# Patient Record
Sex: MALE | Race: WHITE | NOT HISPANIC OR LATINO | Employment: OTHER | URBAN - METROPOLITAN AREA
[De-identification: names, ages, dates, MRNs, and addresses within clinical notes are randomized per-mention and may not be internally consistent; named-entity substitution may affect disease eponyms.]

---

## 2017-07-21 ENCOUNTER — ALLSCRIPTS OFFICE VISIT (OUTPATIENT)
Dept: OTHER | Facility: OTHER | Age: 62
End: 2017-07-21

## 2018-01-13 VITALS
SYSTOLIC BLOOD PRESSURE: 132 MMHG | WEIGHT: 261.5 LBS | HEIGHT: 74 IN | BODY MASS INDEX: 33.56 KG/M2 | DIASTOLIC BLOOD PRESSURE: 76 MMHG | HEART RATE: 96 BPM

## 2018-02-22 ENCOUNTER — OFFICE VISIT (OUTPATIENT)
Dept: OBGYN CLINIC | Facility: CLINIC | Age: 63
End: 2018-02-22
Payer: MEDICARE

## 2018-02-22 VITALS
HEIGHT: 73 IN | SYSTOLIC BLOOD PRESSURE: 97 MMHG | DIASTOLIC BLOOD PRESSURE: 66 MMHG | BODY MASS INDEX: 34.59 KG/M2 | HEART RATE: 101 BPM | WEIGHT: 261 LBS

## 2018-02-22 DIAGNOSIS — M19.011 PRIMARY OSTEOARTHRITIS OF RIGHT SHOULDER: Primary | ICD-10-CM

## 2018-02-22 PROCEDURE — 20610 DRAIN/INJ JOINT/BURSA W/O US: CPT | Performed by: ORTHOPAEDIC SURGERY

## 2018-02-22 PROCEDURE — 99214 OFFICE O/P EST MOD 30 MIN: CPT | Performed by: ORTHOPAEDIC SURGERY

## 2018-02-22 RX ORDER — LIDOCAINE HYDROCHLORIDE 10 MG/ML
4 INJECTION, SOLUTION INFILTRATION; PERINEURAL
Status: COMPLETED | OUTPATIENT
Start: 2018-02-22 | End: 2018-02-22

## 2018-02-22 RX ORDER — BUPIVACAINE HYDROCHLORIDE 2.5 MG/ML
4 INJECTION, SOLUTION INFILTRATION; PERINEURAL
Status: COMPLETED | OUTPATIENT
Start: 2018-02-22 | End: 2018-02-22

## 2018-02-22 RX ORDER — SIMVASTATIN 40 MG
1 TABLET ORAL
COMMUNITY
Start: 2013-10-16 | End: 2022-02-01 | Stop reason: HOSPADM

## 2018-02-22 RX ORDER — GLYBURIDE-METFORMIN HYDROCHLORIDE 2.5; 5 MG/1; MG/1
1 TABLET ORAL 2 TIMES DAILY
COMMUNITY
Start: 2013-06-07 | End: 2022-02-01 | Stop reason: HOSPADM

## 2018-02-22 RX ORDER — LISINOPRIL 30 MG/1
1 TABLET ORAL DAILY
COMMUNITY
End: 2018-10-23

## 2018-02-22 RX ORDER — BLOOD-GLUCOSE METER
EACH MISCELLANEOUS DAILY
COMMUNITY
Start: 2013-06-07 | End: 2018-10-22

## 2018-02-22 RX ORDER — LANCETS 33 GAUGE
EACH MISCELLANEOUS
COMMUNITY
Start: 2013-06-20 | End: 2018-10-22

## 2018-02-22 RX ORDER — TRIAMCINOLONE ACETONIDE 40 MG/ML
40 INJECTION, SUSPENSION INTRA-ARTICULAR; INTRAMUSCULAR
Status: COMPLETED | OUTPATIENT
Start: 2018-02-22 | End: 2018-02-22

## 2018-02-22 RX ADMIN — TRIAMCINOLONE ACETONIDE 40 MG: 40 INJECTION, SUSPENSION INTRA-ARTICULAR; INTRAMUSCULAR at 15:44

## 2018-02-22 RX ADMIN — LIDOCAINE HYDROCHLORIDE 4 ML: 10 INJECTION, SOLUTION INFILTRATION; PERINEURAL at 15:44

## 2018-02-22 RX ADMIN — BUPIVACAINE HYDROCHLORIDE 4 ML: 2.5 INJECTION, SOLUTION INFILTRATION; PERINEURAL at 15:44

## 2018-02-22 NOTE — PROGRESS NOTES
CHIEF COMPLAINT:   Chief Complaint   Patient presents with    Right Shoulder - Pain, Follow-up       HISTORY: Ashutosh Echols is a 63 y o  male here for follow-up of right shoulder pain  He was last seen approximately 7 months ago  Cortisone injection was performed at that time  He reports mild, short-term relief after that injection  He reports insidious recurrence of pain in the right shoulder  He has stiffness in the joint  Pain is worse with activities  No new injuries  ROS: Other than what is noted in the history of present illness 12 point review of systems was obtained and was otherwise negative      PROBLEMS:   Patient Active Problem List   Diagnosis    Primary osteoarthritis of right shoulder       MEDS:   Current Outpatient Prescriptions   Medication Sig Dispense Refill    Blood Glucose Monitoring Suppl (ONE TOUCH ULTRA 2) w/Device KIT by In Vitro route daily      glyBURIDE-metFORMIN (GLUCOVANCE) 2 5-500 MG per tablet Take 1 tablet by mouth Twice daily      ONETOUCH DELICA LANCETS 90O MISC by Does not apply route      simvastatin (ZOCOR) 40 mg tablet Take 1 tablet by mouth daily      lisinopril (ZESTRIL) 30 mg tablet Take 1 tablet by mouth daily       No current facility-administered medications for this visit  ALLERGIES: Patient has no known allergies  MEDICAL HISTORY:   Past Medical History:   Diagnosis Date    Depression     Hypertension        SOCIAL:   Social History     Social History    Marital status: /Civil Union     Spouse name: N/A    Number of children: N/A    Years of education: N/A     Occupational History    Not on file       Social History Main Topics    Smoking status: Former Smoker     Quit date: 3/1/2012    Smokeless tobacco: Not on file    Alcohol use Not on file    Drug use: Unknown    Sexual activity: Not on file     Other Topics Concern    Not on file     Social History Narrative    No narrative on file       The medications, allergies, and history as listed above were all reviewed by myself during this encounter      PHYSICAL EXAM:  Vitals:   Vitals:    02/22/18 1518   BP: 97/66   BP Location: Left arm   Patient Position: Sitting   Cuff Size: Large   Pulse: 101   Weight: 118 kg (261 lb)   Height: 6' 1" (1 854 m)     Body mass index is 34 43 kg/m²  General:  Alert, no distress    ORTHO EXAM:   Right shoulder:  Skin is intact  Mild anterior joint tenderness  Active and passive forward elevation or both 120°  Active and passive external rotation are both to neutral  Strength is 5/5 in elevation, internal rotation, external rotation  Sensation intact to light touch in all nerve distributions  Hand is warm and well perfused  ASSESSMENT AND PLAN:  Prosper Ramesh was seen today for pain and follow-up  Diagnoses and all orders for this visit:    Primary osteoarthritis of right shoulder        Patient Instructions   We discussed options for treatment  Options include continued conservative measures versus surgical intervention  This time the decision was made for repeat cortisone injection  Injection was performed in the right shoulder today  Return if symptoms worsen or fail to improve      Large joint arthrocentesis  Date/Time: 2/22/2018 3:44 PM  Consent given by: patient  Timeout: Immediately prior to procedure a time out was called to verify the correct patient, procedure, equipment, support staff and site/side marked as required   Supporting Documentation  Indications: pain   Procedure Details  Location: shoulder - R glenohumeral  Preparation: Patient was prepped and draped in the usual sterile fashion  Needle size: 22 G  Ultrasound guidance: no  Approach: anterior  Medications administered: 4 mL bupivacaine 0 25 %; 4 mL lidocaine 1 %; 40 mg triamcinolone acetonide 40 mg/mL    Patient tolerance: patient tolerated the procedure well with no immediate complications  Dressing:  Sterile dressing applied

## 2018-02-22 NOTE — PATIENT INSTRUCTIONS
We discussed options for treatment  Options include continued conservative measures versus surgical intervention  This time the decision was made for repeat cortisone injection  Injection was performed in the right shoulder today

## 2018-07-18 ENCOUNTER — OFFICE VISIT (OUTPATIENT)
Dept: OBGYN CLINIC | Facility: MEDICAL CENTER | Age: 63
End: 2018-07-18
Payer: MEDICARE

## 2018-07-18 ENCOUNTER — APPOINTMENT (OUTPATIENT)
Dept: RADIOLOGY | Facility: MEDICAL CENTER | Age: 63
End: 2018-07-18
Payer: MEDICARE

## 2018-07-18 VITALS
HEIGHT: 73 IN | SYSTOLIC BLOOD PRESSURE: 132 MMHG | WEIGHT: 255.2 LBS | BODY MASS INDEX: 33.82 KG/M2 | HEART RATE: 99 BPM | DIASTOLIC BLOOD PRESSURE: 83 MMHG

## 2018-07-18 DIAGNOSIS — M25.561 ACUTE PAIN OF RIGHT KNEE: Primary | ICD-10-CM

## 2018-07-18 DIAGNOSIS — M25.561 ACUTE PAIN OF RIGHT KNEE: ICD-10-CM

## 2018-07-18 DIAGNOSIS — M17.11 PRIMARY OSTEOARTHRITIS OF RIGHT KNEE: ICD-10-CM

## 2018-07-18 PROCEDURE — 99214 OFFICE O/P EST MOD 30 MIN: CPT | Performed by: ORTHOPAEDIC SURGERY

## 2018-07-18 PROCEDURE — 73562 X-RAY EXAM OF KNEE 3: CPT

## 2018-07-18 RX ORDER — ACETAMINOPHEN 325 MG/1
975 TABLET ORAL ONCE
Status: CANCELLED | OUTPATIENT
Start: 2018-07-18 | End: 2018-07-18

## 2018-07-18 RX ORDER — FERROUS SULFATE TAB EC 324 MG (65 MG FE EQUIVALENT) 324 (65 FE) MG
324 TABLET DELAYED RESPONSE ORAL
Qty: 60 TABLET | Refills: 0 | Status: SHIPPED | OUTPATIENT
Start: 2018-07-18 | End: 2019-02-06

## 2018-07-18 RX ORDER — FOLIC ACID 1 MG/1
1 TABLET ORAL DAILY
Qty: 30 TABLET | Refills: 0 | Status: SHIPPED | OUTPATIENT
Start: 2018-07-18 | End: 2018-08-01

## 2018-07-18 RX ORDER — ASCORBIC ACID 500 MG
500 TABLET ORAL DAILY
Qty: 60 TABLET | Refills: 0 | Status: SHIPPED | OUTPATIENT
Start: 2018-07-18 | End: 2019-02-06

## 2018-07-18 RX ORDER — SODIUM CHLORIDE 9 MG/ML
125 INJECTION, SOLUTION INTRAVENOUS CONTINUOUS
Status: CANCELLED | OUTPATIENT
Start: 2018-07-18 | End: 2019-07-18

## 2018-07-18 NOTE — PROGRESS NOTES
Orthopedics          Randell Villatoro 66 y o  male MRN: [id number]      Chief Complaint:   right knee pain    HPI:   66 y o male complaining of right knee pain  Patient has been diagnosed with right knee pain due to osteoarthritis in the past   Patient has had an intra-articular injection of past with minimal pain relief  Patient states his right knee pain is worse with weight-bearing worse with activity worse with bending worse with going up and down stairs pain is aching in nature mildly relieved with rest   Patient denies any significant instability of his right knee states having significant decreasing range of motion of his right knee  He denies numbness tingling denies radiation of pain                  Review Of Systems:   · Skin: Normal  · Neuro: See HPI  · Musculoskeletal: See HPI  · 14 point review of systems negative except as stated above     Past Medical History:   Past Medical History:   Diagnosis Date    Depression     Hypertension        Past Surgical History:   Past Surgical History:   Procedure Laterality Date    BACK SURGERY      HAND SURGERY      KNEE SURGERY      SHOULDER SURGERY      TOOTH EXTRACTION         Family History:  Family history reviewed and non-contributory  Family History   Problem Relation Age of Onset    No Known Problems Mother     Heart attack Father        Social History:  Social History     Social History    Marital status: /Civil Union     Spouse name: N/A    Number of children: N/A    Years of education: N/A     Social History Main Topics    Smoking status: Former Smoker     Quit date: 3/1/2012    Smokeless tobacco: Never Used    Alcohol use None    Drug use: Unknown    Sexual activity: Not Asked     Other Topics Concern    None     Social History Narrative    None       Allergies:   No Known Allergies    Labs:  No results found for: HCT, HGB, PT, INR, WBC, ESR, CRP    Meds:    Current Outpatient Prescriptions:     Blood Glucose Monitoring Suppl (ONE TOUCH ULTRA 2) w/Device KIT, by In Vitro route daily, Disp: , Rfl:     glyBURIDE-metFORMIN (GLUCOVANCE) 2 5-500 MG per tablet, Take 1 tablet by mouth Twice daily, Disp: , Rfl:     lisinopril (ZESTRIL) 30 mg tablet, Take 1 tablet by mouth daily, Disp: , Rfl:     ONETOUCH DELICA LANCETS 19I MISC, by Does not apply route, Disp: , Rfl:     simvastatin (ZOCOR) 40 mg tablet, Take 1 tablet by mouth daily, Disp: , Rfl:       Physical Exam:     General Appearance:    Alert, cooperative, no distress, appears stated age   Head:    Normocephalic, without obvious abnormality, atraumatic   Eyes:    conjunctiva/corneas clear, both eyes        Nose:   Nares normal, septum midline, no drainage    Throat:   Lips normal; teeth and gums normal   Neck:    symmetrical, trachea midline, ;     thyroid:  no enlargement/   Back:     Symmetric, no curvature, ROM normal   Lungs:   No audible wheezing or labored breathing   Chest Wall:    No tenderness or deformity    Heart:    Regular rate and rhythm               Pulses:   2+ and symmetric all extremities   Skin:   Skin color, texture, turgor normal, no rashes or lesions   Neurologic:   normal strength, sensation and reflexes     throughout       Musculoskeletal: right lower extremity    Examination of the patient's right knee no effusion varus deformity 20° flexion contracture and 90° extension contracture  Pain on palpation medial lateral joint lines  Stable to varus and valgus stress with mild laxity valgus stress negative anterior posterior drawer quadriceps hamstring strength 5 out of ankle dorsal plantar flexion strength 5/5 no pain palpation pes anserine bursa is no pain palpation distal ITB band    Radiology:   I personally reviewed the films    X-rays right knee shows severe osteoarthritis medial joint space narrowing osteophyte formation and varus deformity    _*_*_*_*_*_*_*_*_*_*_*_*_*_*_*_*_*_*_*_*_*_*_*_*_*_*_*_*_*_*_*_*_*_*_*_*_*_*_*_*_*    Assessment:  63 y o male with right knee pain osteoarthritis    Plan:   · Weight bearing as tolerated  right lower extremity  · Patient is a candidate for right total knee arthroplasty  · Operative and non operative intervention reviewed with patient  Patient wishes to pursue operative intervention including total hknee arthroplasty  Risks and benefits of the procedure reviewed with the patient in great length  Risks include, but are not limited to, infection, bleeding, DVT/PE, gait abnormality, Limp, chronic pain, stiffness, dislocation, instability, fracture, failure of hardware, neurovascular injury, compartment syndrome, decreased range of motion, loss of limb and failure to achieve the desired results  Patient is aware of the risks and is willing to proceed  We'll followup with the patient in the postoperative period    · Follow up in 3 months or sooner if needed        Amanda Sweeney PA-C

## 2018-07-20 DIAGNOSIS — Z01.818 PRE-OP EXAM: Primary | ICD-10-CM

## 2018-07-24 ENCOUNTER — TELEPHONE (OUTPATIENT)
Dept: PREADMISSION TESTING | Facility: HOSPITAL | Age: 63
End: 2018-07-24

## 2018-07-24 LAB
LEFT EYE DIABETIC RETINOPATHY: NORMAL
RIGHT EYE DIABETIC RETINOPATHY: NORMAL
SEVERITY (EYE EXAM): NORMAL

## 2018-08-01 ENCOUNTER — OFFICE VISIT (OUTPATIENT)
Dept: OBGYN CLINIC | Facility: CLINIC | Age: 63
End: 2018-08-01
Payer: MEDICARE

## 2018-08-01 VITALS
HEIGHT: 74 IN | SYSTOLIC BLOOD PRESSURE: 134 MMHG | DIASTOLIC BLOOD PRESSURE: 87 MMHG | HEART RATE: 94 BPM | BODY MASS INDEX: 32.47 KG/M2 | WEIGHT: 253 LBS

## 2018-08-01 DIAGNOSIS — M25.511 CHRONIC RIGHT SHOULDER PAIN: ICD-10-CM

## 2018-08-01 DIAGNOSIS — M19.011 PRIMARY OSTEOARTHRITIS OF RIGHT SHOULDER: Primary | ICD-10-CM

## 2018-08-01 DIAGNOSIS — G89.29 CHRONIC RIGHT SHOULDER PAIN: ICD-10-CM

## 2018-08-01 PROCEDURE — 20610 DRAIN/INJ JOINT/BURSA W/O US: CPT | Performed by: FAMILY MEDICINE

## 2018-08-01 PROCEDURE — 99213 OFFICE O/P EST LOW 20 MIN: CPT | Performed by: FAMILY MEDICINE

## 2018-08-01 RX ORDER — LIDOCAINE HYDROCHLORIDE 10 MG/ML
4 INJECTION, SOLUTION INFILTRATION; PERINEURAL
Status: COMPLETED | OUTPATIENT
Start: 2018-08-01 | End: 2018-08-01

## 2018-08-01 RX ORDER — TRIAMCINOLONE ACETONIDE 40 MG/ML
40 INJECTION, SUSPENSION INTRA-ARTICULAR; INTRAMUSCULAR
Status: COMPLETED | OUTPATIENT
Start: 2018-08-01 | End: 2018-08-01

## 2018-08-01 RX ADMIN — TRIAMCINOLONE ACETONIDE 40 MG: 40 INJECTION, SUSPENSION INTRA-ARTICULAR; INTRAMUSCULAR at 10:01

## 2018-08-01 RX ADMIN — LIDOCAINE HYDROCHLORIDE 4 ML: 10 INJECTION, SOLUTION INFILTRATION; PERINEURAL at 10:01

## 2018-08-01 NOTE — PROGRESS NOTES
Assessment/Plan:  Assessment/Plan   Diagnoses and all orders for this visit:    Primary osteoarthritis of right shoulder  -     Large joint arthrocentesis    Chronic right shoulder pain       60-year-old right-hand-dominant male with right shoulder pain  Discussed with patient physical exam, impression, and plan  Review of previous shoulder x-rays noted for  glenohumeral arthritis  His physical exam is noted for anterior glenohumeral tenderness and limited range of motion with forward flexion to 120° and abduction to 130° with internal rotation to the right buttock  He is noted to have 4+/ 5 strength supraspinatus and external rotation  Given the patient experienced improvement in pain after intra-articular injection July 2017, I offered patient corticosteroid injection to which he agreed  I administered mixture of 4 cc 1% lidocaine and 1 cc Kenalog to the glenohumeral joint without complication and upon reassessment he demonstrated improved range of motion with improved pain  He is advised to continue with activities tolerated he may follow up in 3 months at which point he will be re-evaluated  Subjective:   Patient ID: Simba Gerard is a 66 y o  male  Chief Complaint   Patient presents with    Right Shoulder - Pain        60-year-old right-hand-dominant male presents for right shoulder pain more than 1 year duration  He was last seen in this regard 1 year ago at which point he received intra-articular corticosteroid injection  He states that immediately after receiving the injection he experiences relief of pain  Pain gradually returned over the last month and is described as localized to anterior lateral aspect of the shoulder, achy and sometimes sharp, nonradiating, worse with elevating the arm, improved with rest, and associated with limited range of motion  He has had difficulty with reaching behind his back and with getting dressed    He has not been taking any medications to help with his pain       Shoulder Pain   This is a recurrent problem  The current episode started more than 1 year ago  The problem occurs intermittently  The problem has been waxing and waning  Associated symptoms include arthralgias and myalgias  Pertinent negatives include no numbness or weakness  Exacerbated by: Arm elevation  He has tried rest for the symptoms  The treatment provided no relief  Review of Systems   Musculoskeletal: Positive for arthralgias and myalgias  Neurological: Negative for weakness and numbness  Objective:  Vitals:    08/01/18 0928   BP: 134/87   Pulse: 94   Weight: 115 kg (253 lb)   Height: 6' 2" (1 88 m)     Right Elbow Exam     Tenderness   The patient is experiencing no tenderness  Range of Motion   The patient has normal right elbow ROM  Muscle Strength   The patient has normal right elbow strength  Right Shoulder Exam     Range of Motion   Active Abduction: 130   Passive Abduction: 140   Forward Flexion: 120   Right shoulder internal rotation 0 degrees: Right buttock  Muscle Strength   Abduction: 5/5   Internal Rotation: 5/5   Subscapularis: 5/5   Biceps: 5/5     Comments:   4+/ 5 strength supraspinatus and external rotation            Physical Exam   Constitutional: He is oriented to person, place, and time  He appears well-developed  No distress  HENT:   Head: Normocephalic and atraumatic  Eyes: Conjunctivae are normal    Neck: No tracheal deviation present  Cardiovascular: Normal rate  Pulmonary/Chest: Effort normal  No respiratory distress  Abdominal: He exhibits no distension  Neurological: He is alert and oriented to person, place, and time  Skin: Skin is warm and dry  Psychiatric: He has a normal mood and affect  His behavior is normal    Nursing note and vitals reviewed            Large joint arthrocentesis  Date/Time: 8/1/2018 10:01 AM  Consent given by: patient  Site marked: site marked  Timeout: Immediately prior to procedure a time out was called to verify the correct patient, procedure, equipment, support staff and site/side marked as required   Supporting Documentation  Indications: pain   Procedure Details  Location: shoulder - R glenohumeral  Preparation: Patient was prepped and draped in the usual sterile fashion  Needle size: 22 G  Approach: posterior  Medications administered: 4 mL lidocaine 1 %; 40 mg triamcinolone acetonide 40 mg/mL    Patient tolerance: patient tolerated the procedure well with no immediate complications  Dressing:  Sterile dressing applied

## 2018-10-15 ENCOUNTER — TELEPHONE (OUTPATIENT)
Dept: PREADMISSION TESTING | Facility: HOSPITAL | Age: 63
End: 2018-10-15

## 2018-10-16 ENCOUNTER — TELEPHONE (OUTPATIENT)
Dept: PREADMISSION TESTING | Facility: HOSPITAL | Age: 63
End: 2018-10-16

## 2018-10-17 DIAGNOSIS — Z01.818 PREOP EXAMINATION: Primary | ICD-10-CM

## 2018-10-19 ENCOUNTER — TELEPHONE (OUTPATIENT)
Dept: PREADMISSION TESTING | Facility: HOSPITAL | Age: 63
End: 2018-10-19

## 2018-10-22 ENCOUNTER — APPOINTMENT (OUTPATIENT)
Dept: LAB | Facility: HOSPITAL | Age: 63
End: 2018-10-22
Attending: PHYSICIAN ASSISTANT
Payer: MEDICARE

## 2018-10-22 ENCOUNTER — OFFICE VISIT (OUTPATIENT)
Dept: LAB | Facility: HOSPITAL | Age: 63
End: 2018-10-22
Attending: ORTHOPAEDIC SURGERY
Payer: MEDICARE

## 2018-10-22 DIAGNOSIS — Z01.818 PREOP EXAMINATION: ICD-10-CM

## 2018-10-22 DIAGNOSIS — M17.11 PRIMARY OSTEOARTHRITIS OF RIGHT KNEE: ICD-10-CM

## 2018-10-22 DIAGNOSIS — Z01.818 PRE-OP EXAM: ICD-10-CM

## 2018-10-22 LAB
ABO GROUP BLD: NORMAL
ALBUMIN SERPL BCP-MCNC: 4.2 G/DL (ref 3.5–5)
ALP SERPL-CCNC: 65 U/L (ref 46–116)
ALT SERPL W P-5'-P-CCNC: 84 U/L (ref 12–78)
ANION GAP SERPL CALCULATED.3IONS-SCNC: 8 MMOL/L (ref 4–13)
APTT PPP: 28 SECONDS (ref 24–36)
AST SERPL W P-5'-P-CCNC: 38 U/L (ref 5–45)
ATRIAL RATE: 102 BPM
BASOPHILS # BLD AUTO: 0.04 THOUSANDS/ΜL (ref 0–0.1)
BASOPHILS NFR BLD AUTO: 1 % (ref 0–1)
BILIRUB SERPL-MCNC: 0.32 MG/DL (ref 0.2–1)
BLD GP AB SCN SERPL QL: NEGATIVE
BUN SERPL-MCNC: 21 MG/DL (ref 5–25)
CALCIUM SERPL-MCNC: 9.8 MG/DL (ref 8.3–10.1)
CHLORIDE SERPL-SCNC: 105 MMOL/L (ref 100–108)
CO2 SERPL-SCNC: 24 MMOL/L (ref 21–32)
CREAT SERPL-MCNC: 1.01 MG/DL (ref 0.6–1.3)
CRP SERPL QL: <3 MG/L
EOSINOPHIL # BLD AUTO: 0.13 THOUSAND/ΜL (ref 0–0.61)
EOSINOPHIL NFR BLD AUTO: 2 % (ref 0–6)
ERYTHROCYTE [DISTWIDTH] IN BLOOD BY AUTOMATED COUNT: 12.9 % (ref 11.6–15.1)
EST. AVERAGE GLUCOSE BLD GHB EST-MCNC: 169 MG/DL
FERRITIN SERPL-MCNC: 357 NG/ML (ref 8–388)
GFR SERPL CREATININE-BSD FRML MDRD: 80 ML/MIN/1.73SQ M
GLUCOSE SERPL-MCNC: 121 MG/DL (ref 65–140)
HBA1C MFR BLD: 7.5 % (ref 4.2–6.3)
HCT VFR BLD AUTO: 42.7 % (ref 36.5–49.3)
HGB BLD-MCNC: 14.7 G/DL (ref 12–17)
IMM GRANULOCYTES # BLD AUTO: 0.01 THOUSAND/UL (ref 0–0.2)
IMM GRANULOCYTES NFR BLD AUTO: 0 % (ref 0–2)
INR PPP: 1.1 (ref 0.86–1.17)
IRON SATN MFR SERPL: 45 %
IRON SERPL-MCNC: 110 UG/DL (ref 65–175)
LYMPHOCYTES # BLD AUTO: 1.58 THOUSANDS/ΜL (ref 0.6–4.47)
LYMPHOCYTES NFR BLD AUTO: 22 % (ref 14–44)
MCH RBC QN AUTO: 31.9 PG (ref 26.8–34.3)
MCHC RBC AUTO-ENTMCNC: 34.4 G/DL (ref 31.4–37.4)
MCV RBC AUTO: 93 FL (ref 82–98)
MONOCYTES # BLD AUTO: 0.83 THOUSAND/ΜL (ref 0.17–1.22)
MONOCYTES NFR BLD AUTO: 12 % (ref 4–12)
NEUTROPHILS # BLD AUTO: 4.52 THOUSANDS/ΜL (ref 1.85–7.62)
NEUTS SEG NFR BLD AUTO: 63 % (ref 43–75)
NRBC BLD AUTO-RTO: 0 /100 WBCS
P AXIS: 52 DEGREES
PLATELET # BLD AUTO: 238 THOUSANDS/UL (ref 149–390)
PMV BLD AUTO: 9.9 FL (ref 8.9–12.7)
POTASSIUM SERPL-SCNC: 4.2 MMOL/L (ref 3.5–5.3)
PR INTERVAL: 168 MS
PROT SERPL-MCNC: 7.7 G/DL (ref 6.4–8.2)
PROTHROMBIN TIME: 14.3 SECONDS (ref 11.8–14.2)
QRS AXIS: 40 DEGREES
QRSD INTERVAL: 86 MS
QT INTERVAL: 334 MS
QTC INTERVAL: 435 MS
RBC # BLD AUTO: 4.61 MILLION/UL (ref 3.88–5.62)
RH BLD: POSITIVE
SODIUM SERPL-SCNC: 137 MMOL/L (ref 136–145)
SPECIMEN EXPIRATION DATE: NORMAL
T WAVE AXIS: 96 DEGREES
TIBC SERPL-MCNC: 243 UG/DL (ref 250–450)
VENTRICULAR RATE: 102 BPM
WBC # BLD AUTO: 7.11 THOUSAND/UL (ref 4.31–10.16)

## 2018-10-22 PROCEDURE — 83036 HEMOGLOBIN GLYCOSYLATED A1C: CPT

## 2018-10-22 PROCEDURE — 80053 COMPREHEN METABOLIC PANEL: CPT

## 2018-10-22 PROCEDURE — 93010 ELECTROCARDIOGRAM REPORT: CPT | Performed by: INTERNAL MEDICINE

## 2018-10-22 PROCEDURE — 83540 ASSAY OF IRON: CPT

## 2018-10-22 PROCEDURE — 83550 IRON BINDING TEST: CPT

## 2018-10-22 PROCEDURE — 93005 ELECTROCARDIOGRAM TRACING: CPT

## 2018-10-22 PROCEDURE — 86140 C-REACTIVE PROTEIN: CPT

## 2018-10-22 PROCEDURE — 85610 PROTHROMBIN TIME: CPT

## 2018-10-22 PROCEDURE — 86900 BLOOD TYPING SEROLOGIC ABO: CPT

## 2018-10-22 PROCEDURE — 86901 BLOOD TYPING SEROLOGIC RH(D): CPT

## 2018-10-22 PROCEDURE — 36415 COLL VENOUS BLD VENIPUNCTURE: CPT

## 2018-10-22 PROCEDURE — 85730 THROMBOPLASTIN TIME PARTIAL: CPT

## 2018-10-22 PROCEDURE — 82728 ASSAY OF FERRITIN: CPT

## 2018-10-22 PROCEDURE — 86850 RBC ANTIBODY SCREEN: CPT

## 2018-10-22 PROCEDURE — 85025 COMPLETE CBC W/AUTO DIFF WBC: CPT

## 2018-10-22 RX ORDER — FOLIC ACID 1 MG/1
TABLET ORAL DAILY
COMMUNITY
End: 2019-02-06

## 2018-10-22 NOTE — PRE-PROCEDURE INSTRUCTIONS
Pre-Surgery Instructions:   Medication Instructions    ascorbic acid (VITAMIN C) 500 MG tablet Instructed patient per Anesthesia Guidelines   ferrous sulfate 324 (65 Fe) mg Instructed patient per Anesthesia Guidelines   folic acid (FOLVITE) 1 mg tablet Instructed patient per Anesthesia Guidelines   glyBURIDE-metFORMIN (GLUCOVANCE) 2 5-500 MG per tablet Instructed patient per Anesthesia Guidelines   lisinopril (ZESTRIL) 30 mg tablet Instructed patient per Anesthesia Guidelines   simvastatin (ZOCOR) 40 mg tablet Instructed patient per Anesthesia Guidelines  Patient had PAT appt  Medication list reviewed & instructed  As of 11 5 18 instructed on tylenol products only  Pt taking folic acid, vit C, iron and will continue until 11 11 18  Last dose of lisinopril 11 10 18  Am DOS no medications   Pt has [facility] script filled @ pharmacy, will  for post-op use  Showering instructions and cloths given by office  Soap and IS given at time of appt  All instructions verbally understood by patient  Pt instructed no alcohol 24 hour prior  Pt to bring copy of A D  All instructions verbally understood by patient  No further questions  Callback number given  ACE/ARB Med Class     Do not take this medication the day before and the morning of the day of surgery/procedure  Low Molecular Weight Heparin Med Class     Stop taking this medication at least 12-24 hours prior to surgery/procedure with prescribing Physician and Surgeon consultation  Insulin Med Class     Pre-Surgery/Procedure Instructions for Adult Patients who Take Medicine for Diabetes or to Control their Blood Sugar     Day Before Surgery/Procedure  Use the directions based on the type of medicine you take for your diabetes  1  If you are having a procedure that does not require a bowel prep:  ? Pre-Mixed Insulin (Intermediate Acting: Humalog 75/25, Humulin 70/30   Novolog 70/30, Regular Insulin)  § Take ½ your regular dose the evening before your procedure  ? Rapid/Fast Acting Insulin/Long Acting Insulin (Humalog U200, NovoLog, Apidra, Lantus, Levemir, Nadeem Leatha, [place])  § Take your FULL regular dose the day before procedure  ? Oral Diabetic Medicines including Glipizide/Glimepiride/Glucotrol (sulfonylurea)  § Take your regular dose with dinner the evening before your procedure  2  If you are having a procedure (e g  Colonoscopy) that requires a bowel prep and you are allowed to have at least a clear liquid diet:  ? Pre-Mixed Insulin (Intermediate Acting: Humalog 75/25, Humulin 70/30, Novolog 70/30, Regular Insulin)  § Take ½ your regular dose the evening before your procedure  ? Rapid/Fast Acting Insulin (Humalog U200, NovoLog, Apidra, Fiasp)  § Take ½ your regular dose the evening before your procedure  ? Long Acting Insulin (Lantus, Levemir, Nadeem Leatha)  § Take your FULL regular dose the day before procedure  ? Oral Glipizide/Glimepiride/Glucotrol (sulfonylurea)  § Take ½ your regular dose the evening before your procedure  ? Oral Diabetic Medicines that are NOT Glipizide/Glimepiride/Glucotrol  § Take your regular dose with dinner in the evening before your procedure      Day of Surgery/Procedure  · Long Acting Insulin (Lantus, Levemir, Nadeem Leatha)  ? If you usually take your Long-Acting Insulin in the morning, take the full dose as scheduled  · With the exception of the morning Long-Acting Insulin noted above, DO NOT take ANY diabetic medicine on the day of your procedure unless you were instructed by the doctor who manages your diabetic medicines  · Continue to check your blood sugars  · If you have an insulin pump then consult with your Endocrinologist for instructions  · If you cannot see your Endocrinologist, on the day of the procedure set your insulin pump to your basal rate only   Please bring your insulin pump supplies to the hospital      This Educational material has been approved by the Patient Education Advisory Committee  Date prepared: 1/17/2018          Expiration date: 1/17/2019        Approval Number:                     Statin Med Class     Continue to take this medication on your normal schedule  If this is an oral medication and you take it in the morning, then you may take this medicine with a sip of water

## 2018-10-23 RX ORDER — LISINOPRIL 40 MG/1
40 TABLET ORAL DAILY
COMMUNITY
End: 2022-02-01 | Stop reason: HOSPADM

## 2018-10-24 ENCOUNTER — TELEPHONE (OUTPATIENT)
Dept: OBGYN CLINIC | Facility: MEDICAL CENTER | Age: 63
End: 2018-10-24

## 2018-10-29 ENCOUNTER — OFFICE VISIT (OUTPATIENT)
Dept: INTERNAL MEDICINE CLINIC | Facility: CLINIC | Age: 63
End: 2018-10-29
Payer: MEDICARE

## 2018-10-29 VITALS
HEIGHT: 74 IN | DIASTOLIC BLOOD PRESSURE: 70 MMHG | WEIGHT: 254.2 LBS | OXYGEN SATURATION: 93 % | HEART RATE: 90 BPM | SYSTOLIC BLOOD PRESSURE: 122 MMHG | BODY MASS INDEX: 32.62 KG/M2

## 2018-10-29 DIAGNOSIS — Z01.818 PRE-OPERATIVE EXAMINATION: Primary | ICD-10-CM

## 2018-10-29 DIAGNOSIS — Z23 ENCOUNTER FOR IMMUNIZATION: ICD-10-CM

## 2018-10-29 DIAGNOSIS — E66.9 OBESITY (BMI 30-39.9): ICD-10-CM

## 2018-10-29 DIAGNOSIS — E78.2 MIXED HYPERLIPIDEMIA: Chronic | ICD-10-CM

## 2018-10-29 DIAGNOSIS — M17.11 PRIMARY OSTEOARTHRITIS OF RIGHT KNEE: ICD-10-CM

## 2018-10-29 DIAGNOSIS — I10 ESSENTIAL HYPERTENSION: Chronic | ICD-10-CM

## 2018-10-29 DIAGNOSIS — E11.9 TYPE 2 DIABETES MELLITUS WITHOUT COMPLICATION, WITHOUT LONG-TERM CURRENT USE OF INSULIN (HCC): Chronic | ICD-10-CM

## 2018-10-29 DIAGNOSIS — Z71.3 DIETARY COUNSELING AND SURVEILLANCE: ICD-10-CM

## 2018-10-29 PROCEDURE — 90732 PPSV23 VACC 2 YRS+ SUBQ/IM: CPT | Performed by: INTERNAL MEDICINE

## 2018-10-29 PROCEDURE — 99204 OFFICE O/P NEW MOD 45 MIN: CPT | Performed by: INTERNAL MEDICINE

## 2018-10-29 PROCEDURE — G0009 ADMIN PNEUMOCOCCAL VACCINE: HCPCS | Performed by: INTERNAL MEDICINE

## 2018-10-29 NOTE — PATIENT INSTRUCTIONS
Type 2 Diabetes in Adults   AMBULATORY CARE:   Type 2 diabetes  is a disease that affects how your body uses glucose (sugar)  Normally, when the blood sugar level increases, the pancreas makes more insulin  Insulin helps move sugar out of the blood so it can be used for energy  Type 2 diabetes develops because either the body cannot make enough insulin, or it cannot use the insulin correctly  After many years, your pancreas may stop making insulin  Common symptoms include the following:   · More hunger or thirst than usual     · Frequent urination     · Weight loss without trying     · Blurred vision  Call 911 if you have any of the following:   · You have any of the following signs of a stroke:      ¨ Numbness or drooping on one side of your face     ¨ Weakness in an arm or leg    ¨ Confusion or difficulty speaking    ¨ Dizziness, a severe headache, or vision loss    · You have any of the following signs of a heart attack:      ¨ Squeezing, pressure, or pain in your chest that lasts longer than 5 minutes or returns    ¨ Discomfort or pain in your back, neck, jaw, stomach, or arm     ¨ Trouble breathing    ¨ Nausea or vomiting    ¨ Lightheadedness or a sudden cold sweat, especially with chest pain or trouble breathing  Seek care immediately if:   · You have severe abdominal pain, or the pain spreads to your back  You may also be vomiting  · You have trouble staying awake or focusing  · You are shaking or sweating  · You have blurred or double vision  · Your breath has a fruity, sweet smell  · Your breathing is deep and labored, or rapid and shallow  · Your heartbeat is fast and weak  Contact your healthcare provider if:   · You are vomiting or have diarrhea  · You have an upset stomach and cannot eat the foods on your meal plan  · You feel weak or more tired than usual      · You feel dizzy, have headaches, or are easily irritated  · Your skin is red, warm, dry, or swollen  · You have a wound that does not heal      · You have numbness in your arms or legs  · You have trouble coping with your illness, or you feel anxious or depressed  · You have questions or concerns about your condition or care  Treatment for type 2 diabetes  includes keeping your blood sugar at a normal level  You must eat the right foods, and exercise regularly  You may need medicine if you cannot control your blood sugar level with nutrition and exercise  You may also need medicine to prevent heart disease, a complication of type 2 diabetes  You may  need any of the following:  · Hypoglycemic medicines or insulin  may be given to decrease the amount of sugar in your blood  · Blood pressure medicine  may be given to lower your blood pressure  Your blood pressure should be less than 140/90  · Cholesterol lowering medicine  may be given to prevent heart disease  · Antiplatelets , such as aspirin, help prevent blood clots  Take your antiplatelet medicine exactly as directed  These medicines make it more likely for you to bleed or bruise  If you are told to take aspirin, do not take acetaminophen or ibuprofen instead  · Take your medicine as directed  Contact your healthcare provider if you think your medicine is not helping or if you have side effects  Tell him or her if you are allergic to any medicine  Keep a list of the medicines, vitamins, and herbs you take  Include the amounts, and when and why you take them  Bring the list or the pill bottles to follow-up visits  Carry your medicine list with you in case of an emergency  Check your blood sugar level: You will be taught how to check a small drop of blood in a glucose monitor  You will need to check your blood sugar level at least 3 times each day if you are on insulin  Ask your healthcare provider when and how often to check during the day  If you check your blood sugar level before a meal , it should be between 80 and 130 mg/dL   If you check your blood sugar level 1 to 2 hours after a meal , it should be less than 180 mg/dL  Ask your healthcare provider if these are good goals for you  Write down your results, and show them to your healthcare provider  He may use the results to make changes to your medicine, food, and exercise schedules  If your blood sugar level is too low: Your blood sugar level is too low if it goes below 70 mg/dL  If the level is too low, eat or drink 15 grams of fast-acting carbohydrate  These are found naturally in fruits  Fast-acting carbohydrates will raise your blood sugar level quickly  Examples of 15 grams of fast-acting carbohydrate are 4 ounces (½ cup) of fruit juice or 4 ounces of regular soda  Other examples are 2 tablespoons of raisins or 3 to 4 glucose tablets  Check your blood sugar level 15 minutes later  If the level is still low (less than 100 mg/dL), eat another 15 grams of carbohydrate  When the level returns to 100 mg/dL, eat a snack or meal that contains carbohydrates  This will help prevent another drop in blood sugar  Always carefully follow your healthcare provider's instructions on how to treat low blood sugar levels  Check your feet each day for sores:  Wear shoes and socks that fit correctly  Do not trim your toenails  Ask your healthcare provider for more information about foot care  Follow your meal plan:  A dietitian will help you make a meal plan to keep your blood sugar level steady  Do not skip meals  Your blood sugar level may drop too low if you have taken diabetes medicine and do not eat  · Keep track of carbohydrates (sugar and starchy foods)  Your blood sugar level can get too high if you eat too many carbohydrates  Your dietitian will help you plan meals and snacks that have the right amount of carbohydrates  · Eat low-fat foods , such as skinless chicken and low-fat milk  · Eat less sodium (salt)    Limit high-sodium foods, such as soy sauce, potato chips, and soup  Do not add salt to food you cook  Limit your use of table salt  You should have less than 2,300 mg of sodium per day  · Eat high-fiber foods , such as vegetables, whole grain breads, and beans  · Limit alcohol  Alcohol affects your blood sugar level and can make it harder to manage your diabetes  Limit alcohol to 1 drink a day if you are a woman  Limit alcohol to 2 drinks a day if you are a man  A drink of alcohol is 12 ounces of beer, 5 ounces of wine, or 1½ ounces of liquor  Maintain a healthy weight:  Ask your healthcare provider how much you should weigh  A healthy weight can help you control your diabetes  Ask your provider to help you create a weight loss plan if you are overweight  Together you can set manageable weight loss goals  Exercise as directed:  Exercise can help keep your blood sugar level steady, decrease your risk of heart disease, and help you lose weight  Stretch before and after you exercise  Exercise for at least 150 minutes every week  Spread this amount of exercise over at least 3 days a week  Do not skip exercise more than 2 days in a row  Include muscle strengthening activities 2 to 3 days each week  Older adults should include balance training 2 to 3 times each week  Activities that help increase balance include yoga and bonny chi  Work with your healthcare provider to create an exercise plan  · Check your blood sugar level before and after exercise  Healthcare providers may tell you to change the amount of insulin you take or food you eat  If your blood sugar level is high, check your blood or urine for ketones before you exercise  Do not exercise if your blood sugar level is high and you have ketones  · If your blood sugar level is less than 100 mg/dL, have a carbohydrate snack before you exercise  Examples are 4 to 6 crackers, ½ banana, 8 ounces (1 cup) of milk, or 4 ounces (½ cup) of juice   Drink water or liquids that do not contain sugar before, during, and after exercise  Ask your dietitian or healthcare provider which liquids you should drink when you exercise  · Do not sit for longer than 30 minutes  If you cannot walk around, at least stand up  This will help you stay active and keep your blood circulating  Do not smoke:  Nicotine and other chemicals in cigarettes and cigars can cause lung damage and make it more difficult to manage your diabetes  Ask your healthcare provider for information if you currently smoke and need help to quit  Do not use e-cigarettes or smokeless tobacco in place of cigarettes or to help you quit  They still contain nicotine  Check your blood pressure as directed:  Ask your healthcare provider what your blood pressure should be  Most adults with diabetes and high blood pressure should have a systolic blood pressure (first number) less than 140  Your diastolic blood pressure (second number) should be less than 90  Wear medical alert identification:  Wear medical alert jewelry or carry a card that says you have diabetes  Ask your healthcare provider where to get these items  Ask about vaccines: You have a higher risk for serious illness if you get the flu, pneumonia, or hepatitis  Ask your healthcare provider if you should get a flu, pneumonia, or hepatitis B vaccine, and when to get the vaccine  Follow up with your healthcare provider as directed: You may need to return to have your A1c checked every 3 months  You will need to return at least once each year to have your feet checked  You will need an eye exam once a year to check for retinopathy  You will also need urine tests every year to check for kidney problems  You may need tests to monitor for heart disease such as an EKG, stress test, blood pressure monitoring, and blood tests  Write down your questions so you remember to ask them during your visits     © 2017 [address] [employer] Information is for End User's use only and may not be sold, redistributed or otherwise used for commercial purposes  All illustrations and images included in CareNotes® are the copyrighted property of A D A M [employer] Inc  or Jhonny Metcalf  The above information is an  only  It is not intended as medical advice for individual conditions or treatments  Talk to your doctor, nurse or pharmacist before following any medical regimen to see if it is safe and effective for you

## 2018-10-29 NOTE — PROGRESS NOTES
INTERNAL MEDICINE INITIAL OFFICE VISIT  [facility] - [facility]    NAME: Tara Ferrer  AGE: 66 y o  SEX: male  : 1955     DATE: 10/29/2018     Assessment and Plan:     1  Pre-operative examination  2  Primary osteoarthritis of right knee    Pre-operative labs and EKG were reviewed  His diabetes is acceptable to undergo surgery at this time  Chest x-ray was unremarkable  His cardiovascular risk is low  He is CLEARED for surgery  His lungs were clear on exam today  Can hold his diabetes medications along with lisinopril the morning of surgery  3  Type 2 diabetes mellitus without complication, without long-term current use of insulin (HCC)    Patient's A1C is acceptable  Weight loss and diabetic diet recommended  Continue current medications as prescribed  [facility]  4  Mixed hyperlipidemia    Continue statin as prescribed  5  Essential hypertension    Blood pressure is well controlled  Continue lisinopril  6  Obesity (BMI 30-39  9)    Patient's Body mass index is 32 64 kg/m²  Discussed the patient's BMI  The BMI is above average; BMI counseling and education was provided to the patient  General weight loss/lifestyle modification strategies discussed (elicit support from others; identify saboteurs; non-food rewards, etc)  Diet interventions: low carb/low calorie diet recommended  Physical activity limited by his underlying arthritis    7  Encounter for immunization  - PNEUMOCOCCAL POLYSACCHARIDE VACCINE 23-VALENT =>1YO SQ IM    Return in about 4 months (around 2019) for Follow-up, [facility]  Chief Complaint:     Chief Complaint   Patient presents with   [address]     [facility] pt   Oleksandr Burden- 2018      History of Present Illness:     Patient presents to [facility] [place] care and he also requires pre-operative evaluation   He has an underlying history of osteoarthritis of bilateral knees, HTN, HLD, diabetes, depression  He was formerly injured in the [facility] ([place]  He had previous 4 operations on his left knee and 1 previous operation on his right knee  He follows regularly with the [place] for most of his health needs  His A1C is usually around 7 0-7 5%  His most recent A1C was 7 5%  He is taking onglyza, glyburide, and metformin  He denies any history of CAD, CHF, CVD, CKD  He denies any chest pain, shortness of breath, palpitations, nausea, vomiting, abdominal pain, fever, chills  No history of abnormal stress test or Echocardiogram  Patient has failed conservative treatment and has continued to suffer with pain and disability from his right knee  For this reason, he has elected to undergo right total knee replacement on 11/19/2018  He gets his eyes and feet checked through the [place]  Denies any history of neuropathy or retinopathy  He has no current concerns for his health  Just wants his knee fixed so he can stop being in so much pain  Denies any history of DVT/PE  Denies any history of abnormal bleeding  Denies any prior reactions to anesthesia  The following portions of the patient's history were reviewed and updated as appropriate: allergies, current medications, past family history, past medical history, past social history, past surgical history and problem list      Review of Systems:     Review of Systems   Constitutional: Negative for appetite change, chills, fatigue and fever  HENT: Negative for congestion, hearing loss, postnasal drip, rhinorrhea, sore throat, tinnitus and trouble swallowing  Eyes: Negative for pain, discharge, redness and visual disturbance  Respiratory: Negative for cough, chest tightness, shortness of breath and wheezing  Cardiovascular: Negative for chest pain, palpitations and leg swelling  Gastrointestinal: Negative for abdominal distention, abdominal pain, blood in stool, constipation, diarrhea, nausea and vomiting     Endocrine: Negative for cold intolerance, heat intolerance, polydipsia, polyphagia and polyuria  Genitourinary: Negative for difficulty urinating, dysuria, frequency, hematuria and urgency  Musculoskeletal: Positive for arthralgias and gait problem  Negative for back pain, joint swelling, myalgias, neck pain and neck stiffness  Skin: Negative for color change and rash  Neurological: Negative for dizziness, tremors, seizures, syncope, speech difficulty, weakness, light-headedness, numbness and headaches  Hematological: Negative for adenopathy  Does not bruise/bleed easily  Psychiatric/Behavioral: Negative for agitation, behavioral problems, confusion, hallucinations, sleep disturbance and suicidal ideas  The patient is not nervous/anxious  Past Medical History:     Past Medical History:   Diagnosis Date    Depression     Diabetes mellitus ([facility] [address] )     Hyperlipidemia     Hypertension     Osteoarthritis, knee       Past Surgical History:     Past Surgical History:   Procedure Laterality Date    BACK SURGERY      HAND SURGERY      JOINT REPLACEMENT      KNEE SURGERY Left     SHOULDER SURGERY      TOOTH EXTRACTION      WISDOM TOOTH EXTRACTION        Social History:   He reports that he quit smoking about 6 years ago  His smoking use included Cigarettes  He has never used smokeless tobacco  He reports that he drinks alcohol  He reports that he does not use drugs       Family History:     Family History   Problem Relation Age of Onset    No Known Problems Mother     Heart attack Father     Arthritis Family     Cancer Family     Diabetes Family     Heart disease Family     Osteoporosis Family       Current Medications:     Current Outpatient Prescriptions:     ascorbic acid (VITAMIN C) 500 MG tablet, Take 1 tablet (500 mg total) by mouth daily, Disp: 60 tablet, Rfl: 0    enoxaparin (LOVENOX) 40 mg/0 4 mL, 1 subcutaneous injection daily x 28 days AFTER surgery, Disp: 28 Syringe, Rfl: 0    ferrous sulfate 324 (65 Fe) mg, Take 1 tablet (324 mg total) by mouth 2 (two) times a day before meals, Disp: 60 tablet, Rfl: 0    folic acid (FOLVITE) 1 mg tablet, Take by mouth daily, Disp: , Rfl:     glyBURIDE-metFORMIN (GLUCOVANCE) 2 5-500 MG per tablet, Take 1 tablet by mouth Twice daily, Disp: , Rfl:     lisinopril (ZESTRIL) 40 mg tablet, Take 40 mg by mouth daily, Disp: , Rfl:     saxagliptin (ONGLYZA) 5 MG tablet, Take 5 mg by mouth daily, Disp: , Rfl:     simvastatin (ZOCOR) 40 mg tablet, Take 1 tablet by mouth daily at bedtime  , Disp: , Rfl:      Allergies:   No Known Allergies     Physical Exam:     /70 (BP Location: Left arm, Patient Position: Sitting, Cuff Size: Standard)   Pulse 90   Ht 6' 2" (1 88 m)   Wt 115 kg (254 lb 3 2 oz)   SpO2 93%   BMI 32 64 kg/m²     Physical Exam   Constitutional: He is oriented to person, place, and time  He appears well-developed and well-nourished  No distress  Obesity   HENT:   Crowded oropharynx   Eyes: Conjunctivae are normal  Right eye exhibits no discharge  Left eye exhibits no discharge  No scleral icterus  Neck: Neck supple  No JVD present  No thyromegaly present  Cardiovascular: Normal rate, regular rhythm, normal heart sounds and intact distal pulses  Exam reveals no gallop and no friction rub  Pulses are no weak pulses  No murmur heard  Pulses:       Dorsalis pedis pulses are 2+ on the right side, and 2+ on the left side  Posterior tibial pulses are 2+ on the right side, and 2+ on the left side  Pulmonary/Chest: Effort normal and breath sounds normal  No respiratory distress  He has no wheezes  He has no rales  He exhibits no tenderness  Abdominal: Soft  Bowel sounds are normal  He exhibits no distension and no mass  There is no tenderness  There is no rebound and no guarding  Musculoskeletal: He exhibits no edema  Knee crepitus   Feet:   Right Foot:   Skin Integrity: Negative for ulcer, skin breakdown, erythema, warmth, callus or dry skin     Left Foot:   Skin Integrity: Negative for ulcer, skin breakdown, erythema, warmth, callus or dry skin  Lymphadenopathy:     He has no cervical adenopathy  Neurological: He is alert and oriented to person, place, and time  Skin: Skin is warm and dry  He is not diaphoretic  Psychiatric: He has a normal mood and affect  His behavior is normal    Vitals reviewed  Diabetic Foot Exam  Patient's shoes and socks removed  Right Foot/Ankle   Right Foot Inspection  Skin Exam: skin normal and skin intact no dry skin, no warmth, no callus, no erythema, no maceration, no abnormal color, no pre-ulcer, no ulcer and no callus                          Toe Exam: ROM and strength within normal limits  Sensory     Proprioception: intact   Monofilament testing: intact  Vascular  Capillary refills: < 3 seconds  The right DP pulse is 2+  The right PT pulse is 2+  Left Foot/Ankle  Left Foot Inspection  Skin Exam: skin normal and skin intactno dry skin, no warmth, no erythema, no maceration, normal color, no pre-ulcer, no ulcer and no callus                         Toe Exam: ROM and strength within normal limits                   Sensory     Proprioception: intact  Monofilament: intact  Vascular  Capillary refills: < 3 seconds  The left DP pulse is 2+  The left PT pulse is 2+  Assign Risk Category:  No deformity present; No loss of protective sensation; No weak pulses       Risk: 0     Data:     Laboratory Results: I have personally reviewed the pertinent laboratory results/reports      Radiology/Other Diagnostic Testing Results: I have personally reviewed pertinent reports        Maryjane Ledesma DO  [facility]

## 2018-11-01 ENCOUNTER — OFFICE VISIT (OUTPATIENT)
Dept: OBGYN CLINIC | Facility: CLINIC | Age: 63
End: 2018-11-01
Payer: MEDICARE

## 2018-11-01 VITALS
BODY MASS INDEX: 32.6 KG/M2 | WEIGHT: 254 LBS | HEIGHT: 74 IN | DIASTOLIC BLOOD PRESSURE: 92 MMHG | HEART RATE: 98 BPM | SYSTOLIC BLOOD PRESSURE: 146 MMHG

## 2018-11-01 DIAGNOSIS — M25.511 CHRONIC RIGHT SHOULDER PAIN: ICD-10-CM

## 2018-11-01 DIAGNOSIS — M19.011 GLENOHUMERAL ARTHRITIS, RIGHT: Primary | ICD-10-CM

## 2018-11-01 DIAGNOSIS — G89.29 CHRONIC RIGHT SHOULDER PAIN: ICD-10-CM

## 2018-11-01 PROCEDURE — 20610 DRAIN/INJ JOINT/BURSA W/O US: CPT | Performed by: FAMILY MEDICINE

## 2018-11-01 PROCEDURE — 99213 OFFICE O/P EST LOW 20 MIN: CPT | Performed by: FAMILY MEDICINE

## 2018-11-01 RX ADMIN — BUPIVACAINE HYDROCHLORIDE 4 ML: 7.5 INJECTION, SOLUTION EPIDURAL; RETROBULBAR at 10:30

## 2018-11-01 RX ADMIN — BUPIVACAINE HYDROCHLORIDE 3 ML: 7.5 INJECTION, SOLUTION EPIDURAL; RETROBULBAR at 10:30

## 2018-11-01 RX ADMIN — TRIAMCINOLONE ACETONIDE 40 MG: 40 INJECTION, SUSPENSION INTRA-ARTICULAR; INTRAMUSCULAR at 10:30

## 2018-11-01 NOTE — PROGRESS NOTES
Assessment/Plan:  Assessment/Plan   Diagnoses and all orders for this visit:    Glenohumeral arthritis, right  -     Large joint arthrocentesis    Chronic right shoulder pain  -     Large joint arthrocentesis      60-year-old right-hand-dominant male with right shoulder pain  Discussed with patient physical exam, impression, plan  Physical exam is unremarkable for tenderness of the shoulder  Range of motion is limited to forward flexion of 90° and abduction to 90°, with internal rotation to the sacrum  He has normal strength of the shoulder in all planes  I discussed treatment option of corticosteroid injection to which he agreed  I administered mixture of 4 cc 0 75% bupivacaine and 1 cc Kenalog to the right glenohumeral joint without complication, and upon reassessment he reported improved pain and range of motion  Also advised to start taking tumeric and glucosamine supplements  He will follow up in 3 months at which point he will be re-evaluated  Subjective:   Patient ID: Honorio Sinclair is a 66 y o  male  Chief Complaint   Patient presents with    Right Shoulder - Pain       60-year-old right-hand-dominant male following up for right shoulder pain more than 1 year duration  He was last seen 3 months ago at which point he received corticosteroid injection to the right glenohumeral joint for treatment of arthritis  Today reports that he has significant improvement that lasted about 2 months after the injection  About 3 weeks ago pain gradually returned and is now described as constant, generalized to the shoulder, achy, worse with elevating the arm, and associated with limited range of motion  Reports not having taken any over-the-counter medications for his pain  Shoulder Pain   This is a chronic problem  The current episode started more than 1 year ago  The problem occurs constantly  The problem has been waxing and waning  Associated symptoms include arthralgias   Pertinent negatives include no joint swelling, numbness or weakness  Exacerbated by: Arm movement  He has tried rest (Corticosteroid injection) for the symptoms  The treatment provided mild relief  Review of Systems   Musculoskeletal: Positive for arthralgias  Negative for joint swelling  Neurological: Negative for weakness and numbness  Objective:  Vitals:    11/01/18 1004   BP: 146/92   Pulse: 98   Weight: 115 kg (254 lb)   Height: 6' 2" (1 88 m)     Right Shoulder Exam     Tenderness   The patient is experiencing no tenderness  Range of Motion   Active Abduction: 90   Forward Flexion: 90   Internal Rotation 0 degrees: Sacrum     Muscle Strength   The patient has normal right shoulder strength  Physical Exam   Constitutional: He is oriented to person, place, and time  He appears well-developed  No distress  HENT:   Head: Normocephalic and atraumatic  Eyes: Conjunctivae are normal    Neck: No tracheal deviation present  Cardiovascular: Normal rate  Pulmonary/Chest: Effort normal  No respiratory distress  Abdominal: He exhibits no distension  Neurological: He is alert and oriented to person, place, and time  Skin: Skin is warm and dry  Psychiatric: He has a normal mood and affect  His behavior is normal    Nursing note and vitals reviewed          Large joint arthrocentesis  Date/Time: 11/1/2018 10:30 AM  Consent given by: patient  Site marked: site marked  Timeout: Immediately prior to procedure a time out was called to verify the correct patient, procedure, equipment, support staff and site/side marked as required   Supporting Documentation  Indications: pain   Procedure Details  Location: shoulder - R glenohumeral  Preparation: Patient was prepped and draped in the usual sterile fashion  Needle size: 22 G  Approach: posterior  Medications administered: 4 mL bupivacaine (PF) 0 75 %; 3 mL bupivacaine (PF) 0 75 %; 40 mg triamcinolone acetonide 40 mg/mL    Patient tolerance: patient tolerated the procedure well with no immediate complications  Dressing:  Sterile dressing applied

## 2018-11-02 RX ORDER — TRIAMCINOLONE ACETONIDE 40 MG/ML
40 INJECTION, SUSPENSION INTRA-ARTICULAR; INTRAMUSCULAR
Status: COMPLETED | OUTPATIENT
Start: 2018-11-01 | End: 2018-11-01

## 2018-11-02 RX ORDER — BUPIVACAINE HYDROCHLORIDE 7.5 MG/ML
4 INJECTION, SOLUTION EPIDURAL; RETROBULBAR
Status: COMPLETED | OUTPATIENT
Start: 2018-11-01 | End: 2018-11-01

## 2018-11-02 RX ORDER — BUPIVACAINE HYDROCHLORIDE 7.5 MG/ML
3 INJECTION, SOLUTION EPIDURAL; RETROBULBAR
Status: COMPLETED | OUTPATIENT
Start: 2018-11-01 | End: 2018-11-01

## 2018-11-06 ENCOUNTER — TELEPHONE (OUTPATIENT)
Dept: PREADMISSION TESTING | Facility: HOSPITAL | Age: 63
End: 2018-11-06

## 2018-11-07 ENCOUNTER — EVALUATION (OUTPATIENT)
Dept: PHYSICAL THERAPY | Facility: CLINIC | Age: 63
End: 2018-11-07
Payer: MEDICARE

## 2018-11-07 ENCOUNTER — HOSPITAL ENCOUNTER (OUTPATIENT)
Dept: RADIOLOGY | Facility: HOSPITAL | Age: 63
Discharge: HOME | End: 2018-11-07
Attending: ORTHOPAEDIC SURGERY
Payer: MEDICARE

## 2018-11-07 DIAGNOSIS — Z01.818 PRE-OP EXAM: ICD-10-CM

## 2018-11-07 DIAGNOSIS — M17.11 PRIMARY OSTEOARTHRITIS OF RIGHT KNEE: Primary | ICD-10-CM

## 2018-11-07 DIAGNOSIS — M17.11 PRIMARY OSTEOARTHRITIS OF RIGHT KNEE: ICD-10-CM

## 2018-11-07 PROCEDURE — 97110 THERAPEUTIC EXERCISES: CPT | Performed by: PHYSICAL THERAPIST

## 2018-11-07 PROCEDURE — G8979 MOBILITY GOAL STATUS: HCPCS | Performed by: PHYSICAL THERAPIST

## 2018-11-07 PROCEDURE — G8978 MOBILITY CURRENT STATUS: HCPCS | Performed by: PHYSICAL THERAPIST

## 2018-11-07 PROCEDURE — 97162 PT EVAL MOD COMPLEX 30 MIN: CPT | Performed by: PHYSICAL THERAPIST

## 2018-11-07 PROCEDURE — 71046 X-RAY EXAM CHEST 2 VIEWS: CPT

## 2018-11-07 RX ORDER — FOLIC ACID 1 MG/1
TABLET ORAL
Qty: 30 TABLET | Refills: 0 | Status: ON HOLD | OUTPATIENT
Start: 2018-11-07 | End: 2018-11-19

## 2018-11-07 NOTE — PROGRESS NOTES
PT Evaluation  and PT Discharge    Today's date: 2018  Patient name: Nely Dyer  : 1955  MRN: [id number]  Referring provider: Nathaniel Strickland MD  Dx:   Encounter Diagnosis     ICD-10-CM    1  Primary osteoarthritis of right knee M17 11 PT plan of care cert/re-cert       Start Time: 1806  Stop Time: 1846  Total time in clinic (min): 40 minutes    Assessment  Assessment details: Nely Dyer is a 66 y o  male  presenting with right knee osteoarthritis and pre  Total knee arthroplasty to be performed by Dr Mychal Sahu on 2018 at [facility]  Currently having difficulty with standing, walking, stairs, tranfers, and household maintenance  Steps to enter home 2 with handrail on left     First floor set up: yes  Assist at home: spouse  Pre op TUG score: 14 sec    Patient was educated on proper stair management, HEP, and home set up    2019: Patient did not return to physical therapy following this visit  Objective measurements and functional goals were unable to be updated  Patient will be discharged at this time    Impairments: abnormal or restricted ROM, activity intolerance, impaired physical strength, lacks appropriate home exercise program, pain with function and weight-bearing intolerance  Understanding of Dx/Px/POC: good   Prognosis: good    Goals  Short term goals:  1) Patient will demonstrate decrease in pain by 20-50% in 4 weeks-not met  2) Patient will demonstrate improved right knee flexion > 110 degrees in 4 weeks-not met  3) Patient will demonstrate improved right knee extension > -5 degrees in 4 weeks-not met  4) Patient will demonstrate improved right knee strength by 1/2 grade in 4 weeks-not met  Long term goals:  1) Patient will demonstrate ability to ambulate without AD  in 6 weeks-not met  2) Patient will demonstrate ability to manage stair with step over step pattern in 6 weeks-not met  3) Patient will demonstrate demonstrate ability to perform car transfer without pain in right knee in 6 weeks-not met  4) Patient will demonstrate independence in HEP in 6 weeks-not met    Plan  Patient would benefit from: skilled physical therapy  Referral necessary: No  Planned modality interventions: cryotherapy  Planned therapy interventions: joint mobilization, manual therapy, Briones taping, neuromuscular re-education, postural training, patient education, therapeutic training, therapeutic exercise, therapeutic activities, stretching, strengthening, home exercise program, balance, graded exercise, functional ROM exercises, gait training and flexibility  Frequency: 2x week  Treatment plan discussed with: family and patient        Subjective Evaluation    History of Present Illness  Mechanism of injury: Patient reports that he started in  he reports that he injured his knee in the [employer]  He reports that he had a menisectomy  He reports that his knee pain has been getting worse overtime  Patient reports that it is now limiting his ability to function and complete ADL's  Patient reports that he had an xray that revealed degenerative changes  Patient reports that Dr Maggy Fischer recommended a TKA  He is scheduled to undergo a right TKA on 2018 [address] [facility]  Patient reports that he will have to participate in home health initially following discharge as he will not have transportation    Pain  Current pain ratin  At best pain ratin  At worst pain ratin  Location: Right knee pain    Social Support  Steps to enter house: yes (2 with handrail on left hand going up)  Lives in: multiple-level home  Lives with: spouse    Employment status: not working (Disabled )  Exercise history: Patient reports that he has no responsibilities around the house  Patient reports that he does not hat any hobbies             Objective     Active Range of Motion     Right Knee   Flexion: 90 degrees   Extension: -12 degrees     Passive Range of Motion     Right Knee   Flexion: Right knee passive flexion: refused passive  Extension: -12 degrees     Additional Passive Range of Motion Details  Patient refused to have left knee ROM measured      Strength/Myotome Testing     Left Knee   Flexion: 4  Extension: 4+    Right Knee   Flexion: 4+  Extension: 4+    Functional Assessment     Comments  TUG 14 sec  Patient refused to participate in balance testing this date      Flowsheet Rows      Most Recent Value   PT/OT G-Codes   Current Score  1   Projected Score  32   Assessment Type  Evaluation   G code set  Mobility: Walking & Moving Around   Mobility: Walking and Moving Around Current Status ()  CL   Mobility: Walking and Moving Around Goal Status ()  CM          Precautions: Right TKA (11/19/2018), Depression, HTN, DM  Daily Treatment Diary     Patient refused to participate in HEP on eval    Manual  11/7            Right knee PROM NV                                                                    Exercise Diary  11/7            Quad sets NV            Heel slides NV            Calf stretch NV            Hamstring stretch  NV                                                                                                                                                                                                                                Modalities

## 2018-11-08 ENCOUNTER — TELEPHONE (OUTPATIENT)
Dept: INTERNAL MEDICINE CLINIC | Facility: CLINIC | Age: 63
End: 2018-11-08

## 2018-11-08 NOTE — TELEPHONE ENCOUNTER
FYI: Dr Sami Nunn called about Dr Maxine Trent addending his note from Oct 29 in reference to the patient's clearance for surgery  The note states that the clearance is pending per results of his chest x-ray  Please have Dr Maxine Trent addend his note     Surgery is Nov 19

## 2018-11-12 ENCOUNTER — TELEPHONE (OUTPATIENT)
Dept: OBGYN CLINIC | Facility: HOSPITAL | Age: 63
End: 2018-11-12

## 2018-11-12 NOTE — TELEPHONE ENCOUNTER
Call placed to conduct pre-op elective admission assessment, spoke with pt  Pt reports he lives with his wife, Priyanka Tom  Per pt, Priyanka Tom works 12-9pm and can only assist limitedly  Pt reports his wife did not take any time off  He reports he has no other local family or friends that could assist him  I encouraged pt to prepare pre-op the best he can including reviewing the home prep checklist and preparing meals for himself due to his limited caregiver support  Pt reporting he does not have any means of transportation post-op  I asked him to discuss with his wife if she could take this pt to outpatient PT in the AMs prior to her going to work for the day  Pt became irate at this point and he adamantly declined this as a possibility and also adamantly declined taking an uber or bus to get there  This patient reports to me he is getting home health care, or will not do post-op PT due to him having no transportation to get there  Per him, he has had 6 knee surgeries, multiple by Dr Quentin Solano and I dont need therapy  I educated pt that he would need post-op PT and possibly up to 12 weeks of it, educated him that [address] is not promised  I also educated pt that not having transportation is not a qualifier for home health care and that he would need to be considered home bound to receive [address], I encouraged him to consider making alternative arrangements in case he is recommended for DC to home with outpt PT(transport by friends, neighbors, community supports including [employer], bus or uber etc )  This patient continued to be upset and refused to work with me in developing any alternative DC plans  I was unable to complete my pre-op admission assessment with this patient  Surgeon has been advised  Pt was encouraged to call me with any questions, concerns or issues

## 2018-11-15 ENCOUNTER — APPOINTMENT (OUTPATIENT)
Dept: PHYSICAL THERAPY | Facility: CLINIC | Age: 63
End: 2018-11-15
Payer: MEDICARE

## 2018-11-19 ENCOUNTER — ANESTHESIA EVENT (OUTPATIENT)
Dept: PERIOP | Facility: HOSPITAL | Age: 63
DRG: 470 | End: 2018-11-19
Payer: MEDICARE

## 2018-11-19 ENCOUNTER — HOSPITAL ENCOUNTER (INPATIENT)
Facility: HOSPITAL | Age: 63
LOS: 3 days | Discharge: NURSING, REHAB OR LONG-TERM CARE | DRG: 470 | End: 2018-11-23
Attending: ORTHOPAEDIC SURGERY | Admitting: ORTHOPAEDIC SURGERY
Payer: MEDICARE

## 2018-11-19 ENCOUNTER — ANESTHESIA (OUTPATIENT)
Dept: PERIOP | Facility: HOSPITAL | Age: 63
DRG: 470 | End: 2018-11-19
Payer: MEDICARE

## 2018-11-19 DIAGNOSIS — Z96.651 STATUS POST TOTAL KNEE REPLACEMENT, RIGHT: Primary | ICD-10-CM

## 2018-11-19 LAB
ABO GROUP BLD: NORMAL
BLD GP AB SCN SERPL QL: NEGATIVE
GLUCOSE SERPL-MCNC: 136 MG/DL (ref 65–140)
GLUCOSE SERPL-MCNC: 152 MG/DL (ref 65–140)
GLUCOSE SERPL-MCNC: 234 MG/DL (ref 65–140)
GLUCOSE SERPL-MCNC: 264 MG/DL (ref 65–140)
RH BLD: POSITIVE
SPECIMEN EXPIRATION DATE: NORMAL

## 2018-11-19 PROCEDURE — G8978 MOBILITY CURRENT STATUS: HCPCS

## 2018-11-19 PROCEDURE — G8979 MOBILITY GOAL STATUS: HCPCS

## 2018-11-19 PROCEDURE — C1776 JOINT DEVICE (IMPLANTABLE): HCPCS | Performed by: ORTHOPAEDIC SURGERY

## 2018-11-19 PROCEDURE — 86901 BLOOD TYPING SEROLOGIC RH(D): CPT | Performed by: ORTHOPAEDIC SURGERY

## 2018-11-19 PROCEDURE — 82948 REAGENT STRIP/BLOOD GLUCOSE: CPT

## 2018-11-19 PROCEDURE — C1713 ANCHOR/SCREW BN/BN,TIS/BN: HCPCS | Performed by: ORTHOPAEDIC SURGERY

## 2018-11-19 PROCEDURE — 0SRC0J9 REPLACEMENT OF RIGHT KNEE JOINT WITH SYNTHETIC SUBSTITUTE, CEMENTED, OPEN APPROACH: ICD-10-PCS | Performed by: ORTHOPAEDIC SURGERY

## 2018-11-19 PROCEDURE — 27447 TOTAL KNEE ARTHROPLASTY: CPT | Performed by: ORTHOPAEDIC SURGERY

## 2018-11-19 PROCEDURE — 86850 RBC ANTIBODY SCREEN: CPT | Performed by: ORTHOPAEDIC SURGERY

## 2018-11-19 PROCEDURE — 86900 BLOOD TYPING SEROLOGIC ABO: CPT | Performed by: ORTHOPAEDIC SURGERY

## 2018-11-19 PROCEDURE — 3E0T3BZ INTRODUCTION OF ANESTHETIC AGENT INTO PERIPHERAL NERVES AND PLEXI, PERCUTANEOUS APPROACH: ICD-10-PCS | Performed by: ANESTHESIOLOGY

## 2018-11-19 PROCEDURE — 97163 PT EVAL HIGH COMPLEX 45 MIN: CPT

## 2018-11-19 DEVICE — ATTUNE KNEE SYSTEM TIBIAL INSERT ROTATING PLATFORM POSTERIOR STABILIZED SIZE 9 5MM AOX
Type: IMPLANTABLE DEVICE | Site: KNEE | Status: FUNCTIONAL
Brand: ATTUNE

## 2018-11-19 DEVICE — ATTUNE KNEE SYSTEM TIBIAL BASE ROTATING PLATFORM SIZE 9 CEMENTED
Type: IMPLANTABLE DEVICE | Site: KNEE | Status: FUNCTIONAL
Brand: ATTUNE

## 2018-11-19 DEVICE — ATTUNE KNEE SYSTEM FEMORAL POSTERIOR STABILIZED SIZE 9 RIGHT CEMENTED
Type: IMPLANTABLE DEVICE | Site: KNEE | Status: FUNCTIONAL
Brand: ATTUNE

## 2018-11-19 DEVICE — SMARTSET HV HIGH VISCOSITY BONE CEMENT 40G
Type: IMPLANTABLE DEVICE | Status: FUNCTIONAL
Brand: SMARTSET

## 2018-11-19 DEVICE — ATTUNE PATELLA MEDIALIZED DOME 41MM CEMENTED AOX
Type: IMPLANTABLE DEVICE | Site: KNEE | Status: FUNCTIONAL
Brand: ATTUNE

## 2018-11-19 RX ORDER — HYDROMORPHONE HCL/PF 1 MG/ML
1 SYRINGE (ML) INJECTION
Status: DISCONTINUED | OUTPATIENT
Start: 2018-11-19 | End: 2018-11-23 | Stop reason: HOSPADM

## 2018-11-19 RX ORDER — HYDROMORPHONE HCL/PF 1 MG/ML
0.4 SYRINGE (ML) INJECTION
Status: DISCONTINUED | OUTPATIENT
Start: 2018-11-19 | End: 2018-11-19 | Stop reason: HOSPADM

## 2018-11-19 RX ORDER — ACETAMINOPHEN 325 MG/1
650 TABLET ORAL EVERY 6 HOURS SCHEDULED
Status: DISCONTINUED | OUTPATIENT
Start: 2018-11-19 | End: 2018-11-20

## 2018-11-19 RX ORDER — DOCUSATE SODIUM 100 MG/1
100 CAPSULE, LIQUID FILLED ORAL 2 TIMES DAILY
Status: DISCONTINUED | OUTPATIENT
Start: 2018-11-19 | End: 2018-11-23 | Stop reason: HOSPADM

## 2018-11-19 RX ORDER — SENNOSIDES 8.6 MG
1 TABLET ORAL DAILY
Status: DISCONTINUED | OUTPATIENT
Start: 2018-11-19 | End: 2018-11-23 | Stop reason: HOSPADM

## 2018-11-19 RX ORDER — SODIUM CHLORIDE, SODIUM LACTATE, POTASSIUM CHLORIDE, CALCIUM CHLORIDE 600; 310; 30; 20 MG/100ML; MG/100ML; MG/100ML; MG/100ML
125 INJECTION, SOLUTION INTRAVENOUS CONTINUOUS
Status: DISCONTINUED | OUTPATIENT
Start: 2018-11-19 | End: 2018-11-23 | Stop reason: HOSPADM

## 2018-11-19 RX ORDER — CEFAZOLIN SODIUM 1 G/3ML
INJECTION, POWDER, FOR SOLUTION INTRAMUSCULAR; INTRAVENOUS AS NEEDED
Status: DISCONTINUED | OUTPATIENT
Start: 2018-11-19 | End: 2018-11-19 | Stop reason: SURG

## 2018-11-19 RX ORDER — MIDAZOLAM HYDROCHLORIDE 1 MG/ML
INJECTION INTRAMUSCULAR; INTRAVENOUS AS NEEDED
Status: DISCONTINUED | OUTPATIENT
Start: 2018-11-19 | End: 2018-11-19 | Stop reason: SURG

## 2018-11-19 RX ORDER — SODIUM CHLORIDE, SODIUM LACTATE, POTASSIUM CHLORIDE, CALCIUM CHLORIDE 600; 310; 30; 20 MG/100ML; MG/100ML; MG/100ML; MG/100ML
100 INJECTION, SOLUTION INTRAVENOUS CONTINUOUS
Status: DISCONTINUED | OUTPATIENT
Start: 2018-11-19 | End: 2018-11-23 | Stop reason: HOSPADM

## 2018-11-19 RX ORDER — TRAMADOL HYDROCHLORIDE 50 MG/1
50 TABLET ORAL EVERY 6 HOURS PRN
Qty: 30 TABLET | Refills: 0 | Status: SHIPPED | OUTPATIENT
Start: 2018-11-19 | End: 2018-11-29

## 2018-11-19 RX ORDER — PROPOFOL 10 MG/ML
INJECTION, EMULSION INTRAVENOUS CONTINUOUS PRN
Status: DISCONTINUED | OUTPATIENT
Start: 2018-11-19 | End: 2018-11-19 | Stop reason: SURG

## 2018-11-19 RX ORDER — OXYCODONE HYDROCHLORIDE 10 MG/1
10 TABLET ORAL EVERY 4 HOURS PRN
Status: DISCONTINUED | OUTPATIENT
Start: 2018-11-19 | End: 2018-11-20

## 2018-11-19 RX ORDER — BUPIVACAINE HYDROCHLORIDE 5 MG/ML
INJECTION, SOLUTION PERINEURAL AS NEEDED
Status: DISCONTINUED | OUTPATIENT
Start: 2018-11-19 | End: 2018-11-19 | Stop reason: SURG

## 2018-11-19 RX ORDER — PRAVASTATIN SODIUM 80 MG/1
80 TABLET ORAL
Status: DISCONTINUED | OUTPATIENT
Start: 2018-11-19 | End: 2018-11-23 | Stop reason: HOSPADM

## 2018-11-19 RX ORDER — PROPOFOL 10 MG/ML
INJECTION, EMULSION INTRAVENOUS AS NEEDED
Status: DISCONTINUED | OUTPATIENT
Start: 2018-11-19 | End: 2018-11-19 | Stop reason: SURG

## 2018-11-19 RX ORDER — LISINOPRIL 20 MG/1
40 TABLET ORAL DAILY
Status: DISCONTINUED | OUTPATIENT
Start: 2018-11-20 | End: 2018-11-19

## 2018-11-19 RX ORDER — SENNOSIDES 8.6 MG
650 CAPSULE ORAL EVERY 8 HOURS PRN
Qty: 30 TABLET | Refills: 0 | Status: SHIPPED | OUTPATIENT
Start: 2018-11-19 | End: 2018-12-19

## 2018-11-19 RX ORDER — OXYCODONE HYDROCHLORIDE 5 MG/1
5 TABLET ORAL EVERY 4 HOURS PRN
Status: DISCONTINUED | OUTPATIENT
Start: 2018-11-19 | End: 2018-11-20

## 2018-11-19 RX ORDER — TRANEXAMIC ACID 100 MG/ML
INJECTION, SOLUTION INTRAVENOUS AS NEEDED
Status: DISCONTINUED | OUTPATIENT
Start: 2018-11-19 | End: 2018-11-19 | Stop reason: SURG

## 2018-11-19 RX ORDER — TRANEXAMIC ACID 100 MG/ML
INJECTION, SOLUTION INTRAVENOUS AS NEEDED
Status: DISCONTINUED | OUTPATIENT
Start: 2018-11-19 | End: 2018-11-19 | Stop reason: HOSPADM

## 2018-11-19 RX ORDER — ONDANSETRON 2 MG/ML
4 INJECTION INTRAMUSCULAR; INTRAVENOUS ONCE AS NEEDED
Status: DISCONTINUED | OUTPATIENT
Start: 2018-11-19 | End: 2018-11-19 | Stop reason: HOSPADM

## 2018-11-19 RX ORDER — FENTANYL CITRATE 50 UG/ML
INJECTION, SOLUTION INTRAMUSCULAR; INTRAVENOUS AS NEEDED
Status: DISCONTINUED | OUTPATIENT
Start: 2018-11-19 | End: 2018-11-19 | Stop reason: SURG

## 2018-11-19 RX ORDER — SODIUM CHLORIDE, SODIUM LACTATE, POTASSIUM CHLORIDE, CALCIUM CHLORIDE 600; 310; 30; 20 MG/100ML; MG/100ML; MG/100ML; MG/100ML
1.5 INJECTION, SOLUTION INTRAVENOUS CONTINUOUS
Status: DISCONTINUED | OUTPATIENT
Start: 2018-11-19 | End: 2018-11-23 | Stop reason: HOSPADM

## 2018-11-19 RX ORDER — OXYCODONE HYDROCHLORIDE 5 MG/1
TABLET ORAL
Qty: 30 TABLET | Refills: 0 | Status: SHIPPED | OUTPATIENT
Start: 2018-11-19 | End: 2018-12-05 | Stop reason: SDUPTHER

## 2018-11-19 RX ORDER — HYDRALAZINE HYDROCHLORIDE 25 MG/1
25 TABLET, FILM COATED ORAL EVERY 8 HOURS PRN
Status: DISCONTINUED | OUTPATIENT
Start: 2018-11-19 | End: 2018-11-23 | Stop reason: HOSPADM

## 2018-11-19 RX ORDER — ACETAMINOPHEN 325 MG/1
975 TABLET ORAL ONCE
Status: COMPLETED | OUTPATIENT
Start: 2018-11-19 | End: 2018-11-19

## 2018-11-19 RX ORDER — SODIUM CHLORIDE 9 MG/ML
125 INJECTION, SOLUTION INTRAVENOUS CONTINUOUS
Status: DISCONTINUED | OUTPATIENT
Start: 2018-11-19 | End: 2018-11-23 | Stop reason: HOSPADM

## 2018-11-19 RX ORDER — MAGNESIUM HYDROXIDE 1200 MG/15ML
LIQUID ORAL AS NEEDED
Status: DISCONTINUED | OUTPATIENT
Start: 2018-11-19 | End: 2018-11-19 | Stop reason: HOSPADM

## 2018-11-19 RX ORDER — DOCUSATE SODIUM 100 MG/1
100 CAPSULE, LIQUID FILLED ORAL 2 TIMES DAILY
Qty: 10 CAPSULE | Refills: 0 | Status: SHIPPED | OUTPATIENT
Start: 2018-11-19 | End: 2019-02-06

## 2018-11-19 RX ADMIN — OXYCODONE HYDROCHLORIDE 10 MG: 10 TABLET ORAL at 22:40

## 2018-11-19 RX ADMIN — INSULIN LISPRO 2 UNITS: 100 INJECTION, SOLUTION INTRAVENOUS; SUBCUTANEOUS at 18:33

## 2018-11-19 RX ADMIN — TRANEXAMIC ACID 1.5 G: 100 INJECTION, SOLUTION INTRAVENOUS at 10:47

## 2018-11-19 RX ADMIN — DOCUSATE SODIUM 100 MG: 100 CAPSULE, LIQUID FILLED ORAL at 18:28

## 2018-11-19 RX ADMIN — INSULIN LISPRO 2 UNITS: 100 INJECTION, SOLUTION INTRAVENOUS; SUBCUTANEOUS at 21:35

## 2018-11-19 RX ADMIN — FENTANYL CITRATE 50 MCG: 50 INJECTION, SOLUTION INTRAMUSCULAR; INTRAVENOUS at 10:00

## 2018-11-19 RX ADMIN — HYDROMORPHONE HYDROCHLORIDE 1 MG: 1 INJECTION, SOLUTION INTRAMUSCULAR; INTRAVENOUS; SUBCUTANEOUS at 19:26

## 2018-11-19 RX ADMIN — ACETAMINOPHEN 650 MG: 325 TABLET, FILM COATED ORAL at 18:27

## 2018-11-19 RX ADMIN — PROPOFOL 50 MG: 10 INJECTION, EMULSION INTRAVENOUS at 10:39

## 2018-11-19 RX ADMIN — ACETAMINOPHEN 975 MG: 325 TABLET, FILM COATED ORAL at 09:16

## 2018-11-19 RX ADMIN — FENTANYL CITRATE 50 MCG: 50 INJECTION, SOLUTION INTRAMUSCULAR; INTRAVENOUS at 10:46

## 2018-11-19 RX ADMIN — MIDAZOLAM 2 MG: 1 INJECTION INTRAMUSCULAR; INTRAVENOUS at 10:00

## 2018-11-19 RX ADMIN — SODIUM CHLORIDE, SODIUM LACTATE, POTASSIUM CHLORIDE, AND CALCIUM CHLORIDE 125 ML/HR: .6; .31; .03; .02 INJECTION, SOLUTION INTRAVENOUS at 09:54

## 2018-11-19 RX ADMIN — SODIUM CHLORIDE, SODIUM LACTATE, POTASSIUM CHLORIDE, AND CALCIUM CHLORIDE 1.5 ML/KG/HR: .6; .31; .03; .02 INJECTION, SOLUTION INTRAVENOUS at 22:38

## 2018-11-19 RX ADMIN — SENNOSIDES 8.6 MG: 8.6 TABLET, FILM COATED ORAL at 15:56

## 2018-11-19 RX ADMIN — PRAVASTATIN SODIUM 80 MG: 80 TABLET ORAL at 15:56

## 2018-11-19 RX ADMIN — OXYCODONE HYDROCHLORIDE 10 MG: 10 TABLET ORAL at 18:27

## 2018-11-19 RX ADMIN — SODIUM CHLORIDE, SODIUM LACTATE, POTASSIUM CHLORIDE, AND CALCIUM CHLORIDE 1.5 ML/KG/HR: .6; .31; .03; .02 INJECTION, SOLUTION INTRAVENOUS at 15:49

## 2018-11-19 RX ADMIN — PROPOFOL 120 MCG/KG/MIN: 10 INJECTION, EMULSION INTRAVENOUS at 10:39

## 2018-11-19 RX ADMIN — BUPIVACAINE HYDROCHLORIDE 2.5 ML: 5 INJECTION, SOLUTION PERINEURAL at 10:36

## 2018-11-19 RX ADMIN — CEFAZOLIN SODIUM 2000 MG: 2 SOLUTION INTRAVENOUS at 18:29

## 2018-11-19 RX ADMIN — CEFAZOLIN 2000 MG: 1 INJECTION, POWDER, FOR SOLUTION INTRAVENOUS at 10:40

## 2018-11-19 RX ADMIN — ACETAMINOPHEN 650 MG: 325 TABLET, FILM COATED ORAL at 23:41

## 2018-11-19 NOTE — PERIOPERATIVE NURSING NOTE
As per dr Carlos Eldridge md, patient must be able to slightly bend knees b/l in order to be discharged to the floor  Will continue to monitor

## 2018-11-19 NOTE — DISCHARGE INSTRUCTIONS
Discharge Instructions - Orthopedics  William Player 66 y o  male MRN: [id number]  Unit/Bed#: Operating Room    Weight Bearing Status:                                           Weight Bearing as tolerated to the right lower extremity  DVT prophylaxis:  Complete course of Lovenox as directed    Pain:  Continue analgesics as directed    Showering Instructions:   Do not shower until followup     Dressing Instructions:   Keep dressing clean, dry and intact until follow up appointment  Driving Instructions:  No driving until cleared by Orthopaedic Surgery  PT/OT:  Continue PT/OT on outpatient basis as directed    Appt Instructions: If you do not have your appointment, please call the clinic at [phone]  Otherwise followup as scheduled below:    Contact the office sooner if you experience any increased numbness/tingling in the extremities        Miscellaneous:  None

## 2018-11-19 NOTE — PLAN OF CARE
DISCHARGE PLANNING     Discharge to home or other facility with appropriate resources Progressing        INFECTION - ADULT     Absence or prevention of progression during hospitalization Progressing        Knowledge Deficit     Patient/family/caregiver demonstrates understanding of disease process, treatment plan, medications, and discharge instructions Progressing        PAIN - ADULT     Verbalizes/displays adequate comfort level or baseline comfort level Progressing        Potential for Falls     Patient will remain free of falls Progressing        SAFETY ADULT     Maintain or return mobility status to optimal level Progressing     Patient will remain free of falls Progressing

## 2018-11-19 NOTE — PLAN OF CARE
Problem: PHYSICAL THERAPY ADULT  Goal: Performs mobility at highest level of function for planned discharge setting  See evaluation for individualized goals  Treatment/Interventions: Functional transfer training, LE strengthening/ROM, Endurance training, Patient/family training, Bed mobility, Gait training, Spoke to nursing, OT  Equipment Recommended: Moises Melissa       See flowsheet documentation for full assessment, interventions and recommendations  Prognosis: Good  Problem List: Decreased strength, Decreased range of motion, Decreased endurance, Impaired balance, Decreased mobility, Pain, Orthopedic restrictions  Assessment: Pt is 66 y o  male seen for PT evaluation s/p admit to [facility] on 11/19/2018 w/ Primary osteoarthritis of right knee  PT consulted to assess pt's functional mobility and d/c needs  Order placed for PT eval and tx, w/ up as tolerated and WBAT R LE order  Comorbidities affecting pt's physical performance at time of assessment include: limited ROM L knee, pain, numbness  PTA, pt was ambulates community distances and elevations  Personal factors affecting pt at time of IE include: inability to ambulate household distances, limited home support, decreased initiation and engagement, unable to perform physical activity, inability to perform IADLs and inability to perform ADLs  Please find objective findings from PT assessment regarding body systems outlined above with impairments and limitations including weakness, decreased ROM, impaired balance, decreased endurance, gait deviations, pain, decreased activity tolerance, decreased functional mobility tolerance, decreased safety awareness, fall risk and orthopedic restrictions  Pt required increased encouragement to participate with c/o continued numbness in B/L LE  Noted active mobility despite numbness  Required A for LE management to scoot EOB  Good sitting tolerance   Pt agreeable to attempt standing although not agreeable to use RW at this time with anxiety about inability to feel LE or the floor  Able to tolerate standing x2 aprox 1 min each time  Refused chair at this time  Demonstrated ability to weight shift although minimal ability to clear LE for lateral stepping  The following objective measures performed on IE also reveal limitations: Barthel Index: 35/100  Pt's clinical presentation is currently evolving seen in pt's presentation of o2, numbness  Pt to benefit from continued PT tx to address deficits as defined above and maximize level of functional independent mobility and consistency  From PT/mobility standpoint, recommendation at time of d/c would be OP PT pending progress in order to facilitate return to PLOF  Recommendation: Outpatient PT     PT - OK to Discharge: No    See flowsheet documentation for full assessment

## 2018-11-19 NOTE — ANESTHESIA PREPROCEDURE EVALUATION
Review of Systems/Medical History  Patient summary reviewed  Chart reviewed  No history of anesthetic complications     Cardiovascular  EKG reviewed, Exercise tolerance (METS): >4,  Hyperlipidemia, Hypertension ,    Pulmonary  Smoker ex-smoker  ,        GI/Hepatic  Negative GI/hepatic ROS          Negative  ROS        Endo/Other  Diabetes well controlled type 2 ,      GYN       Hematology  Negative hematology ROS      Musculoskeletal    Arthritis     Neurology  Negative neurology ROS      Psychology   Depression ,            Lab Results   Component Value Date    WBC 7 11 10/22/2018    HGB 14 7 10/22/2018    HCT 42 7 10/22/2018    MCV 93 10/22/2018     10/22/2018     Lab Results   Component Value Date    K 4 2 10/22/2018    CO2 24 10/22/2018     10/22/2018    BUN 21 10/22/2018    CREATININE 1 01 10/22/2018     Lab Results   Component Value Date    INR 1 10 10/22/2018    PROTIME 14 3 (H) 10/22/2018     Lab Results   Component Value Date    PTT 28 10/22/2018       No results found for: GLUCOSE    Lab Results   Component Value Date    HGBA1C 7 5 (H) 10/22/2018       Type and Screen:  O      Physical Exam    Airway    Mallampati score: IV  TM Distance: <3 FB  Neck ROM: full     Dental       Cardiovascular  Rhythm: regular, Rate: normal,     Pulmonary      Other Findings        Anesthesia Plan  ASA Score- 2     Anesthesia Type- spinal and regional with ASA Monitors  Additional Monitors:   Airway Plan:     Comment: GETA as backup  Plan Factors-    Induction- intravenous  Postoperative Plan- Plan for postoperative opioid use  Informed Consent- Anesthetic plan and risks discussed with patient  I personally reviewed this patient with the CRNA  Discussed and agreed on the Anesthesia Plan with the CRNA  Chanelle Solorio

## 2018-11-19 NOTE — ANESTHESIA PROCEDURE NOTES
Peripheral Block    Patient location during procedure: holding area  Start time: 11/19/2018 10:05 AM  Reason for block: at surgeon's request and post-op pain management  Staffing  Anesthesiologist: Milla Campbell  Performed: anesthesiologist   Preanesthetic Checklist  Completed: patient identified, site marked, surgical consent, pre-op evaluation, timeout performed, IV checked, risks and benefits discussed and monitors and equipment checked  Peripheral Block  Patient position: supine  Prep: ChloraPrep  Patient monitoring: continuous pulse ox, frequent blood pressure checks and heart rate  Block type: adductor canal block  Laterality: right  Injection technique: single-shot  Procedures: ultrasound guided  Ultrasound permanent image saved  Local infiltration: ropivacaine  Infiltration strength: 0 5 %  Dose: 25 mL  Needle  Needle type: Stimuplex   Needle gauge: 21 G  Needle length: 10 cm  Needle localization: ultrasound guidance  Assessment  Injection assessment: incremental injection, local visualized surrounding nerve on ultrasound, negative aspiration for heme and no paresthesia on injection  Slow fractionated injection: yes  Post-procedure:  site cleaned

## 2018-11-19 NOTE — PHYSICAL THERAPY NOTE
Physical Therapy Evaluation     Patient's Name: Vivien Suero    Admitting Diagnosis  Primary osteoarthritis of right knee [M17 11]    Problem List  Patient Active Problem List   Diagnosis    Primary osteoarthritis of right shoulder    Primary osteoarthritis of right knee    Essential hypertension    Mixed hyperlipidemia    Type 2 diabetes mellitus without complication, without long-term current use of insulin ([facility])    Obesity (BMI 30-39  9)       Past Medical History  Past Medical History:   Diagnosis Date    Depression     Diabetes mellitus ([address] )     Hyperlipidemia     Hypertension     Osteoarthritis, knee        Past Surgical History  Past Surgical History:   Procedure Laterality Date    BACK SURGERY      HAND SURGERY      JOINT REPLACEMENT Left     l tkr    KNEE SURGERY Left     SHOULDER SURGERY      TOOTH EXTRACTION      WISDOM TOOTH EXTRACTION        11/19/18 2568   Note Type   Note type Eval/Treat   Pain Assessment   Pain Assessment 0-10   Pain Score No Pain   Pain Type Acute pain;Surgical pain   Pain Location Knee   Pain Orientation Right   Hospital Pain Intervention(s) Repositioned   Response to Interventions tolerated   Home Living   Type of [address] [facility] One level   Home Equipment (walking stick)   Prior Function   Level of [place] Independent with ADLs and functional mobility   Lives With Spouse   Receives Help From Family   ADL Assistance Independent   IADLs Independent   Falls in the last 6 months 0   Restrictions/Precautions   Weight Bearing Precautions Per Order Yes   RLE Weight Bearing Per Order WBAT   Other Precautions Chair Alarm; Bed Alarm;WBS;Multiple lines;Telemetry;O2;Fall Risk;Pain   General   Family/Caregiver Present No   Cognition   Orientation Level Oriented X4   RLE Assessment   RLE Assessment (grossly 3-/5 with pain and numbness noted post sx)   LLE Assessment   LLE Assessment WFL   Coordination   Movements are Fluid and Coordinated 0   Bed Mobility Supine to Sit 3  Moderate assistance   Additional items Assist x 1; Increased time required;LE management   Sit to Supine 3  Moderate assistance   Additional items Assist x 1; Increased time required;LE management   Transfers   Sit to Stand 3  Moderate assistance   Additional items Increased time required;Assist x 2   Stand to Sit 3  Moderate assistance   Additional items Assist x 2; Increased time required   Ambulation/Elevation   Gait pattern Decreased foot clearance;Shuffling   Gait Assistance 3  Moderate assist   Additional items Assist x 2   Assistive Device Other (Comment)  (HHA, refused RW 2* to decreased sensation)   Distance 1'  (minimal progression to R, unable to advance LE)   Balance   Static Sitting Good   Dynamic Sitting Good   Static Standing Poor -   Endurance Deficit   Endurance Deficit Yes   Endurance Deficit Description limited by fatigue and decreased sensation post sx   Activity Tolerance   Activity Tolerance Patient limited by fatigue;Treatment limited secondary to medical complications (Comment)   Nurse Made Aware yes, nsg gave clearance to work with pt  Updated on pt progress   Assessment   Prognosis Good   Problem List Decreased strength;Decreased range of motion;Decreased endurance; Impaired balance;Decreased mobility;Pain;Orthopedic restrictions   Assessment Pt is 66 y o  male seen for PT evaluation s/p admit [facility] on 11/19/2018 w/ Primary osteoarthritis of right knee  PT consulted to assess pt's functional mobility and d/c needs  Order placed for PT eval and tx, w/ up as tolerated and WBAT R LE order  Comorbidities affecting pt's physical performance at time of assessment include: limited ROM L knee, pain, numbness  PTA, pt was ambulates community distances and elevations   Personal factors affecting pt at time of IE include: inability to ambulate household distances, limited home support, decreased initiation and engagement, unable to perform physical activity, inability to perform IADLs and inability to perform ADLs  Please find objective findings from PT assessment regarding body systems outlined above with impairments and limitations including weakness, decreased ROM, impaired balance, decreased endurance, gait deviations, pain, decreased activity tolerance, decreased functional mobility tolerance, decreased safety awareness, fall risk and orthopedic restrictions  Pt required increased encouragement to participate with c/o continued numbness in B/L LE  Noted active mobility despite numbness  Required A for LE management to scoot EOB  Good sitting tolerance  Pt agreeable to attempt standing although not agreeable to use RW at this time with anxiety about inability to feel LE or the floor  Able to tolerate standing x2 aprox 1 min each time  Refused chair at this time  Demonstrated ability to weight shift although minimal ability to clear LE for lateral stepping  The following objective measures performed on IE also reveal limitations: Barthel Index: 35/100  Pt's clinical presentation is currently evolving seen in pt's presentation of o2, numbness  Pt to benefit from continued PT tx to address deficits as defined above and maximize level of functional independent mobility and consistency  From PT/mobility standpoint, recommendation at time of d/c would be OP PT pending progress in order to facilitate return to PLOF  Goals   Patient Goals To be able to feel the floor   STG Expiration Date 12/01/18   Short Term Goal #1 1  Complete bed mobility and transfers I to decrease need for caregiver in home  2  Ambulate 300' I to complete household and community mobility without A  3  Improve dynamic balance to good to decrease need for UE support during ambulation  4  Be educated & demonstate 2 steps to be able to enter home without A  5  Improve knee ROM 0-90  6  I with HEP  Plan   Treatment/Interventions Functional transfer training;LE strengthening/ROM; Endurance training;Patient/family training;Bed mobility;Gait training;Spoke to nursing;OT   PT Frequency Twice a day   Recommendation   Recommendation Outpatient PT   Equipment Recommended Walker   PT - OK to Discharge No   Barthel Index   Feeding 10   Bathing 0   Grooming Score 0   Dressing Score 5   Bladder Score 0   Bowels Score 10   Toilet Use Score 5   Transfers (Bed/Chair) Score 5   Mobility (Level Surface) Score 0   Stairs Score 0   Barthel Index Score 35           Chuck Nava, PT

## 2018-11-19 NOTE — ANESTHESIA POSTPROCEDURE EVALUATION
Post-Op Assessment Note      CV Status:  Stable    Mental Status:  Alert and awake    Hydration Status:  Euvolemic    PONV Controlled:  Controlled    Airway Patency:  Patent    Post Op Vitals Reviewed: Yes          Staff: CRNA, Anesthesiologist           /79 (11/19/18 1302)    Temp (!) 97 °F (36 1 °C) (11/19/18 1302)    Pulse 90 (11/19/18 1302)   Resp 18 (11/19/18 1302)    SpO2   98

## 2018-11-19 NOTE — PERIOPERATIVE NURSING NOTE
Ok to d/c patient to floor as per dr Ar Leo md   Pt  Able to slightly abduct/adduct legs b/l  Dr Ar Leo md, ok with patient being discharged to the floor  Will continue to monitor

## 2018-11-19 NOTE — CONSULTS
Consultation - Shara Winter 66 y o  male MRN: [id number]    Unit/Bed#: CW3 345-01 Encounter: [id number]        History of Present Illness     HPI: Shara Winter is a 66 y o  male, with PMH of depression, hyperlipidemia, DM type 2 and HTN, who presents for an elective Rt TKA by Dr Gara Hodgkins  He had failed conservative treatment as an outpt  Pt tolerated the surgery well  He currently reports feeling hungry  ROS:  Constitutional: Negative  HENT: Negative  Respiratory: Negative  Cardiovascular: Negative  Gastrointestinal: Negative  Musculoskeletal: Negative  Neurological: Negative  Psychiatric/Behavioral: Negative          Historical Information   Past Medical History:   Diagnosis Date    Depression     Diabetes mellitus ([address] )     Hyperlipidemia     Hypertension     Osteoarthritis, knee      Past Surgical History:   Procedure Laterality Date    BACK SURGERY      HAND SURGERY      JOINT REPLACEMENT Left     l tkr    KNEE SURGERY Left     SHOULDER SURGERY      TOOTH EXTRACTION      WISDOM TOOTH EXTRACTION       Social History   History   Alcohol Use    Yes     Comment: 'couple beers every couple weeks'     History   Drug Use No     History   Smoking Status    Former Smoker    Types: Cigarettes    Quit date: 3/1/2012   Smokeless Tobacco    Never Used     Family History   Problem Relation Age of Onset    No Known Problems Mother     Heart attack Father     Arthritis Family     Cancer Family     Diabetes Family     Heart disease Family     Osteoporosis Family        Meds/Allergies   current meds:  Current Facility-Administered Medications   Medication Dose Route Frequency    acetaminophen (TYLENOL) tablet 650 mg  650 mg Oral Q6H [address]    ceFAZolin (ANCEF) IVPB (premix) 2,000 mg  2,000 mg Intravenous Q8H    docusate sodium (COLACE) capsule 100 mg  100 mg Oral BID    [START ON 11/20/2018] enoxaparin (LOVENOX) subcutaneous injection 40 mg  40 mg Subcutaneous Daily    [START ON 11/20/2018] glyBURIDE-metFORMIN (GLUCOVANCE 2 5/500) combo dose   Oral Daily With Breakfast    HYDROmorphone (DILAUDID) injection 1 mg  1 mg Intravenous Q3H PRN    lactated ringers bolus 1,000 mL  1,000 mL Intravenous Once    lactated ringers infusion  125 mL/hr Intravenous Continuous    lactated ringers infusion  100 mL/hr Intravenous Continuous    lactated ringers infusion  1 5 mL/kg/hr Intravenous Continuous    [START ON 11/20/2018] lisinopril (ZESTRIL) tablet 40 mg  40 mg Oral Daily    oxyCODONE (ROXICODONE) immediate release tablet 10 mg  10 mg Oral Q4H PRN    oxyCODONE (ROXICODONE) IR tablet 5 mg  5 mg Oral Q4H PRN    pravastatin (PRAVACHOL) tablet 80 mg  80 mg Oral Daily With Dinner    senna (SENOKOT) tablet 8 6 mg  1 tablet Oral Daily    [START ON 11/20/2018] sitaGLIPtin (JANUVIA) tablet 25 mg  25 mg Oral Daily    sodium chloride 0 9 % infusion  125 mL/hr Intravenous Continuous       PTA meds:   Prescriptions Prior to Admission   Medication    ascorbic acid (VITAMIN C) 500 MG tablet    ferrous sulfate 324 (65 Fe) mg    folic acid (FOLVITE) 1 mg tablet    glyBURIDE-metFORMIN (GLUCOVANCE) 2 5-500 MG per tablet    lisinopril (ZESTRIL) 40 mg tablet    saxagliptin (ONGLYZA) 5 MG tablet    simvastatin (ZOCOR) 40 mg tablet     No Known Allergies    Objective   Vitals: Blood pressure 121/74, pulse 86, temperature 98 1 °F (36 7 °C), temperature source Oral, resp  rate 18, height 6' 2" (1 88 m), weight 115 kg (253 lb 15 5 oz), SpO2 97 %  Physical Exam   Constitutional: Pt is oriented to person, place, and time  HENT:  Normocephalic  EOM are normal  PERRLA  Neck supple  Cardiovascular: Normal rate and regular rhythm  Pulmonary: Breath sounds normal  No respiratory distress  Pt has no wheezes nor rales  Abdominal: Soft  Bowel sounds are normal  Pt exhibits no distension  There is no tenderness  There is no rebound and no guarding     Neurological: Pt is alert and oriented to person, place, and time  Psychiatric: Pt has a normal mood and affect  Lab Results:           Invalid input(s): LABGLOM            No results found for: GLUCOSE    Labs reviewed    Imaging: reviewed  EKG, Pathology, and Other Studies: I have personally reviewed pertinent reports  VTE Prophylaxis: Enoxaparin (Lovenox)    Code Status: Level 1 - Full Code   Advance Directive and Living Will:      Power of :    POLST:      Assessment/Plan     # Rt knee OA s/p Rt TKA: Continue post op pain control measures as prescribed  Follow bowel regimen to help decrease narcotic induced constipation  Follow post operative hemoglobin with serial CBC and treat accordingly  Monitor WBC and fever curve post op while encouraging use of incentive spirometer  DVT prophylaxis in place and reviewed  # HTN:  Pt takes lisinopril 40mg daily at home  Will hold ACEI to decrease the risk of VANDA in the post-op period  Add Hydralazine 25mg every 8 hours as needed for SBP > 160  # DM type 2:  Pt takes Glucovance 2 5/500mg 2x daily and Onglyza 5mg daily at home  Will resume medications in AM provided pt is eating well  Januvia to be substituted for Onglyza  Continue SSI and accuchecks  Adjust medications as needed  # Hyperlipidemia: Continue pravastatin while here  Resume simvastatin at discharge  Counseling / Coordination of Care  Total floor / unit time spent today 20 minutes  Greater than 50% of total time was spent with the patient and / or family counseling and / or coordination of care        George Holm PA-C

## 2018-11-19 NOTE — OP NOTE
Brief Op Note  Areli Mckinney  MRN: [id number]      Unit/Bed#: Operating Room    PreOp Dx: right knee DJD      Postop Dx: right knee DJD      Procedure: right total knee arthroplasty      Surgeon: Kevni Vincent MD      Assistants: Meir Forrest MD ,Meir Forrest MD ,        Fluids:       EBL:       Drains: Hemovac x 1      Specimens: No       Complications: No       Condition: stable transferred to postanesthesia care unit      Implants: Depuy Attune RP  Femoral, size: 9  Tibial tray: 9  Polyethylene: 5  Patellar button: 13      78 y o male, presents at this time, secondary to treatment of right knee DJD with varus and flexion contracture deformities which has failed conservative treatment  The patient was told of all the pros and cons of operative and nonoperative intervention  Some of the complications of operative intervention include infection, bleeding, scarring, nerve injury, vascular injury, fracture, continued pain, decreased range of motion, DVT, PE death, loss of limb, need for further surgery, not obtain an results  The patient wished  Patient did consent for operative intervention for this pathology  Patient was brought to the operating room  Patient was anesthetized as anesthesia team  Patient was prepped and draped in normal sterile fashion  After this was done, we did conduct a time out to make sure correct  Patient was in the room, prepped marked and draped  Implants were in the room, Rep  For the implants were present, DVT prophylaxis and antibiotics were addressed  Midline incision was made over the anterior knee after going through skin, fatty tissue, fascia was identified  Flaps were created both medially and laterally  Medial parapatellar incision was made  Excision of Hoffa fat pad was conducted  At this time because this was a varus knee, we did release over the medial aspect including medial osteophytes and deep MCL   We're able to excise the fatty tissue from the anterior aspect of the distal femur  We were able to at this time, flex the knee with the patella everted  Intramedullary reamer was used  Intramedullary guide for the femur was then placed to determine how much of the distal femur to cut and also, valgus cut angle  Pins were then placed  Intramedullary guide was removed  Distal femoral cut was made  Attention was now to the proximal tibia  Extramedullary guide was used  After making sure that we took the appropriate amount from the medial and lateral side with the aid of a measuring device, the jig was held in place with pins  Protection of the medial and lateral ligaments, as well as the popliteal region, was done  Proximal tibial cut was made  Extension gaps were evaluated  Size of the femur was then determined with the aid of a guide  After this was done, we placed the appropriate sized block  These were held down with pins  Anterior and posterior cuts were made  Anterior, posterior, chamfer cuts were made  We did check our flexion gap and was noted to be appropriate  This was a PCL sacrificing device being used Therefore a box cut was made  Tibial tray size was determined  This was held down with 2 small screws  The reamer and the punch was then used with the appropriate guide to make sure that these were all done, the appropriate manner  Guide was removed  Trial femoral component was placed  Trial tibial polyethylene was placed  Patient was noted to be stable  On coronal and sagittal planes  Patellar button size was determined  This was placed on the medial aspect of the patella  Holes were drilled for our true patella button to be cemented on  We tracked the knee, and we noted that the patella was tracking appropriately over the trochlear of the trial implants  After this was done we did drill holes in our trial femoral component  We then removed  All trial components  Copious irrigation was used at the operative site   We did place some bone within the intramedullary canal of the femur as well as in the medial aspect of the proximal tibia with patient had a subchondral cyst  I discussed the cement was used and all components were placed, including the femur, tibia, and patella button  A trial tibial Polyethylene was placed  After cement had dried, removed the trial polyethylene and removed any excess cement that was in the popliteal region  Copious irrigation was used  The tibial polyethylene was then placed  Patient was placed in the appropriate ranges of motion and patient's Knee was noted to be stable  Tourniquet was discontinued  Hemostasis was obtained  Hemovac was placed, exiting the lateral distal thigh region  #1 Vicryl sutures were used to reapproximate the parapatellar incision  2-0 Vicryl sutures for the subcutaneous closure  This was reinforced with staples  Wounds were cleaned and dried  Hemovac was placed to suction  Acticoat, 4 x 4, ABDs and web roll was placed prior to Ace bandage be placed from the foot  All the way to the mid thigh region    Patient was awakened as anesthesia team noted to be a stable condition and transferred to postanesthesia care unit

## 2018-11-19 NOTE — ANESTHESIA PROCEDURE NOTES
Spinal Block    Patient location during procedure: OR  Start time: 11/19/2018 10:36 AM  Reason for block: primary anesthetic  Staffing  Anesthesiologist: Elliot Joe  Resident/CRNA: Jin Albarran  Performed: resident/CRNA   Preanesthetic Checklist  Completed: patient identified, site marked, surgical consent, pre-op evaluation, timeout performed, IV checked, risks and benefits discussed and monitors and equipment checked  Spinal Block  Patient position: sitting  Prep: ChloraPrep  Patient monitoring: heart rate, continuous pulse ox and frequent blood pressure checks  Approach: midline  Location: L3-4  Injection technique: single-shot  Needle  Needle type: pencil-tip   Needle gauge: 25 G  Needle length: 10 cm  Assessment  Injection Assessment:  negative aspiration for heme, no paresthesia on injection and positive aspiration for clear CSF    Post-procedure:  site cleaned

## 2018-11-20 LAB
ANION GAP SERPL CALCULATED.3IONS-SCNC: 5 MMOL/L (ref 4–13)
BUN SERPL-MCNC: 19 MG/DL (ref 5–25)
CALCIUM SERPL-MCNC: 7.8 MG/DL (ref 8.3–10.1)
CHLORIDE SERPL-SCNC: 104 MMOL/L (ref 100–108)
CO2 SERPL-SCNC: 26 MMOL/L (ref 21–32)
CREAT SERPL-MCNC: 1.01 MG/DL (ref 0.6–1.3)
ERYTHROCYTE [DISTWIDTH] IN BLOOD BY AUTOMATED COUNT: 12.8 % (ref 11.6–15.1)
GFR SERPL CREATININE-BSD FRML MDRD: 80 ML/MIN/1.73SQ M
GLUCOSE SERPL-MCNC: 189 MG/DL (ref 65–140)
GLUCOSE SERPL-MCNC: 206 MG/DL (ref 65–140)
GLUCOSE SERPL-MCNC: 222 MG/DL (ref 65–140)
GLUCOSE SERPL-MCNC: 225 MG/DL (ref 65–140)
GLUCOSE SERPL-MCNC: 234 MG/DL (ref 65–140)
HCT VFR BLD AUTO: 33.2 % (ref 36.5–49.3)
HGB BLD-MCNC: 11.2 G/DL (ref 12–17)
MCH RBC QN AUTO: 32.3 PG (ref 26.8–34.3)
MCHC RBC AUTO-ENTMCNC: 33.7 G/DL (ref 31.4–37.4)
MCV RBC AUTO: 96 FL (ref 82–98)
PLATELET # BLD AUTO: 166 THOUSANDS/UL (ref 149–390)
PMV BLD AUTO: 9.8 FL (ref 8.9–12.7)
POTASSIUM SERPL-SCNC: 4.2 MMOL/L (ref 3.5–5.3)
RBC # BLD AUTO: 3.47 MILLION/UL (ref 3.88–5.62)
SODIUM SERPL-SCNC: 135 MMOL/L (ref 136–145)
WBC # BLD AUTO: 10.41 THOUSAND/UL (ref 4.31–10.16)

## 2018-11-20 PROCEDURE — 97110 THERAPEUTIC EXERCISES: CPT

## 2018-11-20 PROCEDURE — 80048 BASIC METABOLIC PNL TOTAL CA: CPT | Performed by: STUDENT IN AN ORGANIZED HEALTH CARE EDUCATION/TRAINING PROGRAM

## 2018-11-20 PROCEDURE — 85027 COMPLETE CBC AUTOMATED: CPT | Performed by: STUDENT IN AN ORGANIZED HEALTH CARE EDUCATION/TRAINING PROGRAM

## 2018-11-20 PROCEDURE — 97530 THERAPEUTIC ACTIVITIES: CPT

## 2018-11-20 PROCEDURE — 97116 GAIT TRAINING THERAPY: CPT

## 2018-11-20 PROCEDURE — G8987 SELF CARE CURRENT STATUS: HCPCS

## 2018-11-20 PROCEDURE — 97167 OT EVAL HIGH COMPLEX 60 MIN: CPT

## 2018-11-20 PROCEDURE — G8988 SELF CARE GOAL STATUS: HCPCS

## 2018-11-20 PROCEDURE — 82948 REAGENT STRIP/BLOOD GLUCOSE: CPT

## 2018-11-20 RX ORDER — ACETAMINOPHEN 325 MG/1
975 TABLET ORAL EVERY 8 HOURS SCHEDULED
Status: DISCONTINUED | OUTPATIENT
Start: 2018-11-20 | End: 2018-11-23 | Stop reason: HOSPADM

## 2018-11-20 RX ORDER — HYDROMORPHONE HCL/PF 1 MG/ML
SYRINGE (ML) INJECTION
Status: COMPLETED
Start: 2018-11-20 | End: 2018-11-20

## 2018-11-20 RX ORDER — GABAPENTIN 100 MG/1
100 CAPSULE ORAL 3 TIMES DAILY
Status: DISCONTINUED | OUTPATIENT
Start: 2018-11-20 | End: 2018-11-23 | Stop reason: HOSPADM

## 2018-11-20 RX ORDER — OXYCODONE HYDROCHLORIDE 10 MG/1
10 TABLET ORAL EVERY 4 HOURS PRN
Status: DISCONTINUED | OUTPATIENT
Start: 2018-11-20 | End: 2018-11-23 | Stop reason: HOSPADM

## 2018-11-20 RX ORDER — METHOCARBAMOL 750 MG/1
750 TABLET, FILM COATED ORAL EVERY 8 HOURS SCHEDULED
Status: DISCONTINUED | OUTPATIENT
Start: 2018-11-20 | End: 2018-11-23 | Stop reason: HOSPADM

## 2018-11-20 RX ADMIN — HYDROMORPHONE HYDROCHLORIDE 1 MG: 1 INJECTION, SOLUTION INTRAMUSCULAR; INTRAVENOUS; SUBCUTANEOUS at 12:07

## 2018-11-20 RX ADMIN — OXYCODONE HYDROCHLORIDE 10 MG: 10 TABLET ORAL at 05:49

## 2018-11-20 RX ADMIN — HYDROMORPHONE HYDROCHLORIDE 1 MG: 1 INJECTION, SOLUTION INTRAMUSCULAR; INTRAVENOUS; SUBCUTANEOUS at 16:17

## 2018-11-20 RX ADMIN — HYDROMORPHONE HYDROCHLORIDE 1 MG: 1 INJECTION, SOLUTION INTRAMUSCULAR; INTRAVENOUS; SUBCUTANEOUS at 02:18

## 2018-11-20 RX ADMIN — OXYCODONE HYDROCHLORIDE 15 MG: 10 TABLET ORAL at 18:42

## 2018-11-20 RX ADMIN — METHOCARBAMOL TABLETS 750 MG: 750 TABLET, COATED ORAL at 17:06

## 2018-11-20 RX ADMIN — DOCUSATE SODIUM 100 MG: 100 CAPSULE, LIQUID FILLED ORAL at 08:40

## 2018-11-20 RX ADMIN — CEFAZOLIN SODIUM 2000 MG: 2 SOLUTION INTRAVENOUS at 02:17

## 2018-11-20 RX ADMIN — HYDROMORPHONE HYDROCHLORIDE 1 MG: 1 INJECTION, SOLUTION INTRAMUSCULAR; INTRAVENOUS; SUBCUTANEOUS at 21:41

## 2018-11-20 RX ADMIN — DOCUSATE SODIUM 100 MG: 100 CAPSULE, LIQUID FILLED ORAL at 17:06

## 2018-11-20 RX ADMIN — GABAPENTIN 100 MG: 100 CAPSULE ORAL at 17:06

## 2018-11-20 RX ADMIN — PRAVASTATIN SODIUM 80 MG: 80 TABLET ORAL at 18:09

## 2018-11-20 RX ADMIN — OXYCODONE HYDROCHLORIDE 10 MG: 10 TABLET ORAL at 10:48

## 2018-11-20 RX ADMIN — METFORMIN HYDROCHLORIDE: 500 TABLET ORAL at 08:39

## 2018-11-20 RX ADMIN — OXYCODONE HYDROCHLORIDE 15 MG: 10 TABLET ORAL at 23:47

## 2018-11-20 RX ADMIN — OXYCODONE HYDROCHLORIDE 10 MG: 10 TABLET ORAL at 14:44

## 2018-11-20 RX ADMIN — INSULIN LISPRO 1 UNITS: 100 INJECTION, SOLUTION INTRAVENOUS; SUBCUTANEOUS at 21:42

## 2018-11-20 RX ADMIN — ENOXAPARIN SODIUM 40 MG: 40 INJECTION SUBCUTANEOUS at 08:38

## 2018-11-20 RX ADMIN — SENNOSIDES 8.6 MG: 8.6 TABLET, FILM COATED ORAL at 08:39

## 2018-11-20 RX ADMIN — ACETAMINOPHEN 975 MG: 325 TABLET ORAL at 21:41

## 2018-11-20 RX ADMIN — GABAPENTIN 100 MG: 100 CAPSULE ORAL at 21:41

## 2018-11-20 RX ADMIN — ACETAMINOPHEN 650 MG: 325 TABLET, FILM COATED ORAL at 05:49

## 2018-11-20 RX ADMIN — SITAGLIPTIN 25 MG: 25 TABLET, FILM COATED ORAL at 08:39

## 2018-11-20 RX ADMIN — SODIUM CHLORIDE, SODIUM LACTATE, POTASSIUM CHLORIDE, AND CALCIUM CHLORIDE 1.5 ML/KG/HR: .6; .31; .03; .02 INJECTION, SOLUTION INTRAVENOUS at 04:53

## 2018-11-20 RX ADMIN — METHOCARBAMOL TABLETS 750 MG: 750 TABLET, COATED ORAL at 23:48

## 2018-11-20 RX ADMIN — HYDROMORPHONE HYDROCHLORIDE 1 MG: 1 INJECTION, SOLUTION INTRAMUSCULAR; INTRAVENOUS; SUBCUTANEOUS at 08:37

## 2018-11-20 RX ADMIN — ACETAMINOPHEN 650 MG: 325 TABLET, FILM COATED ORAL at 12:07

## 2018-11-20 RX ADMIN — INSULIN LISPRO 1 UNITS: 100 INJECTION, SOLUTION INTRAVENOUS; SUBCUTANEOUS at 08:40

## 2018-11-20 RX ADMIN — SODIUM CHLORIDE, SODIUM LACTATE, POTASSIUM CHLORIDE, AND CALCIUM CHLORIDE 1.5 ML/KG/HR: .6; .31; .03; .02 INJECTION, SOLUTION INTRAVENOUS at 10:49

## 2018-11-20 RX ADMIN — INSULIN LISPRO 1 UNITS: 100 INJECTION, SOLUTION INTRAVENOUS; SUBCUTANEOUS at 18:09

## 2018-11-20 RX ADMIN — INSULIN LISPRO 2 UNITS: 100 INJECTION, SOLUTION INTRAVENOUS; SUBCUTANEOUS at 12:11

## 2018-11-20 NOTE — PLAN OF CARE
Problem: OCCUPATIONAL THERAPY ADULT  Goal: Performs self-care activities at highest level of function for planned discharge setting  See evaluation for individualized goals  Treatment Interventions: ADL retraining, Functional transfer training, UE strengthening/ROM, Endurance training, Patient/family training, Equipment evaluation/education, Compensatory technique education, Continued evaluation, Energy conservation, Activityengagement  Equipment Recommended: Raised toilet seat       See flowsheet documentation for full assessment, interventions and recommendations  Limitation: Decreased ADL status, Decreased Safe judgement during ADL, Decreased endurance, Decreased self-care trans, Decreased high-level ADLs  Prognosis: Fair  Assessment: Pt is a 66 y o  male who was [facility]/19/2018 with Primary osteoarthritis of right knees/p R TKA , WBAT   Pt's problem list also includes PMH of HTN, diabetes type 2, hyperlipidemia, arthritis, obesity depression, R TKR, back and hand surgery  At baseline pt was completing ADL's/IADL's with I +drives, assists wife with homemaking tasks  Pt lives spouse in a 2 Sh with first floor set-up, wife works full time  Currently pt requires S/Set-up UB self care tasks, max/total assist LB self care tasks and min a x 1 with RW for short distance of 3 steps functional mobility, assist x 2 sit to stand transfers with RW, poor weight bearing status to RLE due to pain  Pt currently presents with impairments in the following categories -limited home support, difficulty performing ADLS and difficulty performing IADLS  activity tolerance, endurance, standing balance/tolerance and safety    These impairments, as well as pt's fatigue, pain and WBS   limit pt's ability to safely engage in all baseline areas of occupation, includingbathing, dressing, toileting, functional mobility/transfers, community mobility, driving and house maintenance From OT standpoint, recommend inpatient rehab  upon D/C  OT will continue to follow to address the below stated goals  OT Discharge Recommendation: Short Term Rehab  OT - OK to Discharge:  Yes

## 2018-11-20 NOTE — PHYSICAL THERAPY NOTE
Physical Therapy Tx Session:       11/20/18 1000   Pain Assessment   Pain Assessment 0-10   Pain Score Worst Possible Pain   Pain Type Acute pain;Surgical pain   Pain Location Leg;Knee   Pain Orientation Right   Hospital Pain Intervention(s) Repositioned; Ambulation/increased activity; Elevated; Emotional support;Rest;Relaxation technique   Restrictions/Precautions   Weight Bearing Precautions Per Order Yes   RLE Weight Bearing Per Order WBAT   Other Precautions Agitated;WBS;Multiple lines;Telemetry; Fall Risk;O2;Pain   General   Chart Reviewed Yes   Family/Caregiver Present No   Cognition   Overall Cognitive Status WFL   Arousal/Participation Responsive   Attention Attends with cues to redirect   Orientation Level Oriented X4   Following Commands Follows one step commands with increased time or repetition  (2* inc pain,slow mobility)   Subjective   Subjective pt supine in bed resting comfortably;pt willing and agreeable to work with PT and to participate in therapy intervention with inc motivation and encouragement;"I am having a lot of pain, I can only do what I can do"   Bed Mobility   Supine to Sit 3  Moderate assistance   Additional items Assist x 1;HOB elevated; Bedrails; Increased time required;Verbal cues;LE management   Transfers   Sit to Stand 3  Moderate assistance   Additional items Assist x 1;Bedrails; Increased time required;Verbal cues   Stand to Sit 3  Moderate assistance   Additional items Assist x 1; Armrests; Increased time required;Verbal cues  (for safety,education and control descent)   Additional Comments pt declines to place weight through RLE 2* inc pain and "I have limitations"   Ambulation/Elevation   Gait pattern Poor UE support; Improper Weight shift; Antalgic;Narrow YOVANI; Forward Flexion;Decreased foot clearance;Decreased R stance; Inconsistent alma; Foward flexed; Short stride; Ataxia; Step to;Excessively slow   Gait Assistance 3  Moderate assist   Additional items Assist x 1;Verbal cues; Tactile cues   Assistive Device Rolling walker   Distance 4-6 hops on LLE bed->chair with use of RW on tile surface;pt reports inc pain RLE and "I am unable" to place weight through RLE at this time during mobility   Balance   Static Sitting Good  (in chair postmobility with BLE elevated)   Dynamic Sitting Poor   Static Standing Poor   Dynamic Standing Poor   Ambulatory Poor   Endurance Deficit   Endurance Deficit Yes   Endurance Deficit Description pain,weakness,fatigue,recent surgical procedure   Activity Tolerance   Activity Tolerance Patient limited by fatigue;Patient limited by pain  (poor)   Nurse Made Aware yes Óscar Guerrero)   Exercises   [employer]; Right  (hold for 3 seconds each rep;pt tolerates 7 reps only)   Hip Abduction Supine;AAROM; Right  (reports following 7 reps "stop, it hurts to much")   Hip Adduction Supine;AROM; Right  (tolerated 7 reps;"Stop, it hurts too muc, this is torture")   Equipment Use   Comments limitations for pt to complete RLE ther ex HEP TKR program in supine;pt reports "I need to stop and not do anymore  It hurts too much and this is torture";extensive education and instruction given to pt for comfort and mobility->pt cont to decline to proceed further with HEP and mobility   Assessment   Prognosis Fair   Problem List Decreased strength;Decreased range of motion;Decreased endurance; Impaired balance;Decreased mobility; Decreased safety awareness; Obesity; Decreased skin integrity;Orthopedic restrictions;Pain   Assessment Pt able to perform sit to stand transfers modAx1,ambulate and perform 4-6 hops on LLE with use of RW modAx1 bed->recliner  Limitations and limited gait distance 2* inc pain,dec ability to bear weight RLE 2* inc pain and discomfort and reports fatigue and weakness  Pt able to perform BM modAx1 with inc time, motivation and encouragement  Pt able to perform and complete RLE ther ex HEP in supine with ability to perform limited number of reps 2* inc pain and discomfort   pt would cont to benefit from skilled inpt PT services to maximize functional independence  Goals   Patient Goals to dec the pain and to go home   STG Expiration Date 11/27/18   Treatment Day 1   Plan   Treatment/Interventions Functional transfer training;LE strengthening/ROM; Elevations; Therapeutic exercise; Endurance training;Patient/family training;Equipment eval/education; Bed mobility;Gait training;Spoke to nursing   Progress Slow progress, decreased activity tolerance   PT Frequency Twice a day;7x/wk   Recommendation   Recommendation Outpatient PT; Home with family support   Equipment Recommended Walker  (use of RW for mobility)

## 2018-11-20 NOTE — PROGRESS NOTES
Internal Medicine Progress Note  Patient: Martha Bauman  Age/sex: 66 y o  male  Medical Record #: [id number]      ASSESSMENT/PLAN:  Martha Bauman is seen and examined and mangement for following issues:    # Rt knee OA s/p Rt TKA:  Postop day 1 hemoglobin 11 2  Patient having some postoperative pain but controlled by current medical therapy will continue with same  No fevers  Tolerating diet this a m         # HTN:  Continue to hold ACEI to decrease the risk of VANDA in the post-op period  Add Hydralazine 25mg every 8 hours as needed for SBP > 160  Pt was hypotensive overnight and did receive an LR bolus with improvement  Blood pressure better this morning and patient is sitting in chair without lightheadedness  Will see how he does with physical therapy  Monitor blood pressure per protocol and adjust medications as needed      # DM type 2:  Blood sugars elevated overnight  Oral  Diabetic meds to be resumed this AM  Continue SSI and accuchecks  Adjust medications as needed      # Hyperlipidemia: Continue pravastatin while here  Resume simvastatin at discharge  Subjective: Patient seen and examined  Hypotension overnight responded to IV fluids      ROS:   GI: denies abdominal pain, change bowel habits or reflux symptoms  Neuro: No new neurologic changes  Respiratory: No Cough, SOB  Cardiovascular: No CP, palpitations     Scheduled Meds:    Current Facility-Administered Medications:  acetaminophen 650 mg Oral Q6H [facility] Cheryl Huffman MD    docusate sodium 100 mg Oral BID Cheryl Huffman MD    enoxaparin 40 mg Subcutaneous Daily Cheryl Huffman MD    glyBURIDE-metFORMIN ([facility] 2 5/500) combo dose  Oral Daily With Breakfast Cheryl Huffman MD    hydrALAZINE 25 mg Oral Q8H PRN Carlee Jack PA-C    HYDROmorphone 1 mg Intravenous Q3H PRN Cheryl Huffman MD    insulin lispro 1-5 Units Subcutaneous TID AC Carlee Jack PA-C    insulin lispro 1-5 Units Subcutaneous HS Carlee FLOR Jack    lactated ringers 1,000 mL Intravenous Once Mio Schilling MD Last Rate: Stopped (11/19/18 1534)   lactated ringers 125 mL/hr Intravenous Continuous Malaika Grace MD Last Rate: 125 mL/hr (11/19/18 0954)   lactated ringers 100 mL/hr Intravenous Continuous Nata Madrid CRNA    lactated ringers 1 5 mL/kg/hr Intravenous Continuous Mio Schilling MD Last Rate: 1 5 mL/kg/hr (11/20/18 0453)   oxyCODONE 10 mg Oral Q4H PRN Mio Schilling MD    oxyCODONE 5 mg Oral Q4H PRN Mio Schilling MD    pravastatin 80 mg Oral Daily With Shanna Gonzalez MD    senna 1 tablet Oral Daily Mio Schilling MD    sitaGLIPtin 25 mg Oral Daily Mio Schilling MD    sodium chloride 125 mL/hr Intravenous Continuous Allen Spencer PA-C        Labs:       Results from last 7 days  Lab Units 11/20/18  0511   WBC Thousand/uL 10 41*   HEMOGLOBIN g/dL 11 2*   HEMATOCRIT % 33 2*   PLATELETS Thousands/uL 166           Invalid input(s): LABGLOM, CMP             No results found for: GLUCOSE    Labs reviewed    Physical Examination:  Vitals:   Vitals:    11/19/18 1815 11/19/18 2027 11/19/18 2329 11/20/18 0356   BP: 97/64 94/59 103/67 106/64   BP Location: Right arm Right arm Right arm Right arm   Pulse: 75 81 88 92   Resp: 18 18 18 18   Temp: 97 8 °F (36 6 °C) 98 °F (36 7 °C) 98 3 °F (36 8 °C) 98 °F (36 7 °C)   TempSrc: Oral Oral Oral Oral   SpO2: 99% 98% 96% 98%   Weight:       Height:           GEN: NAD  RESP: CTAB, no R/R/W  CV: +S1 S2, regular rate, no rubs  ABD: soft, NT, ND, normal BS   : catheter removed  EXT: DP pulses intact b/l  Neuro: AAOx3    [ X ] Total time spent: 30 Mins and greater than 50% of this time was spent counseling/coordinating care

## 2018-11-20 NOTE — OCCUPATIONAL THERAPY NOTE
[address] Evaluation     Patient Name: Pastor Isaiah PIERRE Date: 11/20/2018  Problem List  Patient Active Problem List   Diagnosis    Primary osteoarthritis of right shoulder    Primary osteoarthritis of right knee    Essential hypertension    Mixed hyperlipidemia    Type 2 diabetes mellitus without complication, without long-term current use of insulin (HCC)    Obesity (BMI 30-39  9)     Past Medical History  Past Medical History:   Diagnosis Date    Depression     Diabetes mellitus ([address] )     Hyperlipidemia     Hypertension     Osteoarthritis, knee      Past Surgical History  Past Surgical History:   Procedure Laterality Date    BACK SURGERY      HAND SURGERY      JOINT REPLACEMENT Left     l tkr    KNEE SURGERY Left     CT TOTAL KNEE ARTHROPLASTY Right 11/19/2018    Procedure: ARTHROPLASTY KNEE TOTAL;  Surgeon: Nika Du MD;  [facility];  Service: Orthopedics    SHOULDER SURGERY      TOOTH EXTRACTION      WISDOM TOOTH EXTRACTION               11/20/18 1405   Note Type   Note type Eval only   Restrictions/Precautions   Weight Bearing Precautions Per Order Yes   RLE Weight Bearing Per Order WBAT   Other Precautions Agitated;Multiple lines;Telemetry; Fall Risk;Pain;WBS   Pain Assessment   Pain Assessment 0-10   Pain Score Worst Possible Pain   Pain Type Acute pain;Surgical pain   Pain Location Knee   Pain Orientation Right   Hospital Pain Intervention(s) Elevated;Rest;Ambulation/increased activity;Repositioned;Medication (See MAR)  (RN premedicated pt prior to evaluation)   Home Living   Type of [facility] Two level; Able to live on main level with bedroom/bathroom   Bathroom Shower/Tub Tub/shower unit   Bathroom Toilet Standard   Bathroom Equipment Grab bars in shower   Bathroom Accessibility Accessible via walker   [address] [facility]   Prior Function   Level of [address] with ADLs and functional mobility   Lives With Spouse Receives Help From Family   ADL Assistance Independent   IADLs Independent   Falls in the last 6 months 0   Vocational Retired   Lifestyle   Autonomy I with ADl's/IADL's +drives   Reciprocal Relationships spouse -works full time   Service to Others retired   Intrinsic Gratification not stated   Psychosocial   Psychosocial (WDL) X   Patient Behaviors/Mood Anxious; Flat affect;Irritable   Subjective   Subjective Cooperative with pt structuring therapy session, self limiting  ADL   Where Assessed Chair   Eating Assistance 7  Independent   Grooming Assistance 7  Independent   UB Bathing Assistance 5  Supervision/Setup   LB Bathing Assistance 2  Maximal Assistance   UB Dressing Assistance 5  Supervision/Setup   LB Dressing Assistance 1  Total Assistance   LB Dressing Deficit Thread RLE into underwear; Thread LLE into underwear;Setup; Requires assistive device for steadying;Steadying;Verbal cueing;Supervision/safety; Increased time to complete   Additional Comments pt limited by pain with evaluation, premedicated  Bed Mobility   Additional Comments pt in chair upon arrival    Transfers   Sit to Stand 3  Moderate assistance   Additional items Assist x 2; Increased time required   Stand to Sit 3  Moderate assistance   Additional items Assist x 1; Increased time required   Toilet transfer Unable to assess  (pt unable to tolerate further activity)   Additional Comments pt limited by pain to RLE, declined further activity   Functional Mobility   Functional Mobility 4  Minimal assistance   Additional Comments +3 steps with RW min a x 1  (max verbal cues to sequence steps due to pain)   Additional items Rolling walker   Balance   Static Sitting Fair +   Dynamic Sitting Fair   Static Standing Poor   Dynamic Standing Poor   Ambulatory Poor   Activity Tolerance   Activity Tolerance Patient limited by fatigue;Patient limited by pain;Treatment limited secondary to agitation   Medical Staff Made Aware GORDON mcdonough cleared for therapy, Carissa Kluti Kaah -PT   Nurse Made Aware yes   RUE Assessment   RUE Assessment X  (shoulder flexion 0-90* otherwise RUE WFL for ROM/strength)   LUE Assessment   LUE Assessment WFL   Hand Function   Gross Motor Coordination Functional   Fine Motor Coordination Functional   Vision-Basic Assessment   Current Vision Wears glasses all the time   Vision - Complex Assessment   Acuity Able to read clock/calendar on wall without difficulty   Cognition   Overall Cognitive Status Impaired   Arousal/Participation Alert; Responsive   Attention Difficulty attending to directions  (due to pain)   Orientation Level Oriented X4   Memory Within functional limits   Following Commands Follows multistep commands with increased time or repetition   Comments pt with impaired judgement/reasoning with evaluation   Assessment   Limitation Decreased ADL status; Decreased Safe judgement during ADL;Decreased endurance;Decreased self-care trans;Decreased high-level ADLs   Prognosis Fair   Assessment Pt is a 66 y o  male who was [facility] to [facility] on 11/19/2018 with Primary osteoarthritis of right knees/p R TKA , WBAT   Pt's problem list also includes PMH of HTN, diabetes type 2, hyperlipidemia, arthritis, obesity depression, R TKR, back and hand surgery  At baseline pt was completing ADL's/IADL's with I +drives, assists wife with homemaking tasks  Pt lives spouse in a 2 Sh with first floor set-up, wife works full time  Currently pt requires S/Set-up UB self care tasks, max/total assist LB self care tasks and min a x 1 with RW for short distance of 3 steps functional mobility, assist x 2 sit to stand transfers with RW, poor weight bearing status to RLE due to pain  Pt currently presents with impairments in the following categories -limited home support, difficulty performing ADLS and difficulty performing IADLS  activity tolerance, endurance, standing balance/tolerance and safety    These impairments, as well as pt's fatigue, pain and WBS   limit pt's ability to safely engage in all baseline areas of occupation, includingbathing, dressing, toileting, functional mobility/transfers, community mobility, driving and house maintenance From OT standpoint, recommend inpatient rehab  upon D/C  OT will continue to follow to address the below stated goals  Goals   Patient Goals go to rehab   LTG Time Frame 3-7   Long Term Goal #1 see goals below   Plan   Treatment Interventions ADL retraining;Functional transfer training;UE strengthening/ROM; Endurance training;Patient/family training;Equipment evaluation/education; Compensatory technique education;Continued evaluation; Energy conservation; Activityengagement   Goal Expiration Date 11/27/18   OT Frequency 3-5x/wk   Recommendation   OT Discharge Recommendation Short Term Rehab   Equipment Recommended Raised toilet seat   OT - OK to Discharge Yes   Barthel Index   Feeding 10   Bathing 0   Grooming Score 5   Dressing Score 0   Bladder Score 10   Bowels Score 10   Toilet Use Score 5   Transfers (Bed/Chair) Score 5   Mobility (Level Surface) Score 0   Stairs Score 0   Barthel Index Score 45   Modified Monona Scale   Modified Monona Scale 4        * Pt will perform all ADL's at  S level with G balance and DME/AD as needed  *Pt will perform functional transfers, including toilet transfer at S  level with the use of DME as needed  *Pt will perform functional mobility at a S  level with the use of AD as needed and G balance    *Pt with demonstrate good carry over of RW safety and energy conservation techniques  *Pt will demonstrate good carry over of pt/family education and training with 100% attention to tasks to assist with all ADL's     *Pt will improve activity tolerance to F+for 20-25 minute treatment session  *Pt will improve unsupported sitting balance at EOB to G for 15-20 minutes of purposeful activity with G endurance      *Pt will improve standing balance to F+ for 8-10 minutes with functional tasks and G endurance

## 2018-11-20 NOTE — PHYSICAL THERAPY NOTE
Physical Therapy Progress Note     11/20/18 1533   Pain Assessment   Pain Assessment 0-10   Pain Score 6   Pain Type Acute pain   Pain Location Knee   Pain Orientation Right   Effect of Pain on Daily Activities impaired, decreased mobility   Patient's Stated Pain Goal No pain   Hospital Pain Intervention(s) Cold applied;Repositioned; Ambulation/increased activity; Elevated;Distraction; Emotional support; Rest   Diversional Activities Television   Response to Interventions tolerated, decreased with rest   Restrictions/Precautions   RLE Weight Bearing Per Order WBAT   Other Precautions WBS;Pain; Fall Risk;Multiple lines   Subjective   Subjective Pt initially appeared agitated when encountered, but became more pleasant during session  Continues to complain of increased pain with all RLE movements, but stated it felt easier to move this session  Transfers   Sit to Stand 3  Moderate assistance   Additional items Assist x 2;Armrests; Increased time required   Stand to Sit 3  Moderate assistance   Additional items Assist x 1; Increased time required   Ambulation/Elevation   Gait pattern Improper Weight shift; Antalgic;Decreased foot clearance; Inconsistent alma; Short stride; Step to;Excessively slow   Gait Assistance 4  Minimal assist   Additional items Assist x 1   Assistive Device Rolling walker   Distance 5'   Balance   Static Sitting Good   Static Standing Poor +   Ambulatory Poor   Endurance Deficit   Endurance Deficit Yes   Endurance Deficit Description pain   Activity Tolerance   Activity Tolerance Patient limited by pain; Patient limited by fatigue   Nurse [address], RN   Exercises   Quad Sets 5 reps;AROM; Right;Sitting  (3 second holds)   Heelslides Sitting;5 reps;AAROM; Right   Hip Flexion Sitting;5 reps;AAROM; Right   Hip Abduction Sitting;AAROM; Right  (7 reps)   Ankle Pumps Sitting;10 reps;AROM; Bilateral   Assessment   Prognosis Fair   Problem List Decreased strength;Decreased range of motion;Decreased endurance; Impaired balance;Decreased mobility; Decreased safety awareness; Obesity; Decreased skin integrity;Orthopedic restrictions;Pain   Assessment Pt continues to require considerable assist for mobility tasks at this time due to increased R knee pain & decreased ROM  Pt requires increased time to perform sit to stand transfers & is unable to perform L knee flexion to place it under his YOVANI to improve transfer  Pt demonstrated improved standing tolerance, as he was able to put weight through RLE during standing and ambulating after receiving instructions for sequencing  Pt then performed LE exercises as noted above, but is again limited in activity tolerance by pain  Pt educated in continued mobiilty, positional changes & performing exercises to improve activity tolerance, improve ROM, strength, & functional mobility   Pt verbalized understanding at this time  Will continue to benefit from therapy services to address noted deficits, focusing on improved standing tolerance and mobility to increase functional independence  Continue to recommend discharge home with continued progress at this time  Barriers to Discharge Inaccessible home environment   Goals   Patient Goals for pain to go down   STG Expiration Date 11/27/18   Short Term Goal #1 as per PT assessment:    Complete bed mobility and transfers I to decrease need for caregiver in home  2  Ambulate 300' I to complete household and community mobility without A  3  Improve dynamic balance to good to decrease need for UE support during ambulation  4  Be educated & demonstate 2 steps to be able to enter home without A  5  Improve knee ROM 0-90  6  I with HEP  Treatment Day 2   Plan   Treatment/Interventions Functional transfer training;LE strengthening/ROM; Elevations; Therapeutic exercise; Endurance training;Patient/family training;Equipment eval/education; Bed mobility;Gait training   Progress Slow progress, decreased activity tolerance   PT Frequency Twice a day;7x/wk   Recommendation   Recommendation Outpatient PT   Equipment Recommended Walker   PT - OK to Discharge No     Connie Meza, PTA

## 2018-11-20 NOTE — PLAN OF CARE
Problem: PHYSICAL THERAPY ADULT  Goal: Performs mobility at highest level of function for planned discharge setting  See evaluation for individualized goals  Treatment/Interventions: Functional transfer training, LE strengthening/ROM, Endurance training, Patient/family training, Bed mobility, Gait training, Spoke to nursing, OT  Equipment Recommended: Oralia Dunlap       See flowsheet documentation for full assessment, interventions and recommendations  Outcome: Progressing  Prognosis: Fair  Problem List: Decreased strength, Decreased range of motion, Decreased endurance, Impaired balance, Decreased mobility, Decreased safety awareness, Obesity, Decreased skin integrity, Orthopedic restrictions, Pain  Assessment: Pt able to perform sit to stand transfers modAx1,ambulate and perform 4-6 hops on LLE with use of RW modAx1 bed->recliner  Limitations and limited gait distance 2* inc pain,dec ability to bear weight RLE 2* inc pain and discomfort and reports fatigue and weakness  Pt able to perform BM modAx1 with inc time, motivation and encouragement  Pt able to perform and complete RLE ther ex HEP in supine with ability to perform limited number of reps 2* inc pain and discomfort  pt would cont to benefit from skilled inpt PT services to maximize functional independence  Recommendation: Outpatient PT, Home with family support     PT - OK to Discharge: No    See flowsheet documentation for full assessment

## 2018-11-20 NOTE — PROGRESS NOTES
Subjective: No acute events overnight  No acute distress  Pain well controlled    Right lower extremity  Dressing c/d/i  Motor & sensation grossly intact  Limb warm and well perfused    Assessment: Post op from right total knee arthroplasty    Plan:  Weightbearing as tolerated to the right lower extremity  Pain control  DVT ppx  PT  Patient noted to have acute blood loss anemia postoperatively greater than 2 0 mg/dL from baseline  Patient will be resuscitated with IV fluids as needed    Dispo    Objective:  Lab Results   Component Value Date/Time    WBC 10 41 (H) 11/20/2018 05:11 AM    HGB 11 2 (L) 11/20/2018 05:11 AM       Vitals:    11/20/18 0356   BP: 106/64   Pulse: 92   Resp: 18   Temp: 98 °F (36 7 °C)   SpO2: 98%

## 2018-11-20 NOTE — UTILIZATION REVIEW
Initial Clinical Review    Age/Sex: 66 y o  male admitted on 11/19 for elective surgery - OR    Surgery Date: 11/19    Procedure: S/P Right Total Knee Arthroplasty    Anesthesia: Spinal, Regional    Admission Orders: Date/Time/Statement: Outpatient No Charge Bed 11/19 and changed to Inpatient on 11/20 @ 1138    Admitting Physician Mallory Bennett    Level of Care Med Surg    Estimated length of stay More than 2 Midnights    Certification I certify that inpatient services are medically necessary for this patient for a duration of greater than two midnights  See H&P and MD Progress Notes for additional information about the patient's course of treatment  This patient underwent a procedure not on the inpatient only list and therefore is subject to the 2 midnight benchmark  Accordingly, in my judgement, the patient will require at least 2 midnights in the hospital receiving acute medical care  The patient is noted to have comorbid conditions which will require management in the grecia-operative period prior to safe discharge  As such the patient will require acute care beyond the usual and routine recovery period for the procedure alone and is therefore appropriate for inpatient admission  Vital Signs: /73 (BP Location: Left arm)   Pulse 94   Temp 97 9 °F (36 6 °C) (Oral)   Resp 18   Ht 6' 2" (1 88 m)   Wt 115 kg (253 lb 15 5 oz)   SpO2 96%   BMI 32 61 kg/m²     Diet:        Diet Orders            Start     Ordered    11/19/18 1305  Diet Regular; Regular House  Diet effective now     Question Answer Comment   Diet Type Regular    Regular Regular House    RD to adjust diet per protocol?  Yes        11/19/18 1304          Mobility: WBAT RLE  PT/OT eval and treat    DVT Prophylaxis: Foot Pump    Scheduled Meds:  Current Facility-Administered Medications:  Cefazolin  Intravenous x2   acetaminophen 650 mg Oral Q6H YNES   docusate sodium 100 mg Oral BID   enoxaparin 40 mg Subcutaneous Daily glyBURIDE-metFORMIN (GLUCOVANCE 2 5/500) combo dose  Oral Daily With Breakfast   insulin lispro 1-5 Units Subcutaneous TID AC   insulin lispro 1-5 Units Subcutaneous HS   lactated ringers 1,000 mL Intravenous Once   pravastatin 80 mg Oral Daily With Dinner   senna 1 tablet Oral Daily   sitaGLIPtin 25 mg Oral Daily     Continuous Infusions:  lactated ringers 125 mL/hr Last Rate: 125 mL/hr (11/19/18 4244)   lactated ringers 100 mL/hr    lactated ringers 1 5 mL/kg/hr Last Rate: 1 5 mL/kg/hr (11/20/18 4153)   sodium chloride 125 mL/hr      PRN Meds:  hydrALAZINE    HYDROmorphone Iv x2    oxyCODONE po x3

## 2018-11-20 NOTE — CONSULTS
Consultation - Anesthesia Acute Pain Management   Brendan Ramesh 66 y o  male MRN: [id number]  Unit/Bed#: CW3 345-01 Encounter: [id number]               Consult Time: 20 minutes    Assessment/Plan     Assessment:   Primary problem:  Primary osteoarthritis of right knee    Patient Active Problem List   Diagnosis    Primary osteoarthritis of right shoulder    Primary osteoarthritis of right knee    Essential hypertension    Mixed hyperlipidemia    Type 2 diabetes mellitus without complication, without long-term current use of insulin (HCC)    Obesity (BMI 30-39  9)       Plan:   Continue standing tylenol   Continue oxycodone 5 mg (mod)/10 mg (sev) PO q4h PRN  Consider discontinuing IV opioids tomorrow, may decrease dose to 0 5 mg today given relative frequency with which patient requiring breakthrough dosing in order to facilitate transition to exclusively PO meds  Continue bowel regimen while on opioids    Pain appears well controlled on current regimen  Acute pain will sign off at this time  Please contact with any additional questions or concerns  History of Present Illness    Admit Date:  11/19/2018  Hospital Day:  0 days  Primary Service:  Orthopedic Surgery  Attending Provider:  Sherie Hull MD  Physician Requesting Consult: Sherie Hull MD  Reason for Consult / Principal Problem: Postoperative pain control/post peripheral nerve chandu    HPI: Brendan Ramesh is a 66y o  year old male who is POD1 from right total knee arthroplasty under spinal anesthesia and single shot adductor canal nerve block  Both blocks have resolved appropriately  Patient denies painful transition following resolution of PNB  Relays severe pain when OOB this morning  Resolved with "med in IV " Denies any pain at time of conversation  Eating breakfast in bed and in good spirits  Discussed expectations for pain given patient desiring pain free state achieved with hydromorphone pushes      ROS:   Denies HA  Denies CP/SOB  Denies N/V  +flatus, -BM  +R knee pain    Pain History:  Pain History: Chronic right knee pain managed conservatively in the past  Minimal relief with intraarticular injections  Current pain location(s): Posterior knee  Pain Scale:   0-10, currently 2/10  Severity:  Mild currently  Quality: Stabbing  Aggravating and alleviating factors: Improved with PO/IV pain meds and rest, worsened by weight bearing   Current Analgesic regimen:  Tylenol 650 mg PO q6h standing, oxycodone 5 mg PO q4h PRN moderate pain, oxycodone 10 mg PO q4h PRN severe pain (4 doses), hydromorphone 1 mg IV q3h breakthrough pain (3 doses)    Historical Information   Past Medical History:   Diagnosis Date    Depression     Diabetes mellitus ([address] )     Hyperlipidemia     Hypertension     Osteoarthritis, knee      Past Surgical History:   Procedure Laterality Date    BACK SURGERY      HAND SURGERY      JOINT REPLACEMENT Left     l tkr    KNEE SURGERY Left     MS TOTAL KNEE ARTHROPLASTY Right 11/19/2018    Procedure: ARTHROPLASTY KNEE TOTAL;  Surgeon: Parrish Hayden MD;  [facility];  Service: Orthopedics    SHOULDER SURGERY      TOOTH EXTRACTION      WISDOM TOOTH EXTRACTION       Social History   History   Alcohol Use    Yes     Comment: 'couple beers every couple weeks'     History   Drug Use No     History   Smoking Status    Former Smoker    Types: Cigarettes    Quit date: 3/1/2012   Smokeless Tobacco    Never Used     Family History: noncontributory    Meds/Allergies   Current Facility-Administered Medications   Medication Dose Route Frequency    acetaminophen (TYLENOL) tablet 650 mg  650 mg Oral Q6H [address]    docusate sodium (COLACE) capsule 100 mg  100 mg Oral BID    enoxaparin (LOVENOX) subcutaneous injection 40 mg  40 mg Subcutaneous Daily    glyBURIDE-metFORMIN (GLUCOVANCE 2 5/500) combo dose   Oral Daily With Breakfast    hydrALAZINE (APRESOLINE) tablet 25 mg  25 mg Oral Q8H PRN    HYDROmorphone (DILAUDID) injection 1 mg  1 mg Intravenous Q3H PRN    insulin lispro (HumaLOG) 100 units/mL subcutaneous injection 1-5 Units  1-5 Units Subcutaneous TID AC    insulin lispro (HumaLOG) 100 units/mL subcutaneous injection 1-5 Units  1-5 Units Subcutaneous HS    lactated ringers bolus 1,000 mL  1,000 mL Intravenous Once    lactated ringers infusion  125 mL/hr Intravenous Continuous    lactated ringers infusion  100 mL/hr Intravenous Continuous    lactated ringers infusion  1 5 mL/kg/hr Intravenous Continuous    oxyCODONE (ROXICODONE) immediate release tablet 10 mg  10 mg Oral Q4H PRN    oxyCODONE (ROXICODONE) IR tablet 5 mg  5 mg Oral Q4H PRN    pravastatin (PRAVACHOL) tablet 80 mg  80 mg Oral Daily With Dinner    senna (SENOKOT) tablet 8 6 mg  1 tablet Oral Daily    sitaGLIPtin (JANUVIA) tablet 25 mg  25 mg Oral Daily    sodium chloride 0 9 % infusion  125 mL/hr Intravenous Continuous     Prescriptions Prior to Admission   Medication    ascorbic acid (VITAMIN C) 500 MG tablet    ferrous sulfate 324 (65 Fe) mg    folic acid (FOLVITE) 1 mg tablet    glyBURIDE-metFORMIN (GLUCOVANCE) 2 5-500 MG per tablet    lisinopril (ZESTRIL) 40 mg tablet    saxagliptin (ONGLYZA) 5 MG tablet    simvastatin (ZOCOR) 40 mg tablet         No Known Allergies    Objective   Temp:  [97 °F (36 1 °C)-99 6 °F (37 6 °C)] 97 9 °F (36 6 °C)  HR:  [72-97] 94  Resp:  [13-18] 18  BP: ()/(59-83) 119/73    Intake/Output Summary (Last 24 hours) at 11/20/18 1044  Last data filed at 11/20/18 1001   Gross per 24 hour   Intake             2365 ml   Output             3175 ml   Net             -810 ml         Counseling / Coordination of Care  Total floor / unit time spent today 20 minutes  Greater than 50% of total time was spent with the patient and / or family counseling and / or coordination of care  Please note that the [facility] provides consultative services regarding pain management only    With the exception of ketamine and epidural infusions and except when indicated, final decisions regarding starting or changing doses of analgesic medications are at the discretion of the consulting service  Off hours consultation and/or medication management is generally not available      Anthony Olmos MD  November 20, 2018  10:44 AM

## 2018-11-20 NOTE — SOCIAL WORK
CM met with patient to complete a general SW assessment and discuss discharge needs  CM introduced herself, provided extension, and explained her role in the discharge planning  Patient resides in a one level house with his wife George Lawson [phone])  Patient identifies his as their primary support system  Patient is independent with ADLs and functional mobility  Patient reports having a walking stick at home; declined the need for additional at discharge  Patient drives; reports his wife is available to transport at discharge  Patient uses [facility] in [place]; CM informed patient of their access to [facility] at discharge  PCP identified as Dr Isaias Siddiqui  Patient does not have a POA, declined further information  No history of Mental health/ D&A reported  Cm discussed the discharge recommendation from OT of STR  Patient is agreeable and requested more time to look over the list due to feeling groggy from pain medication  Cm will follow up  Patient/caregiver received discharge checklist   Content reviewed  Patient/caregiver encouraged to participate in discharge plan of care prior to discharge home  CM reviewed d/c planning process including the following: identifying help at home, patient preference for d/c planning needs, Discharge Lounge, [facility] Meds to Bed program, availability of treatment team to discuss questions or concerns patient and/or family may have regarding understanding medications and recognizing signs and symptoms once discharged  CM also encouraged patient to follow up with all recommended appointments after discharge  Patient advised of importance for patient and family to participate in managing patients medical well being

## 2018-11-20 NOTE — PLAN OF CARE
Problem: PHYSICAL THERAPY ADULT  Goal: Performs mobility at highest level of function for planned discharge setting  See evaluation for individualized goals  Treatment/Interventions: Functional transfer training, LE strengthening/ROM, Endurance training, Patient/family training, Bed mobility, Gait training, Spoke to nursing, OT  Equipment Recommended: Shante Lynn       See flowsheet documentation for full assessment, interventions and recommendations  Outcome: Progressing  Prognosis: Fair  Problem List: Decreased strength, Decreased range of motion, Decreased endurance, Impaired balance, Decreased mobility, Decreased safety awareness, Obesity, Decreased skin integrity, Orthopedic restrictions, Pain  Assessment: Pt continues to require considerable assist for mobility tasks at this time due to increased R knee pain & decreased ROM  Pt requires increased time to perform sit to stand transfers & is unable to perform L knee flexion to place it under his YOVANI to improve transfer  Pt demonstrated improved standing tolerance, as he was able to put weight through RLE during standing and ambulating after receiving instructions for sequencing  Pt then performed LE exercises as noted above, but is again limited in activity tolerance by pain  Pt educated in continued mobiilty, positional changes & performing exercises to improve activity tolerance, improve ROM, strength, & functional mobility   Pt verbalized understanding at this time  Will continue to benefit from therapy services to address noted deficits, focusing on improved standing tolerance and mobility to increase functional independence  Continue to recommend discharge home with continued progress at this time  Barriers to Discharge: Inaccessible home environment     Recommendation: Outpatient PT     PT - OK to Discharge: No    See flowsheet documentation for full assessment

## 2018-11-20 NOTE — PROGRESS NOTES
Pharmacy notified about Acudose discrepancy regarding waste required for hydromorphone 1mg  Prescribed dose was administered to patient and documented  No medication required to be wasted  Witnessed by Jorge Pickard RN  Please see MAR for medication details

## 2018-11-20 NOTE — PROGRESS NOTES
Jessie Leon 66 y o  male MRN: [id number]  Unit/Bed#: CW3 345-01    This patient underwent a procedure not on the inpatient only list and therefore is subject to the 2 midnight benchmark  Accordingly, in my judgement, the patient will require at least 2 midnights in the hospital receiving acute medical care  The patient is noted to have comorbid conditions which will require management in the grecia-operative period prior to safe discharge  As such the patient will require acute care beyond the usual and routine recovery period for the procedure alone and is therefore appropriate for inpatient admission

## 2018-11-21 ENCOUNTER — APPOINTMENT (OUTPATIENT)
Dept: PHYSICAL THERAPY | Facility: CLINIC | Age: 63
End: 2018-11-21
Payer: MEDICARE

## 2018-11-21 PROBLEM — Z96.651 S/P TKR (TOTAL KNEE REPLACEMENT), RIGHT: Status: ACTIVE | Noted: 2018-11-21

## 2018-11-21 LAB
ANION GAP SERPL CALCULATED.3IONS-SCNC: 7 MMOL/L (ref 4–13)
BUN SERPL-MCNC: 10 MG/DL (ref 5–25)
CALCIUM SERPL-MCNC: 8.2 MG/DL (ref 8.3–10.1)
CHLORIDE SERPL-SCNC: 103 MMOL/L (ref 100–108)
CO2 SERPL-SCNC: 26 MMOL/L (ref 21–32)
CREAT SERPL-MCNC: 0.81 MG/DL (ref 0.6–1.3)
ERYTHROCYTE [DISTWIDTH] IN BLOOD BY AUTOMATED COUNT: 13 % (ref 11.6–15.1)
GFR SERPL CREATININE-BSD FRML MDRD: 96 ML/MIN/1.73SQ M
GLUCOSE SERPL-MCNC: 162 MG/DL (ref 65–140)
GLUCOSE SERPL-MCNC: 186 MG/DL (ref 65–140)
GLUCOSE SERPL-MCNC: 195 MG/DL (ref 65–140)
GLUCOSE SERPL-MCNC: 196 MG/DL (ref 65–140)
GLUCOSE SERPL-MCNC: 205 MG/DL (ref 65–140)
HCT VFR BLD AUTO: 30.6 % (ref 36.5–49.3)
HGB BLD-MCNC: 10.2 G/DL (ref 12–17)
MCH RBC QN AUTO: 31.7 PG (ref 26.8–34.3)
MCHC RBC AUTO-ENTMCNC: 33.3 G/DL (ref 31.4–37.4)
MCV RBC AUTO: 95 FL (ref 82–98)
PLATELET # BLD AUTO: 150 THOUSANDS/UL (ref 149–390)
PMV BLD AUTO: 9.9 FL (ref 8.9–12.7)
POTASSIUM SERPL-SCNC: 3.8 MMOL/L (ref 3.5–5.3)
RBC # BLD AUTO: 3.22 MILLION/UL (ref 3.88–5.62)
SODIUM SERPL-SCNC: 136 MMOL/L (ref 136–145)
WBC # BLD AUTO: 10.56 THOUSAND/UL (ref 4.31–10.16)

## 2018-11-21 PROCEDURE — 82948 REAGENT STRIP/BLOOD GLUCOSE: CPT

## 2018-11-21 PROCEDURE — 97530 THERAPEUTIC ACTIVITIES: CPT

## 2018-11-21 PROCEDURE — 85027 COMPLETE CBC AUTOMATED: CPT | Performed by: STUDENT IN AN ORGANIZED HEALTH CARE EDUCATION/TRAINING PROGRAM

## 2018-11-21 PROCEDURE — 97116 GAIT TRAINING THERAPY: CPT

## 2018-11-21 PROCEDURE — 80048 BASIC METABOLIC PNL TOTAL CA: CPT | Performed by: STUDENT IN AN ORGANIZED HEALTH CARE EDUCATION/TRAINING PROGRAM

## 2018-11-21 RX ORDER — METHOCARBAMOL 750 MG/1
750 TABLET, FILM COATED ORAL EVERY 8 HOURS SCHEDULED
Refills: 0
Start: 2018-11-21 | End: 2019-02-06

## 2018-11-21 RX ORDER — SENNOSIDES 8.6 MG
1 TABLET ORAL DAILY
Qty: 120 EACH | Refills: 0
Start: 2018-11-21 | End: 2022-02-01 | Stop reason: HOSPADM

## 2018-11-21 RX ORDER — GABAPENTIN 100 MG/1
100 CAPSULE ORAL 3 TIMES DAILY
Refills: 0
Start: 2018-11-21 | End: 2019-02-07

## 2018-11-21 RX ADMIN — GABAPENTIN 100 MG: 100 CAPSULE ORAL at 21:29

## 2018-11-21 RX ADMIN — PRAVASTATIN SODIUM 80 MG: 80 TABLET ORAL at 17:34

## 2018-11-21 RX ADMIN — INSULIN LISPRO 1 UNITS: 100 INJECTION, SOLUTION INTRAVENOUS; SUBCUTANEOUS at 21:29

## 2018-11-21 RX ADMIN — SENNOSIDES 8.6 MG: 8.6 TABLET, FILM COATED ORAL at 09:00

## 2018-11-21 RX ADMIN — OXYCODONE HYDROCHLORIDE 10 MG: 10 TABLET ORAL at 06:15

## 2018-11-21 RX ADMIN — INSULIN LISPRO 1 UNITS: 100 INJECTION, SOLUTION INTRAVENOUS; SUBCUTANEOUS at 12:56

## 2018-11-21 RX ADMIN — OXYCODONE HYDROCHLORIDE 15 MG: 10 TABLET ORAL at 14:42

## 2018-11-21 RX ADMIN — METHOCARBAMOL TABLETS 750 MG: 750 TABLET, COATED ORAL at 08:59

## 2018-11-21 RX ADMIN — ACETAMINOPHEN 975 MG: 325 TABLET ORAL at 21:29

## 2018-11-21 RX ADMIN — DOCUSATE SODIUM 100 MG: 100 CAPSULE, LIQUID FILLED ORAL at 08:59

## 2018-11-21 RX ADMIN — GABAPENTIN 100 MG: 100 CAPSULE ORAL at 17:34

## 2018-11-21 RX ADMIN — DOCUSATE SODIUM 100 MG: 100 CAPSULE, LIQUID FILLED ORAL at 17:34

## 2018-11-21 RX ADMIN — ENOXAPARIN SODIUM 40 MG: 40 INJECTION SUBCUTANEOUS at 08:59

## 2018-11-21 RX ADMIN — METHOCARBAMOL TABLETS 750 MG: 750 TABLET, COATED ORAL at 17:34

## 2018-11-21 RX ADMIN — INSULIN LISPRO 1 UNITS: 100 INJECTION, SOLUTION INTRAVENOUS; SUBCUTANEOUS at 17:41

## 2018-11-21 RX ADMIN — SITAGLIPTIN 25 MG: 25 TABLET, FILM COATED ORAL at 09:00

## 2018-11-21 RX ADMIN — OXYCODONE HYDROCHLORIDE 15 MG: 10 TABLET ORAL at 10:37

## 2018-11-21 RX ADMIN — METFORMIN HYDROCHLORIDE: 500 TABLET ORAL at 09:00

## 2018-11-21 RX ADMIN — ACETAMINOPHEN 975 MG: 325 TABLET ORAL at 06:14

## 2018-11-21 RX ADMIN — INSULIN LISPRO 1 UNITS: 100 INJECTION, SOLUTION INTRAVENOUS; SUBCUTANEOUS at 09:00

## 2018-11-21 RX ADMIN — ACETAMINOPHEN 975 MG: 325 TABLET ORAL at 14:42

## 2018-11-21 RX ADMIN — OXYCODONE HYDROCHLORIDE 15 MG: 10 TABLET ORAL at 21:30

## 2018-11-21 RX ADMIN — GABAPENTIN 100 MG: 100 CAPSULE ORAL at 08:59

## 2018-11-21 NOTE — PHYSICAL THERAPY NOTE
Physical Therapy Progress Note     11/21/18 1134   Pain Assessment   Pain Assessment 0-10   Pain Score Worst Possible Pain   Pain Type Acute pain   Pain Location Knee   Pain Orientation Right   Restrictions/Precautions   Weight Bearing Precautions Per Order Yes   RLE Weight Bearing Per Order WBAT   Other Precautions Fall Risk;Pain;WBS; Telemetry   General   Chart Reviewed Yes   Family/Caregiver Present No   Cognition   Overall Cognitive Status WFL   Subjective   Subjective Pt  agreeable to PT  In bed supine upon entry  Pt  very agitated t/o session reporting "there is nothing you are going to teach me that I dont know  This is my 6th surgery on thie leg"  Pt  agitated t/o session  Pt  will not perform even supine to sit transfer without a second person in the room I need two people to get me up from bed  Bed Mobility   Supine to Sit 5  Supervision   Additional items Increased time required; Bedrails;HOB elevated   Transfers   Sit to Stand 3  Moderate assistance   Additional items Assist x 2; Increased time required   Stand to Sit 5  Supervision   Additional items Assist x 1; Increased time required;Armrests; Verbal cues   Ambulation/Elevation   Gait pattern Decreased R stance;Decreased foot clearance; Improper Weight shift; Short stride; Excessively slow; Antalgic   Gait Assistance 5  Supervision   Additional items Assist x 2;Verbal cues   Assistive Device Rolling walker   Distance 6ft   Balance   Static Sitting Good   Ambulatory Fair -   Activity Tolerance   Activity Tolerance Other (Comment); Patient limited by pain  (pt  noted to be self limiting)   Nurse Made Aware yes   Equipment Use   Comments pt  self limiting reporting this is all you get from me because this is all I can do   I will do more tomorrow   Assessment   Prognosis Fair   Problem List Decreased strength;Decreased range of motion;Decreased endurance;Decreased mobility;Pain;Orthopedic restrictions   Assessment pt  needed no hands on assit for supine to sit transfer with HOB elevated and use of bed rails  Pt  used LLE to assist in mobilizing RLE  When therapist attemtpted to assist RLE for transfers supine to sit and stand to sit patient shouted "No  I will do it myself"  Per pt  request provided Mod A x 2 assist for sit to stand transfer and bed elevated  pt  able to steady himself in standing with RW and ambulat to chair with RW with CGAx2-1 and CS  Pt  refused to participate furtehr in therapy reporting this is all I can do  Pt,  able to don shorts in bed in supine and needed Minimal assist in pulling it up once standing  Will continue to follow during the stay to improve functional mobility  Barriers to Discharge Inaccessible home environment;Decreased caregiver support; Other (Comment)  (Pt  reports his wife works and he is alone at home )   Goals   Patient Goals None reported   STG Expiration Date 11/27/18   Short Term Goal #1 as per PT assessment:    Complete bed mobility and transfers I to decrease need for caregiver in home  2  Ambulate 300' I to complete household and community mobility without A  3  Improve dynamic balance to good to decrease need for UE support during ambulation  4  Be educated & demonstate 2 steps to be able to enter home without A  5  Improve knee ROM 0-90  6  I with HEP  Treatment Day 3   Plan   Treatment/Interventions Functional transfer training;Bed mobility;Gait training;Equipment eval/education;Patient/family training;Spoke to nursing   Progress Slow progress, decreased activity tolerance   PT Frequency 7x/wk; Twice a day   Recommendation   Recommendation Outpatient PT   Equipment Recommended Dexter Skelton, PTA

## 2018-11-21 NOTE — OCCUPATIONAL THERAPY NOTE
Occupational Therapy         Patient Name: Brendan Ramesh  XKUXR'T Date: 11/21/2018      Chart reviewed  Pt received bedside and currently eating dinner  Declining therapy and stating "I am going to rehab, hopefully I will miss you tomorrow"  Will continue to follow    Becki Marshall, OT

## 2018-11-21 NOTE — PROGRESS NOTES
Internal Medicine Progress Note  Patient: Brendan Ramesh  Age/sex: 66 y o  male  Medical Record #: [id number]      ASSESSMENT/PLAN:  Brendan Ramesh is seen and examined and mangement for following issues:    # Rt knee OA s/p Rt TKA:  Pain better controlled overnight; No fevers  Hgb 10 2, stable; bmp pending      # HTN:  Continue to hold ACEI to decrease the risk of VANDA in the post-op period  Add Hydralazine 25mg every 8 hours as needed for SBP > 160  First night post op had some hypotension, since resolved; Monitor blood pressure per protocol and adjust medications as needed      # DM type 2:  BS better, back on oral meds; Continue SSI and accuchecks  Adjust medications as needed      # Hyperlipidemia: Continue pravastatin while here  Resume simvastatin at discharge  Subjective: Patient seen and examined    No complaints overnight; slept better; pain is controlled on current regimen    ROS:   GI: denies abdominal pain, change bowel habits or reflux symptoms  Neuro: No new neurologic changes  Respiratory: No Cough, SOB  Musculoskeletal:  +knee pain  Cardiovascular: No CP, palpitations     Scheduled Meds:    Current Facility-Administered Medications:  acetaminophen 975 mg Oral [facility] Janina Roblero MD    docusate sodium 100 mg Oral BID Rachel Alberto MD    enoxaparin 40 mg Subcutaneous Daily Rachel Alberto MD    gabapentin 100 mg Oral TID Janina Roblero MD    glyBURIDE-metFORMIN ([facility] 2 5/500) combo dose  Oral Daily With Breakfast Rachel Alberto MD    hydrALAZINE 25 mg Oral Q8H PRN Carlee Jack PA-C    HYDROmorphone 1 mg Intravenous Q3H PRN Rachel Alberto MD    insulin lispro 1-5 Units Subcutaneous TID AC Carlee Jack PA-C    insulin lispro 1-5 Units Subcutaneous HS Carlee Jack PA-C    lactated ringers 1,000 mL Intravenous Once Rachel Alberto MD Last Rate: Stopped (11/19/18 1534)   lactated ringers 125 mL/hr Intravenous Continuous Anthony Olmos MD Last Rate: 125 mL/hr (11/19/18 0954)   lactated ringers 100 mL/hr Intravenous Continuous Nata Madrid CRNA    lactated ringers 1 5 mL/kg/hr Intravenous Continuous Mio Schilling MD Last Rate: Stopped (11/20/18 1354)   methocarbamol 750 mg Oral Q8H [address] Darrius Langford MD    oxyCODONE 10 mg Oral Q4H PRN Darrius Langford MD    oxyCODONE 15 mg Oral Q4H PRN Darrius Langford MD    pravastatin 80 mg Oral Daily With Shanna Gonzalez MD    senna 1 tablet Oral Daily Mio Schilling MD    sitaGLIPtin 25 mg Oral Daily Mio Schilling MD    sodium chloride 125 mL/hr Intravenous Continuous Allen Spencer PA-C        Labs:       Results from last 7 days  Lab Units 11/20/18  0511   WBC Thousand/uL 10 41*   HEMOGLOBIN g/dL 11 2*   HEMATOCRIT % 33 2*   PLATELETS Thousands/uL 166       Results from last 7 days  Lab Units 11/20/18  0509   POTASSIUM mmol/L 4 2   CHLORIDE mmol/L 104   CO2 mmol/L 26   BUN mg/dL 19   CREATININE mg/dL 1 01   CALCIUM mg/dL 7 8*                No results found for: GLUCOSE    Labs reviewed    Physical Examination:  Vitals:   Vitals:    11/20/18 1204 11/20/18 1533 11/20/18 1900 11/20/18 2315   BP: 151/91 147/80 144/74 125/77   BP Location: Right arm Right arm Right arm Left arm   Pulse: 92 95 101 100   Resp: 16 18 18 19   Temp: 97 8 °F (36 6 °C) 98 2 °F (36 8 °C) 98 6 °F (37 °C) 97 9 °F (36 6 °C)   TempSrc: Oral Oral Oral Oral   SpO2: 97% 96% 96% 98%   Weight:       Height:           GEN: NAD  RESP: CTAB, no R/R/W  CV: +S1 S2, regular rate, no rubs  ABD: soft, NT, ND, normal BS   : voiding  EXT: DP pulses intact b/l; dressing in place  Neuro: AAOx3    [ X ] Total time spent: 30 Mins and greater than 50% of this time was spent counseling/coordinating care

## 2018-11-21 NOTE — PLAN OF CARE
Problem: PHYSICAL THERAPY ADULT  Goal: Performs mobility at highest level of function for planned discharge setting  See evaluation for individualized goals  Treatment/Interventions: Functional transfer training, LE strengthening/ROM, Endurance training, Patient/family training, Bed mobility, Gait training, Spoke to nursing, OT  Equipment Recommended: Jenn Socks       See flowsheet documentation for full assessment, interventions and recommendations  Outcome: Progressing  Prognosis: Fair  Problem List: Decreased strength, Decreased range of motion, Decreased endurance, Decreased mobility, Pain, Orthopedic restrictions  Assessment: pt  needed no hands on assit for supine to sit transfer with HOB elevated and use of bed rails  Pt  used LLE to assist in mobilizing RLE  When therapist attemtpted to assist RLE for transfers supine to sit and stand to sit patient shouted "No  I will do it myself"  Per pt  request provided Mod A x 2 assist for sit to stand transfer and bed elevated  pt  able to steady himself in standing with RW and ambulat to chair with RW with CGAx2-1 and CS  Pt  refused to participate furte in therapy reporting this is all I can do  Pt,  able to don shorts in bed in supine and needed Minimal assist in pulling it up once standing  Will continue to follow during the stay to improve functional mobility  Barriers to Discharge: Inaccessible home environment, Decreased caregiver support, Other (Comment) (Pt  reports his wife works and he is alone at home )     Recommendation: Outpatient PT     PT - OK to Discharge: No    See flowsheet documentation for full assessment

## 2018-11-21 NOTE — PROGRESS NOTES
Subjective: No acute events overnight  No acute distress  Pain well controlled    Right lower extremity  Dressing c/d/i  Motor & sensation grossly intact  Limb warm and well perfused    Assessment: Post op from right total knee arthroplasty    Plan:  Weightbearing as tolerated  Pain control  DVT ppx  PT  Patient noted to have acute blood loss anemia postoperatively greater than 2 0 mg/dL from baseline  Patient will be resuscitated with IV fluids as needed    Dispo:  Orthopedically stable for discharge once cleared by Physical therapy    Objective:  Lab Results   Component Value Date/Time    WBC 10 56 (H) 11/21/2018 04:57 AM    HGB 10 2 (L) 11/21/2018 04:57 AM       Vitals:    11/20/18 2315   BP: 125/77   Pulse: 100   Resp: 19   Temp: 97 9 °F (36 6 °C)   SpO2: 98%

## 2018-11-21 NOTE — SOCIAL WORK
Patient provided choices for STR; referrals to [facility] and [address]  Patient accepted to both facilities and is aware  Cm received notice that patient will be contacted by both facilities and is aware  CM to follow

## 2018-11-21 NOTE — PLAN OF CARE
Problem: PHYSICAL THERAPY ADULT  Goal: Performs mobility at highest level of function for planned discharge setting  See evaluation for individualized goals  Treatment/Interventions: Functional transfer training, LE strengthening/ROM, Endurance training, Patient/family training, Bed mobility, Gait training, Spoke to nursing, OT  Equipment Recommended: Obie Castleman       See flowsheet documentation for full assessment, interventions and recommendations  Outcome: Progressing  Prognosis: Fair  Problem List: Decreased strength, Decreased range of motion, Decreased endurance, Decreased mobility, Pain, Orthopedic restrictions  Assessment: The pt  is very resistant to education about the therapeutic process and benefit of therapeutic exercise and continued mobility  He was adamant about performing transfers his own way even after he was educated on the benefit of utilizing proper technique  He was able to ambulate a short distance, but he refused further ambulation  He also became agitated when it was recommended to utilize the rolling walker to fully turn squarely against the bed prior to sitting as he pushed the walker away and leaned to sit at a fourty-five degree angle  He requires extensive time for all mobility, and continued to resist any recommendations for alternate techniques  Encouraged the pt  to continue mobilizing with staff as well as to complete his home exercise program   Barriers to Discharge: Inaccessible home environment, Decreased caregiver support     Recommendation: Other (Comment), Outpatient PT, Home with family support (With continued progress  )     PT - OK to Discharge: No    See flowsheet documentation for full assessment

## 2018-11-21 NOTE — PHYSICAL THERAPY NOTE
Physical Therapy Progress Note     11/21/18 3022   Pain Assessment   Pain Assessment 0-10   Pain Score Worst Possible Pain   Pain Type Surgical pain   Pain Location Knee   Pain Orientation Right   Hospital Pain Intervention(s) Cold applied;Repositioned; Ambulation/increased activity; Emotional support   Response to Interventions Tolerated  Restrictions/Precautions   RLE Weight Bearing Per Order WBAT   Other Precautions Pain; Fall Risk;WBS   Subjective   Subjective The pt  states that he is only going to ambulate to the other side of the bed  He stated that he does not want a chair follow to walk down the bhagat because he is just going to walk to the other side of the bed  He noted that he does not want assistance with his LE management, and that he knows everything because he has already had five surgeries on his knee  Bed Mobility   Sit to Supine 4  Minimal assistance   Additional items Assist x 1;LE management; Increased time required   Transfers   Sit to Stand 5  Supervision   Additional items Increased time required;Armrests   Stand to Sit 5  Supervision   Additional items Increased time required   Ambulation/Elevation   Gait pattern Excessively slow; Step to;Short stride; Inconsistent alma; Shuffling;Decreased foot clearance; Antalgic;Decreased R stance   Gait Assistance 5  Supervision   Additional items Verbal cues   Assistive Device Rolling walker   Distance 14 feet  Balance   Static Sitting Good   Dynamic Sitting Good   Static Standing Fair   Ambulatory Fair -   Activity Tolerance   Activity Tolerance Patient limited by pain  (Pt  declining any further ambulation or therapy )   Nurse Samina Rizo RN  Exercises   Ankle Pumps Sitting;Right;AAROM;5 reps   Assessment   Prognosis Fair   Problem List Decreased strength;Decreased range of motion;Decreased endurance;Decreased mobility;Pain;Orthopedic restrictions   Assessment The pt   is very resistant to education about the therapeutic process and benefit of therapeutic exercise and continued mobility  He was adamant about performing transfers his own way even after he was educated on the benefit of utilizing proper technique  He was able to ambulate a short distance, but he refused further ambulation  He also became agitated when it was recommended to utilize the rolling walker to fully turn squarely against the bed prior to sitting as he pushed the walker away and leaned to sit at a fourty-five degree angle  He requires extensive time for all mobility, and continued to resist any recommendations for alternate techniques  Encouraged the pt  to continue mobilizing with staff as well as to complete his home exercise program    Barriers to Discharge Inaccessible home environment;Decreased caregiver support   Goals   Patient Goals To go to rehab  STG Expiration Date 11/27/18   Short Term Goal #1 1  Complete bed mobility and transfers I to decrease need for caregiver in home  2  Ambulate 300' I to complete household and community mobility without A  3  Improve dynamic balance to good to decrease need for UE support during ambulation  4  Be educated & demonstate 2 steps to be able to enter home without A  5  Improve knee ROM 0-90  6  I with HEP  by Lang Devine PT at 11/19/18 1715   Treatment Day 4   Plan   Treatment/Interventions Functional transfer training;Elevations;LE strengthening/ROM; Therapeutic exercise; Endurance training;Patient/family training;Bed mobility;Gait training   Progress Slow progress, decreased activity tolerance   PT Frequency 7x/wk; Twice a day   Recommendation   Recommendation Other (Comment); Outpatient PT; Home with family support  (With continued progress )   Equipment Recommended Walker   PT - OK to Discharge No     Arti Carson, CHE

## 2018-11-21 NOTE — RESTORATIVE TECHNICIAN NOTE
Restorative Specialist Mobility Note       Activity: Bedrest, Dangle, Stand at bedside, Turn, Ambulate in room, Ambulate in bhagat     Assistive Device: Front wheel walker     Ambulation Response: Tolerated fairly well  Repositioned: Sitting, Up in chair                          Assisted therapy during session  For all/additional information please see therapy note(s)          Ny Collins Restorative Technician, BS

## 2018-11-21 NOTE — DISCHARGE SUMMARY
ORTHOPEDICS DISCHARGE SUMMARY  Areli Mckinney 66 y o  male MRN: [id number]  Unit/Bed#: CW3 345-01    Attending Physician: Bro Espinoza    Admitting diagnosis: Primary osteoarthritis of right knee [M17 11]    Discharge diagnosis: Primary osteoarthritis of right knee [M17 11]    Date of admission: 11/19/2018    Date of discharge: 11/23/2018         Procedure: R TKA    HPI:  This is a 66y o  year old male that presented to the office with signs and symptoms of right knee osteoarthritis  They tried and failed conservative treatment measures and wished to proceed with surgical intervention  The risks, benefits, and complications of the procedure were discussed with the patient and informed consent was obtained  Hospital Course: The patient was admitted to the hospital on 11/19/2018 and underwent an uncomplicated right total knee arthroplasty  They were transferred to the floor after a brief stay in the post-anesthesia care unit  Their pain was well managed with IV and oral pain medications  They began therapy on post operative day #1  Lovenox was also started for DVT prophylaxis post operative day #1  Hemovac drain was removed on POD1  On discharge date pt was cleared by PT and the medicine team and determined to be safe for discharge  Daily discussion was had with the patient, nursing staff, orthopaedic team, and family members if present  All questions were answered to the patients satisifaction  0  Lab Value Date/Time   HGB 10 2 (L) 11/21/2018 0457   HGB 11 2 (L) 11/20/2018 0511   HGB 14 7 10/22/2018 1355       Greater than 2 gram drop which qualifies for diagnosis of acute blood loss anemia  Vital signs remained stable and pt was resuscitated with IVF as needed     Discharge Instructions: The patient was discharged weight bearing as tolerated to the right lower extremity  Lovenox will be continued for 28 days  Continue PT/OT  Take pain medications as instructed  Discharge Medications:   For the complete list of discharge medications, please refer to the patient's medication reconciliation

## 2018-11-21 NOTE — PHYSICIAN ADVISOR
Current patient class: Inpatient  The patient is currently on Hospital Day: 2 at [address] [place] [address]      The patient was admitted to the hospital at [phone] on 11/20/18 for the following diagnosis:  Primary osteoarthritis of right knee [M17 11]       There is documentation in the medical record of an expected length of stay of at least 2 midnights  The patient is therefore expected to satisfy the 2 midnight benchmark and given the 2 midnight presumption is appropriate for INPATIENT ADMISSION  Given this expectation of a satisfying stay, CMS instructs us that the patient is most often appropriate for inpatient admission under part A provided medical necessity is documented in the chart  After review of the relevant documentation, labs, vital signs and test results, the patient is appropriate for INPATIENT ADMISSION  Admission to the hospital as an inpatient is a complex decision making process which requires the practitioner to consider the patients presenting complaint, history and physical examination and all relevant testing  With this in mind, in this case, the patient was deemed appropriate for INPATIENT ADMISSION  After review of the documentation and testing available at the time of the admission I concur with this clinical determination of medical necessity  Rationale is as follows: The patient is a 66 yrs old Male who presented to the ED at 11/19/2018  8:31 AM with a chief complaint of TKA  Given the need for further hospitalization, and along with the documentation of medical necessity present in the chart, the patient is appropriate for inpatient admission  The patient is expected to satisfy the 2 midnight benchmark, and will require further acute medical care  The patient does have comorbid conditions which increases the risk for significant adverse outcome  Given this the patient is appropriate for inpatient admission      This patient underwent a procedure not on the inpatient only list and therefore is subject to the 2 midnight benchmark  Accordingly, in my judgement, the patient will require at least 2 midnights in the hospital receiving acute medical care  The patient is noted to have comorbid conditions which will require management in the grecia-operative period prior to safe discharge  As such the patient will require acute care beyond the usual and routine recovery period for the procedure alone and is therefore appropriate for inpatient admission        The patients vitals on arrival were ED Triage Vitals   Temperature Pulse Respirations Blood Pressure SpO2   11/19/18 0923 11/19/18 0923 11/19/18 0923 11/19/18 0923 11/19/18 0923   99 7 °F (37 6 °C) 84 16 119/82 97 %      Temp Source Heart Rate Source Patient Position - Orthostatic VS BP Location FiO2 (%)   11/19/18 0923 11/19/18 0923 11/19/18 1515 11/19/18 1515 --   Tymp Core Monitor Lying Right arm       Pain Score       11/19/18 0923       7           Past Medical History:   Diagnosis Date    Depression     Diabetes mellitus ([facility] [address] )     Hyperlipidemia     Hypertension     Osteoarthritis, knee      Past Surgical History:   Procedure Laterality Date    BACK SURGERY      HAND SURGERY      JOINT REPLACEMENT Left     l tkr    KNEE SURGERY Left     LA TOTAL KNEE ARTHROPLASTY Right 11/19/2018    Procedure: ARTHROPLASTY KNEE TOTAL;  Surgeon: Priya Amaya MD;  [facility]: [facility];  Service: Orthopedics    SHOULDER SURGERY      TOOTH EXTRACTION      WISDOM TOOTH EXTRACTION             Consults have been placed to:   IP CONSULT TO CASE MANAGEMENT  IP CONSULT TO INTERNAL MEDICINE  IP CONSULT TO ACUTE PAIN SERVICE    Vitals:    11/20/18 0801 11/20/18 1204 11/20/18 1533 11/20/18 1900   BP: 119/73 151/91 147/80 144/74   BP Location: Left arm Right arm Right arm Right arm   Pulse: 94 92 95 101   Resp: 18 16 18 18   Temp: 97 9 °F (36 6 °C) 97 8 °F (36 6 °C) 98 2 °F (36 8 °C) 98 6 °F (37 °C) TempSrc: Oral Oral Oral Oral   SpO2: 96% 97% 96% 96%   Weight:       Height:           Most recent labs:    Recent Labs      11/20/18   0509  11/20/18   0511   WBC   --   10 41*   HGB   --   11 2*   HCT   --   33 2*   PLT   --   166   K  4 2   --    CALCIUM  7 8*   --    BUN  19   --    CREATININE  1 01   --        Scheduled Meds:  Current Facility-Administered Medications:  acetaminophen 975 mg Oral [facility] Nay Mccrary MD    docusate sodium 100 mg Oral BID Laura Saldana MD    enoxaparin 40 mg Subcutaneous Daily Laura Saldana MD    gabapentin 100 mg Oral TID Nay Mccrary MD    glyBURIDE-metFORMIN (Myrtle Kameron 2 5/500) combo dose  Oral Daily With Breakfast Laura Saldana MD    hydrALAZINE 25 mg Oral Q8H PRN Carlee Jack PA-C    HYDROmorphone 1 mg Intravenous Q3H PRN Laura Saldana MD    insulin lispro 1-5 Units Subcutaneous TID AC Carlee Jack PA-C    insulin lispro 1-5 Units Subcutaneous HS Carlee Jack PA-C    lactated ringers 1,000 mL Intravenous Once Laura Saldana MD Last Rate: Stopped (11/19/18 1534)   lactated ringers 125 mL/hr Intravenous Continuous Lala Brandt MD Last Rate: 125 mL/hr (11/19/18 0954)   lactated ringers 100 mL/hr Intravenous Continuous Nata Rosenberg CRNA    lactated ringers 1 5 mL/kg/hr Intravenous Continuous Laura Saldana MD Last Rate: Stopped (11/20/18 1354)   methocarbamol 750 mg Oral [facility] Nay Mccrary MD    oxyCODONE 10 mg Oral Q4H PRN Nay Mccrary MD    oxyCODONE 15 mg Oral Q4H PRN Nay Mccrary MD    pravastatin 80 mg Oral Daily With Marylee Cheek, MD    senna 1 tablet Oral Daily Laura Saldana MD    sitaGLIPtin 25 mg Oral Daily Laura Saldana MD    sodium chloride 125 mL/hr Intravenous Continuous Dillon Mason PA-C      Continuous Infusions:  lactated ringers 125 mL/hr Last Rate: 125 mL/hr (11/19/18 0954)   lactated ringers 100 mL/hr    lactated ringers 1 5 mL/kg/hr Last Rate: Stopped (11/20/18 3482)   sodium chloride 125 mL/hr      PRN Meds: hydrALAZINE    HYDROmorphone    oxyCODONE    oxyCODONE    Surgical procedures (if appropriate):  Procedure(s):  ARTHROPLASTY KNEE TOTAL

## 2018-11-22 LAB
ANION GAP SERPL CALCULATED.3IONS-SCNC: 5 MMOL/L (ref 4–13)
BUN SERPL-MCNC: 11 MG/DL (ref 5–25)
CALCIUM SERPL-MCNC: 8.4 MG/DL (ref 8.3–10.1)
CHLORIDE SERPL-SCNC: 104 MMOL/L (ref 100–108)
CO2 SERPL-SCNC: 28 MMOL/L (ref 21–32)
CREAT SERPL-MCNC: 0.79 MG/DL (ref 0.6–1.3)
ERYTHROCYTE [DISTWIDTH] IN BLOOD BY AUTOMATED COUNT: 13.2 % (ref 11.6–15.1)
GFR SERPL CREATININE-BSD FRML MDRD: 97 ML/MIN/1.73SQ M
GLUCOSE SERPL-MCNC: 166 MG/DL (ref 65–140)
GLUCOSE SERPL-MCNC: 190 MG/DL (ref 65–140)
GLUCOSE SERPL-MCNC: 196 MG/DL (ref 65–140)
GLUCOSE SERPL-MCNC: 200 MG/DL (ref 65–140)
GLUCOSE SERPL-MCNC: 274 MG/DL (ref 65–140)
HCT VFR BLD AUTO: 28.3 % (ref 36.5–49.3)
HGB BLD-MCNC: 9.4 G/DL (ref 12–17)
MCH RBC QN AUTO: 31.9 PG (ref 26.8–34.3)
MCHC RBC AUTO-ENTMCNC: 33.2 G/DL (ref 31.4–37.4)
MCV RBC AUTO: 96 FL (ref 82–98)
PLATELET # BLD AUTO: 152 THOUSANDS/UL (ref 149–390)
PMV BLD AUTO: 9.7 FL (ref 8.9–12.7)
POTASSIUM SERPL-SCNC: 3.9 MMOL/L (ref 3.5–5.3)
RBC # BLD AUTO: 2.95 MILLION/UL (ref 3.88–5.62)
SODIUM SERPL-SCNC: 137 MMOL/L (ref 136–145)
WBC # BLD AUTO: 9.31 THOUSAND/UL (ref 4.31–10.16)

## 2018-11-22 PROCEDURE — 97116 GAIT TRAINING THERAPY: CPT

## 2018-11-22 PROCEDURE — 97530 THERAPEUTIC ACTIVITIES: CPT | Performed by: STUDENT IN AN ORGANIZED HEALTH CARE EDUCATION/TRAINING PROGRAM

## 2018-11-22 PROCEDURE — 97530 THERAPEUTIC ACTIVITIES: CPT

## 2018-11-22 PROCEDURE — 97535 SELF CARE MNGMENT TRAINING: CPT | Performed by: STUDENT IN AN ORGANIZED HEALTH CARE EDUCATION/TRAINING PROGRAM

## 2018-11-22 PROCEDURE — 80048 BASIC METABOLIC PNL TOTAL CA: CPT | Performed by: STUDENT IN AN ORGANIZED HEALTH CARE EDUCATION/TRAINING PROGRAM

## 2018-11-22 PROCEDURE — 82948 REAGENT STRIP/BLOOD GLUCOSE: CPT

## 2018-11-22 PROCEDURE — 85027 COMPLETE CBC AUTOMATED: CPT | Performed by: STUDENT IN AN ORGANIZED HEALTH CARE EDUCATION/TRAINING PROGRAM

## 2018-11-22 RX ADMIN — METFORMIN HYDROCHLORIDE: 500 TABLET ORAL at 08:05

## 2018-11-22 RX ADMIN — ENOXAPARIN SODIUM 40 MG: 40 INJECTION SUBCUTANEOUS at 08:08

## 2018-11-22 RX ADMIN — ACETAMINOPHEN 975 MG: 325 TABLET ORAL at 06:12

## 2018-11-22 RX ADMIN — ACETAMINOPHEN 975 MG: 325 TABLET ORAL at 21:51

## 2018-11-22 RX ADMIN — SITAGLIPTIN 25 MG: 25 TABLET, FILM COATED ORAL at 08:05

## 2018-11-22 RX ADMIN — OXYCODONE HYDROCHLORIDE 15 MG: 10 TABLET ORAL at 11:49

## 2018-11-22 RX ADMIN — GABAPENTIN 100 MG: 100 CAPSULE ORAL at 16:09

## 2018-11-22 RX ADMIN — OXYCODONE HYDROCHLORIDE 15 MG: 10 TABLET ORAL at 16:09

## 2018-11-22 RX ADMIN — METHOCARBAMOL TABLETS 750 MG: 750 TABLET, COATED ORAL at 08:08

## 2018-11-22 RX ADMIN — INSULIN LISPRO 2 UNITS: 100 INJECTION, SOLUTION INTRAVENOUS; SUBCUTANEOUS at 21:51

## 2018-11-22 RX ADMIN — INSULIN LISPRO 1 UNITS: 100 INJECTION, SOLUTION INTRAVENOUS; SUBCUTANEOUS at 17:35

## 2018-11-22 RX ADMIN — SENNOSIDES 8.6 MG: 8.6 TABLET, FILM COATED ORAL at 08:08

## 2018-11-22 RX ADMIN — INSULIN LISPRO 1 UNITS: 100 INJECTION, SOLUTION INTRAVENOUS; SUBCUTANEOUS at 08:09

## 2018-11-22 RX ADMIN — METHOCARBAMOL TABLETS 750 MG: 750 TABLET, COATED ORAL at 01:33

## 2018-11-22 RX ADMIN — OXYCODONE HYDROCHLORIDE 15 MG: 10 TABLET ORAL at 04:51

## 2018-11-22 RX ADMIN — GABAPENTIN 100 MG: 100 CAPSULE ORAL at 21:51

## 2018-11-22 RX ADMIN — DOCUSATE SODIUM 100 MG: 100 CAPSULE, LIQUID FILLED ORAL at 08:08

## 2018-11-22 RX ADMIN — DOCUSATE SODIUM 100 MG: 100 CAPSULE, LIQUID FILLED ORAL at 17:35

## 2018-11-22 RX ADMIN — GABAPENTIN 100 MG: 100 CAPSULE ORAL at 08:08

## 2018-11-22 RX ADMIN — ACETAMINOPHEN 975 MG: 325 TABLET ORAL at 04:52

## 2018-11-22 RX ADMIN — INSULIN LISPRO 1 UNITS: 100 INJECTION, SOLUTION INTRAVENOUS; SUBCUTANEOUS at 11:46

## 2018-11-22 RX ADMIN — PRAVASTATIN SODIUM 80 MG: 80 TABLET ORAL at 16:09

## 2018-11-22 RX ADMIN — METHOCARBAMOL TABLETS 750 MG: 750 TABLET, COATED ORAL at 16:09

## 2018-11-22 RX ADMIN — OXYCODONE HYDROCHLORIDE 15 MG: 10 TABLET ORAL at 21:51

## 2018-11-22 NOTE — PLAN OF CARE
Problem: OCCUPATIONAL THERAPY ADULT  Goal: Performs self-care activities at highest level of function for planned discharge setting  See evaluation for individualized goals  Treatment Interventions: ADL retraining, Functional transfer training, UE strengthening/ROM, Endurance training, Patient/family training, Equipment evaluation/education, Compensatory technique education, Continued evaluation, Energy conservation, Activityengagement  Equipment Recommended: Raised toilet seat       See flowsheet documentation for full assessment, interventions and recommendations  Outcome: Progressing  Limitation: Decreased ADL status, Decreased Safe judgement during ADL, Decreased endurance, Decreased self-care trans, Decreased high-level ADLs  Prognosis: Fair  Assessment: Pt participates in OT session with focus on LB dressing and activity tolerance to increase I for d/c  Pt requires encouragement to engage in therapy this session  Pt refused use of LHAE at this time stating, "I won't ever use that, I should be good after some time"  Pt mod A LB dressing to don/doff socks and requires A to don sock on RLE  Pt requires occasional vc to redirect to task at hand  Pt will continue to benefit from activity tolerance, adls, and transfers  OT Discharge Recommendation: Short Term Rehab  OT - OK to Discharge:  Yes

## 2018-11-22 NOTE — PROGRESS NOTES
Subjective: Patient complaining of R knee pain with PT   Denies numbness or tingling     Objective:  Lab Results   Component Value Date/Time    WBC 9 31 11/22/2018 04:47 AM    HGB 9 4 (L) 11/22/2018 04:47 AM       Vitals:    11/22/18 0738   BP: 103/62   Pulse: 97   Resp: 18   Temp: 98 4 °F (36 9 °C)   SpO2: 97%     RLE extremity  Dressing c/d/i  Motor & sensation grossly intact distally   Toes WWP    Assessment: Post op from R TKA     Plan:  WBAT RLE  Pain control  DVT ppx  PT  Dispo pending placement in rehab

## 2018-11-22 NOTE — PLAN OF CARE
Problem: PHYSICAL THERAPY ADULT  Goal: Performs mobility at highest level of function for planned discharge setting  See evaluation for individualized goals  Treatment/Interventions: Functional transfer training, LE strengthening/ROM, Endurance training, Patient/family training, Bed mobility, Gait training, Spoke to nursing, OT  Equipment Recommended: [place]       See flowsheet documentation for full assessment, interventions and recommendations  Outcome: Progressing  Prognosis: Fair  Problem List: Decreased strength, Decreased range of motion, Decreased endurance, Impaired balance, Decreased mobility, Decreased skin integrity, Orthopedic restrictions, Pain  Assessment: Pt participated in therapy session focusing on functional mobility tasks  Pt only agreeable to transfer OOB and ambulate to bedside chair despite education of benefits of further ambulation and increased participation in therapy session  Pt performed bed mobility with supervision assist and verbal cues for technique  Pt performed sit to stand transfer with mod A x 1 and verbal cues for technique and hand placement  Pt ambulated 6' x 1 with RW and supervision assist and verbal cues for sequencing and safety  Pt displays decreased step and stride length, decreased gait speed, antalgic gait pattern, step to gait pattern, improper weight shfit, forward flexed posture  Attempted to provide pt education regarding benefits of increased participation in therapy session, pt declining further participation  Skilled physical therapy is indicated to address listed functional deficits focusing on strength, endurance and functional mobility  Barriers to Discharge: Inaccessible home environment, Decreased caregiver support     Recommendation: Outpatient PT, Home with family support (with continued progress)     PT - OK to Discharge: No    See flowsheet documentation for full assessment

## 2018-11-22 NOTE — PHYSICAL THERAPY NOTE
PHYSICAL THERAPY NOTE          Patient Name: Andrea Tena  [id number] Date: 11/22/2018 11/22/18 1100   Pain Assessment   Pain Assessment 0-10   Pain Score 8   Pain Type Surgical pain   Pain Location Knee   Pain Orientation Right   Restrictions/Precautions   Weight Bearing Precautions Per Order Yes   RLE Weight Bearing Per Order WBAT   Other Precautions WBS;Pain; Fall Risk   General   Chart Reviewed Yes   Family/Caregiver Present No   Cognition   Overall Cognitive Status WFL   Arousal/Participation Alert; Responsive   Attention Within functional limits   Orientation Level Oriented X4   Following Commands Follows multistep commands with increased time or repetition   Subjective   Subjective "I don't mind getting up but I'm not sitting up for 5 hours again"   Bed Mobility   Supine to Sit 5  Supervision   Additional items Increased time required;Verbal cues;HOB elevated   Additional Comments Pt left seated in bedside chair at termination of session, all needs within reach   Transfers   Sit to Stand 3  Moderate assistance   Additional items Assist x 1; Increased time required;Verbal cues   Stand to Sit 5  Supervision   Additional items Increased time required;Verbal cues   Ambulation/Elevation   Gait pattern Improper Weight shift; Antalgic;Decreased R stance; Foward flexed; Short stride; Step to;Excessively slow   Gait Assistance 5  Supervision   Additional items Verbal cues   Assistive Device Rolling walker   Distance 5' x 1   Balance   Static Sitting Good   Dynamic Sitting Good   Static Standing Fair +   Dynamic Standing Fair   Ambulatory Fair   Endurance Deficit   Endurance Deficit Yes   Endurance Deficit Description pain   Activity Tolerance   Activity Tolerance Patient limited by pain  (pt only agreeable to transfer OOB and ambulate to chair)   Nurse Made Aware yes, RN Portia   Assessment   Prognosis Fair   Problem List Decreased strength;Decreased range of motion;Decreased endurance; Impaired balance;Decreased mobility; Decreased skin integrity;Orthopedic restrictions;Pain   Assessment Pt participated in therapy session focusing on functional mobility tasks  Pt only agreeable to transfer OOB and ambulate to bedside chair despite education of benefits of further ambulation and increased participation in therapy session  Pt performed bed mobility with supervision assist and verbal cues for technique  Pt performed sit to stand transfer with mod A x 1 and verbal cues for technique and hand placement  Pt ambulated 6' x 1 with RW and supervision assist and verbal cues for sequencing and safety  Pt displays decreased step and stride length, decreased gait speed, antalgic gait pattern, step to gait pattern, improper weight shfit, forward flexed posture  Attempted to provide pt education regarding benefits of increased participation in therapy session, pt declining further participation  Skilled physical therapy is indicated to address listed functional deficits focusing on strength, endurance and functional mobility  Barriers to Discharge Inaccessible home environment;Decreased caregiver support   Goals   Patient Goals none expressed   STG Expiration Date 11/27/18   Short Term Goal #1 1  Complete bed mobility and transfers I to decrease need for caregiver in home  2  Ambulate 300' I to complete household and community mobility without A  3  Improve dynamic balance to good to decrease need for UE support during ambulation  4  Be educated & demonstate 2 steps to be able to enter home without A  5  Improve knee ROM 0-90  6  I with HEP  by Fabio Fung PT at 11/19/18 9155   Treatment Day 5   Plan   Treatment/Interventions Functional transfer training;LE strengthening/ROM; Endurance training;Bed mobility;Gait training;Spoke to nursing   Progress Slow progress, decreased activity tolerance   PT Frequency 7x/wk; Twice a day   Recommendation Recommendation Outpatient PT; Home with family support  (with continued progress)   Equipment Recommended Levon Carroll   PT - OK to Discharge No     Mau Patel, PTA

## 2018-11-22 NOTE — PROGRESS NOTES
Physical Therapy Cancellation Note  Pt refusing all tx at this time, will f/u tomorrow       Eliot Toribio, PTA

## 2018-11-22 NOTE — PLAN OF CARE
Discharge to home or other facility with appropriate resources Progressing      Discharge to post-acute care or home with appropriate resources Progressing      Absence or prevention of progression during hospitalization Progressing      Patient/family/caregiver demonstrates understanding of disease process, treatment plan, medications, and discharge instructions Progressing      Maintain or return mobility to safest level of function Progressing      Maintain proper alignment of affected body part Progressing      Verbalizes/displays adequate comfort level or baseline comfort level Progressing      Patient will remain free of falls Progressing      Skin integrity is maintained or improved Progressing      Maintain or return mobility status to optimal level Progressing      Patient will remain free of falls Progressing      Skin integrity remains intact Progressing      Incision(s), wounds(s) or drain site(s) healing without S/S of infection Progressing      Oral mucous membranes remain intact Progressing

## 2018-11-22 NOTE — OCCUPATIONAL THERAPY NOTE
11/22/18 1128   Restrictions/Precautions   Weight Bearing Precautions Per Order Yes   RLE Weight Bearing Per Order WBAT   Other Precautions WBS;Pain; Fall Risk   Pain Assessment   Pain Assessment 0-10   Pain Score 8   ADL   Where Assessed Chair   LB Dressing Assistance 3  Moderate Assistance   LB Dressing Deficit Setup;Don/doff R sock   LB Dressing Comments don/doff socks   Cognition   Overall Cognitive Status WFL   Arousal/Participation Alert   Attention Within functional limits   Orientation Level Oriented X4   Memory Within functional limits   Following Commands Follows one step commands with increased time or repetition   Activity Tolerance   Activity Tolerance Patient tolerated treatment well   Assessment   Assessment Pt participates in OT session with focus on LB dressing and activity tolerance to increase I for d/c  Pt requires encouragement to engage in therapy this session  Pt refused use of LHAE at this time stating, "I won't ever use that, I should be good after some time"  Pt mod A LB dressing to don/doff socks and requires A to don sock on RLE  Pt requires occasional vc to redirect to task at hand  Pt will continue to benefit from activity tolerance, adls, and transfers  Plan   Treatment Interventions ADL retraining; Activityengagement   Goal Expiration Date 11/27/18   Treatment Day 1   OT Frequency 3-5x/wk   Recommendation   OT Discharge Recommendation Short Term Rehab

## 2018-11-22 NOTE — PROGRESS NOTES
Internal Medicine Progress Note  Patient: Quentin Lemons  Age/sex: 66 y o  male  Medical Record #: [id number]      ASSESSMENT/PLAN:  Quentin Lemons is seen and examined and mangement for following issues:    # Rt knee OA s/p Rt TKA:  Pain better controlled overnight; No fevers  Hgb 9 4, stable; bmp stable      # HTN:  Continue to hold ACEI to decrease the risk of VANDA in the post-op period  Add Hydralazine 25mg every 8 hours as needed for SBP > 160  First night post op had some hypotension, since resolved; Monitor blood pressure per protocol and adjust medications as needed      # DM type 2:  BS slightly elevated, back on oral meds; patient had been receiving a house diet will change to a 5 g consistent carbohydrate diet and see how this improves blood sugar control  Continue same medical therapy for now Continue SSI and accuchecks  Adjust medications as needed      # Hyperlipidemia: Continue pravastatin while here  Resume simvastatin at discharge  Medically stable for discharge await placement for rehabilitation    Subjective: Patient seen and examined    No overnight issues  ROS:   GI: denies abdominal pain, change bowel habits or reflux symptoms  Neuro: No new neurologic changes  Respiratory: No Cough, SOB  Musculoskeletal:  +knee pain  Cardiovascular: No CP, palpitations     Scheduled Meds:    Current Facility-Administered Medications:  acetaminophen 975 mg Oral [facility] Ryland Whitney MD    docusate sodium 100 mg Oral BID Mika Sorto MD    enoxaparin 40 mg Subcutaneous Daily Mika Sorto MD    gabapentin 100 mg Oral TID Ryland Whitney MD    glyBURIDE-metFORMIN (Larinda Fury 2 5/500) combo dose  Oral Daily With Breakfast Mika Sorto MD    hydrALAZINE 25 mg Oral Q8H PRN Carlee Jack PA-C    HYDROmorphone 1 mg Intravenous Q3H PRN Mika Sorto MD    insulin lispro 1-5 Units Subcutaneous TID AC Carlee Jack PA-C    insulin lispro 1-5 Units Subcutaneous HS Carlee FLOR Jack    lactated ringers 1,000 mL Intravenous Once Susi Frost MD Last Rate: Stopped (11/19/18 1534)   lactated ringers 125 mL/hr Intravenous Continuous Nicko Arriola MD Last Rate: 125 mL/hr (11/19/18 0954)   lactated ringers 100 mL/hr Intravenous Continuous Naat Curtis CRNA    lactated ringers 1 5 mL/kg/hr Intravenous Continuous Susi Frost MD Last Rate: Stopped (11/20/18 1354)   methocarbamol 750 mg Oral Q8H [address] Karli Fishman MD    oxyCODONE 10 mg Oral Q4H PRN Karli Fishman MD    oxyCODONE 15 mg Oral Q4H PRN Karli Fishman MD    pravastatin 80 mg Oral Daily With Joel Song MD    senna 1 tablet Oral Daily Susi Frost MD    sitaGLIPtin 25 mg Oral Daily Susi Frost MD    sodium chloride 125 mL/hr Intravenous Continuous Cari Herndon PA-C        Labs:       Results from last 7 days  Lab Units 11/22/18  0447 11/21/18  0457   WBC Thousand/uL 9 31 10 56*   HEMOGLOBIN g/dL 9 4* 10 2*   HEMATOCRIT % 28 3* 30 6*   PLATELETS Thousands/uL 152 150       Results from last 7 days  Lab Units 11/22/18  0447 11/21/18  0457   POTASSIUM mmol/L 3 9 3 8   CHLORIDE mmol/L 104 103   CO2 mmol/L 28 26   BUN mg/dL 11 10   CREATININE mg/dL 0 79 0 81   CALCIUM mg/dL 8 4 8 2*                No results found for: GLUCOSE    Labs reviewed    Physical Examination:  Vitals:   Vitals:    11/21/18 1500 11/21/18 1900 11/21/18 2227 11/22/18 0738   BP: 116/69 104/62 108/69 103/62   BP Location: Left arm Left arm Left arm Left arm   Pulse: 104 92 105 97   Resp: 18 18 18 18   Temp: (!) 100 6 °F (38 1 °C) 99 °F (37 2 °C) 99 4 °F (37 4 °C) 98 4 °F (36 9 °C)   TempSrc: Oral Oral Oral Oral   SpO2: 97% 97% 96% 97%   Weight:       Height:           GEN: NAD  RESP: CTAB, no R/R/W  CV: +S1 S2, regular rate, no rubs  ABD: soft, NT, ND, normal BS   : voiding  EXT: DP pulses intact b/l; dressing in place  Neuro: AAOx3    [ X ] Total time spent: 30 Mins and greater than 50% of this time was spent counseling/coordinating care

## 2018-11-23 ENCOUNTER — TRANSITIONAL CARE MANAGEMENT (OUTPATIENT)
Dept: INTERNAL MEDICINE CLINIC | Facility: CLINIC | Age: 63
End: 2018-11-23

## 2018-11-23 ENCOUNTER — APPOINTMENT (OUTPATIENT)
Dept: PHYSICAL THERAPY | Facility: CLINIC | Age: 63
End: 2018-11-23
Payer: MEDICARE

## 2018-11-23 VITALS
TEMPERATURE: 97.5 F | SYSTOLIC BLOOD PRESSURE: 106 MMHG | RESPIRATION RATE: 18 BRPM | HEART RATE: 95 BPM | DIASTOLIC BLOOD PRESSURE: 62 MMHG | OXYGEN SATURATION: 96 % | WEIGHT: 253.97 LBS | BODY MASS INDEX: 32.59 KG/M2 | HEIGHT: 74 IN

## 2018-11-23 LAB
GLUCOSE SERPL-MCNC: 198 MG/DL (ref 65–140)
GLUCOSE SERPL-MCNC: 232 MG/DL (ref 65–140)

## 2018-11-23 PROCEDURE — 82948 REAGENT STRIP/BLOOD GLUCOSE: CPT

## 2018-11-23 RX ADMIN — INSULIN LISPRO 2 UNITS: 100 INJECTION, SOLUTION INTRAVENOUS; SUBCUTANEOUS at 08:21

## 2018-11-23 RX ADMIN — ENOXAPARIN SODIUM 40 MG: 40 INJECTION SUBCUTANEOUS at 08:18

## 2018-11-23 RX ADMIN — OXYCODONE HYDROCHLORIDE 15 MG: 10 TABLET ORAL at 06:20

## 2018-11-23 RX ADMIN — SITAGLIPTIN 25 MG: 25 TABLET, FILM COATED ORAL at 08:17

## 2018-11-23 RX ADMIN — METHOCARBAMOL TABLETS 750 MG: 750 TABLET, COATED ORAL at 08:17

## 2018-11-23 RX ADMIN — METHOCARBAMOL TABLETS 750 MG: 750 TABLET, COATED ORAL at 00:27

## 2018-11-23 RX ADMIN — GABAPENTIN 100 MG: 100 CAPSULE ORAL at 08:17

## 2018-11-23 RX ADMIN — OXYCODONE HYDROCHLORIDE 15 MG: 10 TABLET ORAL at 10:24

## 2018-11-23 RX ADMIN — METFORMIN HYDROCHLORIDE: 500 TABLET ORAL at 06:33

## 2018-11-23 RX ADMIN — OXYCODONE HYDROCHLORIDE 15 MG: 10 TABLET ORAL at 02:01

## 2018-11-23 RX ADMIN — ACETAMINOPHEN 975 MG: 325 TABLET ORAL at 06:20

## 2018-11-23 NOTE — SOCIAL WORK
Patient accepted to [facility] and is first choice per patient when CM spoke to him  Patient's wife is transporting patient at discharge  Numbers for report and bed assignment are below       P: [phone]  F: [phone]  Bed: 112B

## 2018-11-23 NOTE — PROGRESS NOTES
Subjective: Complaining of pain in right knee, improves with pain medication  Refusing PT yesterday  Set up for DC to rehab today       Objective:  Lab Results   Component Value Date/Time    WBC 9 31 11/22/2018 04:47 AM    HGB 9 4 (L) 11/22/2018 04:47 AM       Vitals:    11/23/18 0000   BP: 111/63   Pulse: (!) 107   Resp: 16   Temp: 98 5 °F (36 9 °C)   SpO2: 98%     RLE  extremity  Dressing c/d/i  Motor & sensation grossly intact distally  Toes WWP    Assessment: Post op from R TKA    Plan:  WBAT RLE  Pain control  DVT ppx  PT  Dispo: DC to rehab today planned

## 2018-11-23 NOTE — PROGRESS NOTES
Internal Medicine Progress Note  Patient: Ashutosh Echols  Age/sex: 66 y o  male  Medical Record #: [id number]      ASSESSMENT/PLAN:  Ashutosh Echols is seen and examined and mangement for following issues:    # Rt knee OA s/p Rt TKA:  Continued pain  No fevers  Hgb 9 4, stable; bmp stable      # HTN:  Continue to hold ACEI to decrease the risk of VANDA in the post-op period  Add Hydralazine 25mg every 8 hours as needed for SBP > 160  First night post op had some hypotension, since resolved; Monitor blood pressure per protocol and adjust medications as needed      # DM type 2:  Diet changed to constant carb 2  Continue same medical therapy for now Continue SSI and accuchecks  Adjust medications as needed       # Hyperlipidemia: Continue pravastatin while here  Resume simvastatin at discharge  Medically stable for discharge await placement for rehabilitation    Subjective: Patient seen and examined  Pt reports uncontrolled pain in the Rt knee  He denies any other complaints      ROS:   GI: denies abdominal pain, change bowel habits or reflux symptoms  Neuro: No new neurologic changes  Respiratory: No Cough, SOB  Musculoskeletal:  +knee pain  Cardiovascular: No CP, palpitations     Scheduled Meds:    Current Facility-Administered Medications:  acetaminophen 975 mg Oral [facility] Kenny Cramer MD    docusate sodium 100 mg Oral BID Kendra Garcia MD    enoxaparin 40 mg Subcutaneous Daily Kendra Garcia MD    gabapentin 100 mg Oral TID Kenny Cramer MD    glyBURIDE-metFORMIN (Lidia Jack 2 5/500) combo dose  Oral Daily With Breakfast Kendra Garcia MD    hydrALAZINE 25 mg Oral Q8H PRN Carlee Jack PA-C    HYDROmorphone 1 mg Intravenous Q3H PRN Kendra Garcia MD    insulin lispro 1-5 Units Subcutaneous TID AC Carlee Jack PA-C    insulin lispro 1-5 Units Subcutaneous HS Carlee Jack PA-C    lactated ringers 1,000 mL Intravenous Once Kendra Garcia MD Last Rate: Stopped (11/19/18 1534)   lactated ringers 125 mL/hr Intravenous Continuous Jossue Sanabria MD Last Rate: 125 mL/hr (11/19/18 0954)   lactated ringers 100 mL/hr Intravenous Continuous Nata Dumont CRNA    lactated ringers 1 5 mL/kg/hr Intravenous Continuous Yosi Jones MD Last Rate: Stopped (11/20/18 1354)   methocarbamol 750 mg Oral Q8H [address] Wero Coronel MD    oxyCODONE 10 mg Oral Q4H PRN Wero Coronel MD    oxyCODONE 15 mg Oral Q4H PRN Wero Coronel MD    pravastatin 80 mg Oral Daily With Gabriela Foy MD    senna 1 tablet Oral Daily Yosi Jones MD    sitaGLIPtin 25 mg Oral Daily Yosi Jones MD    sodium chloride 125 mL/hr Intravenous Continuous Kristi Esposito PA-C        Labs:       Results from last 7 days  Lab Units 11/22/18  0447 11/21/18  0457   WBC Thousand/uL 9 31 10 56*   HEMOGLOBIN g/dL 9 4* 10 2*   HEMATOCRIT % 28 3* 30 6*   PLATELETS Thousands/uL 152 150       Results from last 7 days  Lab Units 11/22/18  0447 11/21/18  0457   POTASSIUM mmol/L 3 9 3 8   CHLORIDE mmol/L 104 103   CO2 mmol/L 28 26   BUN mg/dL 11 10   CREATININE mg/dL 0 79 0 81   CALCIUM mg/dL 8 4 8 2*                No results found for: GLUCOSE    Labs reviewed    Physical Examination:  Vitals:   Vitals:    11/22/18 0738 11/22/18 1445 11/23/18 0000 11/23/18 0700   BP: 103/62 127/73 111/63 106/62   BP Location: Left arm Left arm Left arm Right arm   Pulse: 97 94 (!) 107 95   Resp: 18 16 16 18   Temp: 98 4 °F (36 9 °C) 97 7 °F (36 5 °C) 98 5 °F (36 9 °C) 97 5 °F (36 4 °C)   TempSrc: Oral Oral Oral Oral   SpO2: 97% 98% 98% 96%   Weight:       Height:           GEN: NAD  RESP: CTAB, no R/R/W  CV: +S1 S2, regular rate, no rubs  ABD: soft, NT, ND, normal BS   : voiding  EXT: DP pulses intact b/l; dressing in place  Neuro: AAOx3    [ X ] Total time spent: 30 Mins and greater than 50% of this time was spent counseling/coordinating care

## 2018-11-26 ENCOUNTER — TELEPHONE (OUTPATIENT)
Dept: OBGYN CLINIC | Facility: HOSPITAL | Age: 63
End: 2018-11-26

## 2018-11-26 NOTE — TELEPHONE ENCOUNTER
Patient was able to send a picture of the Right foot/ankle to office email  I forwarded this picture to Keyla Salcedo for review

## 2018-11-26 NOTE — TELEPHONE ENCOUNTER
Dr Gara Hodgkins patient - PO R TKA 11/19    Patient calling that he developed redness to R foot and ankle swelling and tender to touch  He has a history of gout and he is requesting to be evaluated  He will need to be placed on the schedule for 10am as his wife needs to go to work late morning  Please advise if we can place patient on the schedule for tomorrow at 10am?   Patient is seen by [place] for his medical which requires him to drive to [facility]

## 2018-11-27 ENCOUNTER — OFFICE VISIT (OUTPATIENT)
Dept: OBGYN CLINIC | Facility: HOSPITAL | Age: 63
End: 2018-11-27

## 2018-11-27 ENCOUNTER — HOSPITAL ENCOUNTER (OUTPATIENT)
Dept: RADIOLOGY | Facility: HOSPITAL | Age: 63
Discharge: HOME | End: 2018-11-27
Attending: ORTHOPAEDIC SURGERY
Payer: MEDICARE

## 2018-11-27 VITALS — DIASTOLIC BLOOD PRESSURE: 69 MMHG | HEART RATE: 103 BPM | SYSTOLIC BLOOD PRESSURE: 106 MMHG

## 2018-11-27 DIAGNOSIS — Z47.1 AFTERCARE FOLLOWING RIGHT KNEE JOINT REPLACEMENT SURGERY: ICD-10-CM

## 2018-11-27 DIAGNOSIS — M10.371 GOUT DUE TO RENAL IMPAIRMENT INVOLVING TOE OF RIGHT FOOT, UNSPECIFIED CHRONICITY: Primary | ICD-10-CM

## 2018-11-27 DIAGNOSIS — Z96.651 AFTERCARE FOLLOWING RIGHT KNEE JOINT REPLACEMENT SURGERY: ICD-10-CM

## 2018-11-27 PROCEDURE — 73560 X-RAY EXAM OF KNEE 1 OR 2: CPT

## 2018-11-27 PROCEDURE — 99024 POSTOP FOLLOW-UP VISIT: CPT | Performed by: ORTHOPAEDIC SURGERY

## 2018-11-27 RX ORDER — INDOMETHACIN 25 MG/1
25 CAPSULE ORAL
Qty: 60 CAPSULE | Refills: 0 | Status: SHIPPED | OUTPATIENT
Start: 2018-11-27 | End: 2022-02-01 | Stop reason: HOSPADM

## 2018-11-27 RX ORDER — FAMOTIDINE 40 MG/1
40 TABLET, FILM COATED ORAL DAILY
Qty: 30 TABLET | Refills: 0 | Status: SHIPPED | OUTPATIENT
Start: 2018-11-27 | End: 2019-02-06

## 2018-11-27 NOTE — PROGRESS NOTES
Assessment:  1  Aftercare following right knee joint replacement surgery  XR knee 1 or 2 vw right   2  Gout due to renal impairment involving toe of right foot, unspecified chronicity         Plan:     Weight Bearing  as Tolerated   Continue with physical therapy    Continue with Lovenox for DVT prophalyaxis   Prescribed patient indocin for Gout    Discussed treatment plan with patient and he is in agreement treatment plan   Patient will follow up with us in 1 week for his routine postop follow-up    To do next visit:  Return for PO candace has an appt scheduled next week   The above stated was discussed in layman's terms and the patient expressed understanding  All questions were answered to the patient's satisfaction  Subjective:   William Blake is a 66 y o  male who presents  1 week s/p right total knee arthroplasty on 11/9/2018  Patient states he is doing good, having some pain  He notes he is having increase right foot pain  He notes the right foot is hypersensitive to touch and swelling through out the foot and hammyvonnee  Melanie Jovel He has postponed physical therapy due to the right foot pain         Review of systems negative unless otherwise specified in HPI    Past Medical History:   Diagnosis Date    Depression     Diabetes mellitus ([facility] [address] )     Hyperlipidemia     Hypertension     Osteoarthritis, knee        Past Surgical History:   Procedure Laterality Date    BACK SURGERY      HAND SURGERY      JOINT REPLACEMENT Left     l tkr    KNEE SURGERY Left     [place] TOTAL KNEE ARTHROPLASTY Right 11/19/2018    Procedure: ARTHROPLASTY KNEE TOTAL;  Surgeon: Imani Olguin MD;  [facility];  Service: Orthopedics    SHOULDER SURGERY      TOOTH EXTRACTION      WISDOM TOOTH EXTRACTION         Family History   Problem Relation Age of Onset    No Known Problems Mother     Heart attack Father     Arthritis Family     Cancer Family     Diabetes Family     Heart disease Family     Osteoporosis Family        Social History     Occupational History    Not on file       Social History Main Topics    Smoking status: Former Smoker     Types: Cigarettes     Quit date: 3/1/2012    Smokeless tobacco: Never Used    Alcohol use Yes      Comment: 'couple beers every couple weeks'    Drug use: No    Sexual activity: No         Current Outpatient Prescriptions:     acetaminophen (TYLENOL) 650 mg CR tablet, Take 1 tablet (650 mg total) by mouth every 8 (eight) hours as needed for mild pain for up to 30 days, Disp: 30 tablet, Rfl: 0    ascorbic acid (VITAMIN C) 500 MG tablet, Take 1 tablet (500 mg total) by mouth daily, Disp: 60 tablet, Rfl: 0    docusate sodium (COLACE) 100 mg capsule, Take 1 capsule (100 mg total) by mouth 2 (two) times a day, Disp: 10 capsule, Rfl: 0    enoxaparin (LOVENOX) 40 mg/0 4 mL, 1 subcutaneous injection daily x 28 days AFTER surgery, Disp: 28 Syringe, Rfl: 0    ferrous sulfate 324 (65 Fe) mg, Take 1 tablet (324 mg total) by mouth 2 (two) times a day before meals, Disp: 60 tablet, Rfl: 0    folic acid (FOLVITE) 1 mg tablet, Take by mouth daily, Disp: , Rfl:     gabapentin (NEURONTIN) 100 mg capsule, Take 1 capsule (100 mg total) by mouth 3 (three) times a day, Disp: , Rfl: 0    glyBURIDE-metFORMIN (GLUCOVANCE) 2 5-500 MG per tablet, Take 1 tablet by mouth Twice daily, Disp: , Rfl:     lisinopril (ZESTRIL) 40 mg tablet, Take 40 mg by mouth daily, Disp: , Rfl:     methocarbamol (ROBAXIN) 750 mg tablet, Take 1 tablet (750 mg total) by mouth every 8 (eight) hours, Disp: , Rfl: 0    oxyCODONE (ROXICODONE) 5 mg immediate release tablet, Take 1-2 tablets every 4-6 hrs as needed for pain control, Disp: 30 tablet, Rfl: 0    saxagliptin (ONGLYZA) 5 MG tablet, Take 5 mg by mouth daily, Disp: , Rfl:     senna (SENOKOT) 8 6 mg, Take 1 tablet (8 6 mg total) by mouth daily, Disp: 120 each, Rfl: 0    simvastatin (ZOCOR) 40 mg tablet, Take 1 tablet by mouth daily at bedtime  , Disp: , Rfl:     traMADol (ULTRAM) 50 mg tablet, Take 1 tablet (50 mg total) by mouth every 6 (six) hours as needed for moderate pain for up to 10 days, Disp: 30 tablet, Rfl: 0    No Known Allergies         Vitals:    11/27/18 1132   BP: 106/69   Pulse: 103       Objective:            Physical Exam  · General: Awake, Alert, Oriented  · Eyes: Pupils equal, round and reactive to light  · Heart: regular rate and rhythm  · Lungs: No audible wheezing  · Abdomen: soft                    Ortho Exam   Right knee:  Staples intact over incision site  Passive ROM 10 degrees  No erythema no open wounds    Right ankle  Generalized swelling   Pain with ROM of greater toe and to lesser digits  Neurovascularly Intact Distally     Diagnostics, reviewed and taken today if performed as documented: The attending physician has personally reviewed the pertinent films in PACS and interpretation is as follows: Xray right knee s/p right TKA, well alignment of implant      Procedures, if performed today:    Procedures    None performed      Scribe Attestation    I,:   Ryan Jeter am acting as a scribe while in the presence of the attending physician :        I,:   Pricila Meza MD personally performed the services described in this documentation    as scribed in my presence :              Portions of the record may have been created with voice recognition software  Occasional wrong word or "sound a like" substitutions may have occurred due to the inherent limitations of voice recognition software  Read the chart carefully and recognize, using context, where substitutions have occurred

## 2018-12-05 ENCOUNTER — OFFICE VISIT (OUTPATIENT)
Dept: OBGYN CLINIC | Facility: MEDICAL CENTER | Age: 63
End: 2018-12-05

## 2018-12-05 VITALS — HEIGHT: 74 IN | HEART RATE: 104 BPM | WEIGHT: 253 LBS | BODY MASS INDEX: 32.47 KG/M2

## 2018-12-05 DIAGNOSIS — Z96.651 STATUS POST TOTAL KNEE REPLACEMENT, RIGHT: ICD-10-CM

## 2018-12-05 DIAGNOSIS — Z96.651 S/P TKR (TOTAL KNEE REPLACEMENT), RIGHT: Primary | ICD-10-CM

## 2018-12-05 PROCEDURE — 99024 POSTOP FOLLOW-UP VISIT: CPT | Performed by: ORTHOPAEDIC SURGERY

## 2018-12-05 RX ORDER — NAPROXEN 500 MG/1
500 TABLET ORAL 2 TIMES DAILY PRN
Qty: 30 TABLET | Refills: 1 | Status: SHIPPED | OUTPATIENT
Start: 2018-12-05 | End: 2019-02-06

## 2018-12-05 RX ORDER — CEPHALEXIN 500 MG/1
500 CAPSULE ORAL 4 TIMES DAILY
Qty: 28 CAPSULE | Refills: 0 | Status: SHIPPED | OUTPATIENT
Start: 2018-12-05 | End: 2018-12-12

## 2018-12-05 RX ORDER — OXYCODONE HYDROCHLORIDE 5 MG/1
TABLET ORAL
Qty: 30 TABLET | Refills: 0 | Status: SHIPPED | OUTPATIENT
Start: 2018-12-05 | End: 2019-02-06

## 2018-12-05 NOTE — PROGRESS NOTES
66 y o male presents for continued postoperative care of right total knee arthroplasty  He is now 2 weeks postoperative  He has not done any therapy secondary to stating that  was not able to set this up for him as well as he is not willing to obtain transported to outpatient therapy  He currently complains primarily of pain at the dorsum of his left foot as well as gouty pain in his right foot for which he was prescribed Indocin at previous visit  Currently denies motor sensory deficits on the right lower extremity  He denies any fevers or chills      Review of Systems  Review of systems negative unless otherwise specified in HPI    Past Medical History  Past Medical History:   Diagnosis Date    Depression     Diabetes mellitus ([facility] [address] )     Hyperlipidemia     Hypertension     Osteoarthritis, knee        Past Surgical History  Past Surgical History:   Procedure Laterality Date    BACK SURGERY      HAND SURGERY      JOINT REPLACEMENT Left     l tkr    KNEE SURGERY Left     [place] TOTAL KNEE ARTHROPLASTY Right 11/19/2018    Procedure: ARTHROPLASTY KNEE TOTAL;  Surgeon: Segundo Marrero MD;  [facility];  Service: Orthopedics    SHOULDER SURGERY      TOOTH EXTRACTION      WISDOM TOOTH EXTRACTION         Current Medications  Current Outpatient Prescriptions on File Prior to Visit   Medication Sig Dispense Refill    acetaminophen (TYLENOL) 650 mg CR tablet Take 1 tablet (650 mg total) by mouth every 8 (eight) hours as needed for mild pain for up to 30 days 30 tablet 0    ascorbic acid (VITAMIN C) 500 MG tablet Take 1 tablet (500 mg total) by mouth daily 60 tablet 0    docusate sodium (COLACE) 100 mg capsule Take 1 capsule (100 mg total) by mouth 2 (two) times a day 10 capsule 0    enoxaparin (LOVENOX) 40 mg/0 4 mL 1 subcutaneous injection daily x 28 days AFTER surgery 28 Syringe 0    famotidine (PEPCID) 40 MG tablet Take 1 tablet (40 mg total) by mouth daily Please take this medication while on indomethacin for your gout 30 tablet 0    ferrous sulfate 324 (65 Fe) mg Take 1 tablet (324 mg total) by mouth 2 (two) times a day before meals 60 tablet 0    folic acid (FOLVITE) 1 mg tablet Take by mouth daily      gabapentin (NEURONTIN) 100 mg capsule Take 1 capsule (100 mg total) by mouth 3 (three) times a day  0    glyBURIDE-metFORMIN (GLUCOVANCE) 2 5-500 MG per tablet Take 1 tablet by mouth Twice daily      indomethacin (INDOCIN) 25 mg capsule Take 1 capsule (25 mg total) by mouth 3 (three) times a day with meals 60 capsule 0    lisinopril (ZESTRIL) 40 mg tablet Take 40 mg by mouth daily      methocarbamol (ROBAXIN) 750 mg tablet Take 1 tablet (750 mg total) by mouth every 8 (eight) hours  0    oxyCODONE (ROXICODONE) 5 mg immediate release tablet Take 1-2 tablets every 4-6 hrs as needed for pain control 30 tablet 0    saxagliptin (ONGLYZA) 5 MG tablet Take 5 mg by mouth daily      senna (SENOKOT) 8 6 mg Take 1 tablet (8 6 mg total) by mouth daily 120 each 0    simvastatin (ZOCOR) 40 mg tablet Take 1 tablet by mouth daily at bedtime         No current facility-administered medications on file prior to visit          Recent Labs [facility])    0  Lab Value Date/Time   HCT 28 3 (L) 11/22/2018 0447   HGB 9 4 (L) 11/22/2018 0447   WBC 9 31 11/22/2018 0447   INR 1 10 10/22/2018 1355   CRP <3 0 10/22/2018 1355   HGBA1C 7 5 (H) 10/22/2018 1355   HGBA1C 8 0 (H) 08/21/2013 0717         Physical exam  General: Awake, Alert, Oriented  HEENT: No scleral injection, no evidence of facial trauma  Heart: Extremities warm and well perfused  Lungs: No audible wheezing  ·   right Knee exam  · AROM 3-80 unable to passively correct to full  · Incision healing well with no evidence of infection or erythema  · Extremity appears well perfused    Left foot  Erythema over dorsum of foot, tender to touch, slightly warm  No tenderness at ankle, no pain with passive stretch of toes  Foot not swollen  Foot appears well perfused    Procedure  Suture removal  Date/Time: 12/5/2018 9:55 AM  Performed by: Wyatt Yuan  Authorized by: Wyatt Yuan     Consent:     Consent obtained:  Verbal    Consent given by:  Patient  Location:     Laterality:  Right    Location:  Lower extremity    Lower extremity location:  Knee    Knee location:  R knee  Procedure details:     Nail bed suture material: staple remover  Wound appearance:  No sign(s) of infection    Staples removed: entire incision  Post-procedure details:     Post-removal:  Steri-Strips applied    Patient tolerance of procedure:   Tolerated well, no immediate complications      Imaging  Plain films of right knee reveal total knee arthroplasty components in satisfactory positioning    A/P: 63M now 2 wks s/p R TKA with significantly limited range of motion to knee and R foot pain from cellulitis  WBAT bl LE  Keflex prescription for cellulitis  Aggressive PT to restore R knee ROM - home health and outpatient referrals placed  Refilled oxyocodone today, added naprosyn to pain regiemn  Follow-up in 2 months, Dr Eduardo Echavarria will call before that time to assess progress    Mahnaz Davies  12/05/18

## 2018-12-10 ENCOUNTER — TELEPHONE (OUTPATIENT)
Dept: OBGYN CLINIC | Facility: HOSPITAL | Age: 63
End: 2018-12-10

## 2018-12-10 DIAGNOSIS — Z96.651 PRESENCE OF TOTAL KNEE JOINT PROSTHESIS, RIGHT: Primary | ICD-10-CM

## 2018-12-10 NOTE — TELEPHONE ENCOUNTER
Patients wife is calling letting us know that she contacted in home physical therapy and was told that they do not cover the [place] area  The wife is wondering what they should do now  Someone by the name of Suly Del Rosario from the facility was supposed to have called us but I do not see any notes documenting this  Wife would like to know what the next course of action is      Re mcneill

## 2018-12-10 NOTE — TELEPHONE ENCOUNTER
I am not sure what home physical therapy group is covered by his insurance, or he can do outpatient therapy

## 2018-12-11 ENCOUNTER — TELEPHONE (OUTPATIENT)
Dept: OBGYN CLINIC | Facility: HOSPITAL | Age: 63
End: 2018-12-11

## 2018-12-11 DIAGNOSIS — Z96.651 HISTORY OF TOTAL RIGHT KNEE REPLACEMENT: Primary | ICD-10-CM

## 2018-12-11 NOTE — TELEPHONE ENCOUNTER
Pt accepted for home health care through [address]  They plan to contact pt tomorrow to set up services

## 2018-12-12 ENCOUNTER — TELEPHONE (OUTPATIENT)
Dept: OBGYN CLINIC | Facility: HOSPITAL | Age: 63
End: 2018-12-12

## 2018-12-12 NOTE — TELEPHONE ENCOUNTER
Call placed to speak to patient regarding home health care  Spoke with pt  Pt reports that [facility] has "come and gone already this morning"  Pt reports next scheduled home PT appt is Friday  Pt denies having any CP/SOB/dizziness/calf pain  He denies any incisional redness/drainage/bleeding  He denies having any concerning signs and symptoms to him  I encouraged pt to participate as much as he can with his home therapist so he has the greatest level of function possible  Pt denies having any questions at this time  Pt encouraged to call me with any questions, concerns or updates

## 2018-12-24 DIAGNOSIS — M10.371 GOUT DUE TO RENAL IMPAIRMENT INVOLVING TOE OF RIGHT FOOT, UNSPECIFIED CHRONICITY: ICD-10-CM

## 2018-12-27 RX ORDER — FAMOTIDINE 40 MG/1
TABLET, FILM COATED ORAL
Qty: 30 TABLET | Refills: 0 | OUTPATIENT
Start: 2018-12-27

## 2019-01-22 ENCOUNTER — TELEPHONE (OUTPATIENT)
Dept: INTERNAL MEDICINE CLINIC | Facility: CLINIC | Age: 64
End: 2019-01-22

## 2019-01-24 ENCOUNTER — TELEPHONE (OUTPATIENT)
Dept: OBGYN CLINIC | Facility: HOSPITAL | Age: 64
End: 2019-01-24

## 2019-01-24 NOTE — TELEPHONE ENCOUNTER
[place]- Dr Horn Kanner patient  Ph- [phone]  Pt is requesting antibiotics before he receives another injection in the shoulder  He had previous knee replacement done  Next f/u is on 2/7/19

## 2019-02-06 ENCOUNTER — APPOINTMENT (OUTPATIENT)
Dept: RADIOLOGY | Facility: MEDICAL CENTER | Age: 64
End: 2019-02-06
Payer: MEDICARE

## 2019-02-06 ENCOUNTER — OFFICE VISIT (OUTPATIENT)
Dept: OBGYN CLINIC | Facility: MEDICAL CENTER | Age: 64
End: 2019-02-06

## 2019-02-06 VITALS
SYSTOLIC BLOOD PRESSURE: 126 MMHG | HEART RATE: 93 BPM | BODY MASS INDEX: 32.33 KG/M2 | DIASTOLIC BLOOD PRESSURE: 89 MMHG | WEIGHT: 251.8 LBS

## 2019-02-06 DIAGNOSIS — M25.561 ACUTE PAIN OF RIGHT KNEE: ICD-10-CM

## 2019-02-06 DIAGNOSIS — Z09 POSTOP CHECK: Primary | ICD-10-CM

## 2019-02-06 DIAGNOSIS — M79.671 PAIN IN BOTH FEET: ICD-10-CM

## 2019-02-06 DIAGNOSIS — M79.672 PAIN IN BOTH FEET: ICD-10-CM

## 2019-02-06 PROCEDURE — 99024 POSTOP FOLLOW-UP VISIT: CPT | Performed by: ORTHOPAEDIC SURGERY

## 2019-02-06 PROCEDURE — 73560 X-RAY EXAM OF KNEE 1 OR 2: CPT

## 2019-02-06 NOTE — PROGRESS NOTES
ASSESSMENT/PLAN:    Assessment:   63M three months since R TKA    Plan:   - PT  - f/u 9 months for repeat xrays R knee  - f/u with Dr Arabella Low for foot and ankle  - discussed treatment plan with patient he is in agreement treatment plan  Thank you        _____________________________________________________  CHIEF COMPLAINT:  Chief Complaint   Patient presents with    Right Knee - Follow-up         SUBJECTIVE:  Nicolette Marquez is a 66y o  year old male who presents presents 3 months after right total knee arthroplasty  Pain has improved since the operation, patient is able to participate in all activities he likes to  Overall satisfied of the surgery        PAST MEDICAL HISTORY:  Past Medical History:   Diagnosis Date    Depression     Diabetes mellitus ([address] )     Hyperlipidemia     Hypertension     Osteoarthritis, knee        PAST SURGICAL HISTORY:  Past Surgical History:   Procedure Laterality Date    BACK SURGERY      HAND SURGERY      JOINT REPLACEMENT Left     l tkr    KNEE SURGERY Left     [place] TOTAL KNEE ARTHROPLASTY Right 11/19/2018    Procedure: ARTHROPLASTY KNEE TOTAL;  Surgeon: Anatoly Yap MD;  [facility];  Service: Orthopedics    SHOULDER SURGERY      TOOTH EXTRACTION      WISDOM TOOTH EXTRACTION         FAMILY HISTORY:  Family History   Problem Relation Age of Onset    No Known Problems Mother     Heart attack Father     Arthritis Family     Cancer Family     Diabetes Family     Heart disease Family     Osteoporosis Family        SOCIAL HISTORY:  Social History   Substance Use Topics    Smoking status: Former Smoker     Types: Cigarettes     Quit date: 3/1/2012    Smokeless tobacco: Never Used    Alcohol use Yes      Comment: 'couple beers every couple weeks'       MEDICATIONS:    Current Outpatient Prescriptions:     glyBURIDE-metFORMIN (GLUCOVANCE) 2 5-500 MG per tablet, Take 1 tablet by mouth Twice daily, Disp: , Rfl:     lisinopril (ZESTRIL) 40 mg tablet, Take 40 mg by mouth daily, Disp: , Rfl:     saxagliptin (ONGLYZA) 5 MG tablet, Take 5 mg by mouth daily, Disp: , Rfl:     senna (SENOKOT) 8 6 mg, Take 1 tablet (8 6 mg total) by mouth daily, Disp: 120 each, Rfl: 0    simvastatin (ZOCOR) 40 mg tablet, Take 1 tablet by mouth daily at bedtime  , Disp: , Rfl:     gabapentin (NEURONTIN) 100 mg capsule, Take 1 capsule (100 mg total) by mouth 3 (three) times a day (Patient not taking: Reported on 2/6/2019 ), Disp: , Rfl: 0    indomethacin (INDOCIN) 25 mg capsule, Take 1 capsule (25 mg total) by mouth 3 (three) times a day with meals, Disp: 60 capsule, Rfl: 0    ALLERGIES:  No Known Allergies    REVIEW OF SYSTEMS:  Pertinent items are noted in HPI  A comprehensive review of systems was negative      LABS:  HgA1c:   Lab Results   Component Value Date    HGBA1C 7 5 (H) 10/22/2018     BMP:   Lab Results   Component Value Date    CALCIUM 8 4 11/22/2018    K 3 9 11/22/2018    CO2 28 11/22/2018     11/22/2018    BUN 11 11/22/2018    CREATININE 0 79 11/22/2018       _____________________________________________________  PHYSICAL EXAMINATION:  General: well developed and well nourished, alert, oriented times 3 and appears comfortable  Psychiatric: Normal  HEENT: Trachea Midline, No torticollis  Cardiovascular: No discernable arrhythmia  Pulmonary: No wheezing or stridor  Skin: No masses, erthema, lacerations, fluctation, ulcerations  Neurovascular:   SILT s/sp/dp/s, toes x5 warm and pink, +FHL/EHL, +AnkleP-Dflexion      MUSCULOSKELETAL EXAMINATION:  Right knee today:  Healed anterior incision, extension 0°, flexion 120°, stable varus valgus stresses 0 and 30°      _____________________________________________________  STUDIES REVIEWED:  xrays today: stable implant compared with previous xrays      PROCEDURES PERFORMED:  Procedures  No Procedures performed today

## 2019-02-07 ENCOUNTER — OFFICE VISIT (OUTPATIENT)
Dept: OBGYN CLINIC | Facility: CLINIC | Age: 64
End: 2019-02-07
Payer: MEDICARE

## 2019-02-07 VITALS
BODY MASS INDEX: 32.29 KG/M2 | HEART RATE: 90 BPM | HEIGHT: 74 IN | WEIGHT: 251.6 LBS | DIASTOLIC BLOOD PRESSURE: 83 MMHG | SYSTOLIC BLOOD PRESSURE: 121 MMHG

## 2019-02-07 DIAGNOSIS — M19.011 GLENOHUMERAL ARTHRITIS, RIGHT: Primary | ICD-10-CM

## 2019-02-07 PROCEDURE — 20610 DRAIN/INJ JOINT/BURSA W/O US: CPT | Performed by: FAMILY MEDICINE

## 2019-02-07 PROCEDURE — 99214 OFFICE O/P EST MOD 30 MIN: CPT | Performed by: FAMILY MEDICINE

## 2019-02-07 RX ORDER — LIDOCAINE HYDROCHLORIDE 10 MG/ML
3 INJECTION, SOLUTION INFILTRATION; PERINEURAL
Status: COMPLETED | OUTPATIENT
Start: 2019-02-07 | End: 2019-02-07

## 2019-02-07 RX ORDER — TRIAMCINOLONE ACETONIDE 40 MG/ML
40 INJECTION, SUSPENSION INTRA-ARTICULAR; INTRAMUSCULAR
Status: COMPLETED | OUTPATIENT
Start: 2019-02-07 | End: 2019-02-07

## 2019-02-07 RX ORDER — LIDOCAINE HYDROCHLORIDE 10 MG/ML
4 INJECTION, SOLUTION INFILTRATION; PERINEURAL
Status: COMPLETED | OUTPATIENT
Start: 2019-02-07 | End: 2019-02-07

## 2019-02-07 RX ADMIN — LIDOCAINE HYDROCHLORIDE 3 ML: 10 INJECTION, SOLUTION INFILTRATION; PERINEURAL at 10:58

## 2019-02-07 RX ADMIN — LIDOCAINE HYDROCHLORIDE 4 ML: 10 INJECTION, SOLUTION INFILTRATION; PERINEURAL at 10:58

## 2019-02-07 RX ADMIN — TRIAMCINOLONE ACETONIDE 40 MG: 40 INJECTION, SUSPENSION INTRA-ARTICULAR; INTRAMUSCULAR at 10:58

## 2019-02-07 NOTE — PROGRESS NOTES
Assessment/Plan:  Assessment/Plan   Diagnoses and all orders for this visit:    Glenohumeral arthritis, right  -     Large joint arthrocentesis      81-year-old right-hand-dominant male with right shoulder pain more than 1 year duration  Discussed with patient physical exam, impression and plan  Physical exam noted to have range of motion limited to forward flexion of 100° abduction to 90°, with internal rotation to the right buttock  He has normal strength  I discussed treatment option of corticosteroid injection to which he agreed  I administered mixture of 4 cc 1% lidocaine and 1 cc Kenalog to the right glenohumeral joint without complication, and upon reassessment he demonstrated improved range of motion significant improvement in pain  He is advised to start taking joint supplements of tumeric and Osteo Bi-Flex  He will follow up as needed  Subjective:   Patient ID: Shara Winter is a 66 y o  male  Chief Complaint   Patient presents with    Right Shoulder - Follow-up       81-year-old right-hand-dominant male following up for right shoulder pain more than 1 year duration  He was last seen in this regard 3 months ago at which point he received injection to the glenohumeral joint for treatment of arthritis and was advised to start taking joint supplements  He reports having experienced significant improvement in pain immediately after the corticosteroid injection, and improved pain lasted up until about 2 weeks ago  He has not had any injury since last visit  Pain described as generalized to the shoulder, achy and sometimes sharp, constant, nonradiating, fluctuating in intensity, worse with movement of the arm and lifting objects, and improved with rest   He is status post right total knee arthroplasty 11/19/2018  Shoulder Pain   This is a chronic problem  The current episode started more than 1 year ago  The problem occurs constantly  The problem has been waxing and waning   Associated symptoms include arthralgias  Pertinent negatives include no joint swelling, numbness or weakness  Exacerbated by: Arm movement  He has tried rest for the symptoms  The treatment provided mild relief  Review of Systems   Musculoskeletal: Positive for arthralgias  Negative for joint swelling  Neurological: Negative for weakness and numbness  Objective:  Vitals:    02/07/19 1019   BP: 121/83   Pulse: 90   Weight: 114 kg (251 lb 9 6 oz)   Height: 6' 2" (1 88 m)     Right Elbow Exam     Range of Motion   The patient has normal right elbow ROM  Right Shoulder Exam     Tenderness   Right shoulder tenderness location: Anterior glenohumeral aspect  Range of Motion   Active Abduction: 90   Forward Flexion: 100   Right shoulder internal rotation 0 degrees: Right buttock  Muscle Strength   The patient has normal right shoulder strength  Physical Exam   Constitutional: He is oriented to person, place, and time  He appears well-developed  No distress  HENT:   Head: Normocephalic and atraumatic  Eyes: Conjunctivae are normal    Neck: No tracheal deviation present  Cardiovascular: Normal rate  Pulmonary/Chest: Effort normal  No respiratory distress  Abdominal: He exhibits no distension  Musculoskeletal: He exhibits tenderness  He exhibits no edema or deformity  Decreased range of motion of right shoulder   Neurological: He is alert and oriented to person, place, and time  He exhibits normal muscle tone  Coordination normal    Skin: Skin is warm and dry  No rash noted  No pallor  Psychiatric: He has a normal mood and affect  His behavior is normal  Judgment and thought content normal    Nursing note and vitals reviewed        Large joint arthrocentesis  Date/Time: 2/7/2019 10:58 AM  Consent given by: patient  Site marked: site marked  Timeout: Immediately prior to procedure a time out was called to verify the correct patient, procedure, equipment, support staff and site/side marked as required   Supporting Documentation  Indications: pain   Procedure Details  Location: shoulder - R glenohumeral  Preparation: Patient was prepped and draped in the usual sterile fashion  Needle size: 22 G  Ultrasound guidance: no  Approach: posterior  Medications administered: 4 mL lidocaine 1 %; 3 mL lidocaine 1 %; 40 mg triamcinolone acetonide 40 mg/mL    Patient tolerance: patient tolerated the procedure well with no immediate complications  Dressing:  Sterile dressing applied

## 2019-02-15 ENCOUNTER — OFFICE VISIT (OUTPATIENT)
Dept: OBGYN CLINIC | Facility: CLINIC | Age: 64
End: 2019-02-15
Payer: MEDICARE

## 2019-02-15 ENCOUNTER — APPOINTMENT (OUTPATIENT)
Dept: RADIOLOGY | Facility: CLINIC | Age: 64
End: 2019-02-15
Payer: MEDICARE

## 2019-02-15 VITALS
HEART RATE: 109 BPM | DIASTOLIC BLOOD PRESSURE: 99 MMHG | HEIGHT: 74 IN | BODY MASS INDEX: 31.57 KG/M2 | WEIGHT: 246 LBS | SYSTOLIC BLOOD PRESSURE: 152 MMHG

## 2019-02-15 DIAGNOSIS — M79.671 PAIN IN BOTH FEET: ICD-10-CM

## 2019-02-15 DIAGNOSIS — M25.571 PAIN, JOINT, ANKLE AND FOOT, RIGHT: ICD-10-CM

## 2019-02-15 DIAGNOSIS — M79.672 PAIN IN BOTH FEET: ICD-10-CM

## 2019-02-15 DIAGNOSIS — M25.571 PAIN, JOINT, ANKLE AND FOOT, RIGHT: Primary | ICD-10-CM

## 2019-02-15 DIAGNOSIS — M76.71 PERONEAL TENDINITIS OF RIGHT LOWER EXTREMITY: ICD-10-CM

## 2019-02-15 PROCEDURE — 73610 X-RAY EXAM OF ANKLE: CPT

## 2019-02-15 PROCEDURE — 73630 X-RAY EXAM OF FOOT: CPT

## 2019-02-15 PROCEDURE — 99213 OFFICE O/P EST LOW 20 MIN: CPT | Performed by: ORTHOPAEDIC SURGERY

## 2019-02-15 NOTE — PROGRESS NOTES
JEANNETTE Orourke  Attending, Orthopaedic Surgery  [facility] [address] [address] [facility]        ORTHOPAEDIC FOOT AND ANKLE CLINIC VISIT     Assessment:     Encounter Diagnoses   Name Primary?  Pain, joint, ankle and foot, right Yes    Pain in both feet     Peroneal tendinitis of right lower extremity               Plan:   · The patient verbalized understanding of exam findings and treatment plan  We engaged in the shared decision-making process and treatment options were discussed at length with the patient  Surgical and conservative management discussed today along with risks and benefits  · He was given a referral to physical therapy  · He is to continue activity to tolerance  · Advised to follow up with pcp at the [facility] for gout management  · Continue with OTC ankle brace  Return in about 8 weeks (around 4/15/2019) for Recheck right ankle  Ivette Franktown History of Present Illness:   Chief Complaint:   Chief Complaint   Patient presents with    Right Foot - Pain    Right Ankle - Pain     Brendan Ramesh is a 66 y o  male who is being seen for right ankle pain and 1st mtpj pain  He states he had a fractured ankle in 1978 with surgical intervention, he states pain level has increased over the years  Pain is localized at lateral malleolus and 1st mtpj with minimal radiating and described as sharp and severe  Patient denies numbness, tingling or radicular pain  Denies history of neuropathy  Patient does not smoke, does not have diabetes and does not take blood thinners  Patient denies family history of anesthesia complications and has not had any complications with anesthesia       Pain/symptom timing:  Worse during the day when active  Pain/symptom context:  Worse with activites and work  Pain/symptom modifying factors:  Rest makes better, activities make worse  Pain/symptom associated signs/symptoms: none    Prior treatment   · NSAIDsYes    · Injections No · Bracing/Orthotics No   · Physical Therapy No     Orthopedic Surgical History:   See below  Past Medical, Surgical and Social History:  Past Medical History:  has a past medical history of Depression, Diabetes mellitus ([address] ), Hyperlipidemia, Hypertension, and Osteoarthritis, knee  Problem List: does not have any pertinent problems on file  Past Surgical History:  has a past surgical history that includes Knee surgery (Left); Tooth extraction; Back surgery; Shoulder surgery; Hand surgery; [place] tooth extraction; Joint replacement (Left); and pr total knee arthroplasty (Right, 11/19/2018)  Family History: family history includes Arthritis in his family; Cancer in his family; Diabetes in his family; Heart attack in his father; Heart disease in his family; No Known Problems in his mother; Osteoporosis in his family  Social History:  reports that he quit smoking about 6 years ago  His smoking use included cigarettes  He has never used smokeless tobacco  He reports that he drinks alcohol  He reports that he does not use drugs  Current Medications: has a current medication list which includes the following prescription(s): glyburide-metformin, indomethacin, lisinopril, saxagliptin, senna, and simvastatin  Allergies: has No Known Allergies       Review of Systems:  General- denies fever/chills  HEENT- denies hearing loss or sore throat  Eyes- denies eye pain or visual disturbances, denies red eyes  Respiratory- denies cough or SOB  Cardio- denies chest pain or palpitations  GI- denies abdominal pain  Endocrine- denies urinary frequency  Urinary- denies pain with urination  Musculoskeletal- Negative except noted above  Skin- denies rashes or wounds  Neurological- denies dizziness or headache  Psychiatric- denies anxiety or difficulty concentrating    Physical Exam:   /99 (BP Location: Left arm, Patient Position: Sitting, Cuff Size: Adult)   Pulse (!) 109   Ht 6' 2" (1 88 m)   Wt 112 kg (246 lb)   BMI 31 58 kg/m²   General/Constitutional: No apparent distress: well-nourished and well developed  Eyes: normal ocular motion  Lymphatic: No appreciable lymphadenopathy  Respiratory: Non-labored breathing  Vascular: No edema, swelling or tenderness, except as noted in detailed exam   Integumentary: No impressive skin lesions present, except as noted in detailed exam   Neuro: No ataxia or tremors noted  Psych: Normal mood and affect, oriented to person, place and time  Appropriate affect  Musculoskeletal: Normal, except as noted in detailed exam and in HPI  Examination    Right    Gait Normal   Musculoskeletal Tender to palpation at lateral ankle, peroneal tendon  Skin Normal       Nails Normal    Range of Motion  20 degrees dorsiflexion, 40 degrees plantarflexion  Subtalar motion: 20 I, 15 E    Stability Stable    Muscle Strength 5/5 tibialis anterior  5/5 gastrocnemius-soleus  5/5 posterior tibialis  4/5 peroneal/eversion strength  5/5 EHL  5/5 FHL    Neurologic Normal    Sensation Intact to light touch throughout sural, saphenous, superficial peroneal, deep peroneal and medial/lateral plantar nerve distributions  [place] [facility] 07 filament (10g) testing deferred  Cardiovascular Brisk capillary refill < 2 seconds,intact DP and PT pulses    Special Tests Anterior Drawer:  negative      Imaging Studies:   3 views of the right ankle and foot were taken, reviewed and interpreted independently that demonstrate no fracture or dislocation present  No degenerative changes present  Scribe Attestation    I,:   David Reza am acting as a scribe while in the presence of the attending physician :        I,:   Valencia Irizarry MD personally performed the services described in this documentation    as scribed in my presence :          Charlynne Ruts Lachman, MD  Foot & Ankle Surgery   Department [address]      I personally performed the service  Charlynne Ruts Lachman, MD

## 2019-05-09 ENCOUNTER — OFFICE VISIT (OUTPATIENT)
Dept: OBGYN CLINIC | Facility: CLINIC | Age: 64
End: 2019-05-09
Payer: MEDICARE

## 2019-05-09 ENCOUNTER — TELEPHONE (OUTPATIENT)
Dept: INTERNAL MEDICINE CLINIC | Facility: CLINIC | Age: 64
End: 2019-05-09

## 2019-05-09 ENCOUNTER — APPOINTMENT (OUTPATIENT)
Dept: RADIOLOGY | Facility: CLINIC | Age: 64
End: 2019-05-09
Payer: MEDICARE

## 2019-05-09 VITALS
HEART RATE: 83 BPM | BODY MASS INDEX: 33.5 KG/M2 | DIASTOLIC BLOOD PRESSURE: 92 MMHG | SYSTOLIC BLOOD PRESSURE: 143 MMHG | WEIGHT: 261 LBS | HEIGHT: 74 IN

## 2019-05-09 DIAGNOSIS — M19.011 GLENOHUMERAL ARTHRITIS, RIGHT: Primary | ICD-10-CM

## 2019-05-09 DIAGNOSIS — M19.011 GLENOHUMERAL ARTHRITIS, RIGHT: ICD-10-CM

## 2019-05-09 PROCEDURE — 20610 DRAIN/INJ JOINT/BURSA W/O US: CPT | Performed by: FAMILY MEDICINE

## 2019-05-09 PROCEDURE — 73030 X-RAY EXAM OF SHOULDER: CPT

## 2019-05-09 PROCEDURE — 99213 OFFICE O/P EST LOW 20 MIN: CPT | Performed by: FAMILY MEDICINE

## 2019-05-09 RX ORDER — TRIAMCINOLONE ACETONIDE 40 MG/ML
40 INJECTION, SUSPENSION INTRA-ARTICULAR; INTRAMUSCULAR
Status: COMPLETED | OUTPATIENT
Start: 2019-05-09 | End: 2019-05-09

## 2019-05-09 RX ORDER — LIDOCAINE HYDROCHLORIDE 10 MG/ML
3 INJECTION, SOLUTION INFILTRATION; PERINEURAL
Status: COMPLETED | OUTPATIENT
Start: 2019-05-09 | End: 2019-05-09

## 2019-05-09 RX ORDER — LIDOCAINE HYDROCHLORIDE 10 MG/ML
4 INJECTION, SOLUTION INFILTRATION; PERINEURAL
Status: COMPLETED | OUTPATIENT
Start: 2019-05-09 | End: 2019-05-09

## 2019-05-09 RX ORDER — OXYCODONE HYDROCHLORIDE AND ACETAMINOPHEN 5; 325 MG/1; MG/1
1 TABLET ORAL 2 TIMES DAILY PRN
Qty: 20 TABLET | Refills: 0 | Status: SHIPPED | OUTPATIENT
Start: 2019-05-09 | End: 2019-05-15 | Stop reason: SDUPTHER

## 2019-05-09 RX ADMIN — LIDOCAINE HYDROCHLORIDE 4 ML: 10 INJECTION, SOLUTION INFILTRATION; PERINEURAL at 10:38

## 2019-05-09 RX ADMIN — TRIAMCINOLONE ACETONIDE 40 MG: 40 INJECTION, SUSPENSION INTRA-ARTICULAR; INTRAMUSCULAR at 10:38

## 2019-05-09 RX ADMIN — LIDOCAINE HYDROCHLORIDE 3 ML: 10 INJECTION, SOLUTION INFILTRATION; PERINEURAL at 10:38

## 2019-05-14 ENCOUNTER — TELEPHONE (OUTPATIENT)
Dept: OBGYN CLINIC | Facility: CLINIC | Age: 64
End: 2019-05-14

## 2019-05-15 DIAGNOSIS — M19.011 GLENOHUMERAL ARTHRITIS, RIGHT: ICD-10-CM

## 2019-05-15 RX ORDER — OXYCODONE HYDROCHLORIDE AND ACETAMINOPHEN 5; 325 MG/1; MG/1
1 TABLET ORAL 2 TIMES DAILY PRN
Qty: 20 TABLET | Refills: 0 | Status: SHIPPED | OUTPATIENT
Start: 2019-05-15 | End: 2020-06-10

## 2019-07-08 ENCOUNTER — HOSPITAL ENCOUNTER (OUTPATIENT)
Dept: MRI IMAGING | Facility: HOSPITAL | Age: 64
Discharge: HOME | End: 2019-07-08
Attending: FAMILY MEDICINE
Payer: MEDICARE

## 2019-07-08 ENCOUNTER — HOSPITAL ENCOUNTER (OUTPATIENT)
Dept: RADIOLOGY | Facility: HOSPITAL | Age: 64
Discharge: HOME | End: 2019-07-08
Payer: MEDICARE

## 2019-07-08 DIAGNOSIS — M19.011 GLENOHUMERAL ARTHRITIS, RIGHT: ICD-10-CM

## 2019-07-08 PROCEDURE — 73221 MRI JOINT UPR EXTREM W/O DYE: CPT

## 2019-08-20 ENCOUNTER — OFFICE VISIT (OUTPATIENT)
Dept: OBGYN CLINIC | Facility: CLINIC | Age: 64
End: 2019-08-20
Payer: MEDICARE

## 2019-08-20 VITALS
SYSTOLIC BLOOD PRESSURE: 134 MMHG | WEIGHT: 260.2 LBS | HEIGHT: 74 IN | BODY MASS INDEX: 33.39 KG/M2 | HEART RATE: 98 BPM | DIASTOLIC BLOOD PRESSURE: 93 MMHG

## 2019-08-20 DIAGNOSIS — M19.011 GLENOHUMERAL ARTHRITIS, RIGHT: Primary | ICD-10-CM

## 2019-08-20 DIAGNOSIS — M77.8 RIGHT SHOULDER TENDONITIS: ICD-10-CM

## 2019-08-20 PROCEDURE — 99213 OFFICE O/P EST LOW 20 MIN: CPT | Performed by: ORTHOPAEDIC SURGERY

## 2019-08-20 PROCEDURE — 20610 DRAIN/INJ JOINT/BURSA W/O US: CPT | Performed by: ORTHOPAEDIC SURGERY

## 2019-08-20 RX ADMIN — METHYLPREDNISOLONE ACETATE 2 ML: 40 INJECTION, SUSPENSION INTRA-ARTICULAR; INTRALESIONAL; INTRAMUSCULAR; SOFT TISSUE at 13:28

## 2019-08-20 RX ADMIN — LIDOCAINE HYDROCHLORIDE 2 ML: 10 INJECTION, SOLUTION INFILTRATION; PERINEURAL at 13:28

## 2019-08-20 RX ADMIN — BUPIVACAINE HYDROCHLORIDE 2 ML: 5 INJECTION, SOLUTION EPIDURAL; INTRACAUDAL at 13:28

## 2019-08-20 NOTE — PROGRESS NOTES
HPI:  Patient is a 60y o  year old RHD male  who presents to the office for evaluation of his right shoulder after being referred by Dr Fawn Degroot  Pt has been having right shoulder pain for greater than 1 year  Pt has known glenohumeral OA  Pt received a CSI on 5/9/19, 2/7/19,11/1/19, 8/1/19 and previously saw DR Hinson who also provided CSI which did help at first although he felt some did provide relief and some did not  Pt states that the CSI that were administered with an anterior approach did offer more relief  Pt states that he has been taking percocet and aleve as needed for pain  Pt states that his pain increases with any motion of his shoulder  Pt has Hx of fracture of proximal humerus fracture about 10 years ago  Pt states that he has not tried any therapy for his right shoulder  Pt states that he does not have any feeling of instability or numbness and tingling         ROS:   General: No fever, no chills, no weight loss, no weight gain  HEENT:  No loss of hearing, no nose bleeds, no sore throat  Eyes:  No eye pain, no red eyes, no visual disturbance  Respiratory:  No cough, no shortness of breath, no wheezing  Cardiovascular:  No chest pain, no palpitations, no edema  GI: No abdominal pain, no nausea, no vomiting  Endocrine: No frequent urination, no excessive thirst  Urinary:  No dysuria, no hematuria, no incontinence  Musculoskeletal: see HPI and PE  Skin:  No rash, no wounds  Neurological:  No dizziness, no headache, no numbness  Psychiatric:  No difficulty concentrating, no depression, no suicide thoughts, no anxiety  Review of all other systems is negative    PMH:  Past Medical History:   Diagnosis Date    Depression     Diabetes mellitus ([facility] [address] )     Hyperlipidemia     Hypertension     Osteoarthritis, knee        PSH:  Past Surgical History:   Procedure Laterality Date    BACK SURGERY      HAND SURGERY      JOINT REPLACEMENT Left     l tkr    KNEE SURGERY Left     NV TOTAL KNEE ARTHROPLASTY Right 2018    Procedure: ARTHROPLASTY KNEE TOTAL;  Surgeon: Teresa Louie MD;  [facility];  Service: Orthopedics    TOOTH EXTRACTION      WISDOM TOOTH EXTRACTION         Medications:  Current Outpatient Medications   Medication Sig Dispense Refill    glyBURIDE-metFORMIN (GLUCOVANCE) 2 5-500 MG per tablet Take 1 tablet by mouth Twice daily      indomethacin (INDOCIN) 25 mg capsule Take 1 capsule (25 mg total) by mouth 3 (three) times a day with meals 60 capsule 0    lisinopril (ZESTRIL) 40 mg tablet Take 40 mg by mouth daily      oxyCODONE-acetaminophen (PERCOCET) 5-325 mg per tablet Take 1 tablet by mouth 2 (two) times a day as needed for moderate painMax Daily Amount: 2 tablets 20 tablet 0    saxagliptin (ONGLYZA) 5 MG tablet Take 5 mg by mouth daily      senna (SENOKOT) 8 6 mg Take 1 tablet (8 6 mg total) by mouth daily 120 each 0    simvastatin (ZOCOR) 40 mg tablet Take 1 tablet by mouth daily at bedtime         No current facility-administered medications for this visit  Allergies:  No Known Allergies    Family History:  Family History   Problem Relation Age of Onset    No Known Problems Mother     Heart attack Father     Arthritis Family     Cancer Family     Diabetes Family     Heart disease Family     Osteoporosis Family        Social History:  Social History     Occupational History    Not on file   Tobacco Use    Smoking status: Former Smoker     Types: Cigarettes     Last attempt to quit: 3/1/2012     Years since quittin 4    Smokeless tobacco: Never Used   Substance and Sexual Activity    Alcohol use: Yes     Comment: 'couple beers every couple weeks'    Drug use: No    Sexual activity: Never       Physical Exam:  General :  Alert, cooperative, no distress, appears stated age  Blood pressure 134/93, pulse 98, height 6' 2" (1 88 m), weight 118 kg (260 lb 3 2 oz)     Head:  Normocephalic, without obvious abnormality, atraumatic   Eyes: Conjunctiva/corneas clear, EOM's intact,   Ears: Both ears normal appearance, no hearing deficits  Nose: Nares normal, septum midline, no drainage    Neck: Supple,  trachea midline, no adenopathy, no tenderness, no mass   Back:   Symmetric, no curvature, ROM normal, no tenderness   Lungs:   Respirations unlabored   Chest Wall:  No tenderness or deformity   Extremities: Extremities normal, atraumatic, no cyanosis or edema      Pulses: 2+ and symmetric   Skin: Skin color, texture, turgor normal, no rashes or lesions      Neurologic: Normal           Right Shoulder Exam     Range of Motion   Passive abduction: 100   Forward flexion: 120     Tests   Apprehension: negative    Other   Erythema: absent  Sensation: normal    Comments:  ER to neutral  IR to belly  Load shift negative            Imaging Studies: The following imaging studies were reviewed in office today  My findings are noted  Right shoulder xrays performed 5/9/19 shows glenohumeral and AC joint OA as well as evidence of old trauma to the right proximal humerus    MRI of the right shoulder performed 7/8/19 shows glenohumeral OA with joint space narrowing and osteophyte formation, partial tear of the subscapularis and right shoulder tendonitis       Assessment  Encounter Diagnoses   Name Primary?     Glenohumeral arthritis, right Yes    Right shoulder tendonitis          Plan:  Large joint arthrocentesis: R glenohumeral  Date/Time: 8/20/2019 1:28 PM  Consent given by: patient  Site marked: site marked  Timeout: Immediately prior to procedure a time out was called to verify the correct patient, procedure, equipment, support staff and site/side marked as required   Supporting Documentation  Indications: pain   Procedure Details  Location: shoulder - R glenohumeral  Preparation: Patient was prepped and draped in the usual sterile fashion  Approach: anterior  Medications administered: 2 mL bupivacaine (PF) 0 5 %; 2 mL lidocaine 1 %; 2 mL methylPREDNISolone acetate 40 mg/mL    Patient tolerance: patient tolerated the procedure well with no immediate complications  Dressing:  Sterile dressing applied        CSI was offered and accepted by patient  CSI was administered without complications  Order was placed for physical therapy to help increase ROM and decrease discomfort  Shoulder replacement was discussed to help increase ROM and releive pain   Pt would not like to consider Surgery at this time  Pt was advised to continue Aleve for pain in his right shoulder  Pt will follow up in the office in 6 weeks after he finished therapy for a repeat evaluation             Scribe Attestation    I,:   Valri Crigler am acting as a scribe while in the presence of the attending physician :        I,:   Lew Snider MD personally performed the services described in this documentation    as scribed in my presence :

## 2019-08-21 RX ORDER — METHYLPREDNISOLONE ACETATE 40 MG/ML
2 INJECTION, SUSPENSION INTRA-ARTICULAR; INTRALESIONAL; INTRAMUSCULAR; SOFT TISSUE
Status: COMPLETED | OUTPATIENT
Start: 2019-08-20 | End: 2019-08-20

## 2019-08-21 RX ORDER — LIDOCAINE HYDROCHLORIDE 10 MG/ML
2 INJECTION, SOLUTION INFILTRATION; PERINEURAL
Status: COMPLETED | OUTPATIENT
Start: 2019-08-20 | End: 2019-08-20

## 2019-08-21 RX ORDER — BUPIVACAINE HYDROCHLORIDE 5 MG/ML
2 INJECTION, SOLUTION EPIDURAL; INTRACAUDAL
Status: COMPLETED | OUTPATIENT
Start: 2019-08-20 | End: 2019-08-20

## 2019-08-27 ENCOUNTER — TELEPHONE (OUTPATIENT)
Dept: INTERNAL MEDICINE CLINIC | Facility: CLINIC | Age: 64
End: 2019-08-27

## 2019-08-27 NOTE — TELEPHONE ENCOUNTER
Deysi called back said he doesn't wish to be seen here- he goes to the [facility]   Removed PCP from chart per patient's request

## 2019-11-20 ENCOUNTER — OFFICE VISIT (OUTPATIENT)
Dept: OBGYN CLINIC | Facility: CLINIC | Age: 64
End: 2019-11-20
Payer: MEDICARE

## 2019-11-20 VITALS
DIASTOLIC BLOOD PRESSURE: 84 MMHG | WEIGHT: 264.8 LBS | HEART RATE: 88 BPM | HEIGHT: 74 IN | BODY MASS INDEX: 33.98 KG/M2 | SYSTOLIC BLOOD PRESSURE: 139 MMHG

## 2019-11-20 DIAGNOSIS — M19.011 PRIMARY OSTEOARTHRITIS OF RIGHT SHOULDER: Primary | ICD-10-CM

## 2019-11-20 PROCEDURE — 99213 OFFICE O/P EST LOW 20 MIN: CPT | Performed by: ORTHOPAEDIC SURGERY

## 2019-11-20 NOTE — PROGRESS NOTES
SUBJECTIVE  Patient returns today for follow-up regarding his right shoulder  He has been feeling great since his injection on 08/20/2019  He never had to go to therapy as he has full active range of motion at this time  He is feeling well enough that he does not require another injection        ROS:   General: No fever, no chills, no weight loss, no weight gain  HEENT:  No loss of hearing, no nose bleeds, no sore throat  Eyes:  No eye pain, no red eyes, no visual disturbance  Respiratory:  No cough, no shortness of breath, no wheezing  Cardiovascular:  No chest pain, no palpitations, no edema  GI: No abdominal pain, no nausea, no vomiting  Endocrine: No frequent urination, no excessive thirst  Urinary:  No dysuria, no hematuria, no incontinence  Musculoskeletal: see HPI and PE  Skin:  No rash, no wounds  Neurological:  No dizziness, no headache, no numbness  Psychiatric:  No difficulty concentrating, no depression, no suicide thoughts, no anxiety  Review of all other systems is negative    PMH:  Past Medical History:   Diagnosis Date    Depression     Diabetes mellitus [address] [address]     Hyperlipidemia     Hypertension     Osteoarthritis, knee        PSH:  Past Surgical History:   Procedure Laterality Date    BACK SURGERY      HAND SURGERY      JOINT REPLACEMENT Left     l tkr    KNEE SURGERY Left     CA TOTAL KNEE ARTHROPLASTY Right 11/19/2018    Procedure: ARTHROPLASTY KNEE TOTAL;  Surgeon: Renny Spaulding MD;  [facility];  Service: Orthopedics    TOOTH EXTRACTION      WISDOM TOOTH EXTRACTION         Medications:  Current Outpatient Medications   Medication Sig Dispense Refill    glyBURIDE-metFORMIN (GLUCOVANCE) 2 5-500 MG per tablet Take 1 tablet by mouth Twice daily      indomethacin (INDOCIN) 25 mg capsule Take 1 capsule (25 mg total) by mouth 3 (three) times a day with meals 60 capsule 0    lisinopril (ZESTRIL) 40 mg tablet Take 40 mg by mouth daily      oxyCODONE-acetaminophen (PERCOCET) 5-325 mg per tablet Take 1 tablet by mouth 2 (two) times a day as needed for moderate painMax Daily Amount: 2 tablets 20 tablet 0    saxagliptin (ONGLYZA) 5 MG tablet Take 5 mg by mouth daily      senna (SENOKOT) 8 6 mg Take 1 tablet (8 6 mg total) by mouth daily 120 each 0    simvastatin (ZOCOR) 40 mg tablet Take 1 tablet by mouth daily at bedtime         No current facility-administered medications for this visit  Allergies:  No Known Allergies    Family History:  Family History   Problem Relation Age of Onset    No Known Problems Mother     Heart attack Father     Arthritis Family     Cancer Family     Diabetes Family     Heart disease Family     Osteoporosis Family        Social History:  Social History     Occupational History    Not on file   Tobacco Use    Smoking status: Former Smoker     Types: Cigarettes     Last attempt to quit: 3/1/2012     Years since quittin 7    Smokeless tobacco: Never Used   Substance and Sexual Activity    Alcohol use: Yes     Comment: 'couple beers every couple weeks'    Drug use: No    Sexual activity: Never       Physical Exam:  General :  Alert, cooperative, no distress, appears stated age  Blood pressure 139/84, pulse 88, height 6' 2" (1 88 m), weight 120 kg (264 lb 12 8 oz)  Head:  Normocephalic, without obvious abnormality, atraumatic   Eyes:  Conjunctiva/corneas clear, EOM's intact,   Ears: Both ears normal appearance, no hearing deficits      Nose: Nares normal, septum midline, no drainage    Neck: Supple,  trachea midline, no adenopathy, no tenderness, no mass   Back:   Symmetric, no curvature, ROM normal, no tenderness   Lungs:   Respirations unlabored   Chest Wall:  No tenderness or deformity   Extremities: Extremities normal, atraumatic, no cyanosis or edema      Pulses: 2+ and symmetric   Skin: Skin color, texture, turgor normal, no rashes or lesions      Neurologic: Normal           Right Shoulder Exam     Tenderness   The patient is experiencing no tenderness  Range of Motion   Active abduction: 160   Forward flexion: 170   Internal rotation 0 degrees: L1     Muscle Strength   The patient has normal right shoulder strength  Tests   Apprehension: negative  Zhong test: negative    Other   Erythema: absent  Sensation: normal  Pulse: present            Imaging Studies: The following imaging studies were reviewed in office today  My findings are noted  No new imaging studies reviewed today  Assessment  Encounter Diagnosis   Name Primary?  Primary osteoarthritis of right shoulder Yes         Plan:  Patient will continue a home exercise program   He will follow up in 3 months for recheck and glenohumeral injection if necessary

## 2020-01-24 ENCOUNTER — TELEPHONE (OUTPATIENT)
Dept: OBGYN CLINIC | Facility: HOSPITAL | Age: 65
End: 2020-01-24

## 2020-06-10 ENCOUNTER — APPOINTMENT (OUTPATIENT)
Dept: RADIOLOGY | Facility: CLINIC | Age: 65
End: 2020-06-10
Payer: MEDICARE

## 2020-06-10 ENCOUNTER — OFFICE VISIT (OUTPATIENT)
Dept: OBGYN CLINIC | Facility: CLINIC | Age: 65
End: 2020-06-10
Payer: MEDICARE

## 2020-06-10 VITALS
HEIGHT: 74 IN | WEIGHT: 255 LBS | HEART RATE: 97 BPM | SYSTOLIC BLOOD PRESSURE: 133 MMHG | BODY MASS INDEX: 32.73 KG/M2 | RESPIRATION RATE: 20 BRPM | DIASTOLIC BLOOD PRESSURE: 91 MMHG

## 2020-06-10 DIAGNOSIS — M19.012 GLENOHUMERAL ARTHRITIS, LEFT: ICD-10-CM

## 2020-06-10 DIAGNOSIS — M25.512 LEFT SHOULDER PAIN, UNSPECIFIED CHRONICITY: ICD-10-CM

## 2020-06-10 DIAGNOSIS — M25.512 LEFT SHOULDER PAIN, UNSPECIFIED CHRONICITY: Primary | ICD-10-CM

## 2020-06-10 PROCEDURE — 3075F SYST BP GE 130 - 139MM HG: CPT | Performed by: ORTHOPAEDIC SURGERY

## 2020-06-10 PROCEDURE — 99213 OFFICE O/P EST LOW 20 MIN: CPT | Performed by: ORTHOPAEDIC SURGERY

## 2020-06-10 PROCEDURE — 3080F DIAST BP >= 90 MM HG: CPT | Performed by: ORTHOPAEDIC SURGERY

## 2020-06-10 PROCEDURE — 1036F TOBACCO NON-USER: CPT | Performed by: ORTHOPAEDIC SURGERY

## 2020-06-10 PROCEDURE — 73030 X-RAY EXAM OF SHOULDER: CPT

## 2020-06-10 PROCEDURE — 3008F BODY MASS INDEX DOCD: CPT | Performed by: ORTHOPAEDIC SURGERY

## 2020-06-10 PROCEDURE — 20610 DRAIN/INJ JOINT/BURSA W/O US: CPT | Performed by: ORTHOPAEDIC SURGERY

## 2020-06-10 RX ADMIN — METHYLPREDNISOLONE ACETATE 2 ML: 40 INJECTION, SUSPENSION INTRA-ARTICULAR; INTRALESIONAL; INTRAMUSCULAR; SOFT TISSUE at 16:29

## 2020-06-10 RX ADMIN — LIDOCAINE HYDROCHLORIDE 2 ML: 10 INJECTION, SOLUTION INFILTRATION; PERINEURAL at 16:29

## 2020-06-10 RX ADMIN — BUPIVACAINE HYDROCHLORIDE 2 ML: 2.5 INJECTION, SOLUTION INFILTRATION; PERINEURAL at 16:29

## 2020-06-11 RX ORDER — BUPIVACAINE HYDROCHLORIDE 2.5 MG/ML
2 INJECTION, SOLUTION INFILTRATION; PERINEURAL
Status: COMPLETED | OUTPATIENT
Start: 2020-06-10 | End: 2020-06-10

## 2020-06-11 RX ORDER — LIDOCAINE HYDROCHLORIDE 10 MG/ML
2 INJECTION, SOLUTION INFILTRATION; PERINEURAL
Status: COMPLETED | OUTPATIENT
Start: 2020-06-10 | End: 2020-06-10

## 2020-06-11 RX ORDER — METHYLPREDNISOLONE ACETATE 40 MG/ML
2 INJECTION, SUSPENSION INTRA-ARTICULAR; INTRALESIONAL; INTRAMUSCULAR; SOFT TISSUE
Status: COMPLETED | OUTPATIENT
Start: 2020-06-10 | End: 2020-06-10

## 2021-01-06 ENCOUNTER — OFFICE VISIT (OUTPATIENT)
Dept: OBGYN CLINIC | Facility: CLINIC | Age: 66
End: 2021-01-06
Payer: MEDICARE

## 2021-01-06 VITALS
SYSTOLIC BLOOD PRESSURE: 132 MMHG | BODY MASS INDEX: 32.08 KG/M2 | WEIGHT: 250 LBS | DIASTOLIC BLOOD PRESSURE: 93 MMHG | HEART RATE: 85 BPM | HEIGHT: 74 IN

## 2021-01-06 DIAGNOSIS — M19.011 GLENOHUMERAL ARTHRITIS, RIGHT: Primary | ICD-10-CM

## 2021-01-06 DIAGNOSIS — M19.012 GLENOHUMERAL ARTHRITIS, LEFT: ICD-10-CM

## 2021-01-06 PROCEDURE — 99213 OFFICE O/P EST LOW 20 MIN: CPT | Performed by: ORTHOPAEDIC SURGERY

## 2021-01-06 PROCEDURE — 20610 DRAIN/INJ JOINT/BURSA W/O US: CPT | Performed by: ORTHOPAEDIC SURGERY

## 2021-01-06 RX ORDER — METHYLPREDNISOLONE ACETATE 40 MG/ML
2 INJECTION, SUSPENSION INTRA-ARTICULAR; INTRALESIONAL; INTRAMUSCULAR; SOFT TISSUE
Status: COMPLETED | OUTPATIENT
Start: 2021-01-06 | End: 2021-01-06

## 2021-01-06 RX ORDER — BUPIVACAINE HYDROCHLORIDE 5 MG/ML
2 INJECTION, SOLUTION EPIDURAL; INTRACAUDAL
Status: COMPLETED | OUTPATIENT
Start: 2021-01-06 | End: 2021-01-06

## 2021-01-06 RX ORDER — LIDOCAINE HYDROCHLORIDE 10 MG/ML
2 INJECTION, SOLUTION INFILTRATION; PERINEURAL
Status: COMPLETED | OUTPATIENT
Start: 2021-01-06 | End: 2021-01-06

## 2021-01-06 RX ADMIN — METHYLPREDNISOLONE ACETATE 2 ML: 40 INJECTION, SUSPENSION INTRA-ARTICULAR; INTRALESIONAL; INTRAMUSCULAR; SOFT TISSUE at 09:34

## 2021-01-06 RX ADMIN — LIDOCAINE HYDROCHLORIDE 2 ML: 10 INJECTION, SOLUTION INFILTRATION; PERINEURAL at 09:34

## 2021-01-06 RX ADMIN — BUPIVACAINE HYDROCHLORIDE 2 ML: 5 INJECTION, SOLUTION EPIDURAL; INTRACAUDAL at 09:34

## 2021-01-06 NOTE — PROGRESS NOTES
SUBJECTIVE  Pt is a 63year old male with known bilateral glenohumeral OA  Patient previously received a left glenohumeral joint injection 06/10/2020 and right glenohumeral injections 8/2019 and 05/2019  Patient states that he does get significant relief from the cortisone steroid injections  Patient states he takes Tylenol occasionally for pain  Patient states that he has pain at night that causes him to have difficulty sleeping  Patient is diabetic but states that the previous cortisone steroid injections did not increase his blood glucose levels        ROS:   General: No fever, no chills, no weight loss, no weight gain  HEENT:  No loss of hearing, no nose bleeds, no sore throat  Eyes:  No eye pain, no red eyes, no visual disturbance  Respiratory:  No cough, no shortness of breath, no wheezing  Cardiovascular:  No chest pain, no palpitations, no edema  GI: No abdominal pain, no nausea, no vomiting  Endocrine: No frequent urination, no excessive thirst  Urinary:  No dysuria, no hematuria, no incontinence  Musculoskeletal: see HPI and PE  Skin:  No rash, no wounds  Neurological:  No dizziness, no headache, no numbness  Psychiatric:  No difficulty concentrating, no depression, no suicide thoughts, no anxiety  Review of all other systems is negative    PMH:  Past Medical History:   Diagnosis Date    Depression     Diabetes mellitus ([facility] [address] )     Hyperlipidemia     Hypertension     Osteoarthritis, knee        PSH:  Past Surgical History:   Procedure Laterality Date    BACK SURGERY      HAND SURGERY      JOINT REPLACEMENT Left     l tkr    KNEE SURGERY Left     [facility] TOTAL KNEE ARTHROPLASTY Right 11/19/2018    Procedure: ARTHROPLASTY KNEE TOTAL;  Surgeon: Ilia Camp MD;  [facility];  Service: Orthopedics    TOOTH EXTRACTION      WISDOM TOOTH EXTRACTION         Medications:  Current Outpatient Medications   Medication Sig Dispense Refill    glyBURIDE-metFORMIN (GLUCOVANCE) 2 5-500 MG per tablet Take 1 tablet by mouth Twice daily      indomethacin (INDOCIN) 25 mg capsule Take 1 capsule (25 mg total) by mouth 3 (three) times a day with meals 60 capsule 0    lisinopril (ZESTRIL) 40 mg tablet Take 40 mg by mouth daily      saxagliptin (ONGLYZA) 5 MG tablet Take 5 mg by mouth daily      senna (SENOKOT) 8 6 mg Take 1 tablet (8 6 mg total) by mouth daily 120 each 0    simvastatin (ZOCOR) 40 mg tablet Take 1 tablet by mouth daily at bedtime         No current facility-administered medications for this visit  Allergies:  No Known Allergies    Family History:  Family History   Problem Relation Age of Onset    No Known Problems Mother     Heart attack Father     Arthritis Family     Cancer Family     Diabetes Family     Heart disease Family     Osteoporosis Family        Social History:  Social History     Occupational History    Not on file   Tobacco Use    Smoking status: Former Smoker     Types: Cigarettes     Quit date: 3/1/2012     Years since quittin 8    Smokeless tobacco: Never Used   Substance and Sexual Activity    Alcohol use: Yes     Comment: 'couple beers every couple weeks'    Drug use: No    Sexual activity: Never       Physical Exam:  General :  Alert, cooperative, no distress, appears stated age  Blood pressure 132/93, pulse 85, height 6' 2" (1 88 m), weight 113 kg (250 lb)  Head:  Normocephalic, without obvious abnormality, atraumatic   Eyes:  Conjunctiva/corneas clear, EOM's intact,   Ears: Both ears normal appearance, no hearing deficits      Nose: Nares normal, septum midline, no drainage    Neck: Supple,  trachea midline, no adenopathy, no tenderness, no mass   Back:   Symmetric, no curvature, ROM normal, no tenderness   Lungs:   Respirations unlabored   Chest Wall:  No tenderness or deformity   Extremities: Extremities normal, atraumatic, no cyanosis or edema      Pulses: 2+ and symmetric   Skin: Skin color, texture, turgor normal, no rashes or lesions Neurologic: Normal           Right Shoulder Exam     Range of Motion   Active abduction: 160   Forward flexion: 170   Internal rotation 0 degrees: L2     Tests   Apprehension: negative  Zhong test: negative    Other   Erythema: absent  Sensation: normal  Pulse: present      Left Shoulder Exam     Range of Motion   External rotation: 10   Left shoulder internal rotation 0 degrees: hip  Other   Erythema: absent  Sensation: normal  Pulse: present     Comments: Forward flexion - active 90 deg passive 120 deg               Imaging Studies: The following imaging studies were reviewed in office today  My findings are noted  No new images     Assessment  Encounter Diagnoses   Name Primary?  Glenohumeral arthritis, right Yes    Glenohumeral arthritis, left          Plan:  Pt is a 63year old male with known bilateral glenohumeral OA who received significant pain relief from CSI in the past    * Pt was offered bilateral glenohumeral Joint injections  *Pt accepted and CSI were administered without complications   * Pt was advised to monitor his blood glucose level  * Pt will follow up in the office in 3 months for reevaluation     Large joint arthrocentesis: L glenohumeral  Universal Protocol:  Consent: Verbal consent obtained  Risks and benefits: risks, benefits and alternatives were discussed  Consent given by: patient  Patient understanding: patient states understanding of the procedure being performed  Patient consent: the patient's understanding of the procedure matches consent given  Site marked: the operative site was marked  Radiology Images displayed and confirmed   If images not available, report reviewed: imaging studies available    Supporting Documentation  Indications: pain   Procedure Details  Location: shoulder - L glenohumeral  Preparation: Patient was prepped and draped in the usual sterile fashion  Ultrasound guidance: no  Medications administered: 2 mL bupivacaine (PF) 0 5 %; 2 mL lidocaine 1 %; 2 mL methylPREDNISolone acetate 40 mg/mL    Patient tolerance: patient tolerated the procedure well with no immediate complications  Dressing:  Sterile dressing applied    Large joint arthrocentesis: R glenohumeral  [place] Protocol:  Consent: Verbal consent obtained  Risks and benefits: risks, benefits and alternatives were discussed  Consent given by: patient  Patient understanding: patient states understanding of the procedure being performed  Patient consent: the patient's understanding of the procedure matches consent given  Site marked: the operative site was marked  Radiology Images displayed and confirmed   If images not available, report reviewed: imaging studies available    Supporting Documentation  Indications: pain   Procedure Details  Location: shoulder - R glenohumeral  Preparation: Patient was prepped and draped in the usual sterile fashion  Ultrasound guidance: no  Medications administered: 2 mL bupivacaine (PF) 0 5 %; 2 mL lidocaine 1 %; 2 mL methylPREDNISolone acetate 40 mg/mL    Patient tolerance: patient tolerated the procedure well with no immediate complications  Dressing:  Sterile dressing applied            Scribe Attestation    I,:  Jos Woods am acting as a scribe while in the presence of the attending physician :       I,:  Polly Chandler MD personally performed the services described in this documentation    as scribed in my presence :

## 2021-04-21 ENCOUNTER — OFFICE VISIT (OUTPATIENT)
Dept: OBGYN CLINIC | Facility: CLINIC | Age: 66
End: 2021-04-21
Payer: MEDICARE

## 2021-04-21 VITALS
HEART RATE: 87 BPM | SYSTOLIC BLOOD PRESSURE: 153 MMHG | DIASTOLIC BLOOD PRESSURE: 86 MMHG | WEIGHT: 256 LBS | BODY MASS INDEX: 32.85 KG/M2 | HEIGHT: 74 IN

## 2021-04-21 DIAGNOSIS — M54.2 NECK PAIN: ICD-10-CM

## 2021-04-21 DIAGNOSIS — M19.011 GLENOHUMERAL ARTHRITIS, RIGHT: ICD-10-CM

## 2021-04-21 DIAGNOSIS — M19.012 GLENOHUMERAL ARTHRITIS, LEFT: Primary | ICD-10-CM

## 2021-04-21 PROCEDURE — 20610 DRAIN/INJ JOINT/BURSA W/O US: CPT | Performed by: ORTHOPAEDIC SURGERY

## 2021-04-21 PROCEDURE — 99213 OFFICE O/P EST LOW 20 MIN: CPT | Performed by: ORTHOPAEDIC SURGERY

## 2021-04-21 RX ORDER — ATORVASTATIN CALCIUM 80 MG/1
TABLET, FILM COATED ORAL
COMMUNITY
Start: 2021-03-26

## 2021-04-21 RX ADMIN — LIDOCAINE HYDROCHLORIDE 2 ML: 10 INJECTION, SOLUTION INFILTRATION; PERINEURAL at 11:40

## 2021-04-21 RX ADMIN — METHYLPREDNISOLONE ACETATE 2 ML: 40 INJECTION, SUSPENSION INTRA-ARTICULAR; INTRALESIONAL; INTRAMUSCULAR; SOFT TISSUE at 11:40

## 2021-04-21 RX ADMIN — BUPIVACAINE HYDROCHLORIDE 2 ML: 2.5 INJECTION, SOLUTION INFILTRATION; PERINEURAL at 11:40

## 2021-04-21 NOTE — PROGRESS NOTES
SUBJECTIVE  Pt is a 63year old male with known bilateral glenohumeral OA  Patient previously received a left glenohumeral joint injections with the most recent  Being administered bilaterally on 01/06/2021  Today patient states that the cortisone steroid injections provided him with  Significant pain relief  Patient states that he is now having right greater than left shoulder pain  Patient states that his right-sided pain does also radiate to his elbow in the right side of his neck and is worse in the morning  Patient states that he has difficulty lying on his right side and his pain wakes him from sleep  Patient states that his left side is just sore  Patient states that when he is having increased right shoulder pain he wears a sling for short periods of time which does help with his pain        ROS:   General: No fever, no chills, no weight loss, no weight gain  HEENT:  No loss of hearing, no nose bleeds, no sore throat  Eyes:  No eye pain, no red eyes, no visual disturbance  Respiratory:  No cough, no shortness of breath, no wheezing  Cardiovascular:  No chest pain, no palpitations, no edema  GI: No abdominal pain, no nausea, no vomiting  Endocrine: No frequent urination, no excessive thirst  Urinary:  No dysuria, no hematuria, no incontinence  Musculoskeletal: see HPI and PE  Skin:  No rash, no wounds  Neurological:  No dizziness, no headache, no numbness  Psychiatric:  No difficulty concentrating, no depression, no suicide thoughts, no anxiety  Review of all other systems is negative    PMH:  Past Medical History:   Diagnosis Date    Depression     Diabetes mellitus ([address] )     Hyperlipidemia     Hypertension     Osteoarthritis, knee        PSH:  Past Surgical History:   Procedure Laterality Date    BACK SURGERY      HAND SURGERY      JOINT REPLACEMENT Left     l tkr    KNEE SURGERY Left     NE TOTAL KNEE ARTHROPLASTY Right 11/19/2018    Procedure: ARTHROPLASTY KNEE TOTAL;  Surgeon: Jonathan Dunne MD Re;  [facility];  Service: Orthopedics    TOOTH EXTRACTION      WISDOM TOOTH EXTRACTION         Medications:  Current Outpatient Medications   Medication Sig Dispense Refill    atorvastatin (LIPITOR) 80 mg tablet TAKE 40MG (ONE-HALF TABLET) BY MOUTH EVERY DAY AT 5 PM FOR CHOLESTEROL      glyBURIDE-metFORMIN (GLUCOVANCE) 2 5-500 MG per tablet Take 1 tablet by mouth Twice daily      indomethacin (INDOCIN) 25 mg capsule Take 1 capsule (25 mg total) by mouth 3 (three) times a day with meals 60 capsule 0    insulin glargine (LANTUS SOLOSTAR) 100 units/mL injection pen INJECT 28 UNITS SUBCUTANEOUSLY AT BEDTIME FOR DIABETES      lisinopril (ZESTRIL) 40 mg tablet Take 40 mg by mouth daily      metFORMIN (GLUCOPHAGE) 1000 MG tablet TAKE ONE TABLET BY MOUTH TWICE A DAY WITH MEALS FOR DIABETES      saxagliptin (ONGLYZA) 5 MG tablet Take 5 mg by mouth daily      senna (SENOKOT) 8 6 mg Take 1 tablet (8 6 mg total) by mouth daily 120 each 0    simvastatin (ZOCOR) 40 mg tablet Take 1 tablet by mouth daily at bedtime         No current facility-administered medications for this visit         Allergies:  No Known Allergies    Family History:  Family History   Problem Relation Age of Onset    No Known Problems Mother     Heart attack Father     Arthritis Family     Cancer Family     Diabetes Family     Heart disease Family     Osteoporosis Family        Social History:  Social History     Occupational History    Not on file   Tobacco Use    Smoking status: Former Smoker     Types: Cigarettes     Quit date: 3/1/2012     Years since quittin 3    Smokeless tobacco: Never Used   Vaping Use    Vaping Use: Never used   Substance and Sexual Activity    Alcohol use: Yes     Comment: 'couple beers every couple weeks'    Drug use: No    Sexual activity: Never       Physical Exam:  General :  Alert, cooperative, no distress, appears stated age  Blood pressure 153/86, pulse 87, height 6' 2" (1 88 m), weight 116 kg (256 lb)  Head:  Normocephalic, without obvious abnormality, atraumatic   Eyes:  Conjunctiva/corneas clear, EOM's intact,   Ears: Both ears normal appearance, no hearing deficits  Nose: Nares normal, septum midline, no drainage    Neck: Supple,  trachea midline, no adenopathy, no tenderness, no mass   Back:   Symmetric, no curvature, ROM normal, no tenderness   Lungs:   Respirations unlabored   Chest Wall:  No tenderness or deformity   Extremities: Extremities normal, atraumatic, no cyanosis or edema      Pulses: 2+ and symmetric   Skin: Skin color, texture, turgor normal, no rashes or lesions      Neurologic: Normal           Back Exam     Comments:  Right-sided neck discomfort with neck range of motion   Right upper extremity strength 5/5 sensation intact light touch      Right Shoulder Exam     Muscle Strength   Abduction: 5/5   Internal rotation: 5/5   External rotation: 5/5     Other   Erythema: absent  Sensation: normal    Comments:    Passive forward flexion to 130°   Passive abduction 85°           Left Shoulder Exam     Other   Erythema: absent  Sensation: normal     Comments:   Moderate limitations with range of motion   Pain with active range of motion   Limitations with range of motion            Imaging Studies: The following imaging studies were reviewed in office today  My findings are noted  No new images reviewed       Assessment  Encounter Diagnoses   Name Primary?     Glenohumeral arthritis, left Yes    Glenohumeral arthritis, right     Neck pain          Plan:  Pt is a 62 yo male with known bilateral shoulder glenohumeral OA  - Pt has new onset right sided neck discomfort worse in the morning   - PT was ordered for bilateral shoulders and cervical spine  - Pt was offered and accepted repeat glenohumeral CSI   -CSI were administered without complications   - Pt will follow up in the office in 6 weeks after completing therapy       Large joint arthrocentesis: HOMERO glenohumeral  Universal Protocol:  Consent: Verbal consent obtained  Risks and benefits: risks, benefits and alternatives were discussed  Consent given by: patient  Patient understanding: patient states understanding of the procedure being performed  Patient consent: the patient's understanding of the procedure matches consent given  Site marked: the operative site was marked  Supporting Documentation  Indications: pain   Procedure Details  Location: shoulder - R glenohumeral  Preparation: Patient was prepped and draped in the usual sterile fashion  Ultrasound guidance: no  Medications administered: 2 mL lidocaine 1 %; 2 mL methylPREDNISolone acetate 40 mg/mL; 2 mL bupivacaine 0 25 %    Patient tolerance: patient tolerated the procedure well with no immediate complications  Dressing:  Sterile dressing applied    Large joint arthrocentesis: L glenohumeral  Universal Protocol:  Consent: Verbal consent obtained  Risks and benefits: risks, benefits and alternatives were discussed  Consent given by: patient  Time out: Immediately prior to procedure a "time out" was called to verify the correct patient, procedure, equipment, support staff and site/side marked as required    Patient understanding: patient states understanding of the procedure being performed  Patient consent: the patient's understanding of the procedure matches consent given  Site marked: the operative site was marked  Supporting Documentation  Indications: pain   Procedure Details  Location: shoulder - L glenohumeral  Preparation: Patient was prepped and draped in the usual sterile fashion  Ultrasound guidance: no  Medications administered: 2 mL lidocaine 1 %; 2 mL methylPREDNISolone acetate 40 mg/mL; 2 mL bupivacaine 0 25 %    Patient tolerance: patient tolerated the procedure well with no immediate complications  Dressing:  Sterile dressing applied            Scribe Attestation    I,:  Елена Jean am acting as a scribe while in the presence of the attending physician :       I,:  Tami Recinos MD personally performed the services described in this documentation    as scribed in my presence :

## 2021-06-28 RX ORDER — LIDOCAINE HYDROCHLORIDE 10 MG/ML
2 INJECTION, SOLUTION INFILTRATION; PERINEURAL
Status: COMPLETED | OUTPATIENT
Start: 2021-04-21 | End: 2021-04-21

## 2021-06-28 RX ORDER — METHYLPREDNISOLONE ACETATE 40 MG/ML
2 INJECTION, SUSPENSION INTRA-ARTICULAR; INTRALESIONAL; INTRAMUSCULAR; SOFT TISSUE
Status: COMPLETED | OUTPATIENT
Start: 2021-04-21 | End: 2021-04-21

## 2021-06-28 RX ORDER — BUPIVACAINE HYDROCHLORIDE 2.5 MG/ML
2 INJECTION, SOLUTION INFILTRATION; PERINEURAL
Status: COMPLETED | OUTPATIENT
Start: 2021-04-21 | End: 2021-04-21

## 2021-10-13 ENCOUNTER — OFFICE VISIT (OUTPATIENT)
Dept: OBGYN CLINIC | Facility: CLINIC | Age: 66
End: 2021-10-13
Payer: MEDICARE

## 2021-10-13 VITALS
BODY MASS INDEX: 33.37 KG/M2 | SYSTOLIC BLOOD PRESSURE: 115 MMHG | HEIGHT: 74 IN | DIASTOLIC BLOOD PRESSURE: 79 MMHG | WEIGHT: 260 LBS | HEART RATE: 91 BPM

## 2021-10-13 DIAGNOSIS — M19.011 GLENOHUMERAL ARTHRITIS, RIGHT: ICD-10-CM

## 2021-10-13 DIAGNOSIS — M19.012 GLENOHUMERAL ARTHRITIS, LEFT: Primary | ICD-10-CM

## 2021-10-13 PROCEDURE — 99213 OFFICE O/P EST LOW 20 MIN: CPT | Performed by: ORTHOPAEDIC SURGERY

## 2021-10-13 PROCEDURE — 20610 DRAIN/INJ JOINT/BURSA W/O US: CPT | Performed by: ORTHOPAEDIC SURGERY

## 2021-10-13 RX ADMIN — METHYLPREDNISOLONE ACETATE 2 ML: 40 INJECTION, SUSPENSION INTRA-ARTICULAR; INTRALESIONAL; INTRAMUSCULAR; SOFT TISSUE at 14:07

## 2021-10-13 RX ADMIN — LIDOCAINE HYDROCHLORIDE 2 ML: 10 INJECTION, SOLUTION INFILTRATION; PERINEURAL at 14:07

## 2021-10-13 RX ADMIN — BUPIVACAINE HYDROCHLORIDE 2 ML: 2.5 INJECTION, SOLUTION INFILTRATION; PERINEURAL at 14:07

## 2021-10-18 RX ORDER — BUPIVACAINE HYDROCHLORIDE 2.5 MG/ML
2 INJECTION, SOLUTION INFILTRATION; PERINEURAL
Status: COMPLETED | OUTPATIENT
Start: 2021-10-13 | End: 2021-10-13

## 2021-10-18 RX ORDER — METHYLPREDNISOLONE ACETATE 40 MG/ML
2 INJECTION, SUSPENSION INTRA-ARTICULAR; INTRALESIONAL; INTRAMUSCULAR; SOFT TISSUE
Status: COMPLETED | OUTPATIENT
Start: 2021-10-13 | End: 2021-10-13

## 2021-10-18 RX ORDER — LIDOCAINE HYDROCHLORIDE 10 MG/ML
2 INJECTION, SOLUTION INFILTRATION; PERINEURAL
Status: COMPLETED | OUTPATIENT
Start: 2021-10-13 | End: 2021-10-13

## 2022-01-19 ENCOUNTER — APPOINTMENT (INPATIENT)
Dept: RADIOLOGY | Facility: HOSPITAL | Age: 67
DRG: 560 | End: 2022-01-19
Payer: MEDICARE

## 2022-01-19 ENCOUNTER — HOSPITAL ENCOUNTER (INPATIENT)
Facility: HOSPITAL | Age: 67
LOS: 13 days | Discharge: HOME HEALTH | DRG: 560 | End: 2022-02-01
Attending: PHYSICAL MEDICINE & REHABILITATION | Admitting: PHYSICAL MEDICINE & REHABILITATION
Payer: MEDICARE

## 2022-01-19 DIAGNOSIS — G63 POLYNEUROPATHY ASSOCIATED WITH UNDERLYING DISEASE (HCC): ICD-10-CM

## 2022-01-19 DIAGNOSIS — Z72.820 POOR SLEEP: ICD-10-CM

## 2022-01-19 DIAGNOSIS — M79.671 PAIN IN BOTH FEET: ICD-10-CM

## 2022-01-19 DIAGNOSIS — S72.145D CLOSED NONDISPLACED INTERTROCHANTERIC FRACTURE OF LEFT FEMUR WITH ROUTINE HEALING: Primary | ICD-10-CM

## 2022-01-19 DIAGNOSIS — M79.672 PAIN IN BOTH FEET: ICD-10-CM

## 2022-01-19 DIAGNOSIS — L03.115 CELLULITIS OF RIGHT LOWER EXTREMITY: ICD-10-CM

## 2022-01-19 DIAGNOSIS — M79.671 RIGHT FOOT PAIN: ICD-10-CM

## 2022-01-19 DIAGNOSIS — E55.9 VITAMIN D DEFICIENCY: ICD-10-CM

## 2022-01-19 DIAGNOSIS — D64.9 ANEMIA, UNSPECIFIED TYPE: ICD-10-CM

## 2022-01-19 DIAGNOSIS — K59.01 SLOW TRANSIT CONSTIPATION: ICD-10-CM

## 2022-01-19 DIAGNOSIS — E11.9 TYPE 2 DIABETES MELLITUS WITHOUT COMPLICATION, WITHOUT LONG-TERM CURRENT USE OF INSULIN (HCC): Chronic | ICD-10-CM

## 2022-01-19 DIAGNOSIS — I10 ESSENTIAL HYPERTENSION: Chronic | ICD-10-CM

## 2022-01-19 PROBLEM — M17.0 PRIMARY OSTEOARTHRITIS OF BOTH KNEES: Status: ACTIVE | Noted: 2018-07-18

## 2022-01-19 LAB
BACTERIA UR QL AUTO: NORMAL /HPF
BILIRUB UR QL STRIP: NEGATIVE
CLARITY UR: CLEAR
COLOR UR: YELLOW
GLUCOSE SERPL-MCNC: 331 MG/DL (ref 65–140)
GLUCOSE SERPL-MCNC: 333 MG/DL (ref 65–140)
GLUCOSE UR STRIP-MCNC: ABNORMAL MG/DL
HGB UR QL STRIP.AUTO: NEGATIVE
KETONES UR STRIP-MCNC: NEGATIVE MG/DL
LEUKOCYTE ESTERASE UR QL STRIP: NEGATIVE
NITRITE UR QL STRIP: NEGATIVE
NON-SQ EPI CELLS URNS QL MICRO: NORMAL /HPF
PH UR STRIP.AUTO: 5 [PH]
PROT UR STRIP-MCNC: NEGATIVE MG/DL
RBC #/AREA URNS AUTO: NORMAL /HPF
SP GR UR STRIP.AUTO: 1.02 (ref 1–1.04)
UROBILINOGEN UA: 1 MG/DL
WBC #/AREA URNS AUTO: NORMAL /HPF

## 2022-01-19 PROCEDURE — 81001 URINALYSIS AUTO W/SCOPE: CPT | Performed by: PHYSICAL MEDICINE & REHABILITATION

## 2022-01-19 PROCEDURE — 1124F ACP DISCUSS-NO DSCNMKR DOCD: CPT | Performed by: PHYSICAL MEDICINE & REHABILITATION

## 2022-01-19 PROCEDURE — 82948 REAGENT STRIP/BLOOD GLUCOSE: CPT

## 2022-01-19 PROCEDURE — 73630 X-RAY EXAM OF FOOT: CPT

## 2022-01-19 PROCEDURE — 99222 1ST HOSP IP/OBS MODERATE 55: CPT | Performed by: PHYSICAL MEDICINE & REHABILITATION

## 2022-01-19 PROCEDURE — 81003 URINALYSIS AUTO W/O SCOPE: CPT | Performed by: PHYSICAL MEDICINE & REHABILITATION

## 2022-01-19 RX ORDER — ONDANSETRON 4 MG/1
4 TABLET, ORALLY DISINTEGRATING ORAL EVERY 6 HOURS PRN
Status: DISCONTINUED | OUTPATIENT
Start: 2022-01-19 | End: 2022-02-01 | Stop reason: HOSPADM

## 2022-01-19 RX ORDER — OXYCODONE HYDROCHLORIDE 5 MG/1
5 TABLET ORAL EVERY 4 HOURS PRN
Status: DISCONTINUED | OUTPATIENT
Start: 2022-01-19 | End: 2022-01-20

## 2022-01-19 RX ORDER — GABAPENTIN 100 MG/1
100 CAPSULE ORAL EVERY 8 HOURS SCHEDULED
Status: DISCONTINUED | OUTPATIENT
Start: 2022-01-19 | End: 2022-01-20

## 2022-01-19 RX ORDER — LIDOCAINE 50 MG/G
2 PATCH TOPICAL DAILY PRN
Status: DISCONTINUED | OUTPATIENT
Start: 2022-01-19 | End: 2022-02-01 | Stop reason: HOSPADM

## 2022-01-19 RX ORDER — ACETAMINOPHEN 325 MG/1
650 TABLET ORAL EVERY 6 HOURS PRN
Status: DISCONTINUED | OUTPATIENT
Start: 2022-01-19 | End: 2022-02-01 | Stop reason: HOSPADM

## 2022-01-19 RX ORDER — ACETAMINOPHEN 325 MG/1
650 TABLET ORAL EVERY 8 HOURS SCHEDULED
Status: DISCONTINUED | OUTPATIENT
Start: 2022-01-19 | End: 2022-02-01 | Stop reason: HOSPADM

## 2022-01-19 RX ORDER — ATORVASTATIN CALCIUM 40 MG/1
40 TABLET, FILM COATED ORAL
Status: DISCONTINUED | OUTPATIENT
Start: 2022-01-19 | End: 2022-02-01 | Stop reason: HOSPADM

## 2022-01-19 RX ORDER — METHOCARBAMOL 500 MG/1
500 TABLET, FILM COATED ORAL 4 TIMES DAILY
Status: DISCONTINUED | OUTPATIENT
Start: 2022-01-19 | End: 2022-02-01 | Stop reason: HOSPADM

## 2022-01-19 RX ORDER — INSULIN GLARGINE 100 [IU]/ML
10 INJECTION, SOLUTION SUBCUTANEOUS
Status: DISCONTINUED | OUTPATIENT
Start: 2022-01-19 | End: 2022-01-24

## 2022-01-19 RX ORDER — SODIUM PHOSPHATE, DIBASIC AND SODIUM PHOSPHATE, MONOBASIC 7; 19 G/133ML; G/133ML
1 ENEMA RECTAL ONCE
Status: COMPLETED | OUTPATIENT
Start: 2022-01-19 | End: 2022-01-19

## 2022-01-19 RX ORDER — POLYETHYLENE GLYCOL 3350 17 G/17G
17 POWDER, FOR SOLUTION ORAL DAILY
Status: DISCONTINUED | OUTPATIENT
Start: 2022-01-20 | End: 2022-02-01 | Stop reason: HOSPADM

## 2022-01-19 RX ORDER — DOCUSATE SODIUM 100 MG/1
100 CAPSULE, LIQUID FILLED ORAL 2 TIMES DAILY
Status: DISCONTINUED | OUTPATIENT
Start: 2022-01-19 | End: 2022-02-01 | Stop reason: HOSPADM

## 2022-01-19 RX ORDER — ERGOCALCIFEROL 1.25 MG/1
50000 CAPSULE ORAL WEEKLY
Status: DISCONTINUED | OUTPATIENT
Start: 2022-01-20 | End: 2022-02-01 | Stop reason: HOSPADM

## 2022-01-19 RX ORDER — OXYCODONE HYDROCHLORIDE 10 MG/1
10 TABLET ORAL EVERY 4 HOURS PRN
Status: DISCONTINUED | OUTPATIENT
Start: 2022-01-19 | End: 2022-02-01 | Stop reason: HOSPADM

## 2022-01-19 RX ADMIN — INSULIN LISPRO 4 UNITS: 100 INJECTION, SOLUTION INTRAVENOUS; SUBCUTANEOUS at 21:37

## 2022-01-19 RX ADMIN — ENOXAPARIN SODIUM 30 MG: 30 INJECTION SUBCUTANEOUS at 21:37

## 2022-01-19 RX ADMIN — DICLOFENAC SODIUM 2 G: 10 GEL TOPICAL at 21:44

## 2022-01-19 RX ADMIN — GABAPENTIN 100 MG: 100 CAPSULE ORAL at 17:04

## 2022-01-19 RX ADMIN — DOCUSATE SODIUM 100 MG: 100 CAPSULE, LIQUID FILLED ORAL at 21:36

## 2022-01-19 RX ADMIN — INSULIN GLARGINE 10 UNITS: 100 INJECTION, SOLUTION SUBCUTANEOUS at 21:37

## 2022-01-19 RX ADMIN — ACETAMINOPHEN 650 MG: 325 TABLET ORAL at 21:36

## 2022-01-19 RX ADMIN — SALINE LAXATIVE 1 ENEMA: 7; 19 ENEMA RECTAL at 17:51

## 2022-01-19 RX ADMIN — INSULIN LISPRO 4 UNITS: 100 INJECTION, SOLUTION INTRAVENOUS; SUBCUTANEOUS at 17:05

## 2022-01-19 RX ADMIN — GABAPENTIN 100 MG: 100 CAPSULE ORAL at 21:36

## 2022-01-19 RX ADMIN — METHOCARBAMOL TABLETS 500 MG: 500 TABLET, COATED ORAL at 17:04

## 2022-01-19 RX ADMIN — ATORVASTATIN CALCIUM 40 MG: 40 TABLET, FILM COATED ORAL at 17:04

## 2022-01-19 RX ADMIN — ACETAMINOPHEN 650 MG: 325 TABLET ORAL at 17:04

## 2022-01-19 RX ADMIN — METHOCARBAMOL TABLETS 500 MG: 500 TABLET, COATED ORAL at 21:36

## 2022-01-19 NOTE — PLAN OF CARE
Problem: Potential for Falls  Goal: Patient will remain free of falls  Description: INTERVENTIONS:  - Educate patient/family on patient safety including physical limitations  - Instruct patient to call for assistance with activity   - Consult OT/PT to assist with strengthening/mobility   - Keep Call bell within reach  - Keep bed low and locked with side rails adjusted as appropriate  - Keep care items and personal belongings within reach  - Initiate and maintain comfort rounds  - Make Fall Risk Sign visible to staff  - Offer Toileting every  Hours, in advance of need  - Initiate/Maintain alarm  - Obtain necessary fall risk management equipment: - Apply yellow socks and bracelet for high fall risk patients  - Consider moving patient to room near nurses station  Outcome: Progressing     Problem: CARDIOVASCULAR - ADULT  Goal: Maintains optimal cardiac output and hemodynamic stability  Description: INTERVENTIONS:  - Monitor I/O, vital signs and rhythm  - Monitor for S/S and trends of decreased cardiac output  - Administer and titrate ordered vasoactive medications to optimize hemodynamic stability  - Assess quality of pulses, skin color and temperature  - Assess for signs of decreased coronary artery perfusion  - Instruct patient to report change in severity of symptoms  Outcome: Progressing     Problem: GASTROINTESTINAL - ADULT  Goal: Maintains or returns to baseline bowel function  Description: INTERVENTIONS:  - Assess bowel function  - Encourage oral fluids to ensure adequate hydration  - Administer IV fluids if ordered to ensure adequate hydration  - Administer ordered medications as needed  - Encourage mobilization and activity  - Consider nutritional services referral to assist patient with adequate nutrition and appropriate food choices  Outcome: Progressing

## 2022-01-19 NOTE — PCC CARE MANAGEMENT
Pt is participating in therapy sessions and rehab program  Pt lives in a 2 story home with his wife  He has 3 ALEXSANDRA and only uses the first floor  He has crutches and a walker  He reports his wife works and he will be alone during the day  He states he will drive to appointments  Pt/ family have been educated on potential d/c recommendations for continued services  Following to assist w/ d/c planning needs

## 2022-01-19 NOTE — H&P
PHYSICAL MEDICINE AND REHABILITATION H&P/ADMISSION NOTE  Jorden Mascorro 61 y o  male MRN: [id number]  Unit/Bed#: -53 Encounter: [id number]     Rehab Diagnosis: Impairment of mobility, safety and Activities of Daily Living (ADLs) due to Orthopedic Disorders:  08 11  Unilateral Hip Fracture    History of Present Illness:   Jorden Mascorro is a 61 y o  male with PMH of HTN, T2DM TKA (R) (c/b by infection, requiring revision x2), TKA L, gout,  back surgeries, shoulder surgeries, hand surgeries, depression, HLD, Sleep Apnea who presented to the [address] [place] [address] on 1/5 after being bumped by his dog and falling onto his L hip  He was found to have a nondisplaced L trochanteric fracture with a partial tear at the greater trochanter insertion  He underwent L femur ORIF on 1/11  Post-op course complicated by pain, hyperglycemia  He also had issues with constipation  He was admitted to the [facility] on 1/19  Subjective: He was frustrated with his hospital stay, stating that he only had 2 bowel movements his entire stay and last night required manual disimpaction which did not completely empty him  He denies issues with urination  He continues to have pain - but actually more so in his feet than anything - he describes it as pins/needles, burning type pain  But also a component of gout in the R toe  In the past Dr Eugene Arias has treated him with indocin with resolution of his discomfort  He denies any CP, SOB, fevers, chills, N/V, abdominal pain  He is open to any adjustment to his meds to get him closer to his home regimen  Review of Systems: A 10-point review of systems was performed  Negative except as listed above      Plan:     * Closed nondisplaced intertrochanteric fracture of left femur with routine healing  Assessment & Plan  Diagnosed on MRI - nondisplaced intertrochanteric fracture with partial tear at greater trochanter insertion  ORIF on 1/11 with [facility]  Multimodal pain control as described below  WBAT  Incisional care  2 week follow-up on 1/25  Outpatient f/u with [facility]       Slow transit constipation  Assessment & Plan  Likely related to pain medication, inadequate hydration, decreased mobility, recent trauma/surgery  Scheduled docusate/miralax  Giving enema tonight  Polyneuropathy associated with underlying disease [facility])  Assessment & Plan  Most likely related to diabetes  To complete work-up will check SPEP and B12  Started gabapentin 100mg Q8hr and titrate up as tolerated/necessary  Pain in both feet  Assessment & Plan  Bilateral  R > L   R foot may have component of gout with tophus at 5MT head, but not erythematous and warm  Patient's description sounds more like neuropathic pain  Patient has good pulses  Given trauma, will check XR of R foot  Checking BMP prior to initiating colchicine for possible gout  Starting gabapentin for neuropathic pain  Obesity (BMI 30-39  9)  Assessment & Plan  Counseling  Type 2 diabetes mellitus without complication, without long-term current use of insulin [facility])  Assessment & Plan  Lab Results   Component Value Date    HGBA1C 7 2 (H) 01/08/2022       Recent Labs     01/19/22  1633   POCGLU 331*       Blood Sugar Average: Last 72 hrs:  (P) 331     At home: Metformin 1000mg BID, Lantus 28 units daily, Alogliptin 25mg daily, Empaglifluzin 12 5mg daily, Glipizide 5mg BID  Here: Will start Lantus 10 units at night, will hold on starting orals for now, and will phase them in as tolerated  CDI, Accuchecks  Diabetic Diet  IM consulted and management at their discretion  Mixed hyperlipidemia  Assessment & Plan  Restart home Lipitor 40mg at night  Essential hypertension  Assessment & Plan  At home on Lisinopril 30mg daily  Here has not required BP meds  Will closely monitor BP, and add home med as appropriate    IM consulted and management at their discretion    Primary osteoarthritis of both knees  Assessment & Plan  He reports issues with bilateral knees  Dr Yolanda Burden did do TKA to R knee and was following him for this  He reports previous surgery to his L knee with multiple complications/revisions  See multi-modal pain control  Outpatient f/u with Orthopedics  Primary osteoarthritis of both shoulders  Carter Sampson  Last injection was 10/13  Typically gets them done every 6 months or so  May require as he is more reliant on his shoulders  If so, will consult Ortho  Will place order for Diclofenac gel and lidoderm for now  Health Maintenance  #Delirium/Sleep: At risk  No complaints at this time  Focus on environmental interventions - avoiding physical/chemical restraints  Focus on redirection and optimizing pain/bowel/bladder management  #Pain: Tylenol scheduled, Oxycodone 5-10mg PRN, Robaxin scheduled, and adding Gabapentin for neuropathic pain  #Bowel: Last BM 1/18 but backed up  Enema tonight  Added miralax daily to scheduled docusate  #Bladder: Voiding and continent  #Skin/Pressure Injury Prevention: Turn Q2hr in bed, with weight shifts [id number] in wheelchair  Float heels in bed  #DVT Prophylaxis: Lovenox 30mg Q12hr  Will change to 40 during stay  SCDs  #GI Prophylaxis: PO diet  #Code Status:  Full Code  #FEN:  Diabetic Diet  #Dispo:  ELOS 7-10 days  With plan to discharge to home with first floor set-up  If he can do stairs, that would be ideal   Drug regimen reviewed, all potential adverse effects identified and addressed:    Scheduled Meds:     Restrictions include:  left lower extremity weight bearing as tolerated Fall precautions      Functional History/Home Set-up - Prior to Admission:      Functional Status: Patient was independent with mobility/ambulation, transfers, ADL's, IADL's  He did not require any assistive devices prior to this  He lives in a home with a first floor set-up with need to go upstairs only to feed his cats when his wife isn't home      Functional Status Upon Admission to [facility]:  Mobility: Dom RW 20'  Transfers: Dom  ADLs: min-modA     Physical Exam:  Temp:  [101 1 °F (38 4 °C)] 101 1 °F (38 4 °C)  HR:  [110] 110  Resp:  [18] 18  BP: (130)/(76) 130/76  SpO2:  [94 %] 94 %      Gen: No acute distress, Well-nourished, well-appearing  HEENT: Moist mucus membranes, Normocephalic/Atraumatic  Cardiovascular: Regular rate, rhythm, S1/S2  Distal pulses palpable (DP/PT/Radial)  Heme/Extr: No edema  Pulmonary: Non-labored breathing  Lungs CTAB  : No gilbert  GI: Soft, non-tender, non-distended  BS+  MSK: Decreased AROM in L hip due to pain  Decreased movement in R ankle related to pain  Integumentary: Skin is warm, dry  Heels intact  Neuro: AAOx3, Allodynia in bilateral feet  Speech is intact  Appropriate to questioning  Tone is normal    MMT:   Strength: pain limitation in R ankle  Right  Left  Site  Right  Left  Site    5 5  S Ab: Shoulder Abductors  5  3+ HF: Hip Flexors    5 5  EF: Elbow Flexors  5  4 KF: Knee Flexors    5  5  EE: Elbow Extensors  5  4 KE: Knee Extensors    5  5  WE: Wrist Extensors  4  5  DR: Dorsi Flexors    5  5  FF: Finger Flexors  4  5  PF: Plantar Flexors    5  5  HI: Hand Intrinsics  4  5  EHL: Extensor Hallucis Longus   Psych: Normal mood and affect  Laboratory:          Invalid input(s): PLTCT        Invalid input(s): CA         Wt Readings from Last 1 Encounters:   10/13/21 118 kg (260 lb)     Estimated body mass index is 33 38 kg/m² as calculated from the following:    Height as of 10/13/21: 6' 2" (1 88 m)  Weight as of 10/13/21: 118 kg (260 lb)  Imaging: reviewed records  1/5 CTAP  No acute injury in abdomen and pelvis   Diffuse fatty liver, R renal cyst     XR Chest  Lungs clear, L glenohumeral joint arthritis    XR Hip  No evidence of fracture or dislocation of the pelvis or left hip    XR L Knee  No evidence of acute abnormality    MRI L Hip  Non displaced L intertrochanteric femur fracture, adjacent soft tissue edema, partial tear at greater trochanter insertion, mild fluid distention of greater trochanteric bursa suggestive of mild CAM type femoral acetabular impingement  CT L-Spine:  No acute lumbar vertebral body fracture  Multilevel spinal and foraminal stenosis  Rehabilitation Prognosis: good     Tolerance for three hours of therapy a day: good     Family/Patient Goals:  Patient/family's goals: Return to previous home/apartment  Patient will receive PT and OT 90 minutes each per day, five days per week to achieve rehab goals or participate in 900 minutes of therapy within a 7 day week period  Mobility Goals: Modified [place]  Transfer Goals: Modified [place]  Activities of Daily Living (ADLs) Goals: Modified [place]    Discharge Planning:  Rehabilitation and discharge goals discussed with the patient and/or family  Case Managment and Social Work to review patient/family resources and to coordinate Discharge Planning  Estimated length of stay: 7 to 10 days    Patient and Family Education and Training:  Rehabilitation and discharge goals discussed with the patient and/or family  Patient/family education/training needs to be discussed in weekly team meeting      Equipment/DME needs: Therapy teams to assess and evaluate for additional equipment/DME needs throughout rehabilitation stay    Past Medical History:   Past Surgical History:   Family History:   Social history:   Past Medical History:   Diagnosis Date    Depression     Diabetes mellitus ([facility] [address] )     Hyperlipidemia     Hypertension     Osteoarthritis, knee     Past Surgical History:   Procedure Laterality Date    BACK SURGERY      HAND SURGERY      JOINT REPLACEMENT Left     l tkr    KNEE SURGERY Left     CA TOTAL KNEE ARTHROPLASTY Right 11/19/2018    Procedure: ARTHROPLASTY KNEE TOTAL;  Surgeon: Yoly Lou MD;  [facility];  Service: Orthopedics    TOOTH EXTRACTION      WISDOM TOOTH EXTRACTION       Family History   Problem Relation Age of Onset    No Known Problems Mother     Heart attack Father     Arthritis Family     Cancer Family     Diabetes Family     Heart disease Family     Osteoporosis Family       Social History     Socioeconomic History    Marital status: /Civil Union     Spouse name: None    Number of children: None    Years of education: None    Highest education level: None   Occupational History    None   Tobacco Use    Smoking status: Former Smoker     Types: Cigarettes     Quit date: 3/1/2012     Years since quittin 8    Smokeless tobacco: Never Used   Vaping Use    Vaping Use: Never used   Substance and Sexual Activity    Alcohol use: Yes     Comment: 'couple beers every couple weeks'    Drug use: No    Sexual activity: Not Currently   Other Topics Concern    None   Social History Narrative    Caffeine use, active     Social Determinants of Health     Financial Resource Strain: Not on file   Food Insecurity: Not on file   Transportation Needs: Not on file   Physical Activity: Not on file   Stress: Not on file   Social Connections: Not on file   Intimate Partner Violence: Not on file   Housing Stability: Not on file          Current Medical Diagnosis Allergies   Patient Active Problem List   Diagnosis    Primary osteoarthritis of right shoulder    Primary osteoarthritis of right knee    Essential hypertension    Mixed hyperlipidemia    Type 2 diabetes mellitus without complication, without long-term current use of insulin [facility]    Obesity (BMI 30-39  9)    S/P TKR (total knee replacement), right    No Known Allergies        Medical Necessity Criteria for [facility] Admission: Moderately complex due multiple previous surgeries related to diffuse arthritis, chronic back issues, chronic shoulder issues with shoulder arthritis, bilateral knee replacements c/b infection requiring revision, pain control, undiagnosed peripheral neuropathy suspected, possible acute gout, significant constipation, hypomagnesemia, and T2DM with hyperglycemia currently  He needs incisional care and emotional support for adaptation to his recent injury  In addition, the preadmission screen, post-admission physical evaluation, overall plan of care and admissions order demonstrate a reasonable expectation that the following criteria were met at the time of admission to the [facility]  1  The patient requires active and ongoing therapeutic intervention of multiple therapy disciplines (physical therapy, occupational therapy, speech-language pathology, or prosthetics/orthotics), one of which is physical or occupational therapy  2  Patient requires an intensive rehabilitation therapy program, as defined in Chapter 1, section 110 2 2 of the CMS Medicare Policy Manual  This intensive rehabilitation therapy program will consist of at least 3 hours of therapy per day at least 5 days per week or at least 15 hours of intensive rehabilitation therapy within a 7 consecutive day period, beginning with the date of admission to the [facility]  3  The patient is reasonably expected to actively participate in, and benefit significantly from, the intensive rehabilitation therapy program as defined in Chapter 1, section 110 2 2 of the CMS Medicare Policy Manual at this time of admission to the [facility]  He can reasonably be expected to make measurable improvement (that will be of practical value to improve the patients functional capacity or adaptation to impairments) as a result of the rehabilitation treatment, as defined in section 110 3, and such improvement can be expected to be made within the prescribed period of time  As noted in the CMS Medicare Policy Manual, the patient need not be expected to achieve complete independence in the domain of self-care nor be expected to return to his or her prior level of functioning in order to meet this standard    4  The patient must require physician supervision by a rehabilitation physician  As such, a rehabilitation physician will conduct face-to-face visits with the patient at least 3 days per week throughout the patients stay in the [facility] to assess the patient both medically and functionally, as well as to modify the course of treatment as needed to maximize the patients capacity to benefit from the rehabilitation process  5  The patient requires an intensive and coordinated interdisciplinary approach to providing rehabilitation, as defined in Chapter 1, section 110 2 5 of the CMS Medicare Policy Manual  This will be achieved through periodic team conferences, conducted at least once in a 7-day period, and comprising of an interdisciplinary team of medical professionals consisting of: a rehabilitation physician, registered nurse,  and/or , and a licensed/certified therapist from each therapy discipline involved in treating the patient  Shravan Townsend MD  Physical Medicine and Rehabilitation    ** Please Note: Fluency Direct voice to text software may have been used in the creation of this document   **

## 2022-01-20 PROBLEM — E55.9 VITAMIN D DEFICIENCY: Status: ACTIVE | Noted: 2022-01-20

## 2022-01-20 PROBLEM — D64.9 ANEMIA: Status: ACTIVE | Noted: 2022-01-20

## 2022-01-20 PROBLEM — E83.42 HYPOMAGNESEMIA: Status: ACTIVE | Noted: 2022-01-20

## 2022-01-20 LAB
ALBUMIN SERPL BCP-MCNC: 3.3 G/DL (ref 3–5.2)
ALP SERPL-CCNC: 120 U/L (ref 43–122)
ALT SERPL W P-5'-P-CCNC: 34 U/L
ANION GAP SERPL CALCULATED.3IONS-SCNC: 6 MMOL/L (ref 5–14)
AST SERPL W P-5'-P-CCNC: 24 U/L (ref 17–59)
BASOPHILS # BLD AUTO: 0 THOUSANDS/ΜL (ref 0–0.1)
BASOPHILS NFR BLD AUTO: 0 % (ref 0–1)
BILIRUB SERPL-MCNC: 0.96 MG/DL
BUN SERPL-MCNC: 11 MG/DL (ref 5–25)
CALCIUM ALBUM COR SERPL-MCNC: 9.4 MG/DL (ref 8.3–10.1)
CALCIUM SERPL-MCNC: 8.8 MG/DL (ref 8.4–10.2)
CHLORIDE SERPL-SCNC: 100 MMOL/L (ref 97–108)
CO2 SERPL-SCNC: 30 MMOL/L (ref 22–30)
CREAT SERPL-MCNC: 0.77 MG/DL (ref 0.7–1.5)
EOSINOPHIL # BLD AUTO: 0.2 THOUSAND/ΜL (ref 0–0.4)
EOSINOPHIL NFR BLD AUTO: 2 % (ref 0–6)
ERYTHROCYTE [DISTWIDTH] IN BLOOD BY AUTOMATED COUNT: 14.6 %
FERRITIN SERPL-MCNC: 304 NG/ML (ref 8–388)
GFR SERPL CREATININE-BSD FRML MDRD: 95 ML/MIN/1.73SQ M
GLUCOSE P FAST SERPL-MCNC: 227 MG/DL (ref 70–99)
GLUCOSE SERPL-MCNC: 204 MG/DL (ref 65–140)
GLUCOSE SERPL-MCNC: 227 MG/DL (ref 70–99)
GLUCOSE SERPL-MCNC: 239 MG/DL (ref 65–140)
GLUCOSE SERPL-MCNC: 267 MG/DL (ref 65–140)
GLUCOSE SERPL-MCNC: 293 MG/DL (ref 65–140)
HCT VFR BLD AUTO: 31.8 % (ref 41–53)
HGB BLD-MCNC: 10.4 G/DL (ref 13.5–17.5)
IRON SATN MFR SERPL: 22 % (ref 20–50)
IRON SERPL-MCNC: 32 UG/DL (ref 65–175)
LYMPHOCYTES # BLD AUTO: 1.4 THOUSANDS/ΜL (ref 0.5–4)
LYMPHOCYTES NFR BLD AUTO: 13 % (ref 25–45)
MAGNESIUM SERPL-MCNC: 1.4 MG/DL (ref 1.6–2.3)
MCH RBC QN AUTO: 30.2 PG (ref 26–34)
MCHC RBC AUTO-ENTMCNC: 32.7 G/DL (ref 31–36)
MCV RBC AUTO: 93 FL (ref 80–100)
MONOCYTES # BLD AUTO: 1.1 THOUSAND/ΜL (ref 0.2–0.9)
MONOCYTES NFR BLD AUTO: 10 % (ref 1–10)
NEUTROPHILS # BLD AUTO: 8.2 THOUSANDS/ΜL (ref 1.8–7.8)
NEUTS SEG NFR BLD AUTO: 76 % (ref 45–65)
PLATELET # BLD AUTO: 433 THOUSANDS/UL (ref 150–450)
PMV BLD AUTO: 7.3 FL (ref 8.9–12.7)
POTASSIUM SERPL-SCNC: 3.5 MMOL/L (ref 3.6–5)
PROT SERPL-MCNC: 6.8 G/DL (ref 5.9–8.4)
RBC # BLD AUTO: 3.43 MILLION/UL (ref 4.5–5.9)
SODIUM SERPL-SCNC: 136 MMOL/L (ref 137–147)
TIBC SERPL-MCNC: 147 UG/DL (ref 250–450)
URATE SERPL-MCNC: 5 MG/DL (ref 3.5–8.5)
VIT B12 SERPL-MCNC: 315 PG/ML (ref 100–900)
WBC # BLD AUTO: 10.9 THOUSAND/UL (ref 4.5–11)

## 2022-01-20 PROCEDURE — 84550 ASSAY OF BLOOD/URIC ACID: CPT | Performed by: NURSE PRACTITIONER

## 2022-01-20 PROCEDURE — 83540 ASSAY OF IRON: CPT | Performed by: NURSE PRACTITIONER

## 2022-01-20 PROCEDURE — 99233 SBSQ HOSP IP/OBS HIGH 50: CPT | Performed by: PHYSICAL MEDICINE & REHABILITATION

## 2022-01-20 PROCEDURE — 85025 COMPLETE CBC W/AUTO DIFF WBC: CPT | Performed by: PHYSICAL MEDICINE & REHABILITATION

## 2022-01-20 PROCEDURE — 97166 OT EVAL MOD COMPLEX 45 MIN: CPT

## 2022-01-20 PROCEDURE — 97162 PT EVAL MOD COMPLEX 30 MIN: CPT

## 2022-01-20 PROCEDURE — 82728 ASSAY OF FERRITIN: CPT | Performed by: NURSE PRACTITIONER

## 2022-01-20 PROCEDURE — 82607 VITAMIN B-12: CPT | Performed by: PHYSICAL MEDICINE & REHABILITATION

## 2022-01-20 PROCEDURE — 97535 SELF CARE MNGMENT TRAINING: CPT

## 2022-01-20 PROCEDURE — 84165 PROTEIN E-PHORESIS SERUM: CPT | Performed by: PATHOLOGY

## 2022-01-20 PROCEDURE — 97530 THERAPEUTIC ACTIVITIES: CPT

## 2022-01-20 PROCEDURE — 83735 ASSAY OF MAGNESIUM: CPT | Performed by: PHYSICAL MEDICINE & REHABILITATION

## 2022-01-20 PROCEDURE — 84165 PROTEIN E-PHORESIS SERUM: CPT | Performed by: PHYSICAL MEDICINE & REHABILITATION

## 2022-01-20 PROCEDURE — 82948 REAGENT STRIP/BLOOD GLUCOSE: CPT

## 2022-01-20 PROCEDURE — 80053 COMPREHEN METABOLIC PANEL: CPT | Performed by: PHYSICAL MEDICINE & REHABILITATION

## 2022-01-20 PROCEDURE — 99221 1ST HOSP IP/OBS SF/LOW 40: CPT | Performed by: INTERNAL MEDICINE

## 2022-01-20 PROCEDURE — 83550 IRON BINDING TEST: CPT | Performed by: NURSE PRACTITIONER

## 2022-01-20 RX ORDER — GABAPENTIN 300 MG/1
300 CAPSULE ORAL EVERY 8 HOURS SCHEDULED
Status: DISCONTINUED | OUTPATIENT
Start: 2022-01-20 | End: 2022-02-01 | Stop reason: HOSPADM

## 2022-01-20 RX ORDER — POTASSIUM CHLORIDE 20 MEQ/1
40 TABLET, EXTENDED RELEASE ORAL ONCE
Status: COMPLETED | OUTPATIENT
Start: 2022-01-20 | End: 2022-01-20

## 2022-01-20 RX ORDER — OXYCODONE HYDROCHLORIDE 5 MG/1
5 TABLET ORAL EVERY 4 HOURS PRN
Status: DISCONTINUED | OUTPATIENT
Start: 2022-01-20 | End: 2022-02-01 | Stop reason: HOSPADM

## 2022-01-20 RX ADMIN — ERGOCALCIFEROL 50000 UNITS: 1.25 CAPSULE ORAL at 08:01

## 2022-01-20 RX ADMIN — OXYCODONE HYDROCHLORIDE 10 MG: 10 TABLET ORAL at 13:56

## 2022-01-20 RX ADMIN — INSULIN LISPRO 2 UNITS: 100 INJECTION, SOLUTION INTRAVENOUS; SUBCUTANEOUS at 08:02

## 2022-01-20 RX ADMIN — MAGNESIUM OXIDE TAB 400 MG (241.3 MG ELEMENTAL MG) 800 MG: 400 (241.3 MG) TAB at 08:44

## 2022-01-20 RX ADMIN — METHOCARBAMOL TABLETS 500 MG: 500 TABLET, COATED ORAL at 11:45

## 2022-01-20 RX ADMIN — OXYCODONE HYDROCHLORIDE 10 MG: 10 TABLET ORAL at 07:45

## 2022-01-20 RX ADMIN — METHOCARBAMOL TABLETS 500 MG: 500 TABLET, COATED ORAL at 21:26

## 2022-01-20 RX ADMIN — INSULIN LISPRO 3 UNITS: 100 INJECTION, SOLUTION INTRAVENOUS; SUBCUTANEOUS at 11:46

## 2022-01-20 RX ADMIN — GABAPENTIN 100 MG: 100 CAPSULE ORAL at 06:16

## 2022-01-20 RX ADMIN — METHOCARBAMOL TABLETS 500 MG: 500 TABLET, COATED ORAL at 08:01

## 2022-01-20 RX ADMIN — GABAPENTIN 300 MG: 300 CAPSULE ORAL at 21:26

## 2022-01-20 RX ADMIN — ACETAMINOPHEN 650 MG: 325 TABLET ORAL at 21:26

## 2022-01-20 RX ADMIN — LIDOCAINE 2 PATCH: 50 PATCH TOPICAL at 07:45

## 2022-01-20 RX ADMIN — METFORMIN HYDROCHLORIDE 1000 MG: 500 TABLET ORAL at 17:33

## 2022-01-20 RX ADMIN — OXYCODONE HYDROCHLORIDE 10 MG: 10 TABLET ORAL at 21:27

## 2022-01-20 RX ADMIN — METFORMIN HYDROCHLORIDE 1000 MG: 500 TABLET ORAL at 08:44

## 2022-01-20 RX ADMIN — GABAPENTIN 300 MG: 300 CAPSULE ORAL at 13:57

## 2022-01-20 RX ADMIN — POLYETHYLENE GLYCOL 3350 17 G: 17 POWDER, FOR SOLUTION ORAL at 08:01

## 2022-01-20 RX ADMIN — METHOCARBAMOL TABLETS 500 MG: 500 TABLET, COATED ORAL at 17:33

## 2022-01-20 RX ADMIN — INSULIN LISPRO 1 UNITS: 100 INJECTION, SOLUTION INTRAVENOUS; SUBCUTANEOUS at 21:24

## 2022-01-20 RX ADMIN — DOCUSATE SODIUM 100 MG: 100 CAPSULE, LIQUID FILLED ORAL at 17:33

## 2022-01-20 RX ADMIN — MAGNESIUM OXIDE TAB 400 MG (241.3 MG ELEMENTAL MG) 800 MG: 400 (241.3 MG) TAB at 17:33

## 2022-01-20 RX ADMIN — ACETAMINOPHEN 650 MG: 325 TABLET ORAL at 13:57

## 2022-01-20 RX ADMIN — DOCUSATE SODIUM 100 MG: 100 CAPSULE, LIQUID FILLED ORAL at 08:01

## 2022-01-20 RX ADMIN — ACETAMINOPHEN 650 MG: 325 TABLET ORAL at 06:16

## 2022-01-20 RX ADMIN — INSULIN LISPRO 2 UNITS: 100 INJECTION, SOLUTION INTRAVENOUS; SUBCUTANEOUS at 17:33

## 2022-01-20 RX ADMIN — ENOXAPARIN SODIUM 30 MG: 30 INJECTION SUBCUTANEOUS at 08:01

## 2022-01-20 RX ADMIN — ENOXAPARIN SODIUM 30 MG: 30 INJECTION SUBCUTANEOUS at 21:24

## 2022-01-20 RX ADMIN — INSULIN GLARGINE 10 UNITS: 100 INJECTION, SOLUTION SUBCUTANEOUS at 21:30

## 2022-01-20 RX ADMIN — POTASSIUM CHLORIDE 40 MEQ: 1500 TABLET, EXTENDED RELEASE ORAL at 08:44

## 2022-01-20 RX ADMIN — ATORVASTATIN CALCIUM 40 MG: 40 TABLET, FILM COATED ORAL at 17:33

## 2022-01-20 NOTE — PROGRESS NOTES
01/20/22 0930   Rehab Team Goals   ADL Team Goal Patient will be independent with ADLs with least restrictive device upon completion of rehab program   Rehab Team Interventions   OT Interventions Self Care; Therapeutic Exercise; Energy Conservation;Patient/Family Education;Group Therapy   Eating Goal   Eating Goal 06  Independent - Patient completes the activity by him/herself with no assistance from a helper  Meal Complete All meals   Status Ongoing; Target goal - two weeks   Interventions Optimal Position   Grooming Goal   Oral Hygiene Goal 06  Independent - Patient completes the activity by him/herself with no assistance from a helper  Task Wash/Dry Face;Wash/Dry Hands;Brush Teeth; Complete Groom   Environment Stand at [employer] Precaution;WBAT   Status Ongoing; Target goal - two weeks   Intervention Balance Work; Therapeutic Exercise; Tolerance Work   Tub/Shower Transfer Goal   Method Tub  ([place])   Assist Device Seat with Back   Status Ongoing; Target goal - two weeks   Interventions ADL Training   Bathing Goal   Shower/bathe self Goal 06  Independent - Patient completes the activity by him/herself with no assistance from a helper  Environment Seated;Standing;Sponge Bath;Tub   Adaptive Equipment Seat with back   Safety Precautions Safety Precaution;WBAT   Status Ongoing; Target goal - two weeks   Intervention ADL Training; Therapeutic Exercise   Upper Body Dressing Goal   Upper body dressing Goal 06  Independent - Patient completes the activity by him/herself with no assistance from a helper  Task Upper Body;Arms in/out; Over Head   Environment Seated   Safety Precautions Safety Precaution   Status Ongoing; Target goal - two weeks   Intervention Balance Work; Therapeutic Exercise; Tolerance Work   Lower Body Dressing Goal   Lower body dressing Goal 06  Independent - Patient completes the activity by him/herself with no assistance from a helper  Putting on/taking off footwear Goal 06  Independent - Patient completes the activity by him/herself with no assistance from a helper  Task Lower Body;Shoe/Slipper;Socks;Pants; Undergarment   Adaptive Equipment Sock Aide;Reacher   Environment Seated;Standing   Safety Precautions Safety Precaution;WBAT   Status Ongoing; Target goal - two weeks   Intervention Balance Work; Therapeutic Exercise; Tolerance Work   Toileting Transfer Goal   Toilet transfer Goal 06  Independent - Patient completes the activity by him/herself with no assistance from a helper  Assistive Device [facility]; [facility] Com[facility]   Safety Safety Precaution;WBAT   Status Ongoing; Target goal - two weeks   Intervention ADL Training;Balance Work   Toileting Goal   Toileting hygiene Goal 06  Independent - Patient completes the activity by him/herself with no assistance from a helper  Task Pants Up;Pants Down;Hygiene   Safety Balance   Status Ongoing; Target goal - two weeks   Intervention ADL Training;Balance Work;Assistive Device   Comprehension Goal   Comprehension Assist Level Moderate [place]   Assist Device Glasses   Function Demand Basic Needs   Status Ongoing; Target goal - two weeks   Interventions Use of Gesture   Expression Goal   Expression Assist Level Moderate [place]   Assist Device Gestures no;Gestures yes   Function Demand Basic Needs   Status Ongoing; Target goal - two weeks   Meal Prep and Kitchen Mobility   Assist Level Independent   Status Ongoing; Target goal - two weeks   Medication Management   Assist Level Independent   Status Ongoing; Target goal - two weeks

## 2022-01-20 NOTE — TEAM CONFERENCE
Acute RehabilitationTeam Conference Note  Date: 1/20/2022   Time: 12:54 PM       Patient Name:  Shasta Abraham       Medical Record Number: [id number]   YOB: 1955  Sex: Male          Room/Bed:  Banner 264/Banner 264-01  Payor Info:  Payor: Presleyroula Wilkinson / Plan: MEDICARE A AND B / Product Type: Medicare A & B Fee for Service /      Admitting Diagnosis: Closed 2-part intertrochanteric fracture of left femur ([facility] ) [S72 142A]   Admit Date/Time:  1/19/2022  3:54 PM  Admission Comments: No comment available     Primary Diagnosis:  Closed nondisplaced intertrochanteric fracture of left femur with routine healing  Principal Problem: Closed nondisplaced intertrochanteric fracture of left femur with routine healing    Patient Active Problem List    Diagnosis Date Noted    Vitamin D deficiency 01/20/2022    Anemia 01/20/2022    Hypomagnesemia 01/20/2022    Closed nondisplaced intertrochanteric fracture of left femur with routine healing 01/19/2022    Pain in both feet 01/19/2022    Polyneuropathy associated with underlying disease ([facility] ) 01/19/2022    Slow transit constipation 01/19/2022    S/P TKR (total knee replacement), right 11/21/2018    Obesity (BMI 30-39 9) 10/29/2018    Primary osteoarthritis of both knees 07/18/2018    Primary osteoarthritis of both shoulders 12/06/2016    Mixed hyperlipidemia 04/23/2013    Essential hypertension 07/12/2012    Type 2 diabetes mellitus without complication, without long-term current use of insulin ([facility] ) 07/12/2012       Physical Therapy:         61 yr old male admitted to [facility] 1/19/22 sp fall walking dog, sustained + L non displaced IT fx, with greater trochanter insertion tear  Underwent L hip ORIF by Dr Benton Son 1/11/22  Cleared for WBAT L LE  At baseline pt lives with spouse in ML home with first floor setup, 3 ALEXSANDRA, Fully I PTA  without AD  PT to eval pt this afternoon  Occupational Therapy:  Eating: Independent  Grooming: Supervision  Bathing:  Total Assistance,Assist of 2  Bathing: Total Assistance,Assist of 2  Upper Body Dressing: Supervision  Lower Body Dressing: Total Assistance,Assist of 2  Toileting: Total Assistance,Assist of 2  Tub/Shower Transfer:  (NA safety)  Toilet Transfer: Total Assistance,Assist of 2  Cognition: Within Defined Limits  Orientation: Person,Place,Time,Situation  Discharge Recommendations: Home with:  [address] with[de-identified] First Floor Setup,Home Occupational Therapy       Freya Pacheco is a 61 y o  male with PMH of HTN, T2DM TKA (R) (c/b by infection, requiring revision x2), TKA L, gout,  back surgeries, shoulder surgeries, hand surgeries, depression, HLD, Sleep Apnea who presented to the [address] [place] [address] on 1/5 after being bumped by his dog and falling onto his L hip  He was found to have a nondisplaced L trochanteric fracture with a partial tear at the greater trochanter insertion  He underwent L femur ORIF on 1/11  Post-op course complicated by pain, hyperglycemia  He also had issues with constipation  He was admitted to the [facility] on 1/19  Pt supine in bed upon therapist arrival  Beginning of session pt very upset from previous hospital stay  However, pleasant throughout  Pt reports he lives in a multi level home with 3STE with BHR  Pt reports he typically does not go upstairs and stays on FF  Pt reports he was indep with all ADLS, +, reports he takes care of his dogs  Reports he mainly stays on FF which has full BR with tub/shower  At this time pt req S for UB wash/dress, but req Ax2 for STS, SPT and lB dressing 2* to inc harry feet pain which Dr Eren Benitez reporting is neuropathic  Pt seen for 90mins of skilled OT services with emphasis on self-care, transfers,, as well as therapist educated pt on rehab process, goal setting and ELOS which pt verbalized understanding   Pt presents with the following performance component deficits impacting ADL/IADL skills: harry feet pain, weakness, impaired balance, decreased endurance, decreased coordination, increased fall risk, new onset of impairment of functional mobility, decreased ADLS, decreased IADLS, decreased activity tolerance, decreased safety awareness, that result in activity limitations and/or participation restrictions  Pt to continue to benefit from continued acute rehab OT services during hospital stay to address defined deficits and to maximize level of functional independence in the following Occupational Performance areas: grooming, bathing/shower, toilet hygiene, dressing, medication management, functional mobility, community mobility, clothing management, cleaning  Treatment approaches may include: OT eval, self-care, there ex, therapeutic activity, modalities  Pt presents with good rehab potential  This evaluation requires clinical decision making of moderate complexity, because the patient presents with comorbidites that affect occupational performance and required significant modification of tasks or assistance with consideration of multiple treatment options  Pt is unsafe to D/C home at this time, recommending ELOS 2 weeks to achieve Keron level goals with least restrictive device to address these areas and resume prior occupational roles to maximize independence to reduce risk of fall and decrease risk of readmission  Speech Therapy:           No notes on file    Nursing Notes:     Diet Type: Diabetic,Thin Liquids                                                                     Pain Location/Orientation: Orientation: Bilateral,Location: Leg  Pain Score: 4                       Hospital Pain Intervention(s): Medication (See MAR),Repositioned          61 y o  male with PMH of HTN, T2DM TKA (R) (c/b by infection, requiring revision x2), TKA L, gout,  back surgeries, shoulder surgeries, hand surgeries, depression, HLD, Sleep Apnea who presented to the [address] [place] [address] on 1/5 after being bumped by his dog and falling onto his L hip   He was found to have a nondisplaced L trochanteric fracture with a partial tear at the greater trochanter insertion  He underwent L femur ORIF on 1/11  Post-op course complicated by pain, hyperglycemia  He also had issues with constipation  He was admitted to the [address] [place] [address]  on 1/19  Constipation managed with colace, miralax & Fleets enema x 1  Polyneuropathy managed with gabapentin 100mg q8h  DM2 managed with accu checks QID, SSI's, diabetic diet level 2 & Lantus 10units @ HS  HLD managed with lipitor 40mg @ HS  Osteoarthritis of harry knees & shoulders managed with Voltaren gel 2Gm QID PRN,  Lidoderm patches q12h PRN, tylenol 650mg q6h PRN, Oxy IR 5mg or 10mg q4h PRN  This week we will monitor pt's vital signs & lab results  Pt will have adequate pain control to fully participate in therapies  Pt will be educated on importance of T/R & off loading weight while in bed/chair to prevent pressure injury  Pt will have safe transfers with the use of call bell & hourly rounding to prevent falls  We will educate on energy conservation & encourage independence with ADL's  Case Management:     Discharge Planning  Living Arrangements: Lives w/ Spouse/significant other  Support Systems: Spouse/significant other  Assistance Needed: unable to determine at present  Type of Current Residence: Private residence  Current Home Care Services: No  Pt is a new admission and initial evaluation to occur  Is the patient actively participating in therapies? yes  List any modifications to the treatment plan:     Barriers Interventions   Peripheral neuropathy Med management, medical team following   Pain Med management, patches, desensitization    Decreased activity tolerance Endurance training   ALEXSANDRA Stairs training         Is the patient making expected progress toward goals?  yes  List any update or changes to goals:     Medical Goals: Patient will be medically stable for discharge to [facility] upon completion of rehab program and Patient will be able to manage medical conditions and comorbid conditions with medications and follow up upon completion of rehab program    Weekly Team Goals:   Rehab Team Goals  ADL Team Goal: Patient will be independent with ADLs with least restrictive device upon completion of rehab program    Discussion: Pt presents with the above barriers  He is an a of 2 for transfers 2 weeks and mod I goals  PT still has to evaluate pt  Recommendations will be made as pt progresses  Anticipated Discharge Date:  [facility] Team Members Present: The following team members are supervising care for this patient and were present during this Weekly Team Conference      Physician: Dr Cristóbal Laguna MD  : SAIGE Pham  Registered Nurse: Milena Dupree, RN, BSN, CRRN  Physical Therapist: Nataly Thornton, PT  Occupational Therapist: Ginger Moralez MS, OTR/L and Mica Suero, MS, OTR/L

## 2022-01-20 NOTE — CONSULTS
[address] 1955, 61 y o  male MRN: [id number]  Unit/Bed#: -06 Encounter: [id number]  Primary Care Provider: No primary care provider on file  Date and time admitted to hospital: 1/19/2022  3:54 PM    Inpatient consult to Internal Medicine  Consult performed by: MARGARITA Mcfadden  Consult ordered by: Fátima Teixeira MD          Hypomagnesemia  Assessment & Plan  Mg 1 4 on 1/20  Started on magnesium oxide 800mg BID  Recheck Mg on Monday  Anemia  Assessment & Plan  Related to acute blood loss post-procedure  Hgb currently 10 4  Hgb was 15 0 pre-operatively  Iron panel pending  Start iron supplementation after constipation resolves  Monitor for s/s of active bleeding  Continue to trend with routine CBC  Vitamin D deficiency  Assessment & Plan  Vitamin D level 11 on 1/8  Start on Vitamin D 50,000u weekly  Continue to follow-up with PCP as outpatient  Polyneuropathy associated with underlying disease ([facility] )  Assessment & Plan  Continue gabapentin 100mg Q8  Daily skin checks/neurovascular checks  Considerations with PT/OT therapies  Vitamin B12 pending  Obesity (BMI 30-39  9)  Assessment & Plan  BMI 32 35 on admission  Encourage lifestyle modifications  Type 2 diabetes mellitus without complication, without long-term current use of insulin [facility])  Assessment & Plan  Lab Results   Component Value Date    HGBA1C 7 2 (H) 01/08/2022       Recent Labs     01/19/22  1633 01/19/22 2005 01/20/22  0613   POCGLU 331* 333* 239*       Blood Sugar Average: Last 72 hrs:  (P) 301     Last A1c 7 5 on 11/16  Home regimen: Alogliptin 25mg daily, Jardiance 25mg daily, Glipizide 5mg BID, Lantus 28u at HS, and metformin 1000mg BID  Started on Lantus 10u at HS and metformin 1000mg BID on 1/20  Continue QID accuchecks and SSI  Continue cons  carb diet  Continue to follow-up with PCP as outpatient      Mixed hyperlipidemia  Assessment & Plan  Continue Lipitor 40mg daily  Last lipid panel on 11/16: Cholesterol 139, Triglycerides 150, HDL 32, and LDL 82  Essential hypertension  Assessment & Plan  Home lisinopril 30mg currently on hold  Monitor BP with routine VS     Primary osteoarthritis of both shoulders  Assessment & Plan  B/L shoulder glenohumeral osteoarthritis  [facility] [place] as [facility]  Last received steroid injections on 10/13/21  Ensure adequate pain control  Considerations during therapies  * Closed nondisplaced intertrochanteric fracture of left femur with routine healing  Assessment & Plan  1/6 - Mechanical fall at home onto L hip  CT abd/pelvis - L intertrochanteric femur fracture  1/7 - OR for ORIF with short TFN to L hip - Dr Sonali De La Cruz    Ensure adequate pain control  Neurovascular checks Q shift  WBAT to LLE  DVT ppx with Lovenox  Follow-up with Dr Sonali De La Cruz in 2 weeks as outpatient (1/21)  Continue PT/OT therapies  Primary team following  No hx of DXA scan - recommend to be done as outpatient  Continue Vitamin D supplementation  History of Present Illness:    Ashutosh Echols is a 61 y o  male with a PMH of osteoarthritis, T2DM, HLD, HTN, and peripheral neuropathy who originally presented to [address] [place] [address] on 1/6/2022 after experiencing a mechanical fall at home with L hip pain  CT abdomen/pelvis showed L intertrochanteric femur fracture  Patient was taken to the OR on 1/7/2022 for ORIF with short TFN of the L hip performed by Dr Sonali De La Cruz  Post-operatively, patient experienced acute blood loss with drop in Hgb from 15 to 12 7 and was started on oral iron supplementation  Patient was evaluated by inpatient OT and PT and it was recommended that patient have acute rehab placement  Patient admitted to [address] [place] [address] on 1/19/2022; we are consulted for medical clearance  Pt examined while pt sitting in recliner in pt room    Fractured his femur while training his dog to be a service dog  Denies any other falls in the past year  Reviewed medications and medical hx  Hx of T2DM and states that his BG is well controlled at home  Checks his fasting BG daily and usually notes them to be around 100  Has peripheral neuropathy to both of his feet which appear to be very painful and bothersome  Started on gabapentin yesterday with no resolution of symptoms yet  Also ordered Lidoderm patches  Feels that the pain in his feet is worse than his hip  Currently complains of L hip pain, only with movement, 4/10, aching, improving with rest   Had a BM yesterday after a fleets enema and feels much better  Therapy went well this morning and he enjoyed his session  Lives at home with his wife and two dogs  Wife is able to help him as needed but he would like to do as much as possible on his own  Review of Systems:    Review of Systems   Constitutional: Negative for appetite change, chills, fatigue and fever  Respiratory: Negative for cough and shortness of breath  Cardiovascular: Negative for chest pain, palpitations and leg swelling  Gastrointestinal: Negative for abdominal distention, abdominal pain, constipation, diarrhea, nausea and vomiting  LBM 1/19 - after fleets enema   Genitourinary: Negative for difficulty urinating  Musculoskeletal: Positive for arthralgias (B/L shoulder pain, aching, 4/10, improves with rest   L hip pain, 4/10, aching, improves with rest) and gait problem (difficulty getting out of sitting position due to osteoarthritis in B/L knees)  Negative for back pain and joint swelling  Neurological: Positive for numbness  Negative for dizziness, weakness, light-headedness and headaches  Burning/numbess to B/L feet/lower extremities   Psychiatric/Behavioral: Negative for sleep disturbance         Past Medical and Surgical History:     Past Medical History:   Diagnosis Date    Depression     Diabetes mellitus ([facility] )     Hyperlipidemia     Hypertension     Osteoarthritis, knee        Past Surgical History:   Procedure Laterality Date    BACK SURGERY      HAND SURGERY      JOINT REPLACEMENT Left     l tkr    KNEE SURGERY Left     NV TOTAL KNEE ARTHROPLASTY Right 2018    Procedure: ARTHROPLASTY KNEE TOTAL;  Surgeon: Britney Murrieta MD;  [facility]: [facility];  Service: Orthopedics    TOOTH EXTRACTION      WISDOM TOOTH EXTRACTION         Meds/Allergies:    all medications and allergies reviewed    Allergies: No Known Allergies    Social History:     Marital Status: /Civil Union    Substance Use History:   Social History     Substance and Sexual Activity   Alcohol Use Yes    Comment: 'couple beers every couple weeks'     Social History     Tobacco Use   Smoking Status Former Smoker    Types: Cigarettes    Quit date: 3/1/2012    Years since quittin 8   Smokeless Tobacco Never Used     Social History     Substance and Sexual Activity   Drug Use No       Family History:  Reviewed    Physical Exam:     Vitals:   Blood Pressure: 138/64 (22 0609)  Pulse: 84 (22 0609)  Temperature: 98 3 °F (36 8 °C) (22 06)  Temp Source: Oral (22 06)  Respirations: 18 (22 0609)  Height: 6' 2" (188 cm) (22 1604)  Weight - Scale: 114 kg (252 lb) (22 1604)  SpO2: 98 % (22 5190)    Physical Exam  Vitals and nursing note reviewed  Constitutional:       Appearance: Normal appearance  He is obese  HENT:      Head: Normocephalic and atraumatic  Cardiovascular:      Rate and Rhythm: Normal rate and regular rhythm  Pulses: Normal pulses  Heart sounds: Murmur heard  Systolic murmur is present with a grade of 1/6  No friction rub  Pulmonary:      Effort: Pulmonary effort is normal  No respiratory distress  Breath sounds: Examination of the right-lower field reveals rales  Examination of the left-lower field reveals rales  Rales present  No wheezing or rhonchi  Abdominal:      General: Abdomen is flat  Bowel sounds are normal  There is no distension  Palpations: Abdomen is soft  Tenderness: There is no abdominal tenderness  Musculoskeletal:      Cervical back: Normal range of motion and neck supple  Right lower leg: Edema (Minimal non-pitting edema) present  Left lower leg: Edema (Minimal non-pitting edema) present  Skin:     General: Skin is warm and dry  Capillary Refill: Capillary refill takes less than 2 seconds  Comments: Surgical dressing on L hip  Dressing is CDI  Neurological:      Mental Status: He is alert and oriented to person, place, and time  Psychiatric:         Mood and Affect: Mood normal          Behavior: Behavior normal          Additional Data:     Labs and imaging reviewed  EKG Reviewed - last EKG on 1/5 showed NSR with PVCs and QTc of 442  M*Modal software was used to dictate this note  It may contain errors with dictating incorrect words or incorrect spelling  Please contact the provider directly with any questions

## 2022-01-20 NOTE — ASSESSMENT & PLAN NOTE
Vitamin D level 11 on 1/8  Start on Vitamin D 50,000u weekly  Continue to follow-up with PCP as outpatient

## 2022-01-20 NOTE — ASSESSMENT & PLAN NOTE
Lab Results   Component Value Date    HGBA1C 7 2 (H) 01/08/2022       Recent Labs     01/19/22  1633 01/19/22 2005 01/20/22  0613   POCGLU 331* 333* 239*       Blood Sugar Average: Last 72 hrs:  (P) 301     Last A1c 7 5 on 11/16  Home regimen: Alogliptin 25mg daily, Jardiance 25mg daily, Glipizide 5mg BID, Lantus 28u at HS, and metformin 1000mg BID  Started on Lantus 10u at HS and metformin 1000mg BID on 1/20  Continue QID accuchecks and SSI  Continue cons  carb diet  Continue to follow-up with PCP as outpatient

## 2022-01-20 NOTE — ASSESSMENT & PLAN NOTE
1/6 - Mechanical fall at home onto L hip  CT abd/pelvis - L intertrochanteric femur fracture  1/7 - OR for ORIF with short TFN to L hip - Dr Jaz Barrow    Ensure adequate pain control  Neurovascular checks Q shift  WBAT to LLE  DVT ppx with Lovenox  Follow-up with Dr Jaz Barrow in 2 weeks as outpatient (1/21)  Continue PT/OT therapies  Primary team following  No hx of DXA scan - recommend to be done as outpatient  Continue Vitamin D supplementation

## 2022-01-20 NOTE — PROGRESS NOTES
[facility] ICP  PT LTGS   ELOS x2 weeks  01/20/22 1330   Rehab Team Goals   Transfer Team Goal Patient will be independent with transfers with least restrictive device upon completion of rehab program   Locomotion Team Goal Patient will be independent with locomotion with least restrictive device upon completion of rehab program   Rehab Team Interventions   PT Interventions Gait Training; Therapeutic Exercise;Transfer Training;Community Reintegration;Bed Mobility;Modalities; Patient/Family Education   PT Transfer Goal   Roll left and right Goal 09  Not applicable   Sit to lying Goal 09  Not applicable   Lying to sitting on side of bed Goal 09  Not applicable   Sit to stand Goal 06  Independent - Patient completes the activity by him/herself with no assistance from a helper  Chair/bed-to-chair transfer Goal 06  Independent - Patient completes the activity by him/herself with no assistance from a helper  Car Transfer Goal 04  Supervision or touching assistance- [place] provides VERBAL CUES or supervision throughout activity  Assistive Device Roller Walker   Safety Precautions WBAT   Environment Level Surface; Well Lit; Distractions   Status Ongoing; Target goal - two weeks   Locomotion Goal   Primary discharge mode of locomotion Walking   Target Walk Distance 150 ft   Assist Device Roller Walker   Gait Pattern Improvement Inconsistant Rosanne; Excess Slow;Improper weight shift; Forward Flexion; Antalgic   Environment Level Surface; Well Lit; Distractions   Walk 10 feet Goal 06  Independent - Patient completes the activity by him/herself with no assistance from a helper  Walk 50 feet with 2 turns Goal 06  Independent - Patient completes the activity by him/herself with no assistance from a helper  Walk 150 feet Goal 04  Supervision or touching assistance- [place] provides VERBAL CUES or supervision throughout activity  Walking 10 feet on uneven surface 04   Supervision or touching assistance- [place] provides VERBAL CUES or supervision throughout activity  Walking Goal Status Ongoing; Target goal - two weeks   Wheel 50 feet with 2 turns Goal 09  Not applicable   Wheel 483 feet Goal 09  Not applicable   Stairs Goal   1 step or curb goal 04  Supervision or touching assistance- [place] provides VERBAL CUES or supervision throughout activity  4 steps Goal 04  Supervision or touching assistance- [place] provides VERBAL CUES or supervision throughout activity  12 steps Goal   (TBD if able- does not need for DC home)   Assist Level Supervision   Number of Stairs 4   Technique Non-reciprocal   Hand Rail Bilateral   Status Ongoing; Target goal - two weeks   Object Retrieval Goal   Picking up object Goal 06  Independent - Patient completes the activity by him/herself with no assistance from a helper  Assistive Device  Reacher   Small Object Picked Up marker from floor - target goal x2 weeks

## 2022-01-20 NOTE — ASSESSMENT & PLAN NOTE
Bilateral  R > L   R foot may have component of gout/pseudogout, also quite arthritic  Dorsal erythema, related to bone spur per podiatry   Erythema/warmth worsened on 1/21, was started on prednisone and keflex by IM  Has since completed and symptoms have improved  Podiatry placed on 5 day course of colchicine, which has since been completed  Swelling and discomfort improved with LATRELL stockings  Also biomechanically, has a large painful bunion - this causes him to walk on the lateral edge of his foot which causes pain  Also with component of neuropathic pain  XR R foot negative for acute fracture/process  Uric acid normal  Continue increased dose of gabapentin and desensitization  Will need f/u with podiatry at discharge

## 2022-01-20 NOTE — CASE MANAGEMENT
Cm met with pt and reviewed team meeting update  He is agreement with a continued stay and reteam while he continues to work on his rehab goals toward mod I  Discussed potential LOS of two weeks  Following to assist w/ d/c planning needs

## 2022-01-20 NOTE — TREATMENT PLAN
Individualized Plan of [address] [place] [address] 61 y o  male MRN: [id number]  Unit/Bed#: -01 Encounter: [id number]     PATIENT INFORMATION  ADMISSION DATE: 1/19/2022  3:54 PM HAYDE CATEGORY:Orthopedic Disorders:  08 11  Unilateral Hip Fracture   ADMISSION DIAGNOSIS: Closed 2-part intertrochanteric fracture of left femur ([address] ) [S72 142A]  EXPECTED LOS: 7 to 10 days     MEDICAL/FUNCTIONAL PROGNOSIS  Based on my assessment of the patient's medical conditions and current functional status, the prognosis for attaining medical and functional goals or the IRF stay is:  Good    Medical Goals: Patient will be able to manage medical conditions and comorbid conditions with medications and follow up upon completion of rehab program     1  Adequate pain control  2  Prevention of VTE  3  Adequate secretion management, and monitoring of respiratory status  4  Bowel/bladder management  5  Maintain appropriate nutrition and hydration for healing and hemodynamic stability  6  Emotional adaptation to impairment  7  Monitor for polypharmacy  8  Fall prevention  9  Patient and family caregiver training/education  10  Skin protection and prevention of pressure injury  11  Incisional care to prevent infection/dehiscence  12  Prevention of delirium  13  Appropriate management of T2DM for euglycemia, hopefully transition back to home regimen  14  Work-up and management of neuropathic pain       ANTICIPATED DISCHARGE DISPOSITION AND SERVICES  COMMUNITY SETTING: Home - independent/modified independent    ANTICIPATED FOLLOW-UP SERVICE:   Outpatient Therapy Services: PT and OT       DISCIPLINE SPECIFIC PLANS:  Required Disciplines & Services: Rehabillitation Nursing    REQUIRED THERAPY:  Therapy Hours per Day Days per Week Total Days   Physical Therapy 1 5 5 5   Occupational Therapy 1 5 5 5   NOTE: Additional therapy time(s) or changes to allocation of therapies as appropriate to meet patient needs and to achieve functional goals      Patient will either participate in above therapy regimen or participate in 900 minutes of therapy within 7 day week consisting of PT and OT due to the following medical procedure/condition:Orthopedic Disorders:  08 11  Unilateral Hip Fracture    ANTICIPATED FUNCTIONAL OUTCOMES:  ADL:   mod Ind   Bladder/Bowel:   mod Ind    Transfers:   mod Ind   Locomotion:   mod Ind    Cognitive:  mod Ind      DISCHARGE PLANNING NEEDS  Equipment needs: Discharge needs to be reviewed with team      REHAB ANTICIPATED PARTICIPATION RESTRICTIONS:  None

## 2022-01-20 NOTE — ASSESSMENT & PLAN NOTE
Likely related to pain medication, inadequate hydration, decreased mobility, recent trauma/surgery  Scheduled docusate/miralax  Got enema on admission  Last BM 1/27 after bisacodyl tabs  Last BM 1/30

## 2022-01-20 NOTE — CASE MANAGEMENT
Cm met with pt and reviewed rehab routine and cm role  Pt lives in a 2 story home with his wife  He has 3 ALEXSANDRA and only uses the first floor  He has crutches and a walker  He reports his wife works and he will be alone during the day  He states he will drive to appointments  He has a history of outpt therapy and home therapy and STR in a IRF  Reviewed team meeting process  Following to assist w/ d/c planing needs

## 2022-01-20 NOTE — ASSESSMENT & PLAN NOTE
Related to acute blood loss post-procedure  Hgb currently 10 4  Hgb was 15 0 pre-operatively  Iron panel pending  Start iron supplementation after constipation resolves  Monitor for s/s of active bleeding  Continue to trend with routine CBC

## 2022-01-20 NOTE — PCC NURSING
61 y o  male with PMH of HTN, T2DM TKA (R) (c/b by infection, requiring revision x2), TKA L, gout,  back surgeries, shoulder surgeries, hand surgeries, depression, HLD, Sleep Apnea who presented to the [address] [place] [address] on 1/5 after being bumped by his dog and falling onto his L hip  He was found to have a nondisplaced L trochanteric fracture with a partial tear at the greater trochanter insertion  He underwent L femur ORIF on 1/11  Post-op course complicated by pain, hyperglycemia  He also had issues with constipation  He was admitted to the [address] [place] [address]  on 1/19  Constipation managed with colace, miralax & Fleets enema x 1  Gout managed with colchicine 0 6mg daily  Polyneuropathy managed with gabapentin 300mg q8h  DM2 managed with accu checks QID, SSI's, diabetic diet level 2, Metformin 1000mg BID with meals, Jardiance 12 5mg daily, Glipizide 5mg daily before breakfast & Lantus 15units @ HS  HLD managed with lipitor 40mg @ HS  Osteoarthritis of harry knees & shoulders managed with Robaxin 500mg QID, Voltaren gel 2Gm QID PRN, Lidoderm patches q12h PRN, tylenol 650mg q8h, Oxy IR 5mg or 10mg q4h PRN  PPX: Lovenox 30mg BID  This week we will monitor pt's vital signs & lab results  Pt will have adequate pain control to fully participate in therapies  Pt will be educated on importance of T/R & off loading weight while in bed/chair to prevent pressure injury  Pt will have safe transfers with the use of call bell & hourly rounding to prevent falls  We will educate on energy conservation & encourage independence with ADL's

## 2022-01-20 NOTE — ASSESSMENT & PLAN NOTE
B/L shoulder glenohumeral osteoarthritis  Follows with Dr Rochelle Power as outpatient  Last received steroid injections on 10/13/21  Ensure adequate pain control  Considerations during therapies

## 2022-01-20 NOTE — ASSESSMENT & PLAN NOTE
Most likely related to diabetes  SPEP without monoclonal gammopathy  B12 low normal - started on supplementation  Increased Gabapentin 300mg Q8hr  Desensitization techniques

## 2022-01-20 NOTE — ASSESSMENT & PLAN NOTE
Diagnosed on MRI - nondisplaced intertrochanteric fracture with partial tear at greater trochanter insertion  ORIF on 1/11 with [facility]  Multimodal pain control as described below  WBAT  Incisional care  2 week follow-up on 1/25 - XR on 1/21 with near anatomic alignment     - Staples removed, steri-strips in place   - Provided photo of his imaging to take home  [facility] in 4 weeks

## 2022-01-20 NOTE — PROGRESS NOTES
01/20/22 0930   Patient Data   Rehab Impairment L femur fx   Etiologic Diagnosis Fx of Left femur   Date of Onset 01/05/22  (surgery 1/11/22)   Support System   Name Sindhu Payne  (works FT)   Relationship spouse   Able to provide 24 hour supervision No   Able to provide physical help? No   Home Setup   Type of Home Multi Level   Method of Entry Stairs;Hand Rail Bilateral   Number of Stairs 3   Number of Stairs in Home   (FF to 2nd flr; reports he does ot go upstairs)   In Home Hand Rail Right  (L ledge)   First Floor Bathroom Tub;Full; Shower;Curtain;Grab Bars   First Floor Bathroom Accessibility Grab bars in tub/shower   Second Floor Bathroom Half   First Floor Setup Available Yes   Home Modifications Necessary?   (pending progress)   Home Modification Comment pending progress   Available Equipment Roller Walker  (crutches)   Baseline Information   Vocation Other  (retired)   Transportation    Prior IADL Participation   Money Management   (both complete)   Meal Preparation Partial Participation;Stove top; Oven  (50/50 with wife)   Dennys [place]   (wife completes)   Home Cleaning Partial Participation  (50/50 with wife)   Prior Level of Function   Self-Care 3  Independent - Patient completed the activities by him/herself, with or without an assistive device, with no assistance from a helper  Indoor-Mobility (Ambulation) 3  Independent - Patient completed the activities by him/herself, with or without an assistive device, with no assistance from a helper  Stairs 3  Independent - Patient completed the activities by him/herself, with or without an assistive device, with no assistance from a helper  Functional Cognition 3  Independent - Patient completed the activities by him/herself, with or without an assistive device, with no assistance from a helper  Prior Assistance Needed for Household Chores/Cleaning;Meal Preparation  (took meds from bottles)   Prior Device Used Z   None of the above   Falls in the Last Year   Number of falls in the past 12 months 1  (dog got excited and pulled)   Type of Injury Associated with Fall Major injury  (current admission)   Patient Preference   Luis Enrique (Patient Preference) Melyssa Moore   Psychosocial   Psychosocial (WDL) WDL   Restrictions/Precautions   Precautions Fall Risk   Weight Bearing Restrictions Yes   LLE Weight Bearing Per Order WBAT   ROM Restrictions No   Pain Assessment   Pain Assessment Tool 0-10   Pain Score 5   Pain Location/Orientation Orientation: Bilateral;Location: Foot   Pain Radiating Towards constant on feet    Pain Onset/Description Descriptor: Stabbing   Effect of Pain on Daily Activities impacts STS   Patient's Stated Pain Goal No pain   Hospital Pain Intervention(s) Elevated; Rest  (reports getting meds from RN)   Multiple Pain Sites No   Tub/Shower Transfer   Reason Not Assessed Safety;Sponge Bath   Shower/Bathe Self   Type of Assistance Needed Physical assistance   Physical Assistance Level Total assistance   Comment seated EOB completed SB and pt able to wash 8/10 with F fx reach up to ankle  Req TA for washing feet  2* to inc harry feet pain, req Ax2 for STS and TA for grecia/buttock hygiene   Shower/Bathe Self CARE Score 1   Bathing   Assessed Bath Style Sponge Bath   Anticipated D/C Bath Style Tub; Shower;Sponge Bath   Able to [place] No   Able to [employer] Temperature No   Able to Wash/Rinse/Dry (body part) Left Arm;Right Arm;L Upper Leg;R Upper Leg;Chest;Abdomen   Limitations Noted in Balance; Endurance;Strength;Timeliness   Positioning Seated;Standing   Dressing/Undressing Clothing   Remove UB Clothes   (gown)   Don UB Clothes Pullover Shirt   Type of Assistance Needed Supervision   Physical Assistance Level No physical assistance   Comment seated   Upper Body Dressing CARE Score 4   Remove LB Clothes Socks  (brief)   Don LB Clothes Pants;Socks; Other  (brief)   Type of Assistance Needed Physical assistance   Physical Assistance Level Total assistance   Comment pt able to don brief/pants with F fx reach, however req Ax2 for STS and CM   Lower Body Dressing CARE Score 1   Limitations Noted In Balance; Endurance; Safety;Strength;Timeliness   Positioning Supported Sit;Standing   Putting On/Taking Off Footwear   Type of Assistance Needed Physical assistance   Physical Assistance Level 51%-75%   Comment able to don L sock, TA for R sock   Putting On/Taking Off Footwear CARE Score 2   Toileting Hygiene   Type of Assistance Needed Physical assistance   Physical Assistance Level Total assistance   Toileting Hygiene CARE Score 1   Toilet Transfer   Type of Assistance Needed Physical assistance   Physical Assistance Level Total assistance   Comment Ax2 for SPT with RW with inc time 2* to feet pain   Toilet Transfer CARE Score 1   Transfer Bed/Chair/Wheelchair   Type of Assistance Needed Physical assistance   Physical Assistance Level Total assistance   Comment Ax2 for SPT with RW   Chair/Bed-to-Chair Transfer CARE Score 1   Lying to Sitting on Side of Bed   Comment pt req modA for bed mobility, pt rpeorts he sleeps on a recliner at home   Reason if not Attempted Activity not applicable   Lying to Sitting on Side of Bed CARE Score 9   Sit to Stand   Type of Assistance Needed Physical assistance   Physical Assistance Level Total assistance   Comment Ax2 for STS with RW   Sit to Stand CARE Score 1   Comprehension   Assist Devices Glasses   Auditory Complex   Visual Complex   QI: Comprehension 3  Usually Understands: Understands most conversations, but misses some part/intent of message  Requires cues at times to understand  Comprehension (FIM) 6 - Understands complex/abstract but requires  glasses for visual comp   Expression   Verbal Complex   Non-Verbal Complex   Intelligibility Sentence   QI: Expression 3   Exhibits some difficulty with expressing needs and ideas (e g , some words or finishing thoughts) or speech is not clear   Expression (FIM) 6 - Expresses complex/abstract but requires:  more time   RLE Assessment   RLE Assessment   (x1 TKR)   LLE Assessment   LLE Assessment   (x2 THR)   RUE Assessment   RUE Assessment   (upto 90 degrees)   LUE Assessment   LUE Assessment WFL   Sensation   Additional Comments Reports inc pain on harry feet, reports MD believes its gout   Cognition   Overall Cognitive Status WFL   Arousal/Participation Alert; Cooperative   Attention Attends with cues to redirect   Orientation Level Oriented to person;Oriented to place;Oriented to situation   Memory Decreased short term memory   Following Commands Follows multistep commands with increased time or repetition   Vision   Vision Comments No blurry/double vision   Objective Measure   OT Measure(s) Barthel index score   OT Findings see below   Discharge Information   Vocational Plan Retired/not working   Barriers to Return to [place]; Endurance   Patient's Discharge Plan Go back home   Patient's Rehab Expectations "To get back to doing what I was doing"   Barriers to Discharge Home Decreased Strength;Decreased Endurance   Impressions Jessie Leon is a 61 y o  male with PMH of HTN, T2DM TKA (R) (c/b by infection, requiring revision x2), TKA L, gout,  back surgeries, shoulder surgeries, hand surgeries, depression, HLD, Sleep Apnea who presented to the [address] [place] [address] on 1/5 after being bumped by his dog and falling onto his L hip  He was found to have a nondisplaced L trochanteric fracture with a partial tear at the greater trochanter insertion  He underwent L femur ORIF on 1/11  Post-op course complicated by pain, hyperglycemia  He also had issues with constipation  He was admitted to the [facility] on 1/19  Pt supine in bed upon therapist arrival  Beginning of session pt very upset from previous hospital stay  However, pleasant throughout  Pt reports he lives in a multi level home with 3STE with BHR  Pt reports he typically does not go upstairs and stays on FF   Pt reports he was indep with all ADLS, +, reports he takes care of his dogs  Reports he mainly stays on FF which has full BR with tub/shower  At this time pt req S for UB wash/dress, but req Ax2 for STS, SPT and lB dressing 2* to inc harry feet pain which Dr Nicole Nunes reporting is neuropathic  Pt seen for 90mins of skilled OT services with emphasis on self-care, transfers,, as well as therapist educated pt on rehab process, goal setting and ELOS which pt verbalized understanding  Pt presents with the following performance component deficits impacting ADL/IADL skills: harry feet pain, weakness, impaired balance, decreased endurance, decreased coordination, increased fall risk, new onset of impairment of functional mobility, decreased ADLS, decreased IADLS, decreased activity tolerance, decreased safety awareness, that result in activity limitations and/or participation restrictions  Pt to continue to benefit from continued acute rehab OT services during hospital stay to address defined deficits and to maximize level of functional independence in the following Occupational Performance areas: grooming, bathing/shower, toilet hygiene, dressing, medication management, functional mobility, community mobility, clothing management, cleaning  Treatment approaches may include: OT eval, self-care, there ex, therapeutic activity, modalities  Pt presents with good rehab potential  This evaluation requires clinical decision making of moderate complexity, because the patient presents with comorbidites that affect occupational performance and required significant modification of tasks or assistance with consideration of multiple treatment options  Pt is unsafe to D/C home at this time, recommending ELOS 2 weeks to achieve Keron level goals with least restrictive device to address these areas and resume prior occupational roles to maximize independence to reduce risk of fall and decrease risk of readmission        OT Therapy Minutes   OT Time In 0930   OT Time Out 1100   OT Total Time (minutes) 90   OT Mode of treatment - Individual (minutes) 90   OT Mode of treatment - Concurrent (minutes) 0   OT Mode of treatment - Group (minutes) 0   OT Mode of treatment - Co-treat (minutes) 0   OT Mode of Treatment - Total time(minutes) 90 minutes   OT Cumulative Minutes 90   Cumulative Minutes   Cumulative therapy minutes 90     Barthel Index Score (IE)  Feeding- 10/10  Bathing- 0/5  Grooming- 5/5  Dressing- 0/10  Bowels- 10/10  Bladder- 10/10  Toilet use- 0/10  Transfers-10/15  Mobility- 0/15  Stairs-0/10    Total- 45/100  (0-20 total dependence) (21-60 severe dependence) (61-90 moderate dependence) (91-99 slight dependence) (100 independent)

## 2022-01-20 NOTE — ASSESSMENT & PLAN NOTE
Continue Lipitor 40mg daily  Last lipid panel on 11/16: Cholesterol 139, Triglycerides 150, HDL 32, and LDL 82

## 2022-01-20 NOTE — ASSESSMENT & PLAN NOTE
Continue gabapentin 100mg Q8  Daily skin checks/neurovascular checks  Considerations with PT/OT therapies  Vitamin B12 pending

## 2022-01-20 NOTE — ASSESSMENT & PLAN NOTE
Lab Results   Component Value Date    HGBA1C 7 2 (H) 01/08/2022       Recent Labs     01/30/22  1623 01/30/22  2049 01/31/22  0625 01/31/22  1110   POCGLU 163* 198* 157* 107       Blood Sugar Average: Last 72 hrs:  (P) 331     At home: Metformin 1000mg BID, Lantus 28 units daily, Alogliptin 25mg daily, Empaglifluzin 12 5mg daily, Glipizide 5mg BID  Here: Lantus 10 units, Metformin 1000mg BID, Januvia 12 5mg, Glipizide 5mg daily, CDI, Accuchecks  Diabetic Diet  IM consulted and management at their discretion  Follow-up with PCP at discharge

## 2022-01-20 NOTE — PROGRESS NOTES
PT EVAL  ARC TAA       01/20/22 1330   Patient Data   Rehab Impairment U/L hip fx   Etiologic Diagnosis L introchcanteric femur fx   Date of Onset 01/05/22  (DOS 1/11/22)   Support System   Name Fanny Banegas   Relationship wife   Able to provide 24 hour supervision No   Able to provide physical help? No   Home Setup   Type of Home Multi Level   Method of Entry Stairs;Hand Rail Bilateral   Number of Stairs 3   Number of Stairs in Home   (FF to second floor  does not need to go up)   In [address] Right   First Floor Bathroom Full   First Floor Setup Available Yes   Home Modification Comment pending progress   Available Equipment Roller Walker   Baseline Information   Transportation    Prior Level of Function   Self-Care 3  Independent - Patient completed the activities by him/herself, with or without an assistive device, with no assistance from a helper  Indoor-Mobility (Ambulation) 3  Independent - Patient completed the activities by him/herself, with or without an assistive device, with no assistance from a helper  Stairs 3  Independent - Patient completed the activities by him/herself, with or without an assistive device, with no assistance from a helper  Functional Cognition 3  Independent - Patient completed the activities by him/herself, with or without an assistive device, with no assistance from a helper  Prior Assistance Needed for Household Chores/Cleaning;Meal Preparation   Prior Device Used Z  None of the above   Falls in the Last Year   Number of falls in the past 12 months 1   Type of Injury Associated with Fall Major injury  (this incident)   Patient Preference   Nickname (Patient Preference) Jennifer Arguello   Psychosocial   Psychosocial (WDL) X   Patient Behaviors/Mood Aggressive verbally, others;Irritable; Anxious; Angry   Restrictions/Precautions   Precautions Fall Risk;Pain   LLE Weight Bearing Per Order WBAT   Pain Assessment   Pain Assessment Tool 0-10   Pain Score 7   Pain Location/Orientation Orientation: Bilateral;Location: Knee; Location: Foot;Orientation: Left; Location: Hip   Pain Radiating Towards B feet  Pain Onset/Description Onset: Ongoing;Frequency: Intermittent; Descriptor: Aching;Descriptor: Cramping;Descriptor: Discomfort; Descriptor: Avonne Euler   Effect of Pain on Daily Activities affects all mobility  Hospital Pain Intervention(s) Cold applied;Medication (See MAR); Repositioned   Toileting Hygiene   Type of Assistance Needed Physical assistance   Physical Assistance Level Total assistance   Comment +BM, total A for hygiene post and pant mgmt   Toileting Hygiene CARE Score 1   Toilet Transfer   Surface Assessed Standard Commode   Transfer Technique Sit Pivot   Limitations Noted In Balance; Endurance;ROM;Safety;UE Strength;LE Strength   Positioning Concerns Raised Seat   Findings BSC on highest setting, x2 A to stand form BSC   Type of Assistance Needed Physical assistance   Physical Assistance Level Total assistance   Comment x2 A with RW, poor knee flexion  Toilet Transfer CARE Score 1   Toileting   Able to Pull Clothing down no, up no  Able to Manage Clothing Closures No   Limitations Noted In ROM;UE Strength;LE Strength;Balance   Findings +large soft BM, RN notified  Transfer Bed/Chair/Wheelchair   Positioning Concerns Other  (limited mobility)   Limitations Noted In Confidence;Pain Management;UE Strength;LE Strength   Adaptive Equipment Roller Walker   Findings inc time, poor B knee flexion, needs A to wt shift forward  Type of Assistance Needed Physical assistance   Physical Assistance Level Total assistance   Comment x2 A to A with wt shift fwd due to poor knee flexion  Chair/Bed-to-Chair Transfer CARE Score 1   Roll Left and Right   Comment pt sleeps in recliner at baseline   Reason if not Attempted Activity not applicable   Roll Left and Right CARE Score 9   Sit to Lying   Comment pt sleeps in recliner at baseline  Татьяна Asher A for L LE support sit to supine in hospital bed  Reason if not Attempted Activity not applicable   Sit to Lying CARE Score 9   Lying to Sitting on Side of Bed   Comment Pt sleeps in recliner at baseline  Reason if not Attempted Activity not applicable   Lying to Sitting on Side of Bed CARE Score 9   Sit to Stand   Type of Assistance Needed Physical assistance   Physical Assistance Level Total assistance   Comment x2 A to wt shift fwd  Sit to Stand CARE Score 1   Picking Up Object   Comment unable to stand unsupported at this time  Reason if not Attempted Safety concerns   Picking Up Object CARE Score 88   Car Transfer   Reason if not Attempted Environmental limitations   Car Transfer CARE Score 10   Ambulation   Primary Mode of Locomotion Prior to Admission Walk   Distance Walked (feet)   (few steps chair to [facility] and few steps back to bed w/RW)   Assist Device Roller Walker   Gait Pattern Forward Flexion;Decreased foot clearance; Slow Rosanne; Wide YOVANI;Shuffle   Limitations Noted In Posture;Speed;Strength;Swing   Provided Assistance with: Balance   Walk Assist Level Other  (min A x2 for safety  )   Findings pt declined amb on IE due to pain, x2 A present for few steps with transfers with RW to maximize safety  Walk 10 Feet   Reason if not Attempted Safety concerns   Walk 10 Feet CARE Score 88   Walk 50 Feet with Two Turns   Reason if not Attempted Safety concerns   Walk 50 Feet with Two Turns CARE Score 88   Walk 150 Feet   Reason if not Attempted Safety concerns   Walk 150 Feet CARE Score 88   Walking 10 Feet on Uneven Surfaces   Reason if not Attempted Safety concerns   Walking 10 Feet on Uneven Surfaces CARE Score 88   Wheelchair mobility   Findings anticipate DC ambulatory      Wheel 50 Feet with Two Turns   Reason if not Attempted Activity not applicable   Wheel 50 Feet with Two Turns CARE Score 9   Wheel 150 Feet   Reason if not Attempted Activity not applicable   Wheel 197 Feet CARE Score 9   Curb or Single Stair   Reason if not Attempted Safety concerns   1 Step (Curb) CARE Score 88   4 Steps   Reason if not Attempted Safety concerns   4 Steps CARE Score 88   12 Steps   Reason if not Attempted Safety concerns   12 Steps CARE Score 88   Stairs   Findings not safe for stair trial on IE>    RLE Assessment   RLE Assessment   (dec Knee ROM, observed grossly at least 3/5)   LLE Assessment   LLE Assessment   (dec knee ROM  dec hip strength- due to post op status  )   Coordination   Movements are Fluid and Coordinated 0   Coordination and Movement Description rigid due to dec B knee ROM  Sensation   Light Touch No apparent deficits  (slightly hypersensitive due to B LE neuropathy)   Cognition   Overall Cognitive Status [facility]   Arousal/Participation Alert; Cooperative   Attention Attends with cues to redirect   Orientation Level Oriented X4   Memory Decreased short term memory   Following Commands Follows multistep commands with increased time or repetition   Comments can be irritable, disgruntled, tends to do what he wants  Vision   Vision Comments wears glasses    Therapeutic Exercise   Therapeutic Exercise/Activity toileting as outlined, standing sky with RW approx 2min  Assisted pt back to bed at end of session  Discharge Information   Vocational Plan Retired/not working   Patient's Discharge Plan return home with family support and first floor setup  Patient's Rehab Expectations to walk out of here   Barriers to Discharge Home Limited Family Support;Decreased Strength;Decreased Endurance;Pain; Safety Considerations   Impressions 61 yr old male admitted to [facility] 1/19/22 from [facility] fall walking dog, sustained + L non displaced IT fx, with greater trochanter insertion tear  Underwent L hip ORIF by Dr Justin Crenshaw 1/11/22  Cleared for WBAT L LE  Post op complication due to hyperglycemia and constipation  At baseline pt lives with spouse in ML home with first floor setup, 3 ALEXSANDRA B HR, Fully I PTA  without AD, +   PMH per pre-admission screen: DM, depression, HDL, HTN, OA B Knees sp R TKR x1, TKR x2 9 due to post op infection  PALLAVI, h/o back, shoulder and hand surgery  Pt presents OOB in WC from AM session, somewhat frustrated with having to repeat himself  Reported having h/o issues with therapy however is pleased be back with Haja Jackman Rn (Ericka) medicated pt upon entry, as pt had declined meds at noon, as was not medicated since 8 am  Asking for pain meds encouraged  Pt with prolonged recovery process sp TKRs, currently presenting with sig dec B Knee flexion, requiring x2 A for all transfers  Pt asking for BSC, +BM as outlined, RN updated  Declining further amb at this time  Returned to supine with L hip cold pack for comfort  Pt demonstrated good rehab potential to reach mod I goals for safe return home with family support however spouse works during the day, in [employer] x2 weeks  Anticipate DC home with  and home vs out PT likely pending pt cooperation and participation      PT Therapy Minutes   PT Time In 1330   PT Time Out 1500   PT Total Time (minutes) 90   PT Mode of treatment - Individual (minutes) 90   PT Mode of treatment - Concurrent (minutes) 0   PT Mode of treatment - Group (minutes) 0   PT Mode of treatment - Co-treat (minutes) 0   PT Mode of Treatment - Total time(minutes) 90 minutes   PT Cumulative Minutes 90   Cumulative Minutes   Cumulative therapy minutes 180

## 2022-01-20 NOTE — PROGRESS NOTES
Physical Medicine and Rehabilitation Progress Note  Nely Dyer 61 y o  male MRN: [id number]  Unit/Bed#: -15 Encounter: [id number]    HPI: Nely Dyer is a 61 y o  male with PMH of HTN, T2DM TKA (R) (c/b by infection, requiring revision x2), TKA L, gout,  back surgeries, shoulder surgeries, hand surgeries, depression, HLD, Sleep Apnea who presented to the [address] [place] [address] on 1/5 after being bumped by his dog and falling onto his L hip  He was found to have a nondisplaced L trochanteric fracture with a partial tear at the greater trochanter insertion  He underwent L femur ORIF on 1/11  Post-op course complicated by pain, hyperglycemia  He also had issues with constipation  He was admitted to the [facility] on 1/19  Chief Complaint: [place] pain in feet  Interval History/Subjective:  No acute events overnight  Already feeling a bit better  Pain in feet is severe, burning  Denies any CP, SOB, fevers, chills, N/V, abdominal pain  Had a big BM last night, which was a relief  No other new concerns  ROS:  A 10 point review of systems was negative except for what is noted in the HPI  Today's Changes:  1  Increase gabapentin to 300mg Q8hr, desensitization techniques  2  Start oral Mag  3  Add on Uric Acid  4  Restart metformin today  5  Team, see separate conference note  6  Reviewed XR of foot with patient  Total time spent:  35 minutes, with more than 50% spent counseling/coordinating care  Counseling includes discussion with patient re: progress in therapies, functional issues observed by therapy staff, and discussion with patient his/her current medical state/wellbeing  Coordination of patient's care was performed in conjunction with Internal Medicine service to monitor patient's labs, vitals, and management of their comorbidities  In addition, this patient was discussed by the interdisciplinary team in weekly case conference today   The care of the patient was extensively discussed with all care providers and an appropriate rehabilitation plan was formulated unique for this patient  Barriers were identified preventing progression of therapy and appropriate interventions were discussed with each discipline  Please see the team note for input from all disciplines regarding barriers, intervention, and discharge planning  Assessment/Plan:    * Closed nondisplaced intertrochanteric fracture of left femur with routine healing  Assessment & Plan  Diagnosed on MRI - nondisplaced intertrochanteric fracture with partial tear at greater trochanter insertion  ORIF on 1/11 with [facility]  Multimodal pain control as described below  WBAT  Incisional care  2 week follow-up on 1/25  Outpatient f/u with [facility]       Anemia  Assessment & Plan  Mild, likely ABLA  Checking iron panel  Monitor and transfuse for <7  Slow transit constipation  Assessment & Plan  Likely related to pain medication, inadequate hydration, decreased mobility, recent trauma/surgery  Scheduled docusate/miralax  Giving enema tonight  Polyneuropathy associated with underlying disease [facility])  Assessment & Plan  Most likely related to diabetes  To complete work-up will check SPEP and B12 - pending  Increased Gabapentin 300mg Q8hr  Desensitization techniques  Pain in both feet  Assessment & Plan  Bilateral  R > L   R foot may have component of gout with tophus at 5MT head, but not erythematous and warm  Patient's description sounds more like neuropathic pain  Patient has good pulses  XR R foot negative for acute fracture/process  Checking uric acid  Increase gabapentin, desensitization  Obesity (BMI 30-39  9)  Assessment & Plan  Counseling       Type 2 diabetes mellitus without complication, without long-term current use of insulin [facility])  Assessment & Plan  Lab Results   Component Value Date    HGBA1C 7 2 (H) 01/08/2022       Recent Labs     01/19/22  1633 01/19/22 2005 01/20/22  0613 01/20/22  1111   POCGLU 331* 333* 239* 293*       Blood Sugar Average: Last 72 hrs:  (P) 331     At home: Metformin 1000mg BID, Lantus 28 units daily, Alogliptin 25mg daily, Empaglifluzin 12 5mg daily, Glipizide 5mg BID  Here: Lantus 10 units, Metformin 1000mg BID, CDI, Accuchecks  Diabetic Diet  IM consulted and management at their discretion  Mixed hyperlipidemia  Assessment & Plan  Restart home Lipitor 40mg at night  Essential hypertension  Assessment & Plan  At home on Lisinopril 30mg daily  Here has not required BP meds  Will closely monitor BP, and add home med as appropriate  IM consulted and management at their discretion    Primary osteoarthritis of both knees  Assessment & Plan  He reports issues with bilateral knees  Dr Yolanda Burden did do TKA to R knee and was following him for this  He reports previous surgery to his L knee with multiple complications/revisions  See multi-modal pain control  Outpatient f/u with Orthopedics  Primary osteoarthritis of both shoulders  Carter Sampson  Last injection was 10/13  Typically gets them done every 6 months or so  May require as he is more reliant on his shoulders  If so, will consult Ortho  Will place order for Diclofenac gel and lidoderm for now  Health Maintenance  #Delirium/Sleep: At risk  No complaints at this time  Focus on environmental interventions - avoiding physical/chemical restraints  Focus on redirection and optimizing pain/bowel/bladder management  #Pain: Tylenol scheduled, Oxycodone 5-10mg PRN, Robaxin scheduled, and adding Gabapentin for neuropathic pain  #Bowel: Last BM 1/18 but backed up  Enema tonight  Added miralax daily to scheduled docusate  #Bladder: Voiding and continent  #Skin/Pressure Injury Prevention: Turn Q2hr in bed, with weight shifts [id number] in wheelchair  Float heels in bed  #DVT Prophylaxis: Lovenox 30mg Q12hr  Will change to 40 during stay   SCDs  #GI Prophylaxis: PO diet  #Code Status:  Full Code  #FEN: Diabetic Diet  #Dispo:  Team 1/20: Changing ELOS to 2 weeks after watching therapy session today  Neuropathic pain primarily limitation  Goal is modified Ind and discharge back to home  Reteam        Objective:    Functional Update:  PT: pending  OT: Ind eating, Sup grooming, Total A bathing x2, Sup UB dressing,  LB Total A x2, Toileting, Total A x2, Toilet transfer, Total A x2  Allergies per EMR    Physical Exam:  Temp:  [98 3 °F (36 8 °C)-101 1 °F (38 4 °C)] 98 3 °F (36 8 °C)  HR:  [] 84  Resp:  [18] 18  BP: (117-138)/(64-76) 138/64  Oxygen Therapy  SpO2: 98 %    Gen: No acute distress, Well-nourished, well-appearing  HEENT: Moist mucus membranes  Cardiovascular: Regular rate, rhythm, S1/S2  Distal pulses palpable  Heme/Extr: No edema  Pulmonary: Non-labored breathing, lungs CTAB   : No gilbert  GI: Soft, non-tender, non-distended  MSK: Required 2 person assist for sit to stand, limited by pain in feet  Integumentary: Skin is warm, dry  Incision site without signs of infection  Staples are in place  Neuro: AAOx3, Speech is intact  Appropriate to questioning  Psych: Normal mood and affect  Diagnostic Studies: reviewed, no new imaging  XR foot 3+ vw right    Result Date: 1/20/2022  Impression: No acute osseous abnormality  Workstation performed: [id number]       Laboratory:  Reviewed   Results from last 7 days   Lab Units 01/20/22  0506   HEMOGLOBIN g/dL 10 4*   HEMATOCRIT % 31 8*   WBC Thousand/uL 10 90     Results from last 7 days   Lab Units 01/20/22  0506   BUN mg/dL 11   POTASSIUM mmol/L 3 5*   CHLORIDE mmol/L 100   CREATININE mg/dL 0 77   AST U/L 24   ALT U/L 34            Patient Active Problem List   Diagnosis    Primary osteoarthritis of both shoulders    Primary osteoarthritis of both knees    Essential hypertension    Mixed hyperlipidemia    Type 2 diabetes mellitus without complication, without long-term current use of insulin (HCC)    Obesity (BMI 30-39  9)    S/P TKR (total knee replacement), right    Closed nondisplaced intertrochanteric fracture of left femur with routine healing    Pain in both feet    Polyneuropathy associated with underlying disease ([address] )    Slow transit constipation    Vitamin D deficiency    Anemia    Hypomagnesemia         Medications  Current Facility-Administered Medications   Medication Dose Route Frequency Provider Last Rate    acetaminophen  650 mg Oral Q6H PRN MARGARITA Morillo      acetaminophen  650 mg Oral [facility] Delon Carlton MD      atorvastatin  40 mg Oral Daily With Yohannes Dangelo MD      Diclofenac Sodium  2 g Topical 4x Daily PRN Delon Carlton MD      docusate sodium  100 mg Oral BID Delon Carlton MD      enoxaparin  30 mg Subcutaneous BID Delon Carlton MD      ergocalciferol  50,000 Units Oral Weekly MARGARITA Morillo      gabapentin  100 mg Oral [facility] Delon Carlton MD      insulin glargine  10 Units Subcutaneous HS Delon Carlton MD      insulin lispro  1-5 Units Subcutaneous TID [facility] Delon Carlton MD      insulin lispro  1-5 Units Subcutaneous HS Delon Carlton MD      lidocaine  2 patch Topical Daily PRN Delon Carlton MD      magnesium oxide  800 mg Oral BID MARGARITA Morillo      metFORMIN  1,000 mg Oral BID With Meals MARGARITA Morillo      methocarbamol  500 mg Oral 4x Daily Delon Carlton MD      ondansetron  4 mg Oral Q6H PRN Delon Carlton MD      oxyCODONE  10 mg Oral Q4H PRN Delon Carlton MD      oxyCODONE  5 mg Oral Q4H PRN Delon Carlton MD      polyethylene glycol  17 g Oral Daily Delon Carlton MD          ** Please Note: Fluency Direct voice to text software may have been used in the creation of this document   **    =

## 2022-01-20 NOTE — PLAN OF CARE
Problem: CARDIOVASCULAR - ADULT  Goal: Maintains optimal cardiac output and hemodynamic stability  Description: INTERVENTIONS:  - Monitor I/O, vital signs and rhythm  - Monitor for S/S and trends of decreased cardiac output  - Administer and titrate ordered vasoactive medications to optimize hemodynamic stability  - Assess quality of pulses, skin color and temperature  - Assess for signs of decreased coronary artery perfusion  - Instruct patient to report change in severity of symptoms  Outcome: Progressing     Problem: RESPIRATORY - ADULT  Goal: Achieves optimal ventilation and oxygenation  Description: INTERVENTIONS:  - Assess for changes in respiratory status  - Assess for changes in mentation and behavior  - Position to facilitate oxygenation and minimize respiratory effort  - Oxygen administered by appropriate delivery if ordered  - Initiate smoking cessation education as indicated  - Encourage broncho-pulmonary hygiene including cough, deep breathe, Incentive Spirometry  - Assess the need for suctioning and aspirate as needed  - Assess and instruct to report SOB or any respiratory difficulty  - Respiratory Therapy support as indicated  Outcome: Progressing     Problem: GASTROINTESTINAL - ADULT  Goal: Maintains or returns to baseline bowel function  Description: INTERVENTIONS:  - Assess bowel function  - Encourage oral fluids to ensure adequate hydration  - Administer IV fluids if ordered to ensure adequate hydration  - Administer ordered medications as needed  - Encourage mobilization and activity  - Consider nutritional services referral to assist patient with adequate nutrition and appropriate food choices  Outcome: Progressing

## 2022-01-20 NOTE — PLAN OF CARE
Problem: Potential for Falls  Goal: Patient will remain free of falls  Description: INTERVENTIONS:  - Educate patient/family on patient safety including physical limitations  - Instruct patient to call for assistance with activity   - Consult OT/PT to assist with strengthening/mobility   - Keep Call bell within reach  - Keep bed low and locked with side rails adjusted as appropriate  - Keep care items and personal belongings within reach  - Initiate and maintain comfort rounds  - Make Fall Risk Sign visible to staff  - Offer Toileting every  Hours, in advance of need  - Initiate/Maintain alarm  - Obtain necessary fall risk management equipment: - Apply yellow socks and bracelet for high fall risk patients  - Consider moving patient to room near nurses station  Outcome: Progressing     Problem: CARDIOVASCULAR - ADULT  Goal: Maintains optimal cardiac output and hemodynamic stability  Description: INTERVENTIONS:  - Monitor I/O, vital signs and rhythm  - Monitor for S/S and trends of decreased cardiac output  - Administer and titrate ordered vasoactive medications to optimize hemodynamic stability  - Assess quality of pulses, skin color and temperature  - Assess for signs of decreased coronary artery perfusion  - Instruct patient to report change in severity of symptoms  Outcome: Progressing

## 2022-01-20 NOTE — ASSESSMENT & PLAN NOTE
Mild  Normocytic  Component of ABLA  1/30 Hgb improved at 11 7   Cyanocobalamin given low normal    IM checked iron panel, started on Vit C and iron supplementation  Monitor and transfuse for <7

## 2022-01-20 NOTE — ASSESSMENT & PLAN NOTE
He reports issues with bilateral knees  Dr Gemma Mcardle did do TKA to R knee and was following him for this  He reports previous surgery to his L knee with multiple complications/revisions  See multi-modal pain control  Outpatient f/u with Orthopedics

## 2022-01-20 NOTE — PCC OCCUPATIONAL THERAPY
1/26/22  ADL: S/u UB, CGA LB for CM, CGA toiletintg  TRANSFER: CGA with RW, pt performs STS and SPT with Rw with use of TEDS and sneakers, pt reporting inc comfort and ability to perform fx mobility and transfers  D/C PLAN: Pt is making steady gains anf is anticipated to achieve Keron level goals  Anticipated DC early next week with home OT services    Pt barriers include foot pain, L hip pain, dec activity tolerance, dec balance, dec UE strength, dec standing tolerance/balance, impacting participation in ADL/IADL's  In order to address fx deficits, skilled OT services have worked on self-care, therapeutic exercise, therapeutic activity, modalities PRN in order to inc fx performance and independence  Therapist has discussed POC with pt and areas of focus with pt verbalizing understanding  Barriers Intervention   Feet pain/L hip pain Ice, TEDS and sneakers   Dec activity tolerance There act   Dec balance There act, balance training   Dec standing tolerance In stance WB activities        1/19/22  Vivien Suero is a 61 y o  male with PMH of HTN, T2DM TKA (R) (c/b by infection, requiring revision x2), TKA L, gout,  back surgeries, shoulder surgeries, hand surgeries, depression, HLD, Sleep Apnea who presented to the [facility] on 1/5 after being bumped by his dog and falling onto his L hip  He was found to have a nondisplaced L trochanteric fracture with a partial tear at the greater trochanter insertion  He underwent L femur ORIF on 1/11  Post-op course complicated by pain, hyperglycemia  He also had issues with constipation  He was admitted to the [facility] on 1/19  Pt supine in bed upon therapist arrival  Beginning of session pt very upset from previous hospital stay  However, pleasant throughout  Pt reports he lives in a multi level home with 3STE with BHR  Pt reports he typically does not go upstairs and stays on FF  Pt reports he was indep with all ADLS, +, reports he takes care of his dogs   Reports he mainly stays on FF which has full BR with tub/shower  At this time pt req S for UB wash/dress, but req Ax2 for STS, SPT and lB dressing 2* to inc harry feet pain which Dr Nicole Nunes reporting is neuropathic  Pt seen for 90mins of skilled OT services with emphasis on self-care, transfers,, as well as therapist educated pt on rehab process, goal setting and ELOS which pt verbalized understanding  Pt presents with the following performance component deficits impacting ADL/IADL skills: harry feet pain, weakness, impaired balance, decreased endurance, decreased coordination, increased fall risk, new onset of impairment of functional mobility, decreased ADLS, decreased IADLS, decreased activity tolerance, decreased safety awareness, that result in activity limitations and/or participation restrictions  Pt to continue to benefit from continued acute rehab OT services during hospital stay to address defined deficits and to maximize level of functional independence in the following Occupational Performance areas: grooming, bathing/shower, toilet hygiene, dressing, medication management, functional mobility, community mobility, clothing management, cleaning  Treatment approaches may include: OT eval, self-care, there ex, therapeutic activity, modalities  Pt presents with good rehab potential  This evaluation requires clinical decision making of moderate complexity, because the patient presents with comorbidites that affect occupational performance and required significant modification of tasks or assistance with consideration of multiple treatment options  Pt is unsafe to D/C home at this time, recommending ELOS 2 weeks to achieve Keron level goals with least restrictive device to address these areas and resume prior occupational roles to maximize independence to reduce risk of fall and decrease risk of readmission

## 2022-01-20 NOTE — PCC PHYSICAL THERAPY
61 yr old male admitted to [facility] 1/19/22 sp fall walking dog, sustained + L non displaced IT fx, with greater trochanter insertion tear  Underwent L hip ORIF by Dr Allison Camacho 1/11/22  Cleared for WBAT L LE  At baseline pt lives with spouse in ML home with first floor setup, 3 ALEXSANDRA, Fully I PTA  without AD  PT to eval pt this afternoon  1/26/22  Pt making progress towards mod I goals despite pain into L Hip and R heel  Reports pain medications and cold packs help, as well as LATRELL stocking and sneakers  Pt amb up to 50 with RW, standing rest breaks as needed  Heavy use on UE due to limited by knee ROM t/o gait cycle  Barriers -> Interventions  Pain -> medication and modalities  ALEXSANDRA -> initiated stair trials as able  Dec B LE ROM -> AROM as sky, strengthening  Dec I with transfers -> progressing  Ongoing strengthening and transfer training  Needing inc time for all transfers  Dec amb -> has 150ft goal however only needs household distances for DC home  Anticipate DC home mid next week, with home PT recommended for DC (possibly Kasandra Records as in home out pt, pending needs)  Pt has RW

## 2022-01-20 NOTE — ASSESSMENT & PLAN NOTE
At home on Lisinopril 30mg daily  Here has not required BP meds  Will closely monitor BP, and add home med as appropriate    IM consulted and management at their discretion

## 2022-01-20 NOTE — ASSESSMENT & PLAN NOTE
Sees Dr Tip Maharaj  Last injection was 10/13  Typically gets them done every 6 months or so  Has not required during stay  Will follow-up with Dr Tip Maharaj as an outpatient  Will place order for Diclofenac gel and lidoderm for now

## 2022-01-21 ENCOUNTER — APPOINTMENT (INPATIENT)
Dept: RADIOLOGY | Facility: HOSPITAL | Age: 67
DRG: 560 | End: 2022-01-21
Payer: MEDICARE

## 2022-01-21 PROBLEM — L03.90 CELLULITIS: Status: ACTIVE | Noted: 2022-01-21

## 2022-01-21 LAB
GLUCOSE SERPL-MCNC: 191 MG/DL (ref 65–140)
GLUCOSE SERPL-MCNC: 222 MG/DL (ref 65–140)
GLUCOSE SERPL-MCNC: 222 MG/DL (ref 65–140)
GLUCOSE SERPL-MCNC: 283 MG/DL (ref 65–140)

## 2022-01-21 PROCEDURE — 97110 THERAPEUTIC EXERCISES: CPT

## 2022-01-21 PROCEDURE — 97530 THERAPEUTIC ACTIVITIES: CPT

## 2022-01-21 PROCEDURE — 97535 SELF CARE MNGMENT TRAINING: CPT

## 2022-01-21 PROCEDURE — 73552 X-RAY EXAM OF FEMUR 2/>: CPT

## 2022-01-21 PROCEDURE — 82948 REAGENT STRIP/BLOOD GLUCOSE: CPT

## 2022-01-21 PROCEDURE — 99232 SBSQ HOSP IP/OBS MODERATE 35: CPT | Performed by: INTERNAL MEDICINE

## 2022-01-21 RX ORDER — DOXYCYCLINE HYCLATE 50 MG/1
324 CAPSULE, GELATIN COATED ORAL
Status: DISCONTINUED | OUTPATIENT
Start: 2022-01-21 | End: 2022-02-01 | Stop reason: HOSPADM

## 2022-01-21 RX ORDER — CEPHALEXIN 500 MG/1
500 CAPSULE ORAL EVERY 12 HOURS SCHEDULED
Status: COMPLETED | OUTPATIENT
Start: 2022-01-21 | End: 2022-01-25

## 2022-01-21 RX ORDER — LANOLIN ALCOHOL/MO/W.PET/CERES
100 CREAM (GRAM) TOPICAL DAILY
Status: DISCONTINUED | OUTPATIENT
Start: 2022-01-21 | End: 2022-01-21

## 2022-01-21 RX ORDER — PREDNISONE 20 MG/1
40 TABLET ORAL DAILY
Status: COMPLETED | OUTPATIENT
Start: 2022-01-21 | End: 2022-01-25

## 2022-01-21 RX ORDER — ASCORBIC ACID 500 MG
500 TABLET ORAL DAILY
Status: DISCONTINUED | OUTPATIENT
Start: 2022-01-21 | End: 2022-02-01 | Stop reason: HOSPADM

## 2022-01-21 RX ADMIN — THIAMINE HCL TAB 100 MG 100 MG: 100 TAB at 09:05

## 2022-01-21 RX ADMIN — GABAPENTIN 300 MG: 300 CAPSULE ORAL at 21:12

## 2022-01-21 RX ADMIN — INSULIN LISPRO 1 UNITS: 100 INJECTION, SOLUTION INTRAVENOUS; SUBCUTANEOUS at 08:03

## 2022-01-21 RX ADMIN — POLYETHYLENE GLYCOL 3350 17 G: 17 POWDER, FOR SOLUTION ORAL at 08:03

## 2022-01-21 RX ADMIN — INSULIN LISPRO 3 UNITS: 100 INJECTION, SOLUTION INTRAVENOUS; SUBCUTANEOUS at 21:13

## 2022-01-21 RX ADMIN — METFORMIN HYDROCHLORIDE 1000 MG: 500 TABLET ORAL at 08:02

## 2022-01-21 RX ADMIN — METHOCARBAMOL TABLETS 500 MG: 500 TABLET, COATED ORAL at 21:12

## 2022-01-21 RX ADMIN — MAGNESIUM OXIDE TAB 400 MG (241.3 MG ELEMENTAL MG) 800 MG: 400 (241.3 MG) TAB at 17:02

## 2022-01-21 RX ADMIN — ACETAMINOPHEN 650 MG: 325 TABLET ORAL at 14:22

## 2022-01-21 RX ADMIN — OXYCODONE HYDROCHLORIDE 10 MG: 10 TABLET ORAL at 17:02

## 2022-01-21 RX ADMIN — ATORVASTATIN CALCIUM 40 MG: 40 TABLET, FILM COATED ORAL at 17:02

## 2022-01-21 RX ADMIN — METHOCARBAMOL TABLETS 500 MG: 500 TABLET, COATED ORAL at 11:56

## 2022-01-21 RX ADMIN — OXYCODONE HYDROCHLORIDE 10 MG: 10 TABLET ORAL at 08:02

## 2022-01-21 RX ADMIN — FERROUS GLUCONATE 324 MG: 324 TABLET ORAL at 09:05

## 2022-01-21 RX ADMIN — MAGNESIUM OXIDE TAB 400 MG (241.3 MG ELEMENTAL MG) 800 MG: 400 (241.3 MG) TAB at 08:03

## 2022-01-21 RX ADMIN — METHOCARBAMOL TABLETS 500 MG: 500 TABLET, COATED ORAL at 17:02

## 2022-01-21 RX ADMIN — GABAPENTIN 300 MG: 300 CAPSULE ORAL at 14:22

## 2022-01-21 RX ADMIN — OXYCODONE HYDROCHLORIDE 10 MG: 10 TABLET ORAL at 21:13

## 2022-01-21 RX ADMIN — CEPHALEXIN 500 MG: 500 CAPSULE ORAL at 21:12

## 2022-01-21 RX ADMIN — GABAPENTIN 300 MG: 300 CAPSULE ORAL at 06:25

## 2022-01-21 RX ADMIN — CEPHALEXIN 500 MG: 500 CAPSULE ORAL at 14:22

## 2022-01-21 RX ADMIN — PREDNISONE 40 MG: 20 TABLET ORAL at 14:22

## 2022-01-21 RX ADMIN — ENOXAPARIN SODIUM 30 MG: 30 INJECTION SUBCUTANEOUS at 21:12

## 2022-01-21 RX ADMIN — METFORMIN HYDROCHLORIDE 1000 MG: 500 TABLET ORAL at 17:02

## 2022-01-21 RX ADMIN — INSULIN LISPRO 2 UNITS: 100 INJECTION, SOLUTION INTRAVENOUS; SUBCUTANEOUS at 17:03

## 2022-01-21 RX ADMIN — METHOCARBAMOL TABLETS 500 MG: 500 TABLET, COATED ORAL at 08:02

## 2022-01-21 RX ADMIN — INSULIN LISPRO 2 UNITS: 100 INJECTION, SOLUTION INTRAVENOUS; SUBCUTANEOUS at 11:56

## 2022-01-21 RX ADMIN — DOCUSATE SODIUM 100 MG: 100 CAPSULE, LIQUID FILLED ORAL at 17:02

## 2022-01-21 RX ADMIN — ACETAMINOPHEN 650 MG: 325 TABLET ORAL at 21:09

## 2022-01-21 RX ADMIN — ACETAMINOPHEN 650 MG: 325 TABLET ORAL at 06:24

## 2022-01-21 RX ADMIN — OXYCODONE HYDROCHLORIDE AND ACETAMINOPHEN 500 MG: 500 TABLET ORAL at 09:05

## 2022-01-21 RX ADMIN — ENOXAPARIN SODIUM 30 MG: 30 INJECTION SUBCUTANEOUS at 08:02

## 2022-01-21 RX ADMIN — DOCUSATE SODIUM 100 MG: 100 CAPSULE, LIQUID FILLED ORAL at 08:02

## 2022-01-21 RX ADMIN — INSULIN GLARGINE 10 UNITS: 100 INJECTION, SOLUTION SUBCUTANEOUS at 21:12

## 2022-01-21 NOTE — ASSESSMENT & PLAN NOTE
Related to acute blood loss post-procedure  Hgb currently 10 4  Hgb was 15 0 pre-operatively  Iron panel on 1/20: Iron Sat 22%, TIBC 147, Iron 32, and Ferritin 304  Started on ferrous gluconate and ascorbic acid  Monitor for s/s of active bleeding  Continue to trend with routine CBC

## 2022-01-21 NOTE — PLAN OF CARE
Problem: Potential for Falls  Goal: Patient will remain free of falls  Description: INTERVENTIONS:  - Educate patient/family on patient safety including physical limitations  - Instruct patient to call for assistance with activity   - Consult OT/PT to assist with strengthening/mobility   - Keep Call bell within reach  - Keep bed low and locked with side rails adjusted as appropriate  - Keep care items and personal belongings within reach  - Initiate and maintain comfort rounds  - Make Fall Risk Sign visible to staff  - Offer Toileting every  Hours, in advance of need  - Initiate/Maintain alarm  - Obtain necessary fall risk management equipment:   - Apply yellow socks and bracelet for high fall risk patients  - Consider moving patient to room near nurses station  Outcome: Progressing     Problem: NEUROSENSORY - ADULT  Goal: Achieves stable or improved neurological status  Description: INTERVENTIONS  - Monitor and report changes in neurological status  - Monitor vital signs such as temperature, blood pressure, glucose, and any other labs ordered   - Initiate measures to prevent increased intracranial pressure  - Monitor for seizure activity and implement precautions if appropriate      Outcome: Progressing     Problem: RESPIRATORY - ADULT  Goal: Achieves optimal ventilation and oxygenation  Description: INTERVENTIONS:  - Assess for changes in respiratory status  - Assess for changes in mentation and behavior  - Position to facilitate oxygenation and minimize respiratory effort  - Oxygen administered by appropriate delivery if ordered  - Initiate smoking cessation education as indicated  - Encourage broncho-pulmonary hygiene including cough, deep breathe, Incentive Spirometry  - Assess the need for suctioning and aspirate as needed  - Assess and instruct to report SOB or any respiratory difficulty  - Respiratory Therapy support as indicated  Outcome: Progressing

## 2022-01-21 NOTE — ASSESSMENT & PLAN NOTE
Lab Results   Component Value Date    HGBA1C 7 2 (H) 01/08/2022       Recent Labs     01/20/22  1111 01/20/22  1616 01/20/22  2043 01/21/22  0618   POCGLU 293* 267* 204* 191*       Blood Sugar Average: Last 72 hrs:  (P) 265 [id number]     Last A1c 7 5 on 11/16  Home regimen: Alogliptin 25mg daily, Jardiance 25mg daily, Glipizide 5mg BID, Lantus 28u at HS, and metformin 1000mg BID  Started on Lantus 10u at HS and metformin 1000mg BID on 1/20  Continue QID accuchecks and SSI  Continue cons  carb diet  Continue to follow-up with PCP as outpatient

## 2022-01-21 NOTE — PROGRESS NOTES
01/21/22 1230   Pain Assessment   Pain Assessment Tool 0-10   Pain Score 7   Pain Location/Orientation Orientation: Right;Location: Foot   Restrictions/Precautions   Precautions Fall Risk;Pain   LLE Weight Bearing Per Order WBAT   Sit to Lying   Type of Assistance Needed Physical assistance   Physical Assistance Level 26%-50%   Comment BLE lift   Sit to Lying CARE Score 3   Bed-Chair Transfer   Type of Assistance Needed Physical assistance   Physical Assistance Level 51%-75%   Comment Mod A slide board    Chair/Bed-to-Chair Transfer CARE Score 2   Transfer Bed/Chair/Wheelchair   Findings Used slide board due to pt refusing to stand due to R foot pain ( pt min to mod A depending on fatigue / pain/ set up -  Lacks knee flexion from old TKR  so watch that feet down slide out from under him-  relies heavy on UEs but also has OA shoulders so needs slide assist and set up and safety cues   Walk 10 Feet   Reason if not Attempted Safety concerns   Walk 10 Feet CARE Score 88   Walk 50 Feet with Two Turns   Reason if not Attempted Safety concerns   Walk 50 Feet with Two Turns CARE Score 88   Curb or Single Stair   Reason if not Attempted Safety concerns   1 Step (Curb) CARE Score 88   4 Steps   Reason if not Attempted Safety concerns   4 Steps CARE Score 88   Therapeutic Interventions   Strengthening Seated LAQ on L 10x3,  hip flex aarom 10x3   Assessment   Treatment Assessment 60 min session started with bringing pt down to gym in recliner (was going to perform mat program) , pt reports needed to use commode so first portion of session was slide board to commode and performed wt shifting for hygiene and pt was able to perform partial push up for therapist to get pants up  Pt used LLE for push up but did not use R due to foot pain  Pt perform seated TE for ROM and at end of session transport came to take pt for xray so xfered to stretcher (needed 2 people for this due to going up hill)    Cont skilled PT with LE strengthening and ROM,  transfers and attempt standing and walking when pt able  Problem List Decreased strength;Decreased range of motion;Decreased endurance; Impaired balance;Decreased mobility;Pain   Barriers to Discharge Decreased caregiver support; Inaccessible home environment   Plan   Progress Slow progress, decreased activity tolerance   PT Therapy Minutes   PT Time In 1230   PT Time Out 1330   PT Total Time (minutes) 60   PT Mode of treatment - Individual (minutes) 60   PT Mode of treatment - Concurrent (minutes) 0   PT Mode of treatment - Group (minutes) 0   PT Mode of treatment - Co-treat (minutes) 0   PT Mode of Treatment - Total time(minutes) 60 minutes   PT Cumulative Minutes 150   Therapy Time missed   Time missed?  No

## 2022-01-21 NOTE — ASSESSMENT & PLAN NOTE
Possible cellulitis infection to the R foot/heel  Hx of previous cellulitis infections in same location  Started on keflex 500mg BID on 1/21  Monitor for s/s of reoccurring infection

## 2022-01-21 NOTE — ASSESSMENT & PLAN NOTE
Continue gabapentin 100mg Q8 - increased to 300mg Q8 on 1/20  Daily skin checks/neurovascular checks  Considerations with PT/OT therapies  Vitamin B12 level 315 - start on thiamine supplementation

## 2022-01-21 NOTE — PROGRESS NOTES
01/21/22 0815   Pain Assessment   Pain Assessment Tool 0-10   Pain Score 10 - Worst Possible Pain   Pain Location/Orientation Orientation: Right  (foot)   Pain Onset/Description Frequency: Intermittent  (When WBing through R foot)   Effect of Pain on Daily Activities Affects fxnl transfer's and mobility   Patient's Stated Pain Goal No pain   Hospital Pain Intervention(s) Cold applied;Repositioned   Multiple Pain Sites No   Restrictions/Precautions   Precautions Fall Risk;Pain   Weight Bearing Restrictions Yes   LLE Weight Bearing Per Order WBAT   ROM Restrictions No   Lying to Sitting on Side of Bed   Type of Assistance Needed Supervision; Adaptive equipment   Physical Assistance Level No physical assistance   Comment S with HOB elevated and use of BR   Lying to Sitting on Side of Bed CARE Score 4   Sit to Stand   Comment Pt refusing to trial STS to RW with A x 2 2* to c/o of increased pain in R foot   Reason if not Attempted Refused to perform   Sit to Stand CARE Score 7   Bed-Chair Transfer   Type of Assistance Needed Physical assistance; Adaptive equipment;Verbal cues   Physical Assistance Level Total assistance   Comment Trialed SBT EOB - W/C, W/C - drop arm recliner with pt req A x 2 and cues for technique   Chair/Bed-to-Chair Transfer CARE Score 1   Toilet Transfer   Type of Assistance Needed Physical assistance; Adaptive equipment;Verbal cues   Physical Assistance Level Total assistance   Comment Trialed SBT W/C - platform drop arm BSC with cues for technique and body mechanics   Toilet Transfer CARE Score 1   Therapeutic Excerise-Strength   UE Strength Yes   Right Upper Extremity- Strength   R Shoulder Flexion;ABduction; Extension;Horizontal ABduction; External rotation; Internal rotation   R Elbow Elbow flexion;Elbow extension   R Position Seated   Equipment Dumbbell  (2#)   R Weight/Reps/Sets 2 x 15   RUE Strength Comment Pt encouraged to perform UB TE utilizing 2# dumbbell to perform 2 sets of 15 reps with focus on improving UB strength for ADL transfer's  Pt completed UB TE as tolerated, reports chronic R shoulder pain  Left Upper Extremity-Strength   L Shoulder Flexion;ABduction; Extension;Horizontal ABduction; External rotation; Internal rotation   L Elbow Elbow flexion;Elbow extension   L Position Seated   Equipment Dumbell  (2#)   L Weights/Reps/Sets 2 x 15   LUE Strength Comment See above  Cognition   Overall Cognitive Status WFL   Arousal/Participation Alert   Attention Attends with cues to redirect   Orientation Level Oriented X4   Memory Decreased short term memory   Following Commands Follows multistep commands with increased time or repetition   Additional Activities   Additional Activities Other (Comment)   Additional Activities Comments This MON[place]O briefly discussed LHAE to assist with increasing IND with LB ADL's, as pt does have Keron goals  Pt reports being familiar with Titus Nicole, however, does not plan to utilize upon D/C  Discussed activities to improve dynamic reach to A with completing LB ADL's  Pt agreeable to participate once pain level decreases  Activity Tolerance   Activity Tolerance Patient tolerated treatment well   Assessment   Treatment Assessment Pt seen x 90 min skilled OT Tx session with focus on ADL transfer's, LHAE discussion, pain management, and UB TE  See above for further Tx details  Upon entering pt's room, pt communicating aggressively towards this [place]O and PCA Viktor Russo), expressing frustration with engaging in Tx session when experiencing increased pain in R foot  RN did administer pain medication prior to OT session, however, pt verbalizing no relief  Pt not agreeable to complete STS to RW, however, agreeable to trial SBT OOB to w/c  Pt able to complete with A x 2 and cues for body mechanics  Introduced platform drop arm BSC for toileting until pain in R foot decreases and pt is able to complete SPT's with RW  Pt able to complete SBT to [facility] with Ax2   Communicated completing SBT's at this time to PT Leena Miguel) to discuss with team during stand up meeting  Pt participated in [address] with EDU provided on focus of TE to improve UB strength for ADL transfer's  Pt was not receptive, however, completed and tolerated with rest breaks inbetween  Pt reports difficulty with RUE shoulder from "work" and recieves cortizone shots to A with pain  MONTAÑO briefly discussed Edwin Trejo with focus on improving IND with LB ADL's, however, pt not receptive, stating "I wont use this at home " OT plan to encourage dynamic reach activities to improve IND with LB ADL's  Ice provided to R foot for pain management x 15 mins throughout UB TE, with pt tolerating and verbalized "It's comfortable " Continue POC with focus on progressing to ADL tarnsfer's utilizing RW and improving IND with ADL's  Prognosis Fair   Problem List Decreased strength;Decreased range of motion;Decreased endurance; Impaired balance;Decreased mobility;Pain   Barriers to Discharge Decreased caregiver support; Inaccessible home environment   Plan   Treatment/Interventions ADL retraining;Functional transfer training; Therapeutic exercise; Endurance training   Progress Slow progress, decreased activity tolerance  (limited by pain)   Recommendation   OT Discharge Recommendation   (Pending progress)   Equipment Recommended   (TBD)   OT - OK to Discharge No   OT Therapy Minutes   OT Time In 0815   OT Time Out 0945   OT Total Time (minutes) 90   OT Mode of treatment - Individual (minutes) 90   OT Mode of treatment - Concurrent (minutes) 0   OT Mode of treatment - Group (minutes) 0   OT Mode of treatment - Co-treat (minutes) 0   OT Mode of Treatment - Total time(minutes) 90 minutes   OT Cumulative Minutes 180   Therapy Time missed   Time missed?  No

## 2022-01-21 NOTE — ASSESSMENT & PLAN NOTE
1/6 - Mechanical fall at home onto L hip  CT abd/pelvis - L intertrochanteric femur fracture  1/7 - OR for ORIF with short TFN to L hip - Dr Judie Craig    Ensure adequate pain control  Neurovascular checks Q shift  WBAT to LLE  DVT ppx with Lovenox  Follow-up with Dr Judie Craig in 2 weeks as outpatient (1/21)  Continue PT/OT therapies  Primary team following  No hx of DXA scan - recommend to be done as outpatient  Continue Vitamin D supplementation

## 2022-01-21 NOTE — PROGRESS NOTES
01/21/22 1500   Restrictions/Precautions   Precautions Fall Risk;Pain   LLE Weight Bearing Per Order WBAT   Sit to Stand   Comment Pt refused to stand   Bed-Chair Transfer   Type of Assistance Needed Physical assistance   Physical Assistance Level 51%-75%   Comment Mod A slide board    Chair/Bed-to-Chair Transfer CARE Score 2   Therapeutic Interventions   Flexibility supine knee to chest aarom with slight stretch,  Hip abd/ add arom 10x3,  Hip ext MRE min resisted 10x3   Assessment   Treatment Assessment 30 min session pt was in recliner and requested to get back to bed so performed slide board to bed  Pt still reluctant to use RLE   Pt agreed to ROM/ strenthening on LLE  Had discussion with pt that goal is to get walking again when able (will benefit from LE ROM and strengthening but will need to attempt standing again  Nsg reports med changing today so progress pt as able  Barriers to Discharge Decreased caregiver support; Inaccessible home environment   PT Barriers   Physical Impairment Decreased strength;Decreased range of motion;Decreased endurance;Decreased mobility;Pain   Plan   Progress Slow progress, decreased activity tolerance   PT Therapy Minutes   PT Time In 1500   PT Time Out 1530   PT Total Time (minutes) 30   PT Mode of treatment - Individual (minutes) 30   PT Mode of treatment - Concurrent (minutes) 0   PT Mode of treatment - Group (minutes) 0   PT Mode of treatment - Co-treat (minutes) 0   PT Mode of Treatment - Total time(minutes) 30 minutes   PT Cumulative Minutes 180   Therapy Time missed   Time missed?  No

## 2022-01-21 NOTE — PROGRESS NOTES
[facility]  Progress Note - Donelda Filler 1955, 61 y o  male MRN: [id number]  Unit/Bed#: -01 Encounter: [id number]  Primary Care Provider: No primary care provider on file  Date and time admitted to hospital: 1/19/2022  3:54 PM    Cellulitis  Assessment & Plan  Possible cellulitis infection to the R foot/heel  Hx of previous cellulitis infections in same location  Started on keflex 500mg BID on 1/21  Monitor for s/s of reoccurring infection  Hypomagnesemia  Assessment & Plan  Mg 1 4 on 1/20  Started on magnesium oxide 800mg BID  Recheck Mg on Monday  Anemia  Assessment & Plan  Related to acute blood loss post-procedure  Hgb currently 10 4  Hgb was 15 0 pre-operatively  Iron panel on 1/20: Iron Sat 22%, TIBC 147, Iron 32, and Ferritin 304  Started on ferrous gluconate and ascorbic acid  Monitor for s/s of active bleeding  Continue to trend with routine CBC  Vitamin D deficiency  Assessment & Plan  Vitamin D level 11 on 1/8  Start on Vitamin D 50,000u weekly  Continue to follow-up with PCP as outpatient  Polyneuropathy associated with underlying disease ([facility] [address] )  Assessment & Plan  Continue gabapentin 100mg Q8 - increased to 300mg Q8 on 1/20  Daily skin checks/neurovascular checks  Considerations with PT/OT therapies  Vitamin B12 level 315 - start on thiamine supplementation  Obesity (BMI 30-39  9)  Assessment & Plan  BMI 32 35 on admission  Encourage lifestyle modifications  Type 2 diabetes mellitus without complication, without long-term current use of insulin [facility])  Assessment & Plan  Lab Results   Component Value Date    HGBA1C 7 2 (H) 01/08/2022       Recent Labs     01/20/22  1111 01/20/22  1616 01/20/22  2043 01/21/22  0618   POCGLU 293* 267* 204* 191*       Blood Sugar Average: Last 72 hrs:  (P) 265 [id number]     Last A1c 7 5 on 11/16    Home regimen: Alogliptin 25mg daily, Jardiance 25mg daily, Glipizide 5mg BID, Lantus 28u at HS, and metformin 1000mg BID  Started on Lantus 10u at HS and metformin 1000mg BID on 1/20  Continue QID accuchecks and SSI  Continue cons  carb diet  Continue to follow-up with PCP as outpatient  Mixed hyperlipidemia  Assessment & Plan  Continue Lipitor 40mg daily  Last lipid panel on 11/16: Cholesterol 139, Triglycerides 150, HDL 32, and LDL 82  Essential hypertension  Assessment & Plan  Home lisinopril 30mg currently on hold  Monitor BP with routine VS     Primary osteoarthritis of both shoulders  Assessment & Plan  B/L shoulder glenohumeral osteoarthritis  Follows with Dr Kareem Bro as outpatient  Last received steroid injections on 10/13/21  Ensure adequate pain control  Considerations during therapies  * Closed nondisplaced intertrochanteric fracture of left femur with routine healing  Assessment & Plan  1/6 - Mechanical fall at home onto L hip  CT abd/pelvis - L intertrochanteric femur fracture  1/7 - OR for ORIF with short TFN to L hip - Dr Earlene Trevino    Ensure adequate pain control  Neurovascular checks Q shift  WBAT to LLE  DVT ppx with Lovenox  Follow-up with Dr Earlene Trevino in 2 weeks as outpatient (1/21)  Continue PT/OT therapies  Primary team following  No hx of DXA scan - recommend to be done as outpatient  Continue Vitamin D supplementation  VTE Pharmacologic Prophylaxis:   Pharmacologic: Enoxaparin (Lovenox)  Mechanical VTE Prophylaxis in Place: Yes - sequential compression devices  Current Length of Stay: 2 day(s)    Current Patient Status: Inpatient Rehab     Discharge Plan: As per primary team     Code Status: Level 1 - Full Code    Subjective:   Pt examined while pt sitting in recliner in pt room  Complaints of burning pain specifically to the R foot, 7/10, no improvement with pain medications or gabapentin  Assessment of R foot demonstrated warmth and redness  Admits that he has had previous cellulitis infections in the same spot    Will start on antibiotics at this time  Denies any fevers or chills  Slept well last night  Denies any SOB, palpitations, lightheadedness, or dizziness  Therapy went well this morning  Denies any L hip pain - plans for follow-up XR later today  Has no other concerns or complaints currently  Objective:     Vitals:   Temp (24hrs), Av 1 °F (36 7 °C), Min:98 °F (36 7 °C), Max:98 4 °F (36 9 °C)    Temp:  [98 °F (36 7 °C)-98 4 °F (36 9 °C)] 98 °F (36 7 °C)  HR:  [85-88] 88  Resp:  [18] 18  BP: ()/(56-65) 118/63  SpO2:  [93 %-98 %] 98 %  Body mass index is 32 35 kg/m²  Review of Systems   Constitutional: Negative for appetite change, chills, fatigue and fever  Respiratory: Negative for cough and shortness of breath  Cardiovascular: Negative for chest pain, palpitations and leg swelling  Gastrointestinal: Negative for abdominal distention, abdominal pain, constipation, diarrhea, nausea and vomiting  LBM    Genitourinary: Negative for difficulty urinating  Musculoskeletal: Negative for arthralgias and back pain  Burning pain to R foot, /10, warm, red  Hx of previous cellulitis  Neurological: Negative for dizziness, weakness, light-headedness, numbness and headaches  Psychiatric/Behavioral: Negative for sleep disturbance  Input and Output Summary (last 24 hours): Intake/Output Summary (Last 24 hours) at 2022 1301  Last data filed at 2022 0900  Gross per 24 hour   Intake 120 ml   Output --   Net 120 ml       Physical Exam:     Physical Exam  Vitals and nursing note reviewed  Constitutional:       Appearance: Normal appearance  He is obese  HENT:      Head: Normocephalic and atraumatic  Cardiovascular:      Rate and Rhythm: Normal rate and regular rhythm  Pulses: Normal pulses  Heart sounds: Murmur heard  Systolic murmur is present with a grade of 1/6  No friction rub  Pulmonary:      Effort: Pulmonary effort is normal  No respiratory distress        Breath sounds: Normal breath sounds  No wheezing or rhonchi  Abdominal:      General: Abdomen is flat  Bowel sounds are normal  There is no distension  Palpations: Abdomen is soft  Tenderness: There is no abdominal tenderness  Musculoskeletal:      Cervical back: Normal range of motion and neck supple  Right lower leg: Edema (+1 pitting edema) present  Left lower leg: Edema (Minimal non-pitting edema) present  Skin:     General: Skin is warm and dry  Capillary Refill: Capillary refill takes less than 2 seconds  Findings: Erythema (Area of erythema on dorsal aspect of R foot and posterior heel with warmth and edema   ) present  Comments: Surgical dressing on L hip  Dressing is CDI  Neurological:      Mental Status: He is alert and oriented to person, place, and time     Psychiatric:         Mood and Affect: Mood normal          Behavior: Behavior normal          Additional Data:     Labs:    Results from last 7 days   Lab Units 01/20/22  0506   WBC Thousand/uL 10 90   HEMOGLOBIN g/dL 10 4*   HEMATOCRIT % 31 8*   PLATELETS Thousands/uL 433   NEUTROS PCT % 76*   LYMPHS PCT % 13*   MONOS PCT % 10   EOS PCT % 2     Results from last 7 days   Lab Units 01/20/22  0506   SODIUM mmol/L 136*   POTASSIUM mmol/L 3 5*   CHLORIDE mmol/L 100   CO2 mmol/L 30   BUN mg/dL 11   CREATININE mg/dL 0 77   ANION GAP mmol/L 6   CALCIUM mg/dL 8 8   ALBUMIN g/dL 3 3   TOTAL BILIRUBIN mg/dL 0 96   ALK PHOS U/L 120   ALT U/L 34   AST U/L 24   GLUCOSE RANDOM mg/dL 227*         Results from last 7 days   Lab Units 01/21/22  1128 01/21/22  0618 01/20/22  2043 01/20/22  1616 01/20/22  1111 01/20/22  0613 01/19/22  2005 01/19/22  1633   POC GLUCOSE mg/dl 222* 191* 204* 267* 293* 239* 333* 331*               Labs reviewed    Imaging:    Imaging reviewed    Recent Cultures (last 7 days):           Last 24 Hours Medication List:   Current Facility-Administered Medications   Medication Dose Route Frequency Provider Last Rate    acetaminophen  650 mg Oral Q6H PRN MARGARITA Bruce      acetaminophen  650 mg Oral [facility] Latonya Espino MD      ascorbic acid  500 mg Oral Daily MARGARITA Bruce      atorvastatin  40 mg Oral Daily With Talita Lomas MD      cephalexin  500 mg Oral Q12H [address] MARGARITA Chanel      Diclofenac Sodium  2 g Topical 4x Daily PRN Latonya Espino MD      docusate sodium  100 mg Oral BID Latonya Espino MD      enoxaparin  30 mg Subcutaneous BID Latonya Espino MD      ergocalciferol  50,000 Units Oral Weekly MARGARITA Bruce      ferrous gluconate  324 mg Oral Daily Before Breakfast MARGARITA Bruce      gabapentin  300 mg Oral [facility] Latonya Espino MD      insulin glargine  10 Units Subcutaneous HS Latonya Espino MD      insulin lispro  1-5 Units Subcutaneous TID [facility] Latonya Espino MD      insulin lispro  1-5 Units Subcutaneous HS Latonya Espino MD      lidocaine  2 patch Topical Daily PRN Latonya Espino MD      magnesium oxide  800 mg Oral BID MARGARITA Bruce      metFORMIN  1,000 mg Oral BID With Meals MARGARITA Bruce      methocarbamol  500 mg Oral 4x Daily Latonya Espino MD      ondansetron  4 mg Oral Q6H PRN Latonya Espino MD      oxyCODONE  10 mg Oral Q4H PRN Latonya Espino MD      oxyCODONE  5 mg Oral Q4H PRN Latonya Espino MD      polyethylene glycol  17 g Oral Daily Latonya Espino MD      thiamine  100 mg Oral Daily MARGARITA Bruce          M*Modal software was used to dictate this note  It may contain errors with dictating incorrect words or incorrect spelling  Please contact the provider directly with any questions

## 2022-01-21 NOTE — QUICK NOTE
Reviewed chart  Continue current pain management for now  Uric acid normal, can hold on colchicine for now unless he develops characteristic symptoms of inflammation  Diabetes management as per TATIANA Sommer MD  Physical Medicine and Rehabilitation

## 2022-01-21 NOTE — ASSESSMENT & PLAN NOTE
B/L shoulder glenohumeral osteoarthritis  Follows with Dr Carletta Boeck as outpatient  Last received steroid injections on 10/13/21  Ensure adequate pain control  Considerations during therapies

## 2022-01-21 NOTE — PLAN OF CARE
Problem: RESPIRATORY - ADULT  Goal: Achieves optimal ventilation and oxygenation  Description: INTERVENTIONS:  - Assess for changes in respiratory status  - Assess for changes in mentation and behavior  - Position to facilitate oxygenation and minimize respiratory effort  - Oxygen administered by appropriate delivery if ordered  - Initiate smoking cessation education as indicated  - Encourage broncho-pulmonary hygiene including cough, deep breathe, Incentive Spirometry  - Assess the need for suctioning and aspirate as needed  - Assess and instruct to report SOB or any respiratory difficulty  - Respiratory Therapy support as indicated  Outcome: Progressing     Problem: GASTROINTESTINAL - ADULT  Goal: Maintains or returns to baseline bowel function  Description: INTERVENTIONS:  - Assess bowel function  - Encourage oral fluids to ensure adequate hydration  - Administer IV fluids if ordered to ensure adequate hydration  - Administer ordered medications as needed  - Encourage mobilization and activity  - Consider nutritional services referral to assist patient with adequate nutrition and appropriate food choices  Outcome: Progressing  Goal: Maintains adequate nutritional intake  Description: INTERVENTIONS:  - Monitor percentage of each meal consumed  - Identify factors contributing to decreased intake, treat as appropriate  - Assist with meals as needed  - Monitor I&O, weight, and lab values if indicated  - Obtain nutrition services referral as needed  Outcome: Progressing

## 2022-01-22 LAB
ALBUMIN SERPL ELPH-MCNC: 3.23 G/DL (ref 3.5–5)
ALBUMIN SERPL ELPH-MCNC: 49.7 % (ref 52–65)
ALPHA1 GLOB SERPL ELPH-MCNC: 0.53 G/DL (ref 0.1–0.4)
ALPHA1 GLOB SERPL ELPH-MCNC: 8.1 % (ref 2.5–5)
ALPHA2 GLOB SERPL ELPH-MCNC: 0.96 G/DL (ref 0.4–1.2)
ALPHA2 GLOB SERPL ELPH-MCNC: 14.8 % (ref 7–13)
BETA GLOB ABNORMAL SERPL ELPH-MCNC: 0.44 G/DL (ref 0.4–0.8)
BETA1 GLOB SERPL ELPH-MCNC: 6.8 % (ref 5–13)
BETA2 GLOB SERPL ELPH-MCNC: 6.6 % (ref 2–8)
BETA2+GAMMA GLOB SERPL ELPH-MCNC: 0.43 G/DL (ref 0.2–0.5)
GAMMA GLOB ABNORMAL SERPL ELPH-MCNC: 0.91 G/DL (ref 0.5–1.6)
GAMMA GLOB SERPL ELPH-MCNC: 14 % (ref 12–22)
GLUCOSE SERPL-MCNC: 172 MG/DL (ref 65–140)
GLUCOSE SERPL-MCNC: 234 MG/DL (ref 65–140)
GLUCOSE SERPL-MCNC: 246 MG/DL (ref 65–140)
GLUCOSE SERPL-MCNC: 326 MG/DL (ref 65–140)
IGG/ALB SER: 0.99 {RATIO} (ref 1.1–1.8)
PROT PATTERN SERPL ELPH-IMP: ABNORMAL
PROT SERPL-MCNC: 6.5 G/DL (ref 6.4–8.2)

## 2022-01-22 PROCEDURE — 97535 SELF CARE MNGMENT TRAINING: CPT

## 2022-01-22 PROCEDURE — 97112 NEUROMUSCULAR REEDUCATION: CPT

## 2022-01-22 PROCEDURE — 82948 REAGENT STRIP/BLOOD GLUCOSE: CPT

## 2022-01-22 PROCEDURE — 97110 THERAPEUTIC EXERCISES: CPT

## 2022-01-22 PROCEDURE — 97530 THERAPEUTIC ACTIVITIES: CPT

## 2022-01-22 PROCEDURE — 99232 SBSQ HOSP IP/OBS MODERATE 35: CPT | Performed by: PHYSICAL MEDICINE & REHABILITATION

## 2022-01-22 RX ORDER — LANOLIN ALCOHOL/MO/W.PET/CERES
3 CREAM (GRAM) TOPICAL
Status: DISCONTINUED | OUTPATIENT
Start: 2022-01-22 | End: 2022-01-25

## 2022-01-22 RX ADMIN — PREDNISONE 40 MG: 20 TABLET ORAL at 08:07

## 2022-01-22 RX ADMIN — MAGNESIUM OXIDE TAB 400 MG (241.3 MG ELEMENTAL MG) 800 MG: 400 (241.3 MG) TAB at 08:08

## 2022-01-22 RX ADMIN — CYANOCOBALAMIN TAB 1000 MCG 1000 MCG: 1000 TAB at 08:08

## 2022-01-22 RX ADMIN — GABAPENTIN 300 MG: 300 CAPSULE ORAL at 06:31

## 2022-01-22 RX ADMIN — POLYETHYLENE GLYCOL 3350 17 G: 17 POWDER, FOR SOLUTION ORAL at 08:10

## 2022-01-22 RX ADMIN — INSULIN LISPRO 3 UNITS: 100 INJECTION, SOLUTION INTRAVENOUS; SUBCUTANEOUS at 17:08

## 2022-01-22 RX ADMIN — FERROUS GLUCONATE 324 MG: 324 TABLET ORAL at 06:31

## 2022-01-22 RX ADMIN — MELATONIN TAB 3 MG 3 MG: 3 TAB at 22:54

## 2022-01-22 RX ADMIN — MAGNESIUM OXIDE TAB 400 MG (241.3 MG ELEMENTAL MG) 800 MG: 400 (241.3 MG) TAB at 17:08

## 2022-01-22 RX ADMIN — OXYCODONE HYDROCHLORIDE 10 MG: 10 TABLET ORAL at 08:10

## 2022-01-22 RX ADMIN — OXYCODONE HYDROCHLORIDE AND ACETAMINOPHEN 500 MG: 500 TABLET ORAL at 08:08

## 2022-01-22 RX ADMIN — ACETAMINOPHEN 650 MG: 325 TABLET ORAL at 21:28

## 2022-01-22 RX ADMIN — METFORMIN HYDROCHLORIDE 1000 MG: 500 TABLET ORAL at 08:07

## 2022-01-22 RX ADMIN — METHOCARBAMOL TABLETS 500 MG: 500 TABLET, COATED ORAL at 08:07

## 2022-01-22 RX ADMIN — ENOXAPARIN SODIUM 30 MG: 30 INJECTION SUBCUTANEOUS at 21:28

## 2022-01-22 RX ADMIN — OXYCODONE HYDROCHLORIDE 10 MG: 10 TABLET ORAL at 17:15

## 2022-01-22 RX ADMIN — METHOCARBAMOL TABLETS 500 MG: 500 TABLET, COATED ORAL at 21:28

## 2022-01-22 RX ADMIN — GABAPENTIN 300 MG: 300 CAPSULE ORAL at 21:28

## 2022-01-22 RX ADMIN — INSULIN LISPRO 2 UNITS: 100 INJECTION, SOLUTION INTRAVENOUS; SUBCUTANEOUS at 12:20

## 2022-01-22 RX ADMIN — METHOCARBAMOL TABLETS 500 MG: 500 TABLET, COATED ORAL at 17:09

## 2022-01-22 RX ADMIN — ACETAMINOPHEN 650 MG: 325 TABLET ORAL at 14:08

## 2022-01-22 RX ADMIN — ENOXAPARIN SODIUM 30 MG: 30 INJECTION SUBCUTANEOUS at 08:09

## 2022-01-22 RX ADMIN — GABAPENTIN 300 MG: 300 CAPSULE ORAL at 14:08

## 2022-01-22 RX ADMIN — DOCUSATE SODIUM 100 MG: 100 CAPSULE, LIQUID FILLED ORAL at 08:18

## 2022-01-22 RX ADMIN — METHOCARBAMOL TABLETS 500 MG: 500 TABLET, COATED ORAL at 12:20

## 2022-01-22 RX ADMIN — METFORMIN HYDROCHLORIDE 1000 MG: 500 TABLET ORAL at 17:08

## 2022-01-22 RX ADMIN — DOCUSATE SODIUM 100 MG: 100 CAPSULE, LIQUID FILLED ORAL at 17:08

## 2022-01-22 RX ADMIN — INSULIN LISPRO 2 UNITS: 100 INJECTION, SOLUTION INTRAVENOUS; SUBCUTANEOUS at 21:58

## 2022-01-22 RX ADMIN — ATORVASTATIN CALCIUM 40 MG: 40 TABLET, FILM COATED ORAL at 17:08

## 2022-01-22 RX ADMIN — CEPHALEXIN 500 MG: 500 CAPSULE ORAL at 21:28

## 2022-01-22 RX ADMIN — ACETAMINOPHEN 650 MG: 325 TABLET ORAL at 06:30

## 2022-01-22 RX ADMIN — INSULIN LISPRO 1 UNITS: 100 INJECTION, SOLUTION INTRAVENOUS; SUBCUTANEOUS at 08:09

## 2022-01-22 RX ADMIN — INSULIN GLARGINE 10 UNITS: 100 INJECTION, SOLUTION SUBCUTANEOUS at 21:28

## 2022-01-22 RX ADMIN — CEPHALEXIN 500 MG: 500 CAPSULE ORAL at 08:08

## 2022-01-22 NOTE — PROGRESS NOTES
01/22/22 1301   Pain Assessment   Pain Assessment Tool 0-10   Pain Score 5   Pain Location/Orientation Orientation: Right;Location: Foot   Pain Onset/Description Frequency: Intermittent   Hospital Pain Intervention(s) Repositioned; Rest   Restrictions/Precautions   Precautions Fall Risk;Pain   Weight Bearing Restrictions Yes   LLE Weight Bearing Per Order WBAT   ROM Restrictions No   Toileting Hygiene   Type of Assistance Needed Physical assistance   Physical Assistance Level 26%-50%   Comment Stephan for CM up/down over hips while seated on commode using lateral w/s technique  Pt able to complete rear hygiene seated w/ Sup   Toileting Hygiene CARE Score 3   Toilet Transfer   Type of Assistance Needed Physical assistance; Adaptive equipment   Physical Assistance Level 25% or less   Comment Stephan slideboard xfer w/c<>BSC, with A to place/stabilize board and min vc's for technique   Toilet Transfer CARE Score 3   Toilet Transfer   Surface Assessed Standard Commode   Transfer Technique Slide Board   Limitations Noted In Balance; Endurance;Problem Solving;UE Strength;LE Strength; Safety   Positioning Concerns Raised Seat   Exercise Tools   Exercise Tools Yes   Other Exercise Tool 1 Seated in w/c with Sup, [id number] each alternating UE TherEx (all planes) using 2# free weight w/RUE and 3# w/LUE (pt has history of cortisol shots in B/L shoulders, increased difficulty completing w/RUE)  Focus was on increasing UB strength for increased safety and independence during fxl slide board xfers, sid to commode  Pt tolerated with vc's for encouragement and technique  Cognition   Overall Cognitive Status WFL   Arousal/Participation Alert; Cooperative   Attention Attends with cues to redirect   Orientation Level Oriented X4   Memory Decreased short term memory   Following Commands Follows multistep commands with increased time or repetition   Activity Tolerance   Activity Tolerance Patient tolerated treatment well   Assessment Treatment Assessment Pt seen for 60min skilled OT session focused on toileting, slide board xfers, and UE strengthening, for increased independence w/ADLs/IADLs and decreased caregiver burden  See detailed descriptions of fxl performance above  Pt tolerated session well, requiring increased time to complete BM today  Pt initially resistant to engaging in therapy/ UE strengthening, but webb educated pt on purpose of UB strengthening and provided conversation as redirection, and pt then agreeable  Pt continues to be limited by pain, standing balance/tolerance, and neuropathy  Pt would benefit from continued skilled OT focused on ADL retraining, fxl transfer training, commode transfers, standing balance/tolerance, and strengthening/endurance work  Prognosis Fair   Problem List Decreased strength;Decreased range of motion;Decreased endurance; Impaired balance;Decreased mobility;Pain   Barriers to Discharge Inaccessible home environment;Decreased caregiver support   Plan   Treatment/Interventions ADL retraining;Functional transfer training; Therapeutic exercise; Endurance training;Patient/family training;Equipment eval/education; Bed mobility; Compensatory technique education   Progress Slow progress, decreased activity tolerance   Recommendation   OT Discharge Recommendation   (pending)   OT Therapy Minutes   OT Time In 1300   OT Time Out 1400   OT Total Time (minutes) 60   OT Mode of treatment - Individual (minutes) 60   OT Mode of treatment - Concurrent (minutes) 0   OT Mode of treatment - Group (minutes) 0   OT Mode of treatment - Co-treat (minutes) 0   OT Mode of Treatment - Total time(minutes) 60 minutes   OT Cumulative Minutes 240   Therapy Time missed   Time missed?  No

## 2022-01-22 NOTE — ASSESSMENT & PLAN NOTE
Started on Keflex 1/21 by IM 5 day course - since completed  Foot no longer erythematous or red  Suspect tenderness related to pseudogout/gout, arthritis, and neuropathy

## 2022-01-22 NOTE — PLAN OF CARE
Problem: SKIN/TISSUE INTEGRITY - ADULT  Goal: Incision(s), wounds(s) or drain site(s) healing without S/S of infection  Description: INTERVENTIONS  - Assess and document dressing, incision, wound bed, drain sites and surrounding tissue  - Provide patient and family education  - Perform skin care/dressing changes every shift  Outcome: Progressing     Problem: MUSCULOSKELETAL - ADULT  Goal: Maintain proper alignment of affected body part  Description: INTERVENTIONS:  - Support, maintain and protect limb and body alignment  - Provide patient/ family with appropriate education  Outcome: Progressing

## 2022-01-22 NOTE — PROGRESS NOTES
Physical Medicine and Rehabilitation Progress Note  Lobito Chavarria 61 y o  male MRN: [id number]  Unit/Bed#: -46 Encounter: [id number]    HPI: Lobito Chavarria is a 61 y o  male with PMH of HTN, T2DM TKA (R) (c/b by infection, requiring revision x2), TKA L, gout,  back surgeries, shoulder surgeries, hand surgeries, depression, HLD, Sleep Apnea who presented to the [address] [place] [address] on 1/5 after being bumped by his dog and falling onto his L hip  He was found to have a nondisplaced L trochanteric fracture with a partial tear at the greater trochanter insertion  He underwent L femur ORIF on 1/11  Post-op course complicated by pain, hyperglycemia  He also had issues with constipation  He was admitted to the [facility] on 1/19  Chief Complaint: R foot pain feeling better  L foot worse  Poor sleep  Interval History/Subjective:  R foot pain is getting better, L hip is ok  L foot pain is still persistent  Had some trouble sleeping last night  Drinking decaf tea, juts trouble turning his mind off  Last night the pain wasn't what kept him awake  Denies any CP, Sob, fevers, chills, N/V, abdominal pain  Last BM 1/21  ROS:  A 10 point review of systems was negative except for what is noted in the HPI  Today's Changes:  1  Increase gabapentin to 300mg Q8hr, desensitization techniques  2  Continue prednisone and keflex as per IM  3  Adding melatonin for sleep  4  BG improved this AM  Monitor - IM restarted metformin  Total visit time: 25 minutes, with more than 50% spent counseling/coordinating care  Counseling includes discussion with patient re: progress in therapies, functional issues observed by therapy staff, and discussion with patient regarding their current medical state and wellbeing  Coordination of patient's care was performed in conjunction with Internal Medicine service to monitor patient's labs, vitals, and management of their comorbidities      Assessment/Plan:    * Closed nondisplaced intertrochanteric fracture of left femur with routine healing  Assessment & Plan  Diagnosed on MRI - nondisplaced intertrochanteric fracture with partial tear at greater trochanter insertion  ORIF on 1/11 with [facility]  Multimodal pain control as described below  WBAT  Incisional care  2 week follow-up on 1/25 - XR on 1/21 with near anatomic alignment  Will plan to remove giovani next week  Outpatient f/u with [facility]       Cellulitis  Assessment & Plan  Started on Keflex 1/21 by IM 5 day course  Today foot not erythematous or red  Suspect tenderness related to pseudogout and neuropathy  Hypomagnesemia  Assessment & Plan  Repleted with oral magnesium  Recheck Magnesium with Monday labs  Anemia  Assessment & Plan  Mild  Normocytic  Component of ABLA  Cyanocobalamin  IM checked iron panel, started on Vit C and iron supplementation  Also on B12 supplementation   Monitor and transfuse for <7  Vitamin D deficiency  Assessment & Plan  Started on ergocalciferol on admission  Will plan for 6 weeks, then recheck  Slow transit constipation  Assessment & Plan  Likely related to pain medication, inadequate hydration, decreased mobility, recent trauma/surgery  Scheduled docusate/miralax  Got enema  Last BM 1/21  Polyneuropathy associated with underlying disease [facility])  Assessment & Plan  Most likely related to diabetes  SPEP without monoclonal gammopathy  B12 low normal - started on supplementation  Increased Gabapentin 300mg Q8hr  Desensitization techniques  Pain in both feet  Assessment & Plan  Bilateral  R > L   R foot may have component of gout/pseudogout with tophus at 1MT head, but not erythematous and warm  Patient's description sounds more like neuropathic pain  Patient has good pulses  XR R foot negative for acute fracture/process  Uric acid normal  Continue increased dose of gabapentin desensitization  1/21 IM started 5 day course of prednisone       Obesity (BMI 30-39  9)  Assessment & Plan  Counseling  Type 2 diabetes mellitus without complication, without long-term current use of insulin [facility])  Assessment & Plan  Lab Results   Component Value Date    HGBA1C 7 2 (H) 01/08/2022       Recent Labs     01/21/22  1128 01/21/22  1643 01/21/22  2049 01/22/22  0624   POCGLU 222* 222* 283* 172*       Blood Sugar Average: Last 72 hrs:  (P) 331     At home: Metformin 1000mg BID, Lantus 28 units daily, Alogliptin 25mg daily, Empaglifluzin 12 5mg daily, Glipizide 5mg BID  Here: Lantus 10 units, Metformin 1000mg BID, CDI, Accuchecks  Diabetic Diet  IM consulted and management at their discretion  Mixed hyperlipidemia  Assessment & Plan  Restart home Lipitor 40mg at night  Essential hypertension  Assessment & Plan  At home on Lisinopril 30mg daily  Here has not required BP meds  Will closely monitor BP, and add home med as appropriate  IM consulted and management at their discretion    Primary osteoarthritis of both knees  Assessment & Plan  He reports issues with bilateral knees  Dr Abimbola Jiménez did do TKA to R knee and was following him for this  He reports previous surgery to his L knee with multiple complications/revisions  See multi-modal pain control  Outpatient f/u with Orthopedics  Primary osteoarthritis of both shoulders  Jazmín Bravo  Last injection was 10/13  Typically gets them done every 6 months or so  May require as he is more reliant on his shoulders  If so, will consult Ortho  Will place order for Diclofenac gel and lidoderm for now  Health Maintenance  #Delirium/Sleep: At risk  No complaints at this time  Focus on environmental interventions - avoiding physical/chemical restraints  Focus on redirection and optimizing pain/bowel/bladder management  #Pain: Tylenol scheduled, Oxycodone 5-10mg PRN, Robaxin scheduled, and adding Gabapentin for neuropathic pain  Also on prednisone now  #Bowel: Last BM 1/21   Continue miralax and docusate  #Bladder: Voiding and continent  #Skin/Pressure Injury Prevention: Turn Q2hr in bed, with weight shifts [id number] in wheelchair  Float heels in bed  #DVT Prophylaxis: Lovenox 30mg Q12hr  Will change to 40 during stay  SCDs  #GI Prophylaxis: PO diet  #Code Status:  Full Code  #FEN:  Diabetic Diet  #Dispo:  Team 1/20: Changing ELOS to 2 weeks after watching therapy session today  Neuropathic pain primarily limitation  Goal is modified Ind and discharge back to home  Reteam        Objective:    Functional Update:  PT: Sup bed mobility, Dom transfers  Still struggling with ambulation due to pain in feet  OT: Ind eating, Sup grooming, Total A bathing x2, Sup UB dressing,  LB Total A x2, Toileting, Total A x2, Toilet transfer, Total A x2  Allergies per EMR    Physical Exam:  Temp:  [97 6 °F (36 4 °C)-99 1 °F (37 3 °C)] 97 8 °F (36 6 °C)  HR:  [71-87] 71  Resp:  [18] 18  BP: (120-129)/(63-74) 129/63  Oxygen Therapy  SpO2: 97 %    Gen: No acute distress, Well-nourished, well-appearing  HEENT: Moist mucus membranes, Normocephalic/Atraumatic  Cardiovascular: Regular rate, rhythm, S1/S2  Distal pulses palpable  Heme/Extr: No edema  Pulmonary: Non-labored breathing  Lungs CTAB  : No gilbert  GI: Soft, non-tender, non-distended  BS+  MSK: R foot without overlying erthema  Allodynia has improved  Still swollen, but suspect tophus and callus at 1st MT head  Integumentary: Skin is warm, dry  Neuro: AAOx3  Speech is intact  Appropriate to questioning  Psych: Normal mood and affect  Diagnostic Studies: Reviewed  1/21 XR L Femur:  Nearly anatomically aligned intertrochanteric fracture of the left femur, status post ORIF  No evidence for hardware complication      Status post left knee arthroplasty        Laboratory: Reviewed   Results from last 7 days   Lab Units 01/20/22  0506   HEMOGLOBIN g/dL 10 4*   HEMATOCRIT % 31 8*   WBC Thousand/uL 10 90     Results from last 7 days   Lab Units 01/20/22  0506   BUN mg/dL 11   POTASSIUM mmol/L 3 5*   CHLORIDE mmol/L 100   CREATININE mg/dL 0 77   AST U/L 24   ALT U/L 34            Patient Active Problem List   Diagnosis    Primary osteoarthritis of both shoulders    Primary osteoarthritis of both knees    Essential hypertension    Mixed hyperlipidemia    Type 2 diabetes mellitus without complication, without long-term current use of insulin ([facility])    Obesity (BMI 30-39  9)    S/P TKR (total knee replacement), right    Closed nondisplaced intertrochanteric fracture of left femur with routine healing    Pain in both feet    Polyneuropathy associated with underlying disease ([address] )    Slow transit constipation    Vitamin D deficiency    Anemia    Hypomagnesemia    Cellulitis         Medications  Current Facility-Administered Medications   Medication Dose Route Frequency Provider Last Rate    acetaminophen  650 mg Oral Q6H PRN Alvera Alpesh, CRNP      acetaminophen  650 mg Oral [facility] Brea Savage MD      ascorbic acid  500 mg Oral Daily Alvera Alpesh, CRNP      atorvastatin  40 mg Oral Daily With Danna Willett MD      cephalexin  500 mg Oral Q12H [address]      cyanocobalamin  1,000 mcg Oral Daily Alvera Alpesh, CRNP      Diclofenac Sodium  2 g Topical 4x Daily PRN Brea Savage MD      docusate sodium  100 mg Oral BID Brea Savage MD      enoxaparin  30 mg Subcutaneous BID Brea Savage MD      ergocalciferol  50,000 Units Oral Weekly Alvera Alpesh, MARGARITA      ferrous gluconate  324 mg Oral Daily Before Breakfast Alvejusto Betts CRGROVER      gabapentin  300 mg Oral [facility] Brea Savage MD      insulin glargine  10 Units Subcutaneous HS Brea Savage MD      insulin lispro  1-5 Units Subcutaneous TID [facility] Brea Savage MD      insulin lispro  1-5 Units Subcutaneous HS Brea Savage MD      lidocaine  2 patch Topical Daily PRN Brea Savage MD      magnesium oxide  800 mg Oral BID Alvera Alpesh, CRNP      metFORMIN  1,000 mg Oral BID With Meals MARGARITA Dietrich      methocarbamol  500 mg Oral 4x Daily Timoteo David MD      ondansetron  4 mg Oral Q6H PRN Timoteo David MD      oxyCODONE  10 mg Oral Q4H PRN Timoteo David MD      oxyCODONE  5 mg Oral Q4H PRN Timoteo David MD      polyethylene glycol  17 g Oral Daily Timoteo David MD      predniSONE  40 mg Oral Daily MARGARITA Dietrich          ** Please Note: Fluency Direct voice to text software may have been used in the creation of this document   **    =

## 2022-01-22 NOTE — PROGRESS NOTES
01/22/22 0700   Pain Assessment   Pain Assessment Tool 0-10   Pain Location/Orientation Orientation: Right;Location: Foot   Pain Onset/Description Frequency: Intermittent   Patient's Stated Pain Goal No pain   Restrictions/Precautions   Precautions Fall Risk;Pain   Weight Bearing Restrictions Yes   LLE Weight Bearing Per Order WBAT   ROM Restrictions No   Cognition   Overall Cognitive Status WFL   Arousal/Participation Alert   Attention Attends with cues to redirect   Orientation Level Oriented X4   Memory Decreased short term memory   Following Commands Follows multistep commands with increased time or repetition   Subjective   Subjective Patient reported that he did not sleep last night and had pain in both legs      Roll Left and Right   Reason if not Attempted Activity not applicable   Roll Left and Right CARE Score 9   Sit to Lying   Type of Assistance Needed Physical assistance   Physical Assistance Level 25% or less   Comment Bilateral HR   Sit to Lying CARE Score 3   Lying to Sitting on Side of Bed   Type of Assistance Needed Supervision   Physical Assistance Level No physical assistance   Comment Supervision with head of bed elevated   Lying to Sitting on Side of Bed CARE Score 4   Sit to Stand   Reason if not Attempted Refused to perform   Sit to Stand CARE Score 7   Bed-Chair Transfer   Type of Assistance Needed Physical assistance   Physical Assistance Level 25% or less   Comment assistance for slide board placement into [facility]   Chair/Bed-to-Chair Transfer CARE Score 3   Car Transfer   Reason if not Attempted Environmental limitations   Car Transfer CARE Score 10   Walk 10 Feet   Reason if not Attempted Safety concerns   Walk 10 Feet CARE Score 88   Walk 50 Feet with Two Turns   Reason if not Attempted Safety concerns   Walk 50 Feet with Two Turns CARE Score 88   Walk 150 Feet   Reason if not Attempted Safety concerns   Walk 150 Feet CARE Score 88   Walking 10 Feet on Uneven Surfaces   Reason if not Attempted Safety concerns   Walking 10 Feet on Uneven Surfaces CARE Score 88   Ambulation   Primary Mode of Locomotion Prior to Admission Walk   Wheel 50 Feet with Two Turns   Reason if not Attempted Activity not applicable   Wheel 50 Feet with Two Turns CARE Score 9   Wheel 150 Feet   Reason if not Attempted Activity not applicable   Wheel 367 Feet CARE Score 9   Wheelchair mobility   Findings Anticipate D/C ambulatory    Curb or Single Stair   Reason if not Attempted Safety concerns   1 Step (Curb) CARE Score 88   4 Steps   Reason if not Attempted Safety concerns   4 Steps CARE Score 88   12 Steps   Reason if not Attempted Safety concerns   12 Steps CARE Score 88   Picking Up Object   Comment patient unable to stand   Reason if not Attempted Safety concerns   Picking Up Object CARE Score 88   Toilet Transfer   Positioning Concerns Raised Seat   Therapeutic Interventions   Strengthening Seated and performed bilaterally for all- Seated AAROM knee flexion and extension, seated hip flexion and extension, seated hip adduction and hip abduction- 30 reps each direction; seated quad sets- 30 reps, 3 seocnd holds, seated isometric hip abduction and seated adduction- 30 reps, 3 second holds, seated ball squeezes- 20 reps, 3 second holds; seated long arc quads, 50% assistance and unassisted- 20 reps, 3 second holds; seated heel slides with towel and min assistance from therapist   Flexibility Seated bilateral stretching- Hamstrings, quads, gastrocs, adductor groups within ROM and pain tolerance- 25' total    Neuromuscular Re-Education Seated [employer]- knee extension, knee extension, hip abduction, hip adduction- 30 reps with therapist applied resistance for each rep   Other Transfers per objective   Assessment   Treatment Assessment Patient tolerated session fairly today, patient refused to stand today due to his neuropathy in his bilateral feet, R>L   Patient performed all seated interventions well, improving with his participation in transfers per objective  Patient noted challenges with pain, patient unable to achieve full knee extension and limitations with his overall ROM tolerance as well as his overall intervention pain level control  Patient noted that he is improving with his hip pain throughout session  Patient brought back to room in [facility]  Patient requires skilled PT to improve and maximize his function post hip surgery  Family/Caregiver Present no   PT Family training done with: no   Problem List Decreased strength;Decreased range of motion;Decreased endurance; Impaired balance;Decreased mobility;Pain   Barriers to Discharge Decreased caregiver support; Inaccessible home environment   PT Barriers   Physical Impairment Decreased strength;Decreased range of motion;Decreased endurance;Decreased mobility;Pain   Plan   Treatment/Interventions ADL retraining;Functional transfer training; Therapeutic exercise; Endurance training   Progress Slow progress, decreased activity tolerance   PT Therapy Minutes   PT Time In 0830   PT Time Out 1000   PT Total Time (minutes) 90   PT Mode of treatment - Individual (minutes) 90   PT Mode of treatment - Concurrent (minutes) 0   PT Mode of treatment - Group (minutes) 0   PT Mode of treatment - Co-treat (minutes) 0   PT Mode of Treatment - Total time(minutes) 90 minutes   PT Cumulative Minutes 270   Therapy Time missed   Time missed?  No

## 2022-01-22 NOTE — ASSESSMENT & PLAN NOTE
Started on ergocalciferol on 1/20  Will plan for 6 weeks, then recheck     - Will be on dose 3 of 6 on 2/3/2022  Follow-up with PCP

## 2022-01-22 NOTE — PROGRESS NOTES
01/22/22 1500   Pain Assessment   Pain Assessment Tool 0-10   Pain Score 5   Pain Location/Orientation Orientation: Right;Location: Foot   Restrictions/Precautions   Precautions Fall Risk;Pain   Weight Bearing Restrictions Yes   LLE Weight Bearing Per Order WBAT   ROM Restrictions No   Cognition   Overall Cognitive Status WFL   Arousal/Participation Alert   Attention Attends with cues to redirect   Orientation Level Oriented X4   Memory Decreased short term memory   Following Commands Follows multistep commands with increased time or repetition   Subjective   Subjective Patient reported post OT in [facility] with 5/10 pain  Therapeutic Interventions   Strengthening Seated PROM, AAROM of bilateral LE musculature- hip flexion, hip abduction, hip extension, hip adduction- 30 reps each direction bilaterally; Hip flexion- 20 reps bilaterally; LAQ seated- 20 reps bilaterally   Assessment   Treatment Assessment Patient tolerated short session well today, patient attempted and achieved some AROM and strengthening this session which has been challenging for him to achieve  Patient completed a LAQ and seated hip flexion today with the L LE today which is an improvement  Patient improving with his overall tolerance and decreased pain level  Patient continues to refuse to stand due to his pain in his feet, does not feel comfortable standing  Patient requires skilled PT in order to improve his post surgical status and maximize function  Family/Caregiver Present no   PT Family training done with: no   Problem List Decreased strength;Decreased range of motion;Decreased endurance; Impaired balance;Decreased mobility;Pain   Barriers to Discharge Decreased caregiver support; Inaccessible home environment   PT Barriers   Physical Impairment Decreased strength;Decreased range of motion;Decreased endurance;Decreased mobility;Pain   Plan   Treatment/Interventions ADL retraining;Functional transfer training; Therapeutic exercise; Endurance training   Progress Slow progress, decreased activity tolerance   PT Therapy Minutes   PT Time In 1405   PT Time Out 1430   PT Total Time (minutes) 25   PT Mode of treatment - Individual (minutes) 25   PT Mode of treatment - Concurrent (minutes) 0   PT Mode of treatment - Group (minutes) 0   PT Mode of treatment - Co-treat (minutes) 0   PT Mode of Treatment - Total time(minutes) 25 minutes   PT Cumulative Minutes 295   Therapy Time missed   Time missed?  No

## 2022-01-23 LAB
GLUCOSE SERPL-MCNC: 190 MG/DL (ref 65–140)
GLUCOSE SERPL-MCNC: 204 MG/DL (ref 65–140)
GLUCOSE SERPL-MCNC: 301 MG/DL (ref 65–140)
GLUCOSE SERPL-MCNC: 303 MG/DL (ref 65–140)

## 2022-01-23 PROCEDURE — 97110 THERAPEUTIC EXERCISES: CPT

## 2022-01-23 PROCEDURE — 97535 SELF CARE MNGMENT TRAINING: CPT

## 2022-01-23 PROCEDURE — 82948 REAGENT STRIP/BLOOD GLUCOSE: CPT

## 2022-01-23 RX ADMIN — MAGNESIUM OXIDE TAB 400 MG (241.3 MG ELEMENTAL MG) 800 MG: 400 (241.3 MG) TAB at 17:05

## 2022-01-23 RX ADMIN — ACETAMINOPHEN 650 MG: 325 TABLET ORAL at 21:09

## 2022-01-23 RX ADMIN — MAGNESIUM OXIDE TAB 400 MG (241.3 MG ELEMENTAL MG) 800 MG: 400 (241.3 MG) TAB at 08:13

## 2022-01-23 RX ADMIN — METHOCARBAMOL TABLETS 500 MG: 500 TABLET, COATED ORAL at 11:46

## 2022-01-23 RX ADMIN — OXYCODONE HYDROCHLORIDE 10 MG: 10 TABLET ORAL at 19:57

## 2022-01-23 RX ADMIN — ATORVASTATIN CALCIUM 40 MG: 40 TABLET, FILM COATED ORAL at 17:05

## 2022-01-23 RX ADMIN — CEPHALEXIN 500 MG: 500 CAPSULE ORAL at 08:13

## 2022-01-23 RX ADMIN — INSULIN LISPRO 3 UNITS: 100 INJECTION, SOLUTION INTRAVENOUS; SUBCUTANEOUS at 21:10

## 2022-01-23 RX ADMIN — INSULIN LISPRO 1 UNITS: 100 INJECTION, SOLUTION INTRAVENOUS; SUBCUTANEOUS at 11:46

## 2022-01-23 RX ADMIN — FERROUS GLUCONATE 324 MG: 324 TABLET ORAL at 06:17

## 2022-01-23 RX ADMIN — ACETAMINOPHEN 650 MG: 325 TABLET ORAL at 06:18

## 2022-01-23 RX ADMIN — OXYCODONE HYDROCHLORIDE AND ACETAMINOPHEN 500 MG: 500 TABLET ORAL at 08:13

## 2022-01-23 RX ADMIN — OXYCODONE HYDROCHLORIDE 10 MG: 10 TABLET ORAL at 06:18

## 2022-01-23 RX ADMIN — ENOXAPARIN SODIUM 30 MG: 30 INJECTION SUBCUTANEOUS at 08:14

## 2022-01-23 RX ADMIN — METHOCARBAMOL TABLETS 500 MG: 500 TABLET, COATED ORAL at 21:09

## 2022-01-23 RX ADMIN — PREDNISONE 40 MG: 20 TABLET ORAL at 08:13

## 2022-01-23 RX ADMIN — OXYCODONE HYDROCHLORIDE 10 MG: 10 TABLET ORAL at 12:39

## 2022-01-23 RX ADMIN — MELATONIN TAB 3 MG 3 MG: 3 TAB at 22:19

## 2022-01-23 RX ADMIN — ACETAMINOPHEN 650 MG: 325 TABLET ORAL at 14:40

## 2022-01-23 RX ADMIN — GABAPENTIN 300 MG: 300 CAPSULE ORAL at 06:17

## 2022-01-23 RX ADMIN — METFORMIN HYDROCHLORIDE 1000 MG: 500 TABLET ORAL at 08:13

## 2022-01-23 RX ADMIN — CEPHALEXIN 500 MG: 500 CAPSULE ORAL at 21:09

## 2022-01-23 RX ADMIN — INSULIN LISPRO 3 UNITS: 100 INJECTION, SOLUTION INTRAVENOUS; SUBCUTANEOUS at 16:52

## 2022-01-23 RX ADMIN — ENOXAPARIN SODIUM 30 MG: 30 INJECTION SUBCUTANEOUS at 21:09

## 2022-01-23 RX ADMIN — DOCUSATE SODIUM 100 MG: 100 CAPSULE, LIQUID FILLED ORAL at 17:05

## 2022-01-23 RX ADMIN — METFORMIN HYDROCHLORIDE 1000 MG: 500 TABLET ORAL at 17:05

## 2022-01-23 RX ADMIN — CYANOCOBALAMIN TAB 1000 MCG 1000 MCG: 1000 TAB at 08:13

## 2022-01-23 RX ADMIN — INSULIN LISPRO 1 UNITS: 100 INJECTION, SOLUTION INTRAVENOUS; SUBCUTANEOUS at 08:14

## 2022-01-23 RX ADMIN — DOCUSATE SODIUM 100 MG: 100 CAPSULE, LIQUID FILLED ORAL at 08:13

## 2022-01-23 RX ADMIN — METHOCARBAMOL TABLETS 500 MG: 500 TABLET, COATED ORAL at 08:13

## 2022-01-23 RX ADMIN — INSULIN GLARGINE 10 UNITS: 100 INJECTION, SOLUTION SUBCUTANEOUS at 21:08

## 2022-01-23 RX ADMIN — GABAPENTIN 300 MG: 300 CAPSULE ORAL at 14:40

## 2022-01-23 RX ADMIN — GABAPENTIN 300 MG: 300 CAPSULE ORAL at 21:09

## 2022-01-23 RX ADMIN — POLYETHYLENE GLYCOL 3350 17 G: 17 POWDER, FOR SOLUTION ORAL at 08:13

## 2022-01-23 RX ADMIN — METHOCARBAMOL TABLETS 500 MG: 500 TABLET, COATED ORAL at 17:05

## 2022-01-23 NOTE — PLAN OF CARE
Problem: METABOLIC, FLUID AND ELECTROLYTES - ADULT  Goal: Glucose maintained within target range  Description: INTERVENTIONS:  - Monitor Blood Glucose as ordered  - Assess for signs and symptoms of hyperglycemia and hypoglycemia  - Administer ordered medications to maintain glucose within target range  - Assess nutritional intake and initiate nutrition service referral as needed  Outcome: Progressing     Problem: GENITOURINARY - ADULT  Goal: Urinary catheter remains patent  Description: INTERVENTIONS:  - Assess patency of urinary catheter  - If patient has a chronic gilbert, consider changing catheter if non-functioning  - Follow guidelines for intermittent irrigation of non-functioning urinary catheter  Outcome: Completed

## 2022-01-23 NOTE — PROGRESS NOTES
01/23/22 0700   Pain Assessment   Pain Assessment Tool 0-10   Pain Score 5   Pain Location/Orientation Orientation: Left; Location: Hip   Patient's Stated Pain Goal No pain   Restrictions/Precautions   Precautions Fall Risk;Pain   Weight Bearing Restrictions Yes   LLE Weight Bearing Per Order WBAT   ROM Restrictions No   Cognition   Overall Cognitive Status WFL   Arousal/Participation Alert   Attention Attends with cues to redirect   Orientation Level Oriented X4   Memory Decreased short term memory   Following Commands Follows multistep commands with increased time or repetition   Subjective   Subjective Patient presented in bed this morning, stated that he couldnt sleep last night      Roll Left and Right   Comment patient sleeps in recliner at baseline   Reason if not Attempted Activity not applicable   Roll Left and Right CARE Score 9   Sit to Lying   Type of Assistance Needed Physical assistance   Physical Assistance Level 25% or less   Comment bilateral BR   Sit to Lying CARE Score 3   Lying to Sitting on Side of Bed   Type of Assistance Needed Supervision   Physical Assistance Level No physical assistance   Comment bilateral BR   Lying to Sitting on Side of Bed CARE Score 4   Sit to Stand   Reason if not Attempted Refused to perform   Sit to Stand CARE Score 7   Bed-Chair Transfer   Type of Assistance Needed Incidental touching   Physical Assistance Level No physical assistance   Comment assistance for slide board placement, patient could not fully place under R gluteal area, less than 10% assistance   Chair/Bed-to-Chair Transfer CARE Score 4   Car Transfer   Reason if not Attempted Environmental limitations   Car Transfer CARE Score 10   Walk 10 Feet   Comment R foot pain from neuropathy   Reason if not Attempted Safety concerns   Walk 10 Feet CARE Score 88   Walk 50 Feet with Two Turns   Comment R foot pain from neuropathy   Reason if not Attempted Safety concerns   Walk 50 Feet with Two Turns CARE Score 88   Walk 150 Feet   Comment R foot pain from neuropathy   Reason if not Attempted Safety concerns   Walk 150 Feet CARE Score 88   Walking 10 Feet on Uneven Surfaces   Comment R foot pain from neuropathy   Reason if not Attempted Safety concerns   Walking 10 Feet on Uneven Surfaces CARE Score 88   Wheel 50 Feet with Two Turns   Reason if not Attempted Activity not applicable   Wheel 50 Feet with Two Turns CARE Score 9   Wheel 150 Feet   Reason if not Attempted Activity not applicable   Wheel 933 Feet CARE Score 9   Curb or Single Stair   Reason if not Attempted Safety concerns   1 Step (Curb) CARE Score 88   4 Steps   Reason if not Attempted Safety concerns   4 Steps CARE Score 88   12 Steps   Reason if not Attempted Safety concerns   12 Steps CARE Score 88   Picking Up Object   Reason if not Attempted Safety concerns   Picking Up Object CARE Score 88   Therapeutic Interventions   Strengthening AAROM and AROM knee extension, knee flexion, hip flexion, hip abduction and hip adduction; Long arc quads- 20 reps, 3 second holds; isometric hip abduction and hip adduction- 20 reps, 3 second holds, seated hip flexion with knee extended- 20 reps, 3 seocnd holds   Flexibility Seated bilateral stretching of bilateral hamstring and gastroc within pain and knee ROM tolerance   Other Bed to chair transfer and bed mobility per objective   Assessment   Treatment Assessment Patient tolerated short PT session fairly for 30 minutes today, patient challenged with overall intervention tolerance but improving with his overall intervention performance and required less assistance today with his transfers as well as his AROM today, completing increased ROM as well as completing increased end range stretching and placing feet on the ground  Patient continually limited by neuropathy in bilateral feet, R>L, states that he does not want to try transfers until his is feeling less pain   Patient requires skilled PT in order to improve his post surgical status and to maximize function  Family/Caregiver Present no   PT Family training done with: no   Problem List Decreased strength;Decreased range of motion;Decreased endurance; Impaired balance;Decreased mobility;Pain   Barriers to Discharge Inaccessible home environment;Decreased caregiver support   PT Barriers   Physical Impairment Decreased strength;Decreased range of motion;Decreased endurance;Decreased mobility;Pain   Plan   Treatment/Interventions ADL retraining;Functional transfer training; Therapeutic exercise; Endurance training   Progress Slow progress, decreased activity tolerance   PT Therapy Minutes   PT Time In 0930   PT Time Out 1000   PT Total Time (minutes) 30   PT Mode of treatment - Individual (minutes) 30   PT Mode of treatment - Concurrent (minutes) 0   PT Mode of treatment - Group (minutes) 0   PT Mode of treatment - Co-treat (minutes) 0   PT Mode of Treatment - Total time(minutes) 30 minutes   PT Cumulative Minutes 325   Therapy Time missed   Time missed?  No

## 2022-01-23 NOTE — PROGRESS NOTES
01/23/22 1230   Pain Assessment   Pain Assessment Tool 0-10   Pain Score 6   Pain Location/Orientation Orientation: Right;Location: Foot   Restrictions/Precautions   Precautions Fall Risk;Pain   Weight Bearing Restrictions Yes   LLE Weight Bearing Per Order WBAT   ROM Restrictions No   Lower Body Dressing   Type of Assistance Needed Supervision   Physical Assistance Level No physical assistance   Comment dynamic reaching while seated in  to thread pants - able to weight shift laterally for CM w/ increased time while in [facility]   Lower Body Dressing CARE Score 4   Putting On/Taking Off Footwear   Type of Assistance Needed Supervision   Physical Assistance Level No physical assistance   Comment increased time to doff/don socks via dynamic reach while seated in WC - pt receptive to sock aide; however reported "I would use it if I had to, but I don't"    Putting On/Taking Off Footwear CARE Score 4   Sit to Stand   Comment Pt refused sit <> stand transfers on this date due to R foot pain   Reason if not Attempted Refused to perform   Sit to Stand CARE Score 7   Toileting Hygiene   Type of Assistance Needed Supervision   Physical Assistance Level No physical assistance   Comment S to complete hygiene post BM and manage underwear/pants over hips via lateral weight shifting w/ increased time   [address] Score 4   Toileting   Able to [address] down yes, up yes  (w/ increased time via lateral weight shifting)   Findings +BM, RN aware   Toilet Transfer   Type of Assistance Needed Physical assistance   Physical Assistance Level 25% or less   Comment Min A via slideboard from [facility] <> drop arm commode - A required for placement and removal of slideboard - Min cues for appropriately sequencing and technique throughout transfer   Toilet Transfer CARE Score 3   Toilet Transfer   Surface Assessed Drop Arm Commode   Transfer Technique Slide Board   Limitations Noted In Endurance; Sequencing   Therapeutic Excerise-Strength   UE Strength Yes   Right Upper Extremity- Strength   R Shoulder Flexion;ABduction; Extension;Horizontal ABduction   R Elbow Elbow flexion;Elbow extension   R Position Seated   Equipment Dowel; Other (Comment)  (4# dowel)   R Weight/Reps/Sets 2x15   RUE Strength Comment completed UB therex while seated in WC focusing on increased strength for functional transfers for ADLs when agreeable to stand as well as performing slide board transfers  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, 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" - receptive to UE therex w/ min encouragement  Tangential and required redirection at times  Tolerated well w/ therapeutic rest breaks provided as needed  Modified shoulder flexion/extension to limit overhead 2* chronic R shoulder pain and limited ROM   Left Upper Extremity-Strength   L Shoulder Flexion;ABduction; Extension;Horizontal ABduction   L Elbow Elbow flexion;Elbow extension   L Position Seated   Equipment Dowel  (4#)   L Weights/Reps/Sets 2x15   LUE Strength Comment see above   Cognition   Overall Cognitive Status WFL   Arousal/Participation Alert; Cooperative   Attention Attends with cues to redirect   Orientation Level Oriented X4   Memory Within functional limits   Following Commands Follows multistep commands with increased time or repetition   Comments Required encouragement as pt very distracted and preoccupied w/ R foot pain; w/ encouragement/support and prolonged theapeutic conversation pt was agreeable and cooperative   Additional Activities   Additional Activities Other (Comment)   Additional Activities Comments Continued conversation regarding benefit of [place]; pt declined and was able to demonstrate dynamic reaching to LB to perform dressing tasks  Would benefit from sock aide for ease and improved tolerance as pt required increased time and use of bed surface for support when reaching down while seated in WC - pt pleasantly declined stating he did not feel he needed it    Pt demonstrated ability to retrieve dropped items from floor when navigating around room; has reacher at home and will use as needed  Discussed fall prevention to reinforce pt safety due to impulsivity and pt verbalized understanding   Activity Tolerance   Activity Tolerance Patient tolerated treatment well   Assessment   Treatment Assessment Pt participated in 60 minute skilled OT session focusing on functional transfers, compensatory education due to continued refusal to stand related to R foot pain, UE strengthening, toileting, and LBD  Pt remains highly distracted and upset related to R foot pain; declined standing due to same  Following increased time w/ therapeutic conversation, pt was agreeable and cooperative to tx session  Able to carryover slideboard transfer technique to commode w/ Min cues for safe sequencing  Continues w/ F performance w/ lateral weight shifting to perform CM  Demonstrated ability to dynamically reach to LE's to perform LBD tasks on this date as pt denied [place]  Pt verbalized that the sock aide is helpful as he recalled from previous education, however feels he does not need it at this time  Agreeable w/ encouragement to perform UE therex focusing on improved strength for slideboard transfers and standing / functional mob  when pt becomes agreeable and feels pain is more tolerable  Pt verbalized he is motivated to stand and walk; discouraged by R foot pain which he reports is much worse the L hip pain  Home setup is not appropriate for WC per pt and plans to utilize RW; pt educated on need to progress in tx sessions w/ RW use to prepare for d/c home; pt receptive  Discussed potential DME needs pending progress w/ functional transfers; pt receptive and familar w/ equipment from multiple previous surgeries    Pt will require/benefit from continued skilled OT services in this setting to progress w/ functional transfers and maximize safety / functional potential w/ ADL routine upon return home  Plan to follow up w/ continued functional transfer training encouraging standing/standing tolerance/standing balance required for ADL completion as well as compensatory education related to pain and decreased tolerance  Prognosis Fair   Problem List Decreased strength; Impaired balance;Decreased mobility; Impaired judgement;Pain   Barriers to Discharge Inaccessible home environment   Plan   Treatment/Interventions ADL retraining;Functional transfer training; Therapeutic exercise;Patient/family training;Equipment eval/education; Compensatory technique education   Progress Slow progress, decreased activity tolerance   Recommendation   OT Discharge Recommendation   (pending progress)   Equipment Recommended Other (comment)  (TBD - may benefit from shower chair vs  tub bench )   OT - OK to Discharge No   OT Therapy Minutes   OT Time In 1230   OT Time Out 1330   OT Total Time (minutes) 60   OT Mode of treatment - Individual (minutes) 60   OT Mode of treatment - Concurrent (minutes) 0   OT Mode of treatment - Group (minutes) 0   OT Mode of treatment - Co-treat (minutes) 0   OT Mode of Treatment - Total time(minutes) 60 minutes   OT Cumulative Minutes 300   Therapy Time missed   Time missed?  No

## 2022-01-24 PROBLEM — M79.671 RIGHT FOOT PAIN: Status: ACTIVE | Noted: 2022-01-24

## 2022-01-24 LAB
ANION GAP SERPL CALCULATED.3IONS-SCNC: 3 MMOL/L (ref 5–14)
BASOPHILS # BLD AUTO: 0.1 THOUSANDS/ΜL (ref 0–0.1)
BASOPHILS NFR BLD AUTO: 1 % (ref 0–1)
BUN SERPL-MCNC: 17 MG/DL (ref 5–25)
CALCIUM SERPL-MCNC: 8.7 MG/DL (ref 8.4–10.2)
CHLORIDE SERPL-SCNC: 102 MMOL/L (ref 97–108)
CO2 SERPL-SCNC: 32 MMOL/L (ref 22–30)
CREAT SERPL-MCNC: 0.76 MG/DL (ref 0.7–1.5)
EOSINOPHIL # BLD AUTO: 0.1 THOUSAND/ΜL (ref 0–0.4)
EOSINOPHIL NFR BLD AUTO: 1 % (ref 0–6)
ERYTHROCYTE [DISTWIDTH] IN BLOOD BY AUTOMATED COUNT: 14.5 %
GFR SERPL CREATININE-BSD FRML MDRD: 96 ML/MIN/1.73SQ M
GLUCOSE P FAST SERPL-MCNC: 195 MG/DL (ref 70–99)
GLUCOSE SERPL-MCNC: 144 MG/DL (ref 65–140)
GLUCOSE SERPL-MCNC: 183 MG/DL (ref 65–140)
GLUCOSE SERPL-MCNC: 195 MG/DL (ref 70–99)
GLUCOSE SERPL-MCNC: 246 MG/DL (ref 65–140)
GLUCOSE SERPL-MCNC: 281 MG/DL (ref 65–140)
HCT VFR BLD AUTO: 30.1 % (ref 41–53)
HGB BLD-MCNC: 10.1 G/DL (ref 13.5–17.5)
LYMPHOCYTES # BLD AUTO: 2.3 THOUSANDS/ΜL (ref 0.5–4)
LYMPHOCYTES NFR BLD AUTO: 22 % (ref 25–45)
MAGNESIUM SERPL-MCNC: 1.9 MG/DL (ref 1.6–2.3)
MCH RBC QN AUTO: 31 PG (ref 26–34)
MCHC RBC AUTO-ENTMCNC: 33.7 G/DL (ref 31–36)
MCV RBC AUTO: 92 FL (ref 80–100)
MONOCYTES # BLD AUTO: 0.9 THOUSAND/ΜL (ref 0.2–0.9)
MONOCYTES NFR BLD AUTO: 9 % (ref 1–10)
NEUTROPHILS # BLD AUTO: 7 THOUSANDS/ΜL (ref 1.8–7.8)
NEUTS SEG NFR BLD AUTO: 67 % (ref 45–65)
PLATELET # BLD AUTO: 438 THOUSANDS/UL (ref 150–450)
PMV BLD AUTO: 7.3 FL (ref 8.9–12.7)
POTASSIUM SERPL-SCNC: 3.7 MMOL/L (ref 3.6–5)
RBC # BLD AUTO: 3.27 MILLION/UL (ref 4.5–5.9)
SODIUM SERPL-SCNC: 137 MMOL/L (ref 137–147)
WBC # BLD AUTO: 10.5 THOUSAND/UL (ref 4.5–11)

## 2022-01-24 PROCEDURE — 97110 THERAPEUTIC EXERCISES: CPT

## 2022-01-24 PROCEDURE — 97530 THERAPEUTIC ACTIVITIES: CPT

## 2022-01-24 PROCEDURE — 80048 BASIC METABOLIC PNL TOTAL CA: CPT | Performed by: NURSE PRACTITIONER

## 2022-01-24 PROCEDURE — 82948 REAGENT STRIP/BLOOD GLUCOSE: CPT

## 2022-01-24 PROCEDURE — 85025 COMPLETE CBC W/AUTO DIFF WBC: CPT | Performed by: NURSE PRACTITIONER

## 2022-01-24 PROCEDURE — 97116 GAIT TRAINING THERAPY: CPT

## 2022-01-24 PROCEDURE — 99232 SBSQ HOSP IP/OBS MODERATE 35: CPT | Performed by: INTERNAL MEDICINE

## 2022-01-24 PROCEDURE — 97535 SELF CARE MNGMENT TRAINING: CPT

## 2022-01-24 PROCEDURE — 99232 SBSQ HOSP IP/OBS MODERATE 35: CPT | Performed by: PHYSICAL MEDICINE & REHABILITATION

## 2022-01-24 PROCEDURE — 83735 ASSAY OF MAGNESIUM: CPT | Performed by: NURSE PRACTITIONER

## 2022-01-24 RX ORDER — INSULIN GLARGINE 100 [IU]/ML
15 INJECTION, SOLUTION SUBCUTANEOUS
Status: DISCONTINUED | OUTPATIENT
Start: 2022-01-24 | End: 2022-02-01 | Stop reason: HOSPADM

## 2022-01-24 RX ADMIN — METHOCARBAMOL TABLETS 500 MG: 500 TABLET, COATED ORAL at 17:13

## 2022-01-24 RX ADMIN — INSULIN LISPRO 3 UNITS: 100 INJECTION, SOLUTION INTRAVENOUS; SUBCUTANEOUS at 17:13

## 2022-01-24 RX ADMIN — METHOCARBAMOL TABLETS 500 MG: 500 TABLET, COATED ORAL at 11:40

## 2022-01-24 RX ADMIN — METHOCARBAMOL TABLETS 500 MG: 500 TABLET, COATED ORAL at 08:12

## 2022-01-24 RX ADMIN — OXYCODONE HYDROCHLORIDE 10 MG: 10 TABLET ORAL at 08:13

## 2022-01-24 RX ADMIN — EMPAGLIFLOZIN 12.5 MG: 25 TABLET, FILM COATED ORAL at 11:40

## 2022-01-24 RX ADMIN — MAGNESIUM OXIDE TAB 400 MG (241.3 MG ELEMENTAL MG) 800 MG: 400 (241.3 MG) TAB at 08:12

## 2022-01-24 RX ADMIN — MELATONIN TAB 3 MG 3 MG: 3 TAB at 22:22

## 2022-01-24 RX ADMIN — INSULIN LISPRO 2 UNITS: 100 INJECTION, SOLUTION INTRAVENOUS; SUBCUTANEOUS at 11:40

## 2022-01-24 RX ADMIN — INSULIN LISPRO 1 UNITS: 100 INJECTION, SOLUTION INTRAVENOUS; SUBCUTANEOUS at 08:14

## 2022-01-24 RX ADMIN — ENOXAPARIN SODIUM 30 MG: 30 INJECTION SUBCUTANEOUS at 08:13

## 2022-01-24 RX ADMIN — OXYCODONE HYDROCHLORIDE AND ACETAMINOPHEN 500 MG: 500 TABLET ORAL at 08:12

## 2022-01-24 RX ADMIN — DOCUSATE SODIUM 100 MG: 100 CAPSULE, LIQUID FILLED ORAL at 08:12

## 2022-01-24 RX ADMIN — ATORVASTATIN CALCIUM 40 MG: 40 TABLET, FILM COATED ORAL at 17:12

## 2022-01-24 RX ADMIN — ACETAMINOPHEN 650 MG: 325 TABLET ORAL at 21:08

## 2022-01-24 RX ADMIN — FERROUS GLUCONATE 324 MG: 324 TABLET ORAL at 06:00

## 2022-01-24 RX ADMIN — INSULIN GLARGINE 15 UNITS: 100 INJECTION, SOLUTION SUBCUTANEOUS at 21:07

## 2022-01-24 RX ADMIN — PREDNISONE 40 MG: 20 TABLET ORAL at 08:11

## 2022-01-24 RX ADMIN — METHOCARBAMOL TABLETS 500 MG: 500 TABLET, COATED ORAL at 21:08

## 2022-01-24 RX ADMIN — METFORMIN HYDROCHLORIDE 1000 MG: 500 TABLET ORAL at 08:12

## 2022-01-24 RX ADMIN — ACETAMINOPHEN 650 MG: 325 TABLET ORAL at 05:54

## 2022-01-24 RX ADMIN — CEPHALEXIN 500 MG: 500 CAPSULE ORAL at 21:08

## 2022-01-24 RX ADMIN — OXYCODONE HYDROCHLORIDE 10 MG: 10 TABLET ORAL at 21:10

## 2022-01-24 RX ADMIN — GABAPENTIN 300 MG: 300 CAPSULE ORAL at 05:54

## 2022-01-24 RX ADMIN — CYANOCOBALAMIN TAB 1000 MCG 1000 MCG: 1000 TAB at 08:12

## 2022-01-24 RX ADMIN — METFORMIN HYDROCHLORIDE 1000 MG: 500 TABLET ORAL at 17:13

## 2022-01-24 RX ADMIN — ENOXAPARIN SODIUM 30 MG: 30 INJECTION SUBCUTANEOUS at 21:07

## 2022-01-24 RX ADMIN — ACETAMINOPHEN 650 MG: 325 TABLET ORAL at 14:51

## 2022-01-24 RX ADMIN — GABAPENTIN 300 MG: 300 CAPSULE ORAL at 21:08

## 2022-01-24 RX ADMIN — CEPHALEXIN 500 MG: 500 CAPSULE ORAL at 08:12

## 2022-01-24 RX ADMIN — DOCUSATE SODIUM 100 MG: 100 CAPSULE, LIQUID FILLED ORAL at 17:13

## 2022-01-24 RX ADMIN — GABAPENTIN 300 MG: 300 CAPSULE ORAL at 14:51

## 2022-01-24 NOTE — ASSESSMENT & PLAN NOTE
Continue gabapentin 100mg Q8 - increased to 300mg Q8 on 1/20  Daily skin checks/neurovascular checks  Considerations with PT/OT therapies  Vitamin B12 level 315 - started on cyanocobalamin

## 2022-01-24 NOTE — PROGRESS NOTES
01/24/22 1230   Pain Assessment   Pain Assessment Tool 0-10   Pain Score 5   Pain Location/Orientation Orientation: Right  (feet)   Pain Onset/Description Onset: Ongoing   Effect of Pain on Daily Activities impacts STS and transfers, but pt tolerates   Patient's Stated Pain Goal No pain   Hospital Pain Intervention(s) Cold applied;Elevated; Rest   Multiple Pain Sites No   Restrictions/Precautions   Precautions Fall Risk;Pain   Weight Bearing Restrictions Yes   RLE Weight Bearing Per Order WBAT   LLE Weight Bearing Per Order WBAT   ROM Restrictions No   Grooming   Findings Completed combing hair seated in wc in front of sink   Shower/Bathe Self   Type of Assistance Needed Supervision   Physical Assistance Level No physical assistance   Comment completed shower this session seated on tub bench  while seated on tub bench pt able to wash 10/10 parts with use of lat weight shifting for buttock hygiene, inc time for fx reach to feet  Although supervision, goals is for pt to complete in stance as in stance was PTA  Unable to complete in stance 2* to pain in feet   Shower/Bathe Self CARE Score 4   Bathing   Assessed Bath Style Shower   Anticipated D/C Bath Style Shower;Tub;Sponge Bath   Able to [place] No   Able to [employer] Temperature No   Able to Wash/Rinse/Dry (body part) Left Arm;Right Arm;L Upper Leg;R Upper Leg;L Lower Leg/Foot;R Lower Leg/Foot;Chest;Abdomen;Perineal Area; Buttocks   Limitations Noted in Balance; Endurance; Safety;Strength;Timeliness   Positioning Seated   Tub/Shower Transfer   Limitations Noted In Balance; Endurance;ROM;UE Strength;LE Strength   Adaptive Equipment Grab Bars;Transfer Bench   Assessed Shower   Findings Dom/CGA for SB transfer wc><tub bench   Upper Body Dressing   Type of Assistance Needed Supervision   Physical Assistance Level No physical assistance   Comment seated   Upper Body Dressing CARE Score 4   Lower Body Dressing   Type of Assistance Needed Physical assistance Physical Assistance Level 26%-50%   Comment with inc time pt able to complete fx reach to don undergarment/pants  Difficulty to perform CM at seated level on wc  Pt able to perform wc pushup with therapist A with CM over hips   Lower Body Dressing CARE Score 3   Putting On/Taking Off Footwear   Type of Assistance Needed Physical assistance   Physical Assistance Level 26%-50%   Comment pt req A to ace wrap R foot for swelling  With inc time pt able to don socks   Putting On/Taking Off Footwear CARE Score 3   Dressing/Undressing Clothing   Remove UB Clothes Pullover Shirt   Don UB Clothes Pullover Shirt   Remove LB Clothes Undergarment;Pants;Socks   Don LB Clothes Undergarment;Pants;Socks   Limitations Noted In Balance; Endurance;Strength;ROM; Timeliness   Positioning Supported Sit;Standing   Sit to Stand   Type of Assistance Needed Physical assistance   Physical Assistance Level 26%-50%   Comment therapist proivded pt with full desk length wc inc pts ability to perform STS  Pt able to push from arm rest, bring feet under him and then release for RW  Completed x2 with Dom  Sit to Stand CARE Score 3   Bed-Chair Transfer   Type of Assistance Needed Physical assistance   Physical Assistance Level 26%-50%   Comment Dom SPT with RW and inc time 2* to foot pain   Chair/Bed-to-Chair Transfer CARE Score 3   Toileting Hygiene   Type of Assistance Needed Set-up / clean-up   Physical Assistance Level No physical assistance   Comment no BM as pt only had gas, but did void  Toileting Hygiene CARE Score 5   Toilet Transfer   Type of Assistance Needed Physical assistance   Physical Assistance Level 25% or less   Comment Dom/CGA for SB transfer from wc><PFBSC over toilet   Toilet Transfer CARE Score 3   Toilet Transfer   Surface Assessed Platform Commode   Transfer Technique Slide Board   Limitations Noted In Balance; Endurance;UE Strength;LE Strength   Cognition   Overall Cognitive Status WFL   Arousal/Participation Alert; Cooperative   Attention Attends with cues to redirect   Orientation Level Oriented X4   Memory Within functional limits   Following Commands Follows multistep commands with increased time or repetition   Activity Tolerance   Activity Tolerance Patient tolerated treatment well   Assessment   Treatment Assessment Engaged pt in 90mins of skilled OT services with focus on self-care, STS and transfers  Pt at this time performing SB transfers at Dom/CGA  However, pt PTA was completing functional standing/mobility  Although pt is scoring S for bathing, pt is sitting on tub bench and completing lat weight shifting  Goal is to cont to inc in functional standing tolerance/balance to achieve PLOF  In addition, therapist provided pt with full desk length arm rest wc to inc pts fx performance in STS to a Dom level with RW  Cont to req inc time to bring BLE under him as pt has dec knee flex  In addition, pt performed SPT with RW at [facility] level  Pt can cont to benefit from further skilled OT services with focus on functional standing tolerance/balance, SPT with RW, functional mobility, activity tolerance, UE TE to inc overall fx performance/independence and dec caregiver burden  Prognosis Fair   Problem List Decreased strength; Impaired balance;Decreased mobility; Impaired judgement;Pain   Barriers to Discharge Inaccessible home environment   Plan   Treatment/Interventions ADL retraining;Functional transfer training; Therapeutic exercise; Endurance training;Patient/family training;Bed mobility; Compensatory technique education   Progress Progressing toward goals   Recommendation   OT Equipment ordered TBD   OT - OK to Discharge No   OT Therapy Minutes   OT Time In 1230   OT Time Out 1400   OT Total Time (minutes) 90   OT Mode of treatment - Individual (minutes) 90   OT Mode of treatment - Concurrent (minutes) 0   OT Mode of treatment - Group (minutes) 0   OT Mode of treatment - Co-treat (minutes) 0   OT Mode of Treatment - Total time(minutes) 90 minutes   OT Cumulative Minutes 390   Therapy Time missed   Time missed?  No

## 2022-01-24 NOTE — ASSESSMENT & PLAN NOTE
Possible cellulitis infection to the R foot/heel  Hx of previous cellulitis infections in same location  Started on keflex 500mg BID on 1/21, complete in 5 days (1/26)  Monitor for s/s of reoccurring infection

## 2022-01-24 NOTE — PLAN OF CARE
Problem: Potential for Falls  Goal: Patient will remain free of falls  Description: INTERVENTIONS:  - Educate patient/family on patient safety including physical limitations  - Instruct patient to call for assistance with activity   - Consult OT/PT to assist with strengthening/mobility   - Keep Call bell within reach  - Keep bed low and locked with side rails adjusted as appropriate  - Keep care items and personal belongings within reach  - Initiate and maintain comfort rounds  - Make Fall Risk Sign visible to staff  - Initiate/Maintain alarm  - Obtain necessary fall risk management equipment:   - Apply yellow socks and bracelet for high fall risk patients  - Consider moving patient to room near nurses station  Outcome: Progressing     Problem: METABOLIC, FLUID AND ELECTROLYTES - ADULT  Goal: Glucose maintained within target range  Description: INTERVENTIONS:  - Monitor Blood Glucose as ordered  - Assess for signs and symptoms of hyperglycemia and hypoglycemia  - Administer ordered medications to maintain glucose within target range  - Assess nutritional intake and initiate nutrition service referral as needed  Outcome: Progressing     Problem: SKIN/TISSUE INTEGRITY - ADULT  Goal: Incision(s), wounds(s) or drain site(s) healing without S/S of infection  Description: INTERVENTIONS  - Assess and document dressing, incision, wound bed, drain sites and surrounding tissue  - Provide patient and family education    Outcome: Progressing     Problem: Potential for Falls  Goal: Patient will remain free of falls  Description: INTERVENTIONS:  - Educate patient/family on patient safety including physical limitations  - Instruct patient to call for assistance with activity   - Consult OT/PT to assist with strengthening/mobility   - Keep Call bell within reach  - Keep bed low and locked with side rails adjusted as appropriate  - Keep care items and personal belongings within reach  - Initiate and maintain comfort rounds  - Make Fall Risk Sign visible to staff    - Obtain necessary fall risk management equipment:  slideboard  - Apply yellow socks and bracelet for high fall risk patients  - Consider moving patient to room near nurses station  Outcome: Progressing     Problem: METABOLIC, FLUID AND ELECTROLYTES - ADULT  Goal: Glucose maintained within target range  Description: INTERVENTIONS:  - Monitor Blood Glucose as ordered  - Assess for signs and symptoms of hyperglycemia and hypoglycemia  - Administer ordered medications to maintain glucose within target range  - Assess nutritional intake and initiate nutrition service referral as needed  Outcome: Progressing     Problem: SKIN/TISSUE INTEGRITY - ADULT  Goal: Incision(s), wounds(s) or drain site(s) healing without S/S of infection  Description: INTERVENTIONS  - Assess and document dressing, incision, wound bed, drain sites and surrounding tissue  - Provide patient and family education  - Perform skin care/dressing changes every day  Outcome: Progressing

## 2022-01-24 NOTE — PROGRESS NOTES
01/24/22 0830   Pain Assessment   Pain Assessment Tool 0-10   Pain Score 5   Pain Location/Orientation Orientation: Right;Location: Foot   Pain Onset/Description Onset: Ongoing; Descriptor: Aching;Descriptor: Burning; Descriptor: Discomfort; Descriptor: Stabbing   Restrictions/Precautions   Precautions Fall Risk;Pain   RLE Weight Bearing Per Order WBAT   LLE Weight Bearing Per Order WBAT   General   Change In Medical/Functional Status ongoing R ankle pain, at end of session, MD suggested R ankle ace wrapping  Cognition   Overall Cognitive Status WFL   Arousal/Participation Alert; Cooperative   Attention Attends with cues to redirect   Orientation Level Oriented X4   Memory Within functional limits   Following Commands Follows multistep commands with increased time or repetition   Comments pt actually pleasant and cooperative  Subjective   Subjective "The meds are kicking in, I'd like to try to stand"   Lying to Sitting on Side of Bed   Type of Assistance Needed Supervision   Physical Assistance Level No physical assistance   Comment use of UE support to mobilize L LE  Lying to Sitting on Side of Bed CARE Score 4   Sit to Stand   Type of Assistance Needed Physical assistance   Physical Assistance Level 26%-50%   Comment min A pulling self up in parallel bars  Sit to Stand CARE Score 3   Bed-Chair Transfer   Type of Assistance Needed Set-up / clean-up   Physical Assistance Level No physical assistance   Comment SBT bed to   Needs A for board placement  Chair/Bed-to-Chair Transfer CARE Score 5   Walk 10 Feet   Comment x2 trials in parallel bars    Reason if not Attempted Safety concerns   Walk 10 Feet CARE Score 88   Ambulation   Does the patient walk? 2  Yes   Primary Mode of Locomotion Prior to Admission Walk   Distance Walked (feet) 32 ft  (x2)   Assist Device Parallel Bars   Gait Pattern Antalgic; Slow Rosanne;Decreased foot clearance; Forward Flexion; Step through; Decreased R stance; Improper weight shift   Limitations Noted In Heel Strike;Posture;Speed;Strength;Swing   Provided Assistance with: Balance   Walk Assist Level Contact Guard   Findings heavy use of UE support in parallel bars,however pt able to turn and walk fwd back to start x2 trials each totalling 32 ft  Pt was motivated to walk, limited by R foot pain  Wheelchair mobility   Method Right upper extremity; Left upper extremity   Assistance Provided For Remove Leg Rest;Replace Leg Rest;Remove armrests;Replace armrests; Locking Brakes   Distance Level Surface (feet) 150 ft   Findings use of self propulsion for endurance and locomotion, plan remains for AR home ambulatory  Therapeutic Interventions   Strengthening Seated B LE TE: red tband DF x30; LAQ with thigh support x30, Red tband HS curls with thigh support x30; ball squeeze x30, red tband hip abd x30   Flexibility seated B heel slides with slight manual over pressure at end range x20 each  Manual passive DF stretch 5x30 sec each  Balance ongoing static stand trials in parallel bars during amb rest breaks  Assessment   Treatment Assessment Pt engaged in 90 min skilled PT intervention, pt pleasant and cooperative, eager to stand, Pleased medication was kicking in  Parallel bars as outlined, tolerating up to 32 ft with CGA, x2 trials  Pulls self up with B UE to stand  Use of second cushion for seat elevation to complete TE to allow for inc B knee ROM as able  Appears L LE knee flexion approx 70 degrees at best  Pt declined cold pack at end of session  R ankle ace wrapped  Pt unexpectedly motivated for therapy this AM, hopefully can continue and progress to RW use as able  Problem List Decreased strength; Impaired balance;Decreased mobility; Impaired judgement;Pain   Barriers to Discharge Inaccessible home environment   Barriers to Discharge Comments 3 ALEXSANDRA, B HR, first floor setup; sleeps in recliner      PT Barriers   Physical Impairment Decreased strength;Decreased range of motion;Decreased mobility;Orthopedic restrictions;Pain   Functional Limitation Car transfers; Ramp negotiation;Stair negotiation;Standing;Transfers; Walking   Plan   Treatment/Interventions Functional transfer training;LE strengthening/ROM; Therapeutic exercise; Endurance training;Patient/family training;Gait training   Progress Progressing toward goals   Recommendation   PT Discharge Recommendation Home with home health rehabilitation   PT Therapy Minutes   PT Time In 0830   PT Time Out 1000   PT Total Time (minutes) 90   PT Mode of treatment - Individual (minutes) 90   PT Mode of treatment - Concurrent (minutes) 0   PT Mode of treatment - Group (minutes) 0   PT Mode of treatment - Co-treat (minutes) 0   PT Mode of Treatment - Total time(minutes) 90 minutes   PT Cumulative Minutes 415   Therapy Time missed   Time missed?  No

## 2022-01-24 NOTE — ASSESSMENT & PLAN NOTE
B/L shoulder glenohumeral osteoarthritis  Follows with Dr Gennaro Gilbert as outpatient  Last received steroid injections on 10/13/21  Ensure adequate pain control  Considerations during therapies

## 2022-01-24 NOTE — ASSESSMENT & PLAN NOTE
Started on keflex for possible cellulitis infection on dorsal aspect of foot  Also started on prednisone 40mg daily x5 days on 1/21 for possible pseudogout  R foot XR on 1/19: "No acute osseous abnormality "  Uric acid level WNL

## 2022-01-24 NOTE — ASSESSMENT & PLAN NOTE
1/6 - Mechanical fall at home onto L hip  CT abd/pelvis - L intertrochanteric femur fracture  1/7 - OR for ORIF with short TFN to L hip - Dr Raymon Perez    Ensure adequate pain control  Neurovascular checks Q shift  WBAT to LLE  DVT ppx with Lovenox  Follow-up with Dr Raymon Perez in 2 weeks as outpatient (1/21)  XR on 1/21: "Nearly anatomically aligned intertrochanteric fracture of the left femur, status post ORIF  No evidence for hardware complication  Status post left knee arthroplasty "    Continue PT/OT therapies  Primary team following  No hx of DXA scan - recommend to be done as outpatient  Continue Vitamin D supplementation

## 2022-01-24 NOTE — PROGRESS NOTES
[facility]  Progress Note - Deena Le 1955, 61 y o  male MRN: [id number]  Unit/Bed#: -01 Encounter: [id number]  Primary Care Provider: No primary care provider on file  Date and time admitted to hospital: 1/19/2022  3:54 PM    Right foot pain  Assessment & Plan  Started on keflex for possible cellulitis infection on dorsal aspect of foot  Also started on prednisone 40mg daily x5 days on 1/21 for possible pseudogout  R foot XR on 1/19: "No acute osseous abnormality "  Uric acid level WNL  Cellulitis  Assessment & Plan  Possible cellulitis infection to the R foot/heel  Hx of previous cellulitis infections in same location  Started on keflex 500mg BID on 1/21, complete in 5 days (1/26)  Monitor for s/s of reoccurring infection  Hypomagnesemia  Assessment & Plan  Improved, Mg 1 9 from 1 4 on 1/24  Continue magnesium oxide 800mg daily  Anemia  Assessment & Plan  Related to acute blood loss post-procedure  Hgb currently 10 1  Hgb was 15 0 pre-operatively  Iron panel on 1/20: Iron Sat 22%, TIBC 147, Iron 32, and Ferritin 304  Started on ferrous gluconate and ascorbic acid  Monitor for s/s of active bleeding  Continue to trend with routine CBC  Vitamin D deficiency  Assessment & Plan  Vitamin D level 11 on 1/8  Start on Vitamin D 50,000u weekly  Continue to follow-up with PCP as outpatient  Polyneuropathy associated with underlying disease ([facility] [address] )  Assessment & Plan  Continue gabapentin 100mg Q8 - increased to 300mg Q8 on 1/20  Daily skin checks/neurovascular checks  Considerations with PT/OT therapies  Vitamin B12 level 315 - started on cyanocobalamin  Obesity (BMI 30-39  9)  Assessment & Plan  BMI 32 35 on admission  Encourage lifestyle modifications      Type 2 diabetes mellitus without complication, without long-term current use of insulin [facility])  Assessment & Plan  Lab Results   Component Value Date    HGBA1C 7 2 (H) 01/08/2022 Recent Labs     01/23/22  1118 01/23/22  1636 01/23/22  1959 01/24/22  0632   POCGLU 204* 301* 303* 183*       Blood Sugar Average: Last 72 hrs:  (P) 236 [id number]     Last A1c 7 5 on 11/16  Home regimen: Alogliptin 25mg daily, Jardiance 12 5mg daily, Glipizide 5mg BID, Lantus 28u at HS, and metformin 1000mg BID  Started on Lantus 10u at HS and metformin 1000mg BID on 1/20  Start Jardiance 12 5mg daily on 1/24  Continue QID accuchecks and SSI  Continue cons  carb diet  Continue to follow-up with PCP as outpatient  Mixed hyperlipidemia  Assessment & Plan  Continue Lipitor 40mg daily  Last lipid panel on 11/16: Cholesterol 139, Triglycerides 150, HDL 32, and LDL 82  Essential hypertension  Assessment & Plan  Home lisinopril 30mg currently on hold  Monitor BP with routine VS     Primary osteoarthritis of both shoulders  Assessment & Plan  B/L shoulder glenohumeral osteoarthritis  Follows with Dr Halima Rea as outpatient  Last received steroid injections on 10/13/21  Ensure adequate pain control  Considerations during therapies  * Closed nondisplaced intertrochanteric fracture of left femur with routine healing  Assessment & Plan  1/6 - Mechanical fall at home onto L hip  CT abd/pelvis - L intertrochanteric femur fracture  1/7 - OR for ORIF with short TFN to L hip - Dr Raymon Perez    Ensure adequate pain control  Neurovascular checks Q shift  WBAT to LLE  DVT ppx with Lovenox  Follow-up with Dr Raymon Perez in 2 weeks as outpatient (1/21)  XR on 1/21: "Nearly anatomically aligned intertrochanteric fracture of the left femur, status post ORIF  No evidence for hardware complication  Status post left knee arthroplasty "    Continue PT/OT therapies  Primary team following  No hx of DXA scan - recommend to be done as outpatient  Continue Vitamin D supplementation      VTE Pharmacologic Prophylaxis:   Pharmacologic: Enoxaparin (Lovenox)  Mechanical VTE Prophylaxis in Place: Yes - sequential compression devices  Current Length of Stay: 5 day(s)    Current Patient Status: Inpatient Rehab     Discharge Plan: As per primary team     Code Status: Level 1 - Full Code    Subjective:   Pt examined while pt sitting in recliner in pt room  Currently complains of B/L feet pain, 6/10, burning, improves at times with pain medication  Has noticed gradual improvement in pain with steroid and antibiotics  May benefit from gradual increase in gabapentin if neuropathy continues to be bothersome  Was able to walk the parallel bars in the gym this morning  Denies any L hip pain  Sleeping well at night  Denies any lightheadedness, dizziness, SOB, or palpitations  Currently has no other concerns or complaints at this time  Objective:     Vitals:   Temp (24hrs), Av 6 °F (36 4 °C), Min:97 6 °F (36 4 °C), Max:97 7 °F (36 5 °C)    Temp:  [97 6 °F (36 4 °C)-97 7 °F (36 5 °C)] 97 6 °F (36 4 °C)  HR:  [77-94] 77  Resp:  [17-18] 18  BP: (128-145)/(71-87) 128/74  SpO2:  [96 %-98 %] 96 %  Body mass index is 32 35 kg/m²  Review of Systems   Constitutional: Negative for appetite change, chills, fatigue and fever  Respiratory: Negative for cough and shortness of breath  Cardiovascular: Negative for chest pain, palpitations and leg swelling  Gastrointestinal: Negative for abdominal distention, abdominal pain, constipation, diarrhea, nausea and vomiting  LBM    Genitourinary: Negative for difficulty urinating  Musculoskeletal: Negative for arthralgias and back pain         + burning sensation to B/L feet, 6/10, slight improvement with medications   Neurological: Negative for dizziness, weakness, light-headedness, numbness and headaches  Psychiatric/Behavioral: Negative for sleep disturbance  Input and Output Summary (last 24 hours):        Intake/Output Summary (Last 24 hours) at 2022 0904  Last data filed at 2022 0804  Gross per 24 hour   Intake 600 ml   Output 500 ml   Net 100 ml       Physical Exam:     Physical Exam  Vitals and nursing note reviewed  Constitutional:       Appearance: Normal appearance  He is obese  HENT:      Head: Normocephalic and atraumatic  Cardiovascular:      Rate and Rhythm: Normal rate and regular rhythm  Pulses: Normal pulses  Heart sounds: Murmur heard  Systolic murmur is present with a grade of 1/6  No friction rub  Pulmonary:      Effort: Pulmonary effort is normal  No respiratory distress  Breath sounds: Normal breath sounds  No wheezing or rhonchi  Abdominal:      General: Abdomen is flat  Bowel sounds are normal  There is no distension  Palpations: Abdomen is soft  Tenderness: There is no abdominal tenderness  Musculoskeletal:      Cervical back: Normal range of motion and neck supple  Right lower leg: Edema (Moderate non-pitting edema) present  Left lower leg: Edema (Moderate non-pitting edema) present  Skin:     General: Skin is warm and dry  Capillary Refill: Capillary refill takes less than 2 seconds  Neurological:      Mental Status: He is alert and oriented to person, place, and time     Psychiatric:         Mood and Affect: Mood normal          Behavior: Behavior normal          Additional Data:     Labs:    Results from last 7 days   Lab Units 01/24/22  0638   WBC Thousand/uL 10 50   HEMOGLOBIN g/dL 10 1*   HEMATOCRIT % 30 1*   PLATELETS Thousands/uL 438   NEUTROS PCT % 67*   LYMPHS PCT % 22*   MONOS PCT % 9   EOS PCT % 1     Results from last 7 days   Lab Units 01/24/22  0638 01/20/22  0506 01/20/22  0506   SODIUM mmol/L 137   < > 136*   POTASSIUM mmol/L 3 7   < > 3 5*   CHLORIDE mmol/L 102   < > 100   CO2 mmol/L 32*   < > 30   BUN mg/dL 17   < > 11   CREATININE mg/dL 0 76   < > 0 77   ANION GAP mmol/L 3*   < > 6   CALCIUM mg/dL 8 7   < > 8 8   ALBUMIN g/dL  --   --  3 3   TOTAL BILIRUBIN mg/dL  --   --  0 96   ALK PHOS U/L  --   --  120   ALT U/L  --   --  34   AST U/L  -- --  24   GLUCOSE RANDOM mg/dL 195*   < > 227*    < > = values in this interval not displayed           Results from last 7 days   Lab Units 01/24/22  0632 01/23/22  1959 01/23/22  1636 01/23/22  1118 01/23/22  0615 01/22/22  2116 01/22/22  1634 01/22/22  1137 01/22/22  0624 01/21/22  2049 01/21/22  1643 01/21/22  1128   POC GLUCOSE mg/dl 183* 303* 301* 204* 190* 246* 326* 234* 172* 283* 222* 222*               Labs reviewed    Imaging:    Imaging reviewed    Recent Cultures (last 7 days):           Last 24 Hours Medication List:   Current Facility-Administered Medications   Medication Dose Route Frequency Provider Last Rate    acetaminophen  650 mg Oral Q6H PRN Earnstine Sam, CRNP      acetaminophen  650 mg Oral [facility] Ryan Woodall MD      ascorbic acid  500 mg Oral Daily Earnstine MARGARITA Vargas      atorvastatin  40 mg Oral Daily With Estuardo Koehler MD      cephalexin  500 mg Oral Q12H [address] MARGARITA Judge      cyanocobalamin  1,000 mcg Oral Daily Earnstine Sam CRNP      Diclofenac Sodium  2 g Topical 4x Daily PRN Ryan Woodall MD      docusate sodium  100 mg Oral BID Ryan Woodall MD      enoxaparin  30 mg Subcutaneous BID Ryan Woodall MD      ergocalciferol  50,000 Units Oral Weekly Earnstine MARGARITA Vargas      ferrous gluconate  324 mg Oral Daily Before Breakfast XochitlnsMARGARITA Bagley      gabapentin  300 mg Oral [facility] Ryan Woodall MD      insulin glargine  10 Units Subcutaneous HS Ryan Woodall MD      insulin lispro  1-5 Units Subcutaneous TID [facility] Ryan Woodall MD      insulin lispro  1-5 Units Subcutaneous HS Ryan Woodall MD      lidocaine  2 patch Topical Daily PRN Ryan Woodall MD      [START ON 1/25/2022] magnesium oxide  800 mg Oral Daily EarnsMARGARITA Bagley      melatonin  3 mg Oral HS Ryan Woodall MD      metFORMIN  1,000 mg Oral BID With Meals Earnstine MARGARITA Vargas      methocarbamol  500 mg Oral 4x Daily Ryan Woodall MD      NON FORMULARY  12 5 mg Oral Daily Tonya Keys MARGARITA Peralta      ondansetron  4 mg Oral Q6H PRN Vane Leyva MD      oxyCODONE  10 mg Oral Q4H PRN Vane Leyva MD      oxyCODONE  5 mg Oral Q4H PRN Vane Leyva MD      polyethylene glycol  17 g Oral Daily Vane Leyva MD      predniSONE  40 mg Oral Daily MARGARITA Arana          M*Modal software was used to dictate this note  It may contain errors with dictating incorrect words or incorrect spelling  Please contact the provider directly with any questions

## 2022-01-24 NOTE — ASSESSMENT & PLAN NOTE
Lab Results   Component Value Date    HGBA1C 7 2 (H) 01/08/2022       Recent Labs     01/23/22  1118 01/23/22  1636 01/23/22  1959 01/24/22  0632   POCGLU 204* 301* 303* 183*       Blood Sugar Average: Last 72 hrs:  (P) 236 [id number]     Last A1c 7 5 on 11/16  Home regimen: Alogliptin 25mg daily, Jardiance 12 5mg daily, Glipizide 5mg BID, Lantus 28u at HS, and metformin 1000mg BID  Started on Lantus 10u at HS and metformin 1000mg BID on 1/20  Start Jardiance 12 5mg daily on 1/24  Continue QID accuchecks and SSI  Continue cons  carb diet  Continue to follow-up with PCP as outpatient

## 2022-01-24 NOTE — ASSESSMENT & PLAN NOTE
Related to acute blood loss post-procedure  Hgb currently 10 1  Hgb was 15 0 pre-operatively  Iron panel on 1/20: Iron Sat 22%, TIBC 147, Iron 32, and Ferritin 304  Started on ferrous gluconate and ascorbic acid  Monitor for s/s of active bleeding  Continue to trend with routine CBC

## 2022-01-24 NOTE — PROGRESS NOTES
Physical Medicine and Rehabilitation Progress Note  Martha Carr 61 y o  male MRN: [id number]  Unit/Bed#: -29 Encounter: [id number]    HPI: Martha Carr is a 61 y o  male with PMH of HTN, T2DM TKA (R) (c/b by infection, requiring revision x2), TKA L, gout,  back surgeries, shoulder surgeries, hand surgeries, depression, HLD, Sleep Apnea who presented to the [address] [place] [address] on 1/5 after being bumped by his dog and falling onto his L hip  He was found to have a nondisplaced L trochanteric fracture with a partial tear at the greater trochanter insertion  He underwent L femur ORIF on 1/11  Post-op course complicated by pain, hyperglycemia  He also had issues with constipation  He was admitted to the [facility] on 1/19  Chief Complaint: R foot pain  Interval History/Subjective:  No acute events overnight  R foot pain headed in the right direction  ACE wrapping did help  Denies any CP, SOB, fevers, chills, N/V, abdominal pain  Slept fine last night for the most part with some repositioning  L hip feels fine  Last BM 1/24  ROS:  A 10 point review of systems was negative except for what is noted in the HPI  Today's Changes: 1  Continue prednisone, keflex, and current dose of gabapentin   - Consulted podiatry to establish, and also he reports his pain at his bunion causes him to alexa his foot and hurt the lateral aspect of his foot  - Adding ACE wrap and elevation to help with swelling  2  Some staples removed, should be able to remove the rest this week  Total visit time: 25 minutes, with more than 50% spent counseling/coordinating care  Counseling includes discussion with patient re: progress in therapies, functional issues observed by therapy staff, and discussion with patient regarding their current medical state and wellbeing   Coordination of patient's care was performed in conjunction with Internal Medicine service to monitor patient's labs, vitals, and management of their comorbidities  Assessment/Plan:    * Closed nondisplaced intertrochanteric fracture of left femur with routine healing  Assessment & Plan  Diagnosed on MRI - nondisplaced intertrochanteric fracture with partial tear at greater trochanter insertion  ORIF on 1/11 with [facility]  Multimodal pain control as described below  WBAT  Incisional care  2 week follow-up on 1/25 - XR on 1/21 with near anatomic alignment  Will plan to remove giovani tomorrow  Outpatient f/u with [facility]       Cellulitis  Assessment & Plan  Started on Keflex 1/21 by IM 5 day course  Today foot not erythematous or red  Suspect tenderness related to pseudogout and neuropathy  Hypomagnesemia  Assessment & Plan  Repleted with oral magnesium  Continue oral magnesium  1/24 Mag 1 9     Anemia  Assessment & Plan  Mild  Normocytic  Component of ABLA  1/24 Hgb stable at 10 1   Cyanocobalamin given low normal    IM checked iron panel, started on Vit C and iron supplementation  Monitor and transfuse for <7  Vitamin D deficiency  Assessment & Plan  Started on ergocalciferol on admission  Will plan for 6 weeks, then recheck  Slow transit constipation  Assessment & Plan  Likely related to pain medication, inadequate hydration, decreased mobility, recent trauma/surgery  Scheduled docusate/miralax  Got enema on admission  Last BM 1/24    Polyneuropathy associated with underlying disease [facility])  Assessment & Plan  Most likely related to diabetes  SPEP without monoclonal gammopathy  B12 low normal - started on supplementation  Increased Gabapentin 300mg Q8hr  Desensitization techniques  Pain in both feet  Assessment & Plan  Bilateral  R > L   R foot may have component of gout/pseudogout  Erythema/warmth worsened on 1/21, was started on prednisone and keflex by IM  Improved since  Still with swelling  Also biomechanically, has a large painful bunion - this causes him to walk on the lateral edge of his foot which causes pain     Also with component of neuropathic pain  XR R foot negative for acute fracture/process  Uric acid normal  Continue increased dose of gabapentin desensitization  1/21 IM started 5 day course of prednisone  Adding ACE wrapping for swelling  Consulted podiatry for input  Obesity (BMI 30-39  9)  Assessment & Plan  Counseling  Type 2 diabetes mellitus without complication, without long-term current use of insulin [facility])  Assessment & Plan  Lab Results   Component Value Date    HGBA1C 7 2 (H) 01/08/2022       Recent Labs     01/24/22  1121 01/24/22  1622 01/24/22  2054 01/25/22  0612   POCGLU 246* 281* 144* 124       Blood Sugar Average: Last 72 hrs:  (P) 331     At home: Metformin 1000mg BID, Lantus 28 units daily, Alogliptin 25mg daily, Empaglifluzin 12 5mg daily, Glipizide 5mg BID  Here: Lantus 10 units, Metformin 1000mg BID, Januvia 12 5mg, CDI, Accuchecks  Diabetic Diet  IM consulted and management at their discretion  Mixed hyperlipidemia  Assessment & Plan  Restart home Lipitor 40mg at night  Essential hypertension  Assessment & Plan  At home on Lisinopril 30mg daily  Here has not required BP meds  Will closely monitor BP, and add home med as appropriate  IM consulted and management at their discretion    Primary osteoarthritis of both knees  Assessment & Plan  He reports issues with bilateral knees  Dr Viktoria Patel did do TKA to R knee and was following him for this  He reports previous surgery to his L knee with multiple complications/revisions  See multi-modal pain control  Outpatient f/u with Orthopedics  Primary osteoarthritis of both shoulders  Roseanne Jiménez  Last injection was 10/13  Typically gets them done every 6 months or so  May require as he is more reliant on his shoulders  If so, will consult Ortho  Will place order for Diclofenac gel and lidoderm for now  Health Maintenance  #Delirium/Sleep: At risk  No complaints at this time   Focus on environmental interventions - avoiding physical/chemical restraints  Focus on redirection and optimizing pain/bowel/bladder management  #Pain: Tylenol scheduled, Oxycodone 5-10mg PRN, Robaxin scheduled, and adding Gabapentin for neuropathic pain  Also on prednisone now  #Bowel: Last BM 1/24  Continue miralax and docusate  #Bladder: Voiding and continent  #Skin/Pressure Injury Prevention: Turn Q2hr in bed, with weight shifts [id number] in wheelchair  Float heels in bed  #DVT Prophylaxis: Lovenox 30mg Q12hr  Will change to 40 during stay  SCDs  #GI Prophylaxis: PO diet  #Code Status:  Full Code  #FEN:  Diabetic Diet  #Dispo:  Team 1/20: Changing ELOS to 2 weeks after watching therapy session today  Neuropathic pain primarily limitation  Goal is modified Ind and discharge back to home  Reteam        Objective:    Functional Update:  PT: Sup bed mobility, Dom-Set-up for transfers, CGA for ambulation 32' on parallel bars   OT: Progressing to Dom with ADLs     Allergies per EMR    Physical Exam:  Temp:  [97 6 °F (36 4 °C)-97 7 °F (36 5 °C)] 97 6 °F (36 4 °C)  HR:  [77-94] 77  Resp:  [17-18] 18  BP: (128-145)/(71-87) 128/74  Oxygen Therapy  SpO2: 96 %    Gen: No acute distress, Well-nourished, well-appearing  HEENT: Moist mucus membranes, Normocephalic/Atraumatic  Cardiovascular: Regular rate, rhythm, S1/S2  Distal pulses palpable in PT and DP bilaterally  Heme/Extr: R ankle/foot edema  Pulmonary: Non-labored breathing  Lungs CTAB  : No gilbert  GI: Soft, non-tender, non-distended  BS+  MSK:  Improving AROM in L hip and R ankle  Integumentary: Skin is warm, dry  Incision C/D/I  No signs of jose d dehiscence or infection  Some skin invagination in both incisions, but healing well  Neuro: AAOx3,  Speech is intact  Appropriate to questioning  Demonstrated at least 3/5 strength in L hip and ankle  Improving  Psych: Normal mood and affect  Diagnostic Studies: Reviewed, no new imaging      Laboratory: Reviewed, no new labs  Results from last 7 days   Lab Units 01/24/22  0638 01/20/22  0506   HEMOGLOBIN g/dL 10 1* 10 4*   HEMATOCRIT % 30 1* 31 8*   WBC Thousand/uL 10 50 10 90     Results from last 7 days   Lab Units 01/24/22  0638 01/20/22  0506   BUN mg/dL 17 11   POTASSIUM mmol/L 3 7 3 5*   CHLORIDE mmol/L 102 100   CREATININE mg/dL 0 76 0 77   AST U/L  --  24   ALT U/L  --  34            Patient Active Problem List   Diagnosis    Primary osteoarthritis of both shoulders    Primary osteoarthritis of both knees    Essential hypertension    Mixed hyperlipidemia    Type 2 diabetes mellitus without complication, without long-term current use of insulin ([facility])    Obesity (BMI 30-39  9)    S/P TKR (total knee replacement), right    Closed nondisplaced intertrochanteric fracture of left femur with routine healing    Pain in both feet    Polyneuropathy associated with underlying disease ([facility])    Slow transit constipation    Vitamin D deficiency    Anemia    Hypomagnesemia    Cellulitis    Right foot pain         Medications  Current Facility-Administered Medications   Medication Dose Route Frequency Provider Last Rate    acetaminophen  650 mg Oral Q6H PRN MARGARITA Bingham      acetaminophen  650 mg Oral [facility] Anna Marino MD      ascorbic acid  500 mg Oral Daily MARGARITA Bingham      atorvastatin  40 mg Oral Daily With Crescencio Melvin MD      cephalexin  500 mg Oral Q12H [address] MARGARIAT Judge      cyanocobalamin  1,000 mcg Oral Daily MARGARITA Bingham      Diclofenac Sodium  2 g Topical 4x Daily PRN Anna Marino MD      docusate sodium  100 mg Oral BID Anna Marino MD      Empagliflozin  12 5 mg Oral Daily MARGARITA Bingham      enoxaparin  30 mg Subcutaneous BID Anna Marino MD      ergocalciferol  50,000 Units Oral Weekly MARGARITA Bingham      ferrous gluconate  324 mg Oral Daily Before Breakfast MARGARITA Bingham      gabapentin  300 mg Oral [facility] Anna Marino MD      insulin glargine  10 Units Subcutaneous HS Genevieve Medley MD      insulin lispro  1-5 Units Subcutaneous TID [facility] Genevieve Medley MD      insulin lispro  1-5 Units Subcutaneous HS Genevieve Medley MD      lidocaine  2 patch Topical Daily PRN Genevieve Medley MD      [START ON 1/25/2022] magnesium oxide  800 mg Oral Daily MARGARITA Lacey      melatonin  3 mg Oral HS Genevieve Medley MD      metFORMIN  1,000 mg Oral BID With Meals MARGARITA Lacey      methocarbamol  500 mg Oral 4x Daily Genevieve Medley MD      ondansetron  4 mg Oral Q6H PRN Genevieve Medley MD      oxyCODONE  10 mg Oral Q4H PRN Genevieve Medley MD      oxyCODONE  5 mg Oral Q4H PRN Genevieve Medley, MD      polyethylene glycol  17 g Oral Daily Genevieve Medley MD      predniSONE  40 mg Oral Daily MARGARITA Lacey          ** Please Note: Fluency Direct voice to text software may have been used in the creation of this document   **    =

## 2022-01-25 LAB
GLUCOSE SERPL-MCNC: 124 MG/DL (ref 65–140)
GLUCOSE SERPL-MCNC: 194 MG/DL (ref 65–140)
GLUCOSE SERPL-MCNC: 201 MG/DL (ref 65–140)
GLUCOSE SERPL-MCNC: 246 MG/DL (ref 65–140)

## 2022-01-25 PROCEDURE — 97530 THERAPEUTIC ACTIVITIES: CPT

## 2022-01-25 PROCEDURE — 99232 SBSQ HOSP IP/OBS MODERATE 35: CPT | Performed by: INTERNAL MEDICINE

## 2022-01-25 PROCEDURE — 97110 THERAPEUTIC EXERCISES: CPT

## 2022-01-25 PROCEDURE — 97116 GAIT TRAINING THERAPY: CPT

## 2022-01-25 PROCEDURE — 99232 SBSQ HOSP IP/OBS MODERATE 35: CPT | Performed by: PHYSICAL MEDICINE & REHABILITATION

## 2022-01-25 PROCEDURE — 82948 REAGENT STRIP/BLOOD GLUCOSE: CPT

## 2022-01-25 RX ORDER — LANOLIN ALCOHOL/MO/W.PET/CERES
6 CREAM (GRAM) TOPICAL
Status: DISCONTINUED | OUTPATIENT
Start: 2022-01-25 | End: 2022-02-01 | Stop reason: HOSPADM

## 2022-01-25 RX ORDER — COLCHICINE 0.6 MG/1
0.6 TABLET ORAL DAILY
Status: COMPLETED | OUTPATIENT
Start: 2022-01-25 | End: 2022-01-29

## 2022-01-25 RX ADMIN — CYANOCOBALAMIN TAB 1000 MCG 1000 MCG: 1000 TAB at 08:39

## 2022-01-25 RX ADMIN — METFORMIN HYDROCHLORIDE 1000 MG: 500 TABLET ORAL at 08:39

## 2022-01-25 RX ADMIN — ACETAMINOPHEN 650 MG: 325 TABLET ORAL at 21:36

## 2022-01-25 RX ADMIN — GABAPENTIN 300 MG: 300 CAPSULE ORAL at 06:08

## 2022-01-25 RX ADMIN — MELATONIN TAB 3 MG 6 MG: 3 TAB at 21:36

## 2022-01-25 RX ADMIN — ENOXAPARIN SODIUM 30 MG: 30 INJECTION SUBCUTANEOUS at 21:36

## 2022-01-25 RX ADMIN — INSULIN LISPRO 1 UNITS: 100 INJECTION, SOLUTION INTRAVENOUS; SUBCUTANEOUS at 17:31

## 2022-01-25 RX ADMIN — ENOXAPARIN SODIUM 30 MG: 30 INJECTION SUBCUTANEOUS at 08:40

## 2022-01-25 RX ADMIN — INSULIN LISPRO 1 UNITS: 100 INJECTION, SOLUTION INTRAVENOUS; SUBCUTANEOUS at 21:40

## 2022-01-25 RX ADMIN — INSULIN GLARGINE 15 UNITS: 100 INJECTION, SOLUTION SUBCUTANEOUS at 21:36

## 2022-01-25 RX ADMIN — ATORVASTATIN CALCIUM 40 MG: 40 TABLET, FILM COATED ORAL at 17:31

## 2022-01-25 RX ADMIN — DOCUSATE SODIUM 100 MG: 100 CAPSULE, LIQUID FILLED ORAL at 08:39

## 2022-01-25 RX ADMIN — CEPHALEXIN 500 MG: 500 CAPSULE ORAL at 21:36

## 2022-01-25 RX ADMIN — OXYCODONE HYDROCHLORIDE 10 MG: 10 TABLET ORAL at 09:35

## 2022-01-25 RX ADMIN — ACETAMINOPHEN 650 MG: 325 TABLET ORAL at 06:08

## 2022-01-25 RX ADMIN — METHOCARBAMOL TABLETS 500 MG: 500 TABLET, COATED ORAL at 17:31

## 2022-01-25 RX ADMIN — INSULIN LISPRO 1 UNITS: 100 INJECTION, SOLUTION INTRAVENOUS; SUBCUTANEOUS at 11:59

## 2022-01-25 RX ADMIN — METHOCARBAMOL TABLETS 500 MG: 500 TABLET, COATED ORAL at 21:36

## 2022-01-25 RX ADMIN — GABAPENTIN 300 MG: 300 CAPSULE ORAL at 14:29

## 2022-01-25 RX ADMIN — METHOCARBAMOL TABLETS 500 MG: 500 TABLET, COATED ORAL at 11:58

## 2022-01-25 RX ADMIN — METFORMIN HYDROCHLORIDE 1000 MG: 500 TABLET ORAL at 17:31

## 2022-01-25 RX ADMIN — DOCUSATE SODIUM 100 MG: 100 CAPSULE, LIQUID FILLED ORAL at 17:31

## 2022-01-25 RX ADMIN — OXYCODONE HYDROCHLORIDE 10 MG: 10 TABLET ORAL at 21:35

## 2022-01-25 RX ADMIN — EMPAGLIFLOZIN 12.5 MG: 25 TABLET, FILM COATED ORAL at 08:40

## 2022-01-25 RX ADMIN — ACETAMINOPHEN 650 MG: 325 TABLET ORAL at 14:29

## 2022-01-25 RX ADMIN — MAGNESIUM OXIDE TAB 400 MG (241.3 MG ELEMENTAL MG) 800 MG: 400 (241.3 MG) TAB at 08:39

## 2022-01-25 RX ADMIN — COLCHICINE 0.6 MG: 0.6 TABLET, FILM COATED ORAL at 14:30

## 2022-01-25 RX ADMIN — OXYCODONE HYDROCHLORIDE AND ACETAMINOPHEN 500 MG: 500 TABLET ORAL at 08:39

## 2022-01-25 RX ADMIN — PREDNISONE 40 MG: 20 TABLET ORAL at 08:39

## 2022-01-25 RX ADMIN — OXYCODONE HYDROCHLORIDE 5 MG: 5 TABLET ORAL at 14:33

## 2022-01-25 RX ADMIN — FERROUS GLUCONATE 324 MG: 324 TABLET ORAL at 06:09

## 2022-01-25 RX ADMIN — METHOCARBAMOL TABLETS 500 MG: 500 TABLET, COATED ORAL at 08:39

## 2022-01-25 RX ADMIN — GABAPENTIN 300 MG: 300 CAPSULE ORAL at 21:36

## 2022-01-25 RX ADMIN — CEPHALEXIN 500 MG: 500 CAPSULE ORAL at 08:40

## 2022-01-25 NOTE — ASSESSMENT & PLAN NOTE
1/6 - Mechanical fall at home onto L hip  CT abd/pelvis - L intertrochanteric femur fracture  1/7 - OR for ORIF with short TFN to L hip - Dr Maritza Tilley    Ensure adequate pain control  Neurovascular checks Q shift  WBAT to LLE  DVT ppx with Lovenox  Follow-up with Dr Maritza Tilley in 2 weeks as outpatient (1/21)  XR on 1/21: "Nearly anatomically aligned intertrochanteric fracture of the left femur, status post ORIF  No evidence for hardware complication  Status post left knee arthroplasty "    Continue PT/OT therapies  Primary team following  No hx of DXA scan - recommend to be done as outpatient  Continue Vitamin D supplementation

## 2022-01-25 NOTE — PROGRESS NOTES
01/25/22 1000   Pain Assessment   Pain Assessment Tool 0-10   Pain Score 7   Pain Location/Orientation Orientation: Bilateral;Location: Foot   Pain Onset/Description Onset: Ongoing   Effect of Pain on Daily Activities impacts STS   Patient's Stated Pain Goal No pain   Hospital Pain Intervention(s) Elevated; Rest  (don TEDS/sneakers)   Restrictions/Precautions   Precautions Fall Risk;Pain   Weight Bearing Restrictions Yes   RLE Weight Bearing Per Order WBAT   LLE Weight Bearing Per Order WBAT   ROM Restrictions No   Oral Hygiene   Type of Assistance Needed Set-up / clean-up   Physical Assistance Level No physical assistance   Comment seated end of session   Oral Hygiene CARE Score 5   Putting On/Taking Off Footwear   Type of Assistance Needed Physical assistance   Physical Assistance Level Total assistance   Comment TA to don TEDS/sneakers  Pt reporting that he has dec feet pain with TEDS and sneakers  NP Aida Mt approving use of TEDS vs ace wraps   Putting On/Taking Off Footwear CARE Score 1   Sit to Stand   Type of Assistance Needed Physical assistance   Physical Assistance Level 51%-75%   Comment fluctuates between Dom/modA with inc time until pt places feet under him    Sit to Stand CARE Score 2   Bed-Chair Transfer   Type of Assistance Needed Physical assistance   Physical Assistance Level 26%-50%   Comment Dom SPT with RW   Chair/Bed-to-Chair Transfer CARE Score 3   Toilet Transfer   Type of Assistance Needed Physical assistance   Physical Assistance Level 26%-50%   Comment Dom SPT with RW   Toilet Transfer CARE Score 3   Cognition   Overall Cognitive Status WFL   Arousal/Participation Alert; Cooperative   Attention Attends with cues to redirect   Orientation Level Oriented X4   Memory Within functional limits   Following Commands Follows multistep commands with increased time or repetition   Additional Activities   Additional Activities Other (Comment)  (fx mobility)   Additional Activities Comments Pt reporting inc comfort on feet with use of TEDS and sneakers  Therapist performed fx mobiltiy for short distance (13ft) at Dom level and no LOB observed  Pt toelrated well and excited he was able to ambulate for short distance, therapist communicated with PT Hamida   Activity Tolerance   Activity Tolerance Patient tolerated treatment well   Medical Staff Made Aware ALWAYS DON TEDS AND SNEAKERS DURING TRANSFERS   Assessment   Treatment Assessment Engaged pt in 60mins of skilled oT services with focus on STS, SPT and fx mobility  Upon therapist arrival pt had R foot ace wrapped  However, pt rporting if he could trail sneakers reporting inc comfort  In addition, therapist asked NP Huseyin Santa if we can trial TEDS which was approved  Combination use of TEDS and sneakers pt observed with imrpoved stability of LE and able to perform STS, SPT and short fx mobility  Therapist communicated with RN staff pt to be wheeled into BR and perform SPT with RW and always have TEDS/sneakers don for transfers with RN Ashley Lizama verbalized understanding  Pt is making steady gains, cont OT services with focus on activity tolerance, balance, SPT, fx mobility to inc fx performance and dec caregiver burden  Prognosis Fair   Problem List Decreased strength; Impaired balance;Decreased mobility; Impaired judgement;Pain   Barriers to Discharge Inaccessible home environment   Plan   Treatment/Interventions ADL retraining;Functional transfer training; Therapeutic exercise; Endurance training;Patient/family training;Bed mobility; Compensatory technique education   Progress Progressing toward goals   Recommendation   OT Equipment ordered TBD   OT - OK to Discharge No   OT Therapy Minutes   OT Time In 1000   OT Time Out 1100   OT Total Time (minutes) 60   OT Mode of treatment - Individual (minutes) 60   OT Mode of treatment - Concurrent (minutes) 0   OT Mode of treatment - Group (minutes) 0   OT Mode of treatment - Co-treat (minutes) 0   OT Mode of Treatment - Total time(minutes) 60 minutes   OT Cumulative Minutes 450   Therapy Time missed   Time missed?  No

## 2022-01-25 NOTE — PROGRESS NOTES
01/25/22 1230   Pain Assessment   Pain Assessment Tool 0-10   Pain Score 4   Pain Location/Orientation Orientation: Bilateral;Location: Foot   Pain Onset/Description Onset: Ongoing   Patient's Stated Pain Goal No pain   Hospital Pain Intervention(s) Elevated; Rest   Multiple Pain Sites No   Restrictions/Precautions   Precautions Fall Risk;Pain   Weight Bearing Restrictions Yes   RLE Weight Bearing Per Order WBAT   LLE Weight Bearing Per Order WBAT   ROM Restrictions No   Therapeutic Excerise-Strength   UE Strength Yes   Right Upper Extremity- Strength   R Shoulder Flexion;ABduction; Extension;Horizontal ABduction   R Elbow Elbow flexion;Elbow extension   R Position Seated   Equipment Dumbbell  (4#)   R Weight/Reps/Sets 2x15   RUE Strength Comment therapist attempted to compelte wc pushups, however, pt reporting inc discomfort of R shoulder  engaged in UE TE iwth 4# to inc fx strength, tolerated well with rest break in between   Left Upper Extremity-Strength   LUE Strength Comment see above   Cognition   Overall Cognitive Status [facility]   Arousal/Participation Alert; Cooperative   Attention Attends with cues to redirect   Orientation Level Oriented X4   Memory Within functional limits   Following Commands Follows multistep commands with increased time or repetition   Activity Tolerance   Activity Tolerance Patient tolerated treatment well   Assessment   Treatment Assessment Engaged pt in brief 30mins of skilled OT services with focus on UE TE  Attempted to compelte wc pushups with pt reporting discomfort of R shoulder  Therapist spoke with NP Geovany Roberts about possible cortisone shot to R shoulder as pt hs recieved them before  Geovany Roberts reporting she will speak to Dr Madison Snider next day  Pt is making steady gains at this time  Cont skilled OT services with focus on STS, SPT, activity tolerance, standing tolerance to inc fx performance and indepndence  Prognosis Fair   Problem List Decreased strength; Impaired balance;Decreased mobility; Impaired judgement;Pain   Barriers to Discharge Inaccessible home environment   Plan   Treatment/Interventions ADL retraining;Functional transfer training; Therapeutic exercise; Endurance training;Patient/family training;Bed mobility; Compensatory technique education   Progress Progressing toward goals   Recommendation   OT Equipment ordered TBD   OT - OK to Discharge No   OT Therapy Minutes   OT Time In 1230   OT Time Out 1300   OT Total Time (minutes) 30   OT Mode of treatment - Individual (minutes) 30   OT Mode of treatment - Concurrent (minutes) 0   OT Mode of treatment - Group (minutes) 0   OT Mode of treatment - Co-treat (minutes) 0   OT Mode of Treatment - Total time(minutes) 30 minutes   OT Cumulative Minutes 480   Therapy Time missed   Time missed?  No

## 2022-01-25 NOTE — PLAN OF CARE
Problem: GASTROINTESTINAL - ADULT  Goal: Maintains or returns to baseline bowel function  Description: INTERVENTIONS:  - Assess bowel function  - Encourage oral fluids to ensure adequate hydration  - Administer IV fluids if ordered to ensure adequate hydration  - Administer ordered medications as needed  - Encourage mobilization and activity  - Consider nutritional services referral to assist patient with adequate nutrition and appropriate food choices  Outcome: Progressing     Problem: METABOLIC, FLUID AND ELECTROLYTES - ADULT  Goal: Glucose maintained within target range  Description: INTERVENTIONS:  - Monitor Blood Glucose as ordered  - Assess for signs and symptoms of hyperglycemia and hypoglycemia  - Administer ordered medications to maintain glucose within target range  - Assess nutritional intake and initiate nutrition service referral as needed  Outcome: Progressing     Problem: SKIN/TISSUE INTEGRITY - ADULT  Goal: Incision(s), wounds(s) or drain site(s) healing without S/S of infection  Description: INTERVENTIONS  - Assess and document dressing, incision, wound bed, drain sites and surrounding tissue  - Provide patient and family education  - Perform skin care/dressing changes every shift  Outcome: Progressing

## 2022-01-25 NOTE — CONSULTS
Consult Note- Podiatry   Kaitlin Sanabria 61 y o  male MRN: [id number]  Unit/Bed#: -01 Encounter: [id number]    Assessment/Plan     Assessment:  1  Pain right foot  2  Degenerative joint disease b/l foot  3  Dorsal midfoot exostosis  4  Hallux limitus b/l  5  Edema b/l  6  Gout flare Rl     Plan:  - -pt eval and managed    - Number and complexity of problems addressed:  1 undiagnosed new problem with uncertain prognosis   as shown    - Amount/complexity of data reviewed and analyzed:     Category 1: prior patient notes were analyzed today before evaluating and managing patient  All PMH were discussed with pt today  - Risk of complications: moderate risk of morbidity from additional testing or treatment involved with this patient, which includes but not limited to:    - discussed anatomy, condition, treatment plan and options  They were instructed on proper foot care  The patient was seen today for greater than total of  45-59 minutes     This is total time spent today involving both face-to-face time and non face-to-face time  This time spent includes  reviewing their past medical history  , performing a medically appropriate examination and evaluation of the patient, counseling and educating the patient,  documenting all findings in EMR, and independently interpreting results and communicating results with  patient     and discussing their condition and treatment options, risks, and potential complications  I have discussed the findings of this examination with the patient  The discussion included a complete verbal explanation of the examination results, diagnosis and planned treatment(s)  A schedule for future care needs was explained  The patient has verbalized the understanding of these instructions at this time  If any questions should arise after returning home I have encouraged the patient to feel free to call the office      - xrays of the R foot reviewed by me, arthritic changes noted throughout entire foot, dorsal spurring of midfoot  - I d/w pt today that much of his symptoms are arthritic in nature  - continue with oral Prednisone as prescribed  - I will place patient on short course of colchicine as well  - dorsal swelling in area of dorsal spurring  I relaced patient sneakers today in order to alleviate pressure to area of dorsal spur  - continue with compression stockings at all times  - pt should follow up as outpt for further management, and possible injection/surgical management  - appreciate consult  - All questions and concerns addressed  - Podiatry signing off, thank you for the consult  History of Present Illness     HPI:  Pastor Colon is a 61 y o  male who presents with painful R foot  Pt denies any type of trauma  States pain was to the right big toe joint  Pt has history of gout flares  Thinks he sustained a flare  Pt was placed on prednisone  Pt denies any trauma  Pt states he has a "lump" on the top of his R foot, with swelling  Pt is in compression stockings  Inpatient consult to Podiatry  Consult performed by: Liam Esposito DPM  Consult ordered by: Susan Ricardo MD        Review of Systems   Constitutional: Negative  HENT: Negative  Eyes: Negative  Respiratory: Negative  Cardiovascular: Negative  Gastrointestinal: Negative  Musculoskeletal: R foot pain  Skin: Negative  Neurological: Negative          Historical Information   Past Medical History:   Diagnosis Date    Depression     Diabetes mellitus ([address] )     Hyperlipidemia     Hypertension     Osteoarthritis, knee      Past Surgical History:   Procedure Laterality Date    BACK SURGERY      HAND SURGERY      JOINT REPLACEMENT Left     l tkr    KNEE SURGERY Left     OR TOTAL KNEE ARTHROPLASTY Right 2018    Procedure: ARTHROPLASTY KNEE TOTAL;  Surgeon: Nika Du MD;  Location: [facility];  Service: Orthopedics    TOOTH EXTRACTION      WISDOM TOOTH EXTRACTION       Social History   Social History     Substance and Sexual Activity   Alcohol Use Yes    Comment: 'couple beers every couple weeks'     Social History     Substance and Sexual Activity   Drug Use No     Social History     Tobacco Use   Smoking Status Former Smoker    Types: Cigarettes    Quit date: 3/1/2012    Years since quittin 9   Smokeless Tobacco Never Used     Family History:   Family History   Problem Relation Age of Onset    No Known Problems Mother     Heart attack Father     Arthritis Family     Cancer Family     Diabetes Family     Heart disease Family     Osteoporosis Family        Meds/Allergies   Medications Prior to Admission   Medication    atorvastatin (LIPITOR) 80 mg tablet    glyBURIDE-metFORMIN (GLUCOVANCE) 2 5-500 MG per tablet    indomethacin (INDOCIN) 25 mg capsule    insulin glargine (LANTUS SOLOSTAR) 100 units/mL injection pen    lisinopril (ZESTRIL) 40 mg tablet    metFORMIN (GLUCOPHAGE) 1000 MG tablet    saxagliptin (ONGLYZA) 5 MG tablet    senna (SENOKOT) 8 6 mg    simvastatin (ZOCOR) 40 mg tablet     No Known Allergies    Objective   First Vitals:   Blood Pressure: 130/76 (22 1556)  Pulse: (!) 110 (22 1556)  Temperature: (!) 101 1 °F (38 4 °C) (22 1556)  Temp Source: Oral (22 1556)  Respirations: 18 (22 1556)  Height: 6' 2" (188 cm) (22 1604)  Weight - Scale: 114 kg (252 lb) (22 1604)  SpO2: 94 % (22 1556)    Current Vitals:   Blood Pressure: 116/69 (22 0608)  Pulse: 78 (22 0608)  Temperature: (!) 97 2 °F (36 2 °C) (22 0608)  Temp Source: Tympanic (22 2964)  Respirations: 18 (22 0101)  Height: 6' 2" (188 cm) (22 1604)  Weight - Scale: 114 kg (252 lb) (22 1604)  SpO2: 95 % (22 0608)    /69 (BP Location: Right arm)   Pulse 78   Temp (!) 97 2 °F (36 2 °C) (Tympanic)   Resp 18   Ht 6' 2" (1 88 m)   Wt 114 kg (252 lb)   SpO2 95%   BMI 32 35 kg/m²     General Appearance: Alert, cooperative, no distress   Head:    Normocephalic, without obvious abnormality, atraumatic   Eyes:    PERRL, conjunctiva/corneas clear, EOM's intact            Nose:   Moist mucous membranes, no drainage or sinus tenderness   Throat:   No tenderness, no exudates   Neck:   Supple, symmetrical, trachea midline, no JVD   Back:     Symmetric, no CVA tenderness   Lungs:     Clear to auscultation bilaterally, respirations unlabored   Chest wall:    No tenderness or deformity   Heart:    Regular rate and rhythm, S1 and S2 normal, no murmur, rub   or gallop   Abdomen:     Soft, non-tender, bowel sounds active all four quadrants,     no masses, no organomegaly     Extremities:   MMT is 4/5 to all compartments of the LE, +2/4 edema B/L, Digital ROM is intact,     Dorsal R midfoot, with palpable exostosis, surrounding redness from irritation, noninfectious in nature, crepitus with ROM of b/l Midfoot joints, with discomfort to the R, there is edema of the midfoot noted,    Discomfort and crepitus with ROM of the R 1st MPJ       Pulses:   R DP is +1/4, R PT is +1/4, L DP is +1/4, L PT is +1/4, CFT< 3sec to all digits  Thin/shiny skin noted to the B/L LE, pigmentary changes to B/L LE  Absent digital hair growth b/l  Nail thickening b/l  Skin:    No open Lesions  Neurologic:   CNII-XII intact  Normal strength, sensation and reflexes       Throughout  Gross sensation is intact   Protective sensation is diminished       Lab Results:   Admission on 01/19/2022   Component Date Value    POC Glucose 01/19/2022 331*    Color, UA 01/19/2022 Yellow     Clarity, UA 01/19/2022 Clear     Specific Gravity, UA 01/19/2022 1 020     pH, UA 01/19/2022 5 0     Leukocytes, UA 01/19/2022 Negative     Nitrite, UA 01/19/2022 Negative     Protein, UA 01/19/2022 Negative     Glucose, UA 01/19/2022 >=1000 (1%)*    Ketones, UA 01/19/2022 Negative     Bilirubin, UA 01/19/2022 Negative     Blood, UA 01/19/2022 Negative     UROBILINOGEN UA 01/19/2022 1 0     POC Glucose 01/19/2022 333*    RBC, UA 01/19/2022 None Seen     WBC, UA 01/19/2022 0-1     Epithelial Cells 01/19/2022 Occasional     Bacteria, UA 01/19/2022 Occasional     Sodium 01/20/2022 136*    Potassium 01/20/2022 3 5*    Chloride 01/20/2022 100     CO2 01/20/2022 30     ANION GAP 01/20/2022 6     BUN 01/20/2022 11     Creatinine 01/20/2022 0 77     Glucose 01/20/2022 227*    Glucose, Fasting 01/20/2022 227*    Calcium 01/20/2022 8 8     Corrected Calcium 01/20/2022 9 4     AST 01/20/2022 24     ALT 01/20/2022 34     Alkaline Phosphatase 01/20/2022 120     Total Protein 01/20/2022 6 8     Albumin 01/20/2022 3 3     Total Bilirubin 01/20/2022 0 96     eGFR 01/20/2022 95     WBC 01/20/2022 10 90     RBC 01/20/2022 3 43*    Hemoglobin 01/20/2022 10 4*    Hematocrit 01/20/2022 31 8*    MCV 01/20/2022 93     MCH 01/20/2022 30 2     MCHC 01/20/2022 32 7     RDW 01/20/2022 14 6     MPV 01/20/2022 7 3*    Platelets 20/93/3958 433     Neutrophils Relative 01/20/2022 76*    Lymphocytes Relative 01/20/2022 13*    Monocytes Relative 01/20/2022 10     Eosinophils Relative 01/20/2022 2     Basophils Relative 01/20/2022 0     Neutrophils Absolute 01/20/2022 8 20*    Lymphocytes Absolute 01/20/2022 1 40     Monocytes Absolute 01/20/2022 1 10*    Eosinophils Absolute 01/20/2022 0 20     Basophils Absolute 01/20/2022 0 00     Magnesium 01/20/2022 1 4*    A/G Ratio 01/20/2022 0 99*    Albumin Electrophoresis 01/20/2022 49 7*    Albumin CONC 01/20/2022 3 23*    Alpha 1 01/20/2022 8 1*    ALPHA 1 CONC 01/20/2022 0 53*    Alpha 2 01/20/2022 14 8*    ALPHA 2 CONC 01/20/2022 0 96     Beta-1 01/20/2022 6 8     BETA 1 CONC 01/20/2022 0 44     Beta-2 01/20/2022 6 6     BETA 2 CONC 01/20/2022 0 43     Gamma Globulin 01/20/2022 14 0     GAMMA CONC 01/20/2022 0 91     Total Protein 01/20/2022 6 5     SPEP Interpretation 01/20/2022 See Comment     Vitamin B-12 01/20/2022 315     Iron Saturation 01/20/2022 22     TIBC 01/20/2022 147*    Iron 01/20/2022 32*    Ferritin 01/20/2022 304     POC Glucose 01/20/2022 239*    POC Glucose 01/20/2022 293*    Uric Acid 01/20/2022 5 0     POC Glucose 01/20/2022 267*    POC Glucose 01/20/2022 204*    POC Glucose 01/21/2022 191*    POC Glucose 01/21/2022 222*    POC Glucose 01/21/2022 222*    POC Glucose 01/21/2022 283*    POC Glucose 01/22/2022 172*    POC Glucose 01/22/2022 234*    POC Glucose 01/22/2022 326*    POC Glucose 01/22/2022 246*    POC Glucose 01/23/2022 190*    POC Glucose 01/23/2022 204*    POC Glucose 01/23/2022 301*    POC Glucose 01/23/2022 303*    Sodium 01/24/2022 137     Potassium 01/24/2022 3 7     Chloride 01/24/2022 102     CO2 01/24/2022 32*    ANION GAP 01/24/2022 3*    BUN 01/24/2022 17     Creatinine 01/24/2022 0 76     Glucose 01/24/2022 195*    Glucose, Fasting 01/24/2022 195*    Calcium 01/24/2022 8 7     eGFR 01/24/2022 96     WBC 01/24/2022 10 50     RBC 01/24/2022 3 27*    Hemoglobin 01/24/2022 10 1*    Hematocrit 01/24/2022 30 1*    MCV 01/24/2022 92     MCH 01/24/2022 31 0     MCHC 01/24/2022 33 7     RDW 01/24/2022 14 5     MPV 01/24/2022 7 3*    Platelets 04/51/0355 438     Neutrophils Relative 01/24/2022 67*    Lymphocytes Relative 01/24/2022 22*    Monocytes Relative 01/24/2022 9     Eosinophils Relative 01/24/2022 1     Basophils Relative 01/24/2022 1     Neutrophils Absolute 01/24/2022 7 00     Lymphocytes Absolute 01/24/2022 2 30     Monocytes Absolute 01/24/2022 0 90     Eosinophils Absolute 01/24/2022 0 10     Basophils Absolute 01/24/2022 0 10     Magnesium 01/24/2022 1 9     POC Glucose 01/24/2022 183*    POC Glucose 01/24/2022 246*    POC Glucose 01/24/2022 281*    POC Glucose 01/24/2022 144*    POC Glucose 01/25/2022 124     POC Glucose 01/25/2022 201*     Imaging: I have personally reviewed pertinent films in PACS  EKG, Pathology, and Other Studies: I have personally reviewed pertinent reports        Code Status: Level 1 - Full Code  Advance Directive and Living Will:      Power of :    POLST:

## 2022-01-25 NOTE — ASSESSMENT & PLAN NOTE
B/L shoulder glenohumeral osteoarthritis  Follows with Dr Tyesha Lin as outpatient  Last received steroid injections on 10/13/21  Ensure adequate pain control  Considerations during therapies

## 2022-01-25 NOTE — ASSESSMENT & PLAN NOTE
Lab Results   Component Value Date    HGBA1C 7 2 (H) 01/08/2022       Recent Labs     01/24/22  1121 01/24/22  1622 01/24/22 2054 01/25/22  0612   POCGLU 246* 281* 144* 124       Blood Sugar Average: Last 72 hrs:  (P) 227 [id number]     Last A1c 7 5 on 11/16  Home regimen: Alogliptin 25mg daily, Jardiance 12 5mg daily, Glipizide 5mg BID, Lantus 28u at HS, and metformin 1000mg BID  Started on Lantus 10u at HS and metformin 1000mg BID on 1/20  Started Jardiance 12 5mg daily and increased Lantus to 15u at HS on 1/24  Continue QID accuchecks and SSI  Continue cons  carb diet  Continue to follow-up with PCP as outpatient

## 2022-01-25 NOTE — PROGRESS NOTES
01/25/22 1400   Pain Assessment   Pain Assessment Tool 0-10   Pain Score 7   Pain Location/Orientation Orientation: Right;Location: Foot;Orientation: Left; Location: Hip   Pain Onset/Description Onset: Ongoing;Frequency: Intermittent; Descriptor: Aching;Descriptor: Discomfort; Descriptor: Sore   Effect of Pain on Daily Activities Patient pushes through pain, increases functional STS time    Patient's Stated Pain Goal No pain   Hospital Pain Intervention(s) Cold applied;Elevated; Rest   Multiple Pain Sites Yes   Restrictions/Precautions   Precautions Fall Risk;Pain   Weight Bearing Restrictions Yes   RLE Weight Bearing Per Order FWB   LLE Weight Bearing Per Order WBAT   ROM Restrictions No   Cognition   Overall Cognitive Status WFL   Arousal/Participation Alert; Cooperative   Attention Attends with cues to redirect   Orientation Level Oriented X4   Memory Within functional limits   Following Commands Follows multistep commands with increased time or repetition   Subjective   Subjective Pt reports that he did not receive pain medication for a while   Sit to Stand   Type of Assistance Needed Physical assistance   Physical Assistance Level 51%-75%   Comment Min A initially and resorts to Mod A as Pt fatigues throughout the session    Sit to Stand CARE Score 2   Bed-Chair Transfer   Type of Assistance Needed Physical assistance   Physical Assistance Level 26%-50%   Comment Min A with SPT with RW   Chair/Bed-to-Chair Transfer CARE Score 3   Car Transfer   Reason if not Attempted Environmental limitations   Car Transfer CARE Score 10   Walk 10 Feet   Type of Assistance Needed Supervision;Physical assistance   Physical Assistance Level 25% or less   Comment With RW   Walk 10 Feet CARE Score 3   Walk 50 Feet with Two Turns   Type of Assistance Needed Physical assistance;Supervision   Physical Assistance Level 25% or less   Comment Pt is able to complete task without rest breaks, gait slow   Walk 50 Feet with Two Turns CARE Score 3   Walk 150 Feet   Reason if not Attempted Safety concerns   Walk 150 Feet CARE Score 88   Ambulation   Does the patient walk? 2  Yes   Primary Mode of Locomotion Prior to Admission Walk   Distance Walked (feet) 36 ft  (Trials of 50 and 80)   Assist Device Roller Walker   Gait Pattern Antalgic; Inconsistant Rosanne; Wide YOVANI   Limitations Noted In Endurance; Safety;Speed   Provided Assistance with: Balance   Walk Assist Level Minimum Assist   Findings Used RW and decreased reilance on AD from parallel bars last session  Heavy UE use, decrease B knee flexion ROM throughout gait cycle   Wheel 50 Feet with Two Turns   Reason if not Attempted Activity not applicable   Wheel 50 Feet with Two Turns CARE Score 9   Wheel 150 Feet   Reason if not Attempted Activity not applicable   Wheel 051 Feet CARE Score 9   Wheelchair mobility   Method Right upper extremity; Left upper extremity   Assistance Provided For Remove Leg Rest;Remove armrests; Locking Brakes   Distance Level Surface (feet) 150 ft   Findings Use of WC propulsion for LE endurance and ROM  Curb or Single Stair   Reason if not Attempted Safety concerns   1 Step (Curb) CARE Score 88   4 Steps   Reason if not Attempted Safety concerns   4 Steps CARE Score 88   12 Steps   Reason if not Attempted Safety concerns   12 Steps CARE Score 88   Picking Up Object   Reason if not Attempted Safety concerns   Picking Up Object CARE Score 88   Therapeutic Interventions   Strengthening Bilateral: Knee flexion RTB 20x, hip abduction RTB 20x with 3 second hold  LLE: LAQ 20x    Flexibility Manual passive overpressure on R LE into DF 4  x 20 sec   Modalities Cold pack to R achilles and L hip with the legs elevated to end the session, 20 min   Assessment   Treatment Assessment Pt engaged in 90 mins of skilled PT services with focus on bouts of ambuation increasing the distance walked with each trial   Pt progressed from parallel bars from last session indicating progress towards goals  Oxycodone was given to the Pt at the beginning of the session prior to ambulation   Pt was able to perform SLS on the R but only weigtshifts on the L  Pt would benefit from attempting SLS LLE weightshifts in preparation for stair negogation as pt has [facility] home  Overall pt progressing and pleased with progress, suggest premedication for therapy  Problem List Decreased strength; Impaired balance;Decreased mobility; Impaired judgement;Pain   Barriers to Discharge Inaccessible home environment   Barriers to Discharge Comments 3 Steps, B HR, first floor setup, sleeps in recliner  PT Barriers   Physical Impairment Decreased strength;Decreased range of motion;Decreased mobility;Orthopedic restrictions;Pain   Functional Limitation Car transfers; Ramp negotiation;Stair negotiation;Standing;Transfers; Walking   Plan   Treatment/Interventions ADL retraining;Functional transfer training;LE strengthening/ROM; Elevations; Therapeutic exercise; Endurance training;Patient/family training;Bed mobility;Gait training; Compensatory technique education   Progress Progressing toward goals   Recommendation   PT Discharge Recommendation Home with outpatient rehabilitation   PT Equipment ordered reports having RW  PT Therapy Minutes   PT Time In 1400   PT Time Out 1530   PT Total Time (minutes) 90   PT Mode of treatment - Individual (minutes) 90   PT Mode of treatment - Concurrent (minutes) 0   PT Mode of treatment - Group (minutes) 0   PT Mode of treatment - Co-treat (minutes) 0   PT Mode of Treatment - Total time(minutes) 90 minutes   PT Cumulative Minutes 505   Therapy Time missed   Time missed?  No

## 2022-01-25 NOTE — PROGRESS NOTES
[facility]  Progress Note - Hortensia Certain 1955, 61 y o  male MRN: [id number]  Unit/Bed#: -01 Encounter: [id number]  Primary Care Provider: No primary care provider on file  Date and time admitted to hospital: 1/19/2022  3:54 PM    Right foot pain  Assessment & Plan  Started on keflex for possible cellulitis infection on dorsal aspect of foot  Also started on prednisone 40mg daily x5 days on 1/21 for possible pseudogout  R foot XR on 1/19: "No acute osseous abnormality "  Uric acid level WNL  Cellulitis  Assessment & Plan  Possible cellulitis infection to the R foot/heel  Hx of previous cellulitis infections in same location  Started on keflex 500mg BID on 1/21, complete in 5 days (1/26)  Monitor for s/s of reoccurring infection  Hypomagnesemia  Assessment & Plan  Improved, Mg 1 9 from 1 4 on 1/24  Continue magnesium oxide 800mg daily  Anemia  Assessment & Plan  Related to acute blood loss post-procedure  Hgb currently 10 1  Hgb was 15 0 pre-operatively  Iron panel on 1/20: Iron Sat 22%, TIBC 147, Iron 32, and Ferritin 304  Started on ferrous gluconate and ascorbic acid  Monitor for s/s of active bleeding  Continue to trend with routine CBC  Vitamin D deficiency  Assessment & Plan  Vitamin D level 11 on 1/8  Start on Vitamin D 50,000u weekly  Continue to follow-up with PCP as outpatient  Polyneuropathy associated with underlying disease ([facility] [address] )  Assessment & Plan  Continue gabapentin 100mg Q8 - increased to 300mg Q8 on 1/20  Daily skin checks/neurovascular checks  Considerations with PT/OT therapies  Vitamin B12 level 315 - started on cyanocobalamin  Obesity (BMI 30-39  9)  Assessment & Plan  BMI 32 35 on admission  Encourage lifestyle modifications      Type 2 diabetes mellitus without complication, without long-term current use of insulin [facility])  Assessment & Plan  Lab Results   Component Value Date    HGBA1C 7 2 (H) 01/08/2022 Recent Labs     01/24/22  1121 01/24/22  1622 01/24/22  2054 01/25/22  0612   POCGLU 246* 281* 144* 124       Blood Sugar Average: Last 72 hrs:  (P) 227 [id number]     Last A1c 7 5 on 11/16  Home regimen: Alogliptin 25mg daily, Jardiance 12 5mg daily, Glipizide 5mg BID, Lantus 28u at HS, and metformin 1000mg BID  Started on Lantus 10u at HS and metformin 1000mg BID on 1/20  Started Jardiance 12 5mg daily and increased Lantus to 15u at HS on 1/24  Continue QID accuchecks and SSI  Continue cons  carb diet  Continue to follow-up with PCP as outpatient  Mixed hyperlipidemia  Assessment & Plan  Continue Lipitor 40mg daily  Last lipid panel on 11/16: Cholesterol 139, Triglycerides 150, HDL 32, and LDL 82  Essential hypertension  Assessment & Plan  Home lisinopril 30mg currently on hold  Monitor BP with routine VS     Primary osteoarthritis of both shoulders  Assessment & Plan  B/L shoulder glenohumeral osteoarthritis  Follows with Dr Westley Maldonado as outpatient  Last received steroid injections on 10/13/21  Ensure adequate pain control  Considerations during therapies  * Closed nondisplaced intertrochanteric fracture of left femur with routine healing  Assessment & Plan  1/6 - Mechanical fall at home onto L hip  CT abd/pelvis - L intertrochanteric femur fracture  1/7 - OR for ORIF with short TFN to L hip - Dr Bassam Rivas    Ensure adequate pain control  Neurovascular checks Q shift  WBAT to LLE  DVT ppx with Lovenox  Follow-up with Dr Bassam Rivas in 2 weeks as outpatient (1/21)  XR on 1/21: "Nearly anatomically aligned intertrochanteric fracture of the left femur, status post ORIF  No evidence for hardware complication  Status post left knee arthroplasty "    Continue PT/OT therapies  Primary team following  No hx of DXA scan - recommend to be done as outpatient  Continue Vitamin D supplementation        VTE Pharmacologic Prophylaxis:   Pharmacologic: Enoxaparin (Lovenox)  Mechanical VTE Prophylaxis in Place: Yes - sequential compression devices  Current Length of Stay: 6 day(s)    Current Patient Status: Inpatient Rehab     Discharge Plan: As per primary team     Code Status: Level 1 - Full Code    Subjective:   Pt examined while pt sitting in recliner in pt room  Feels that his B/L foot pain is much better today  Started wearing compression stockings and shoes and feels that it has been extremely helpful  Occasionally has L groin pain, aching, improves with rest   Overall feels that he is having a really good day today  Slept "ok" last night  Therapy is going well  Has no other concerns or complaints at this time  Objective:     Vitals:   Temp (24hrs), Av 2 °F (36 2 °C), Min:97 2 °F (36 2 °C), Max:97 3 °F (36 3 °C)    Temp:  [97 2 °F (36 2 °C)-97 3 °F (36 3 °C)] 97 2 °F (36 2 °C)  HR:  [78-89] 78  Resp:  [18] 18  BP: (116-133)/(69-83) 116/69  SpO2:  [95 %-97 %] 95 %  Body mass index is 32 35 kg/m²  Review of Systems   Constitutional: Negative for appetite change, chills, fatigue and fever  Respiratory: Negative for cough, shortness of breath, wheezing and stridor  Cardiovascular: Negative for chest pain, palpitations and leg swelling  Gastrointestinal: Negative for abdominal distention, abdominal pain, constipation, diarrhea, nausea and vomiting  LBM    Genitourinary: Negative for difficulty urinating  Musculoskeletal: Positive for arthralgias (occasional L groin pain, aching, improves with rest)  Negative for back pain  Improvement in neuropathy pain to B/L feet with compression stockings    Neurological: Negative for dizziness, weakness, light-headedness, numbness and headaches  Psychiatric/Behavioral: Negative for sleep disturbance  Input and Output Summary (last 24 hours):        Intake/Output Summary (Last 24 hours) at 2022 0902  Last data filed at 2022 0843  Gross per 24 hour   Intake 540 ml   Output 600 ml   Net -60 ml Physical Exam:     Physical Exam  Vitals and nursing note reviewed  Constitutional:       Appearance: Normal appearance  He is obese  HENT:      Head: Normocephalic and atraumatic  Cardiovascular:      Rate and Rhythm: Normal rate and regular rhythm  Pulses: Normal pulses  Heart sounds: Murmur heard  Systolic murmur is present with a grade of 1/6  No friction rub  Pulmonary:      Effort: Pulmonary effort is normal  No respiratory distress  Breath sounds: Normal breath sounds  No wheezing or rhonchi  Abdominal:      General: Abdomen is flat  Bowel sounds are normal  There is no distension  Palpations: Abdomen is soft  Tenderness: There is no abdominal tenderness  Musculoskeletal:      Cervical back: Normal range of motion and neck supple  Right lower leg: Edema (Minimal non-pitting edema) present  Left lower leg: Edema (Minimal non-pitting edema) present  Skin:     General: Skin is warm and dry  Capillary Refill: Capillary refill takes less than 2 seconds  Neurological:      Mental Status: He is alert and oriented to person, place, and time     Psychiatric:         Mood and Affect: Mood normal          Behavior: Behavior normal          Additional Data:     Labs:    Results from last 7 days   Lab Units 01/24/22  0638   WBC Thousand/uL 10 50   HEMOGLOBIN g/dL 10 1*   HEMATOCRIT % 30 1*   PLATELETS Thousands/uL 438   NEUTROS PCT % 67*   LYMPHS PCT % 22*   MONOS PCT % 9   EOS PCT % 1     Results from last 7 days   Lab Units 01/24/22  0638 01/20/22  0506 01/20/22  0506   SODIUM mmol/L 137   < > 136*   POTASSIUM mmol/L 3 7   < > 3 5*   CHLORIDE mmol/L 102   < > 100   CO2 mmol/L 32*   < > 30   BUN mg/dL 17   < > 11   CREATININE mg/dL 0 76   < > 0 77   ANION GAP mmol/L 3*   < > 6   CALCIUM mg/dL 8 7   < > 8 8   ALBUMIN g/dL  --   --  3 3   TOTAL BILIRUBIN mg/dL  --   --  0 96   ALK PHOS U/L  --   --  120   ALT U/L  --   --  34   AST U/L  --   --  24   GLUCOSE RANDOM mg/dL 195*   < > 227*    < > = values in this interval not displayed           Results from last 7 days   Lab Units 01/25/22  0612 01/24/22  2054 01/24/22  1622 01/24/22  1121 01/24/22  7350 01/23/22  1959 01/23/22  1636 01/23/22  1118 01/23/22  0615 01/22/22  2116 01/22/22  1634 01/22/22  1137   POC GLUCOSE mg/dl 124 144* 281* 246* 183* 303* 301* 204* 190* 246* 326* 234*               Labs reviewed    Imaging:    Imaging reviewed    Recent Cultures (last 7 days):           Last 24 Hours Medication List:   Current Facility-Administered Medications   Medication Dose Route Frequency Provider Last Rate    acetaminophen  650 mg Oral Q6H PRN MARGARITA Fermin      acetaminophen  650 mg Oral [facility] Melinda Hooper MD      ascorbic acid  500 mg Oral Daily MARGARITA Fermin      atorvastatin  40 mg Oral Daily With Rigo Moreno MD      cephalexin  500 mg Oral Q12H [address] MARGARITA [address]      cyanocobalamin  1,000 mcg Oral Daily MARGARITA Fermin      Diclofenac Sodium  2 g Topical 4x Daily PRN Melnida Hooper MD      docusate sodium  100 mg Oral BID Melinda Hooper MD      Empagliflozin  12 5 mg Oral Daily MARGARITA Fermin      enoxaparin  30 mg Subcutaneous BID Melinda Hooper MD      ergocalciferol  50,000 Units Oral Weekly MARGARITA Fermin      ferrous gluconate  324 mg Oral Daily Before Breakfast MARGARITA Fermin      gabapentin  300 mg Oral [facility] Melinda Hooper MD      insulin glargine  15 Units Subcutaneous HS MARGARITA Fermin      insulin lispro  1-5 Units Subcutaneous TID [facility] [place] Melinda Hooper MD      insulin lispro  1-5 Units Subcutaneous HS Melinda Hooper MD      lidocaine  2 patch Topical Daily PRN Melinda Hooper MD      magnesium oxide  800 mg Oral Daily MARGARITA Fermin      melatonin  3 mg Oral HS Melinda Hooper MD      metFORMIN  1,000 mg Oral BID With Meals MARGARITA Fermin      methocarbamol  500 mg Oral 4x Daily Melinda Hooper MD      ondansetron  4 mg Oral Q6H PRN Ryan Woodall MD      oxyCODONE  10 mg Oral Q4H PRN Ryan Woodall MD      oxyCODONE  5 mg Oral Q4H PRN Ryan Woodall MD      polyethylene glycol  17 g Oral Daily Ryan Woodall MD          M*Modal software was used to dictate this note  It may contain errors with dictating incorrect words or incorrect spelling  Please contact the provider directly with any questions

## 2022-01-26 LAB
GLUCOSE SERPL-MCNC: 137 MG/DL (ref 65–140)
GLUCOSE SERPL-MCNC: 147 MG/DL (ref 65–140)
GLUCOSE SERPL-MCNC: 170 MG/DL (ref 65–140)
GLUCOSE SERPL-MCNC: 199 MG/DL (ref 65–140)

## 2022-01-26 PROCEDURE — 97530 THERAPEUTIC ACTIVITIES: CPT

## 2022-01-26 PROCEDURE — 97110 THERAPEUTIC EXERCISES: CPT

## 2022-01-26 PROCEDURE — 99232 SBSQ HOSP IP/OBS MODERATE 35: CPT | Performed by: PHYSICAL MEDICINE & REHABILITATION

## 2022-01-26 PROCEDURE — 97535 SELF CARE MNGMENT TRAINING: CPT

## 2022-01-26 PROCEDURE — 82948 REAGENT STRIP/BLOOD GLUCOSE: CPT

## 2022-01-26 PROCEDURE — 97116 GAIT TRAINING THERAPY: CPT

## 2022-01-26 PROCEDURE — 99232 SBSQ HOSP IP/OBS MODERATE 35: CPT | Performed by: INTERNAL MEDICINE

## 2022-01-26 RX ORDER — ECHINACEA PURPUREA EXTRACT 125 MG
1 TABLET ORAL EVERY 2 HOUR PRN
Status: DISCONTINUED | OUTPATIENT
Start: 2022-01-26 | End: 2022-02-01 | Stop reason: HOSPADM

## 2022-01-26 RX ORDER — GLIPIZIDE 5 MG/1
5 TABLET ORAL
Status: DISCONTINUED | OUTPATIENT
Start: 2022-01-26 | End: 2022-02-01 | Stop reason: HOSPADM

## 2022-01-26 RX ADMIN — ACETAMINOPHEN 650 MG: 325 TABLET ORAL at 06:04

## 2022-01-26 RX ADMIN — FERROUS GLUCONATE 324 MG: 324 TABLET ORAL at 06:05

## 2022-01-26 RX ADMIN — METFORMIN HYDROCHLORIDE 1000 MG: 500 TABLET ORAL at 08:19

## 2022-01-26 RX ADMIN — GLIPIZIDE 5 MG: 5 TABLET ORAL at 09:18

## 2022-01-26 RX ADMIN — GABAPENTIN 300 MG: 300 CAPSULE ORAL at 14:09

## 2022-01-26 RX ADMIN — ATORVASTATIN CALCIUM 40 MG: 40 TABLET, FILM COATED ORAL at 17:15

## 2022-01-26 RX ADMIN — ENOXAPARIN SODIUM 30 MG: 30 INJECTION SUBCUTANEOUS at 21:22

## 2022-01-26 RX ADMIN — OXYCODONE HYDROCHLORIDE 10 MG: 10 TABLET ORAL at 08:23

## 2022-01-26 RX ADMIN — INSULIN GLARGINE 15 UNITS: 100 INJECTION, SOLUTION SUBCUTANEOUS at 21:20

## 2022-01-26 RX ADMIN — METFORMIN HYDROCHLORIDE 1000 MG: 500 TABLET ORAL at 17:15

## 2022-01-26 RX ADMIN — OXYCODONE HYDROCHLORIDE AND ACETAMINOPHEN 500 MG: 500 TABLET ORAL at 08:19

## 2022-01-26 RX ADMIN — GABAPENTIN 300 MG: 300 CAPSULE ORAL at 21:22

## 2022-01-26 RX ADMIN — ENOXAPARIN SODIUM 30 MG: 30 INJECTION SUBCUTANEOUS at 08:20

## 2022-01-26 RX ADMIN — MAGNESIUM OXIDE TAB 400 MG (241.3 MG ELEMENTAL MG) 800 MG: 400 (241.3 MG) TAB at 08:19

## 2022-01-26 RX ADMIN — INSULIN LISPRO 1 UNITS: 100 INJECTION, SOLUTION INTRAVENOUS; SUBCUTANEOUS at 21:20

## 2022-01-26 RX ADMIN — METHOCARBAMOL TABLETS 500 MG: 500 TABLET, COATED ORAL at 08:18

## 2022-01-26 RX ADMIN — GABAPENTIN 300 MG: 300 CAPSULE ORAL at 06:04

## 2022-01-26 RX ADMIN — ACETAMINOPHEN 650 MG: 325 TABLET ORAL at 14:10

## 2022-01-26 RX ADMIN — CYANOCOBALAMIN TAB 1000 MCG 1000 MCG: 1000 TAB at 08:22

## 2022-01-26 RX ADMIN — METHOCARBAMOL TABLETS 500 MG: 500 TABLET, COATED ORAL at 12:26

## 2022-01-26 RX ADMIN — INSULIN LISPRO 1 UNITS: 100 INJECTION, SOLUTION INTRAVENOUS; SUBCUTANEOUS at 17:15

## 2022-01-26 RX ADMIN — DOCUSATE SODIUM 100 MG: 100 CAPSULE, LIQUID FILLED ORAL at 17:15

## 2022-01-26 RX ADMIN — METHOCARBAMOL TABLETS 500 MG: 500 TABLET, COATED ORAL at 17:15

## 2022-01-26 RX ADMIN — ACETAMINOPHEN 650 MG: 325 TABLET ORAL at 21:22

## 2022-01-26 RX ADMIN — DOCUSATE SODIUM 100 MG: 100 CAPSULE, LIQUID FILLED ORAL at 08:20

## 2022-01-26 RX ADMIN — OXYCODONE HYDROCHLORIDE 10 MG: 10 TABLET ORAL at 21:21

## 2022-01-26 RX ADMIN — COLCHICINE 0.6 MG: 0.6 TABLET, FILM COATED ORAL at 08:23

## 2022-01-26 RX ADMIN — EMPAGLIFLOZIN 12.5 MG: 25 TABLET, FILM COATED ORAL at 08:22

## 2022-01-26 RX ADMIN — OXYCODONE HYDROCHLORIDE 10 MG: 10 TABLET ORAL at 17:16

## 2022-01-26 RX ADMIN — OXYCODONE HYDROCHLORIDE 10 MG: 10 TABLET ORAL at 12:42

## 2022-01-26 RX ADMIN — POLYETHYLENE GLYCOL 3350 17 G: 17 POWDER, FOR SOLUTION ORAL at 08:21

## 2022-01-26 RX ADMIN — METHOCARBAMOL TABLETS 500 MG: 500 TABLET, COATED ORAL at 21:22

## 2022-01-26 RX ADMIN — MELATONIN TAB 3 MG 6 MG: 3 TAB at 21:22

## 2022-01-26 NOTE — ASSESSMENT & PLAN NOTE
1/6 - Mechanical fall at home onto L hip  CT abd/pelvis - L intertrochanteric femur fracture  1/7 - OR for ORIF with short TFN to L hip - Dr Barnhart Reason    Ensure adequate pain control  Neurovascular checks Q shift  WBAT to LLE  DVT ppx with Lovenox  Follow-up with Dr Barnhart Reason in 2 weeks as outpatient (1/21)  XR on 1/21: "Nearly anatomically aligned intertrochanteric fracture of the left femur, status post ORIF  No evidence for hardware complication  Status post left knee arthroplasty "    Continue PT/OT therapies  Primary team following  No hx of DXA scan - recommend to be done as outpatient  Continue Vitamin D supplementation

## 2022-01-26 NOTE — PROGRESS NOTES
01/26/22 0830   Pain Assessment   Pain Assessment Tool 0-10   Pain Score 6   Pain Location/Orientation Orientation: Bilateral;Location: Foot   Pain Onset/Description Onset: Ongoing   Effect of Pain on Daily Activities toelrates, inc time for STS, offered ice refused end of session   Patient's Stated Pain Goal No pain   Hospital Pain Intervention(s) Elevated; Rest   Multiple Pain Sites No   Restrictions/Precautions   Precautions Fall Risk;Pain   Weight Bearing Restrictions Yes   RLE Weight Bearing Per Order FWB   LLE Weight Bearing Per Order WBAT   ROM Restrictions No   Oral Hygiene   Type of Assistance Needed Independent   Physical Assistance Level No physical assistance   Comment seated wc level at sink   Oral Hygiene CARE Score 6   Shower/Bathe Self   Type of Assistance Needed Supervision   Physical Assistance Level No physical assistance   Comment completed shower this session  Pt receptive on getting tub bench at home, 2* to foot pain and inc fall risk in shower, educ pt on completing lat weight shift for grecia/buttock hygiene with pt able to perform  G fx reach for feet  Shower/Bathe Self CARE Score 4   Bathing   Assessed Bath Style Shower   Anticipated D/C Bath Style Tub; Shower   Able to [place] No   Able to [employer] Temperature Yes   Able to Wash/Rinse/Dry (body part) Left Arm;Right Arm;L Upper Leg;R Upper Leg;L Lower Leg/Foot;R Lower Leg/Foot;Chest;Abdomen;Perineal Area; Buttocks   Limitations Noted in Balance; Endurance;ROM;Strength;Timeliness   Positioning Seated   Adaptive Equipment Shower Bars; Shower Seat;Hand M D C  Holdings CGA for shower transfer with use of GB  Pt receptive to purchase tub bench for home, handout provided   PERFORM DRY TUB TRANSFER NEXT SESSION   Upper Body Dressing   Type of Assistance Needed Set-up / clean-up   Physical Assistance Level No physical assistance   Comment seated   Upper Body Dressing CARE Score 5   Lower Body Dressing   Type of Assistance Needed Incidental touching   Physical Assistance Level No physical assistance   Comment completed fx reach to don undergarmnet/pants, CGA in stance for CM with inc time to perform STS   Lower Body Dressing CARE Score 4   Putting On/Taking Off Footwear   Type of Assistance Needed Physical assistance   Physical Assistance Level 51%-75%   Comment therapist educ pt on use of sock aid to don TEDS with pt receptive  Pt req inc time to don TEDS, elevated LE onto recliner and them pull up  Unable to complete don shoes req TA   Putting On/Taking Off Footwear CARE Score 2   Lying to Sitting on Side of Bed   Type of Assistance Needed Supervision   Physical Assistance Level No physical assistance   Lying to Sitting on Side of Bed CARE Score 4   Sit to Stand   Type of Assistance Needed Physical assistance   Physical Assistance Level 26%-50%   Comment Dom STS with RW with inc time   Sit to Stand CARE Score 3   Bed-Chair Transfer   Type of Assistance Needed Incidental touching; Adaptive equipment   Physical Assistance Level No physical assistance   Comment CGA with RW   Chair/Bed-to-Chair Transfer CARE Score 4   Toileting Hygiene   Type of Assistance Needed Incidental touching; Adaptive equipment   Physical Assistance Level No physical assistance   Comment CGA with RW   Toileting Hygiene CARE Score 4   Toilet Transfer   Type of Assistance Needed Incidental touching; Adaptive equipment   Physical Assistance Level No physical assistance   Comment CGA with RW   Toilet Transfer CARE Score 4   Toilet Transfer   Surface Assessed Standard Commode   Transfer Technique Standard   Limitations Noted In Balance; Endurance;UE Strength;LE Strength   Adaptive Equipment Grab Bar;Walker   Cognition   Overall Cognitive Status WFL   Arousal/Participation Alert; Cooperative   Attention Attends with cues to redirect   Orientation Level Oriented X4   Memory Within functional limits   Following Commands Follows multistep commands with increased time or repetition   Activity Tolerance   Activity Tolerance Patient tolerated treatment well   Assessment   Treatment Assessment Engaged pt in 90mins of skilled OT services with focus onself-care, transfers and fx mobility  Pt supine in bed upon therapist arrival  Pt reporting that he will wlak into BR barefoot, compelted at [address] level with RW and inc time  CGA for shower transfer, please trail try tub transfer next session  Pt receptive for use of tub bench and sock aid at DC with handouts provided educ pt to order sooner rather than later in order for equipment to be delivered at DC pt reporting he will order  Pt overall performing ADLs at Dom/CGA level  Therapist progressed pt with RN staff to walk into BR with RW Dom x1, communicated to [employer]  Pt is mkaing steady gains, anticipated pt to achieve Keron level goals  Cont skilled OT services with focus on dry tub transfer, don sneakers, activity tolerance, balance, IADLs, med mngmnt to inc fx performance and dec caregiver burden  Prognosis Fair   Problem List Decreased strength; Impaired balance;Decreased mobility; Impaired judgement;Pain   Barriers to Discharge Inaccessible home environment   Plan   Treatment/Interventions ADL retraining;Functional transfer training; Therapeutic exercise; Endurance training;Patient/family training;Bed mobility; Compensatory technique education   Progress Progressing toward goals   Recommendation   OT Discharge Recommendation Home with home health rehabilitation   Equipment Recommended Raised toilet seat ($);Shower transfer bench ($$);Sock aid ($)   OT Equipment ordered pt has raised toilet, handouts provided for tub bench and sock aid   OT - OK to Discharge No   OT Therapy Minutes   OT Time In 0830   OT Time Out 1000   OT Total Time (minutes) 90   OT Mode of treatment - Individual (minutes) 90   OT Mode of treatment - Concurrent (minutes) 0   OT Mode of treatment - Group (minutes) 0   OT Mode of treatment - Co-treat (minutes) 0   OT Mode of Treatment - Total time(minutes) 90 minutes   OT Cumulative Minutes 570   Therapy Time missed   Time missed?  No

## 2022-01-26 NOTE — ASSESSMENT & PLAN NOTE
Possible cellulitis infection to the R foot/heel  Hx of previous cellulitis infections in same location  Started on keflex 500mg BID on 1/21, complete today for 5 day course  Monitor for s/s of reoccurring infection

## 2022-01-26 NOTE — PROGRESS NOTES
01/26/22 1230   Pain Assessment   Pain Assessment Tool 0-10   Pain Score 6   Pain Location/Orientation Orientation: Right;Location: Foot;Orientation: Left; Location: Hip   Pain Onset/Description Onset: Ongoing;Frequency: Intermittent; Descriptor: Aching;Descriptor: Discomfort   Effect of Pain on Daily Activities dec heel strike and standing/amb tolerance  Restrictions/Precautions   Precautions Fall Risk;Pain   LLE Weight Bearing Per Order WBAT   General   Change In Medical/Functional Status trialed R heel lift due to pt toe walking   Cognition   Overall Cognitive Status [facility]   Arousal/Participation Alert; Cooperative   Attention Attends with cues to redirect   Orientation Level Oriented X4   Memory Within functional limits   Following Commands Follows multistep commands with increased time or repetition   Subjective   Subjective "My hip really hurts right now"  Requests pain meds, RN aware  Sit to Stand   Type of Assistance Needed Physical assistance   Physical Assistance Level 26%-50%   Comment needing inc time due to inc pain, min A for safety and poor knee flexion  Sit to Stand CARE Score 3   Bed-Chair Transfer   Type of Assistance Needed Incidental touching   Physical Assistance Level No physical assistance   Chair/Bed-to-Chair Transfer CARE Score 4   Walk 10 Feet   Type of Assistance Needed Incidental touching   Physical Assistance Level No physical assistance   Comment CG/CS with RW   Walk 10 Feet CARE Score 4   Walk 50 Feet with Two Turns   Comment did walk 50 ft however did not make any turns  Ambulation   Does the patient walk? 2  Yes   Primary Mode of Locomotion Prior to Admission Walk   Distance Walked (feet) 12 ft  (36, 50)   Assist Device Roller Walker   Gait Pattern Antalgic; Inconsistant Rosanne;Decreased foot clearance; Forward Flexion; Shuffle;Step through; Decreased R stance; Improper weight shift   Limitations Noted In Endurance; Heel Strike;Posture;Speed;Strength;Swing   Walk Assist Level Contact Guard   Findings inc R posterior ankle pain, pt toe walking, trialed R heel lift in shoe, with good results, pt denied achilles pain after  Wheelchair mobility   Does the patient use a wheelchair? 0  No   Curb or Single Stair   Style negotiated Single stair   Type of Assistance Needed Physical assistance   Physical Assistance Level 25% or less   Comment B HR, ascend with R LE/ descend backwards on bottom step  1 Step (Curb) CARE Score 3   Therapeutic Interventions   Strengthening Seated B heel slides 2x1 min/ seated abd slides 2x1 min  EOM LAQ and blue tband HS curls x1 min each  Modalities CP to R achilles, and L hip at end of session in recliner with feet elevated  Assessment   Treatment Assessment Pt engaged in 90 min skilled PT intervention despite c/o ongoing pain to R achilles and L hip  Feels he may overdone it yesterday a little  Agreeable to try x1 one step, use of B HR  Reports having 2 ALEXSANDRA with B HR and that he can crawl up them if needed, as he has in the past  Pt reports feeling better if he toe walks, recalls having heel cup at home  trialed R heel lift inside shoe with good result, pt denied pain with use and pleased with results  However L hip pain, depsite pain meds, limited amb  Needing several stand rest breaks t/o trials  Plan for inc stair training next session  Pt may benefit from 3-4 cushion for recliner chair at home to A with sit to stands  Suggest continue per POC to maximize fucntional mobility and independence  Pt feels he will need home PT since he cannot drive and spouse works  Problem List Decreased strength; Impaired balance;Decreased mobility; Impaired judgement;Pain   Barriers to Discharge Inaccessible home environment   PT Barriers   Physical Impairment Decreased strength;Decreased range of motion;Decreased mobility;Orthopedic restrictions;Pain   Functional Limitation Car transfers; Ramp negotiation;Stair negotiation;Standing;Transfers; Walking   Plan Treatment/Interventions Functional transfer training;LE strengthening/ROM; Elevations; Therapeutic exercise; Endurance training;Patient/family training;Gait training   Progress Progressing toward goals   Recommendation   PT Discharge Recommendation Home with home health rehabilitation   PT Therapy Minutes   PT Time In 1230   PT Time Out 1400   PT Total Time (minutes) 90   PT Mode of treatment - Individual (minutes) 90   PT Mode of treatment - Concurrent (minutes) 0   PT Mode of treatment - Group (minutes) 0   PT Mode of treatment - Co-treat (minutes) 0   PT Mode of Treatment - Total time(minutes) 90 minutes   PT Cumulative Minutes 595   Therapy Time missed   Time missed?  No

## 2022-01-26 NOTE — PROGRESS NOTES
Physical Medicine and Rehabilitation Progress Note  Jessie Leon 61 y o  male MRN: [id number]  Unit/Bed#: -76 Encounter: [id number]    HPI: Jessie Leon is a 61 y o  male with PMH of HTN, T2DM TKA (R) (c/b by infection, requiring revision x2), TKA L, gout,  back surgeries, shoulder surgeries, hand surgeries, depression, HLD, Sleep Apnea who presented to the [address] [place] [address] on 1/5 after being bumped by his dog and falling onto his L hip  He was found to have a nondisplaced L trochanteric fracture with a partial tear at the greater trochanter insertion  He underwent L femur ORIF on 1/11  Post-op course complicated by pain, hyperglycemia  He also had issues with constipation  He was admitted to the [facility] on 1/19  Chief Complaint: Feeling better  Interval History/Subjective:  No acute events overnight  This week has really turned a corner, and advanced to Dom in room mobility with nursing  He is feeling like he is making progress, and really likes the LATRELL stockings for compression  With a sock aide, he was able to put them on  He denies any new CP, SOB, fevers, chills, N/V, abdominal pain  Last BM was 1/24  ROS:  A 10 point review of systems was negative except for what is noted in the HPI  Today's Changes: 1  Reviewed podiatry note, will have him f/u with them outpatient  Appreciate recs  2  Glipizide started by IM for T2DM  3  Remove remainder of staples tomorrow, and will place steri-strips  Assessment/Plan:    * Closed nondisplaced intertrochanteric fracture of left femur with routine healing  Assessment & Plan  Diagnosed on MRI - nondisplaced intertrochanteric fracture with partial tear at greater trochanter insertion  ORIF on 1/11 with [facility]  Multimodal pain control as described below  WBAT  Incisional care  2 week follow-up on 1/25 - XR on 1/21 with near anatomic alignment  Will plan to remove remainder of staples tomorrow     Outpatient f/u with [facility] Ortho Cellulitis  Assessment & Plan  Started on Keflex 1/21 by IM 5 day course - since completed  Foot no longer erythematous or red  Suspect tenderness related to pseudogout/gout and neuropathy  Hypomagnesemia  Assessment & Plan  Repleted with oral magnesium  Continue oral magnesium  1/24 Mag 1 9     Anemia  Assessment & Plan  Mild  Normocytic  Component of ABLA  1/24 Hgb stable at 10 1   Cyanocobalamin given low normal    IM checked iron panel, started on Vit C and iron supplementation  Monitor and transfuse for <7  Vitamin D deficiency  Assessment & Plan  Started on ergocalciferol on admission  Will plan for 6 weeks, then recheck  Slow transit constipation  Assessment & Plan  Likely related to pain medication, inadequate hydration, decreased mobility, recent trauma/surgery  Scheduled docusate/miralax  Got enema on admission  Last BM 1/24    Polyneuropathy associated with underlying disease [facility])  Assessment & Plan  Most likely related to diabetes  SPEP without monoclonal gammopathy  B12 low normal - started on supplementation  Increased Gabapentin 300mg Q8hr  Desensitization techniques  Pain in both feet  Assessment & Plan  Bilateral  R > L   R foot may have component of gout/pseudogout, also quite arthritic  Dorsal erythema, related to bone spur per podiatry   Erythema/warmth worsened on 1/21, was started on prednisone and keflex by IM  Has since completed and symptoms have improved  Podiatry recommending possible course of colchicine  Swelling improved with LATRELL stockings  Also biomechanically, has a large painful bunion - this causes him to walk on the lateral edge of his foot which causes pain  Also with component of neuropathic pain  XR R foot negative for acute fracture/process  Uric acid normal  Continue increased dose of gabapentin desensitization  1/21 IM started 5 day course of prednisone  Obesity (BMI 30-39  9)  Assessment & Plan  Counseling       Type 2 diabetes mellitus without complication, without long-term current use of insulin [facility])  Assessment & Plan  Lab Results   Component Value Date    HGBA1C 7 2 (H) 01/08/2022       Recent Labs     01/25/22  1141 01/25/22  1638 01/25/22  2033 01/26/22  0601   POCGLU 201* 246* 194* 147*       Blood Sugar Average: Last 72 hrs:  (P) 331     At home: Metformin 1000mg BID, Lantus 28 units daily, Alogliptin 25mg daily, Empaglifluzin 12 5mg daily, Glipizide 5mg BID  Here: Lantus 10 units, Metformin 1000mg BID, Januvia 12 5mg, Glipizide 5mg daily, CDI, Accuchecks  Diabetic Diet  IM consulted and management at their discretion  Mixed hyperlipidemia  Assessment & Plan  Restart home Lipitor 40mg at night  Essential hypertension  Assessment & Plan  At home on Lisinopril 30mg daily  Here has not required BP meds  Will closely monitor BP, and add home med as appropriate  IM consulted and management at their discretion    Primary osteoarthritis of both knees  Assessment & Plan  He reports issues with bilateral knees  Dr Maria G Mcneil did do TKA to R knee and was following him for this  He reports previous surgery to his L knee with multiple complications/revisions  See multi-modal pain control  Outpatient f/u with Orthopedics  Primary osteoarthritis of both shoulders  Garfield Sinclair  Last injection was 10/13  Typically gets them done every 6 months or so  May require as he is more reliant on his shoulders  If so, will consult Ortho  Will place order for Diclofenac gel and lidoderm for now  Health Maintenance  #Delirium/Sleep: At risk  No complaints at this time  Focus on environmental interventions - avoiding physical/chemical restraints  Focus on redirection and optimizing pain/bowel/bladder management  #Pain: Tylenol scheduled, Oxycodone 5-10mg PRN, Robaxin scheduled, and adding Gabapentin for neuropathic pain  Also on prednisone now  #Bowel: Last BM 1/24  Continue miralax and docusate  #Bladder: Voiding and continent  #Skin/Pressure Injury Prevention: Turn Q2hr in bed, with weight shifts [id number] in wheelchair  Float heels in bed  #DVT Prophylaxis: Lovenox 30mg Q12hr  Will change to 40 during stay  SCDs  #GI Prophylaxis: PO diet  #Code Status:  Full Code  #FEN:  Diabetic Diet  #Dispo:  Team 1/20: Changing ELOS to 2 weeks after watching initial therapy eval  Making marked improvement this week  Goal is modified Ind and discharge back to home  Reteam        Objective:    Functional Update:  PT: Progressing to Dom with mobility  Ambulating with RW 36'   OT: Progressing to Dom with ADLs     Allergies per EMR    Physical Exam:  Temp:  [97 6 °F (36 4 °C)-97 9 °F (36 6 °C)] 97 6 °F (36 4 °C)  HR:  [73-84] 73  Resp:  [18] 18  BP: (107-121)/(63-74) 107/74  Oxygen Therapy  SpO2: 95 %    Gen: No acute distress, Well-nourished, well-appearing  HEENT: Moist mucus membranes, Normocephalic/Atraumatic  Cardiovascular: Regular rate, rhythm, S1/S2  Distal pulses palpable  Heme/Extr: Improving pedal edema on the R  No shin edema bilaterally  Pulmonary: Non-labored breathing  Lungs CTAB  : No gilbert  GI: Soft, non-tender, non-distended  BS+  MSK: Improving R ankle and L hip ROM  Integumentary: Skin is warm, dry  Neuro: AAOx3, Speech is intact  Appropriate to questioning  Tone is normal    Psych: Normal mood and affect  Diagnostic Studies: Reviewed, no new imaging  Laboratory: Reviewed, no new labs     Results from last 7 days   Lab Units 01/24/22  0638 01/20/22  0506   HEMOGLOBIN g/dL 10 1* 10 4*   HEMATOCRIT % 30 1* 31 8*   WBC Thousand/uL 10 50 10 90     Results from last 7 days   Lab Units 01/24/22  0638 01/20/22  0506   BUN mg/dL 17 11   POTASSIUM mmol/L 3 7 3 5*   CHLORIDE mmol/L 102 100   CREATININE mg/dL 0 76 0 77   AST U/L  --  24   ALT U/L  --  34            Patient Active Problem List   Diagnosis    Primary osteoarthritis of both shoulders    Primary osteoarthritis of both knees    Essential hypertension    Mixed hyperlipidemia    Type 2 diabetes mellitus without complication, without long-term current use of insulin ([facility])    Obesity (BMI 30-39  9)    S/P TKR (total knee replacement), right    Closed nondisplaced intertrochanteric fracture of left femur with routine healing    Pain in both feet    Polyneuropathy associated with underlying disease ([address] )    Slow transit constipation    Vitamin D deficiency    Anemia    Hypomagnesemia    Cellulitis    Right foot pain         Medications  Current Facility-Administered Medications   Medication Dose Route Frequency Provider Last Rate    acetaminophen  650 mg Oral Q6H PRN TenYOKO ÁlvarezNP      acetaminophen  650 mg Oral [facility] Fátima Teixeira MD      ascorbic acid  500 mg Oral Daily MARGARITA Mcfadden      atorvastatin  40 mg Oral Daily With King Hightower MD      colchicine  0 6 mg Oral Daily Jennifer Giordano DPM      cyanocobalamin  1,000 mcg Oral Daily MARGARITA Mcfadden      Diclofenac Sodium  2 g Topical 4x Daily PRN Fátima Teixeira MD      docusate sodium  100 mg Oral BID Fátima Teixeira MD      Empagliflozin  12 5 mg Oral Daily MARGARITA Mcfadden      enoxaparin  30 mg Subcutaneous BID Fátima Teixeira MD      ergocalciferol  50,000 Units Oral Weekly MARGARITA Mcfadden      ferrous gluconate  324 mg Oral Daily Before Breakfast MARGARITA Mcfadden      gabapentin  300 mg Oral [facility] Fátima Teixeira MD      glipiZIDE  5 mg Oral Daily Before Breakfast MARGARITA Mcfadden      insulin glargine  15 Units Subcutaneous HS MARGARITA Mcfadden      insulin lispro  1-5 Units Subcutaneous TID [facility] Fátima Teixeira MD      insulin lispro  1-5 Units Subcutaneous HS Fátima Teixeira MD      lidocaine  2 patch Topical Daily PRN Fátima Teixeira MD      magnesium oxide  800 mg Oral Daily MARGARITA Mcfadden      melatonin  6 mg Oral HS TenMARGARITA Álvarez      metFORMIN  1,000 mg Oral BID With Meals MARGARITA Mcfadden      methocarbamol 500 mg Oral 4x Daily Juan Ahumada MD      ondansetron  4 mg Oral Q6H PRN Juan Ahumada MD      oxyCODONE  10 mg Oral Q4H PRN Juan Ahumada MD      oxyCODONE  5 mg Oral Q4H PRN Juan Ahumada MD      polyethylene glycol  17 g Oral Daily Juan Ahumada MD      sodium chloride  1 spray Each Nare Q2H PRN MARGARITA Canas          ** Please Note: Fluency Direct voice to text software may have been used in the creation of this documen

## 2022-01-26 NOTE — PROGRESS NOTES
[facility]  Progress Note - Maynor Dies 1955, 61 y o  male MRN: [id number]  Unit/Bed#: -01 Encounter: [id number]  Primary Care Provider: No primary care provider on file  Date and time admitted to hospital: 1/19/2022  3:54 PM    Right foot pain  Assessment & Plan  Started on keflex for possible cellulitis infection on dorsal aspect of foot  Also started on prednisone 40mg daily x5 days on 1/21 for possible pseudogout - to be completed today  R foot XR on 1/19: "No acute osseous abnormality "  Uric acid level WNL  Started on colchicine 0 6mg daily by Podiatry on 1/25 for 5 day course  Cellulitis  Assessment & Plan  Possible cellulitis infection to the R foot/heel  Hx of previous cellulitis infections in same location  Started on keflex 500mg BID on 1/21, complete today for 5 day course  Monitor for s/s of reoccurring infection  Hypomagnesemia  Assessment & Plan  Improved, Mg 1 9 from 1 4 on 1/24  Continue magnesium oxide 800mg daily  Anemia  Assessment & Plan  Related to acute blood loss post-procedure  Hgb currently 10 1  Hgb was 15 0 pre-operatively  Iron panel on 1/20: Iron Sat 22%, TIBC 147, Iron 32, and Ferritin 304  Started on ferrous gluconate and ascorbic acid  Monitor for s/s of active bleeding  Continue to trend with routine CBC  Vitamin D deficiency  Assessment & Plan  Vitamin D level 11 on 1/8  Start on Vitamin D 50,000u weekly  Continue to follow-up with PCP as outpatient  Polyneuropathy associated with underlying disease ([facility] [address] )  Assessment & Plan  Continue gabapentin 100mg Q8 - increased to 300mg Q8 on 1/20  Daily skin checks/neurovascular checks  Considerations with PT/OT therapies  Vitamin B12 level 315 - started on cyanocobalamin  Obesity (BMI 30-39  9)  Assessment & Plan  BMI 32 35 on admission  Encourage lifestyle modifications      Type 2 diabetes mellitus without complication, without long-term current use of insulin [facility])  Assessment & Plan  Lab Results   Component Value Date    HGBA1C 7 2 (H) 01/08/2022       Recent Labs     01/25/22  1141 01/25/22  1638 01/25/22 2033 01/26/22  0601   POCGLU 201* 246* 194* 147*       Blood Sugar Average: Last 72 hrs:  (P) 881 [id number]     Last A1c 7 5 on 11/16  Home regimen: Alogliptin 25mg daily, Jardiance 12 5mg daily, Glipizide 5mg BID, Lantus 28u at HS, and metformin 1000mg BID  Currently on Lantus 15u at HS, metformin 1000mg BID, and Jardiance 12 5mg daily  Started on glipizide 5mg daily on 1/26  Continue QID accuchecks and SSI  Continue cons  carb diet  Continue to follow-up with PCP as outpatient  Mixed hyperlipidemia  Assessment & Plan  Continue Lipitor 40mg daily  Last lipid panel on 11/16: Cholesterol 139, Triglycerides 150, HDL 32, and LDL 82  Essential hypertension  Assessment & Plan  Home lisinopril 30mg currently on hold  Monitor BP with routine VS     Primary osteoarthritis of both shoulders  Assessment & Plan  B/L shoulder glenohumeral osteoarthritis  Follows with Dr Debbie Sinclair as outpatient  Last received steroid injections on 10/13/21  Ensure adequate pain control  Considerations during therapies  * Closed nondisplaced intertrochanteric fracture of left femur with routine healing  Assessment & Plan  1/6 - Mechanical fall at home onto L hip  CT abd/pelvis - L intertrochanteric femur fracture  1/7 - OR for ORIF with short TFN to L hip - Dr Pedrito Reece    Ensure adequate pain control  Neurovascular checks Q shift  WBAT to LLE  DVT ppx with Lovenox  Follow-up with Dr Pedrito Reece in 2 weeks as outpatient (1/21)  XR on 1/21: "Nearly anatomically aligned intertrochanteric fracture of the left femur, status post ORIF  No evidence for hardware complication  Status post left knee arthroplasty "    Continue PT/OT therapies  Primary team following  No hx of DXA scan - recommend to be done as outpatient    Continue Vitamin D supplementation  VTE Pharmacologic Prophylaxis:   Pharmacologic: Enoxaparin (Lovenox)  Mechanical VTE Prophylaxis in Place: Yes - sequential compression devices  Current Length of Stay: 7 day(s)    Current Patient Status: Inpatient Rehab     Discharge Plan: As per primary team     Code Status: Level 1 - Full Code    Subjective:   Pt examined while pt sitting in WC in pt room  Having a very good day today with therapy  Complaints of nasal congestion/dryness - ordered saline nasal spray  B/L foot pain well controlled today, occasional aching/burning, 4/10  Has noticed much improvement with footwear and compression stockings  Denies any hip pain currently  Slept better last night  Enjoyed the protein shake yesterday but still feels constantly hungry  Last day of steroids today  Has no other concerns or complaints currently  Objective:     Vitals:   Temp (24hrs), Av 7 °F (36 5 °C), Min:97 6 °F (36 4 °C), Max:97 9 °F (36 6 °C)    Temp:  [97 6 °F (36 4 °C)-97 9 °F (36 6 °C)] 97 6 °F (36 4 °C)  HR:  [73-84] 73  Resp:  [18] 18  BP: (107-121)/(63-74) 107/74  SpO2:  [94 %-98 %] 95 %  Body mass index is 32 35 kg/m²  Review of Systems   Constitutional: Positive for appetite change (constant hunger since starting steroids)  Negative for chills, fatigue and fever  HENT: Positive for congestion (nasal congestion/dryness, saline spray ordered)  Respiratory: Negative for cough and shortness of breath  Cardiovascular: Negative for chest pain, palpitations and leg swelling  Gastrointestinal: Negative for abdominal distention, abdominal pain, constipation, diarrhea, nausea and vomiting  LBM    Genitourinary: Negative for difficulty urinating  Musculoskeletal: Positive for arthralgias (occasional R foot aching, 4/10, improvement with shoes and TEDs)  Negative for back pain  Neurological: Negative for dizziness, weakness, light-headedness, numbness and headaches  Psychiatric/Behavioral: Negative for sleep disturbance  Input and Output Summary (last 24 hours): Intake/Output Summary (Last 24 hours) at 1/26/2022 0905  Last data filed at 1/25/2022 2100  Gross per 24 hour   Intake 120 ml   Output 1250 ml   Net -1130 ml       Physical Exam:     Physical Exam  Vitals and nursing note reviewed  Constitutional:       Appearance: Normal appearance  He is obese  HENT:      Head: Normocephalic and atraumatic  Cardiovascular:      Rate and Rhythm: Normal rate and regular rhythm  Pulses: Normal pulses  Heart sounds: Murmur heard  Systolic murmur is present with a grade of 1/6  No friction rub  Pulmonary:      Effort: Pulmonary effort is normal  No respiratory distress  Breath sounds: Normal breath sounds  No wheezing or rhonchi  Abdominal:      General: Abdomen is flat  Bowel sounds are normal  There is no distension  Palpations: Abdomen is soft  Tenderness: There is no abdominal tenderness  Musculoskeletal:      Cervical back: Normal range of motion and neck supple  Right lower leg: Edema (Minimal non-pitting edema) present  Left lower leg: Edema (Minimal non-pitting edema) present  Skin:     General: Skin is warm and dry  Capillary Refill: Capillary refill takes less than 2 seconds  Neurological:      Mental Status: He is alert and oriented to person, place, and time     Psychiatric:         Mood and Affect: Mood normal          Behavior: Behavior normal          Additional Data:     Labs:    Results from last 7 days   Lab Units 01/24/22  0638   WBC Thousand/uL 10 50   HEMOGLOBIN g/dL 10 1*   HEMATOCRIT % 30 1*   PLATELETS Thousands/uL 438   NEUTROS PCT % 67*   LYMPHS PCT % 22*   MONOS PCT % 9   EOS PCT % 1     Results from last 7 days   Lab Units 01/24/22  0638 01/20/22  0506 01/20/22  0506   SODIUM mmol/L 137   < > 136*   POTASSIUM mmol/L 3 7   < > 3 5*   CHLORIDE mmol/L 102   < > 100   CO2 mmol/L 32*   < > 30 BUN mg/dL 17   < > 11   CREATININE mg/dL 0 76   < > 0 77   ANION GAP mmol/L 3*   < > 6   CALCIUM mg/dL 8 7   < > 8 8   ALBUMIN g/dL  --   --  3 3   TOTAL BILIRUBIN mg/dL  --   --  0 96   ALK PHOS U/L  --   --  120   ALT U/L  --   --  34   AST U/L  --   --  24   GLUCOSE RANDOM mg/dL 195*   < > 227*    < > = values in this interval not displayed           Results from last 7 days   Lab Units 01/26/22  0601 01/25/22  2033 01/25/22  1638 01/25/22  1141 01/25/22  0612 01/24/22  2054 01/24/22  1622 01/24/22  1121 01/24/22  0632 01/23/22 1959 01/23/22  1636 01/23/22  1118   POC GLUCOSE mg/dl 147* 194* 246* 201* 124 144* 281* 246* 183* 303* 301* 204*               Labs reviewed    Imaging:    Imaging reviewed    Recent Cultures (last 7 days):           Last 24 Hours Medication List:   Current Facility-Administered Medications   Medication Dose Route Frequency Provider Last Rate    acetaminophen  650 mg Oral Q6H PRN MARGARITA Alejandro      acetaminophen  650 mg Oral [facility] Reyna Ríos MD      ascorbic acid  500 mg Oral Daily MARGARITA Alejandro      atorvastatin  40 mg Oral Daily With Charley Jung MD      colchicine  0 6 mg Oral Daily Neel Arguello DPM      cyanocobalamin  1,000 mcg Oral Daily MARGARITA Alejandro      Diclofenac Sodium  2 g Topical 4x Daily PRN Reyna Ríos MD      docusate sodium  100 mg Oral BID Reyna Ríos MD      Empagliflozin  12 5 mg Oral Daily MARGARITA Alejandro      enoxaparin  30 mg Subcutaneous BID Reyna Ríos MD      ergocalciferol  50,000 Units Oral Weekly MARGARITA Alejandro      ferrous gluconate  324 mg Oral Daily Before Breakfast MARGARITA Alejandro      gabapentin  300 mg Oral [facility] Reyna Ríos MD      glipiZIDE  5 mg Oral Daily Before Breakfast MARGARITA Alejandro      insulin glargine  15 Units Subcutaneous HS MARGARITA Alejandro      insulin lispro  1-5 Units Subcutaneous TID [facility] Reyna Ríos MD      insulin lispro  1-5 Units Subcutaneous MIREYA Hendrix Depadua, MD      lidocaine  2 patch Topical Daily PRN Isael Gama MD      magnesium oxide  800 mg Oral Daily MARGARITA Olea      melatonin  6 mg Oral HS MARGARITA Olea      metFORMIN  1,000 mg Oral BID With Meals MARGARITA Olea      methocarbamol  500 mg Oral 4x Daily Isael Gama MD      ondansetron  4 mg Oral Q6H PRN Isael Gama MD      oxyCODONE  10 mg Oral Q4H PRN Isael Gama MD      oxyCODONE  5 mg Oral Q4H PRN Isael Gama MD      polyethylene glycol  17 g Oral Daily Isael Gama MD          M*Modal software was used to dictate this note  It may contain errors with dictating incorrect words or incorrect spelling  Please contact the provider directly with any questions

## 2022-01-26 NOTE — PLAN OF CARE
Problem: RESPIRATORY - ADULT  Goal: Achieves optimal ventilation and oxygenation  Description: INTERVENTIONS:  - Assess for changes in respiratory status  - Assess for changes in mentation and behavior  - Position to facilitate oxygenation and minimize respiratory effort  - Oxygen administered by appropriate delivery if ordered  - Initiate smoking cessation education as indicated  - Encourage broncho-pulmonary hygiene including cough, deep breathe, Incentive Spirometry  - Assess the need for suctioning and aspirate as needed  - Assess and instruct to report SOB or any respiratory difficulty  - Respiratory Therapy support as indicated  Outcome: Progressing     Problem: SKIN/TISSUE INTEGRITY - ADULT  Goal: Incision(s), wounds(s) or drain site(s) healing without S/S of infection  Description: INTERVENTIONS  - Assess and document dressing, incision, wound bed, drain sites and surrounding tissue  - Provide patient and family education    Outcome: Progressing     Problem: PAIN - ADULT  Goal: Verbalizes/displays adequate comfort level or baseline comfort level  Description: Interventions:  - Encourage patient to monitor pain and request assistance  - Assess pain using appropriate pain scale  - Administer analgesics based on type and severity of pain and evaluate response  - Implement non-pharmacological measures as appropriate and evaluate response  - Consider cultural and social influences on pain and pain management  - Notify physician/advanced practitioner if interventions unsuccessful or patient reports new pain  Outcome: Progressing

## 2022-01-26 NOTE — ASSESSMENT & PLAN NOTE
Started on keflex for possible cellulitis infection on dorsal aspect of foot  Also started on prednisone 40mg daily x5 days on 1/21 for possible pseudogout - to be completed today  R foot XR on 1/19: "No acute osseous abnormality "  Uric acid level WNL  Started on colchicine 0 6mg daily by Podiatry on 1/25 for 5 day course

## 2022-01-26 NOTE — ASSESSMENT & PLAN NOTE
B/L shoulder glenohumeral osteoarthritis  Follows with Dr Halima Rea as outpatient  Last received steroid injections on 10/13/21  Ensure adequate pain control  Considerations during therapies

## 2022-01-26 NOTE — ASSESSMENT & PLAN NOTE
Lab Results   Component Value Date    HGBA1C 7 2 (H) 01/08/2022       Recent Labs     01/25/22  1141 01/25/22  1638 01/25/22 2033 01/26/22  0601   POCGLU 201* 246* 194* 147*       Blood Sugar Average: Last 72 hrs:  (P) 222 [id number]     Last A1c 7 5 on 11/16  Home regimen: Alogliptin 25mg daily, Jardiance 12 5mg daily, Glipizide 5mg BID, Lantus 28u at HS, and metformin 1000mg BID  Currently on Lantus 15u at HS, metformin 1000mg BID, and Jardiance 12 5mg daily  Started on glipizide 5mg daily on 1/26  Continue QID accuchecks and SSI  Continue cons  carb diet  Continue to follow-up with PCP as outpatient

## 2022-01-27 LAB
GLUCOSE SERPL-MCNC: 113 MG/DL (ref 65–140)
GLUCOSE SERPL-MCNC: 137 MG/DL (ref 65–140)
GLUCOSE SERPL-MCNC: 149 MG/DL (ref 65–140)
GLUCOSE SERPL-MCNC: 174 MG/DL (ref 65–140)

## 2022-01-27 PROCEDURE — 99232 SBSQ HOSP IP/OBS MODERATE 35: CPT | Performed by: INTERNAL MEDICINE

## 2022-01-27 PROCEDURE — 97116 GAIT TRAINING THERAPY: CPT

## 2022-01-27 PROCEDURE — 97110 THERAPEUTIC EXERCISES: CPT

## 2022-01-27 PROCEDURE — 97530 THERAPEUTIC ACTIVITIES: CPT

## 2022-01-27 PROCEDURE — 82948 REAGENT STRIP/BLOOD GLUCOSE: CPT

## 2022-01-27 PROCEDURE — 99233 SBSQ HOSP IP/OBS HIGH 50: CPT | Performed by: PHYSICAL MEDICINE & REHABILITATION

## 2022-01-27 PROCEDURE — 97535 SELF CARE MNGMENT TRAINING: CPT

## 2022-01-27 RX ADMIN — DOCUSATE SODIUM 100 MG: 100 CAPSULE, LIQUID FILLED ORAL at 08:01

## 2022-01-27 RX ADMIN — METFORMIN HYDROCHLORIDE 1000 MG: 500 TABLET ORAL at 17:37

## 2022-01-27 RX ADMIN — GABAPENTIN 300 MG: 300 CAPSULE ORAL at 21:31

## 2022-01-27 RX ADMIN — INSULIN GLARGINE 15 UNITS: 100 INJECTION, SOLUTION SUBCUTANEOUS at 21:32

## 2022-01-27 RX ADMIN — ATORVASTATIN CALCIUM 40 MG: 40 TABLET, FILM COATED ORAL at 17:37

## 2022-01-27 RX ADMIN — FERROUS GLUCONATE 324 MG: 324 TABLET ORAL at 06:24

## 2022-01-27 RX ADMIN — METHOCARBAMOL TABLETS 500 MG: 500 TABLET, COATED ORAL at 21:30

## 2022-01-27 RX ADMIN — OXYCODONE HYDROCHLORIDE 10 MG: 10 TABLET ORAL at 17:41

## 2022-01-27 RX ADMIN — POLYETHYLENE GLYCOL 3350 17 G: 17 POWDER, FOR SOLUTION ORAL at 08:02

## 2022-01-27 RX ADMIN — METHOCARBAMOL TABLETS 500 MG: 500 TABLET, COATED ORAL at 17:37

## 2022-01-27 RX ADMIN — GABAPENTIN 300 MG: 300 CAPSULE ORAL at 06:24

## 2022-01-27 RX ADMIN — ACETAMINOPHEN 650 MG: 325 TABLET ORAL at 14:11

## 2022-01-27 RX ADMIN — METHOCARBAMOL TABLETS 500 MG: 500 TABLET, COATED ORAL at 11:50

## 2022-01-27 RX ADMIN — ACETAMINOPHEN 650 MG: 325 TABLET ORAL at 06:24

## 2022-01-27 RX ADMIN — MELATONIN TAB 3 MG 6 MG: 3 TAB at 21:30

## 2022-01-27 RX ADMIN — METFORMIN HYDROCHLORIDE 1000 MG: 500 TABLET ORAL at 08:02

## 2022-01-27 RX ADMIN — DOCUSATE SODIUM 100 MG: 100 CAPSULE, LIQUID FILLED ORAL at 17:38

## 2022-01-27 RX ADMIN — ENOXAPARIN SODIUM 30 MG: 30 INJECTION SUBCUTANEOUS at 21:30

## 2022-01-27 RX ADMIN — MAGNESIUM OXIDE TAB 400 MG (241.3 MG ELEMENTAL MG) 800 MG: 400 (241.3 MG) TAB at 08:02

## 2022-01-27 RX ADMIN — INSULIN LISPRO 1 UNITS: 100 INJECTION, SOLUTION INTRAVENOUS; SUBCUTANEOUS at 17:38

## 2022-01-27 RX ADMIN — OXYCODONE HYDROCHLORIDE 10 MG: 10 TABLET ORAL at 07:59

## 2022-01-27 RX ADMIN — OXYCODONE HYDROCHLORIDE 10 MG: 10 TABLET ORAL at 11:54

## 2022-01-27 RX ADMIN — GABAPENTIN 300 MG: 300 CAPSULE ORAL at 14:14

## 2022-01-27 RX ADMIN — OXYCODONE HYDROCHLORIDE AND ACETAMINOPHEN 500 MG: 500 TABLET ORAL at 08:02

## 2022-01-27 RX ADMIN — EMPAGLIFLOZIN 12.5 MG: 25 TABLET, FILM COATED ORAL at 08:03

## 2022-01-27 RX ADMIN — METHOCARBAMOL TABLETS 500 MG: 500 TABLET, COATED ORAL at 08:12

## 2022-01-27 RX ADMIN — CYANOCOBALAMIN TAB 1000 MCG 1000 MCG: 1000 TAB at 08:02

## 2022-01-27 RX ADMIN — COLCHICINE 0.6 MG: 0.6 TABLET, FILM COATED ORAL at 08:01

## 2022-01-27 RX ADMIN — BISACODYL 10 MG: 5 TABLET, COATED ORAL at 11:50

## 2022-01-27 RX ADMIN — ERGOCALCIFEROL 50000 UNITS: 1.25 CAPSULE ORAL at 08:01

## 2022-01-27 RX ADMIN — OXYCODONE HYDROCHLORIDE 10 MG: 10 TABLET ORAL at 21:31

## 2022-01-27 RX ADMIN — ENOXAPARIN SODIUM 30 MG: 30 INJECTION SUBCUTANEOUS at 08:01

## 2022-01-27 RX ADMIN — ACETAMINOPHEN 650 MG: 325 TABLET ORAL at 21:31

## 2022-01-27 RX ADMIN — GLIPIZIDE 5 MG: 5 TABLET ORAL at 06:24

## 2022-01-27 NOTE — PROGRESS NOTES
Physical Medicine and Rehabilitation Progress Note  Shasta Abraham 61 y o  male MRN: [id number]  Unit/Bed#: -04 Encounter: [id number]    HPI: Shasta Abraham is a 61 y o  male with PMH of HTN, T2DM TKA (R) (c/b by infection, requiring revision x2), TKA L, gout,  back surgeries, shoulder surgeries, hand surgeries, depression, HLD, Sleep Apnea who presented to the [address] [place] [address] on 1/5 after being bumped by his dog and falling onto his L hip  He was found to have a nondisplaced L trochanteric fracture with a partial tear at the greater trochanter insertion  He underwent L femur ORIF on 1/11  Post-op course complicated by pain, hyperglycemia  He also had issues with constipation  He was admitted to the [facility] on 1/19  Chief Complaint: Improving R foot pain  Interval History/Subjective:  No acute events overnight  No new issues  Compression helps with R foot pain  Denies any CP, SOB, fevers, chills, N/V, abdominal pain  ROS:  A 10 point review of systems was negative except for what is noted in the HPI  Today's Changes:  1  Continue current plan of care  Assessment/Plan:    * Closed nondisplaced intertrochanteric fracture of left femur with routine healing  Assessment & Plan  Diagnosed on MRI - nondisplaced intertrochanteric fracture with partial tear at greater trochanter insertion  ORIF on 1/11 with [facility]  Multimodal pain control as described below  WBAT  Incisional care  2 week follow-up on 1/25 - XR on 1/21 with near anatomic alignment  Will plan to remove remainder of staples tomorrow  Outpatient f/u with [facility]       Cellulitis  Assessment & Plan  Started on Keflex 1/21 by IM 5 day course - since completed  Foot no longer erythematous or red  Suspect tenderness related to pseudogout/gout and neuropathy  Hypomagnesemia  Assessment & Plan  Repleted with oral magnesium  Continue oral magnesium  1/24 Mag 1 9     Anemia  Assessment & Plan  Mild  Normocytic   Component of ABLA  1/24 Hgb stable at 10 1   Cyanocobalamin given low normal    IM checked iron panel, started on Vit C and iron supplementation  Monitor and transfuse for <7  Vitamin D deficiency  Assessment & Plan  Started on ergocalciferol on admission  Will plan for 6 weeks, then recheck  Slow transit constipation  Assessment & Plan  Likely related to pain medication, inadequate hydration, decreased mobility, recent trauma/surgery  Scheduled docusate/miralax  Got enema on admission  Last BM 1/24    Polyneuropathy associated with underlying disease [facility])  Assessment & Plan  Most likely related to diabetes  SPEP without monoclonal gammopathy  B12 low normal - started on supplementation  Increased Gabapentin 300mg Q8hr  Desensitization techniques  Pain in both feet  Assessment & Plan  Bilateral  R > L   R foot may have component of gout/pseudogout, also quite arthritic  Dorsal erythema, related to bone spur per podiatry   Erythema/warmth worsened on 1/21, was started on prednisone and keflex by IM  Has since completed and symptoms have improved  Podiatry recommending possible course of colchicine  Swelling improved with LATRELL stockings  Also biomechanically, has a large painful bunion - this causes him to walk on the lateral edge of his foot which causes pain  Also with component of neuropathic pain  XR R foot negative for acute fracture/process  Uric acid normal  Continue increased dose of gabapentin desensitization  1/21 IM started 5 day course of prednisone  Obesity (BMI 30-39  9)  Assessment & Plan  Counseling       Type 2 diabetes mellitus without complication, without long-term current use of insulin [facility])  Assessment & Plan  Lab Results   Component Value Date    HGBA1C 7 2 (H) 01/08/2022       Recent Labs     01/25/22  1141 01/25/22  1638 01/25/22  2033 01/26/22  0601   POCGLU 201* 246* 194* 147*       Blood Sugar Average: Last 72 hrs:  (P) 331     At home: Metformin 1000mg BID, Lantus 28 units daily, Alogliptin 25mg daily, Empaglifluzin 12 5mg daily, Glipizide 5mg BID  Here: Lantus 10 units, Metformin 1000mg BID, Januvia 12 5mg, Glipizide 5mg daily, CDI, Accuchecks  Diabetic Diet  IM consulted and management at their discretion  Mixed hyperlipidemia  Assessment & Plan  Restart home Lipitor 40mg at night  Essential hypertension  Assessment & Plan  At home on Lisinopril 30mg daily  Here has not required BP meds  Will closely monitor BP, and add home med as appropriate  IM consulted and management at their discretion    Primary osteoarthritis of both knees  Assessment & Plan  He reports issues with bilateral knees  Dr Josiah Monsivais did do TKA to R knee and was following him for this  He reports previous surgery to his L knee with multiple complications/revisions  See multi-modal pain control  Outpatient f/u with Orthopedics  Primary osteoarthritis of both shoulders  Weed Bloodgood Dr  [facility]  Last injection was 10/13  Typically gets them done every 6 months or so  May require as he is more reliant on his shoulders  If so, will consult Ortho  Will place order for Diclofenac gel and lidoderm for now  Health Maintenance  #Delirium/Sleep: At risk  No complaints at this time  Focus on environmental interventions - avoiding physical/chemical restraints  Focus on redirection and optimizing pain/bowel/bladder management  #Pain: Tylenol scheduled, Oxycodone 5-10mg PRN, Robaxin scheduled, and adding Gabapentin for neuropathic pain  Also on prednisone now  #Bowel: Last BM 1/24  Continue miralax and docusate  #Bladder: Voiding and continent  #Skin/Pressure Injury Prevention: Turn Q2hr in bed, with weight shifts [id number] in wheelchair  Float heels in bed  #DVT Prophylaxis: Lovenox 30mg Q12hr  Will change to 40 during stay   SCDs  #GI Prophylaxis: PO diet  #Code Status:  Full Code  #FEN:  Diabetic Diet  #Dispo:  Team 1/20: Changing ELOS to 2 weeks after watching therapy session today  Neuropathic pain primarily limitation  Goal is modified Ind and discharge back to home  Reteam        Objective:    Functional Update:  PT: Sup bed mobility, Dom-Set-up for transfers, CGA for ambulation 32' on parallel bars   OT: Progressing to Dom with ADLs     Allergies per EMR    Physical Exam:  Temp:  [97 1 °F (36 2 °C)-97 6 °F (36 4 °C)] 97 6 °F (36 4 °C)  HR:  [72-82] 72  Resp:  [18-20] 18  BP: (114-136)/(66-84) 131/66  Oxygen Therapy  SpO2: 97 %    Gen: No acute distress, Well-nourished, well-appearing  HEENT: Moist mucus membranes, Normocephalic/Atraumatic  Cardiovascular: Regular rate, rhythm, S1/S2  Distal pulses palpable  Heme/Extr: No elis  Pulmonary: Non-labored breathing  Lungs CTAB  : No gilbert  GI: Soft, non-tender, non-distended  BS+  MSK: R ankle swelling improved  Integumentary: Skin is warm, dry  Neuro: AAOx3, Speech is intact  Appropriate to questioning  Tone is normal    Psych: Normal mood and affect  Diagnostic Studies: RReviewed, no new imaging  Laboratory: Reviewed, no new labs  Results from last 7 days   Lab Units 01/24/22  0638   HEMOGLOBIN g/dL 10 1*   HEMATOCRIT % 30 1*   WBC Thousand/uL 10 50     Results from last 7 days   Lab Units 01/24/22  0638   BUN mg/dL 17   POTASSIUM mmol/L 3 7   CHLORIDE mmol/L 102   CREATININE mg/dL 0 76            Patient Active Problem List   Diagnosis    Primary osteoarthritis of both shoulders    Primary osteoarthritis of both knees    Essential hypertension    Mixed hyperlipidemia    Type 2 diabetes mellitus without complication, without long-term current use of insulin ([facility])    Obesity (BMI 30-39  9)    S/P TKR (total knee replacement), right    Closed nondisplaced intertrochanteric fracture of left femur with routine healing    Pain in both feet    Polyneuropathy associated with underlying disease (HCC)    Slow transit constipation    Vitamin D deficiency    Anemia    Hypomagnesemia    Cellulitis    Right foot pain         Medications  Current Facility-Administered Medications   Medication Dose Route Frequency Provider Last Rate    acetaminophen  650 mg Oral Q6H PRN MARGARITA Lopez      acetaminophen  650 mg Oral [facility] Lefty Sainz MD      ascorbic acid  500 mg Oral Daily MARGARITA Lopez      atorvastatin  40 mg Oral Daily With Gautam Xiong MD      colchicine  0 6 mg Oral Daily Himanshu Hi DPM      cyanocobalamin  1,000 mcg Oral Daily MARGARITA Lopez      Diclofenac Sodium  2 g Topical 4x Daily PRN Lefty Sainz MD      docusate sodium  100 mg Oral BID Lefty Sainz MD      Empagliflozin  12 5 mg Oral Daily MARGARITA Lopez      enoxaparin  30 mg Subcutaneous BID Lefty Sainz MD      ergocalciferol  50,000 Units Oral Weekly MARGARITA Lopez      ferrous gluconate  324 mg Oral Daily Before Breakfast MARGARITA Lopez      gabapentin  300 mg Oral [facility] Lefty Sainz MD      glipiZIDE  5 mg Oral Daily Before Breakfast MARGARITA Lopez      insulin glargine  15 Units Subcutaneous HS MARGARITA Lopez      insulin lispro  1-5 Units Subcutaneous TID [facility] Lefty Sainz MD      insulin lispro  1-5 Units Subcutaneous HS Lefty Sainz MD      lidocaine  2 patch Topical Daily PRN Lefty Sainz MD      magnesium oxide  800 mg Oral Daily MARGARITA Lopez      melatonin  6 mg Oral HS MARGARITA Loepz      metFORMIN  1,000 mg Oral BID With Meals MARGARITA Lopez      methocarbamol  500 mg Oral 4x Daily Lefty Sainz MD      ondansetron  4 mg Oral Q6H PRN Lefty Sainz MD      oxyCODONE  10 mg Oral Q4H PRN Lefty Sainz MD      oxyCODONE  5 mg Oral Q4H PRN Lefty Sainz MD      polyethylene glycol  17 g Oral Daily Lefty Sainz MD      sodium chloride  1 spray Each Nare Q2H PRN MARGARITA Lopez          ** Please Note: Fluency Direct voice to text software may have been used in the creation of this document   **    =

## 2022-01-27 NOTE — PROGRESS NOTES
01/27/22 0830   Pain Assessment   Pain Assessment Tool 0-10   Pain Score 4   Pain Location/Orientation Orientation: Left; Location: Hip;Orientation: Right;Location: Foot   Hospital Pain Intervention(s) Heat applied   Restrictions/Precautions   Precautions Pain; Fall Risk   Weight Bearing Restrictions Yes   Cognition   Overall Cognitive Status WFL   Subjective   Subjective c/o more of right foot/ankle pain vs hip today   Lying to Sitting on Side of Bed   Type of Assistance Needed Supervision   Lying to Sitting on Side of Bed CARE Score 4   Sit to Stand   Type of Assistance Needed Incidental touching; Adaptive equipment   Comment Pt relying on B UE more, but safe   Sit to Stand CARE Score 4   Bed-Chair Transfer   Type of Assistance Needed Supervision; Adaptive equipment   Comment CS with RW   Chair/Bed-to-Chair Transfer CARE Score 4   Car Transfer   Reason if not Attempted Environmental limitations   Car Transfer CARE Score 10   Walk 10 Feet   Type of Assistance Needed Incidental touching;Supervision; Adaptive equipment   Physical Assistance Level No physical assistance   Comment initially CGA, improved to CS as he loosened up and pain controlled   Walk 10 Feet CARE Score 4   Walk 50 Feet with Two Turns   Type of Assistance Needed Incidental touching;Supervision; Adaptive equipment   Physical Assistance Level No physical assistance   Comment CGA/CS with RW   Walk 50 Feet with Two Turns CARE Score 4   Walk 150 Feet   Comment seated rest break at 100ft   Reason if not Attempted Safety concerns   Walk 150 Feet CARE Score 88   Ambulation   Does the patient walk? 2  Yes   Primary Mode of Locomotion Prior to Admission Walk   Distance Walked (feet) 100 ft   Assist Device Roller Walker   Gait Pattern Antalgic; Slow Rosanne; Step through; Inconsistant Rosanne; Improper weight shift;Decreased L stance;Decreased R stance   Wheelchair mobility   Does the patient use a wheelchair? 0   No   Curb or Single Stair   Style negotiated Single stair   Type of Assistance Needed Incidental touching   Physical Assistance Level No physical assistance   Comment using B HR, step to alowing pt to perform as he would at home, leading with R LE , no LOB   1 Step (Curb) CARE Score 4   4 Steps   Type of Assistance Needed Incidental touching   Physical Assistance Level No physical assistance   Comment using B HR, step to alowing pt to perform as he would at home, leading with R LE , no LOB   4 Steps CARE Score 4   12 Steps   Reason if not Attempted Safety concerns   12 Steps CARE Score 88   Stairs   # of Steps 4   Therapeutic Interventions   Strengthening LAQ w/ 1#, seated hip flexion L LE no wt   Flexibility Passive stretch L HS and calf   Modalities MHP x 20 min R ankle/foot x 20 min at start of session   Assessment   Treatment Assessment Pt having pain meds prior to session  Along with MHP and medication, pt able to participate well today  Improved mobility with stairs and walking  Standing tolerance and overall activity limited by pain and fatigue  , along with L LE weakness  Pt still requires elevatd surfaces for transfers du eto decreased ROM and strength  Plan   Progress Progressing toward goals   Recommendation   PT Discharge Recommendation Home with home health rehabilitation   Equipment Recommended Walker   PT Therapy Minutes   PT Time In 0830   PT Time Out 0930   PT Total Time (minutes) 60   PT Mode of treatment - Individual (minutes) 60   PT Mode of treatment - Concurrent (minutes) 0   PT Mode of treatment - Group (minutes) 0   PT Mode of treatment - Co-treat (minutes) 0   PT Mode of Treatment - Total time(minutes) 60 minutes   PT Cumulative Minutes 655   Therapy Time missed   Time missed?  No

## 2022-01-27 NOTE — PLAN OF CARE
Problem: GASTROINTESTINAL - ADULT  Goal: Minimal or absence of nausea and/or vomiting  Description: INTERVENTIONS:  - Administer IV fluids if ordered to ensure adequate hydration  - Maintain NPO status until nausea and vomiting are resolved  - Nasogastric tube if ordered  - Administer ordered antiemetic medications as needed  - Provide nonpharmacologic comfort measures as appropriate  - Advance diet as tolerated, if ordered  - Consider nutrition services referral to assist patient with adequate nutrition and appropriate food choices  Outcome: Progressing     Problem: PAIN - ADULT  Goal: Verbalizes/displays adequate comfort level or baseline comfort level  Description: Interventions:  - Encourage patient to monitor pain and request assistance  - Assess pain using appropriate pain scale  - Administer analgesics based on type and severity of pain and evaluate response  - Implement non-pharmacological measures as appropriate and evaluate response  - Consider cultural and social influences on pain and pain management  - Notify physician/advanced practitioner if interventions unsuccessful or patient reports new pain  Outcome: Progressing

## 2022-01-27 NOTE — ASSESSMENT & PLAN NOTE
1/6 - Mechanical fall at home onto L hip  CT abd/pelvis - L intertrochanteric femur fracture  1/7 - OR for ORIF with short TFN to L hip - Dr Silviano Goddard    Ensure adequate pain control  Neurovascular checks Q shift  WBAT to LLE  DVT ppx with Lovenox  Follow-up with Dr Silviano Goddard in 2 weeks as outpatient (1/21)  XR on 1/21: "Nearly anatomically aligned intertrochanteric fracture of the left femur, status post ORIF  No evidence for hardware complication  Status post left knee arthroplasty "    Continue PT/OT therapies  Primary team following  No hx of DXA scan - recommend to be done as outpatient  Continue Vitamin D supplementation

## 2022-01-27 NOTE — TEAM CONFERENCE
Acute RehabilitationTeam Conference Note  Date: 1/27/2022   Time: 12:40 PM       Patient Name:  Martha Bauman       Medical Record Number: [id number]   YOB: 1955  Sex:  Male          Room/Bed:  [facility] 264/[facility] 264-01  Payor Info:  Payor: Leoncio Soler / Plan: MEDICARE A AND B / Product Type: Medicare A & B Fee for Service /      Admitting Diagnosis: Closed 2-part intertrochanteric fracture of left femur ([facility] ) [S72 142A]   Admit Date/Time:  1/19/2022  3:54 PM  Admission Comments: No comment available     Primary Diagnosis:  Closed nondisplaced intertrochanteric fracture of left femur with routine healing  Principal Problem: Closed nondisplaced intertrochanteric fracture of left femur with routine healing    Patient Active Problem List    Diagnosis Date Noted    Right foot pain 01/24/2022    Cellulitis 01/21/2022    Vitamin D deficiency 01/20/2022    Anemia 01/20/2022    Hypomagnesemia 01/20/2022    Closed nondisplaced intertrochanteric fracture of left femur with routine healing 01/19/2022    Pain in both feet 01/19/2022    Polyneuropathy associated with underlying disease ([facility] ) 01/19/2022    Slow transit constipation 01/19/2022    S/P TKR (total knee replacement), right 11/21/2018    Obesity (BMI 30-39 9) 10/29/2018    Primary osteoarthritis of both knees 07/18/2018    Primary osteoarthritis of both shoulders 12/06/2016    Mixed hyperlipidemia 04/23/2013    Essential hypertension 07/12/2012    Type 2 diabetes mellitus without complication, without long-term current use of insulin ([facility] ) 07/12/2012       Physical Therapy:    Weight Bearing Status: Weight Bearing as Tolerated  Transfers: Minimal Assistance,Incidental Touching  Bed Mobility:  (not a goal as pt sleeps in recliner at home)  Amulation Distance (ft): 50 feet  Ambulation: Incidental Touching  Assistive Device for Ambulation: Roller Walker  Number of Stairs: 1  Assistive Device for Stairs: Bilateral Bear Uma Assistance: Minimal Assistance  Discharge Recommendations: Home with:  [address] with[de-identified] Home Physical Therapy    61 yr old male admitted to [facility] 1/19/22 sp fall walking dog, sustained + L non displaced IT fx, with greater trochanter insertion tear  Underwent L hip ORIF by Dr Patsy Garcia 1/11/22  Cleared for WBAT L LE  At baseline pt lives with spouse in ML home with first floor setup, 3 ALEXSANDRA, Fully I PTA  without AD  PT to eval pt this afternoon  1/26/22  Pt making progress towards mod I goals despite pain into L Hip and R heel  Reports pain medications and cold packs help, as well as LATRELL stocking and sneakers  Pt amb up to 50 with RW, standing rest breaks as needed  Heavy use on UE due to limited by knee ROM t/o gait cycle  Barriers -> Interventions  Pain -> medication and modalities  ALEXSANDRA -> initiated stair trials as able  Dec B LE ROM -> AROM as sky, strengthening  Dec I with transfers -> progressing  Ongoing strengthening and transfer training  Needing inc time for all transfers  Dec amb -> has 150ft goal however only needs household distances for DC home  Anticipate DC home mid next week, with home PT recommended for DC (possibly Donald as in home out pt, pending needs)  Pt has RW  Occupational Therapy:  Eating: Independent  Grooming: Independent  Bathing: Supervision  Bathing: Supervision  Upper Body Dressing:  (setup)  Lower Body Dressing: Incidental Touching  Toileting: Incidental Touching  Tub/Shower Transfer: Minimal Assistance  Toilet Transfer: Incidental Touching  Cognition: Within Defined Limits  Orientation: Person,Place,Time,Situation  Discharge Recommendations: Home with:  [address] with[de-identified] Family Support,First Floor Setup,Home Occupational Therapy       1/26/22  ADL: S/u UB, CGA LB for CM, CGA toiletintg  TRANSFER: CGA with RW, pt performs STS and SPT with Rw with use of TEDS and sneakers, pt reporting inc comfort and ability to perform fx mobility and transfers    D/C PLAN: Pt is making steady gains anf is anticipated to achieve Keron level goals  Anticipated DC early next week with home OT services    Pt barriers include foot pain, L hip pain, dec activity tolerance, dec balance, dec UE strength, dec standing tolerance/balance, impacting participation in ADL/IADL's  In order to address fx deficits, skilled OT services have worked on self-care, therapeutic exercise, therapeutic activity, modalities PRN in order to inc fx performance and independence  Therapist has discussed POC with pt and areas of focus with pt verbalizing understanding  Barriers Intervention   Feet pain/L hip pain Ice, TEDS and sneakers   Dec activity tolerance There act   Dec balance There act, balance training   Dec standing tolerance In stance WB activities        1/19/22  Ashutosh Echols is a 61 y o  male with PMH of HTN, T2DM TKA (R) (c/b by infection, requiring revision x2), TKA L, gout,  back surgeries, shoulder surgeries, hand surgeries, depression, HLD, Sleep Apnea who presented to the [facility] on 1/5 after being bumped by his dog and falling onto his L hip  He was found to have a nondisplaced L trochanteric fracture with a partial tear at the greater trochanter insertion  He underwent L femur ORIF on 1/11  Post-op course complicated by pain, hyperglycemia  He also had issues with constipation  He was admitted to the [facility] on 1/19  Pt supine in bed upon therapist arrival  Beginning of session pt very upset from previous hospital stay  However, pleasant throughout  Pt reports he lives in a multi level home with 3STE with BHR  Pt reports he typically does not go upstairs and stays on FF  Pt reports he was indep with all ADLS, +, reports he takes care of his dogs  Reports he mainly stays on FF which has full BR with tub/shower  At this time pt req S for UB wash/dress, but req Ax2 for STS, SPT and lB dressing 2* to inc harry feet pain which Dr Dameon Hernandez reporting is neuropathic   Pt seen for 90mins of skilled OT services with emphasis on self-care, transfers,, as well as therapist educated pt on rehab process, goal setting and ELOS which pt verbalized understanding  Pt presents with the following performance component deficits impacting ADL/IADL skills: harry feet pain, weakness, impaired balance, decreased endurance, decreased coordination, increased fall risk, new onset of impairment of functional mobility, decreased ADLS, decreased IADLS, decreased activity tolerance, decreased safety awareness, that result in activity limitations and/or participation restrictions  Pt to continue to benefit from continued acute rehab OT services during hospital stay to address defined deficits and to maximize level of functional independence in the following Occupational Performance areas: grooming, bathing/shower, toilet hygiene, dressing, medication management, functional mobility, community mobility, clothing management, cleaning  Treatment approaches may include: OT eval, self-care, there ex, therapeutic activity, modalities  Pt presents with good rehab potential  This evaluation requires clinical decision making of moderate complexity, because the patient presents with comorbidites that affect occupational performance and required significant modification of tasks or assistance with consideration of multiple treatment options  Pt is unsafe to D/C home at this time, recommending ELOS 2 weeks to achieve Keron level goals with least restrictive device to address these areas and resume prior occupational roles to maximize independence to reduce risk of fall and decrease risk of readmission         Speech Therapy:           No notes on file    Nursing Notes:  Appetite: Good  Diet Type: Diabetic,Thin Liquids                                                                     Pain Location/Orientation: Location: Foot,Orientation: Right  Pain Score: 6                       Hospital Pain Intervention(s): Medication (See MAR)          61 y o  male with PMH of HTN, T2DM TKA (R) (c/b by infection, requiring revision x2), TKA L, gout,  back surgeries, shoulder surgeries, hand surgeries, depression, HLD, Sleep Apnea who presented to the [address] [place] [address] on 1/5 after being bumped by his dog and falling onto his L hip  He was found to have a nondisplaced L trochanteric fracture with a partial tear at the greater trochanter insertion  He underwent L femur ORIF on 1/11  Post-op course complicated by pain, hyperglycemia  He also had issues with constipation  He was admitted to the [address] [place] [address]  on 1/19  Constipation managed with colace, miralax & Fleets enema x 1  Gout managed with colchicine 0 6mg daily  Polyneuropathy managed with gabapentin 300mg q8h  DM2 managed with accu checks QID, SSI's, diabetic diet level 2, Metformin 1000mg BID with meals, Jardiance 12 5mg daily, Glipizide 5mg daily before breakfast & Lantus 15units @ HS  HLD managed with lipitor 40mg @ HS  Osteoarthritis of harry knees & shoulders managed with Robaxin 500mg QID, Voltaren gel 2Gm QID PRN, Lidoderm patches q12h PRN, tylenol 650mg q8h, Oxy IR 5mg or 10mg q4h PRN  PPX: Lovenox 30mg BID  This week we will monitor pt's vital signs & lab results  Pt will have adequate pain control to fully participate in therapies  Pt will be educated on importance of T/R & off loading weight while in bed/chair to prevent pressure injury  Pt will have safe transfers with the use of call bell & hourly rounding to prevent falls  We will educate on energy conservation & encourage independence with ADL's  Case Management:     Discharge Planning  Living Arrangements: Lives w/ Spouse/significant other  Support Systems: Spouse/significant other  Assistance Needed: unable to determine at present  Type of Current Residence: Private residence  Current Home Care Services: No  Pt is participating in therapy sessions and rehab program  Pt lives in a 2 story home with his wife  He has 3 ALEXSANDRA and only uses the first floor   He has crutches and a walker  He reports his wife works and he will be alone during the day  He states he will drive to appointments  Pt/ family have been educated on potential d/c recommendations for continued services  Following to assist w/ d/c planning needs  Is the patient actively participating in therapies? yes  List any modifications to the treatment plan:     Barriers Interventions   Pain Med management   Gout Med management, compression, moe stockings   Constipation Med management, suppository or edema   neuropathy gabapentin    Standing tolerance Therapy exercises   Decreased balance Therapy exercises, AE   ALEXSANDRA Stairs training         Is the patient making expected progress toward goals? yes  List any update or changes to goals:     Medical Goals: Patient will be medically stable for discharge to [facility] [place] by [facility] upon completion of rehab program and Patient will be able to manage medical conditions and comorbid conditions with medications and follow up upon completion of rehab program    Weekly Team Goals:   Rehab Team Goals  ADL Team Goal: Patient will be independent with ADLs with least restrictive device upon completion of rehab program  Bowel/Bladder Team Goal: Patient will return to premorbid level for bladder/bowel management upon completion of rehab program  Transfer Team Goal: Patient will be independent with transfers with least restrictive device upon completion of rehab program  Locomotion Team Goal: Patient will be independent with locomotion with least restrictive device upon completion of rehab program    Discussion: Pt presents with the above barriers  He is ambulating at 100 ft at close supervision, close supervision for 4 ALEXSANDRA with bilateral handrails  He is functioning at min a for ADLs for LB and toiletting  Recommendations are for home PT  Anticipated Discharge Date:  2/1  [facility] Team Members Present:   The following team members are supervising care for this patient and were present during this Weekly Team Conference      Physician: Dr Ike Fisher MD  : SAIGE Snell  Registered Nurse: Timoteo Fitzgerald RN/ Arti Monique RN  Physical Therapist: KAMALA LantiguaT  Occupational Therapist: Jc Deal MS, OTR/L and Pal Arias MS, OTR/L  Speech Therapist: Nicho Gonzales [facility], [id number] [facility]

## 2022-01-27 NOTE — ASSESSMENT & PLAN NOTE
Started on keflex for possible cellulitis infection on dorsal aspect of foot  Also started on prednisone 40mg daily x5 days on 1/21 for possible pseudogout - completed on 1/26  R foot XR on 1/19: "No acute osseous abnormality "  Uric acid level WNL  Started on colchicine 0 6mg daily by Podiatry on 1/25 for 5 day course

## 2022-01-27 NOTE — PROGRESS NOTES
Physical Medicine and Rehabilitation Progress Note  Slim Hughes 61 y o  male MRN: [id number]  Unit/Bed#: -71 Encounter: [id number]    HPI: Slim Hughes is a 61 y o  male with PMH of HTN, T2DM TKA (R) (c/b by infection, requiring revision x2), TKA L, gout,  back surgeries, shoulder surgeries, hand surgeries, depression, HLD, Sleep Apnea who presented to the [address] [place] [address] on 1/5 after being bumped by his dog and falling onto his L hip  He was found to have a nondisplaced L trochanteric fracture with a partial tear at the greater trochanter insertion  He underwent L femur ORIF on 1/11  Post-op course complicated by pain, hyperglycemia  He also had issues with constipation  He was admitted to the [facility] on 1/19  Chief Complaint: R ankle pain this morning  Interval History/Subjective:  No acute events overnight  Yesterday, therapy placed a small wedge in his heel (his achilles has been feeling painful), with improvement in that pain, but later, developed some medial ankle pain around his subtalar joint  Improved after pain medicine today prior to therapy  He states he used to wear an ankle brace at times at home - just an over the counter brace, which would sometimes help  He denies any CP, SOB, fevers, chills, N/V, abdominal pain  Last BM was 1/24, and is open to taking something stronger today for his bowels  He wants to avoid taking an enema or suppository  ROS:  A 10 point review of systems was negative except for what is noted in the HPI  Today's Changes: 1  Reviewed podiatry note, will have him f/u with them outpatient  Appreciate recs  2  May require some med/lat ankle support in addition to wedge  Discussed with therapies  3  Giving bisacodyl tabs today  If no BM, would recommend suppository or enema  4  Removed remainder of staples today and placed steri-strips  5  BG better controlled  6  Team Conference today, see separate note       Total time spent:  35 minutes, with more than 50% spent counseling/coordinating care  Counseling includes discussion with patient re: progress in therapies, functional issues observed by therapy staff, and discussion with patient his/her current medical state/wellbeing  Coordination of patient's care was performed in conjunction with Internal Medicine service to monitor patient's labs, vitals, and management of their comorbidities  In addition, this patient was discussed by the interdisciplinary team in weekly case conference today  The care of the patient was extensively discussed with all care providers and an appropriate rehabilitation plan was formulated unique for this patient  Barriers were identified preventing progression of therapy and appropriate interventions were discussed with each discipline  Please see the team note for input from all disciplines regarding barriers, intervention, and discharge planning  Assessment/Plan:    * Closed nondisplaced intertrochanteric fracture of left femur with routine healing  Assessment & Plan  Diagnosed on MRI - nondisplaced intertrochanteric fracture with partial tear at greater trochanter insertion  ORIF on 1/11 with [facility]  Multimodal pain control as described below  WBAT  Incisional care  2 week follow-up on 1/25 - XR on 1/21 with near anatomic alignment     - Staples removed, steri-strips in place   - Would like photo of his imaging to take home  Outpatient f/u with [facility] in 4 weeks  Cellulitis  Assessment & Plan  Started on Keflex 1/21 by IM 5 day course - since completed  Foot no longer erythematous or red  Suspect tenderness related to pseudogout/gout, arthritis, and neuropathy  Hypomagnesemia  Assessment & Plan  Repleted with oral magnesium  Continue oral magnesium  1/24 Mag 1 9     Anemia  Assessment & Plan  Mild  Normocytic   Component of ABLA  1/24 Hgb stable at 10 1   Cyanocobalamin given low normal    IM checked iron panel, started on Vit C and iron supplementation  Monitor and transfuse for <7  Vitamin D deficiency  Assessment & Plan  Started on ergocalciferol on admission  Will plan for 6 weeks, then recheck  Slow transit constipation  Assessment & Plan  Likely related to pain medication, inadequate hydration, decreased mobility, recent trauma/surgery  Scheduled docusate/miralax  Got enema on admission  Last BM 1/24    Polyneuropathy associated with underlying disease [facility])  Assessment & Plan  Most likely related to diabetes  SPEP without monoclonal gammopathy  B12 low normal - started on supplementation  Increased Gabapentin 300mg Q8hr  Desensitization techniques  Pain in both feet  Assessment & Plan  Bilateral  R > L   R foot may have component of gout/pseudogout, also quite arthritic  Dorsal erythema, related to bone spur per podiatry   Erythema/warmth worsened on 1/21, was started on prednisone and keflex by IM  Has since completed and symptoms have improved  Podiatry recommending possible course of colchicine  Swelling improved with LATRELL stockings  Also biomechanically, has a large painful bunion - this causes him to walk on the lateral edge of his foot which causes pain  Also with component of neuropathic pain  XR R foot negative for acute fracture/process  Uric acid normal  Continue increased dose of gabapentin desensitization  1/21 IM started 5 day course of prednisone  Obesity (BMI 30-39  9)  Assessment & Plan  Counseling  Type 2 diabetes mellitus without complication, without long-term current use of insulin [facility])  Assessment & Plan  Lab Results   Component Value Date    HGBA1C 7 2 (H) 01/08/2022       Recent Labs     01/25/22  1141 01/25/22  1638 01/25/22  2033 01/26/22  0601   POCGLU 201* 246* 194* 147*       Blood Sugar Average: Last 72 hrs:  (P) 331     At home: Metformin 1000mg BID, Lantus 28 units daily, Alogliptin 25mg daily, Empaglifluzin 12 5mg daily, Glipizide 5mg BID    Here: Lantus 10 units, Metformin 1000mg BID, Januvia 12 5mg, Glipizide 5mg daily, CDI, Accuchecks  Diabetic Diet  IM consulted and management at their discretion  Mixed hyperlipidemia  Assessment & Plan  Restart home Lipitor 40mg at night  Essential hypertension  Assessment & Plan  At home on Lisinopril 30mg daily  Here has not required BP meds  Will closely monitor BP, and add home med as appropriate  IM consulted and management at their discretion    Primary osteoarthritis of both knees  Assessment & Plan  He reports issues with bilateral knees  Dr Gemma Mcardle did do TKA to R knee and was following him for this  He reports previous surgery to his L knee with multiple complications/revisions  See multi-modal pain control  Outpatient f/u with Orthopedics  Primary osteoarthritis of both shoulders  [facility] Dr Westley Maldonado  Last injection was 10/13  Typically gets them done every 6 months or so  May require as he is more reliant on his shoulders  If so, will consult Ortho  Will place order for Diclofenac gel and lidoderm for now  Health Maintenance  #Delirium/Sleep: At risk  No complaints at this time  Focus on environmental interventions - avoiding physical/chemical restraints  Focus on redirection and optimizing pain/bowel/bladder management  #Pain: Tylenol scheduled, Oxycodone 5-10mg PRN, Robaxin scheduled, and adding Gabapentin for neuropathic pain  Also on prednisone now  #Bowel: Last BM 1/24  Continue miralax and docusate  Giving bisacodyl tabs today  #Bladder: Voiding and continent  #Skin/Pressure Injury Prevention: Turn Q2hr in bed, with weight shifts [id number] in wheelchair  Float heels in bed  #DVT Prophylaxis: Lovenox 30mg Q12hr  Changing to 40mg tomorrow  SCDs  #GI Prophylaxis: PO diet  #Code Status:  Full Code  #FEN:  Diabetic Diet  #Dispo:  Team 1/27: ADD 2/1 with either home PT or [facility])  He has made excellent progress this past week   Goal is modified Ind  Objective:    Functional Update:  PT: Dom-CGA transfers, CGA ambulation 50', Dom stairs  OT: Ind eating/grooming, Sup bathing, set-up UB dressing, CGA LB dressing and toileting, Dom tub/shower transfer ,CGA toilet transfer     Allergies per EMR    Physical Exam:  Temp:  [97 1 °F (36 2 °C)-97 6 °F (36 4 °C)] 97 6 °F (36 4 °C)  HR:  [72-82] 72  Resp:  [18-20] 18  BP: (114-136)/(66-84) 131/66  Oxygen Therapy  SpO2: 97 %    Gen: No acute distress, Well-nourished, well-appearing  HEENT: Moist mucus membranes, Normocephalic/Atraumatic  Cardiovascular: Regular rate, rhythm, S1/S2  Distal pulses palpable  Heme/Extr: No edema  Pulmonary: Non-labored breathing  Lungs CTAB  : No gilbert  GI: Soft, non-tender, non-distended  BS+  MSK: No point tenderness to subtalar joint or around spring ligament, but did just receive pain medication  He has some swelling at medial ankle, which is not any worse than it has been in the past    Integumentary: Skin is warm, dry  Incision sites well-healed, steri-strips in place  Staples removed  Neuro: AAOx3, Speech is intact  Appropriate to questioning  Tone is normal  Observed sit to stand with over-reliance on upper extremities  Psych: Normal mood and affect  Diagnostic Studies: Reviewed, no new imaging  Laboratory: Reviewed, no new labs  Results from last 7 days   Lab Units 01/24/22  0638   HEMOGLOBIN g/dL 10 1*   HEMATOCRIT % 30 1*   WBC Thousand/uL 10 50     Results from last 7 days   Lab Units 01/24/22  0638   BUN mg/dL 17   POTASSIUM mmol/L 3 7   CHLORIDE mmol/L 102   CREATININE mg/dL 0 76            Patient Active Problem List   Diagnosis    Primary osteoarthritis of both shoulders    Primary osteoarthritis of both knees    Essential hypertension    Mixed hyperlipidemia    Type 2 diabetes mellitus without complication, without long-term current use of insulin (HCC)    Obesity (BMI 30-39  9)    S/P TKR (total knee replacement), right    Closed nondisplaced intertrochanteric fracture of left femur with routine healing    Pain in both feet    Polyneuropathy associated with underlying disease ([address] )    Slow transit constipation    Vitamin D deficiency    Anemia    Hypomagnesemia    Cellulitis    Right foot pain         Medications  Current Facility-Administered Medications   Medication Dose Route Frequency Provider Last Rate    acetaminophen  650 mg Oral Q6H PRN YOKO PatNP      acetaminophen  650 mg Oral [facility] Brisa Ovalle MD      ascorbic acid  500 mg Oral Daily MARGARITA Pat      atorvastatin  40 mg Oral Daily With Lala Blancas MD      colchicine  0 6 mg Oral Daily Jerry JOVANY Donohue      cyanocobalamin  1,000 mcg Oral Daily Earma Jt CRNP      Diclofenac Sodium  2 g Topical 4x Daily PRN Brisa Ovalle MD      docusate sodium  100 mg Oral BID Brisa Ovalle MD      Empagliflozin  12 5 mg Oral Daily MARGARITA Pat      enoxaparin  30 mg Subcutaneous BID Brisa Ovalle MD      ergocalciferol  50,000 Units Oral Weekly EarMARGARITA Ram      ferrous gluconate  324 mg Oral Daily Before Breakfast MARGARITA Pat      gabapentin  300 mg Oral [facility] Brisa Ovalle MD      glipiZIDE  5 mg Oral Daily Before Breakfast MARGARITA Pat      insulin glargine  15 Units Subcutaneous HS MARGARITA Pat      insulin lispro  1-5 Units Subcutaneous TID [facility] Brisa Ovalle MD      insulin lispro  1-5 Units Subcutaneous HS Brisa Ovalle MD      lidocaine  2 patch Topical Daily PRN Brisa Ovalle MD      magnesium oxide  800 mg Oral Daily EarMARGARITA Ram      melatonin  6 mg Oral HS MARGARITA Pat      metFORMIN  1,000 mg Oral BID With Meals MARGARITA Pat      methocarbamol  500 mg Oral 4x Daily Brisa Ovalle MD      ondansetron  4 mg Oral Q6H PRN Brisa Ovalle MD      oxyCODONE  10 mg Oral Q4H PRN Brisa Ovalle MD      oxyCODONE  5 mg Oral Q4H PRN Brisa Ovalle MD      polyethylene glycol  17 g Oral Daily Austin Villanueva MD      sodium chloride  1 spray Each Nare Q2H PRN MARGARITA Todd          ** Please Note: Fluency Direct voice to text software may have been used in the creation of this documen

## 2022-01-27 NOTE — ASSESSMENT & PLAN NOTE
Lab Results   Component Value Date    HGBA1C 7 2 (H) 01/08/2022       Recent Labs     01/26/22  1128 01/26/22  1606 01/26/22 2031 01/27/22  0623   POCGLU 137 199* 170* 113       Blood Sugar Average: Last 72 hrs:  (P) 543 [id number]     Last A1c 7 5 on 11/16  Home regimen: Alogliptin 25mg daily, Jardiance 12 5mg daily, Glipizide 5mg BID, Lantus 28u at HS, and metformin 1000mg BID  Currently on Lantus 15u at HS, metformin 1000mg BID, and Jardiance 12 5mg daily  Started on glipizide 5mg daily on 1/26  Continue QID accuchecks and SSI  Continue cons  carb diet  Continue to follow-up with PCP as outpatient

## 2022-01-27 NOTE — CASE MANAGEMENT
Zackary met with pt and reviewed team meeting update  He is in agreement with d/c on 2/1 with continued home PT  He requested to have it through his former agency, [address] [place] [address]  In preparation for d/c, zackary sent referral to [facility] for home [facility]  Following to assist w/ d/c planning needs

## 2022-01-27 NOTE — PROGRESS NOTES
01/27/22 1100   Pain Assessment   Pain Assessment Tool 0-10   Pain Score 6   Pain Location/Orientation Orientation: Left; Location: Hip   Restrictions/Precautions   Precautions Fall Risk;Pain   Subjective   Subjective "i think I'm a little sore form earlier this morning"   Sit to Stand   Type of Assistance Needed Incidental touching; Adaptive equipment   Comment extra time needed from higher surface   Sit to Stand CARE Score 4   Bed-Chair Transfer   Type of Assistance Needed Supervision; Adaptive equipment   Comment CS with RW   Chair/Bed-to-Chair Transfer CARE Score 4   Walk 10 Feet   Type of Assistance Needed Incidental touching;Supervision; Adaptive equipment   Comment CGA/CS with RW, only manged 15 ft limited by pain and fatigue/weakness L LE   Walk 10 Feet CARE Score 4   Ambulation   Does the patient walk? 2  Yes   Therapeutic Interventions   Strengthening had pt self-propel w/c to/from room/gym for ther ex   Equipment Use   NuStep 10 min for self active stretch   Assessment   Treatment Assessment Pt seen for 30 min session  Worked o nustep for active stretch to improve hip/knee flexion in attempt to promote incresaed fwd wt shift with STS transfers and rely less on B UE to stand  Tolerated 10 min, but reports being "sore" with decreased tolerance then to ambulate  PT Therapy Minutes   PT Time In 1100   PT Time Out 1130   PT Total Time (minutes) 30   PT Mode of treatment - Individual (minutes) 30   PT Mode of treatment - Concurrent (minutes) 0   PT Mode of treatment - Group (minutes) 0   PT Mode of treatment - Co-treat (minutes) 0   PT Mode of Treatment - Total time(minutes) 30 minutes   PT Cumulative Minutes 685   Therapy Time missed   Time missed?  No

## 2022-01-27 NOTE — PLAN OF CARE
Problem: RESPIRATORY - ADULT  Goal: Achieves optimal ventilation and oxygenation  Description: INTERVENTIONS:  - Assess for changes in respiratory status  - Assess for changes in mentation and behavior  - Position to facilitate oxygenation and minimize respiratory effort  - Oxygen administered by appropriate delivery if ordered  - Initiate smoking cessation education as indicated  - Encourage broncho-pulmonary hygiene including cough, deep breathe, Incentive Spirometry  - Assess the need for suctioning and aspirate as needed  - Assess and instruct to report SOB or any respiratory difficulty  - Respiratory Therapy support as indicated  Outcome: Progressing     Problem: GASTROINTESTINAL - ADULT  Goal: Establish and maintain optimal ostomy function  Description: INTERVENTIONS:  - Assess bowel function  - Encourage oral fluids to ensure adequate hydration  - Administer IV fluids if ordered to ensure adequate hydration   - Administer ordered medications as needed  - Encourage mobilization and activity  - Nutrition services referral to assist patient with appropriate food choices  - Assess stoma site  - Consider wound care consult   Outcome: Progressing     Problem: GENITOURINARY - ADULT  Goal: Maintains or returns to baseline urinary function  Description: INTERVENTIONS:  - Assess urinary function  - Encourage oral fluids to ensure adequate hydration if ordered  - Administer IV fluids as ordered to ensure adequate hydration  - Administer ordered medications as needed  - Offer frequent toileting  - Follow urinary retention protocol if ordered  Outcome: Progressing     Problem: METABOLIC, FLUID AND ELECTROLYTES - ADULT  Goal: Fluid balance maintained  Description: INTERVENTIONS:  - Monitor labs   - Monitor I/O and WT  - Instruct patient on fluid and nutrition as appropriate  - Assess for signs & symptoms of volume excess or deficit  Outcome: Progressing

## 2022-01-27 NOTE — PROGRESS NOTES
[facility]  Progress Note - Shara Winter 1955, 61 y o  male MRN: [id number]  Unit/Bed#: -01 Encounter: [id number]  Primary Care Provider: No primary care provider on file  Date and time admitted to hospital: 1/19/2022  3:54 PM    Right foot pain  Assessment & Plan  Started on keflex for possible cellulitis infection on dorsal aspect of foot  Also started on prednisone 40mg daily x5 days on 1/21 for possible pseudogout - completed on 1/26  R foot XR on 1/19: "No acute osseous abnormality "  Uric acid level WNL  Started on colchicine 0 6mg daily by Podiatry on 1/25 for 5 day course  Cellulitis  Assessment & Plan  Possible cellulitis infection to the R foot/heel  Hx of previous cellulitis infections in same location  Started on keflex 500mg BID on 1/21, completed on 1/26 for 5 day course  Monitor for s/s of reoccurring infection  Hypomagnesemia  Assessment & Plan  Improved, Mg 1 9 from 1 4 on 1/24  Continue magnesium oxide 800mg daily  Anemia  Assessment & Plan  Related to acute blood loss post-procedure  Hgb currently 10 1  Hgb was 15 0 pre-operatively  Iron panel on 1/20: Iron Sat 22%, TIBC 147, Iron 32, and Ferritin 304  Started on ferrous gluconate and ascorbic acid  Monitor for s/s of active bleeding  Continue to trend with routine CBC  Vitamin D deficiency  Assessment & Plan  Vitamin D level 11 on 1/8  Start on Vitamin D 50,000u weekly  Continue to follow-up with PCP as outpatient  Polyneuropathy associated with underlying disease ([facility] [address] )  Assessment & Plan  Continue gabapentin 100mg Q8 - increased to 300mg Q8 on 1/20  Daily skin checks/neurovascular checks  Considerations with PT/OT therapies  Vitamin B12 level 315 - started on cyanocobalamin  Obesity (BMI 30-39  9)  Assessment & Plan  BMI 32 35 on admission  Encourage lifestyle modifications      Type 2 diabetes mellitus without complication, without long-term current use of insulin [facility])  Assessment & Plan  Lab Results   Component Value Date    HGBA1C 7 2 (H) 01/08/2022       Recent Labs     01/26/22  1128 01/26/22  1606 01/26/22 2031 01/27/22  0623   POCGLU 137 199* 170* 113       Blood Sugar Average: Last 72 hrs:  (P) 030 [id number]     Last A1c 7 5 on 11/16  Home regimen: Alogliptin 25mg daily, Jardiance 12 5mg daily, Glipizide 5mg BID, Lantus 28u at HS, and metformin 1000mg BID  Currently on Lantus 15u at HS, metformin 1000mg BID, and Jardiance 12 5mg daily  Started on glipizide 5mg daily on 1/26  Continue QID accuchecks and SSI  Continue cons  carb diet  Continue to follow-up with PCP as outpatient  Mixed hyperlipidemia  Assessment & Plan  Continue Lipitor 40mg daily  Last lipid panel on 11/16: Cholesterol 139, Triglycerides 150, HDL 32, and LDL 82  Essential hypertension  Assessment & Plan  Home lisinopril 30mg currently on hold  Monitor BP with routine VS     Primary osteoarthritis of both shoulders  Assessment & Plan  B/L shoulder glenohumeral osteoarthritis  Follows with Dr Kasandra Sampson as outpatient  Last received steroid injections on 10/13/21  Ensure adequate pain control  Considerations during therapies  * Closed nondisplaced intertrochanteric fracture of left femur with routine healing  Assessment & Plan  1/6 - Mechanical fall at home onto L hip  CT abd/pelvis - L intertrochanteric femur fracture  1/7 - OR for ORIF with short TFN to L hip - Dr Letty Toth    Ensure adequate pain control  Neurovascular checks Q shift  WBAT to LLE  DVT ppx with Lovenox  Follow-up with Dr Letty Toth in 2 weeks as outpatient (1/21)  XR on 1/21: "Nearly anatomically aligned intertrochanteric fracture of the left femur, status post ORIF  No evidence for hardware complication  Status post left knee arthroplasty "    Continue PT/OT therapies  Primary team following  No hx of DXA scan - recommend to be done as outpatient  Continue Vitamin D supplementation        VTE Pharmacologic Prophylaxis:   Pharmacologic: Enoxaparin (Lovenox)  Mechanical VTE Prophylaxis in Place: Yes - sequential compression devices  Current Length of Stay: 8 day(s)    Current Patient Status: Inpatient Rehab     Discharge Plan: As per primary team     Code Status: Level 1 - Full Code    Subjective:   Pt examined while pt sitting in WC in pt room  States that he is having a really good day today  Pain is well controlled  Currently complaining of B/L feet pain, 5/10, burning, improves with pain medication  Denies any L hip pain at this time  States that he occasionally has L groin pain, especially after therapy - plans on icing later today  Slept very well last night  Hasn't had a BM since , denies any abdominal pain and is passing gas  BG is better this morning  Has no other concerns or complaints at this time  Objective:     Vitals:   Temp (24hrs), Av 3 °F (36 3 °C), Min:97 1 °F (36 2 °C), Max:97 6 °F (36 4 °C)    Temp:  [97 1 °F (36 2 °C)-97 6 °F (36 4 °C)] 97 6 °F (36 4 °C)  HR:  [72-82] 72  Resp:  [18-20] 18  BP: (114-136)/(66-84) 131/66  SpO2:  [97 %-98 %] 97 %  Body mass index is 32 35 kg/m²  Review of Systems   Constitutional: Negative for appetite change, chills, fatigue and fever  Respiratory: Negative for cough and shortness of breath  Cardiovascular: Negative for chest pain, palpitations and leg swelling  Gastrointestinal: Positive for constipation  Negative for abdominal distention, abdominal pain, diarrhea, nausea and vomiting  LBM , denies abdominal pain and is passing gas  Genitourinary: Negative for difficulty urinating  Musculoskeletal: Negative for arthralgias and back pain  B/L foot pain, 5/10, burning, improves with pain medication and compression   Neurological: Negative for dizziness, weakness, light-headedness, numbness and headaches  Psychiatric/Behavioral: Negative for sleep disturbance          Input and Output Summary (last 24 hours): Intake/Output Summary (Last 24 hours) at 1/27/2022 0803  Last data filed at 1/27/2022 4154  Gross per 24 hour   Intake 830 ml   Output 1970 ml   Net -1140 ml       Physical Exam:     Physical Exam  Vitals and nursing note reviewed  Constitutional:       Appearance: Normal appearance  He is obese  HENT:      Head: Normocephalic and atraumatic  Cardiovascular:      Rate and Rhythm: Normal rate and regular rhythm  Pulses: Normal pulses  Heart sounds: Murmur heard  Systolic murmur is present with a grade of 1/6  No friction rub  Pulmonary:      Effort: Pulmonary effort is normal  No respiratory distress  Breath sounds: Normal breath sounds  No wheezing or rhonchi  Abdominal:      General: Abdomen is flat  Bowel sounds are normal  There is no distension  Palpations: Abdomen is soft  Tenderness: There is no abdominal tenderness  Musculoskeletal:      Cervical back: Normal range of motion and neck supple  Right lower leg: Edema (Minimal non-pitting edema) present  Left lower leg: Edema (Minimal non-pitting edema) present  Skin:     General: Skin is warm and dry  Capillary Refill: Capillary refill takes less than 2 seconds  Neurological:      Mental Status: He is alert and oriented to person, place, and time     Psychiatric:         Mood and Affect: Mood normal          Behavior: Behavior normal          Additional Data:     Labs:    Results from last 7 days   Lab Units 01/24/22  0638   WBC Thousand/uL 10 50   HEMOGLOBIN g/dL 10 1*   HEMATOCRIT % 30 1*   PLATELETS Thousands/uL 438   NEUTROS PCT % 67*   LYMPHS PCT % 22*   MONOS PCT % 9   EOS PCT % 1     Results from last 7 days   Lab Units 01/24/22  0638   SODIUM mmol/L 137   POTASSIUM mmol/L 3 7   CHLORIDE mmol/L 102   CO2 mmol/L 32*   BUN mg/dL 17   CREATININE mg/dL 0 76   ANION GAP mmol/L 3*   CALCIUM mg/dL 8 7   GLUCOSE RANDOM mg/dL 195*         Results from last 7 days   Lab Units 01/27/22  4712 01/26/22  2031 01/26/22  1606 01/26/22  1128 01/26/22  0601 01/25/22  2033 01/25/22  1638 01/25/22  1141 01/25/22  0612 01/24/22 2054 01/24/22  1622 01/24/22  1121   POC GLUCOSE mg/dl 113 170* 199* 137 147* 194* 246* 201* 124 144* 281* 246*               Labs reviewed    Imaging:    Imaging reviewed    Recent Cultures (last 7 days):           Last 24 Hours Medication List:   Current Facility-Administered Medications   Medication Dose Route Frequency Provider Last Rate    acetaminophen  650 mg Oral Q6H PRN Tommas Cotton, CRNP      acetaminophen  650 mg Oral [facility] Timoteo David MD      ascorbic acid  500 mg Oral Daily Tommas Cotton, CRNP      atorvastatin  40 mg Oral Daily With Berta Castillo MD      colchicine  0 6 mg Oral Daily Yaquelin Lat, DPM      cyanocobalamin  1,000 mcg Oral Daily Tommas Cotton, CRNP      Diclofenac Sodium  2 g Topical 4x Daily PRN Timoteo David MD      docusate sodium  100 mg Oral BID Timoteo David MD      Empagliflozin  12 5 mg Oral Daily Tommas Cotton, CRNP      enoxaparin  30 mg Subcutaneous BID Timoteo David MD      ergocalciferol  50,000 Units Oral Weekly Tommas Cotton, CRNP      ferrous gluconate  324 mg Oral Daily Before Breakfast Tommas Cotton, CRNP      gabapentin  300 mg Oral [facility] Timoteo David MD      glipiZIDE  5 mg Oral Daily Before Breakfast Tommas Cotton, CRNP      insulin glargine  15 Units Subcutaneous HS Tommas Cotton, CRNP      insulin lispro  1-5 Units Subcutaneous TID [facility] Timoteo David MD      insulin lispro  1-5 Units Subcutaneous HS Timoteo David MD      lidocaine  2 patch Topical Daily PRN Timoteo David MD      magnesium oxide  800 mg Oral Daily Tommas Cotton, CRNP      melatonin  6 mg Oral HS Tommas Cotton, CRNP      metFORMIN  1,000 mg Oral BID With Meals Tommas Cotton, CRNP      methocarbamol  500 mg Oral 4x Daily Timoteo David MD      ondansetron  4 mg Oral Q6H PRN Timoteo David MD      oxyCODONE  10 mg Oral Q4H PRN Catherine Ahuamda Louis Lehman MD      oxyCODONE  5 mg Oral Q4H PRN Pio Saab MD      polyethylene glycol  17 g Oral Daily Pio Saab MD      sodium chloride  1 spray Each Nare Q2H PRN MARGARITA Rico*Modal software was used to dictate this note  It may contain errors with dictating incorrect words or incorrect spelling  Please contact the provider directly with any questions

## 2022-01-27 NOTE — ASSESSMENT & PLAN NOTE
B/L shoulder glenohumeral osteoarthritis  Follows with Dr Yovani Crenshaw as outpatient  Last received steroid injections on 10/13/21  Ensure adequate pain control  Considerations during therapies

## 2022-01-27 NOTE — PROGRESS NOTES
01/27/22 1230   Pain Assessment   Pain Assessment Tool 0-10   Pain Score 3   Pain Location/Orientation Orientation: Right;Location: Foot   Pain Onset/Description Frequency: Constant/Continuous   Effect of Pain on Daily Activities Tolerated   Patient's Stated Pain Goal No pain   Hospital Pain Intervention(s) Cold applied   Multiple Pain Sites No   Restrictions/Precautions   Precautions Fall Risk;Pain   Weight Bearing Restrictions Yes   RLE Weight Bearing Per Order FWB   LLE Weight Bearing Per Order WBAT   ROM Restrictions No   Tub/Shower Transfer   Limitations Noted In Balance; Endurance;ROM;LE Strength   Adaptive Equipment Transfer Bench   Assessed Tub/shower combo  (DRY)   Findings CGA utilizing tub bench to perform sit and swivel technique to enter/exit dry tub environment  Pt plans to purchase tub bench IND'ly for D/C  Pt provided with handout to purchase during previous Tx session  Sit to Stand   Type of Assistance Needed Incidental touching; Adaptive equipment   Physical Assistance Level No physical assistance   Comment CGA STS to RW   Sit to Stand CARE Score 4   Bed-Chair Transfer   Type of Assistance Needed Incidental touching; Adaptive equipment   Physical Assistance Level No physical assistance   Comment CG/CS with RW for fxnl transfer's and fxnl mobility   Chair/Bed-to-Chair Transfer CARE Score 4   Toileting Hygiene   Type of Assistance Needed Incidental touching; Adaptive equipment   Physical Assistance Level No physical assistance   Comment CG/CS in stance at RW to perform CM and hygiene task   Toileting Hygiene CARE Score 4   Toilet Transfer   Type of Assistance Needed Incidental touching; Adaptive equipment   Physical Assistance Level No physical assistance   Comment CG/CS with RW and use of over the toilet commode   Toilet Transfer CARE Score 4   Toilet Transfer   Surface Assessed Standard Commode   Transfer Technique Standard   Limitations Noted In Balance; Endurance;LE Strength   Health Management Health Management Pt provided with current list of scheduled medications with this [place] reviewing medications purpose and frequency  Pt unable to recall medications taken PTA  Communicates he does not utilize pill organizer PTA, and is currently not agreeable to trial/utilize for D/C  OT plan to complete medication reference list for pt to easily identify medications purpose and frequencies  Cognition   Overall Cognitive Status WFL   Arousal/Participation Alert; Cooperative   Attention Attends with cues to redirect   Orientation Level Oriented X4   Memory Within functional limits   Following Commands Follows multistep commands with increased time or repetition   Additional Activities   Additional Activities Other (Comment)   Additional Activities Comments Pt engaged in fxnl dynamic reach activity seated EOM req pt to perform dynamic reach to R/L side to retrieve/place graded clips  EDU provided on focus of task to improve pt's dynamic reeach for increased IND with LB ADL's  Pt receptive to EDU, overall tolerating activity well with no c/o of increased pain  Activity Tolerance   Activity Tolerance Patient tolerated treatment well   Assessment   Treatment Assessment Pt participated in 90 min skilled OT Tx session with focus on ADL transfer's, ADL of toileting, improving dynamic reach, and initiation of medication management  See above for further Tx details  Pt is demonstrating improvement with dynamic reach for LB ADL's, tolerating well with no c/o of increased pain  Pt is performing ADL transfer's at CG/CS level with RW  Pt demo ability to complete dry tub xfer at CG level completing sit and swivel technique utilizing tub bench  Pt communicates plan to IND purchase tub bench for D/C  OTR provided pt with handout to purchase tub bench during previous Tx session  [place] initiated review of medication list  Pt reports he will not utilize pill organizer upon D/C   OT plan to complete medication list for pt to reference for medications purpose and frequencies  Continue POC  Prognosis Fair   Problem List Decreased strength;Decreased range of motion;Decreased endurance; Impaired balance;Decreased mobility;Pain   Barriers to Discharge Inaccessible home environment   Plan   Treatment/Interventions ADL retraining;Functional transfer training; Therapeutic exercise; Endurance training   Progress Progressing toward goals   Recommendation   OT Discharge Recommendation Home with home health rehabilitation   Equipment Recommended Shower transfer bench ($$)   OT Equipment ordered   (Pt to order IND; OTR provided with handout to purchase)   OT - OK to Discharge No   OT Therapy Minutes   OT Time In 1230   OT Time Out 1400   OT Total Time (minutes) 90   OT Mode of treatment - Individual (minutes) 90   OT Mode of treatment - Concurrent (minutes) 0   OT Mode of treatment - Group (minutes) 0   OT Mode of treatment - Co-treat (minutes) 0   OT Mode of Treatment - Total time(minutes) 90 minutes   OT Cumulative Minutes 660   Therapy Time missed   Time missed?  No

## 2022-01-28 PROBLEM — L03.90 CELLULITIS: Status: RESOLVED | Noted: 2022-01-21 | Resolved: 2022-01-28

## 2022-01-28 LAB
GLUCOSE SERPL-MCNC: 116 MG/DL (ref 65–140)
GLUCOSE SERPL-MCNC: 143 MG/DL (ref 65–140)
GLUCOSE SERPL-MCNC: 145 MG/DL (ref 65–140)
GLUCOSE SERPL-MCNC: 148 MG/DL (ref 65–140)

## 2022-01-28 PROCEDURE — 99232 SBSQ HOSP IP/OBS MODERATE 35: CPT | Performed by: INTERNAL MEDICINE

## 2022-01-28 PROCEDURE — 97116 GAIT TRAINING THERAPY: CPT

## 2022-01-28 PROCEDURE — 97530 THERAPEUTIC ACTIVITIES: CPT

## 2022-01-28 PROCEDURE — 99232 SBSQ HOSP IP/OBS MODERATE 35: CPT | Performed by: PHYSICAL MEDICINE & REHABILITATION

## 2022-01-28 PROCEDURE — 97535 SELF CARE MNGMENT TRAINING: CPT

## 2022-01-28 PROCEDURE — 82948 REAGENT STRIP/BLOOD GLUCOSE: CPT

## 2022-01-28 PROCEDURE — 97110 THERAPEUTIC EXERCISES: CPT

## 2022-01-28 RX ADMIN — MELATONIN TAB 3 MG 6 MG: 3 TAB at 21:50

## 2022-01-28 RX ADMIN — ATORVASTATIN CALCIUM 40 MG: 40 TABLET, FILM COATED ORAL at 17:04

## 2022-01-28 RX ADMIN — METHOCARBAMOL TABLETS 500 MG: 500 TABLET, COATED ORAL at 17:04

## 2022-01-28 RX ADMIN — GLIPIZIDE 5 MG: 5 TABLET ORAL at 06:38

## 2022-01-28 RX ADMIN — METFORMIN HYDROCHLORIDE 1000 MG: 500 TABLET ORAL at 08:34

## 2022-01-28 RX ADMIN — COLCHICINE 0.6 MG: 0.6 TABLET, FILM COATED ORAL at 08:33

## 2022-01-28 RX ADMIN — DOCUSATE SODIUM 100 MG: 100 CAPSULE, LIQUID FILLED ORAL at 17:04

## 2022-01-28 RX ADMIN — POLYETHYLENE GLYCOL 3350 17 G: 17 POWDER, FOR SOLUTION ORAL at 08:32

## 2022-01-28 RX ADMIN — ENOXAPARIN SODIUM 40 MG: 40 INJECTION SUBCUTANEOUS at 08:33

## 2022-01-28 RX ADMIN — FERROUS GLUCONATE 324 MG: 324 TABLET ORAL at 06:38

## 2022-01-28 RX ADMIN — GABAPENTIN 300 MG: 300 CAPSULE ORAL at 06:38

## 2022-01-28 RX ADMIN — INSULIN GLARGINE 15 UNITS: 100 INJECTION, SOLUTION SUBCUTANEOUS at 21:53

## 2022-01-28 RX ADMIN — EMPAGLIFLOZIN 12.5 MG: 25 TABLET, FILM COATED ORAL at 08:35

## 2022-01-28 RX ADMIN — ACETAMINOPHEN 650 MG: 325 TABLET ORAL at 21:51

## 2022-01-28 RX ADMIN — GABAPENTIN 300 MG: 300 CAPSULE ORAL at 14:17

## 2022-01-28 RX ADMIN — OXYCODONE HYDROCHLORIDE 10 MG: 10 TABLET ORAL at 21:58

## 2022-01-28 RX ADMIN — GABAPENTIN 300 MG: 300 CAPSULE ORAL at 21:52

## 2022-01-28 RX ADMIN — METFORMIN HYDROCHLORIDE 1000 MG: 500 TABLET ORAL at 17:04

## 2022-01-28 RX ADMIN — OXYCODONE HYDROCHLORIDE 10 MG: 10 TABLET ORAL at 11:56

## 2022-01-28 RX ADMIN — METHOCARBAMOL TABLETS 500 MG: 500 TABLET, COATED ORAL at 11:56

## 2022-01-28 RX ADMIN — METHOCARBAMOL TABLETS 500 MG: 500 TABLET, COATED ORAL at 08:34

## 2022-01-28 RX ADMIN — MAGNESIUM OXIDE TAB 400 MG (241.3 MG ELEMENTAL MG) 800 MG: 400 (241.3 MG) TAB at 08:34

## 2022-01-28 RX ADMIN — OXYCODONE HYDROCHLORIDE 10 MG: 10 TABLET ORAL at 07:42

## 2022-01-28 RX ADMIN — CYANOCOBALAMIN TAB 1000 MCG 1000 MCG: 1000 TAB at 08:34

## 2022-01-28 RX ADMIN — METHOCARBAMOL TABLETS 500 MG: 500 TABLET, COATED ORAL at 21:52

## 2022-01-28 RX ADMIN — OXYCODONE HYDROCHLORIDE AND ACETAMINOPHEN 500 MG: 500 TABLET ORAL at 08:34

## 2022-01-28 RX ADMIN — ACETAMINOPHEN 650 MG: 325 TABLET ORAL at 06:38

## 2022-01-28 RX ADMIN — ACETAMINOPHEN 650 MG: 325 TABLET ORAL at 14:17

## 2022-01-28 RX ADMIN — OXYCODONE HYDROCHLORIDE 10 MG: 10 TABLET ORAL at 17:06

## 2022-01-28 RX ADMIN — DOCUSATE SODIUM 100 MG: 100 CAPSULE, LIQUID FILLED ORAL at 08:35

## 2022-01-28 NOTE — ASSESSMENT & PLAN NOTE
Possible cellulitis infection to the R foot/heel  Hx of previous cellulitis infections in same location  Started on keflex 500mg BID on 1/21, completed on 1/26 for 5 day course  Monitor for s/s of reoccurring infection

## 2022-01-28 NOTE — ASSESSMENT & PLAN NOTE
B/L shoulder glenohumeral osteoarthritis  Follows with Dr Duong Never as outpatient  Last received steroid injections on 10/13/21  Ensure adequate pain control  Considerations during therapies

## 2022-01-28 NOTE — ASSESSMENT & PLAN NOTE
Lab Results   Component Value Date    HGBA1C 7 2 (H) 01/08/2022       Recent Labs     01/27/22  1125 01/27/22  1637 01/27/22 2037 01/28/22  0621   POCGLU 149* 174* 137 143*       Blood Sugar Average: Last 72 hrs:  (P) 988 [id number]     Last A1c 7 5 on 11/16  Home regimen: Alogliptin 25mg daily, Jardiance 12 5mg daily, Glipizide 5mg BID, Lantus 28u at HS, and metformin 1000mg BID  Currently on Lantus 15u at HS, metformin 1000mg BID, and Jardiance 12 5mg daily  Started on glipizide 5mg daily on 1/26  Continue QID accuchecks and SSI  Continue cons  carb diet  Continue to follow-up with PCP as outpatient

## 2022-01-28 NOTE — PROGRESS NOTES
[address] [facility]  Progress Note - Andrea Tena 1955, 61 y o  male MRN: [id number]  Unit/Bed#: -01 Encounter: [id number]  Primary Care Provider: No primary care provider on file  Date and time admitted to hospital: 1/19/2022  3:54 PM    Right foot pain  Assessment & Plan  Started on keflex for possible cellulitis infection on dorsal aspect of foot  Also started on prednisone 40mg daily x5 days on 1/21 for possible pseudogout - completed on 1/26  R foot XR on 1/19: "No acute osseous abnormality "  Uric acid level WNL  Started on colchicine 0 6mg daily by Podiatry on 1/25 for 5 day course  Cellulitis  Assessment & Plan  Possible cellulitis infection to the R foot/heel  Hx of previous cellulitis infections in same location  Started on keflex 500mg BID on 1/21, completed on 1/26 for 5 day course  Monitor for s/s of reoccurring infection  Hypomagnesemia  Assessment & Plan  Improved, Mg 1 9 from 1 4 on 1/24  Continue magnesium oxide 800mg daily  Anemia  Assessment & Plan  Related to acute blood loss post-procedure  Hgb currently 10 1  Hgb was 15 0 pre-operatively  Iron panel on 1/20: Iron Sat 22%, TIBC 147, Iron 32, and Ferritin 304  Started on ferrous gluconate and ascorbic acid  Monitor for s/s of active bleeding  Continue to trend with routine CBC  Vitamin D deficiency  Assessment & Plan  Vitamin D level 11 on 1/8  Start on Vitamin D 50,000u weekly  Continue to follow-up with PCP as outpatient  Polyneuropathy associated with underlying disease ([facility] [address] )  Assessment & Plan  Continue gabapentin 100mg Q8 - increased to 300mg Q8 on 1/20  Daily skin checks/neurovascular checks  Considerations with PT/OT therapies  Vitamin B12 level 315 - started on cyanocobalamin  Obesity (BMI 30-39  9)  Assessment & Plan  BMI 32 35 on admission  Encourage lifestyle modifications      Type 2 diabetes mellitus without complication, without long-term current use of insulin [facility])  Assessment & Plan  Lab Results   Component Value Date    HGBA1C 7 2 (H) 01/08/2022       Recent Labs     01/27/22  1125 01/27/22  1637 01/27/22 2037 01/28/22  0621   POCGLU 149* 174* 137 143*       Blood Sugar Average: Last 72 hrs:  (P) 265 [id number]     Last A1c 7 5 on 11/16  Home regimen: Alogliptin 25mg daily, Jardiance 12 5mg daily, Glipizide 5mg BID, Lantus 28u at HS, and metformin 1000mg BID  Currently on Lantus 15u at HS, metformin 1000mg BID, and Jardiance 12 5mg daily  Started on glipizide 5mg daily on 1/26  Continue QID accuchecks and SSI  Continue cons  carb diet  Continue to follow-up with PCP as outpatient  Mixed hyperlipidemia  Assessment & Plan  Continue Lipitor 40mg daily  Last lipid panel on 11/16: Cholesterol 139, Triglycerides 150, HDL 32, and LDL 82  Essential hypertension  Assessment & Plan  Home lisinopril 30mg currently on hold  Monitor BP with routine VS     Primary osteoarthritis of both shoulders  Assessment & Plan  B/L shoulder glenohumeral osteoarthritis  Follows with Dr Vandana Collazo as outpatient  Last received steroid injections on 10/13/21  Ensure adequate pain control  Considerations during therapies  * Closed nondisplaced intertrochanteric fracture of left femur with routine healing  Assessment & Plan  1/6 - Mechanical fall at home onto L hip  CT abd/pelvis - L intertrochanteric femur fracture  1/7 - OR for ORIF with short TFN to L hip - Dr Maritza Tilley    Ensure adequate pain control  Neurovascular checks Q shift  WBAT to LLE  DVT ppx with Lovenox  Follow-up with Dr Maritza Tilley in 2 weeks as outpatient (1/21)  XR on 1/21: "Nearly anatomically aligned intertrochanteric fracture of the left femur, status post ORIF  No evidence for hardware complication  Status post left knee arthroplasty "    Continue PT/OT therapies  Primary team following  No hx of DXA scan - recommend to be done as outpatient  Continue Vitamin D supplementation        VTE Pharmacologic Prophylaxis:   Pharmacologic: Enoxaparin (Lovenox)  Mechanical VTE Prophylaxis in Place: Yes - sequential compression devices  Current Length of Stay: 9 day(s)    Current Patient Status: Inpatient Rehab     Discharge Plan: As per primary team     Code Status: Level 1 - Full Code    Subjective:   Pt examined while sitting in recliner in pt room  Currently complaining of R toe pain, worse after therapy  States that his shoe was on tightly and rubbing slightly  No wounds or sores present  Recommended to ice toe if continues to throb, also taking some pain medication at this time  Overall feels that each day is better  Slept well last night  Plans for discharge on Tuesday  BG have been better controlled  Has no other concerns or complaints at this time  Objective:     Vitals:   Temp (24hrs), Av 5 °F (36 4 °C), Min:97 4 °F (36 3 °C), Max:97 6 °F (36 4 °C)    Temp:  [97 4 °F (36 3 °C)-97 6 °F (36 4 °C)] 97 6 °F (36 4 °C)  HR:  [69-78] 69  Resp:  [18] 18  BP: (112-123)/(59-81) 123/73  SpO2:  [95 %-97 %] 95 %  Body mass index is 32 35 kg/m²  Review of Systems   Constitutional: Negative for appetite change, chills, fatigue and fever  Respiratory: Negative for cough and shortness of breath  Cardiovascular: Negative for chest pain, palpitations and leg swelling  Gastrointestinal: Negative for abdominal distention, abdominal pain, constipation, diarrhea, nausea and vomiting  Genitourinary: Negative for difficulty urinating  Musculoskeletal: Positive for arthralgias (R big toe pain, throbbing, /10, receiving pain medication currently, shoes were too tight during therapy)  Negative for back pain  Neurological: Negative for dizziness, weakness, light-headedness, numbness and headaches  Psychiatric/Behavioral: Negative for sleep disturbance  Input and Output Summary (last 24 hours):        Intake/Output Summary (Last 24 hours) at 2022 7533  Last data filed at 2022 0801  Gross per 24 hour   Intake 600 ml   Output 1750 ml   Net -1150 ml       Physical Exam:     Physical Exam  Vitals and nursing note reviewed  Constitutional:       Appearance: Normal appearance  He is obese  HENT:      Head: Normocephalic and atraumatic  Cardiovascular:      Rate and Rhythm: Normal rate and regular rhythm  Pulses: Normal pulses  Heart sounds: Murmur heard  Systolic murmur is present with a grade of 1/6  No friction rub  Pulmonary:      Effort: Pulmonary effort is normal  No respiratory distress  Breath sounds: Examination of the right-lower field reveals decreased breath sounds  Examination of the left-lower field reveals decreased breath sounds  Decreased breath sounds present  No wheezing or rhonchi  Abdominal:      General: Abdomen is flat  Bowel sounds are normal  There is no distension  Palpations: Abdomen is soft  Tenderness: There is no abdominal tenderness  Musculoskeletal:      Cervical back: Normal range of motion and neck supple  Right lower leg: Edema (Minimal non-pitting edema) present  Left lower leg: Edema (Minimal non-pitting edema) present  Skin:     General: Skin is warm and dry  Neurological:      Mental Status: He is alert and oriented to person, place, and time     Psychiatric:         Mood and Affect: Mood normal          Behavior: Behavior normal          Additional Data:     Labs:    Results from last 7 days   Lab Units 01/24/22  0638   WBC Thousand/uL 10 50   HEMOGLOBIN g/dL 10 1*   HEMATOCRIT % 30 1*   PLATELETS Thousands/uL 438   NEUTROS PCT % 67*   LYMPHS PCT % 22*   MONOS PCT % 9   EOS PCT % 1     Results from last 7 days   Lab Units 01/24/22  0638   SODIUM mmol/L 137   POTASSIUM mmol/L 3 7   CHLORIDE mmol/L 102   CO2 mmol/L 32*   BUN mg/dL 17   CREATININE mg/dL 0 76   ANION GAP mmol/L 3*   CALCIUM mg/dL 8 7   GLUCOSE RANDOM mg/dL 195*         Results from last 7 days   Lab Units 01/28/22  0621 01/27/22 2037 01/27/22  1637 01/27/22  1125 01/27/22  0623 01/26/22  2031 01/26/22  1606 01/26/22  1128 01/26/22  0601 01/25/22  2033 01/25/22  1638 01/25/22  1141   POC GLUCOSE mg/dl 143* 137 174* 149* 113 170* 199* 137 147* 194* 246* 201*               Labs reviewed    Imaging:    Imaging reviewed    Recent Cultures (last 7 days):           Last 24 Hours Medication List:   Current Facility-Administered Medications   Medication Dose Route Frequency Provider Last Rate    acetaminophen  650 mg Oral Q6H PRN MARGARITA Lopez      acetaminophen  650 mg Oral [facility] Lefty Sainz MD      ascorbic acid  500 mg Oral Daily MARGARITA Lopez      atorvastatin  40 mg Oral Daily With Gautam Xiong MD      colchicine  0 6 mg Oral Daily Himanshu Hi DPM      cyanocobalamin  1,000 mcg Oral Daily MARGARITA Lopez      Diclofenac Sodium  2 g Topical 4x Daily PRN Lefty Sainz MD      docusate sodium  100 mg Oral BID Lefty Sainz MD      Empagliflozin  12 5 mg Oral Daily MARGARITA Lopez      enoxaparin  40 mg Subcutaneous Q24H [address] Lefty Sainz MD      ergocalciferol  50,000 Units Oral Weekly MARGARITA Lopez      ferrous gluconate  324 mg Oral Daily Before Breakfast MARGARITA Lopez      gabapentin  300 mg Oral [facility] Lefty Sainz MD      glipiZIDE  5 mg Oral Daily Before Breakfast MARGARITA Lopez      insulin glargine  15 Units Subcutaneous HS MARGARITA Lopez      insulin lispro  1-5 Units Subcutaneous TID [facility] Lefty Sainz MD      insulin lispro  1-5 Units Subcutaneous HS Lefty Sainz MD      lidocaine  2 patch Topical Daily PRN Lefty Sainz MD      magnesium oxide  800 mg Oral Daily MARGARITA Lopez      melatonin  6 mg Oral HS MARGARITA Lopez      metFORMIN  1,000 mg Oral BID With Meals MARGARITA Lopez      methocarbamol  500 mg Oral 4x Daily Lefty Sainz MD      ondansetron  4 mg Oral Q6H PRN Lefty Sainz MD      oxyCODONE  10 mg Oral Q4H PRN MD Terrance Jimenez oxyCODONE  5 mg Oral Q4H PRN José Antonio Burnham MD      polyethylene glycol  17 g Oral Daily José Antonio Burnham MD      sodium chloride  1 spray Each Nare Q2H PRN MARGARITA Mcghee          M*Modal software was used to dictate this note  It may contain errors with dictating incorrect words or incorrect spelling  Please contact the provider directly with any questions

## 2022-01-28 NOTE — PROGRESS NOTES
01/28/22 1230   Pain Assessment   Pain Assessment Tool 0-10   Pain Score 3   Pain Location/Orientation Orientation: Right;Location: Foot   Pain Onset/Description Frequency: Intermittent   Effect of Pain on Daily Activities Tolerated   Patient's Stated Pain Goal No pain   Hospital Pain Intervention(s) Shower/Bath   Multiple Pain Sites No   Restrictions/Precautions   Precautions Fall Risk;Pain   Weight Bearing Restrictions Yes   RLE Weight Bearing Per Order FWB   LLE Weight Bearing Per Order WBAT   ROM Restrictions No   Braces or Orthoses   (Trialed w/o teds)   Lifestyle   Autonomy Pt agreeable to participate in Tx session  Grooming   Able To Comb/Brush Hair   Limitation Noted In Strength   Findings S in stance at sink with RW to comb hair  No LOB or c/o of pain  Shower/Bathe Self   Type of Assistance Needed Supervision   Physical Assistance Level No physical assistance   Comment Pt performed shower ADL demonstrating ability to bathe 10/10 parts  Improvement noted with dynamic reach to bathe BLE's to feet  Improved standing balance while in stance at grabbar to bathe grecia/buttocks area  Shower/Bathe Self CARE Score 4   Bathing   Assessed Bath Style Shower   Anticipated D/C Bath Style Shower; Tub   Able to Gather/Transport Yes   Able to Adjust Water Temperature Yes   Able to Wash/Rinse/Dry (body part) Left Arm;Right Arm;L Upper Leg;R Upper Leg;L Lower Leg/Foot;R Lower Leg/Foot;Chest;Abdomen;Perineal Area; Buttocks; Other   Limitations Noted in Balance; Endurance   Positioning Seated;Standing   Adaptive Equipment Shower Bars;Tub Bench;Hand Held Shower   Findings  Pt reports plan to purchase tub bench independently for D/C  Pt reports having grab bars installed in tub environment for D/C  Tub/Shower Transfer   Limitations Noted In Balance; Endurance;ROM   Adaptive Equipment Transfer Bench;Grab Bars   Assessed Shower   Findings CS side stepping in/out of wet shower environment, transitioning BUE's between RW <> grab bar  Slipper socks donned for transfer  Upper Body Dressing   Type of Assistance Needed Independent   Physical Assistance Level No physical assistance   Comment Seated to don [place] shirt   Upper Body Dressing CARE Score 6   Lower Body Dressing   Type of Assistance Needed Supervision   Physical Assistance Level No physical assistance   Comment Seated performing dynamic reach to thread BLE's into under garment and pants  S in stance at RW to perform CM with no LOB   Lower Body Dressing CARE Score 4   Putting On/Taking Off Footwear   Type of Assistance Needed Supervision   Physical Assistance Level No physical assistance   Comment Seated performing dynamic reach to don personal socks and shoes  Trialing w/o B/L knee high teds this session  Putting On/Taking Off Footwear CARE Score 4   Sit to Stand   Type of Assistance Needed Supervision; Adaptive equipment   Physical Assistance Level No physical assistance   Comment S with RW   Sit to Stand CARE Score 4   Bed-Chair Transfer   Type of Assistance Needed Supervision; Adaptive equipment   Physical Assistance Level No physical assistance   Comment S with RW for fxnl transfer's and fxnl mobility   Chair/Bed-to-Chair Transfer CARE Score 4   Toileting Hygiene   Type of Assistance Needed Supervision; Adaptive equipment   Physical Assistance Level No physical assistance   Comment S in stance at RW for CM and hygiene   Toileting Hygiene CARE Score 4   Toilet Transfer   Type of Assistance Needed Supervision; Adaptive equipment   Physical Assistance Level No physical assistance   Comment S with RW and use of over-the-toilet commode   Toilet Transfer CARE Score 4   Health Management   Health Management Created list of current scheduled medications for pt to reference, listing each medications purpose and frequency  Pt is currently taking 10+ medications and is not agreeable to utilize AM/PM pill organizer for D/C   OT plan to engage pt in medication management upon upcoming Tx session in preparation for D/C on 2/1  Cognition   Overall Cognitive Status WFL   Arousal/Participation Alert; Cooperative   Attention Attends with cues to redirect   Orientation Level Oriented X4   Memory Within functional limits   Following Commands Follows multistep commands with increased time or repetition   Additional Activities   Additional Activities Other (Comment)   Additional Activities Comments Pt engaged in simple item retrieval around room environment utilizing RW for fxnl mobility to retrieve comb and candies  Pt overall req S for task with no LOB observed  Activity Tolerance   Activity Tolerance Patient tolerated treatment well   Assessment   Treatment Assessment Pt engaged in 90 min skilled OT Tx session with focus on ADL performance, ADL transfer's, item retrieval, and implementing medication reference list to increase IND with med management task  See above for further Tx details  Pt is making positive gains towards OT goals of Keron, completing ADL tasks and transfer's at S level  Pt requesting to trial fxnl mobility w/o teds this session, as pt communicated only wearing teads for comfort  Pt reporting "Feeling good" without teds and requesting to see how the rest of the day goes  OT plan to follow up during upcoming sesison in regards to plan for teds for D/C  Pt provided with medication reference list to A with med management task, as pt is currently not agreeable to utilize AM/PM pill organizer  OT plan to simulate med management task utilizing medication reference sheet with focus on progressing to IND for med management task  Pt plan to D/C home on 2/1 with Home PT services  Prognosis Fair   Problem List Decreased strength;Decreased range of motion;Decreased endurance; Impaired balance;Decreased mobility;Pain   Barriers to Discharge Inaccessible home environment   Plan   Treatment/Interventions ADL retraining;Functional transfer training; Therapeutic exercise; Endurance training   Progress Progressing toward goals   Recommendation   OT Discharge Recommendation Home with home health rehabilitation  (PT only - No OT needs)   Equipment Recommended Shower transfer bench ($$)   OT Equipment ordered   (Pt provided with handout from OTR to purchase IND)   OT - OK to Discharge No   OT Therapy Minutes   OT Time In 1230   OT Time Out 1400   OT Total Time (minutes) 90   OT Mode of treatment - Individual (minutes) 90   OT Mode of treatment - Concurrent (minutes) 0   OT Mode of treatment - Group (minutes) 0   OT Mode of treatment - Co-treat (minutes) 0   OT Mode of Treatment - Total time(minutes) 90 minutes   OT Cumulative Minutes 750   Therapy Time missed   Time missed?  No

## 2022-01-28 NOTE — PROGRESS NOTES
Physical Medicine and Rehabilitation Progress Note  Melanie Reno 61 y o  male MRN: [id number]  Unit/Bed#: -00 Encounter: [id number]    HPI: Melanie Reno is a 61 y o  male with PMH of HTN, T2DM TKA (R) (c/b by infection, requiring revision x2), TKA L, gout,  back surgeries, shoulder surgeries, hand surgeries, depression, HLD, Sleep Apnea who presented to the [facility] on 1/5 after being bumped by his dog and falling onto his L hip  He was found to have a nondisplaced L trochanteric fracture with a partial tear at the greater trochanter insertion  He underwent L femur ORIF on 1/11  Post-op course complicated by pain, hyperglycemia  He also had issues with constipation  He was admitted to the [facility] on 1/19  Chief Complaint: A bit goofy today after pain meds  Interval History/Subjective:  No acute events overnight  Today pain in R foot is much better  He denies any CP, SOB, fevers, chills, N/V, abdominal pain  Last BM was 1/27 after bisacodyl tabs yesterday  He is in good spirits and agreeable to Tuesday discharge  ROS:  A 10 point review of systems was negative except for what is noted in the HPI  Today's Changes: 1  Continue current plan of care  2  Discussed compression stockings with zippers - with a sock aide this may be the easiest way to get these on  Assessment/Plan:    * Closed nondisplaced intertrochanteric fracture of left femur with routine healing  Assessment & Plan  Diagnosed on MRI - nondisplaced intertrochanteric fracture with partial tear at greater trochanter insertion  ORIF on 1/11 with [facility]  Multimodal pain control as described below  WBAT  Incisional care  2 week follow-up on 1/25 - XR on 1/21 with near anatomic alignment     - Staples removed, steri-strips in place   - Provided photo of his imaging to take home  Outpatient f/u with [facility] in 4 weeks  Hypomagnesemia  Assessment & Plan  Repleted with oral magnesium  Continue oral magnesium     1/24 Mag 1 9     Anemia  Assessment & Plan  Mild  Normocytic  Component of ABLA  1/24 Hgb stable at 10 1   Cyanocobalamin given low normal    IM checked iron panel, started on Vit C and iron supplementation  Monitor and transfuse for <7  Vitamin D deficiency  Assessment & Plan  Started on ergocalciferol on 1/20  Will plan for 6 weeks, then recheck  Slow transit constipation  Assessment & Plan  Likely related to pain medication, inadequate hydration, decreased mobility, recent trauma/surgery  Scheduled docusate/miralax  Got enema on admission  Last BM 1/27 after bisacodyl tabs  Polyneuropathy associated with underlying disease [facility])  Assessment & Plan  Most likely related to diabetes  SPEP without monoclonal gammopathy  B12 low normal - started on supplementation  Increased Gabapentin 300mg Q8hr  Desensitization techniques  Pain in both feet  Assessment & Plan  Bilateral  R > L   R foot may have component of gout/pseudogout, also quite arthritic  Dorsal erythema, related to bone spur per podiatry   Erythema/warmth worsened on 1/21, was started on prednisone and keflex by IM  Has since completed and symptoms have improved  Podiatry recommending possible course of colchicine  Swelling improved with LATRELL stockings  Also biomechanically, has a large painful bunion - this causes him to walk on the lateral edge of his foot which causes pain  Also with component of neuropathic pain  XR R foot negative for acute fracture/process  Uric acid normal  Continue increased dose of gabapentin desensitization    Obesity (BMI 30-39  9)  Assessment & Plan  Counseling       Type 2 diabetes mellitus without complication, without long-term current use of insulin [facility])  Assessment & Plan  Lab Results   Component Value Date    HGBA1C 7 2 (H) 01/08/2022       Recent Labs     01/27/22  1125 01/27/22  1637 01/27/22  2037 01/28/22  0621   POCGLU 149* 174* 137 143*       Blood Sugar Average: Last 72 hrs:  (P) 331     At home: Metformin 1000mg BID, Lantus 28 units daily, Alogliptin 25mg daily, Empaglifluzin 12 5mg daily, Glipizide 5mg BID  Here: Lantus 10 units, Metformin 1000mg BID, Januvia 12 5mg, Glipizide 5mg daily, CDI, Accuchecks  Diabetic Diet  IM consulted and management at their discretion  Mixed hyperlipidemia  Assessment & Plan  Restart home Lipitor 40mg at night  Essential hypertension  Assessment & Plan  At home on Lisinopril 30mg daily  Here has not required BP meds  Will closely monitor BP, and add home med as appropriate  IM consulted and management at their discretion    Primary osteoarthritis of both knees  Assessment & Plan  He reports issues with bilateral knees  Dr Scott Leslie did do TKA to R knee and was following him for this  He reports previous surgery to his L knee with multiple complications/revisions  See multi-modal pain control  Outpatient f/u with Orthopedics  Primary osteoarthritis of both shoulders  Speedy Wilde  Last injection was 10/13  Typically gets them done every 6 months or so  May require as he is more reliant on his shoulders  If so, will consult Ortho  Will place order for Diclofenac gel and lidoderm for now  Cellulitis-resolved as of 1/28/2022  Assessment & Plan  Started on Keflex 1/21 by IM 5 day course - since completed  Foot no longer erythematous or red  Suspect tenderness related to pseudogout/gout, arthritis, and neuropathy  Health Maintenance  #Delirium/Sleep: At risk  No complaints at this time  Focus on environmental interventions - avoiding physical/chemical restraints  Focus on redirection and optimizing pain/bowel/bladder management  #Pain: Tylenol scheduled, Oxycodone 5-10mg PRN, Robaxin scheduled, and adding Gabapentin for neuropathic pain  Also on prednisone now  #Bowel: Last BM 1/27  Continue miralax and docusate  Bisacodyl tabs PRN do work for him  #Bladder: Voiding and continent     #Skin/Pressure Injury Prevention: Turn Q2hr in bed, with weight shifts [id number] in wheelchair  Float heels in bed  #DVT Prophylaxis: Lovenox 30mg Q12hr  Changing to 40mg tomorrow  SCDs  #GI Prophylaxis: PO diet  #Code Status:  Full Code  #FEN:  Diabetic Diet  #Dispo:  Team 1/27: ADD 2/1 with either home PT or [facility]  He has made excellent progress this past week  Goal is modified Ind  Objective:    Functional Update:  PT: Dom-CGA transfers, CGA ambulation 50', Dom stairs  OT: Ind eating/grooming, Sup bathing, set-up UB dressing, CGA LB dressing and toileting, Dom tub/shower transfer ,CGA toilet transfer     Allergies per EMR    Physical Exam:  Temp:  [97 4 °F (36 3 °C)-97 6 °F (36 4 °C)] 97 6 °F (36 4 °C)  HR:  [69-78] 69  Resp:  [18] 18  BP: (112-123)/(59-81) 123/73  Oxygen Therapy  SpO2: 95 %    Gen: No acute distress, Well-nourished, well-appearing  HEENT: Moist mucus membranes, Normocephalic/Atraumatic  Cardiovascular: Regular rate, rhythm, S1/S2  Distal pulses palpable  Heme/Extr: RLE ankle edema staple  Pulmonary: Non-labored breathing  Lungs CTAB  : No gilbert  GI: Soft, non-tender, non-distended  BS+  Integumentary: Skin is warm, dry  Neuro: AAOx3, Speech is intact  Appropriate to questioning  Tone is normal  Significantly improved sit to stand today, and ambulating well with rolling walker  Psych: Normal mood and affect  Diagnostic Studies: Reviewed, no new imaging  Laboratory: Reviewed, no new labs     Results from last 7 days   Lab Units 01/24/22  0638   HEMOGLOBIN g/dL 10 1*   HEMATOCRIT % 30 1*   WBC Thousand/uL 10 50     Results from last 7 days   Lab Units 01/24/22  0638   BUN mg/dL 17   POTASSIUM mmol/L 3 7   CHLORIDE mmol/L 102   CREATININE mg/dL 0 76            Patient Active Problem List   Diagnosis    Primary osteoarthritis of both shoulders    Primary osteoarthritis of both knees    Essential hypertension    Mixed hyperlipidemia    Type 2 diabetes mellitus without complication, without long-term current use of insulin ([facility])    Obesity (BMI 30-39  9)    S/P TKR (total knee replacement), right    Closed nondisplaced intertrochanteric fracture of left femur with routine healing    Pain in both feet    Polyneuropathy associated with underlying disease ([address] )    Slow transit constipation    Vitamin D deficiency    Anemia    Hypomagnesemia    Cellulitis    Right foot pain         Medications  Current Facility-Administered Medications   Medication Dose Route Frequency Provider Last Rate    acetaminophen  650 mg Oral Q6H PRN MARGARITA Ahuja      acetaminophen  650 mg Oral [facility] Debra Hong MD      ascorbic acid  500 mg Oral Daily MARGARITA Ahuja      atorvastatin  40 mg Oral Daily With Antonio Mustafa MD      colchicine  0 6 mg Oral Daily Artur Narayanan DPM      cyanocobalamin  1,000 mcg Oral Daily MARGARITA Ahuja      Diclofenac Sodium  2 g Topical 4x Daily PRN Debra Hong MD      docusate sodium  100 mg Oral BID Debra Hong MD      Empagliflozin  12 5 mg Oral Daily MARGARITA Ahuja      enoxaparin  40 mg Subcutaneous Q24H [address] Debra Hong MD      ergocalciferol  50,000 Units Oral Weekly MARGARITA Ahuja      ferrous gluconate  324 mg Oral Daily Before Breakfast MARGARITA Ahuja      gabapentin  300 mg Oral [facility] Debra Hong MD      glipiZIDE  5 mg Oral Daily Before Breakfast MARGARITA Ahuja      insulin glargine  15 Units Subcutaneous HS MARGARITA Ahuja      insulin lispro  1-5 Units Subcutaneous TID [facility] Debra Hong MD      insulin lispro  1-5 Units Subcutaneous HS Debra Hong MD      lidocaine  2 patch Topical Daily PRN Debra Hong MD      magnesium oxide  800 mg Oral Daily MARGARITA Ahuja      melatonin  6 mg Oral HS MARGARITA Ahuja      metFORMIN  1,000 mg Oral BID With Meals MARGARITA Ahuja      methocarbamol  500 mg Oral 4x Daily Debra Hong MD      ondansetron  4 mg Oral Q6H PRN MD Lucinda Donald oxyCODONE  10 mg Oral Q4H PRN Reyes Ina, MD      oxyCODONE  5 mg Oral Q4H PRN Reyes Ina, MD      polyethylene glycol  17 g Oral Daily Reyes Ina, MD      sodium chloride  1 spray Each Nare Q2H PRN Cory Boas, CRNP          ** Please Note: Fluency Direct voice to text software may have been used in the creation of this documen

## 2022-01-28 NOTE — PLAN OF CARE
Problem: GASTROINTESTINAL - ADULT  Goal: Maintains or returns to baseline bowel function  Description: INTERVENTIONS:  - Assess bowel function  - Encourage oral fluids to ensure adequate hydration  - Administer IV fluids if ordered to ensure adequate hydration  - Administer ordered medications as needed  - Encourage mobilization and activity  - Consider nutritional services referral to assist patient with adequate nutrition and appropriate food choices  Outcome: Progressing     Problem: SKIN/TISSUE INTEGRITY - ADULT  Goal: Incision(s), wounds(s) or drain site(s) healing without S/S of infection  Description: INTERVENTIONS  - Assess and document dressing, incision, wound bed, drain sites and surrounding tissue  - Provide patient and family education  Outcome: Progressing

## 2022-01-28 NOTE — PLAN OF CARE
Problem: NEUROSENSORY - ADULT  Goal: Achieves stable or improved neurological status  Description: INTERVENTIONS  - Monitor and report changes in neurological status  - Monitor vital signs such as temperature, blood pressure, glucose, and any other labs ordered   - Initiate measures to prevent increased intracranial pressure  - Monitor for seizure activity and implement precautions if appropriate      Outcome: Progressing     Problem: RESPIRATORY - ADULT  Goal: Achieves optimal ventilation and oxygenation  Description: INTERVENTIONS:  - Assess for changes in respiratory status  - Assess for changes in mentation and behavior  - Position to facilitate oxygenation and minimize respiratory effort  - Oxygen administered by appropriate delivery if ordered  - Initiate smoking cessation education as indicated  - Encourage broncho-pulmonary hygiene including cough, deep breathe, Incentive Spirometry  - Assess the need for suctioning and aspirate as needed  - Assess and instruct to report SOB or any respiratory difficulty  - Respiratory Therapy support as indicated  Outcome: Progressing     Problem: GASTROINTESTINAL - ADULT  Goal: Maintains or returns to baseline bowel function  Description: INTERVENTIONS:  - Assess bowel function  - Encourage oral fluids to ensure adequate hydration  - Administer IV fluids if ordered to ensure adequate hydration  - Administer ordered medications as needed  - Encourage mobilization and activity  - Consider nutritional services referral to assist patient with adequate nutrition and appropriate food choices  Outcome: Progressing     Problem: MUSCULOSKELETAL - ADULT  Goal: Maintain proper alignment of affected body part  Description: INTERVENTIONS:  - Support, maintain and protect limb and body alignment  - Provide patient/ family with appropriate education  Outcome: Progressing

## 2022-01-28 NOTE — PROGRESS NOTES
01/28/22 0830   Pain Assessment   Pain Assessment Tool 0-10   Pain Score 5   Pain Location/Orientation Orientation: Left; Location: Hip;Orientation: Right;Location: Foot   Hospital Pain Intervention(s) Heat applied  (right foot)   Restrictions/Precautions   Precautions Fall Risk;Pain   Cognition   Overall Cognitive Status WFL   Subjective   Subjective feels he is making functional progress, wlaking better today   Sit to Lying   Type of Assistance Needed Supervision   Sit to Lying CARE Score 4   Lying to Sitting on Side of Bed   Type of Assistance Needed Supervision   Lying to Sitting on Side of Bed CARE Score 4   Sit to Stand   Type of Assistance Needed Incidental touching; Adaptive equipment   Comment with RW   Sit to Stand CARE Score 4   Bed-Chair Transfer   Type of Assistance Needed Supervision; Adaptive equipment   Comment with RW   Chair/Bed-to-Chair Transfer CARE Score 4   Car Transfer   Reason if not Attempted Environmental limitations   Car Transfer CARE Score 10   Walk 10 Feet   Type of Assistance Needed Supervision; Adaptive equipment   Comment CS with RW   Walk 10 Feet CARE Score 4   Walk 50 Feet with Two Turns   Type of Assistance Needed Supervision; Adaptive equipment   Comment CS with RW   Walk 50 Feet with Two Turns CARE Score 4   Walk 150 Feet   Type of Assistance Needed Supervision; Adaptive equipment   Comment CS with RW, 1 short staning rest break for 20 sec   Walk 150 Feet CARE Score 4   Ambulation   Does the patient walk? 2  Yes   Primary Mode of Locomotion Prior to Admission Walk   Distance Walked (feet) 165 ft   Findings improved step through gait, improving wt bearing B LE with less wt on B UE, reports pain tolerable and that "left hip feels looser today"   Wheelchair mobility   Does the patient use a wheelchair? 0   No   Findings pt propelling w/c to/from gym & room for ther ex purposes only   4 Steps   Type of Assistance Needed Incidental touching;Supervision   Physical Assistance Level No physical assistance   Comment Step to gait using B HR, circumducting B LE for step ups as he would previously at home   4 Steps CARE Score 4   Therapeutic Interventions   Strengthening Seated LAQ in avilble AROM L L E with 2#, AA seated hip flex L LE 3 set sto tolerance with pt reporting "its loosening up"   Flexibility manual stretch B HS and left calf   Modalities MHP Right foot x 20 min during L LE therex at start of session   Equipment Use   NuStep 15 min at end of session for self AROM/stretching B LE   Assessment   Treatment Assessment Pt continues to participate well with having pain meds prior to therapy, using MHP on right foot and stretching to start session  Close supervision wih all transfers and ambulation  Pt able to improve step through gait today, improved alma along with distance  Progressing well overall toward Ind goals  Recommendation   PT Discharge Recommendation Home with home health rehabilitation   Equipment Recommended Walker   PT Therapy Minutes   PT Time In 0830   PT Time Out 1000   PT Total Time (minutes) 90   PT Mode of treatment - Individual (minutes) 90   PT Mode of treatment - Concurrent (minutes) 0   PT Mode of treatment - Group (minutes) 0   PT Mode of treatment - Co-treat (minutes) 0   PT Mode of Treatment - Total time(minutes) 90 minutes   PT Cumulative Minutes 775   Therapy Time missed   Time missed?  No

## 2022-01-28 NOTE — ASSESSMENT & PLAN NOTE
1/6 - Mechanical fall at home onto L hip  CT abd/pelvis - L intertrochanteric femur fracture  1/7 - OR for ORIF with short TFN to L hip - Dr Yenifer Gan    Ensure adequate pain control  Neurovascular checks Q shift  WBAT to LLE  DVT ppx with Lovenox  Follow-up with Dr Yenifer Gan in 2 weeks as outpatient (1/21)  XR on 1/21: "Nearly anatomically aligned intertrochanteric fracture of the left femur, status post ORIF  No evidence for hardware complication  Status post left knee arthroplasty "    Continue PT/OT therapies  Primary team following  No hx of DXA scan - recommend to be done as outpatient  Continue Vitamin D supplementation

## 2022-01-29 LAB
GLUCOSE SERPL-MCNC: 114 MG/DL (ref 65–140)
GLUCOSE SERPL-MCNC: 134 MG/DL (ref 65–140)
GLUCOSE SERPL-MCNC: 141 MG/DL (ref 65–140)
GLUCOSE SERPL-MCNC: 153 MG/DL (ref 65–140)

## 2022-01-29 PROCEDURE — 97112 NEUROMUSCULAR REEDUCATION: CPT

## 2022-01-29 PROCEDURE — 97116 GAIT TRAINING THERAPY: CPT

## 2022-01-29 PROCEDURE — 97110 THERAPEUTIC EXERCISES: CPT

## 2022-01-29 PROCEDURE — 82948 REAGENT STRIP/BLOOD GLUCOSE: CPT

## 2022-01-29 PROCEDURE — 97530 THERAPEUTIC ACTIVITIES: CPT

## 2022-01-29 RX ADMIN — POLYETHYLENE GLYCOL 3350 17 G: 17 POWDER, FOR SOLUTION ORAL at 08:34

## 2022-01-29 RX ADMIN — ACETAMINOPHEN 650 MG: 325 TABLET ORAL at 21:31

## 2022-01-29 RX ADMIN — GABAPENTIN 300 MG: 300 CAPSULE ORAL at 06:22

## 2022-01-29 RX ADMIN — METFORMIN HYDROCHLORIDE 1000 MG: 500 TABLET ORAL at 17:32

## 2022-01-29 RX ADMIN — OXYCODONE HYDROCHLORIDE 10 MG: 10 TABLET ORAL at 17:39

## 2022-01-29 RX ADMIN — GABAPENTIN 300 MG: 300 CAPSULE ORAL at 21:31

## 2022-01-29 RX ADMIN — ACETAMINOPHEN 650 MG: 325 TABLET ORAL at 06:22

## 2022-01-29 RX ADMIN — OXYCODONE HYDROCHLORIDE AND ACETAMINOPHEN 500 MG: 500 TABLET ORAL at 08:33

## 2022-01-29 RX ADMIN — EMPAGLIFLOZIN 12.5 MG: 25 TABLET, FILM COATED ORAL at 08:32

## 2022-01-29 RX ADMIN — OXYCODONE HYDROCHLORIDE 10 MG: 10 TABLET ORAL at 09:10

## 2022-01-29 RX ADMIN — DOCUSATE SODIUM 100 MG: 100 CAPSULE, LIQUID FILLED ORAL at 08:33

## 2022-01-29 RX ADMIN — MELATONIN TAB 3 MG 6 MG: 3 TAB at 21:31

## 2022-01-29 RX ADMIN — CYANOCOBALAMIN TAB 1000 MCG 1000 MCG: 1000 TAB at 08:33

## 2022-01-29 RX ADMIN — ATORVASTATIN CALCIUM 40 MG: 40 TABLET, FILM COATED ORAL at 17:32

## 2022-01-29 RX ADMIN — METHOCARBAMOL TABLETS 500 MG: 500 TABLET, COATED ORAL at 17:33

## 2022-01-29 RX ADMIN — METHOCARBAMOL TABLETS 500 MG: 500 TABLET, COATED ORAL at 12:43

## 2022-01-29 RX ADMIN — MAGNESIUM OXIDE TAB 400 MG (241.3 MG ELEMENTAL MG) 800 MG: 400 (241.3 MG) TAB at 08:33

## 2022-01-29 RX ADMIN — INSULIN GLARGINE 15 UNITS: 100 INJECTION, SOLUTION SUBCUTANEOUS at 21:32

## 2022-01-29 RX ADMIN — ACETAMINOPHEN 650 MG: 325 TABLET ORAL at 14:42

## 2022-01-29 RX ADMIN — GLIPIZIDE 5 MG: 5 TABLET ORAL at 06:23

## 2022-01-29 RX ADMIN — METFORMIN HYDROCHLORIDE 1000 MG: 500 TABLET ORAL at 08:33

## 2022-01-29 RX ADMIN — FERROUS GLUCONATE 324 MG: 324 TABLET ORAL at 06:23

## 2022-01-29 RX ADMIN — DOCUSATE SODIUM 100 MG: 100 CAPSULE, LIQUID FILLED ORAL at 17:33

## 2022-01-29 RX ADMIN — DICLOFENAC SODIUM 2 G: 10 GEL TOPICAL at 08:32

## 2022-01-29 RX ADMIN — GABAPENTIN 300 MG: 300 CAPSULE ORAL at 14:42

## 2022-01-29 RX ADMIN — ENOXAPARIN SODIUM 40 MG: 40 INJECTION SUBCUTANEOUS at 08:32

## 2022-01-29 RX ADMIN — METHOCARBAMOL TABLETS 500 MG: 500 TABLET, COATED ORAL at 08:33

## 2022-01-29 RX ADMIN — INSULIN LISPRO 1 UNITS: 100 INJECTION, SOLUTION INTRAVENOUS; SUBCUTANEOUS at 08:33

## 2022-01-29 RX ADMIN — COLCHICINE 0.6 MG: 0.6 TABLET, FILM COATED ORAL at 08:33

## 2022-01-29 NOTE — PROGRESS NOTES
01/29/22 1000   Pain Assessment   Pain Assessment Tool 0-10   Pain Score 7   Pain Location/Orientation Orientation: Right;Location: Foot; Location: Leg   Pain Onset/Description Onset: Ongoing   Restrictions/Precautions   Precautions Fall Risk;Pain   Weight Bearing Restrictions Yes   RLE Weight Bearing Per Order WBAT   LLE Weight Bearing Per Order WBAT   ROM Restrictions No   Cognition   Overall Cognitive Status WFL   Arousal/Participation Alert; Cooperative   Attention Attends with cues to redirect   Orientation Level Oriented X4   Memory Within functional limits   Following Commands Follows multistep commands without difficulty   Subjective   Subjective Patient stated that he needed to use the rest room   Roll Left and Right   Comment Patient sleeps in recliner at [facility]   Reason if not Attempted Activity not applicable   Roll Left and Right CARE Score 9   Sit to Lying   Type of Assistance Needed Supervision   Physical Assistance Level No physical assistance   Comment bilateral BR   Sit to Lying CARE Score 4   Lying to Sitting on Side of Bed   Type of Assistance Needed Supervision   Physical Assistance Level No physical assistance   Lying to Sitting on Side of Bed CARE Score 4   Sit to Stand   Type of Assistance Needed Supervision   Physical Assistance Level No physical assistance   Comment S with RW   Sit to Stand CARE Score 4   Bed-Chair Transfer   Type of Assistance Needed Supervision   Physical Assistance Level No physical assistance   Comment S with RW   Chair/Bed-to-Chair Transfer CARE Score 4   Car Transfer   Reason if not Attempted Environmental limitations   Car Transfer CARE Score 10   Walk 10 Feet   Type of Assistance Needed Supervision   Physical Assistance Level No physical assistance   Comment CS with RW   Walk 10 Feet CARE Score 4   Walk 50 Feet with Two Turns   Type of Assistance Needed Supervision   Physical Assistance Level No physical assistance   Comment CS with RW   Walk 50 Feet with Two Turns CARE Score 4   Ambulation   Does the patient walk? 2  Yes   Primary Mode of Locomotion Prior to Admission Walk   Distance Walked (feet) 100 ft   Assist Device Roller [employer] 50 Feet with Two Turns   Type of Assistance Needed Independent   Wheel 50 Feet with Two Turns CARE Score 6   Wheel 150 Feet   Type of Assistance Needed Independent   Wheel 150 Feet CARE Score 6   Wheelchair mobility   Findings pt propelling w/c to/from gym & room for ther ex purposes only   12 Steps   Reason if not Attempted Safety concerns   12 Steps CARE Score 88   Toilet Transfer   Type of Assistance Needed Supervision   Physical Assistance Level No physical assistance   Comment standard toilet   Toilet Transfer CARE Score 4   Therapeutic Interventions   Strengthening Hip flexion, abduction, extension in standing- 20 reps, 3 second holds   Flexibility Manual stretching- 10 minutes bilaterally hamstring stretching and gastroc stretching   Neuromuscular Re-Education Mini Squats- 20 reps, 3 second holds; lateral step taps- 4" step with L Le only- 20 reps, 3 second holds   Other WC mobility, ambulation, transfers per objective   Assessment   Treatment Assessment Patient tolerated session well, progressing independence as well as progressing his functional independence  Patient challenged with standing endurance as well as his ROM in his L LE  Patient challenged with bilateral LE weight bearing today and fatigue  Patient challenged with standing and walking for long durations of time  Patient challenged with overall interventions with weight bearing and muscle contraction, patient challenged with engaging his bilateral LE musculature but improving with his standing tolerance today  Patient fatigued post session but overall intervention tolerance improving  Patient requires skilled PT in order to maximize his function and improvements in his L hip status     Family/Caregiver Present no   PT Family training done with: no   Problem List Decreased strength;Decreased range of motion;Decreased endurance; Impaired balance;Decreased mobility;Pain   Barriers to Discharge Inaccessible home environment   PT Barriers   Physical Impairment Decreased strength;Decreased range of motion;Decreased mobility;Orthopedic restrictions;Pain   Functional Limitation Car transfers; Ramp negotiation;Stair negotiation;Standing;Transfers; Walking   Plan   Treatment/Interventions ADL retraining;Functional transfer training; Therapeutic exercise; Endurance training   Progress Progressing toward goals   Recommendation   PT Discharge Recommendation Home with home health rehabilitation   Equipment Recommended Walker   PT Therapy Minutes   PT Time In 1000   PT Time Out 1130   PT Total Time (minutes) 90   PT Mode of treatment - Individual (minutes) 90   PT Mode of treatment - Concurrent (minutes) 0   PT Mode of treatment - Group (minutes) 0   PT Mode of treatment - Co-treat (minutes) 0   PT Mode of Treatment - Total time(minutes) 90 minutes   PT Cumulative Minutes 865   Therapy Time missed   Time missed?  No

## 2022-01-29 NOTE — PLAN OF CARE
Problem: METABOLIC, FLUID AND ELECTROLYTES - ADULT  Goal: Electrolytes maintained within normal limits  Description: INTERVENTIONS:  - Monitor labs and assess patient for signs and symptoms of electrolyte imbalances  - Administer electrolyte replacement as ordered  - Monitor response to electrolyte replacements, including repeat lab results as appropriate  - Instruct patient on fluid and nutrition as appropriate  Outcome: Progressing     Problem: Prexisting or High Potential for Compromised Skin Integrity  Goal: Skin integrity is maintained or improved  Description: INTERVENTIONS:  - Identify patients at risk for skin breakdown  - Assess and monitor skin integrity  - Assess and monitor nutrition and hydration status  - Monitor labs   - Assess for incontinence   - Turn and reposition patient  - Assist with mobility/ambulation  - Relieve pressure over bony prominences  - Avoid friction and shearing  - Provide appropriate hygiene as needed including keeping skin clean and dry  - Evaluate need for skin moisturizer/barrier cream  - Collaborate with interdisciplinary team   - Patient/family teaching  - Consider wound care consult   Outcome: Progressing     Problem: GENITOURINARY - ADULT  Goal: Absence of urinary retention  Description: INTERVENTIONS:  - Assess patients ability to void and empty bladder  - Monitor I/O  - Bladder scan as needed  - Discuss with physician/AP medications to alleviate retention as needed  - Discuss catheterization for long term situations as appropriate  Outcome: Completed     Problem: SKIN/TISSUE INTEGRITY - ADULT  Goal: Pressure injury heals and does not worsen  Description: Interventions:  - Implement low air loss mattress or specialty surface (Criteria met)  - Apply silicone foam dressing  - Instruct/assist with weight shifting every  minutes when in chair   - Limit chair time to  hour intervals  - Use special pressure reducing interventions such as  when in chair   - Apply fecal or urinary incontinence containment device   - Perform passive or active ROM every   - Turn and reposition patient & offload bony prominences every  hours   - Utilize friction reducing device or surface for transfers   - Consider consults to  interdisciplinary teams such as   - Use incontinent care products after each incontinent episode such as   - Consider nutrition services referral as needed  Outcome: Completed

## 2022-01-30 LAB
GLUCOSE SERPL-MCNC: 111 MG/DL (ref 65–140)
GLUCOSE SERPL-MCNC: 133 MG/DL (ref 65–140)
GLUCOSE SERPL-MCNC: 163 MG/DL (ref 65–140)
GLUCOSE SERPL-MCNC: 198 MG/DL (ref 65–140)

## 2022-01-30 PROCEDURE — 97530 THERAPEUTIC ACTIVITIES: CPT

## 2022-01-30 PROCEDURE — 82948 REAGENT STRIP/BLOOD GLUCOSE: CPT

## 2022-01-30 PROCEDURE — 97110 THERAPEUTIC EXERCISES: CPT

## 2022-01-30 PROCEDURE — 97116 GAIT TRAINING THERAPY: CPT

## 2022-01-30 PROCEDURE — 97112 NEUROMUSCULAR REEDUCATION: CPT

## 2022-01-30 RX ADMIN — DOCUSATE SODIUM 100 MG: 100 CAPSULE, LIQUID FILLED ORAL at 17:03

## 2022-01-30 RX ADMIN — ACETAMINOPHEN 650 MG: 325 TABLET ORAL at 06:35

## 2022-01-30 RX ADMIN — POLYETHYLENE GLYCOL 3350 17 G: 17 POWDER, FOR SOLUTION ORAL at 08:04

## 2022-01-30 RX ADMIN — GABAPENTIN 300 MG: 300 CAPSULE ORAL at 22:01

## 2022-01-30 RX ADMIN — METFORMIN HYDROCHLORIDE 1000 MG: 500 TABLET ORAL at 08:05

## 2022-01-30 RX ADMIN — METHOCARBAMOL TABLETS 500 MG: 500 TABLET, COATED ORAL at 21:55

## 2022-01-30 RX ADMIN — OXYCODONE HYDROCHLORIDE 10 MG: 10 TABLET ORAL at 21:54

## 2022-01-30 RX ADMIN — ATORVASTATIN CALCIUM 40 MG: 40 TABLET, FILM COATED ORAL at 17:03

## 2022-01-30 RX ADMIN — DOCUSATE SODIUM 100 MG: 100 CAPSULE, LIQUID FILLED ORAL at 08:04

## 2022-01-30 RX ADMIN — METHOCARBAMOL TABLETS 500 MG: 500 TABLET, COATED ORAL at 08:04

## 2022-01-30 RX ADMIN — GLIPIZIDE 5 MG: 5 TABLET ORAL at 06:36

## 2022-01-30 RX ADMIN — ENOXAPARIN SODIUM 40 MG: 40 INJECTION SUBCUTANEOUS at 08:05

## 2022-01-30 RX ADMIN — ACETAMINOPHEN 650 MG: 325 TABLET ORAL at 21:55

## 2022-01-30 RX ADMIN — METFORMIN HYDROCHLORIDE 1000 MG: 500 TABLET ORAL at 17:03

## 2022-01-30 RX ADMIN — OXYCODONE HYDROCHLORIDE 10 MG: 10 TABLET ORAL at 12:00

## 2022-01-30 RX ADMIN — EMPAGLIFLOZIN 12.5 MG: 25 TABLET, FILM COATED ORAL at 08:04

## 2022-01-30 RX ADMIN — OXYCODONE HYDROCHLORIDE 10 MG: 10 TABLET ORAL at 06:39

## 2022-01-30 RX ADMIN — MAGNESIUM OXIDE TAB 400 MG (241.3 MG ELEMENTAL MG) 800 MG: 400 (241.3 MG) TAB at 08:04

## 2022-01-30 RX ADMIN — INSULIN LISPRO 1 UNITS: 100 INJECTION, SOLUTION INTRAVENOUS; SUBCUTANEOUS at 17:03

## 2022-01-30 RX ADMIN — OXYCODONE HYDROCHLORIDE AND ACETAMINOPHEN 500 MG: 500 TABLET ORAL at 08:04

## 2022-01-30 RX ADMIN — MELATONIN TAB 3 MG 6 MG: 3 TAB at 21:55

## 2022-01-30 RX ADMIN — ACETAMINOPHEN 650 MG: 325 TABLET ORAL at 14:58

## 2022-01-30 RX ADMIN — INSULIN GLARGINE 15 UNITS: 100 INJECTION, SOLUTION SUBCUTANEOUS at 22:01

## 2022-01-30 RX ADMIN — METHOCARBAMOL TABLETS 500 MG: 500 TABLET, COATED ORAL at 17:03

## 2022-01-30 RX ADMIN — GABAPENTIN 300 MG: 300 CAPSULE ORAL at 14:58

## 2022-01-30 RX ADMIN — INSULIN LISPRO 1 UNITS: 100 INJECTION, SOLUTION INTRAVENOUS; SUBCUTANEOUS at 22:00

## 2022-01-30 RX ADMIN — CYANOCOBALAMIN TAB 1000 MCG 1000 MCG: 1000 TAB at 08:05

## 2022-01-30 RX ADMIN — METHOCARBAMOL TABLETS 500 MG: 500 TABLET, COATED ORAL at 11:51

## 2022-01-30 RX ADMIN — FERROUS GLUCONATE 324 MG: 324 TABLET ORAL at 06:36

## 2022-01-30 RX ADMIN — GABAPENTIN 300 MG: 300 CAPSULE ORAL at 06:36

## 2022-01-30 NOTE — PROGRESS NOTES
01/30/22 7515   Pain Assessment   Pain Assessment Tool 0-10   Pain Score 6   Pain Location/Orientation Orientation: Right;Location: Hip   Restrictions/Precautions   Precautions Fall Risk;Pain   RLE Weight Bearing Per Order WBAT   LLE Weight Bearing Per Order WBAT   Putting On/Taking Off Footwear   Type of Assistance Needed Set-up / clean-up   Physical Assistance Level No physical assistance   Comment Requires inc time to don and doff b/l sneakers   Putting On/Taking Off Footwear CARE Score 5   Sit to Stand   Type of Assistance Needed Supervision   Physical Assistance Level No physical assistance   Comment Requires PRN cues for safety   Sit to Stand CARE Score 4   Therapeutic Excerise-Strength   UE Strength Yes   Right Upper Extremity- Strength   R Elbow Elbow flexion;Elbow extension  (6lb dumbbell, red theraband)   R Wrist Wrist flexion;Wrist extension   R Hand   (gross grasp with red resistive web)   R Position Seated   Equipment Dumbbell  (6lb elbow ext/flex &forearm pronation/supination, 4lb wrist)   RUE Strength Comment 20x2 exercises with focus on strengthening for improved independence with IADLs  Pt defers participation in exercises that focus on his shoulders, reporting he needs cortizone first   5lb medicine ball pass   Left Upper Extremity-Strength   LUE Strength Comment see above   Cognition   Arousal/Participation Alert; Responsive   Attention Attends with cues to redirect   Orientation Level Oriented X4   Memory Within functional limits   Following Commands Follows multistep commands inconsistently   Comments Pt initially not agreeable, but becomes more pleasant when doing activities that he doesn't "hate"    Reports he has been through 4 major surgeries that required rehab, and wont be doing things he doesn't want to do   Activity Tolerance   Activity Tolerance Patient tolerated treatment well   Assessment   Treatment Assessment Pt is seen for 45 minute OT session this afternoon with focus on UE strengthening for carry over with IADL participation  Plan originally to practice simulated med mgmt and IADL participation, however pt defers reporting it would be "absolutely pointless"  Discussed recommendations from therapy team and importance of ensuring compliance with medication mgmt, especially with possible changes in his normal regimen  Pt continues to defer stating "I haven't  yet, have I?"  Pt willing to participate in shortened session with focus on UE strength, but no exercises that incorporate shoulder mvmt  Session limited by patients willingness to participate in recommended interventions  Pt is functioning at a supervision level with footwear donning/doffing, and completing transfers fluctuating between close supervision and Keron  Next session to attempt to address IADLs and med mgmt as pt allows  Prognosis Fair   Problem List Decreased strength;Decreased range of motion;Decreased endurance; Impaired balance;Decreased mobility;Pain   Barriers to Discharge Inaccessible home environment   Plan   Treatment/Interventions ADL retraining;Functional transfer training; Therapeutic exercise; Endurance training; Compensatory technique education   Progress Progressing toward goals   Recommendation   OT Discharge Recommendation Home with home health rehabilitation  (PT only, no OT needs)   Equipment Recommended Shower transfer bench ($$)   OT Therapy Minutes   OT Time In 1300   OT Time Out 1345   OT Total Time (minutes) 45   OT Mode of treatment - Individual (minutes) 45   OT Mode of treatment - Concurrent (minutes) 0   OT Mode of treatment - Group (minutes) 0   OT Mode of treatment - Co-treat (minutes) 0   OT Mode of Treatment - Total time(minutes) 45 minutes   OT Cumulative Minutes 795   Therapy Time missed   Time missed?  Yes   Reason for time missed   (pt defers further participation)

## 2022-01-30 NOTE — PLAN OF CARE
Problem: SKIN/TISSUE INTEGRITY - ADULT  Goal: Skin Integrity remains intact(Skin Breakdown Prevention)  Description: Assess:  -Perform Gilberto assessment every   -Clean and moisturize skin every   -Inspect skin when repositioning, toileting, and assisting with ADLS  -Assess under medical devices such as  every   -Assess extremities for adequate circulation and sensation     Bed Management:  -Have minimal linens on bed & keep smooth, unwrinkled  -Change linens as needed when moist or perspiring  -Avoid sitting or lying in one position for more than  hours while in bed  -Keep HOB at degrees     Toileting:  -Offer bedside commode  -Assess for incontinence every   -Use incontinent care products after each incontinent episode such as     Activity:  -Mobilize patient  times a day  -Encourage activity and walks on unit  -Encourage or provide ROM exercises   -Turn and reposition patient every  Hours  -Use appropriate equipment to lift or move patient in bed  -Instruct/ Assist with weight shifting every  when out of bed in chair  -Consider limitation of chair time  hour intervals    Skin Care:  -Avoid use of baby powder, tape, friction and shearing, hot water or constrictive clothing  -Relieve pressure over bony prominences using   -Do not massage red bony areas    Next Steps:  -Teach patient strategies to minimize risks such as    -Consider consults to  interdisciplinary teams such as   Outcome: Progressing

## 2022-01-30 NOTE — PROGRESS NOTES
01/30/22 0754   Pain Assessment   Pain Assessment Tool 0-10   Pain Score 7   Pain Location/Orientation Orientation: Right;Location: Foot   Restrictions/Precautions   Precautions Fall Risk;Pain   Weight Bearing Restrictions Yes   RLE Weight Bearing Per Order WBAT   LLE Weight Bearing Per Order WBAT   ROM Restrictions No   General   Change In Medical/Functional Status IN ROOM PRIVILEGES; NURSING AWARE   Cognition   Overall Cognitive Status WFL   Arousal/Participation Alert; Cooperative   Attention Attends with cues to redirect   Orientation Level Oriented X4   Memory Within functional limits   Following Commands Follows multistep commands inconsistently   Subjective   Subjective Patient reported that he is ready for skilled PT today, patient challenged with fatigue in the morning      Roll Left and Right   Comment Patient sleeps in recliner at baseline   Reason if not Attempted Activity not applicable   Roll Left and Right CARE Score 9   Sit to Lying   Type of Assistance Needed Supervision   Physical Assistance Level No physical assistance   Comment bilateral Bed rail   Sit to Lying CARE Score 4   Lying to Sitting on Side of Bed   Type of Assistance Needed Supervision   Physical Assistance Level No physical assistance   Comment bilateral bed rail   Lying to Sitting on Side of Bed CARE Score 4   Sit to Stand   Type of Assistance Needed Supervision   Physical Assistance Level No physical assistance   Comment Supervision with RW   Sit to Stand CARE Score 4   Bed-Chair Transfer   Type of Assistance Needed Supervision   Physical Assistance Level No physical assistance   Comment Supervision with RW   Chair/Bed-to-Chair Transfer CARE Score 4   Car Transfer   Reason if not Attempted Environmental limitations   Car Transfer CARE Score 10   Walk 10 Feet   Type of Assistance Needed Supervision   Physical Assistance Level No physical assistance   Comment CS with RW   Walk 10 Feet CARE Score 4   Walk 50 Feet with Two Turns   Type of Assistance Needed Supervision   Physical Assistance Level No physical assistance   Comment CS with RW   Walk 50 Feet with Two Turns CARE Score 4   Walk 150 Feet   Type of Assistance Needed Supervision   Physical Assistance Level No physical assistance   Comment CS with RW   Walk 150 Feet CARE Score 4   Walking 10 Feet on Uneven Surfaces   Type of Assistance Needed Supervision   Physical Assistance Level No physical assistance   Comment CS with RW; Ascending only, patient was too fatigued to attempt descending   Walking 10 Feet on Uneven Surfaces CARE Score 4   Ambulation   Does the patient walk? 2  Yes   Primary Mode of Locomotion Prior to Admission Walk   Distance Walked (feet) 250 ft   Assist Device Roller Walker   Gait Pattern Antalgic; Slow Rosanne; Step through; Inconsistant Rosanne; Improper weight shift;Decreased L stance;Decreased R stance   Limitations Noted In Endurance; Heel Strike;Posture;Speed;Strength;Swing   Provided Assistance with: Balance   Walk Assist Level Close Supervision   Wheelchair mobility   Does the patient use a wheelchair? 0  No   Findings pt propelling w/c to/from gym & room for ther ex purposes only   Therapeutic Interventions   Strengthening Standing hip flexion, standing hip abdution, standing hip extension- 20 reps, 3 second holds bilaterally; Standing heel raises- 20 reps, 3 second holds; seated quad sets with knees extended- 20 reps, 3 second holds; Flexibility Seated hamstring and gastroc stretching- 15' bilaterally, noted with moist heat applied per modalities section to improve pain and tissue elasticity and improve blood flow to the painful areas and reduce neuropathic pain   Balance Standing unsupported with reaching outside YOVANI, holding 1# weight between hands- 5'; step   Neuromuscular Re-Education Standing weight shifting- fowards and backwards, side to side;     Modalities MHP on R ankle and L proximal hip during static stretching to facilitate tissues elasticity and pain control   Other Ambulation, transfers, and inclines per objective   Assessment   Treatment Assessment Patient tolerated session well, patient tolerated continuous 2 hour session well today  Patient continues to struggle with pain with R foot neuropathy, patient challenged with overall intervention tolerance but improving with his independence, patient given in room privileges, patient verbalizes understanding and instructed on safety with sit to stands, patient previously sit to stand performed from bed with one hand on middle of walker and one hand on bed, treating therapist demonstrated adequate safety technique to perform sit to stand to reduce fall risk, patient verbalized and demonstrated understanding  Patient requires skilled PT in order to improve his post surgical status and to maximize function  Family/Caregiver Present no   PT Family training done with: no   Problem List Decreased strength;Decreased range of motion;Decreased endurance; Impaired balance;Decreased mobility;Pain   Barriers to Discharge Inaccessible home environment   PT Barriers   Physical Impairment Decreased strength;Decreased range of motion;Decreased mobility;Orthopedic restrictions;Pain   Functional Limitation Car transfers; Ramp negotiation;Stair negotiation;Standing;Transfers; Walking   Plan   Treatment/Interventions ADL retraining;Functional transfer training; Therapeutic exercise; Endurance training   Progress Progressing toward goals   Recommendation   PT Discharge Recommendation Home with home health rehabilitation   Equipment Recommended Walker   PT Therapy Minutes   PT Time In 0830   PT Time Out 1030   PT Total Time (minutes) 120   PT Mode of treatment - Individual (minutes) 120   PT Mode of treatment - Concurrent (minutes) 120   PT Mode of treatment - Group (minutes) 0   PT Mode of treatment - Co-treat (minutes) 0   PT Mode of Treatment - Total time(minutes) 240 minutes   PT Cumulative Minutes 1105   Therapy Time missed   Time missed?  No

## 2022-01-31 LAB
ANION GAP SERPL CALCULATED.3IONS-SCNC: 8 MMOL/L (ref 5–14)
BASOPHILS # BLD AUTO: 0 THOUSANDS/ΜL (ref 0–0.1)
BASOPHILS NFR BLD AUTO: 1 % (ref 0–1)
BUN SERPL-MCNC: 16 MG/DL (ref 5–25)
CALCIUM SERPL-MCNC: 9 MG/DL (ref 8.4–10.2)
CHLORIDE SERPL-SCNC: 103 MMOL/L (ref 97–108)
CO2 SERPL-SCNC: 30 MMOL/L (ref 22–30)
CREAT SERPL-MCNC: 0.97 MG/DL (ref 0.7–1.5)
EOSINOPHIL # BLD AUTO: 0.2 THOUSAND/ΜL (ref 0–0.4)
EOSINOPHIL NFR BLD AUTO: 4 % (ref 0–6)
ERYTHROCYTE [DISTWIDTH] IN BLOOD BY AUTOMATED COUNT: 15.4 %
GFR SERPL CREATININE-BSD FRML MDRD: 82 ML/MIN/1.73SQ M
GLUCOSE P FAST SERPL-MCNC: 170 MG/DL (ref 70–99)
GLUCOSE SERPL-MCNC: 107 MG/DL (ref 65–140)
GLUCOSE SERPL-MCNC: 143 MG/DL (ref 65–140)
GLUCOSE SERPL-MCNC: 157 MG/DL (ref 65–140)
GLUCOSE SERPL-MCNC: 166 MG/DL (ref 65–140)
GLUCOSE SERPL-MCNC: 170 MG/DL (ref 70–99)
HCT VFR BLD AUTO: 35.6 % (ref 41–53)
HGB BLD-MCNC: 11.7 G/DL (ref 13.5–17.5)
LYMPHOCYTES # BLD AUTO: 1.9 THOUSANDS/ΜL (ref 0.5–4)
LYMPHOCYTES NFR BLD AUTO: 29 % (ref 25–45)
MCH RBC QN AUTO: 30.4 PG (ref 26–34)
MCHC RBC AUTO-ENTMCNC: 32.8 G/DL (ref 31–36)
MCV RBC AUTO: 93 FL (ref 80–100)
MONOCYTES # BLD AUTO: 0.8 THOUSAND/ΜL (ref 0.2–0.9)
MONOCYTES NFR BLD AUTO: 12 % (ref 1–10)
NEUTROPHILS # BLD AUTO: 3.6 THOUSANDS/ΜL (ref 1.8–7.8)
NEUTS SEG NFR BLD AUTO: 55 % (ref 45–65)
PLATELET # BLD AUTO: 360 THOUSANDS/UL (ref 150–450)
PMV BLD AUTO: 7.5 FL (ref 8.9–12.7)
POTASSIUM SERPL-SCNC: 4 MMOL/L (ref 3.6–5)
RBC # BLD AUTO: 3.85 MILLION/UL (ref 4.5–5.9)
SODIUM SERPL-SCNC: 141 MMOL/L (ref 137–147)
WBC # BLD AUTO: 6.5 THOUSAND/UL (ref 4.5–11)

## 2022-01-31 PROCEDURE — 80048 BASIC METABOLIC PNL TOTAL CA: CPT | Performed by: NURSE PRACTITIONER

## 2022-01-31 PROCEDURE — 99232 SBSQ HOSP IP/OBS MODERATE 35: CPT | Performed by: PHYSICAL MEDICINE & REHABILITATION

## 2022-01-31 PROCEDURE — 85025 COMPLETE CBC W/AUTO DIFF WBC: CPT | Performed by: NURSE PRACTITIONER

## 2022-01-31 PROCEDURE — 97530 THERAPEUTIC ACTIVITIES: CPT

## 2022-01-31 PROCEDURE — 82948 REAGENT STRIP/BLOOD GLUCOSE: CPT

## 2022-01-31 PROCEDURE — 97116 GAIT TRAINING THERAPY: CPT

## 2022-01-31 PROCEDURE — 99231 SBSQ HOSP IP/OBS SF/LOW 25: CPT | Performed by: INTERNAL MEDICINE

## 2022-01-31 PROCEDURE — 97110 THERAPEUTIC EXERCISES: CPT

## 2022-01-31 PROCEDURE — 97535 SELF CARE MNGMENT TRAINING: CPT

## 2022-01-31 RX ORDER — OXYCODONE HYDROCHLORIDE 5 MG/1
5 TABLET ORAL EVERY 4 HOURS PRN
Qty: 30 TABLET | Refills: 0 | Status: SHIPPED | OUTPATIENT
Start: 2022-01-31 | End: 2022-01-31

## 2022-01-31 RX ORDER — LANOLIN ALCOHOL/MO/W.PET/CERES
6 CREAM (GRAM) TOPICAL
Refills: 0
Start: 2022-01-31 | End: 2022-01-31

## 2022-01-31 RX ORDER — ACETAMINOPHEN 325 MG/1
650 TABLET ORAL EVERY 6 HOURS PRN
Refills: 0
Start: 2022-01-31

## 2022-01-31 RX ORDER — METHOCARBAMOL 500 MG/1
500 TABLET, FILM COATED ORAL 4 TIMES DAILY PRN
Qty: 50 TABLET | Refills: 0 | Status: SHIPPED | OUTPATIENT
Start: 2022-01-31 | End: 2022-07-15 | Stop reason: ALTCHOICE

## 2022-01-31 RX ORDER — GABAPENTIN 300 MG/1
300 CAPSULE ORAL EVERY 8 HOURS SCHEDULED
Qty: 45 CAPSULE | Refills: 0 | Status: SHIPPED | OUTPATIENT
Start: 2022-01-31 | End: 2022-01-31

## 2022-01-31 RX ORDER — METHOCARBAMOL 500 MG/1
500 TABLET, FILM COATED ORAL 4 TIMES DAILY PRN
Qty: 60 TABLET | Refills: 0 | Status: SHIPPED | OUTPATIENT
Start: 2022-01-31 | End: 2022-01-31

## 2022-01-31 RX ORDER — GLIPIZIDE 5 MG/1
5 TABLET ORAL
Refills: 0
Start: 2022-02-01 | End: 2022-01-31

## 2022-01-31 RX ORDER — GLIPIZIDE 5 MG/1
5 TABLET ORAL
Qty: 30 TABLET | Refills: 0 | Status: SHIPPED | OUTPATIENT
Start: 2022-02-01

## 2022-01-31 RX ORDER — ERGOCALCIFEROL 1.25 MG/1
50000 CAPSULE ORAL WEEKLY
Qty: 3 CAPSULE | Refills: 0 | Status: SHIPPED | OUTPATIENT
Start: 2022-02-03 | End: 2022-01-31

## 2022-01-31 RX ORDER — DOCUSATE SODIUM 100 MG/1
100 CAPSULE, LIQUID FILLED ORAL 2 TIMES DAILY
Qty: 30 CAPSULE | Refills: 0 | Status: SHIPPED | OUTPATIENT
Start: 2022-01-31 | End: 2022-01-31

## 2022-01-31 RX ORDER — GABAPENTIN 300 MG/1
300 CAPSULE ORAL EVERY 8 HOURS SCHEDULED
Qty: 90 CAPSULE | Refills: 0 | Status: SHIPPED | OUTPATIENT
Start: 2022-01-31 | End: 2022-07-15 | Stop reason: ALTCHOICE

## 2022-01-31 RX ORDER — ASCORBIC ACID 500 MG
500 TABLET ORAL DAILY
Qty: 30 TABLET | Refills: 0 | Status: SHIPPED | OUTPATIENT
Start: 2022-02-01 | End: 2022-07-15 | Stop reason: ALTCHOICE

## 2022-01-31 RX ORDER — POLYETHYLENE GLYCOL 3350 17 G/17G
17 POWDER, FOR SOLUTION ORAL DAILY
Qty: 14 EACH | Refills: 0 | Status: SHIPPED | OUTPATIENT
Start: 2022-02-01 | End: 2022-07-15 | Stop reason: ALTCHOICE

## 2022-01-31 RX ORDER — OXYCODONE HYDROCHLORIDE 5 MG/1
5 TABLET ORAL EVERY 4 HOURS PRN
Qty: 30 TABLET | Refills: 0 | Status: SHIPPED | OUTPATIENT
Start: 2022-01-31 | End: 2022-02-10

## 2022-01-31 RX ORDER — ERGOCALCIFEROL 1.25 MG/1
50000 CAPSULE ORAL WEEKLY
Qty: 3 CAPSULE | Refills: 0 | Status: SHIPPED | OUTPATIENT
Start: 2022-02-03 | End: 2022-02-24

## 2022-01-31 RX ORDER — POLYETHYLENE GLYCOL 3350 17 G/17G
17 POWDER, FOR SOLUTION ORAL DAILY
Refills: 0
Start: 2022-02-01 | End: 2022-01-31

## 2022-01-31 RX ORDER — ASCORBIC ACID 500 MG
500 TABLET ORAL DAILY
Qty: 15 TABLET | Refills: 0 | Status: SHIPPED | OUTPATIENT
Start: 2022-02-01 | End: 2022-01-31

## 2022-01-31 RX ORDER — DOXYCYCLINE HYCLATE 50 MG/1
324 CAPSULE, GELATIN COATED ORAL
Qty: 30 TABLET | Refills: 0 | Status: SHIPPED | OUTPATIENT
Start: 2022-02-01 | End: 2022-07-15 | Stop reason: ALTCHOICE

## 2022-01-31 RX ORDER — DOXYCYCLINE HYCLATE 50 MG/1
324 CAPSULE, GELATIN COATED ORAL
Qty: 15 TABLET | Refills: 0 | Status: SHIPPED | OUTPATIENT
Start: 2022-02-01 | End: 2022-01-31

## 2022-01-31 RX ORDER — DOCUSATE SODIUM 100 MG/1
100 CAPSULE, LIQUID FILLED ORAL 2 TIMES DAILY
Qty: 30 CAPSULE | Refills: 0 | Status: SHIPPED | OUTPATIENT
Start: 2022-01-31 | End: 2022-07-15 | Stop reason: ALTCHOICE

## 2022-01-31 RX ORDER — CHOLECALCIFEROL (VITAMIN D3) 125 MCG
5 CAPSULE ORAL
Qty: 30 TABLET | Refills: 0 | Status: SHIPPED | OUTPATIENT
Start: 2022-01-31

## 2022-01-31 RX ADMIN — ENOXAPARIN SODIUM 40 MG: 40 INJECTION SUBCUTANEOUS at 08:25

## 2022-01-31 RX ADMIN — INSULIN LISPRO 1 UNITS: 100 INJECTION, SOLUTION INTRAVENOUS; SUBCUTANEOUS at 08:26

## 2022-01-31 RX ADMIN — GLIPIZIDE 5 MG: 5 TABLET ORAL at 06:45

## 2022-01-31 RX ADMIN — ATORVASTATIN CALCIUM 40 MG: 40 TABLET, FILM COATED ORAL at 16:48

## 2022-01-31 RX ADMIN — ACETAMINOPHEN 650 MG: 325 TABLET ORAL at 21:54

## 2022-01-31 RX ADMIN — DOCUSATE SODIUM 100 MG: 100 CAPSULE, LIQUID FILLED ORAL at 08:25

## 2022-01-31 RX ADMIN — METHOCARBAMOL TABLETS 500 MG: 500 TABLET, COATED ORAL at 16:49

## 2022-01-31 RX ADMIN — MELATONIN TAB 3 MG 6 MG: 3 TAB at 21:54

## 2022-01-31 RX ADMIN — METHOCARBAMOL TABLETS 500 MG: 500 TABLET, COATED ORAL at 21:55

## 2022-01-31 RX ADMIN — DOCUSATE SODIUM 100 MG: 100 CAPSULE, LIQUID FILLED ORAL at 16:49

## 2022-01-31 RX ADMIN — OXYCODONE HYDROCHLORIDE 10 MG: 10 TABLET ORAL at 21:55

## 2022-01-31 RX ADMIN — GABAPENTIN 300 MG: 300 CAPSULE ORAL at 06:44

## 2022-01-31 RX ADMIN — INSULIN LISPRO 1 UNITS: 100 INJECTION, SOLUTION INTRAVENOUS; SUBCUTANEOUS at 16:48

## 2022-01-31 RX ADMIN — OXYCODONE HYDROCHLORIDE 10 MG: 10 TABLET ORAL at 13:42

## 2022-01-31 RX ADMIN — MAGNESIUM OXIDE TAB 400 MG (241.3 MG ELEMENTAL MG) 800 MG: 400 (241.3 MG) TAB at 08:25

## 2022-01-31 RX ADMIN — GABAPENTIN 300 MG: 300 CAPSULE ORAL at 21:55

## 2022-01-31 RX ADMIN — EMPAGLIFLOZIN 12.5 MG: 25 TABLET, FILM COATED ORAL at 08:30

## 2022-01-31 RX ADMIN — METHOCARBAMOL TABLETS 500 MG: 500 TABLET, COATED ORAL at 08:25

## 2022-01-31 RX ADMIN — INSULIN GLARGINE 15 UNITS: 100 INJECTION, SOLUTION SUBCUTANEOUS at 21:55

## 2022-01-31 RX ADMIN — GABAPENTIN 300 MG: 300 CAPSULE ORAL at 13:40

## 2022-01-31 RX ADMIN — FERROUS GLUCONATE 324 MG: 324 TABLET ORAL at 06:44

## 2022-01-31 RX ADMIN — POLYETHYLENE GLYCOL 3350 17 G: 17 POWDER, FOR SOLUTION ORAL at 08:25

## 2022-01-31 RX ADMIN — ACETAMINOPHEN 650 MG: 325 TABLET ORAL at 13:40

## 2022-01-31 RX ADMIN — OXYCODONE HYDROCHLORIDE 10 MG: 10 TABLET ORAL at 03:19

## 2022-01-31 RX ADMIN — METFORMIN HYDROCHLORIDE 1000 MG: 500 TABLET ORAL at 08:25

## 2022-01-31 RX ADMIN — ACETAMINOPHEN 650 MG: 325 TABLET ORAL at 06:44

## 2022-01-31 RX ADMIN — METFORMIN HYDROCHLORIDE 1000 MG: 500 TABLET ORAL at 16:48

## 2022-01-31 RX ADMIN — METHOCARBAMOL TABLETS 500 MG: 500 TABLET, COATED ORAL at 12:14

## 2022-01-31 RX ADMIN — OXYCODONE HYDROCHLORIDE AND ACETAMINOPHEN 500 MG: 500 TABLET ORAL at 08:25

## 2022-01-31 RX ADMIN — CYANOCOBALAMIN TAB 1000 MCG 1000 MCG: 1000 TAB at 08:25

## 2022-01-31 NOTE — PROGRESS NOTES
01/31/22 1400   Pain Assessment   Pain Assessment Tool 0-10   Pain Score 5   Pain Location/Orientation Orientation: Right;Location: Foot;Orientation: Left; Location: Hip   Pain Onset/Description Onset: Ongoing   Patient's Stated Pain Goal No pain   Multiple Pain Sites No   Restrictions/Precautions   Precautions Fall Risk;Pain   Weight Bearing Restrictions Yes   LLE Weight Bearing Per Order WBAT   ROM Restrictions No   Cognition   Overall Cognitive Status WFL   Arousal/Participation Alert; Cooperative   Attention Attends with cues to redirect   Orientation Level Oriented X4   Memory Within functional limits   Following Commands Follows multistep commands with increased time or repetition   Subjective   Subjective Pt notes that L groin pain is a little bothersome  He also reports that he had pain medication given approximately 20 minutes prior to PT      Roll Left and Right   Comment Pt sleeps in recliner at baseline   Reason if not Attempted Activity not applicable   Roll Left and Right CARE Score 9   Sit to Stand   Type of Assistance Needed Independent   Physical Assistance Level No physical assistance   Comment Supervision    Sit to Stand CARE Score 6   Car Transfer   Reason if not Attempted Environmental limitations   Car Transfer CARE Score 10   Walk 10 Feet   Type of Assistance Needed Independent   Physical Assistance Level No physical assistance   Comment Independent with RW   Walk 10 Feet CARE Score 6   Walk 50 Feet with Two Turns   Type of Assistance Needed Independent   Physical Assistance Level No physical assistance   Walk 50 Feet with Two Turns CARE Score 6   Walk 150 Feet   Type of Assistance Needed Supervision   Physical Assistance Level No physical assistance   Comment Supervision with RW   Walk 150 Feet CARE Score 4   Walking 10 Feet on Uneven Surfaces   Type of Assistance Needed Supervision   Physical Assistance Level No physical assistance   Comment Supervision with patient ascending and descending 10 ft indoor ramp with RW   Walking 10 Feet on Uneven Surfaces CARE Score 4   Ambulation   Does the patient walk? 2  Yes   Primary Mode of Locomotion Prior to Admission Walk   Distance Walked (feet) 250 ft  (with 150 feet amb trial)   Assist Device Roller Walker   Gait Pattern Antalgic; Slow Rosanne; Inconsistant Rosanne; Improper weight shift   Limitations Noted In Endurance; Heel Strike; Safety;Speed   Provided Assistance with: Balance   Walk Assist Level Supervision;Modified Independent   Findings Pt demonstrates improved upright posture and increased stride length which has allowed PT to decrease assistance for short distances (50 ft) to independent and longer distances (250 ft) to supervision    Wheel 50 Feet with Two Turns   Reason if not Attempted Activity not applicable   Wheel 50 Feet with Two Turns CARE Score 9   Wheel 150 Feet   Reason if not Attempted Activity not applicable   Wheel 457 Feet CARE Score 9   Wheelchair mobility   Does the patient use a wheelchair? 0  No   Curb or Single Stair   Style negotiated Curb   Type of Assistance Needed Incidental touching   Physical Assistance Level No physical assistance   Comment Pt steps over 8 inch curb with CG from PT   If pt attempts 4 inch curb, pt would be supervision   1 Step (Curb) CARE Score 4   4 Steps   Type of Assistance Needed Supervision   Physical Assistance Level No physical assistance   Comment Pt uses UE for support, however, pt demonstrates improved foot clearance ascending steps   4 Steps CARE Score 4   12 Steps   Reason if not Attempted Safety concerns   12 Steps CARE Score 88   Stairs   Type Stairs   # of Steps 4   Weight Bearing Precautions WBAT   Assist Devices Bilateral Rail   Findings Pt uses UE for support, however, pt demonstrates improved foot clearance ascending steps   Therapeutic Interventions   Strengthening BLE: seated hip flexion AAROM 2 x 20   Flexibility Hamstring and gastro stretch with patient seated 3 x 30 sec   Modalities MHP on R ankle to decrease pain and improve ROM   Assessment   Treatment Assessment Pt engaged in 90 minute session of skilled PT services  The focus of the session was on ambulation and stair negotation in preparation for discharge  The PT has made great progress with sit to stand transfers, ambulation tolerance, and AD use  He has met his set goals and he is appropriate for D/C  He will have the support of his wife to take on additional responsibilities that he previously did such as walking and taking care of the dogs as he continues to improve his strength and ambulation tolerance  The patient does not require DME for D/C home  Additional home PT is recommended for pt to continue to return to [facility] however due to previous poor experiences with PT prior to [facility] compliance is questionable  Family/Caregiver Present no   Problem List Decreased strength;Decreased range of motion; Impaired balance;Decreased mobility;Pain   Barriers to Discharge None   PT Barriers   Physical Impairment Decreased strength;Decreased range of motion;Decreased mobility;Orthopedic restrictions;Pain   Functional Limitation Car transfers; Ramp negotiation;Stair negotiation;Standing;Transfers; Walking   Plan   Progress Improving as expected   Recommendation   PT Discharge Recommendation Home with home health rehabilitation   PT Therapy Minutes   PT Time In 1400   PT Time Out 1530   PT Total Time (minutes) 90   PT Mode of treatment - Individual (minutes) 90   PT Mode of treatment - Concurrent (minutes) 0   PT Mode of treatment - Group (minutes) 0   PT Mode of treatment - Co-treat (minutes) 0   PT Mode of Treatment - Total time(minutes) 90 minutes   PT Cumulative Minutes 8625   Therapy Time missed   Time missed?  No

## 2022-01-31 NOTE — ASSESSMENT & PLAN NOTE
Related to acute blood loss post-procedure  Hgb currently 11 7  Hgb was 15 0 pre-operatively  Iron panel on 1/20: Iron Sat 22%, TIBC 147, Iron 32, and Ferritin 304  Started on ferrous gluconate and ascorbic acid  Monitor for s/s of active bleeding  Continue to trend with routine CBC

## 2022-01-31 NOTE — DISCHARGE SUMMARY
tDischarge Summary - Lars Rivas 61 y o  male MRN: [id number]  Unit/Bed#: [facility] 815-90 Encounter: [id number]    Admission Date: 1/19/2022     Discharge Date: 02/01/2022    Etiologic/Rehabilitation Diagnosis: Impairment of mobility, safety and Activities of Daily Living (ADLs) due to Orthopedic Disorders:  08 11  Unilateral Hip Fracture    HPI: Gauri Pak a 61 y  o  male with PMH of HTN, T2DM TKA (R) (c/b by infection, requiring revision x2), TKA L, gout,  back surgeries, shoulder surgeries, hand surgeries, depression, HLD, Sleep Apnea who presented to the [facility] on 1/5 after being bumped by his dog and falling onto his L hip  He was found to have a nondisplaced L trochanteric fracture with a partial tear at the greater trochanter insertion  He underwent L femur ORIF on 1/11  Post-op course complicated by pain, hyperglycemia  He also had issues with constipation  He was admitted to the [facility] on 1/19      Procedures Performed During ARC Admission: None    Acute Rehabilitation Center Course: Patient participated in a comprehensive interdisciplinary inpatient rehabilitation program which included involvment of MD, therapies (PT, OT, and/or SLP), RN, CM, SW, dietary, and psychology services  He was able to be advanced to a modified independent level of assist and considered safe for discharge home  Please see below for patient's day to day management of rehabilitation needs  Please refer to Internal Medicine notes during [facility] stay for day to day management of patient's medical co-morbidities  * Closed nondisplaced intertrochanteric fracture of left femur with routine healing  Assessment & Plan  Diagnosed on MRI - nondisplaced intertrochanteric fracture with partial tear at greater trochanter insertion  ORIF on 1/11 with [facility]  Multimodal pain control as described below  WBAT  Incisional care    2 week follow-up on 1/25 - XR on 1/21 with near anatomic alignment     - Staples removed, steri-strips in place   - Provided photo of his imaging to take home  Outpatient f/u with [facility] in 4 weeks  Hypomagnesemia  Assessment & Plan  Repleted with oral magnesium  Continue oral magnesium  1/24 Mag 1 9     Anemia  Assessment & Plan  Mild  Normocytic  Component of ABLA  1/30 Hgb improved at 11 7   Cyanocobalamin given low normal    IM checked iron panel, started on Vit C and iron supplementation  Monitor and transfuse for <7  Vitamin D deficiency  Assessment & Plan  Started on ergocalciferol on 1/20  Will plan for 6 weeks, then recheck  - Will be on dose 3 of 6 on 2/3/2022  Follow-up with PCP     Slow transit constipation  Assessment & Plan  Likely related to pain medication, inadequate hydration, decreased mobility, recent trauma/surgery  Scheduled docusate/miralax  Got enema on admission  Last BM 1/27 after bisacodyl tabs  Last BM 1/30  Polyneuropathy associated with underlying disease [facility])  Assessment & Plan  Most likely related to diabetes  SPEP without monoclonal gammopathy  B12 low normal - started on supplementation  Increased Gabapentin 300mg Q8hr  Desensitization techniques  Pain in both feet  Assessment & Plan  Bilateral  R > L   R foot may have component of gout/pseudogout, also quite arthritic  Dorsal erythema, related to bone spur per podiatry   Erythema/warmth worsened on 1/21, was started on prednisone and keflex by IM  Has since completed and symptoms have improved  Podiatry placed on 5 day course of colchicine, which has since been completed  Swelling and discomfort improved with LATRELL stockings  Also biomechanically, has a large painful bunion - this causes him to walk on the lateral edge of his foot which causes pain  Also with component of neuropathic pain  XR R foot negative for acute fracture/process  Uric acid normal  Continue increased dose of gabapentin and desensitization  Will need f/u with podiatry at discharge       Obesity (BMI 30-39  9)  Assessment & Plan  Counseling  Type 2 diabetes mellitus without complication, without long-term current use of insulin [facility])  Assessment & Plan  Lab Results   Component Value Date    HGBA1C 7 2 (H) 01/08/2022       Recent Labs     01/30/22  1623 01/30/22  2049 01/31/22  0625 01/31/22  1110   POCGLU 163* 198* 157* 107       Blood Sugar Average: Last 72 hrs:  (P) 331     At home: Metformin 1000mg BID, Lantus 28 units daily, Alogliptin 25mg daily, Empaglifluzin 12 5mg daily, Glipizide 5mg BID  Here: Lantus 10 units, Metformin 1000mg BID, Januvia 12 5mg, Glipizide 5mg daily, CDI, Accuchecks  Diabetic Diet  IM consulted and management at their discretion  Follow-up with PCP at discharge  Mixed hyperlipidemia  Assessment & Plan  Restart home Lipitor 40mg at night  Essential hypertension  Assessment & Plan  At home on Lisinopril 30mg daily  Here has not required BP meds  Will closely monitor BP, and add home med as appropriate  IM consulted and management at their discretion    Primary osteoarthritis of both knees  Assessment & Plan  He reports issues with bilateral knees  Dr Tomasa Sever did do TKA to R knee and was following him for this  He reports previous surgery to his L knee with multiple complications/revisions  See multi-modal pain control  Outpatient f/u with Orthopedics  Primary osteoarthritis of both shoulders  Katelyn Burr  Last injection was 10/13  Typically gets them done every 6 months or so  Has not required during stay  Will follow-up with Dr Ryann Burr as an outpatient  Will place order for Diclofenac gel and lidoderm for now  Cellulitis-resolved as of 1/28/2022  Assessment & Plan  Started on Keflex 1/21 by IM 5 day course - since completed  Foot no longer erythematous or red  Suspect tenderness related to pseudogout/gout, arthritis, and neuropathy           Discharge Physical Examination:  /83 (BP Location: Left arm)   Pulse 74 Temp 97 6 °F (36 4 °C) (Tympanic)   Resp 18   Ht 6' 2" (1 88 m)   Wt 114 kg (252 lb)   SpO2 100%   BMI 32 35 kg/m²     Gen: No acute distress, Well-nourished, well-appearing  HEENT: Moist mucus membranes, Normocephalic/Atraumatic  Cardiovascular: Regular rate, rhythm, S1/S2  Distal pulses palpable  Heme/Extr: No edema  Pulmonary: Non-labored breathing  Lungs CTAB  : No gilbert  GI: Soft, non-tender, non-distended  BS+  MSK: Still limited AROM in L hip with some groin pain which is stable  Integumentary: Skin is warm, dry  Incision sites C/D/I, healing well with no staples  Neuro: AAOx3, Speech is intact  Appropriate to questioning  Tone is normal  Length dependent sensory impairment in legs  MMT:   Strength:   Right  Left  Site  Right  Left  Site    5 5  S Ab: Shoulder Abductors  5  3  HF: Hip Flexors    5 5  EF: Elbow Flexors  5  5 KF: Knee Flexors    5  5  EE: Elbow Extensors  5  5  KE: Knee Extensors    5  5  WE: Wrist Extensors  5  5  DR: Dorsi Flexors    5  5  FF: Finger Flexors  5  5  PF: Plantar Flexors    5  5  HI: Hand Intrinsics  NT  NT EHL: Extensor Hallucis Longus   Psych: Normal mood and affect  Significant Findings, Care, Treatment and Services Provided: Acute comprehensive interdisciplinary inpatient rehabilitation including PT, OT, SLP, RN, CM, SW, dietary, psychology, etc     Complications: None    Functional Status Upon Admission to ARC:  Mobility: Dom RW 20'  Transfers: Dom  ADLs: min-modA  Functional Status Upon Discharge from [facility]:   Mobility: Sup-Ind ambulation 250', Sup-Ind with transfers  Transfers: Independent   ADLs: Independent     Discharge Diagnosis: Impairment of mobility, safety and Activities of Daily Living (ADLs) due to Orthopedic Disorders:  08 11  Unilateral Hip Fracture    Discharge Medications:   See after visit summary for reconciled discharge medications provided to patient and family        Condition at Discharge: good     Discharge instructions/Information to patient and family:   See after visit summary for information provided to patient and family  Provisions for Follow-Up Care:  See after visit summary for information related to follow-up care and any pertinent home health orders  No future appointments  Disposition: Home with Home PT    Planned Readmission: No    Discharge Statement   I spent 45 minutes discharging the patient  This time was spent on the day of discharge  I had direct contact with the patient on the day of discharge  Greater than 50% of the total time was spent examining patient, answering all patient questions, arranging and discussing plan of care with patient as well as directly providing post-discharge instructions  Additional time then spent on discharge activities  Discharge Medications:  See after visit summary for reconciled discharge medications provided to patient and family        Facility Administered Medications Prior to Discharge:    Current Facility-Administered Medications   Medication Dose Route Frequency Provider Last Rate    acetaminophen  650 mg Oral Q6H PRN MARGARITA Mcghee      acetaminophen  650 mg Oral [facility] José Antonio Burnham MD      ascorbic acid  500 mg Oral Daily MARGARITA Mcghee      atorvastatin  40 mg Oral Daily With Flavio Parra MD      cyanocobalamin  1,000 mcg Oral Daily MARGARITA Mcghee      Diclofenac Sodium  2 g Topical 4x Daily PRN José Antonio Burnham MD      docusate sodium  100 mg Oral BID José Antonio Burnham MD      Empagliflozin  12 5 mg Oral Daily AníbalMARGARITA Jimenez      enoxaparin  40 mg Subcutaneous Q24H [address] José Antonio Burnham MD      ergocalciferol  50,000 Units Oral Weekly MARGARITA Mcghee      ferrous gluconate  324 mg Oral Daily Before [address] MARGARITA Cartagena      gabapentin  300 mg Oral [facility] José Antonio Burnham MD      glipiZIDE  5 mg Oral Daily Before Breakfast MARGARITA Mcghee      insulin glargine  15 Units Subcutaneous HS Aníbal Owens MARGARITA      insulin lispro  1-5 Units Subcutaneous TID [facility] Reyna Ríos MD      insulin lispro  1-5 Units Subcutaneous HS Reyna Ríos MD      lidocaine  2 patch Topical Daily PRN Reyna Ríos MD      magnesium oxide  800 mg Oral Daily MARGARITA Alejandro      melatonin  6 mg Oral HS MARGARITA Alejandro      metFORMIN  1,000 mg Oral BID With Meals MARGARITA Alejandro      methocarbamol  500 mg Oral 4x Daily Reyna Ríos MD      ondansetron  4 mg Oral Q6H PRN Reyna Ríos MD      oxyCODONE  10 mg Oral Q4H PRN Reyna Ríos MD      oxyCODONE  5 mg Oral Q4H PRN Reyna Ríos MD      polyethylene glycol  17 g Oral Daily Reyna Ríos MD      sodium chloride  1 spray Each Nare Q2H PRN MARGARITA Alejandro

## 2022-01-31 NOTE — PLAN OF CARE
Problem: NEUROSENSORY - ADULT  Goal: Remains free of injury related to seizures activity  Description: INTERVENTIONS  - Maintain airway, patient safety  and administer oxygen as ordered  - Monitor patient for seizure activity, document and report duration and description of seizure to physician/advanced practitioner  - If seizure occurs,  ensure patient safety during seizure  - Reorient patient post seizure  - Seizure pads on all 4 side rails  - Instruct patient/family to notify RN of any seizure activity including if an aura is experienced  - Instruct patient/family to call for assistance with activity based on nursing assessment  - Administer anti-seizure medications if ordered    Outcome: Progressing     Problem: CARDIOVASCULAR - ADULT  Goal: Absence of cardiac dysrhythmias or at baseline rhythm  Description: INTERVENTIONS:  - Continuous cardiac monitoring, vital signs, obtain 12 lead EKG if ordered  - Administer antiarrhythmic and heart rate control medications as ordered  - Monitor electrolytes and administer replacement therapy as ordered  Outcome: Progressing

## 2022-01-31 NOTE — ASSESSMENT & PLAN NOTE
Started on keflex for possible cellulitis infection on dorsal aspect of foot  Also started on prednisone 40mg daily x5 days on 1/21 for possible pseudogout - completed on 1/26  R foot XR on 1/19: "No acute osseous abnormality "  Uric acid level WNL  Started on colchicine 0 6mg daily by Podiatry on 1/25, completed on 1/29 for 5 day course

## 2022-01-31 NOTE — PROGRESS NOTES
Physical Medicine and Rehabilitation Progress Note  Honorio Sinclair 61 y o  male MRN: [id number]  Unit/Bed#: -43 Encounter: [id number]    HPI: Honorio Sinclair is a 61 y o  male with PMH of HTN, T2DM TKA (R) (c/b by infection, requiring revision x2), TKA L, gout,  back surgeries, shoulder surgeries, hand surgeries, depression, HLD, Sleep Apnea who presented to the [address] [place] [address] on 1/5 after being bumped by his dog and falling onto his L hip  He was found to have a nondisplaced L trochanteric fracture with a partial tear at the greater trochanter insertion  He underwent L femur ORIF on 1/11  Post-op course complicated by pain, hyperglycemia  He also had issues with constipation  He was admitted to the [facility] on 1/19  Chief Complaint: Poor sleep last night, intermittent R foot pain  Interval History/Subjective:  No acute events over the weekend  Had some trouble sleeping last night, but not sure why  Denies any new CP, SOB, fevers, chills, N/V, abdominal pain  Still gets intermittent R foot pain, but he is able to manage it  Last BM 1/30  ROS:  A 10 point review of systems was negative except for what is noted in the HPI  Today's Changes:  1  Will prep discharge today - does not need home med refills, but will need new meds - asking them to be sent to pharmacy downstairs for a price check     - HE would like 1-2 weeks of medication, and then he will f/u with PCP to get refills through the [facility] due to cost   2  Wife has to pick patient up at 10:30am tomorrow  Total time spent:  35 minutes, with more than 50% spent counseling/coordinating care  Counseling includes discussion with patient re: progress in therapies, functional issues observed by therapy staff, and discussion with patient his/her current medical state/wellbeing  Coordination of patient's care was performed in conjunction with Internal Medicine service to monitor patient's labs, vitals, and management of their comorbidities  Assessment/Plan:    * Closed nondisplaced intertrochanteric fracture of left femur with routine healing  Assessment & Plan  Diagnosed on MRI - nondisplaced intertrochanteric fracture with partial tear at greater trochanter insertion  ORIF on 1/11 with [facility]  Multimodal pain control as described below  WBAT  Incisional care  2 week follow-up on 1/25 - XR on 1/21 with near anatomic alignment     - Staples removed, steri-strips in place   - Provided photo of his imaging to take home  [facility] weeks  Hypomagnesemia  Assessment & Plan  Repleted with oral magnesium  Continue oral magnesium  1/24 Mag 1 9     Anemia  Assessment & Plan  Mild  Normocytic  Component of ABLA  1/30 Hgb improved at 11 7   Cyanocobalamin given low normal    IM checked iron panel, started on Vit C and iron supplementation  Monitor and transfuse for <7  Vitamin D deficiency  Assessment & Plan  Started on ergocalciferol on 1/20  Will plan for 6 weeks, then recheck  - Will be on dose 3 of 6 on 2/3/2022  Follow-up with PCP     Slow transit constipation  Assessment & Plan  Likely related to pain medication, inadequate hydration, decreased mobility, recent trauma/surgery  Scheduled docusate/miralax  Got enema on admission  Last BM 1/27 after bisacodyl tabs  Last BM 1/30  Polyneuropathy associated with underlying disease [facility])  Assessment & Plan  Most likely related to diabetes  SPEP without monoclonal gammopathy  B12 low normal - started on supplementation  Increased Gabapentin 300mg Q8hr  Desensitization techniques  Pain in both feet  Assessment & Plan  Bilateral  R > L   R foot may have component of gout/pseudogout, also quite arthritic  Dorsal erythema, related to bone spur per podiatry   Erythema/warmth worsened on 1/21, was started on prednisone and keflex by IM  Has since completed and symptoms have improved    Podiatry placed on 5 day course of colchicine, which has since been completed  Swelling and discomfort improved with LATRELL stockings  Also biomechanically, has a large painful bunion - this causes him to walk on the lateral edge of his foot which causes pain  Also with component of neuropathic pain  XR R foot negative for acute fracture/process  Uric acid normal  Continue increased dose of gabapentin and desensitization  Will need f/u with podiatry at discharge  Obesity (BMI 30-39  9)  Assessment & Plan  Counseling  Type 2 diabetes mellitus without complication, without long-term current use of insulin [facility])  Assessment & Plan  Lab Results   Component Value Date    HGBA1C 7 2 (H) 01/08/2022       Recent Labs     01/30/22  1623 01/30/22  2049 01/31/22  0625 01/31/22  1110   POCGLU 163* 198* 157* 107       Blood Sugar Average: Last 72 hrs:  (P) 331     At home: Metformin 1000mg BID, Lantus 28 units daily, Alogliptin 25mg daily, Empaglifluzin 12 5mg daily, Glipizide 5mg BID  Here: Lantus 10 units, Metformin 1000mg BID, Januvia 12 5mg, Glipizide 5mg daily, CDI, Accuchecks  Diabetic Diet  IM consulted and management at their discretion  Follow-up with PCP at discharge  Mixed hyperlipidemia  Assessment & Plan  Restart home Lipitor 40mg at night  Essential hypertension  Assessment & Plan  At home on Lisinopril 30mg daily  Here has not required BP meds  Will closely monitor BP, and add home med as appropriate  IM consulted and management at their discretion    Primary osteoarthritis of both knees  Assessment & Plan  He reports issues with bilateral knees  Dr Berta Oliveros did do TKA to R knee and was following him for this  He reports previous surgery to his L knee with multiple complications/revisions  See multi-modal pain control  Outpatient f/u with Orthopedics  Primary osteoarthritis of both shoulders  Angel Rivas  Last injection was 10/13  Typically gets them done every 6 months or so  Has not required during stay   Will follow-up with Dr Yovani Crenshaw as an outpatient  Will place order for Diclofenac gel and lidoderm for now  Cellulitis-resolved as of 1/28/2022  Assessment & Plan  Started on Keflex 1/21 by IM 5 day course - since completed  Foot no longer erythematous or red  Suspect tenderness related to pseudogout/gout, arthritis, and neuropathy  Health Maintenance  #Delirium/Sleep: At risk  No complaints at this time  Focus on environmental interventions - avoiding physical/chemical restraints  Focus on redirection and optimizing pain/bowel/bladder management  #Pain: Tylenol scheduled, Oxycodone 5-10mg PRN, Robaxin scheduled, and adding Gabapentin for neuropathic pain  Also on prednisone now  #Bowel: Last BM 1/30  Continue miralax and docusate  Bisacodyl tabs PRN do work for him  #Bladder: Voiding and continent  #Skin/Pressure Injury Prevention: Turn Q2hr in bed, with weight shifts [id number] in wheelchair  Float heels in bed  #DVT Prophylaxis: Lovenox 30mg Q12hr  Changing to 40mg tomorrow  SCDs  #GI Prophylaxis: PO diet  #Code Status:  Full Code  #FEN:  Diabetic Diet  #Dispo:  Team 1/27: ADD 2/1 with either home PT or [facility]  He has made excellent progress this past week  Goal is modified Ind  Objective:    Functional Update:  PT: Sup with all mobility, including ambulation 250' with RW    OT: Progressing to Ind with ADLs  Allergies per EMR    Physical Exam:  Temp:  [97 7 °F (36 5 °C)-98 1 °F (36 7 °C)] 97 7 °F (36 5 °C)  HR:  [80-90] 80  Resp:  [16-20] 20  BP: (113-124)/(66-77) 124/77  Oxygen Therapy  SpO2: 94 %    Gen: No acute distress, Well-nourished, well-appearing  HEENT: Moist mucus membranes, Normocephalic/Atraumatic  Cardiovascular: Regular rate, rhythm, S1/S2  Distal pulses palpable  Heme/Extr: No edema  Pulmonary: Non-labored breathing  Lungs CTAB  : No gilbert  GI: Soft, non-tender, non-distended  BS+  MSK: Markedly improved R ankle edema  Integumentary: Skin is warm, dry     Neuro: AAOx3, Speech is intact  Appropriate to questioning  Tone is normal    Psych: Normal mood and affect  Diagnostic Studies: Reviewed, no new imaging  Laboratory: Reviewed  Results from last 7 days   Lab Units 01/31/22  0558   HEMOGLOBIN g/dL 11 7*   HEMATOCRIT % 35 6*   WBC Thousand/uL 6 50     Results from last 7 days   Lab Units 01/31/22  0558   BUN mg/dL 16   POTASSIUM mmol/L 4 0   CHLORIDE mmol/L 103   CREATININE mg/dL 0 97            Patient Active Problem List   Diagnosis    Primary osteoarthritis of both shoulders    Primary osteoarthritis of both knees    Essential hypertension    Mixed hyperlipidemia    Type 2 diabetes mellitus without complication, without long-term current use of insulin ([facility]    Obesity (BMI 30-39  9)    S/P TKR (total knee replacement), right    Closed nondisplaced intertrochanteric fracture of left femur with routine healing    Pain in both feet    Polyneuropathy associated with underlying disease ([facility]    Slow transit constipation    Vitamin D deficiency    Anemia    Hypomagnesemia    Right foot pain         Medications  Current Facility-Administered Medications   Medication Dose Route Frequency Provider Last Rate    acetaminophen  650 mg Oral Q6H PRN Gipson Spittle, CRNP      acetaminophen  650 mg Oral [facility] Isael Gama MD      ascorbic acid  500 mg Oral Daily Gipson Spittle, CRNP      atorvastatin  40 mg Oral Daily With Dalia Bentley MD      cyanocobalamin  1,000 mcg Oral Daily Gipson Spittle, CRNP      Diclofenac Sodium  2 g Topical 4x Daily PRN Isael Gama MD      docusate sodium  100 mg Oral BID Isael Gama MD      Empagliflozin  12 5 mg Oral Daily Gipson Spittle, CRNP      enoxaparin  40 mg Subcutaneous Q24H [address] Isael Gama MD      ergocalciferol  50,000 Units Oral Weekly Gispon Spittle, CRNP      ferrous gluconate  324 mg Oral Daily Before Breakfast Gipson Spittle, CRNP      gabapentin  300 mg Oral [facility] MD Yony Cross glipiZIDE  5 mg Oral Daily Before Breakfast MARGARITA Rico      insulin glargine  15 Units Subcutaneous HS MARGARITA Rico      insulin lispro  1-5 Units Subcutaneous TID [facility] Pio Saab MD      insulin lispro  1-5 Units Subcutaneous HS Pio Saab MD      lidocaine  2 patch Topical Daily PRN Pio Saab MD      magnesium oxide  800 mg Oral Daily MARGARITA Rico      melatonin  6 mg Oral HS MARGARITA Rico      metFORMIN  1,000 mg Oral BID With Meals MARGARITA Rico      methocarbamol  500 mg Oral 4x Daily Pio Saab MD      ondansetron  4 mg Oral Q6H PRN Pio Saab MD      oxyCODONE  10 mg Oral Q4H PRN Pio Saab MD      oxyCODONE  5 mg Oral Q4H PRN Pio Saab MD      polyethylene glycol  17 g Oral Daily Pio Saab MD      sodium chloride  1 spray Each Nare Q2H PRN MARGARITA Rico          ** Please Note: Fluency Direct voice to text software may have been used in the creation of this documen

## 2022-01-31 NOTE — PROGRESS NOTES
[facility]  Progress Note - Lobito Chavarria 1955, 61 y o  male MRN: [id number]  Unit/Bed#: -01 Encounter: [id number]  Primary Care Provider: No primary care provider on file  Date and time admitted to hospital: 1/19/2022  3:54 PM    Right foot pain  Assessment & Plan  Started on keflex for possible cellulitis infection on dorsal aspect of foot  Also started on prednisone 40mg daily x5 days on 1/21 for possible pseudogout - completed on 1/26  R foot XR on 1/19: "No acute osseous abnormality "  Uric acid level WNL  Started on colchicine 0 6mg daily by Podiatry on 1/25, completed on 1/29 for 5 day course  Hypomagnesemia  Assessment & Plan  Improved, Mg 1 9 from 1 4 on 1/24  Continue magnesium oxide 800mg daily  Anemia  Assessment & Plan  Related to acute blood loss post-procedure  Hgb currently 11 7  Hgb was 15 0 pre-operatively  Iron panel on 1/20: Iron Sat 22%, TIBC 147, Iron 32, and Ferritin 304  Started on ferrous gluconate and ascorbic acid  Monitor for s/s of active bleeding  Continue to trend with routine CBC  Vitamin D deficiency  Assessment & Plan  Vitamin D level 11 on 1/8  Start on Vitamin D 50,000u weekly  Continue to follow-up with PCP as outpatient  Polyneuropathy associated with underlying disease ([facility] [address] )  Assessment & Plan  Continue gabapentin 100mg Q8 - increased to 300mg Q8 on 1/20  Daily skin checks/neurovascular checks  Considerations with PT/OT therapies  Vitamin B12 level 315 - started on cyanocobalamin  Obesity (BMI 30-39  9)  Assessment & Plan  BMI 32 35 on admission  Encourage lifestyle modifications      Type 2 diabetes mellitus without complication, without long-term current use of insulin [facility])  Assessment & Plan  Lab Results   Component Value Date    HGBA1C 7 2 (H) 01/08/2022       Recent Labs     01/30/22  1103 01/30/22  1623 01/30/22  2049 01/31/22  0625   POCGLU 111 163* 198* 157*       Blood Sugar Average: Last 72 hrs:  (P) 142 [id number]     Last A1c 7 5 on 11/16  Home regimen: Alogliptin 25mg daily, Jardiance 12 5mg daily, Glipizide 5mg BID, Lantus 28u at HS, and metformin 1000mg BID  Currently on Lantus 15u at HS, metformin 1000mg BID, and Jardiance 12 5mg daily  Started on glipizide 5mg daily on 1/26  Continue QID accuchecks and SSI  Continue cons  carb diet  Continue to follow-up with PCP as outpatient  Mixed hyperlipidemia  Assessment & Plan  Continue Lipitor 40mg daily  Last lipid panel on 11/16: Cholesterol 139, Triglycerides 150, HDL 32, and LDL 82  Essential hypertension  Assessment & Plan  Home lisinopril 30mg currently on hold  Monitor BP with routine VS     Primary osteoarthritis of both shoulders  Assessment & Plan  B/L shoulder glenohumeral osteoarthritis  Follows with Dr Westley Maldonado as outpatient  Last received steroid injections on 10/13/21  Ensure adequate pain control  Considerations during therapies  * Closed nondisplaced intertrochanteric fracture of left femur with routine healing  Assessment & Plan  1/6 - Mechanical fall at home onto L hip  CT abd/pelvis - L intertrochanteric femur fracture  1/7 - OR for ORIF with short TFN to L hip - Dr Bassam iRvas    Ensure adequate pain control  Neurovascular checks Q shift  WBAT to LLE  DVT ppx with Lovenox  Follow-up with Dr Bassam Rivas in 2 weeks as outpatient (1/21)  XR on 1/21: "Nearly anatomically aligned intertrochanteric fracture of the left femur, status post ORIF  No evidence for hardware complication  Status post left knee arthroplasty "    Continue PT/OT therapies  Primary team following  No hx of DXA scan - recommend to be done as outpatient  Continue Vitamin D supplementation  VTE Pharmacologic Prophylaxis:   Pharmacologic: Enoxaparin (Lovenox)  Mechanical VTE Prophylaxis in Place: Yes - sequential compression devices      Current Length of Stay: 12 day(s)    Current Patient Status: Inpatient Rehab     Discharge Plan: As per primary team     Code Status: Level 1 - Full Code    Subjective:   Pt examined while pt sitting in WC in pt room  Currently complaining of moderate pain to B/L lower extremities, 4/10, burning, aching, improves with pain medication and rest   Has noticed significant improvement in pain  Currently denies any L hip pain  Very thankful for the care he has received here  Planning on scheduling Podiatry appointment after discharge  Plans for discharge tomorrow  Did not sleep well last night, but has been sleeping well prior to this  BG have been well controlled  Has no other concerns or complaints at this time  Objective:     Vitals:   Temp (24hrs), Av 9 °F (36 6 °C), Min:97 7 °F (36 5 °C), Max:98 1 °F (36 7 °C)    Temp:  [97 7 °F (36 5 °C)-98 1 °F (36 7 °C)] 97 7 °F (36 5 °C)  HR:  [80-90] 80  Resp:  [16-20] 20  BP: (113-124)/(66-77) 124/77  SpO2:  [94 %] 94 %  Body mass index is 32 35 kg/m²  Review of Systems   Constitutional: Negative for appetite change, chills, fatigue and fever  Respiratory: Negative for cough and shortness of breath  Cardiovascular: Negative for chest pain, palpitations and leg swelling  Gastrointestinal: Negative for abdominal distention, abdominal pain, constipation, diarrhea, nausea and vomiting  LBM    Genitourinary: Negative for difficulty urinating  Musculoskeletal: Positive for arthralgias (Moderate, B/L foot pain, aching/burning, 4/10, improves with pain medication, compression, and rest)  Negative for back pain  Neurological: Negative for dizziness, weakness, light-headedness, numbness and headaches  Psychiatric/Behavioral: Positive for sleep disturbance (difficulty staying asleep last night, slept well over the weekend)  Input and Output Summary (last 24 hours):        Intake/Output Summary (Last 24 hours) at 2022 0850  Last data filed at 2022 175  Gross per 24 hour   Intake 720 ml   Output --   Net 720 ml       Physical Exam:     Physical Exam  Vitals and nursing note reviewed  Constitutional:       Appearance: Normal appearance  He is obese  HENT:      Head: Normocephalic and atraumatic  Cardiovascular:      Rate and Rhythm: Normal rate and regular rhythm  Pulses: Normal pulses  Heart sounds: Murmur heard  Systolic murmur is present with a grade of 1/6  No friction rub  Pulmonary:      Effort: Pulmonary effort is normal  No respiratory distress  Breath sounds: Examination of the right-lower field reveals decreased breath sounds  Examination of the left-lower field reveals decreased breath sounds  Decreased breath sounds present  No wheezing or rhonchi  Abdominal:      General: Abdomen is flat  Bowel sounds are normal  There is no distension  Palpations: Abdomen is soft  Tenderness: There is no abdominal tenderness  Musculoskeletal:      Cervical back: Normal range of motion and neck supple  Right lower leg: Edema (Minimal non-pitting edema) present  Left lower leg: Edema (Trace non-pitting edema) present  Skin:     General: Skin is warm and dry  Neurological:      Mental Status: He is alert and oriented to person, place, and time     Psychiatric:         Mood and Affect: Mood normal          Behavior: Behavior normal          Additional Data:     Labs:    Results from last 7 days   Lab Units 01/31/22  0558   WBC Thousand/uL 6 50   HEMOGLOBIN g/dL 11 7*   HEMATOCRIT % 35 6*   PLATELETS Thousands/uL 360   NEUTROS PCT % 55   LYMPHS PCT % 29   MONOS PCT % 12*   EOS PCT % 4     Results from last 7 days   Lab Units 01/31/22  0558   SODIUM mmol/L 141   POTASSIUM mmol/L 4 0   CHLORIDE mmol/L 103   CO2 mmol/L 30   BUN mg/dL 16   CREATININE mg/dL 0 97   ANION GAP mmol/L 8   CALCIUM mg/dL 9 0   GLUCOSE RANDOM mg/dL 170*         Results from last 7 days   Lab Units 01/31/22  0625 01/30/22  2049 01/30/22  1623 01/30/22  1103 01/30/22  0555 01/29/22  2056 01/29/22  1609 01/29/22  1127 01/29/22  0624 01/28/22  2030 01/28/22  1610 01/28/22  1118   POC GLUCOSE mg/dl 157* 198* 163* 111 133 141* 134 114 153* 145* 148* 116               Labs reviewed    Imaging:    Imaging reviewed    Recent Cultures (last 7 days):           Last 24 Hours Medication List:   Current Facility-Administered Medications   Medication Dose Route Frequency Provider Last Rate    acetaminophen  650 mg Oral Q6H PRN MARGARITA Landin      acetaminophen  650 mg Oral [facility] Wai Berry MD      ascorbic acid  500 mg Oral Daily MARGARITA Landin      atorvastatin  40 mg Oral Daily With Nedra Kraft MD      cyanocobalamin  1,000 mcg Oral Daily MARGARITA Landin      Diclofenac Sodium  2 g Topical 4x Daily PRN Wai Berry MD      docusate sodium  100 mg Oral BID Wai Berry MD      Empagliflozin  12 5 mg Oral Daily MARGARITA Landin      enoxaparin  40 mg Subcutaneous Q24H [address] Wai Berry MD      ergocalciferol  50,000 Units Oral Weekly MARGARITA Landin      ferrous gluconate  324 mg Oral Daily Before Breakfast MARGARITA Landin      gabapentin  300 mg Oral [facility] Wai Berry MD      glipiZIDE  5 mg Oral Daily Before Breakfast MARGARITA Landin      insulin glargine  15 Units Subcutaneous HS MARGARITA Landin      insulin lispro  1-5 Units Subcutaneous TID [facility] Wai Berry MD      insulin lispro  1-5 Units Subcutaneous HS Wai Berry MD      lidocaine  2 patch Topical Daily PRN Wai Berry MD      magnesium oxide  800 mg Oral Daily MARGARITA Landin      melatonin  6 mg Oral HS MARGARITA Landin      metFORMIN  1,000 mg Oral BID With Meals MARGARITA Landin      methocarbamol  500 mg Oral 4x Daily Wai Berry MD      ondansetron  4 mg Oral Q6H PRN Wai Berry MD      oxyCODONE  10 mg Oral Q4H PRN Wai Berry MD      oxyCODONE  5 mg Oral Q4H PRN Wai Berry MD      polyethylene glycol  17 g Oral Daily Wai Berry MD      sodium chloride  1 spray Each Sondra Q2H PRN Cory Boas, CRNP          M*Modal software was used to dictate this note  It may contain errors with dictating incorrect words or incorrect spelling  Please contact the provider directly with any questions

## 2022-01-31 NOTE — ASSESSMENT & PLAN NOTE
Lab Results   Component Value Date    HGBA1C 7 2 (H) 01/08/2022       Recent Labs     01/30/22  1103 01/30/22  1623 01/30/22  2049 01/31/22  0625   POCGLU 111 163* 198* 157*       Blood Sugar Average: Last 72 hrs:  (P) 142 [id number]     Last A1c 7 5 on 11/16  Home regimen: Alogliptin 25mg daily, Jardiance 12 5mg daily, Glipizide 5mg BID, Lantus 28u at HS, and metformin 1000mg BID  Currently on Lantus 15u at HS, metformin 1000mg BID, and Jardiance 12 5mg daily  Started on glipizide 5mg daily on 1/26  Continue QID accuchecks and SSI  Continue cons  carb diet  Continue to follow-up with PCP as outpatient

## 2022-01-31 NOTE — PROGRESS NOTES
01/31/22 0880   Pain Assessment   Pain Assessment Tool 0-10   Pain Score 3   Pain Location/Orientation Orientation: Right;Location: Foot   Pain Onset/Description Onset: Ongoing   Effect of Pain on Daily Activities Tolerated   Patient's Stated Pain Goal No pain   Hospital Pain Intervention(s) Shower/Bath   Multiple Pain Sites No   Restrictions/Precautions   Precautions Fall Risk;Pain   Weight Bearing Restrictions Yes   RLE Weight Bearing Per Order WBAT   LLE Weight Bearing Per Order WBAT   ROM Restrictions No   Lifestyle   Autonomy Pt agreeable to participate in OT Tx session  Oral Hygiene   Type of Assistance Needed Independent   Physical Assistance Level No physical assistance   Comment Completed in stance at sink top with RW   Oral Hygiene CARE Score 6   Grooming   Able To Comb/Brush Hair;Brush/Clean Teeth   Findings Keron in stance at RW to complete grooming task   Shower/Bathe Self   Type of Assistance Needed Independent; Adaptive equipment   Physical Assistance Level No physical assistance   Comment Pt performed shower ADL at Keron level, demonstrating ability to bathe 10/10 parts  75% of shower routine completed in stance utilizing shower grab bar for support  Pt utilized tub bench to bathe lower BLE's to feet  Pt reports having grab bars installed in shower environment and plan to IND purchase tub bench for D/C  Shower/Bathe Self CARE Score 6   Bathing   Assessed Bath Style Shower   Anticipated D/C Bath Style Shower; Tub   Able to Gather/Transport Yes   Able to Adjust Water Temperature Yes   Able to Wash/Rinse/Dry (body part) Left Arm;Right Arm;L Upper Leg;R Upper Leg;L Lower Leg/Foot;R Lower Leg/Foot;Chest;Abdomen;Perineal Area; Buttocks   Limitations Noted in Balance;Strength   Positioning Standing;Seated   Adaptive Equipment Tub Bench; Shower [employer]   Limitations Noted In Balance;ROM;LE Strength   Adaptive Equipment Grab Bars;Transfer Bench   Assessed Shower   Findings Keron side stepping in/out of wet shower environment transitioning BUE's between RW <> grab bar  Pt reports having grab bar installed in shower environment at home, and plans to IND order tub bench  Upper Body Dressing   Type of Assistance Needed Independent   Physical Assistance Level No physical assistance   Comment IND seated to don [place] shirt   Upper Body Dressing CARE Score 6   Lower Body Dressing   Type of Assistance Needed Independent   Physical Assistance Level No physical assistance   Comment IND seated performing dynamic reach to thread BLE's into under garment and loose fitting pants  IND in stance at RW to complete CM up over hips  Lower Body Dressing CARE Score 6   Putting On/Taking Off Footwear   Type of Assistance Needed Independent   Physical Assistance Level No physical assistance   Comment IND seated performing xleg technique to don B/L personal socks and sneakers  Pt requesting to not utilize B/L teds at this time  Putting On/Taking Off Footwear CARE Score 6   Picking Up Object   Type of Assistance Needed Independent; Adaptive equipment   Physical Assistance Level No physical assistance   Comment Keron in stance at RW to perform dynamic reach and retrieve clothing item from flr level   Picking Up Object CARE Score 6   Sit to Stand   Type of Assistance Needed Independent; Adaptive equipment   Physical Assistance Level No physical assistance   Comment Keron with RW   Sit to Stand CARE Score 6   Bed-Chair Transfer   Type of Assistance Needed Independent; Adaptive equipment   Physical Assistance Level No physical assistance   Comment Keron with RW   Chair/Bed-to-Chair Transfer CARE Score 6   Toileting Hygiene   Type of Assistance Needed Independent; Adaptive equipment   Physical Assistance Level No physical assistance   Comment Keron in stance at [place] [address] Score 6   Toilet Transfer   Type of Assistance Needed Independent; Adaptive equipment   Physical Assistance Level No physical assistance   Comment Keron with RW to raised toilet seat   Toilet Transfer CARE Score 6   Toilet Transfer   Surface Assessed Raised Toilet   Transfer Technique Standard   Limitations Noted In [address] Management Level of [address] Management Pt has not been agreeable to participate in simulated med management task  However, pt demo ability to identify which medications are indicated at various times of day utilizing medication reference list created by OT  Pt encouraged to utilize AM/PM pill organizer for D/C, as pt is taking 10+ medications at this time, however, pt is not receptive  Pt will be IND for med management upon D/C when utilizing medication reference list  Pt also encouraged to communicate with MD in regards to questions for medications upon D/C  Pt receptive  Cognition   Overall Cognitive Status WFL   Arousal/Participation Alert; Cooperative   Attention Attends with cues to redirect   Orientation Level Oriented X4   Memory Within functional limits   Following Commands Follows multistep commands with increased time or repetition   Additional Activities   Additional Activities Other (Comment)   Additional Activities Comments Pt demo ability to complete item retrieval from around room environment at Keron level with RW  Pt observed draping clothing items over RW to transport back to bed environment for ADL performance  Activity Tolerance   Activity Tolerance Patient tolerated treatment well   Assessment   Treatment Assessment Pt participated in 90 min skilled OT Tx session with focus on ADL performance and IADL task of med management  See above for further Tx details  Pt has achieved OT goals of Keron for ADL performance and transfer's  Pt is requesting to not utilize B/L teds at this time, as pt is experiencing dec pain in R foot and reports only utilizing teds for pain management   Pt demo ability to correctly identify medications when given different time/day scenarios and while utilizing medication reference list created by OT  Pt is not agreeable to engage in simulated med management activity, or trial AM/PM pill organizer at this time  Pt plan to D/C home on 2/1 with Outpatient PT services, as pt does not have OT needs  DME includes RW and tub bench  Pt with no further questions for OT at this time  Prognosis Fair   Problem List Decreased strength;Decreased range of motion; Impaired balance;Decreased mobility;Pain   Recommendation   OT Discharge Recommendation Home with home health rehabilitation  (PT only, no OT needs )   Equipment Recommended Shower transfer bench ($$)   OT Equipment ordered   (Pt IND ordered for D/C)   OT - OK to Discharge No   OT Therapy Minutes   OT Time In 0830   OT Time Out 1000   OT Total Time (minutes) 90   OT Mode of treatment - Individual (minutes) 90   OT Mode of treatment - Concurrent (minutes) 0   OT Mode of treatment - Group (minutes) 0   OT Mode of treatment - Co-treat (minutes) 0   OT Mode of Treatment - Total time(minutes) 90 minutes   OT Cumulative Minutes 885   Therapy Time missed   Time missed?  No

## 2022-01-31 NOTE — PLAN OF CARE
Problem: NEUROSENSORY - ADULT  Goal: Achieves stable or improved neurological status  Description: INTERVENTIONS  - Monitor and report changes in neurological status  - Monitor vital signs such as temperature, blood pressure, glucose, and any other labs ordered   - Initiate measures to prevent increased intracranial pressure  - Monitor for seizure activity and implement precautions if appropriate      1/31/2022 0457 by Bipin Rome RN  Outcome: Progressing  1/31/2022 0456 by Bipin Rome RN  Outcome: Progressing

## 2022-01-31 NOTE — ASSESSMENT & PLAN NOTE
1/6 - Mechanical fall at home onto L hip  CT abd/pelvis - L intertrochanteric femur fracture  1/7 - OR for ORIF with short TFN to L hip - Dr Ilia Brown    Ensure adequate pain control  Neurovascular checks Q shift  WBAT to LLE  DVT ppx with Lovenox  Follow-up with Dr Ilia Brown in 2 weeks as outpatient (1/21)  XR on 1/21: "Nearly anatomically aligned intertrochanteric fracture of the left femur, status post ORIF  No evidence for hardware complication  Status post left knee arthroplasty "    Continue PT/OT therapies  Primary team following  No hx of DXA scan - recommend to be done as outpatient  Continue Vitamin D supplementation

## 2022-01-31 NOTE — ASSESSMENT & PLAN NOTE
B/L shoulder glenohumeral osteoarthritis  Follows with Dr Rosalie Pichardo as outpatient  Last received steroid injections on 10/13/21  Ensure adequate pain control  Considerations during therapies

## 2022-02-01 VITALS
BODY MASS INDEX: 32.34 KG/M2 | WEIGHT: 252 LBS | OXYGEN SATURATION: 100 % | DIASTOLIC BLOOD PRESSURE: 83 MMHG | HEART RATE: 74 BPM | SYSTOLIC BLOOD PRESSURE: 128 MMHG | HEIGHT: 74 IN | TEMPERATURE: 97.6 F | RESPIRATION RATE: 18 BRPM

## 2022-02-01 LAB — GLUCOSE SERPL-MCNC: 133 MG/DL (ref 65–140)

## 2022-02-01 PROCEDURE — 99239 HOSP IP/OBS DSCHRG MGMT >30: CPT | Performed by: PHYSICAL MEDICINE & REHABILITATION

## 2022-02-01 PROCEDURE — 82948 REAGENT STRIP/BLOOD GLUCOSE: CPT

## 2022-02-01 PROCEDURE — 97530 THERAPEUTIC ACTIVITIES: CPT

## 2022-02-01 PROCEDURE — 99232 SBSQ HOSP IP/OBS MODERATE 35: CPT | Performed by: NURSE PRACTITIONER

## 2022-02-01 RX ADMIN — MAGNESIUM OXIDE TAB 400 MG (241.3 MG ELEMENTAL MG) 800 MG: 400 (241.3 MG) TAB at 09:09

## 2022-02-01 RX ADMIN — METHOCARBAMOL TABLETS 500 MG: 500 TABLET, COATED ORAL at 09:09

## 2022-02-01 RX ADMIN — DOCUSATE SODIUM 100 MG: 100 CAPSULE, LIQUID FILLED ORAL at 09:09

## 2022-02-01 RX ADMIN — GLIPIZIDE 5 MG: 5 TABLET ORAL at 06:43

## 2022-02-01 RX ADMIN — METFORMIN HYDROCHLORIDE 1000 MG: 500 TABLET ORAL at 09:09

## 2022-02-01 RX ADMIN — POLYETHYLENE GLYCOL 3350 17 G: 17 POWDER, FOR SOLUTION ORAL at 09:09

## 2022-02-01 RX ADMIN — CYANOCOBALAMIN TAB 1000 MCG 1000 MCG: 1000 TAB at 09:09

## 2022-02-01 RX ADMIN — ACETAMINOPHEN 650 MG: 325 TABLET ORAL at 06:43

## 2022-02-01 RX ADMIN — ENOXAPARIN SODIUM 40 MG: 40 INJECTION SUBCUTANEOUS at 09:09

## 2022-02-01 RX ADMIN — FERROUS GLUCONATE 324 MG: 324 TABLET ORAL at 06:43

## 2022-02-01 RX ADMIN — GABAPENTIN 300 MG: 300 CAPSULE ORAL at 06:43

## 2022-02-01 RX ADMIN — OXYCODONE HYDROCHLORIDE AND ACETAMINOPHEN 500 MG: 500 TABLET ORAL at 09:09

## 2022-02-01 RX ADMIN — EMPAGLIFLOZIN 12.5 MG: 25 TABLET, FILM COATED ORAL at 09:09

## 2022-02-01 NOTE — NURSING NOTE
Pt ready for discharge, all scripts reviewed with pt, discharge paperwork reviewed, pt shows minimal interest, states "I`ll figure it out"   Meds to be picked up at [address], called pharmacy and confirmed pt will be picking up when spouse arrives, out of pocket cost for Neurontin $13 - and pt agreeable  All other scripts to be filled by [address]  Pt packed all belongings independently   Taken downstairs by Anthony Arndt PCA and Amparo PT without any issues ,car transfer CG

## 2022-02-01 NOTE — CASE MANAGEMENT
Team d/c summary:    Pt made good rehab progress and returned home with his spouse  He will receive continued home PT through [address] [place] [address]  NO DME needs at time of d/c  Pt will  some meds at [facility] and other meds to be filled by [place]  Pt has scripts  Pt alert and oriented for d/c instructions

## 2022-02-01 NOTE — PHYSICAL THERAPY NOTE
ARC PT DC SUMMARY    61 yr old male admitted to [facility] 1/19/22 sp fall walking dog, sustained + L non displaced IT fx, with greater trochanter insertion tear  Underwent L hip ORIF by Dr Torres Ward 1/11/22  Cleared for WBAT L LE  At baseline pt lives with spouse in ML home with first floor setup, 3 ALEXSANDRA, Fully I PTA  without AD  +  PMH per pre-admission screen: DM, depression, HDL, HTN, OA B Knees sp R TKR x1, TKR x2 9 due to post op infection  PALLAVI, h/o back, shoulder and hand surgery  Pt with prolonged recovery process s/p TKRs, presenting with sig dec B Knee flexion L>R  Pt had slow start to therapy due to ongoing pain to R foot/achilles  Use of CP/MHP modailities t/o with good results  Once pain reduced/managed, and implement use of R heel lift, ambulation improved  Recommending sneakers for all transfers  At DC, pt met all set mod I/S goals  With RW support for DC home with family support and first floor setup  Encouraged amb hourly with RW  Home PT recommended to follow, questionable compliance given pts previous experiences with surgeries and therapies

## 2022-02-01 NOTE — PLAN OF CARE
Problem: NEUROSENSORY - ADULT  Goal: Achieves maximal functionality and self care  Description: INTERVENTIONS  - Monitor swallowing and airway patency with patient fatigue and changes in neurological status  - Encourage and assist patient to increase activity and self care     - Encourage visually impaired, hearing impaired and aphasic patients to use assistive/communication devices  Outcome: Progressing     Problem: CARDIOVASCULAR - ADULT  Goal: Absence of cardiac dysrhythmias or at baseline rhythm  Description: INTERVENTIONS:  - Continuous cardiac monitoring, vital signs, obtain 12 lead EKG if ordered  - Administer antiarrhythmic and heart rate control medications as ordered  - Monitor electrolytes and administer replacement therapy as ordered  Outcome: Progressing     Problem: RESPIRATORY - ADULT  Goal: Achieves optimal ventilation and oxygenation  Description: INTERVENTIONS:  - Assess for changes in respiratory status  - Assess for changes in mentation and behavior  - Position to facilitate oxygenation and minimize respiratory effort  - Oxygen administered by appropriate delivery if ordered  - Initiate smoking cessation education as indicated  - Encourage broncho-pulmonary hygiene including cough, deep breathe, Incentive Spirometry  - Assess the need for suctioning and aspirate as needed  - Assess and instruct to report SOB or any respiratory difficulty  - Respiratory Therapy support as indicated  Outcome: Progressing     Problem: GASTROINTESTINAL - ADULT  Goal: Maintains or returns to baseline bowel function  Description: INTERVENTIONS:  - Assess bowel function  - Encourage oral fluids to ensure adequate hydration  - Administer IV fluids if ordered to ensure adequate hydration  - Administer ordered medications as needed  - Encourage mobilization and activity  - Consider nutritional services referral to assist patient with adequate nutrition and appropriate food choices  Outcome: Progressing  Goal: Maintains adequate nutritional intake  Description: INTERVENTIONS:  - Monitor percentage of each meal consumed  - Identify factors contributing to decreased intake, treat as appropriate  - Assist with meals as needed  - Monitor I&O, weight, and lab values if indicated  - Obtain nutrition services referral as needed  Outcome: Progressing     Problem: GENITOURINARY - ADULT  Goal: Maintains or returns to baseline urinary function  Description: INTERVENTIONS:  - Assess urinary function  - Encourage oral fluids to ensure adequate hydration if ordered  - Administer IV fluids as ordered to ensure adequate hydration  - Administer ordered medications as needed  - Offer frequent toileting  - Follow urinary retention protocol if ordered  Outcome: Progressing

## 2022-02-01 NOTE — ASSESSMENT & PLAN NOTE
1/6 - Mechanical fall at home onto L hip  CT abd/pelvis - L intertrochanteric femur fracture  1/7 - OR for ORIF with short TFN to L hip - Dr Bassam Rivas    Ensure adequate pain control  Neurovascular checks Q shift  WBAT to LLE  DVT ppx with Lovenox  Follow-up with Dr Bassam Rivas in 2 weeks as outpatient (1/21)  XR on 1/21: "Nearly anatomically aligned intertrochanteric fracture of the left femur, status post ORIF  No evidence for hardware complication  Status post left knee arthroplasty "    Continue PT/OT therapies  Primary team following  No hx of DXA scan - recommend to be done as outpatient  Continue Vitamin D supplementation

## 2022-02-01 NOTE — PROGRESS NOTES
02/01/22 1100   Car Transfer   Type of Assistance Needed Supervision   Physical Assistance Level No physical assistance   Car Transfer CARE Score 4   Assessment   Treatment Assessment Assisted pt out to car for DC home  Spouse arrived in [facility], pt requesting to sit in back seat for more leg room, spouse stated he had to sit in passenger seat due to stuff in the back  Pt somewhat frustrated, agreeable to passenger seat  Without AD pt was able to stand from [facility] with car support and step into front seat with L LE  Cues and education were provided to sit first then turn pt stated due to limited knee flexion he is unable to do it that way  Seat was checked prior and was back as far it goes  No physical  was required  Pt cleared for DC home with home PT to follow      PT Therapy Minutes   PT Time In 1100   PT Time Out 1110   PT Total Time (minutes) 10   PT Mode of treatment - Individual (minutes) 10   PT Mode of treatment - Concurrent (minutes) 0   PT Mode of treatment - Group (minutes) 0   PT Mode of treatment - Co-treat (minutes) 0   PT Mode of Treatment - Total time(minutes) 10 minutes   PT Cumulative Minutes [id number]

## 2022-02-01 NOTE — PLAN OF CARE
Problem: Potential for Falls  Goal: Patient will remain free of falls  Description: INTERVENTIONS:  - Educate patient/family on patient safety including physical limitations  - Instruct patient to call for assistance with activity   - Consult OT/PT to assist with strengthening/mobility   - Keep Call bell within reach  - Keep bed low and locked with side rails adjusted as appropriate  - Keep care items and personal belongings within reach  - Initiate and maintain comfort rounds  - Make Fall Risk Sign visible to staff  - Offer Toileting every  Hours, in advance of need  - Initiate/Maintain alarm  - Obtain necessary fall risk management equipment:   - Apply yellow socks and bracelet for high fall risk patients  - Consider moving patient to room near nurses station  2/1/2022 1054 by Porter Dickens RN  Outcome: Adequate for Discharge  2/1/2022 1040 by Porter Dickens RN  Outcome: Progressing     Problem: NEUROSENSORY - ADULT  Goal: Achieves stable or improved neurological status  Description: INTERVENTIONS  - Monitor and report changes in neurological status  - Monitor vital signs such as temperature, blood pressure, glucose, and any other labs ordered   - Initiate measures to prevent increased intracranial pressure  - Monitor for seizure activity and implement precautions if appropriate      2/1/2022 1054 by Porter Dickens RN  Outcome: Adequate for Discharge  2/1/2022 1040 by Porter Dickens RN  Outcome: Progressing  Goal: Remains free of injury related to seizures activity  Description: INTERVENTIONS  - Maintain airway, patient safety  and administer oxygen as ordered  - Monitor patient for seizure activity, document and report duration and description of seizure to physician/advanced practitioner  - If seizure occurs,  ensure patient safety during seizure  - Reorient patient post seizure  - Seizure pads on all 4 side rails  - Instruct patient/family to notify RN of any seizure activity including if an aura is experienced  - Instruct patient/family to call for assistance with activity based on nursing assessment  - Administer anti-seizure medications if ordered    Outcome: Adequate for Discharge  Goal: Achieves maximal functionality and self care  Description: INTERVENTIONS  - Monitor swallowing and airway patency with patient fatigue and changes in neurological status  - Encourage and assist patient to increase activity and self care     - Encourage visually impaired, hearing impaired and aphasic patients to use assistive/communication devices  Outcome: Adequate for Discharge     Problem: CARDIOVASCULAR - ADULT  Goal: Maintains optimal cardiac output and hemodynamic stability  Description: INTERVENTIONS:  - Monitor I/O, vital signs and rhythm  - Monitor for S/S and trends of decreased cardiac output  - Administer and titrate ordered vasoactive medications to optimize hemodynamic stability  - Assess quality of pulses, skin color and temperature  - Assess for signs of decreased coronary artery perfusion  - Instruct patient to report change in severity of symptoms  2/1/2022 1054 by Tima Myers RN  Outcome: Adequate for Discharge  2/1/2022 1040 by Tima Myers RN  Outcome: Progressing  Goal: Absence of cardiac dysrhythmias or at baseline rhythm  Description: INTERVENTIONS:  - Continuous cardiac monitoring, vital signs, obtain 12 lead EKG if ordered  - Administer antiarrhythmic and heart rate control medications as ordered  - Monitor electrolytes and administer replacement therapy as ordered  Outcome: Adequate for Discharge     Problem: RESPIRATORY - ADULT  Goal: Achieves optimal ventilation and oxygenation  Description: INTERVENTIONS:  - Assess for changes in respiratory status  - Assess for changes in mentation and behavior  - Position to facilitate oxygenation and minimize respiratory effort  - Oxygen administered by appropriate delivery if ordered  - Initiate smoking cessation education as indicated  - Encourage broncho-pulmonary hygiene including cough, deep breathe, Incentive Spirometry  - Assess the need for suctioning and aspirate as needed  - Assess and instruct to report SOB or any respiratory difficulty  - Respiratory Therapy support as indicated  Outcome: Adequate for Discharge     Problem: GASTROINTESTINAL - ADULT  Goal: Minimal or absence of nausea and/or vomiting  Description: INTERVENTIONS:  - Administer IV fluids if ordered to ensure adequate hydration  - Maintain NPO status until nausea and vomiting are resolved  - Nasogastric tube if ordered  - Administer ordered antiemetic medications as needed  - Provide nonpharmacologic comfort measures as appropriate  - Advance diet as tolerated, if ordered  - Consider nutrition services referral to assist patient with adequate nutrition and appropriate food choices  Outcome: Adequate for Discharge  Goal: Maintains or returns to baseline bowel function  Description: INTERVENTIONS:  - Assess bowel function  - Encourage oral fluids to ensure adequate hydration  - Administer IV fluids if ordered to ensure adequate hydration  - Administer ordered medications as needed  - Encourage mobilization and activity  - Consider nutritional services referral to assist patient with adequate nutrition and appropriate food choices  Outcome: Adequate for Discharge  Goal: Maintains adequate nutritional intake  Description: INTERVENTIONS:  - Monitor percentage of each meal consumed  - Identify factors contributing to decreased intake, treat as appropriate  - Assist with meals as needed  - Monitor I&O, weight, and lab values if indicated  - Obtain nutrition services referral as needed  Outcome: Adequate for Discharge  Goal: Oral mucous membranes remain intact  Description: INTERVENTIONS  - Assess oral mucosa and hygiene practices  - Implement preventative oral hygiene regimen  - Implement oral medicated treatments as ordered  - Initiate Nutrition services referral as needed  Outcome: Adequate for Discharge     Problem: GENITOURINARY - ADULT  Goal: Maintains or returns to baseline urinary function  Description: INTERVENTIONS:  - Assess urinary function  - Encourage oral fluids to ensure adequate hydration if ordered  - Administer IV fluids as ordered to ensure adequate hydration  - Administer ordered medications as needed  - Offer frequent toileting  - Follow urinary retention protocol if ordered  Outcome: Adequate for Discharge     Problem: METABOLIC, FLUID AND ELECTROLYTES - ADULT  Goal: Electrolytes maintained within normal limits  Description: INTERVENTIONS:  - Monitor labs and assess patient for signs and symptoms of electrolyte imbalances  - Administer electrolyte replacement as ordered  - Monitor response to electrolyte replacements, including repeat lab results as appropriate  - Instruct patient on fluid and nutrition as appropriate  Outcome: Adequate for Discharge  Goal: Fluid balance maintained  Description: INTERVENTIONS:  - Monitor labs   - Monitor I/O and WT  - Instruct patient on fluid and nutrition as appropriate  - Assess for signs & symptoms of volume excess or deficit  Outcome: Adequate for Discharge  Goal: Glucose maintained within target range  Description: INTERVENTIONS:  - Monitor Blood Glucose as ordered  - Assess for signs and symptoms of hyperglycemia and hypoglycemia  - Administer ordered medications to maintain glucose within target range  - Assess nutritional intake and initiate nutrition service referral as needed  Outcome: Adequate for Discharge     Problem: SKIN/TISSUE INTEGRITY - ADULT  Goal: Skin Integrity remains intact(Skin Breakdown Prevention)  Description: Assess:  -Perform Gilberto assessment every   -Clean and moisturize skin every   -Inspect skin when repositioning, toileting, and assisting with ADLS  -Assess under medical devices such as  every   -Assess extremities for adequate circulation and sensation     Bed Management:  -Have minimal linens on bed & keep smooth, unwrinkled  -Change linens as needed when moist or perspiring  -Avoid sitting or lying in one position for more than  hours while in bed  -Keep HOB at degrees     Toileting:  -Offer bedside commode  -Assess for incontinence every   -Use incontinent care products after each incontinent episode such as     Activity:  -Mobilize patient  times a day  -Encourage activity and walks on unit  -Encourage or provide ROM exercises   -Turn and reposition patient every  Hours  -Use appropriate equipment to lift or move patient in bed  -Instruct/ Assist with weight shifting every  when out of bed in chair  -Consider limitation of chair time  hour intervals    Skin Care:  -Avoid use of baby powder, tape, friction and shearing, hot water or constrictive clothing  -Relieve pressure over bony prominences using   -Do not massage red bony areas    Next Steps:  -Teach patient strategies to minimize risks such as    -Consider consults to  interdisciplinary teams such as   Outcome: Adequate for Discharge  Goal: Incision(s), wounds(s) or drain site(s) healing without S/S of infection  Description: INTERVENTIONS  - Assess and document dressing, incision, wound bed, drain sites and surrounding tissue  - Provide patient and family education  - Perform skin care/dressing changes every Outcome: Adequate for Discharge     Problem: HEMATOLOGIC - ADULT  Goal: Maintains hematologic stability  Description: INTERVENTIONS  - Assess for signs and symptoms of bleeding or hemorrhage  - Monitor labs  - Administer supportive blood products/factors as ordered and appropriate  Outcome: Adequate for Discharge     Problem: MUSCULOSKELETAL - ADULT  Goal: Maintain or return mobility to safest level of function  Description: INTERVENTIONS:  - Assess patient's ability to carry out ADLs; assess patient's baseline for ADL function and identify physical deficits which impact ability to perform ADLs (bathing, care of mouth/teeth, toileting, grooming, dressing, etc )  - Assess/evaluate cause of self-care deficits   - Assess range of motion  - Assess patient's mobility  - Assess patient's need for assistive devices and provide as appropriate  - Encourage maximum independence but intervene and supervise when necessary  - Involve family in performance of ADLs  - Assess for home care needs following discharge   - Consider OT consult to assist with ADL evaluation and planning for discharge  - Provide patient education as appropriate  Outcome: Adequate for Discharge  Goal: Maintain proper alignment of affected body part  Description: INTERVENTIONS:  - Support, maintain and protect limb and body alignment  - Provide patient/ family with appropriate education  Outcome: Adequate for Discharge     Problem: Prexisting or High Potential for Compromised Skin Integrity  Goal: Skin integrity is maintained or improved  Description: INTERVENTIONS:  - Identify patients at risk for skin breakdown  - Assess and monitor skin integrity  - Assess and monitor nutrition and hydration status  - Monitor labs   - Assess for incontinence   - Turn and reposition patient  - Assist with mobility/ambulation  - Relieve pressure over bony prominences  - Avoid friction and shearing  - Provide appropriate hygiene as needed including keeping skin clean and dry  - Evaluate need for skin moisturizer/barrier cream  - Collaborate with interdisciplinary team   - Patient/family teaching  - Consider wound care consult   Outcome: Adequate for Discharge     Problem: PAIN - ADULT  Goal: Verbalizes/displays adequate comfort level or baseline comfort level  Description: Interventions:  - Encourage patient to monitor pain and request assistance  - Assess pain using appropriate pain scale  - Administer analgesics based on type and severity of pain and evaluate response  - Implement non-pharmacological measures as appropriate and evaluate response  - Consider cultural and social influences on pain and pain management  - Notify physician/advanced practitioner if interventions unsuccessful or patient reports new pain  Outcome: Adequate for Discharge

## 2022-02-01 NOTE — DISCHARGE INSTRUCTIONS
DISCHARGE INSTRUCTIONS: Muna Justice [age]    Bring these instructions with you to your Outpatient Physician appointments so they can order and follow-up any additional lab work or imaging recommended at time of discharge  It  is you or your caregivers responsibility to obtain follow-up MEDICATION REFILLS  As indicated through your Primary Care Physician (PCP) and other outpatient specialty provider(s) after discharge  Please follow-up with your PCP as soon as possible after discharge to set-up follow-up management and when appropriate refills  If you (or your health care proxy) have any questions or concerns regarding your acute rehabilitation stay including issues with medications, rehabilitation, and follow-up plan, please call:          [facility]'s Acute Rehabilitation Unit at [facility] at [phone]    Should you develop fevers, chills, new weakness, changes in sensation, difficulty speaking, facial weakness, confusion, shortness of breath, chest pain, or other concerning symptoms please call 911 and/or obtain transportation to nearest ER immediately  Should you develop worsening pain, swelling, or drainage notify your surgeon right away or obtain transportation to nearest ER for evaluation  PHYSICIANS to see:  Please see your doctors listed in the follow up providers section of your discharge paperwork, and take the discharge paperwork with you to your appointments  Home health has been ordered for you: Your home health agency should reach out to you or your family soon to arrange follow-up  (If [facility] health is your provider their phone number is [phone])    If you are unable to get in touch with home health you may contact your  at [phone]  [place]    LAB WORK to recommended after discharge:   Follow-up lab work at discretion of your outpatient physicians to be determined at time of your future appointments  IMAGING to follow-up:  Follow-up imaging as discussed with your recent physicians or at discretion of your outpatient physicians to be determined at time of your appointments  ADDITIONAL FINDINGS and ISSUES to follow-up:  1  Low Magnesium: Follow-up with your PCP to monitor your magnesium level while on supplements  2  Anemia: Improving, follow-up with your PCP  3  Vitamin D deficiency: You take Vitamin D every Thursday - do so for 3 more weeks starting on 2/3, and follow-up with your PCP to monitor our levels  4  Diabetic neuropathy, R foot arthritis, pseudogout: Make sure to follow-up with podiatry for continued care  Check under the "DISCHARGE PROVIDER" section of these DISCHARGE INSTRUCTIONS for provider specific issues as well  Excessive delay or lack of appropriate follow-up could potentially increase your risk of complications which could be severe and even life-threatening  It is you or your caregiver's responsibility to ensure these tests are ordered by your outpatient providers and followed up with accordingly  SKIN CARE INSTRUCTIONS to follow:  Monitor incision(s) for increased redness, swelling, pain/tenderness, discharge/pus and promptly notify your surgeon should these develop  - Should you develop significant pain, swelling, or drainage obtain transportation to nearest emergency room for immediate evaluation if unable to reach your surgeon promptly   - Should you develop uncontrolled pain, fever, chills, sweats, changes in strength, sensation, or color of this area obtain transportation to nearest emergency room for immediate evaluation  WEIGHTBEARING/ACTIVITY PRECAUTIONS to follow:  Weightbearing as tolerated  Alcohol restrictions: You are recommended to not drink alcohol at this time unless cleared by an outpatient physician  Drinking alcohol in your current functional condition can increase your risk of injury which could be severe    Drinking alcohol given your current health problems can lead to increased medical complications which could be severe  Combining alcohol with your current medications can increase your risk of injury which could be severe  Smoking restrictions: You are recommended to not smoke nicotine  Smoking increases your risk of heart attack, stroke, emphysema/COPD, and lung cancer  MEDICATIONS:  Please see a full list of your medications outlined in the After Visit Summary that is attached to these Discharge Instructions  Please note changes may have been made to your medications please refer to your discharge paperwork for your current medications and take this list with you to all your doctors appointments for your doctors to review  Please do not resume a home medication unless the medication reconciliation sheet indicates to do so, please do not assume that a medication that you were given a prescription for is the same as a medication you have at home based on both medications having the same name as dosages and frequency may have changed  Unless specifically noted in your medication list provided to you in your discharge paper work do not resume prior vitamins, minerals, or supplements you may have been taking prior to your hospitalization unless instructed by an outpatient physician in the future  Blood thinners prescribed to optimize long and short term health and decrease risk of complications but with risks related to bleeding and other complications  Lovenox (Enoxaparin) Instructions: You have been prescribed Lovenox (Enoxaparin)  This medication is used to prevent clots which can cause severe disability and even death    This medications was started prior to your acute rehabilitation course as recommended by your Orthopedic Surgeons  It was continued during your acute rehabilitation course    This medication will need to be managed by your orthopedic surgeon and/or primary care physician after discharge  Follow-up with this provider as soon as possible to ensure appropriate use  This is a strong anticoagulant (blood thinner)  It is being recommended for use by you (or your family) to decrease the risk of clotting which can be severely disabling and even life-threatening  Even when provided as recommended it can cause severe disabling and even life-threatening bleeding  Too much or too little of this blood thinner further increases your risk of this medication causing serious and even life-threatening complications such as severe bleeding, clots, strokes, and death  With that said, at this time based on best available evidence and consensus agreement, your physicians recommend you take Lovenox (Enoxaparin) medication based on your overall risks and benefits in your specific medical situation  If you (or your family/caregiver) notice black stools, bloody stools, vomit blood, develop new weakness, slurred speech, confusion or have any other concerning symptoms call 911 or obtain transportation to nearest emergency room immediately  ***Please complete the remainder of your course of lovenox (enoxaparin) as prescribed  Sedating Medications with increased risk of complications: These medication(s) was(were) started prior to your acute rehabilitation course as recommended by your prior physicians  It was continued during your acute rehabilitation course  This(these) medication(s) will need to be managed by your outpatient physician(s) after discharge  Follow-up with the appropriate provider as soon as possible to ensure appropriate use  Your goal will be to wean off of opiate medications and then onto acetaminophen only and then off of acetaminophen as well if possible  There are risks associated with opioid medications, including dependence, addiction and tolerance  The patient understands and agrees to use these medications only as prescribed   Potential side effects of the medications include, but are not limited to, constipation, drowsiness, addiction, impaired judgment and risk of fatal overdose if not taken as prescribed  The patient was warned against driving while taking sedation medications  Sharing medications is a felony  At this point in time, the patient is showing no signs of addiction, abuse, diversion or suicidal ideation  Oxycodone (opiate) pain medication has been used to help your acute pain  You tolerated this medication adequately during your recent hospital stay  You will be given a limited supply of opiate pain medications on discharge; should you require additional refills/medications, your PCP and/or surgeon may prescribe at his/her discretion  This can be quite helpful particularly for severe acute pain  They can increase your risk of falling, injury, and breathing difficulties which can be severe and even life-threatening  Note it is advisable to limit use of opiate pain medications as they can become habit forming and lead to addiction  Methocarbamol (Robaxin) pain/muscle spasm medication has been used to help your acute pain and spasms  You tolerated this medication adequately during your recent hospital stay  - Do not take with alcohol (or marijuana/cannabis) while on this medication as this can cause increased confusion, breathing problems, falls, and severe injury  - Follow-up with your primary care physician within 1-2 weeks as well as relevant specialty physician for additional management of your medical conditions, potential refills, or adjustments of this medication  MEDICAL MANAGEMENT AT HOME specific to you:    Diabetes Management:  Please check your blood sugars 2-3 times daily before meals and record them to provide to your doctors, contact your family doctor as soon as possible for blood sugars higher than 220 or lower than 100       Follow-up with PCP/family doctor regularly to ensure blood sugars remain adequately controlled  Hypertension Management:  Only take the medications prescribed for you at time of discharge - overly high or low blood pressure increases your risk for health complications    Follow-up with PCP/family doctor regularly to ensure blood pressure remains adequately controlled  Please check your blood pressure prior to taking your blood pressure medications and keep a log that you will bring with you to your follow-up doctors' appointments  >Please contact your family doctor or cardiologist immediately for a blood pressure below 100/50 and do not take your blood pressure medications until speaking with them  >Please contact your family doctor or cardiologist as soon as possible for blood pressure greater than 160/100  Acetaminophen (Tylenol) Dosing Warning: You may have up to 3000 mg of acetaminophen (Tylenol) from all sources spread out over a 24 hours period  Do not have more than that, as this can increase your risk of liver injury which can be serious  NSAID Warning:  Please avoid NSAID (including but not limited to advil, aleve, motrin, naproxen, ibuprofen, mobic, meloxicam, diclofenac etc) medications as NSAID medications may increase your risk of bleeding (which can be life-threatening), stroke, heart attack, developing/worsening GI ulcers, worsening heart failure, kidney disease, liver (cirrhosis) disease, potentially delay bone healing  Please note a summary of your hospital stay with relevant information for your doctors will try to be sent to them  Please confirm with your doctors at your follow up visits that they have received this summary and have them contact [address] [place] [address] if they have not received them along with any other medical records they may require       Alida Frances Phone Number:  [phone]

## 2022-02-01 NOTE — PROGRESS NOTES
[address] [facility]  Progress Note - Kayla Schwartz 1955, 61 y o  male MRN: [id number]  Unit/Bed#: -01 Encounter: [id number]  Primary Care Provider: No primary care provider on file  Date and time admitted to hospital: 1/19/2022  3:54 PM    Right foot pain  Assessment & Plan  Started on keflex for possible cellulitis infection on dorsal aspect of foot  Also started on prednisone 40mg daily x5 days on 1/21 for possible pseudogout - completed on 1/26  R foot XR on 1/19: "No acute osseous abnormality "  Uric acid level WNL  Started on colchicine 0 6mg daily by Podiatry on 1/25, completed on 1/29 for 5 day course  Hypomagnesemia  Assessment & Plan  Improved, Mg 1 9 from 1 4 on 1/24  Continue magnesium oxide 800mg daily  Anemia  Assessment & Plan  Related to acute blood loss post-procedure  Hgb currently 11 7  Hgb was 15 0 pre-operatively  Iron panel on 1/20: Iron Sat 22%, TIBC 147, Iron 32, and Ferritin 304  Started on ferrous gluconate and ascorbic acid  Monitor for s/s of active bleeding  Continue to trend with routine CBC  Vitamin D deficiency  Assessment & Plan  Vitamin D level 11 on 1/8  Start on Vitamin D 50,000u weekly  Continue to follow-up with PCP as outpatient  Polyneuropathy associated with underlying disease ([facility] [address] )  Assessment & Plan  Continue gabapentin 100mg Q8 - increased to 300mg Q8 on 1/20  Daily skin checks/neurovascular checks  Considerations with PT/OT therapies  Vitamin B12 level 315 - started on cyanocobalamin  Obesity (BMI 30-39  9)  Assessment & Plan  BMI 32 35 on admission  Encourage lifestyle modifications      Type 2 diabetes mellitus without complication, without long-term current use of insulin [facility])  Assessment & Plan  Lab Results   Component Value Date    HGBA1C 7 2 (H) 01/08/2022       Recent Labs     01/31/22  1110 01/31/22  1629 01/31/22  2042 02/01/22  0701   POCGLU 107 166* 143* 133       Blood Sugar Average: Last 72 hrs:  (P) 836 [id number]     Last A1c 7 5 on 11/16  Home regimen: Alogliptin 25mg daily, Jardiance 12 5mg daily, Glipizide 5mg BID, Lantus 28u at HS, and metformin 1000mg BID  Currently on Lantus 15u at HS, metformin 1000mg BID, and Jardiance 12 5mg daily  Started on glipizide 5mg daily on 1/26  Continue QID accuchecks and SSI  Continue cons  carb diet  Continue to follow-up with PCP as outpatient  Mixed hyperlipidemia  Assessment & Plan  Continue Lipitor 40mg daily  Last lipid panel on 11/16: Cholesterol 139, Triglycerides 150, HDL 32, and LDL 82  Essential hypertension  Assessment & Plan  Home lisinopril 30mg currently on hold  Monitor BP with routine VS     Primary osteoarthritis of both shoulders  Assessment & Plan  B/L shoulder glenohumeral osteoarthritis  Follows with Dr Rebecca Barajas as outpatient  Last received steroid injections on 10/13/21  Ensure adequate pain control  Considerations during therapies  * Closed nondisplaced intertrochanteric fracture of left femur with routine healing  Assessment & Plan  1/6 - Mechanical fall at home onto L hip  CT abd/pelvis - L intertrochanteric femur fracture  1/7 - OR for ORIF with short TFN to L hip - Dr Timoteo Cross    Ensure adequate pain control  Neurovascular checks Q shift  WBAT to LLE  DVT ppx with Lovenox  Follow-up with Dr Timoteo Cross in 2 weeks as outpatient (1/21)  XR on 1/21: "Nearly anatomically aligned intertrochanteric fracture of the left femur, status post ORIF  No evidence for hardware complication  Status post left knee arthroplasty "    Continue PT/OT therapies  Primary team following  No hx of DXA scan - recommend to be done as outpatient  Continue Vitamin D supplementation  VTE Pharmacologic Prophylaxis:   Pharmacologic: Enoxaparin (Lovenox)  Mechanical VTE Prophylaxis in Place: Yes - sequential compression devices      Current Length of Stay: 13 day(s)    Current Patient Status: Inpatient Rehab     Discharge Plan: As per primary team     Code Status: Level 1 - Full Code    Subjective:   Pt examined while sitting in recliner in pt room  Plans for discharge today  Currently has no concerns or complaints  Very thankful for the care that he has received  Plans to follow-up with Podiatry as outpatient for further foot pain management  Currently denies any pain  Reviewed DM regimen and how to make adjustments once discharged  [facility] if [facility] develops any questions  Objective:     Vitals:   Temp (24hrs), Av 1 °F (36 7 °C), Min:97 6 °F (36 4 °C), Max:98 5 °F (36 9 °C)    Temp:  [97 6 °F (36 4 °C)-98 5 °F (36 9 °C)] 97 6 °F (36 4 °C)  HR:  [74-94] 74  Resp:  [18] 18  BP: (104-128)/(64-83) 128/83  SpO2:  [93 %-100 %] 100 %  Body mass index is 32 35 kg/m²  Review of Systems   Constitutional: Negative for appetite change, chills, fatigue and fever  Respiratory: Negative for cough and shortness of breath  Cardiovascular: Negative for chest pain, palpitations and leg swelling  Gastrointestinal: Negative for abdominal distention, abdominal pain, constipation, diarrhea, nausea and vomiting  LBM    Genitourinary: Negative for difficulty urinating  Musculoskeletal: Negative for arthralgias and back pain  Neurological: Negative for dizziness, weakness, light-headedness, numbness and headaches  Psychiatric/Behavioral: Negative for sleep disturbance  Input and Output Summary (last 24 hours): Intake/Output Summary (Last 24 hours) at 2022 0908  Last data filed at 2022 1730  Gross per 24 hour   Intake 714 ml   Output --   Net 714 ml       Physical Exam:     Physical Exam  Vitals and nursing note reviewed  Constitutional:       Appearance: Normal appearance  He is obese  HENT:      Head: Normocephalic and atraumatic  Neck:      Vascular: No carotid bruit  Cardiovascular:      Rate and Rhythm: Normal rate and regular rhythm  Pulses: Normal pulses        Heart sounds: Murmur heard  Systolic murmur is present with a grade of 1/6  No friction rub  Pulmonary:      Effort: Pulmonary effort is normal  No respiratory distress  Breath sounds: Normal breath sounds  No wheezing or rhonchi  Abdominal:      General: Abdomen is flat  Bowel sounds are normal  There is no distension  Palpations: Abdomen is soft  Tenderness: There is no abdominal tenderness  Musculoskeletal:      Cervical back: Normal range of motion and neck supple  Right lower leg: Edema (Minimal non-pitting edema) present  Left lower leg: Edema (Trace non-pitting edema) present  Skin:     General: Skin is warm and dry  Capillary Refill: Capillary refill takes less than 2 seconds  Neurological:      Mental Status: He is alert and oriented to person, place, and time     Psychiatric:         Mood and Affect: Mood normal          Behavior: Behavior normal          Additional Data:     Labs:    Results from last 7 days   Lab Units 01/31/22  0558   WBC Thousand/uL 6 50   HEMOGLOBIN g/dL 11 7*   HEMATOCRIT % 35 6*   PLATELETS Thousands/uL 360   NEUTROS PCT % 55   LYMPHS PCT % 29   MONOS PCT % 12*   EOS PCT % 4     Results from last 7 days   Lab Units 01/31/22  0558   SODIUM mmol/L 141   POTASSIUM mmol/L 4 0   CHLORIDE mmol/L 103   CO2 mmol/L 30   BUN mg/dL 16   CREATININE mg/dL 0 97   ANION GAP mmol/L 8   CALCIUM mg/dL 9 0   GLUCOSE RANDOM mg/dL 170*         Results from last 7 days   Lab Units 02/01/22  0701 01/31/22  2042 01/31/22  1629 01/31/22  1110 01/31/22  0625 01/30/22  2049 01/30/22  1623 01/30/22  1103 01/30/22  0555 01/29/22  2056 01/29/22  1609 01/29/22  1127   POC GLUCOSE mg/dl 133 143* 166* 107 157* 198* 163* 111 133 141* 134 114               Labs reviewed    Imaging:    Imaging reviewed    Recent Cultures (last 7 days):           Last 24 Hours Medication List:   Current Facility-Administered Medications   Medication Dose Route Frequency Provider Last Rate    acetaminophen 650 mg Oral Q6H PRN MARGARITA Landin      acetaminophen  650 mg Oral [facility] Wai Berry MD      ascorbic acid  500 mg Oral Daily MARGARITA Landin      atorvastatin  40 mg Oral Daily With Nedra Kraft MD      cyanocobalamin  1,000 mcg Oral Daily MARGARITA Landin      Diclofenac Sodium  2 g Topical 4x Daily PRN Wai Berry MD      docusate sodium  100 mg Oral BID Wai Berry MD      Empagliflozin  12 5 mg Oral Daily MARGARITA Landin      enoxaparin  40 mg Subcutaneous Q24H [facility] Wai Berry MD      ergocalciferol  50,000 Units Oral Weekly MARGARITA Landin      ferrous gluconate  324 mg Oral Daily Before Breakfast MARGARITA Landin      gabapentin  300 mg Oral [facility] Wai Berry MD      glipiZIDE  5 mg Oral Daily Before Breakfast MARGARITA Landin      insulin glargine  15 Units Subcutaneous HS MARGARITA Landin      insulin lispro  1-5 Units Subcutaneous TID [facility] Wai Berry MD      insulin lispro  1-5 Units Subcutaneous HS Wai Berry MD      lidocaine  2 patch Topical Daily PRN Wai Berry MD      magnesium oxide  800 mg Oral Daily MARGARITA Landin      melatonin  6 mg Oral HS MARGARITA Landin      metFORMIN  1,000 mg Oral BID With Meals MARGARITA Landin      methocarbamol  500 mg Oral 4x Daily Wai Berry MD      ondansetron  4 mg Oral Q6H PRN Wai Berry MD      oxyCODONE  10 mg Oral Q4H PRN Wai Berry MD      oxyCODONE  5 mg Oral Q4H PRN Wai Berry MD      polyethylene glycol  17 g Oral Daily Wai Berry MD      sodium chloride  1 spray Each Nare Q2H PRN MARGARITA Landin          M*Modal software was used to dictate this note  It may contain errors with dictating incorrect words or incorrect spelling  Please contact the provider directly with any questions

## 2022-02-01 NOTE — ASSESSMENT & PLAN NOTE
Lab Results   Component Value Date    HGBA1C 7 2 (H) 01/08/2022       Recent Labs     01/31/22  1110 01/31/22  1629 01/31/22 2042 02/01/22  0701   POCGLU 107 166* 143* 133       Blood Sugar Average: Last 72 hrs:  (P) 030 [id number]     Last A1c 7 5 on 11/16  Home regimen: Alogliptin 25mg daily, Jardiance 12 5mg daily, Glipizide 5mg BID, Lantus 28u at HS, and metformin 1000mg BID  Currently on Lantus 15u at HS, metformin 1000mg BID, and Jardiance 12 5mg daily  Started on glipizide 5mg daily on 1/26  Continue QID accuchecks and SSI  Continue cons  carb diet  Continue to follow-up with PCP as outpatient

## 2022-02-01 NOTE — ASSESSMENT & PLAN NOTE
B/L shoulder glenohumeral osteoarthritis  Follows with Dr Edel Cherry as outpatient  Last received steroid injections on 10/13/21  Ensure adequate pain control  Considerations during therapies

## 2022-02-03 NOTE — PROGRESS NOTES
02/03/22 1234   Hello, [Guardians Name / Maggie Moreno, this is [Caller [place] Ayden from [facility], and our clinical care team wanted to check on you / your child after your recent visit to the hospital  It will only take 3-5 minutes  Is this a good time? Discharge Call Type/ Specific Diagnosis: General Call   General Discharge Phone Call   Were your/your child's discharge instructions clear and understandable? Please tell me in your own words how to care for yourself/your child now that you're home Yes;Patient understood instructions   Have you filled your/your child's new prescriptions yet? Yes   What questions do you have about those medications? No questions   Are you/your child having any unusual symptoms or problems? (Specific to problem- i e , dressing, pain, bruising or swelling, procedure, etc ) No reported symptoms/problems   Do you have follow up appointment with your/your child's physician? Yes   Is there anything preventing you from keeping that appointment? No;Patient able to keep appointment   Are there any physicians, nurses, or hospital staff you would like us to recognize for doing a very good job? Nurse;PCA/Tech;PT/OT/RT/SLP  (Everyone did a great job!)   Thank you for taking the time to share with me about your care and recovery  Do you have any suggestions for us? No   This call resulted in: No interventions needed   Call Complete   Attempted Number of Calls 2   Discharge phone call complete? Complete   Hi, This is ________ from [facility]  This is just a courtesy call, and there is no need to call us back  Have a great day     (Called by [address])

## 2022-02-05 NOTE — OCCUPATIONAL THERAPY NOTE
OT DISCHARGE SUMMARY    Pt made good progress during stay on the [facility] following admission for closed nondisplaced intertrochanteric fx of L femur with routine healing  Pt presented upon IE with generalized weakness, decreased endurance, activity tolerance, and functional mobility  On evaluation, pt required Ax2 to complete all ADLs and functional transfers  Pt was discharged to home with wife support for IADLs  Pt currently functioning at Keron level for ADL, Keron level for transfers with RW  The following DME was recommended tub bench, BSC pt has DME  Family training not needed at this time  Pt has met all OT goals, pt to DC home with home PT only         Radha Aquino MS, OTR/L

## 2022-02-14 ENCOUNTER — HOSPITAL ENCOUNTER (EMERGENCY)
Facility: HOSPITAL | Age: 67
Discharge: HOME | End: 2022-02-14
Attending: EMERGENCY MEDICINE
Payer: MEDICARE

## 2022-02-14 VITALS
DIASTOLIC BLOOD PRESSURE: 74 MMHG | SYSTOLIC BLOOD PRESSURE: 121 MMHG | HEART RATE: 97 BPM | RESPIRATION RATE: 16 BRPM | TEMPERATURE: 98.6 F | OXYGEN SATURATION: 97 %

## 2022-02-14 DIAGNOSIS — M79.671 PAIN IN RIGHT FOOT: ICD-10-CM

## 2022-02-14 DIAGNOSIS — L03.115 CELLULITIS OF RIGHT FOOT: Primary | ICD-10-CM

## 2022-02-14 PROCEDURE — 99283 EMERGENCY DEPT VISIT LOW MDM: CPT | Performed by: EMERGENCY MEDICINE

## 2022-02-14 PROCEDURE — 99283 EMERGENCY DEPT VISIT LOW MDM: CPT

## 2022-02-14 RX ORDER — OXYCODONE HYDROCHLORIDE 5 MG/1
5-10 TABLET ORAL EVERY 4 HOURS PRN
Qty: 20 TABLET | Refills: 0 | Status: SHIPPED | OUTPATIENT
Start: 2022-02-14 | End: 2022-02-24

## 2022-02-14 RX ORDER — OXYCODONE HYDROCHLORIDE 10 MG/1
10 TABLET ORAL ONCE
Status: COMPLETED | OUTPATIENT
Start: 2022-02-14 | End: 2022-02-14

## 2022-02-14 RX ORDER — CEPHALEXIN 250 MG/1
500 CAPSULE ORAL ONCE
Status: COMPLETED | OUTPATIENT
Start: 2022-02-14 | End: 2022-02-14

## 2022-02-14 RX ORDER — CEPHALEXIN 500 MG/1
500 CAPSULE ORAL EVERY 6 HOURS SCHEDULED
Qty: 40 CAPSULE | Refills: 0 | Status: SHIPPED | OUTPATIENT
Start: 2022-02-14 | End: 2022-02-24

## 2022-02-14 RX ORDER — LIDOCAINE 50 MG/G
1 PATCH TOPICAL ONCE
Status: DISCONTINUED | OUTPATIENT
Start: 2022-02-14 | End: 2022-02-14 | Stop reason: HOSPADM

## 2022-02-14 RX ADMIN — LIDOCAINE 5% 1 PATCH: 700 PATCH TOPICAL at 12:05

## 2022-02-14 RX ADMIN — OXYCODONE HYDROCHLORIDE 10 MG: 10 TABLET ORAL at 10:57

## 2022-02-14 RX ADMIN — CEPHALEXIN 500 MG: 250 CAPSULE ORAL at 12:05

## 2022-02-14 NOTE — ED PROVIDER NOTES
History  Chief Complaint   Patient presents with    Foot Swelling     pt c/o R foot swelling over the weekend and unable to bear weight; pt states recent problem with foot was told it was arthritis causing swelling and went to rehab  pt to see podiatry on wed     73-year-old male presents with pain and swelling to the right foot with mild erythema to the distal aspect of the right foot  He was recently in the hospital and rehabilitation at [facility] and states that he does have intermittent swelling of his foot after having an injury to his hip  Patient is to see podiatry on Wednesday but states that he was in too much pain to wait until Wednesday to see the specialist so presents the emergency department for pain control  He states that there is no new trauma to his foot  He does have erythema with swelling, possible cellulitis  He states that he has had gout in the past and this does not feel like gout  Neurovascular intact  No swelling to the calf or upper leg, no history of DVT or PE  Patient is on Eliquis  Prior to Admission Medications   Prescriptions Last Dose Informant Patient Reported? Taking?    Empagliflozin 25 MG TABS   No No   Sig: Take 0 5 tablets (12 5 mg total) by mouth in the morning   acetaminophen (TYLENOL) 325 mg tablet   No No   Sig: Take 2 tablets (650 mg total) by mouth every 6 (six) hours as needed for mild pain, moderate pain or fever   ascorbic acid (VITAMIN C) 500 MG tablet   No No   Sig: Take 1 tablet (500 mg total) by mouth daily   atorvastatin (LIPITOR) 80 mg tablet   Yes No   Sig: TAKE 40MG (ONE-HALF TABLET) BY MOUTH EVERY DAY AT 5 PM FOR CHOLESTEROL   cyanocobalamin (VITAMIN B-12) 1000 MCG tablet   No No   Sig: Take 1 tablet (1,000 mcg total) by mouth daily   docusate sodium (COLACE) 100 mg capsule   No No   Sig: Take 1 capsule (100 mg total) by mouth 2 (two) times a day   enoxaparin (LOVENOX) 40 mg/0 4 mL   No No   Sig: Inject 0 4 mL (40 mg total) under the skin every 24 hours for 14 days   ergocalciferol (VITAMIN D2) 50,000 units   No No   Sig: Take 1 capsule (50,000 Units total) by mouth once a week for 21 days   ferrous gluconate (FERGON) 324 mg tablet   No No   Sig: Take 1 tablet (324 mg total) by mouth daily before breakfast   gabapentin (NEURONTIN) 300 mg capsule   No No   Sig: Take 1 capsule (300 mg total) by mouth every 8 (eight) hours   glipiZIDE (GLUCOTROL) 5 mg tablet   No No   Sig: Take 1 tablet (5 mg total) by mouth daily before breakfast   insulin glargine (LANTUS SOLOSTAR) 100 units/mL injection pen   No No   Sig: Inject 15 Units under the skin daily at bedtime   magnesium oxide (MAG-OX) 400 mg   No No   Sig: Take 1 tablet (400 mg total) by mouth daily   melatonin 5 MG TABS   No No   Sig: Take 1 tablet (5 mg total) by mouth daily at bedtime   metFORMIN (GLUCOPHAGE) 1000 MG tablet   Yes No   Sig: TAKE ONE TABLET BY MOUTH TWICE A DAY WITH MEALS FOR DIABETES   methocarbamol (ROBAXIN) 500 mg tablet   No No   Sig: Take 1 tablet (500 mg total) by mouth 4 (four) times a day as needed for muscle spasms   polyethylene glycol (MIRALAX) 17 g packet   No No   Sig: Take 17 g by mouth daily      Facility-Administered Medications: None       Past Medical History:   Diagnosis Date    Depression     Diabetes mellitus (HCC)     Hyperlipidemia     Hypertension     Osteoarthritis, knee        Past Surgical History:   Procedure Laterality Date    BACK SURGERY      HAND SURGERY      JOINT REPLACEMENT Left     l tkr    KNEE SURGERY Left     [facility] TOTAL KNEE ARTHROPLASTY Right 11/19/2018    Procedure: ARTHROPLASTY KNEE TOTAL;  Surgeon: Yoselyn Hathaway MD;  Location:  MAIN OR;  Service: Orthopedics    TOOTH EXTRACTION      WISDOM TOOTH EXTRACTION         Family History   Problem Relation Age of Onset    No Known Problems Mother     Heart attack Father     Arthritis Family     Cancer Family     Diabetes Family     Heart disease Family     Osteoporosis Family I have reviewed and agree with the history as documented  E-Cigarette/Vaping    E-Cigarette Use Never User      E-Cigarette/Vaping Substances     Social History     Tobacco Use    Smoking status: Former Smoker     Types: Cigarettes     Quit date: 3/1/2012     Years since quittin 9    Smokeless tobacco: Never Used   Vaping Use    Vaping Use: Never used   Substance Use Topics    Alcohol use: Yes     Comment: 'couple beers every couple weeks'    Drug use: No       Review of Systems   Constitutional: Negative for chills and fever  Respiratory: Negative for chest tightness and shortness of breath  Cardiovascular: Negative for chest pain and leg swelling  Gastrointestinal: Negative for abdominal pain, nausea and vomiting  Musculoskeletal: Negative for back pain and neck pain  Right foot pain, swelling, erythema   Skin: Positive for color change (Erythema to distal right foot)  Negative for wound  Neurological: Negative for dizziness, weakness, numbness and headaches  Hematological: Does not bruise/bleed easily  Physical Exam  Physical Exam  Vitals reviewed  Constitutional:       General: He is not in acute distress  Appearance: He is well-developed  He is not diaphoretic  HENT:      Head: Normocephalic and atraumatic  Eyes:      Conjunctiva/sclera: Conjunctivae normal    Pulmonary:      Effort: Pulmonary effort is normal  No respiratory distress  Breath sounds: Normal breath sounds  Musculoskeletal:         General: Swelling and tenderness (Right foot) present  Normal range of motion  Cervical back: Normal range of motion and neck supple  Right lower leg: No edema  Left lower leg: No edema  Skin:     General: Skin is warm and dry  Coloration: Skin is not pale  Findings: Erythema ( distal right foot) present  Neurological:      General: No focal deficit present  Mental Status: He is alert and oriented to person, place, and time  Cranial Nerves: No cranial nerve deficit  Motor: No weakness  Psychiatric:         Behavior: Behavior normal          Vital Signs  ED Triage Vitals [02/14/22 1024]   Temperature Pulse Respirations Blood Pressure SpO2   98 6 °F (37 °C) 97 16 121/74 97 %      Temp Source Heart Rate Source Patient Position - Orthostatic VS BP Location FiO2 (%)   Oral Monitor Sitting Left arm --      Pain Score       7           Vitals:    02/14/22 1024   BP: 121/74   Pulse: 97   Patient Position - Orthostatic VS: Sitting         Visual Acuity      ED Medications  Medications   oxyCODONE (ROXICODONE) immediate release tablet 10 mg (10 mg Oral Given 2/14/22 1057)   cephalexin (KEFLEX) capsule 500 mg (500 mg Oral Given 2/14/22 1205)       Diagnostic Studies  Results Reviewed     None                 No orders to display              Procedures  Procedures         ED Course                               SBIRT 22yo+      Most Recent Value   SBIRT (22 yo +)    In order to provide better care to our patients, we are screening all of our patients for alcohol and drug use  Would it be okay to ask you these screening questions? Yes Filed at: 02/14/2022 1041   Initial Alcohol Screen: US AUDIT-C     1  How often do you have a drink containing alcohol? 0 Filed at: 02/14/2022 1041   2  How many drinks containing alcohol do you have on a typical day you are drinking? 0 Filed at: 02/14/2022 1041   3a  Male UNDER 65: How often do you have five or more drinks on one occasion? 0 Filed at: 02/14/2022 1041   3b  FEMALE Any Age, or MALE 65+: How often do you have 4 or more drinks on one occassion? 0 Filed at: 02/14/2022 1041   Audit-C Score 0 Filed at: 02/14/2022 1041   IDRIS: How many times in the past year have you    Used an illegal drug or used a prescription medication for non-medical reasons?  Never Filed at: 02/14/2022 1041                    MDM  Number of Diagnoses or Management Options  Cellulitis of right foot  Pain in right foot  Diagnosis management comments: Patient feels improved after pain control given here  Patient is sent home with pain control to hold him until he is able to see podiatry on Wednesday  No x-ray performed as the patient did denies trauma to the area  He is put on antibiotics out of concern for cellulitis causing warmth and swelling and pain  Patient has no swelling to calf for leg and does not need evaluation for DVT at this time  Advised return precaution in case of new symptoms, systemic illness, etc        Disposition  Final diagnoses:   Cellulitis of right foot   Pain in right foot     Time reflects when diagnosis was documented in both MDM as applicable and the Disposition within this note     Time User Action Codes Description Comment    2/14/2022 12:02 PM Shay Austin Add [L03 115] Cellulitis of right foot     2/14/2022 12:02 PM Shay Austin Add [B56 119] Pain in right foot       ED Disposition     ED Disposition Condition Date/Time Comment    Discharge Stable Mon Feb 14, 2022 12:01 PM Martha Bauman discharge to home/self care              Follow-up Information     Follow up With Specialties Details Why Contact Info Additional Information    [facility] Emergency Department Emergency Medicine  If symptoms worsen [address] Emergency Department, [address] [place] [address], [id number]    your podiatrist for follow up               Discharge Medication List as of 2/14/2022 12:10 PM      START taking these medications    Details   cephalexin (KEFLEX) 500 mg capsule Take 1 capsule (500 mg total) by mouth every 6 (six) hours for 10 days For right foot skin infection, Starting Mon 2/14/2022, Until Thu 2/24/2022, Normal      Diclofenac Sodium (VOLTAREN) 1 % Apply 2 g topically 4 (four) times a day Topically for pain control as needed, Starting Mon 2/14/2022, Normal oxyCODONE (Roxicodone) 5 immediate release tablet Take 1-2 tablets (5-10 mg total) by mouth every 4 (four) hours as needed for severe pain for up to 10 days Max Daily Amount: 60 mg, Starting Mon 2/14/2022, Until Thu 2/24/2022 at 2359, Normal         CONTINUE these medications which have NOT CHANGED    Details   acetaminophen (TYLENOL) 325 mg tablet Take 2 tablets (650 mg total) by mouth every 6 (six) hours as needed for mild pain, moderate pain or fever, Starting Mon 1/31/2022, No Print      ascorbic acid (VITAMIN C) 500 MG tablet Take 1 tablet (500 mg total) by mouth daily, Starting Tue 2/1/2022, Print      atorvastatin (LIPITOR) 80 mg tablet TAKE 40MG (ONE-HALF TABLET) BY MOUTH EVERY DAY AT 5 PM FOR CHOLESTEROL, Historical Med      cyanocobalamin (VITAMIN B-12) 1000 MCG tablet Take 1 tablet (1,000 mcg total) by mouth daily, Starting Tue 2/1/2022, Print      docusate sodium (COLACE) 100 mg capsule Take 1 capsule (100 mg total) by mouth 2 (two) times a day, Starting Mon 1/31/2022, Print      Empagliflozin 25 MG TABS Take 0 5 tablets (12 5 mg total) by mouth in the morning, Starting Tue 2/1/2022, Print      enoxaparin (LOVENOX) 40 mg/0 4 mL Inject 0 4 mL (40 mg total) under the skin every 24 hours for 14 days, Starting Wed 2/2/2022, Until Wed 2/16/2022, Print      ergocalciferol (VITAMIN D2) 50,000 units Take 1 capsule (50,000 Units total) by mouth once a week for 21 days, Starting Thu 2/3/2022, Until Thu 2/24/2022, Print      ferrous gluconate (FERGON) 324 mg tablet Take 1 tablet (324 mg total) by mouth daily before breakfast, Starting Tue 2/1/2022, Print      gabapentin (NEURONTIN) 300 mg capsule Take 1 capsule (300 mg total) by mouth every 8 (eight) hours, Starting Mon 1/31/2022, Normal      glipiZIDE (GLUCOTROL) 5 mg tablet Take 1 tablet (5 mg total) by mouth daily before breakfast, Starting Tue 2/1/2022, Print      insulin glargine (LANTUS SOLOSTAR) 100 units/mL injection pen Inject 15 Units under the skin daily at bedtime, Starting Mon 1/31/2022, No Print      magnesium oxide (MAG-OX) 400 mg Take 1 tablet (400 mg total) by mouth daily, Starting Tue 2/1/2022, Normal      melatonin 5 MG TABS Take 1 tablet (5 mg total) by mouth daily at bedtime, Starting Mon 1/31/2022, Print      metFORMIN (GLUCOPHAGE) 1000 MG tablet TAKE ONE TABLET BY MOUTH TWICE A DAY WITH MEALS FOR DIABETES, Historical Med      methocarbamol (ROBAXIN) 500 mg tablet Take 1 tablet (500 mg total) by mouth 4 (four) times a day as needed for muscle spasms, Starting Mon 1/31/2022, Print      polyethylene glycol (MIRALAX) 17 g packet Take 17 g by mouth daily, Starting Tue 2/1/2022, Print             No discharge procedures on file      PDMP Review       Value Time User    PDMP Reviewed  Yes 1/31/2022  1:00 PM Reyna Ríos MD          ED Provider  Electronically Signed by           Shelton García DO  02/14/22 6388

## 2022-02-28 ENCOUNTER — TELEPHONE (OUTPATIENT)
Dept: OBGYN CLINIC | Facility: HOSPITAL | Age: 67
End: 2022-02-28

## 2022-03-02 ENCOUNTER — APPOINTMENT (OUTPATIENT)
Dept: PHYSICAL THERAPY | Facility: MEDICAL CENTER | Age: 67
End: 2022-03-02
Payer: MEDICARE

## 2022-03-07 ENCOUNTER — EVALUATION (OUTPATIENT)
Dept: PHYSICAL THERAPY | Facility: MEDICAL CENTER | Age: 67
End: 2022-03-07
Payer: MEDICARE

## 2022-03-07 DIAGNOSIS — R26.89 BALANCE PROBLEM: ICD-10-CM

## 2022-03-07 DIAGNOSIS — R53.81 PHYSICAL DECONDITIONING: ICD-10-CM

## 2022-03-07 DIAGNOSIS — Z87.81 S/P LEFT HIP FRACTURE: Primary | ICD-10-CM

## 2022-03-07 PROCEDURE — 97110 THERAPEUTIC EXERCISES: CPT

## 2022-03-07 PROCEDURE — 97161 PT EVAL LOW COMPLEX 20 MIN: CPT

## 2022-03-07 NOTE — LETTER
2022    Ady John MD  [address] [facility] [address],  [address] [place] [id number]    Patient: William Blake   YOB: 1955   Date of Visit: 3/7/2022     Encounter Diagnosis     ICD-10-CM    1  S/p left hip fracture  Z87 81    2  Physical deconditioning  R53 81    3  Balance problem  R26 89        Dear Dr Shyam García: Thank you for your recent referral of William Blake  Please review the attached evaluation summary from Joseph's recent visit  Please verify that you agree with the plan of care by signing the attached order  If you have any questions or concerns, please do not hesitate to call  I sincerely appreciate the opportunity to share in the care of one of your patients and hope to have another opportunity to work with you in the near future  Sincerely,    Baltazar Acharya, PT      Referring Provider:      I certify that I have read the below Plan of Care and certify the need for these services furnished under this plan of treatment while under my care  Ady John MD  [address] [facility] [address],  [address] [place] [id number]  Via Fax: [phone]          PT Evaluation     Today's date: 3/7/2022  Patient name: William Blake  : 1955  MRN: [id number]  Referring provider: Sheng Hinojosa MD  Dx:   Encounter Diagnosis     ICD-10-CM    1  S/p left hip fracture  Z87 81    2  Physical deconditioning  R53 81    3  Balance problem  R26 89        Start Time: 1545  Stop Time: 1640  Total time in clinic (min): 55 minutes    Assessment  Assessment details: William Blake is a 61 y o  male presenting with s/p nondisplaced intertrochanteric fracture with partial tear at greater trochanter insertion  Pt received ORIF on  with [facility]   Objective examination is consistent with expected/associated impairments, including decreased L hip strength, decreased endurance, decreased general LE strength/power, impaired ROM, impaired transfers, balance deficits, difficulty with bed mobility, and impaired ability to perform ADLs  Pt also has history of B TKA with residual ROM deficits that appear to be negatively impacting pt's current level of function  Will benefit from skilled PT interventions for strengthening/endurance training, neuromuscular reeducation, balance training, gait training, and functional activity training to assist pt in returning to PLOF  Impairments: abnormal or restricted ROM, abnormal movement, activity intolerance, impaired physical strength, lacks appropriate home exercise program and pain with function    Symptom irritability: lowUnderstanding of Dx/Px/POC: good   Prognosis: good    Goals    Short Term Goals: In 4 weeks, the patient will:  1  Increase 2 minute walk test by 50 ft (> MDC of 40 ft) to indicate improving CV and muscular endurance  2  Increase L hip flexion strength to 3+/5 to indicate improving L hip flexor strength   3  Independent with HEP for improved carryover from PT  4  Increase 30s STS to > repetitions   5  Increase SLS to >5s B with no UE support     Long Term Goals: In 8 weeks, the patient will:  1  Increase 2 minute walk test to >490 ft with LRAD or no AD to indicate pt has met age matched norms for ambulation endurance   2  Increase SLS to > 15s B   3  Increase L hip strength to 4+/5 globally   4  Improve 30s STS to 10 repetitions with less than total reliance on BUE to indicate improve LE strength/power   5  Increase FOTO to greater than predicted value to indicate significantly improved level of function   6   Independent with HEP for selfcare        Plan  Referral necessary: No  Planned therapy interventions: functional ROM exercises, graded activity, graded exercise, home exercise program, joint mobilization, manual therapy, neuromuscular re-education, patient education, strengthening, stretching, therapeutic activities, therapeutic exercise, balance, balance/weight bearing training, transfer training, self care, postural training and ADL retraining  Frequency: 3x week  Duration in weeks: 8  Treatment plan discussed with: patient        Subjective  Pt presents s/p L hip fracture with ORIF  Pt reports that he had home health PT for two weeks, but didn't feel like it was helpful  Pt lives in his home with his wife, and has been able to get around at home as needed on crutches (sometimes using one crutch)  He has a shower chair but has been able to  the shower more recently  He reports no issues with using the bathroom  Pt reports minimal soreness, just occasional anterior hip pain during ambulation  Pt notes he's been sleeping in his bed, and also notes shoulder pain (chronic issue) exacerbated by using crutches  Pain Location: anterior hip   Pain Intensity: worst: 8/10, best: 0/10, current: 0/10   JOSSUE: pt fell at home in front of his house when his dog pulled him, landed on his hip   DOI: Sx Jan 11  Provoked by: ambulation, weight bearing/advancing over LLE, advancing LLE   Eased Positions: preparing for initial contact    Constitutional S/S: no red flags noted   Goals: walking his dogs, riding his motorcycle   PLOF: ambulating with no AD   Occupation: retired in 2009     Objective      Range of Motion: Goniometric revealed the following findings (in degrees)  Joint Motion Right: 3/7/2022 Left: 3/7/2022   Hip flexion  105/115 100/110    Hip Extension Pt declined prone lying Pt declined prone lying   Hip External Rotation NV NV   Hip Internal Rotation NV NV   Knee Flexion 75/79  60/65      Strength: MMT revealed the following findings    Joint Motion Right: 3/7/2022 Left: 3/7/2022   Hip Flexion 5/5 2+/5   Hip Abduction 4+/5 4/5   Hip ER  5/5 4/5   Hip Extension 4+/5 5/5   Knee Extension 5/5 5/5   Knee Flexion 5/5 4+/5   Ankle Plantarflexion 5/5 5/5   Ankle Dorsiflexion 5/5 5/5     Additional Assessments:  Palpation: TTP at medial groin on L hip   Heel raise: able to perform > 5 B  Gait Pattern: B crutch use, decreased L step length, step through gait pattern, forward flexed posture   Balance: SLS: R: 5s, L: unable   Transfers: STS: Total BUE reliance with LLE kicked out anteriorly       Outcome Measures:    30s STS: 4 repetitions, BUE use  2 MWT: 350 ft   With B crutch use     FOTO: 56    Pertinent Findings and Outcome Measures:                                                                                                                                                                         Test / Measure  3/7/2022      FOTO 56      2  ft, B crutches      30s 4 repetitions, BUE use      L hip flexion MMT 2+/5       L SLS Unable                Precautions: DM/polyneuropathy, HTN     Manuals 3/7                                                                Neuro Re-Ed             SLS with BUE HEP            Standing marches  NV            Lateral stepping  NV                                                                Ther Ex             Long arc quad  HEP            Seated marches  HEP            Heel raises  HEP            STS NV            Sidelying abduction NV                                                    Ther Activity             Transfer training              Bed mobility training              Gait Training             Ambulation with single crutch  NV                          Modalities

## 2022-03-07 NOTE — PROGRESS NOTES
PT Evaluation     Today's date: 3/7/2022  Patient name: Quentin Lemons  : 1955  MRN: [id number]  Referring provider: Brayan Granados MD  Dx:   Encounter Diagnosis     ICD-10-CM    1  S/p left hip fracture  Z87 81    2  Physical deconditioning  R53 81    3  Balance problem  R26 89        Start Time: 1545  Stop Time: 1640  Total time in clinic (min): 55 minutes    Assessment  Assessment details: Quentin Lemons is a 61 y o  male presenting with s/p nondisplaced intertrochanteric fracture with partial tear at greater trochanter insertion  Pt received ORIF on  with [facility]  Objective examination is consistent with expected/associated impairments, including decreased L hip strength, decreased endurance, decreased general LE strength/power, impaired ROM, impaired transfers, balance deficits, difficulty with bed mobility, and impaired ability to perform ADLs  Pt also has history of B TKA with residual ROM deficits that appear to be negatively impacting pt's current level of function  Will benefit from skilled PT interventions for strengthening/endurance training, neuromuscular reeducation, balance training, gait training, and functional activity training to assist pt in returning to PLOF  Impairments: abnormal or restricted ROM, abnormal movement, activity intolerance, impaired physical strength, lacks appropriate home exercise program and pain with function    Symptom irritability: lowUnderstanding of Dx/Px/POC: good   Prognosis: good    Goals    Short Term Goals: In 4 weeks, the patient will:  1  Increase 2 minute walk test by 50 ft (> MDC of 40 ft) to indicate improving CV and muscular endurance  2  Increase L hip flexion strength to 3+/5 to indicate improving L hip flexor strength   3  Independent with HEP for improved carryover from PT  4  Increase 30s STS to > repetitions   5  Increase SLS to >5s B with no UE support     Long Term Goals: In 8 weeks, the patient will:  1   Increase 2 minute walk test to >490 ft with LRAD or no AD to indicate pt has met age matched norms for ambulation endurance   2  Increase SLS to > 15s B   3  Increase L hip strength to 4+/5 globally   4  Improve 30s STS to 10 repetitions with less than total reliance on BUE to indicate improve LE strength/power   5  Increase FOTO to greater than predicted value to indicate significantly improved level of function   6  Independent with HEP for selfcare        Plan  Referral necessary: No  Planned therapy interventions: functional ROM exercises, graded activity, graded exercise, home exercise program, joint mobilization, manual therapy, neuromuscular re-education, patient education, strengthening, stretching, therapeutic activities, therapeutic exercise, balance, balance/weight bearing training, transfer training, self care, postural training and ADL retraining  Frequency: 3x week  Duration in weeks: 8  Treatment plan discussed with: patient        Subjective  Pt presents s/p L hip fracture with ORIF  Pt reports that he had home health PT for two weeks, but didn't feel like it was helpful  Pt lives in his home with his wife, and has been able to get around at home as needed on crutches (sometimes using one crutch)  He has a shower chair but has been able to  the shower more recently  He reports no issues with using the bathroom  Pt reports minimal soreness, just occasional anterior hip pain during ambulation  Pt notes he's been sleeping in his bed, and also notes shoulder pain (chronic issue) exacerbated by using crutches     Pain Location: anterior hip   Pain Intensity: worst: 8/10, best: 0/10, current: 0/10   JOSSUE: pt fell at home in front of his house when his dog pulled him, landed on his hip   DOI: Sx Jan 11  Provoked by: ambulation, weight bearing/advancing over LLE, advancing LLE   Eased Positions: preparing for initial contact    Constitutional S/S: no red flags noted   Goals: walking his dogs, riding his motorcycle   PLOF: ambulating with no AD   Occupation: retired in 2009     Objective      Range of Motion: Goniometric revealed the following findings (in degrees)  Joint Motion Right: 3/7/2022 Left: 3/7/2022   Hip flexion  105/115 100/110    Hip Extension Pt declined prone lying Pt declined prone lying   Hip External Rotation NV NV   Hip Internal Rotation NV NV   Knee Flexion 75/79  60/65      Strength: MMT revealed the following findings  Joint Motion Right: 3/7/2022 Left: 3/7/2022   Hip Flexion 5/5 2+/5   Hip Abduction 4+/5 4/5   Hip ER  5/5 4/5   Hip Extension 4+/5 5/5   Knee Extension 5/5 5/5   Knee Flexion 5/5 4+/5   Ankle Plantarflexion 5/5 5/5   Ankle Dorsiflexion 5/5 5/5     Additional Assessments:  Palpation: TTP at medial groin on L hip   Heel raise: able to perform > 5 B  Gait Pattern: B crutch use, decreased L step length, step through gait pattern, forward flexed posture   Balance: SLS: R: 5s, L: unable   Transfers: STS: Total BUE reliance with LLE kicked out anteriorly       Outcome Measures:    30s STS: 4 repetitions, BUE use  2 MWT: 350 ft   With B crutch use     FOTO: 56    Pertinent Findings and Outcome Measures:                                                                                                                                                                         Test / Measure  3/7/2022      FOTO 56      2  ft, B crutches      30s 4 repetitions, BUE use      L hip flexion MMT 2+/5       L SLS Unable                Precautions: DM/polyneuropathy, HTN     Manuals 3/7                                                                Neuro Re-Ed             SLS with BUE HEP            Standing marches  NV            Lateral stepping  NV                                                                Ther Ex             Long arc quad  HEP            Seated marches  HEP            Heel raises  HEP            STS NV            Sidelying abduction NV Ther Activity             Transfer training              Bed mobility training              Gait Training             Ambulation with single crutch  NV                          Modalities

## 2022-03-08 ENCOUNTER — OFFICE VISIT (OUTPATIENT)
Dept: PHYSICAL THERAPY | Facility: MEDICAL CENTER | Age: 67
End: 2022-03-08
Payer: MEDICARE

## 2022-03-08 DIAGNOSIS — R53.81 PHYSICAL DECONDITIONING: ICD-10-CM

## 2022-03-08 DIAGNOSIS — R26.89 BALANCE PROBLEM: ICD-10-CM

## 2022-03-08 DIAGNOSIS — Z87.81 S/P LEFT HIP FRACTURE: Primary | ICD-10-CM

## 2022-03-08 PROCEDURE — 97110 THERAPEUTIC EXERCISES: CPT | Performed by: PHYSICAL THERAPY ASSISTANT

## 2022-03-08 PROCEDURE — 97112 NEUROMUSCULAR REEDUCATION: CPT | Performed by: PHYSICAL THERAPY ASSISTANT

## 2022-03-08 NOTE — PROGRESS NOTES
Daily Note     Today's date: 3/8/2022  Patient name: Vivien Suero  : 1955  MRN: [id number]  Referring provider: Catalino Moore MD  Dx:   Encounter Diagnosis     ICD-10-CM    1  S/p left hip fracture  Z87 81    2  Physical deconditioning  R53 81    3  Balance problem  R26 89                   Subjective: Pt reports he is pretty sore from his evaluation yesterday  Objective: See treatment diary below      Assessment: Tolerated treatment well  Progressed TE as noted without reports of pain  Pt limited in TE by decreased knee flexion bilaterally from previous TRK's  Patient demonstrated fatigue post treatment and would benefit from continued PT      Plan: Continue per plan of care        Precautions: DM/polyneuropathy, HTN     Manuals 3/7 3/8                                                               Neuro Re-Ed             SLS with BUE HEP            Standing marches  NV 2x10 ea           Lateral stepping  NV 10'x6                                                               Ther Ex             Long arc quad  HEP 2x10           Seated marches  HEP 2x10           Heel raises  HEP 3x10           STS NV            Sidelying abduction NV  2x10           bridging  2x10           KTC  2x10           clamshells  GTB  2x10           SLR  2x10           Ther Activity             Transfer training              Bed mobility training              Gait Training             Ambulation with single crutch  NV                          Modalities

## 2022-03-10 ENCOUNTER — APPOINTMENT (OUTPATIENT)
Dept: PHYSICAL THERAPY | Facility: MEDICAL CENTER | Age: 67
End: 2022-03-10
Payer: MEDICARE

## 2022-03-11 ENCOUNTER — OFFICE VISIT (OUTPATIENT)
Dept: OBGYN CLINIC | Facility: CLINIC | Age: 67
End: 2022-03-11
Payer: MEDICARE

## 2022-03-11 VITALS
WEIGHT: 252 LBS | DIASTOLIC BLOOD PRESSURE: 85 MMHG | HEART RATE: 97 BPM | BODY MASS INDEX: 32.34 KG/M2 | SYSTOLIC BLOOD PRESSURE: 147 MMHG | HEIGHT: 74 IN

## 2022-03-11 DIAGNOSIS — M19.012 GLENOHUMERAL ARTHRITIS, LEFT: Primary | ICD-10-CM

## 2022-03-11 DIAGNOSIS — M19.011 GLENOHUMERAL ARTHRITIS, RIGHT: ICD-10-CM

## 2022-03-11 PROCEDURE — 20610 DRAIN/INJ JOINT/BURSA W/O US: CPT | Performed by: ORTHOPAEDIC SURGERY

## 2022-03-11 PROCEDURE — 99214 OFFICE O/P EST MOD 30 MIN: CPT | Performed by: ORTHOPAEDIC SURGERY

## 2022-03-11 RX ADMIN — TRIAMCINOLONE ACETONIDE 80 MG: 40 INJECTION, SUSPENSION INTRA-ARTICULAR; INTRAMUSCULAR at 10:18

## 2022-03-11 RX ADMIN — LIDOCAINE HYDROCHLORIDE 2 ML: 10 INJECTION, SOLUTION INFILTRATION; PERINEURAL at 10:18

## 2022-03-11 RX ADMIN — BUPIVACAINE HYDROCHLORIDE 2 ML: 2.5 INJECTION, SOLUTION INFILTRATION; PERINEURAL at 10:18

## 2022-03-11 NOTE — PROGRESS NOTES
HPI:  Patient is a 61y o  year old  male  who presents for follow-up of bilateral shoulder glenohumeral osteoarthritis  Patient was last seen in the office on 10/31/2021 where patient received bilateral glenohumeral injections  Patient states pain returned a month or two ago  Patient requests repeat injections  Patient delayed coming in sooner due to recent hospitalization at [facility] for a hip fracture       ROS:   General: No fever, no chills, no weight loss, no weight gain  HEENT:  No loss of hearing, no nose bleeds, no sore throat  Eyes:  No eye pain, no red eyes, no visual disturbance  Respiratory:  No cough, no shortness of breath, no wheezing  Cardiovascular:  No chest pain, no palpitations, no edema  GI: No abdominal pain, no nausea, no vomiting  Endocrine: No frequent urination, no excessive thirst  Urinary:  No dysuria, no hematuria, no incontinence  Musculoskeletal: see HPI and PE  Skin:  No rash, no wounds  Neurological:  No dizziness, no headache, no numbness  Psychiatric:  No difficulty concentrating, no depression, no suicide thoughts, no anxiety  Review of all other systems is negative    PMH:  Past Medical History:   Diagnosis Date    Depression     Diabetes mellitus ([facility] [address] )     Hyperlipidemia     Hypertension     Osteoarthritis, knee        PSH:  Past Surgical History:   Procedure Laterality Date    BACK SURGERY      HAND SURGERY      JOINT REPLACEMENT Left     l tkr    KNEE SURGERY Left     MA TOTAL KNEE ARTHROPLASTY Right 11/19/2018    Procedure: ARTHROPLASTY KNEE TOTAL;  Surgeon: Aston Mata MD;  Location:  MAIN OR;  Service: Orthopedics    TOOTH EXTRACTION      WISDOM TOOTH EXTRACTION         Medications:  Current Outpatient Medications   Medication Sig Dispense Refill    acetaminophen (TYLENOL) 325 mg tablet Take 2 tablets (650 mg total) by mouth every 6 (six) hours as needed for mild pain, moderate pain or fever  0    ascorbic acid (VITAMIN C) 500 MG tablet Take 1 tablet (500 mg total) by mouth daily 30 tablet 0    atorvastatin (LIPITOR) 80 mg tablet TAKE 40MG (ONE-HALF TABLET) BY MOUTH EVERY DAY AT 5 PM FOR CHOLESTEROL      cyanocobalamin (VITAMIN B-12) 1000 MCG tablet Take 1 tablet (1,000 mcg total) by mouth daily 30 tablet 0    Diclofenac Sodium (VOLTAREN) 1 % Apply 2 g topically 4 (four) times a day Topically for pain control as needed 100 g 0    docusate sodium (COLACE) 100 mg capsule Take 1 capsule (100 mg total) by mouth 2 (two) times a day 30 capsule 0    Empagliflozin 25 MG TABS Take 0 5 tablets (12 5 mg total) by mouth in the morning 15 tablet 0    ferrous gluconate (FERGON) 324 mg tablet Take 1 tablet (324 mg total) by mouth daily before breakfast 30 tablet 0    gabapentin (NEURONTIN) 300 mg capsule Take 1 capsule (300 mg total) by mouth every 8 (eight) hours 90 capsule 0    glipiZIDE (GLUCOTROL) 5 mg tablet Take 1 tablet (5 mg total) by mouth daily before breakfast 30 tablet 0    insulin glargine (LANTUS SOLOSTAR) 100 units/mL injection pen Inject 15 Units under the skin daily at bedtime 15 mL 0    magnesium oxide (MAG-OX) 400 mg Take 1 tablet (400 mg total) by mouth daily 30 tablet 0    melatonin 5 MG TABS Take 1 tablet (5 mg total) by mouth daily at bedtime 30 tablet 0    metFORMIN (GLUCOPHAGE) 1000 MG tablet TAKE ONE TABLET BY MOUTH TWICE A DAY WITH MEALS FOR DIABETES      methocarbamol (ROBAXIN) 500 mg tablet Take 1 tablet (500 mg total) by mouth 4 (four) times a day as needed for muscle spasms 50 tablet 0    polyethylene glycol (MIRALAX) 17 g packet Take 17 g by mouth daily 14 each 0    enoxaparin (LOVENOX) 40 mg/0 4 mL Inject 0 4 mL (40 mg total) under the skin every 24 hours for 14 days 5 6 mL 0    ergocalciferol (VITAMIN D2) 50,000 units Take 1 capsule (50,000 Units total) by mouth once a week for 21 days 3 capsule 0     No current facility-administered medications for this visit         Allergies:  No Known Allergies    Family History:  Family History Problem Relation Age of Onset    No Known Problems Mother     Heart attack Father     Arthritis Family     Cancer Family     Diabetes Family     Heart disease Family     Osteoporosis Family        Social History:  Social History     Occupational History    Not on file   Tobacco Use    Smoking status: Former Smoker     Types: Cigarettes     Quit date: 3/1/2012     Years since quitting: 10 0    Smokeless tobacco: Never Used   Vaping Use    Vaping Use: Never used   Substance and Sexual Activity    Alcohol use: Yes     Comment: 'couple beers every couple weeks'    Drug use: No    Sexual activity: Not Currently       Physical Exam:  General :  Alert, cooperative, no distress, appears stated age  Blood pressure 147/85, pulse 97, height 6' 2" (1 88 m), weight 114 kg (252 lb)  Head:  Normocephalic, without obvious abnormality, atraumatic   Eyes:  Conjunctiva/corneas clear, EOM's intact,   Ears: Both ears normal appearance, no hearing deficits  Nose: Nares normal, septum midline, no drainage    Neck: Supple,  trachea midline, no adenopathy, no tenderness, no mass   Back:   Symmetric, no curvature, ROM normal, no tenderness   Lungs:   Respirations unlabored   Chest Wall:  No tenderness or deformity   Extremities: Extremities normal, atraumatic, no cyanosis or edema      Pulses: 2+ and symmetric   Skin: Skin color, texture, turgor normal, no rashes or lesions      Neurologic: Normal           Right Shoulder Exam     Range of Motion   Active abduction: 100   Forward flexion: 130     Muscle Strength   The patient has normal right shoulder strength  Other   Erythema: absent  Scars: absent  Sensation: normal  Pulse: present      Left Shoulder Exam     Range of Motion   Active abduction: 100   Forward flexion: 130     Muscle Strength   The patient has normal left shoulder strength      Other   Erythema: absent  Scars: absent  Sensation: normal  Pulse: present         Large joint arthrocentesis: R glenohumeral  Universal Protocol:  Consent: Verbal consent obtained  Risks and benefits: risks, benefits and alternatives were discussed  Consent given by: patient  Patient understanding: patient states understanding of the procedure being performed  Patient consent: the patient's understanding of the procedure matches consent given  Site marked: the operative site was marked  Patient identity confirmed: verbally with patient    Supporting Documentation  Indications: pain   Procedure Details  Location: shoulder - R glenohumeral  Needle size: 25 G  Medications administered: 2 mL bupivacaine 0 25 %; 2 mL lidocaine 1 %; 80 mg triamcinolone acetonide 40 mg/mL    Patient tolerance: patient tolerated the procedure well with no immediate complications    Large joint arthrocentesis: L glenohumeral  Universal Protocol:  Consent: Verbal consent obtained  Risks and benefits: risks, benefits and alternatives were discussed  Consent given by: patient  Patient understanding: patient states understanding of the procedure being performed  Patient consent: the patient's understanding of the procedure matches consent given  Site marked: the operative site was marked  Patient identity confirmed: verbally with patient    Supporting Documentation  Indications: pain   Procedure Details  Location: shoulder - L glenohumeral  Needle size: 25 G  Medications administered: 2 mL bupivacaine 0 25 %; 2 mL lidocaine 1 %; 40 mg triamcinolone acetonide 40 mg/mL    Patient tolerance: patient tolerated the procedure well with no immediate complications            Imaging Studies: The following imaging studies were reviewed in office today  My findings are noted  Assessment  Encounter Diagnoses   Name Primary?  Glenohumeral arthritis, left Yes    Glenohumeral arthritis, right          Plan:  Patient is a 62-year-old male with bilateral glenohumeral osteoarthritis   *Patient was offered bilateral shoulder glenohumeral joint injections  Patient accepted  *Bilateral glenohumeral steroid injections were given without complication  *Patient may follow-up as needed for any new or worsening symptoms       Scribe Attestation    I,:  Rohan Medina PA-C am acting as a scribe while in the presence of the attending physician :       I,:  Joyce Barreto MD personally performed the services described in this documentation    as scribed in my presence :

## 2022-03-14 ENCOUNTER — OFFICE VISIT (OUTPATIENT)
Dept: PHYSICAL THERAPY | Facility: MEDICAL CENTER | Age: 67
End: 2022-03-14
Payer: MEDICARE

## 2022-03-14 DIAGNOSIS — Z87.81 S/P LEFT HIP FRACTURE: Primary | ICD-10-CM

## 2022-03-14 DIAGNOSIS — R26.89 BALANCE PROBLEM: ICD-10-CM

## 2022-03-14 DIAGNOSIS — R53.81 PHYSICAL DECONDITIONING: ICD-10-CM

## 2022-03-14 PROCEDURE — 97112 NEUROMUSCULAR REEDUCATION: CPT

## 2022-03-14 PROCEDURE — 97110 THERAPEUTIC EXERCISES: CPT

## 2022-03-14 NOTE — PROGRESS NOTES
Daily Note     Today's date: 3/14/2022  Patient name: Shara Winter  : 1955  MRN: [id number]  Referring provider: Hermelinda Robertson MD  Dx:   Encounter Diagnosis     ICD-10-CM    1  S/p left hip fracture  Z87 81    2  Physical deconditioning  R53 81    3  Balance problem  R26 89        Start Time: 0930  Stop Time: 1015  Total time in clinic (min): 45 minutes    Subjective: Pt reported that he's mainly focused on getting his leg to "come up" (regain hip flexion), and has noticed improvements in his ADL performance such as getting in and out of his bathtub  He is trying to be patient but feels like it's taking a while  Objective: See treatment diary below       Assessment: Tolerated treatment well  Pt has demonstrated significant improvement in hip flexion strength since initial evaluation, and has noted improvements in functional task performance  Pt was able to increase difficulty of multiple strengthening exercises w/o complaints, taking initiative to make exercises harder  Patient would benefit from continued PT to continue to decrease pain and improve strength, ROM, and ADL performance  Plan: Continue per plan of care  Update HEP next visit        Precautions: DM/polyneuropathy, HTN     Manuals 3/7 3/8 3/14                                                              Neuro Re-Ed             SLS with BUE HEP            Standing marches  NV 2x10 ea 2x10 ea          Lateral stepping  NV 10'x6 6'x10                                                               Ther Ex             Long arc quad  HEP 2x10 2x10, 2#          Seated marches  HEP 2x10 2x10           Heel raises  HEP 3x10 30x           STS NV  2x10, elevated mat height           Sidelying abduction NV  2x10 3x10           bridging  2x10 2x10, BTB           KTC  2x10           clamshells  GTB  2x10 Supine 2x10 GTB, 2x10 BTB           SLR  2x10 2x10           Ther Activity             Transfer training              Bed mobility training Gait Training             Ambulation with single crutch  NV                          Modalities

## 2022-03-15 ENCOUNTER — APPOINTMENT (OUTPATIENT)
Dept: PHYSICAL THERAPY | Facility: MEDICAL CENTER | Age: 67
End: 2022-03-15
Payer: MEDICARE

## 2022-03-16 ENCOUNTER — OFFICE VISIT (OUTPATIENT)
Dept: PHYSICAL THERAPY | Facility: MEDICAL CENTER | Age: 67
End: 2022-03-16
Payer: MEDICARE

## 2022-03-16 DIAGNOSIS — R53.81 PHYSICAL DECONDITIONING: ICD-10-CM

## 2022-03-16 DIAGNOSIS — R26.89 BALANCE PROBLEM: ICD-10-CM

## 2022-03-16 DIAGNOSIS — Z87.81 S/P LEFT HIP FRACTURE: Primary | ICD-10-CM

## 2022-03-16 PROCEDURE — 97110 THERAPEUTIC EXERCISES: CPT

## 2022-03-16 PROCEDURE — 97112 NEUROMUSCULAR REEDUCATION: CPT

## 2022-03-18 ENCOUNTER — APPOINTMENT (OUTPATIENT)
Dept: PHYSICAL THERAPY | Facility: MEDICAL CENTER | Age: 67
End: 2022-03-18
Payer: MEDICARE

## 2022-03-21 ENCOUNTER — OFFICE VISIT (OUTPATIENT)
Dept: PHYSICAL THERAPY | Facility: MEDICAL CENTER | Age: 67
End: 2022-03-21
Payer: MEDICARE

## 2022-03-21 DIAGNOSIS — R26.89 BALANCE PROBLEM: ICD-10-CM

## 2022-03-21 DIAGNOSIS — R53.81 PHYSICAL DECONDITIONING: ICD-10-CM

## 2022-03-21 DIAGNOSIS — Z87.81 S/P LEFT HIP FRACTURE: Primary | ICD-10-CM

## 2022-03-21 PROCEDURE — 97530 THERAPEUTIC ACTIVITIES: CPT

## 2022-03-21 PROCEDURE — 97110 THERAPEUTIC EXERCISES: CPT

## 2022-03-21 PROCEDURE — 97140 MANUAL THERAPY 1/> REGIONS: CPT

## 2022-03-21 NOTE — PROGRESS NOTES
Daily Note     Today's date: 3/21/2022  Patient name: Atilio Farfan  : 1955  MRN: [id number]  Referring provider: Karri Mendez MD  Dx:   Encounter Diagnosis     ICD-10-CM    1  S/p left hip fracture  Z87 81    2  Physical deconditioning  R53 81    3  Balance problem  R26 89        Start Time: 0930  Stop Time: 1015  Total time in clinic (min): 45 minutes    Subjective: Pt reports he had a gout flair up the other day, noted challenges with weight on feet, uses 1 crutch for now  Minor pain reported, 3/10 noted  Objective: See treatment diary below      Assessment: Pt tolerated treatment well but did note a fair amount of soreness in the L knee today as well as L hip, patient limited by previous surgeries as well as pain from L hip, patient improving with WB   Patient would benefit from continued PT to continue to decrease sx acuity and increase strength/endurance/functional capacity  Plan: Continue per plan of care       Precautions: DM/polyneuropathy, HTN     Manuals 3/7 3/8 3/14 3/16 3/21        PROM     PK 10'                                               Neuro Re-Ed             SLS with BUE HEP            Standing marches  NV 2x10 ea 2x10 ea 1 min  1 min        Lateral stepping  NV 10'x6 6'x10  6'x10  7 min, 10'                                                            Ther Ex             Long arc quad  HEP 2x10 2x10, 2# 2x1 min cont, 2# 2x1 min cont, 2#        Seated marches  HEP 2x10 2x10           Heel raises  HEP 3x10 30x  30x  30 reps, 3 second hold        STS NV  2x10, elevated mat height   2x10, elevated mat height         Sidelying abduction NV  2x10 3x10  2x10          bridging  2x10 2x10, BTB  2x10 with ab brace 2x10 with ab brace        Lunges on 6" step  2x10   3x10         clamshells  GTB  2x10 Supine 2x10 GTB, 2x10 BTB  3x10  3x10         SLR  2x10 2x10  2x10  2 x10 2#         Ther Activity             Squats with 2" wedge under L foot     20 reps, 3 second holds        SLS with weight bearing and and reaching     20 reps, 3 second holds        Gait Training             Ambulation with single crutch  NV                          Modalities

## 2022-03-23 ENCOUNTER — OFFICE VISIT (OUTPATIENT)
Dept: PHYSICAL THERAPY | Facility: MEDICAL CENTER | Age: 67
End: 2022-03-23
Payer: MEDICARE

## 2022-03-23 DIAGNOSIS — R26.89 BALANCE PROBLEM: ICD-10-CM

## 2022-03-23 DIAGNOSIS — R53.81 PHYSICAL DECONDITIONING: ICD-10-CM

## 2022-03-23 DIAGNOSIS — Z87.81 S/P LEFT HIP FRACTURE: Primary | ICD-10-CM

## 2022-03-23 PROCEDURE — 97110 THERAPEUTIC EXERCISES: CPT

## 2022-03-23 PROCEDURE — 97530 THERAPEUTIC ACTIVITIES: CPT

## 2022-03-23 PROCEDURE — 97140 MANUAL THERAPY 1/> REGIONS: CPT

## 2022-03-23 NOTE — PROGRESS NOTES
Daily Note     Today's date: 3/23/2022  Patient name: Brittney Sinha  : 1955  MRN: [id number]  Referring provider: Shruti Genao MD  Dx:   Encounter Diagnosis     ICD-10-CM    1  S/p left hip fracture  Z87 81    2  Physical deconditioning  R53 81    3  Balance problem  R26 89        Start Time: 0845  Stop Time: 930  Total time in clinic (min): 45 minutes    Subjective: Pt reports that he attempted to walk with his cane yesterday but noted that he was very uncomfortable today  Objective: See treatment diary below      Assessment: Pt tolerated treatment well but did note a fair amount of soreness in the L knee today as well as L hip, patient noted improvement in soft tissue with IASTM today, patient continually challenged with even weight bearing and adequate tolerance to increased load  Patient would benefit from continued PT to continue to decrease sx acuity and increase strength/endurance/functional capacity  Plan: Continue per plan of care       Precautions: DM/polyneuropathy, HTN     Manuals 3/7 3/8 3/14 3/16 3/21 3/23       PROM     PK 10' PK 5' L LE       IASTM L quad       10' PK                                 Neuro Re-Ed             SLS with BUE HEP            Standing marches  NV 2x10 ea 2x10 ea 1 min  1 min 1 min       Lateral stepping  NV 10'x6 6'x10  6'x10  7 min, 10' 3 min 10 feet                                                           Ther Ex             Long arc quad  HEP 2x10 2x10, 2# 2x1 min cont, 2# 2x1 min cont, 2# 2x1 min cont, 7#       Seated marches  HEP 2x10 2x10           Heel raises  HEP 3x10 30x  30x  30 reps, 3 second hold 30 reps, 3 second hold       STS NV  2x10, elevated mat height   2x10, elevated mat height  2x10, elevated mat height       Sidelying abduction NV  2x10 3x10  2x10          bridging  2x10 2x10, BTB  2x10 with ab brace 2x10 with ab brace 2x10 with ab brace       Lunges on 6" step  2x10   3x10  3x10 post IASTM       clamshells  GTB  2x10 Supine 2x10 GTB, 2x10 BTB  3x10  3x10  3x10        SLR  2x10 2x10  2x10  2 x10 2#  2 x10 2#        Ther Activity             Squats with 2" wedge under L foot     20 reps, 3 second holds        SLS with weight bearing and and reaching     20 reps, 3 second holds 20 reps, 3 second holds       Gait Training             Ambulation with single crutch  NV                          Modalities

## 2022-03-25 ENCOUNTER — OFFICE VISIT (OUTPATIENT)
Dept: PHYSICAL THERAPY | Facility: MEDICAL CENTER | Age: 67
End: 2022-03-25
Payer: MEDICARE

## 2022-03-25 DIAGNOSIS — R26.89 BALANCE PROBLEM: ICD-10-CM

## 2022-03-25 DIAGNOSIS — Z87.81 S/P LEFT HIP FRACTURE: Primary | ICD-10-CM

## 2022-03-25 DIAGNOSIS — R53.81 PHYSICAL DECONDITIONING: ICD-10-CM

## 2022-03-25 PROCEDURE — 97140 MANUAL THERAPY 1/> REGIONS: CPT

## 2022-03-25 PROCEDURE — 97110 THERAPEUTIC EXERCISES: CPT

## 2022-03-25 PROCEDURE — 97530 THERAPEUTIC ACTIVITIES: CPT

## 2022-03-25 NOTE — PROGRESS NOTES
Daily Note     Today's date: 3/25/2022  Patient name: Marika Freedman  : 1955  MRN: [id number]  Referring provider: Tierra Martini MD  Dx:   Encounter Diagnosis     ICD-10-CM    1  S/p left hip fracture  Z87 81    2  Physical deconditioning  R53 81    3  Balance problem  R26 89        Start Time: 0845  Stop Time: 930  Total time in clinic (min): 45 minutes    Subjective: Pt reports that he attempted SLS at home, patient notes lateral hip and knee pain on the L LE, 3/10 pain  Objective: See treatment diary below      Assessment: Pt tolerated treatment well but did note a fair amount of soreness in the L knee today as well as L hip, patient progressing with resistance, patient demonstrating improvements with his NM control, patient limited with L sided weight bearing  Patient would benefit from continued PT to continue to decrease sx acuity and increase strength/endurance/functional capacity  Plan: Continue per plan of care       Precautions: DM/polyneuropathy, HTN     Manuals 3/7 3/8 3/14 3/16 3/21 3/23 3/25      PROM     PK 10' PK 5' L LE       IASTM L quad       10' PK 10' PK                                Neuro Re-Ed             SLS with BUE HEP            Standing marches  NV 2x10 ea 2x10 ea 1 min  1 min 1 min 1 min      Lateral stepping  NV 10'x6 6'x10  6'x10  7 min, 10' 3 min 10 feet 2 min 10 feet      Wobble board- AP and                                                     Ther Ex             Long arc quad  HEP 2x10 2x10, 2# 2x1 min cont, 2# 2x1 min cont, 2# 2x1 min cont, 7# 2x1 min cont, 8#; added yellow theraband resistance in addition      Seated marches  HEP 2x10 2x10           Heel raises  HEP 3x10 30x  30x  30 reps, 3 second hold 30 reps, 3 second hold 30 reps, 3 second hold      STS NV  2x10, elevated mat height   2x10, elevated mat height  2x10, elevated mat height 2x10, elevated mat height      Sidelying abduction NV  2x10 3x10  2x10          bridging  2x10 2x10, BTB  2x10 with ab brace 2x10 with ab brace 2x10 with ab brace 2x10 with ab brace      Lunges on 6" step  2x10   3x10  3x10 post IASTM       clamshells  GTB  2x10 Supine 2x10 GTB, 2x10 BTB  3x10  3x10  3x10  3x10       Hamstring curls       8 lbs, 20 reps                                                                       SLR  2x10 2x10  2x10  2 x10 2#  2 x10 2#  2 x10 8#       Ther Activity             Squats with 2" wedge under L foot     20 reps, 3 second holds        SLS with weight bearing and and reaching     20 reps, 3 second holds 20 reps, 3 second holds 20 reps, 3 second holds      Gait Training             Ambulation with single crutch  NV                          Modalities

## 2022-03-29 ENCOUNTER — OFFICE VISIT (OUTPATIENT)
Dept: PHYSICAL THERAPY | Facility: MEDICAL CENTER | Age: 67
End: 2022-03-29
Payer: MEDICARE

## 2022-03-29 DIAGNOSIS — R53.81 PHYSICAL DECONDITIONING: ICD-10-CM

## 2022-03-29 DIAGNOSIS — Z87.81 S/P LEFT HIP FRACTURE: Primary | ICD-10-CM

## 2022-03-29 DIAGNOSIS — R26.89 BALANCE PROBLEM: ICD-10-CM

## 2022-03-29 PROCEDURE — 97530 THERAPEUTIC ACTIVITIES: CPT

## 2022-03-29 PROCEDURE — 97140 MANUAL THERAPY 1/> REGIONS: CPT

## 2022-03-29 PROCEDURE — 97110 THERAPEUTIC EXERCISES: CPT

## 2022-03-29 RX ORDER — BUPIVACAINE HYDROCHLORIDE 2.5 MG/ML
2 INJECTION, SOLUTION INFILTRATION; PERINEURAL
Status: COMPLETED | OUTPATIENT
Start: 2022-03-11 | End: 2022-03-11

## 2022-03-29 RX ORDER — TRIAMCINOLONE ACETONIDE 40 MG/ML
40 INJECTION, SUSPENSION INTRA-ARTICULAR; INTRAMUSCULAR
Status: COMPLETED | OUTPATIENT
Start: 2022-03-29 | End: 2022-03-29

## 2022-03-29 RX ORDER — LIDOCAINE HYDROCHLORIDE 10 MG/ML
2 INJECTION, SOLUTION INFILTRATION; PERINEURAL
Status: COMPLETED | OUTPATIENT
Start: 2022-03-11 | End: 2022-03-11

## 2022-03-29 RX ORDER — TRIAMCINOLONE ACETONIDE 40 MG/ML
80 INJECTION, SUSPENSION INTRA-ARTICULAR; INTRAMUSCULAR
Status: COMPLETED | OUTPATIENT
Start: 2022-03-11 | End: 2022-03-11

## 2022-03-29 RX ADMIN — TRIAMCINOLONE ACETONIDE 40 MG: 40 INJECTION, SUSPENSION INTRA-ARTICULAR; INTRAMUSCULAR at 14:53

## 2022-03-29 NOTE — PROGRESS NOTES
Daily Note     Today's date: 3/29/2022  Patient name: Melanie Reno  : 1955  MRN: [id number]  Referring provider: Latesha Gan MD  Dx:   Encounter Diagnosis     ICD-10-CM    1  S/p left hip fracture  Z87 81    2  Physical deconditioning  R53 81    3  Balance problem  R26 89        Start Time: 1230  Stop Time: 1315  Total time in clinic (min): 45 minutes    Subjective: Pt reports that he is using his cane, patient reports soreness but feeling good  Objective: See treatment diary below      Assessment: Pt tolerated treatment well but did note a fair amount of soreness in the L knee today as well as L hip, patient progressing with resistance, patient demonstrating improvements with his NM control, patient limited with L sided weight bearing  Patient functionally improving with pushing sled, patient challenged with stepping up obstacles such as steps with weight bearing  Patient would benefit from continued PT to continue to decrease sx acuity and increase strength/endurance/functional capacity  Plan: Continue per plan of care       Precautions: DM/polyneuropathy, HTN     Manuals 3/7 3/8 3/14 3/16 3/21 3/23 3/25 3/29     PROM     PK 10' PK 5' L LE       IASTM L quad       10' PK 10' PK 10' PK                               Neuro Re-Ed             SLS with BUE HEP            Standing marches  NV 2x10 ea 2x10 ea 1 min  1 min 1 min 1 min 1 min     Lateral stepping  NV 10'x6 6'x10  6'x10  7 min, 10' 3 min 10 feet 2 min 10 feet 1 5 min, 10 feet     Wobble board- AP and                                                     Ther Ex             Long arc quad  HEP 2x10 2x10, 2# 2x1 min cont, 2# 2x1 min cont, 2# 2x1 min cont, 7# 2x1 min cont, 8#; added yellow theraband resistance in addition      Seated marches  HEP 2x10 2x10           Heel raises  HEP 3x10 30x  30x  30 reps, 3 second hold 30 reps, 3 second hold 30 reps, 3 second hold      STS NV  2x10, elevated mat height   2x10, elevated mat height  2x10, elevated mat height 2x10, elevated mat height 2x10, elevated mat height     Sidelying abduction NV  2x10 3x10  2x10          bridging  2x10 2x10, BTB  2x10 with ab brace 2x10 with ab brace 2x10 with ab brace 2x10 with ab brace 2x10 with ab brace     Lunges on 6" step  2x10   3x10  3x10 post IASTM       clamshells  GTB  2x10 Supine 2x10 GTB, 2x10 BTB  3x10  3x10  3x10  3x10  3x10      Hamstring curls       8 lbs, 20 reps      Heel slides        20 reps, 3 second holds     Hanging knee flexion stretch        8#, 3 minutes                                            SLR  2x10 2x10  2x10  2 x10 2#  2 x10 2#  2 x10 8#  2 x10 8#      Ther Activity             Squats with 2" wedge under L foot     20 reps, 3 second holds        Pushing sled        50#, 20 feet 8 cycles push and pull     Step ups, forward and laterally        4" step, 10 reps each                                            SLS with weight bearing and and reaching     20 reps, 3 second holds 20 reps, 3 second holds 20 reps, 3 second holds 20 reps, 3 second holds     Gait Training             Ambulation with single crutch  NV                          Modalities

## 2022-03-30 ENCOUNTER — OFFICE VISIT (OUTPATIENT)
Dept: PHYSICAL THERAPY | Facility: MEDICAL CENTER | Age: 67
End: 2022-03-30
Payer: MEDICARE

## 2022-03-30 DIAGNOSIS — R26.89 BALANCE PROBLEM: ICD-10-CM

## 2022-03-30 DIAGNOSIS — R53.81 PHYSICAL DECONDITIONING: ICD-10-CM

## 2022-03-30 DIAGNOSIS — Z87.81 S/P LEFT HIP FRACTURE: Primary | ICD-10-CM

## 2022-03-30 PROCEDURE — 97140 MANUAL THERAPY 1/> REGIONS: CPT

## 2022-03-30 PROCEDURE — 97530 THERAPEUTIC ACTIVITIES: CPT

## 2022-03-30 PROCEDURE — 97110 THERAPEUTIC EXERCISES: CPT

## 2022-03-30 NOTE — PROGRESS NOTES
Daily Note     Today's date: 3/30/2022  Patient name: Sammi Correia  : 1955  MRN: [id number]  Referring provider: Feliciano Muse MD  Dx:   Encounter Diagnosis     ICD-10-CM    1  S/p left hip fracture  Z87 81    2  Physical deconditioning  R53 81    3  Balance problem  R26 89        Start Time: 1015  Stop Time: 1100  Total time in clinic (min): 45 minutes    Subjective: Pt reports that he is using his cane, patient reports soreness but feeling good, notes that 2 days in a row "is a lot"    Objective: See treatment diary below      Assessment: Pt tolerated treatment well improving weight bearing on L LE with alternating isometrics  Patient would benefit from continued PT to continue to decrease sx acuity and increase strength/endurance/functional capacity  Plan: Continue per plan of care       Precautions: DM/polyneuropathy, HTN     Manuals 3/7 3/8 3/14 3/16 3/21 3/23 3/25 3/29 3/30    PROM     PK 10' PK 5' L LE       IASTM L quad       10' PK 10' PK 10' PK 10'PK                              Neuro Re-Ed             SLS with BUE HEP            Standing marches  NV 2x10 ea 2x10 ea 1 min  1 min 1 min 1 min 1 min 1 min    Lateral stepping  NV 10'x6 6'x10  6'x10  7 min, 10' 3 min 10 feet 2 min 10 feet 1 5 min, 10 feet 1 5 min, 10 feet    Wobble board- AP and                                                     Ther Ex             Long arc quad  HEP 2x10 2x10, 2# 2x1 min cont, 2# 2x1 min cont, 2# 2x1 min cont, 7# 2x1 min cont, 8#; added yellow theraband resistance in addition  Manual isotonics- extension and flexion with manual therapist    3'    Seated marches  HEP 2x10 2x10           Heel raises  HEP 3x10 30x  30x  30 reps, 3 second hold 30 reps, 3 second hold 30 reps, 3 second hold      STS NV  2x10, elevated mat height   2x10, elevated mat height  2x10, elevated mat height 2x10, elevated mat height 2x10, elevated mat height 2x10, elevated mat height    Sidelying abduction NV  2x10 3x10  2x10 bridging  2x10 2x10, BTB  2x10 with ab brace 2x10 with ab brace 2x10 with ab brace 2x10 with ab brace 2x10 with ab brace 2x10 with ab brace    Lunges on 6" step  2x10   3x10  3x10 post IASTM       clamshells  GTB  2x10 Supine 2x10 GTB, 2x10 BTB  3x10  3x10  3x10  3x10  3x10  3x10    Hamstring curls       8 lbs, 20 reps      Heel slides        20 reps, 3 second holds 20 reps, 3 second holds    Hanging knee flexion stretch        8#, 3 minutes                                            SLR  2x10 2x10  2x10  2 x10 2#  2 x10 2#  2 x10 8#  2 x10 8#  2 x10 8#     Ther Activity             Squats with 2" wedge under L foot     20 reps, 3 second holds        Pushing sled        50#, 20 feet 8 cycles push and pull 50#, 20 feet 8 cycles push and pull    Step ups, forward and laterally        4" step, 10 reps each 4" step, 10 reps each                                           SLS with weight bearing and and reaching     20 reps, 3 second holds 20 reps, 3 second holds 20 reps, 3 second holds 20 reps, 3 second holds 20 reps, 3 second holds; added alternating isometrics- 20 reps each    Gait Training             Ambulation with single crutch  NV                          Modalities

## 2022-04-01 ENCOUNTER — OFFICE VISIT (OUTPATIENT)
Dept: PHYSICAL THERAPY | Facility: MEDICAL CENTER | Age: 67
End: 2022-04-01
Payer: MEDICARE

## 2022-04-01 ENCOUNTER — OFFICE VISIT (OUTPATIENT)
Dept: OBGYN CLINIC | Facility: CLINIC | Age: 67
End: 2022-04-01
Payer: MEDICARE

## 2022-04-01 ENCOUNTER — APPOINTMENT (OUTPATIENT)
Dept: RADIOLOGY | Facility: AMBULARY SURGERY CENTER | Age: 67
End: 2022-04-01
Attending: ORTHOPAEDIC SURGERY
Payer: MEDICARE

## 2022-04-01 VITALS — HEIGHT: 74 IN | BODY MASS INDEX: 30.54 KG/M2 | WEIGHT: 238 LBS

## 2022-04-01 DIAGNOSIS — M25.562 PAIN IN BOTH KNEES, UNSPECIFIED CHRONICITY: Primary | ICD-10-CM

## 2022-04-01 DIAGNOSIS — M25.562 PAIN IN BOTH KNEES, UNSPECIFIED CHRONICITY: ICD-10-CM

## 2022-04-01 DIAGNOSIS — Z87.81 S/P LEFT HIP FRACTURE: Primary | ICD-10-CM

## 2022-04-01 DIAGNOSIS — R26.89 BALANCE PROBLEM: ICD-10-CM

## 2022-04-01 DIAGNOSIS — M25.561 PAIN IN BOTH KNEES, UNSPECIFIED CHRONICITY: Primary | ICD-10-CM

## 2022-04-01 DIAGNOSIS — M25.561 PAIN IN BOTH KNEES, UNSPECIFIED CHRONICITY: ICD-10-CM

## 2022-04-01 DIAGNOSIS — R53.81 PHYSICAL DECONDITIONING: ICD-10-CM

## 2022-04-01 PROCEDURE — 99213 OFFICE O/P EST LOW 20 MIN: CPT | Performed by: ORTHOPAEDIC SURGERY

## 2022-04-01 PROCEDURE — 73562 X-RAY EXAM OF KNEE 3: CPT

## 2022-04-01 PROCEDURE — 97112 NEUROMUSCULAR REEDUCATION: CPT | Performed by: PHYSICAL THERAPIST

## 2022-04-01 PROCEDURE — 97110 THERAPEUTIC EXERCISES: CPT | Performed by: PHYSICAL THERAPIST

## 2022-04-01 NOTE — PROGRESS NOTES
PT Re-Evaluation     Today's date: 2022  Patient name: Nilda Palacio  : 1955  MRN: [id number]  Referring provider: Beni Cheng MD  Dx:   Encounter Diagnosis     ICD-10-CM    1  S/p left hip fracture  Z87 81    2  Physical deconditioning  R53 81    3  Balance problem  R26 89        Start Time: 1020  Stop Time: 1054  Total time in clinic (min): 34 minutes    Assessment  Assessment details: Nilda Palacio is a 61 y o  male who has completed 10 PT sessions to this date  The patient has made improvements in LE strength, improved weight bearing tolerance of L LE being able to ambulate with SPC and improved activity tolerance  However, the patient continues to have increased difficulty with increased pain in the L LE, gait impairments, decreased LE strength and decreased activity tolerance  2MWT distance decrease may be due to use of less supportive AD per patient choice  Pt relies heavily on UEs to assist with chair transfers and ambulating with SPC  I believe this patient will continue to benefit from skilled PT for LE strengthening exercises, balance training, gait training and ROM exercises to improve limitations and assist the patient to improved QOL  Impairments: abnormal or restricted ROM, abnormal movement, activity intolerance, impaired physical strength, lacks appropriate home exercise program and pain with function    Symptom irritability: lowUnderstanding of Dx/Px/POC: good   Prognosis: good    Goals    Short Term Goals: In 4 weeks, the patient will:  1  Increase 2 minute walk test by 50 ft (> MDC of 40 ft) to indicate improving CV and muscular endurance- not met  2  Increase L hip flexion strength to 3+/5 to indicate improving L hip flexor strength - met  3  Independent with HEP for improved carryover from PT- part met  4  Increase 30s STS to > repetitions - not met  5  Increase SLS to >5s B with no UE support - not met    Long Term Goals: In 8 weeks, the patient will:  1   Increase 2 minute walk test to >490 ft with LRAD or no AD to indicate pt has met age matched norms for ambulation endurance - not met  2  Increase SLS to > 15s B - not met  3  Increase L hip strength to 4+/5 globally- not met   4  Improve 30s STS to 10 repetitions with less than total reliance on BUE to indicate improve LE strength/power - not met  5  Increase FOTO to greater than predicted value to indicate significantly improved level of function - not met  6  Independent with HEP for selfcare- not met        Plan  Patient would benefit from: skilled physical therapy  Referral necessary: No  Planned therapy interventions: functional ROM exercises, graded activity, graded exercise, home exercise program, joint mobilization, manual therapy, neuromuscular re-education, patient education, strengthening, stretching, therapeutic activities, therapeutic exercise, balance, balance/weight bearing training, transfer training, self care, postural training and ADL retraining  Frequency: 3x week  Duration in weeks: 8  Treatment plan discussed with: patient        Subjective  Subjective Comments: pt reports sine beginning PT his L leg has been getting better  He notes improvement in pain and ROM  He notes slight improvement in strength  Pt reports that when he is weight bearing, he will get groin discomfort  Pt also notes that his L knee is very tight since surgery  Pain   Rest: 0/10   Best: 0/10   Worst: 3/10              Objective      Range of Motion: Goniometric revealed the following findings (in degrees)  Joint Motion Right: 4/1/2022 Left: 4/1/2022   Hip flexion  105/115 110/125    Hip Extension Pt declined prone lying Pt declined prone lying   Hip External Rotation NV NV   Hip Internal Rotation NV NV   Knee Flexion 75/79  60/65    Hip Abduction 35 passive 35 passive     Strength: MMT revealed the following findings    Joint Motion Right: 4/1/2022 Left: 4/1/2022   Hip Flexion 5/5 3+/5   Hip Abduction 4+/5 4+/5   Hip ER  5/5 4+/5   Hip Extension 4+/5 5/5   Knee Extension 5/5 5/5   Knee Flexion 5/5 4+/5   Ankle Plantarflexion 5/5 5/5   Ankle Dorsiflexion 5/5 5/5     Additional Assessments:  Palpation: TTP at medial groin on L hip   Heel raise: able to perform > 5 B  Gait Pattern: SPC use, decreased L step length, step through gait pattern, forward flexed posture decreased stance time on L LE  Balance: SLS: R: 5s, L: unable   Transfers: STS: Total BUE reliance with LLE kicked out anteriorly       Outcome Measures:    30s STS: refused  2 MWT: 255 ft  With [facility] use     FOTO: 41    Pertinent Findings and Outcome Measures:                                                                                                                                                                         Test / Measure  4/1/2022 4/1/2022     FOTO 56 41     2  ft, B crutches 255ft SPC     30s 4 repetitions, BUE use refused     L hip flexion MMT 2+/5  3+/5     L SLS Unable  unable              Precautions: DM/polyneuropathy, HTN     Manuals      3/23 3/25 3/29 3/30 4/1   PROM      PK 5' L LE       IASTM L quad       10' PK 10' PK 10' PK 10'PK RK STM                             Neuro Re-Ed             SLS with BUE             Standing marches       1 min 1 min 1 min 1 min    Lateral stepping       3 min 10 feet 2 min 10 feet 1 5 min, 10 feet 1 5 min, 10 feet    Wobble board- AP and                                                     Ther Ex             Long arc quad       2x1 min cont, 7# 2x1 min cont, 8#; added yellow theraband resistance in addition  Manual isotonics- extension and flexion with manual therapist    3'    Seated marches           standing b/l UE x20 ea     Heel raises       30 reps, 3 second hold 30 reps, 3 second hold      STS      2x10, elevated mat height 2x10, elevated mat height 2x10, elevated mat height 2x10, elevated mat height x1   Sidelying abduction             bridging      2x10 with ab brace 2x10 with ab brace 2x10 with ab brace 2x10 with ab brace refused   Lunges on 6" step      3x10 post IASTM       clamshells      3x10  3x10  3x10  3x10 refused   Hamstring curls       8 lbs, 20 reps      Heel slides        20 reps, 3 second holds 20 reps, 3 second holds 20 reps, 3 second holds   Hanging knee flexion stretch        8#, 3 minutes     SAQ          x20                             SLR      2 x10 2#  2 x10 8#  2 x10 8#  2 x10 8#  x20 #0   Ther Activity             Squats with 2" wedge under L foot             Pushing sled        50#, 20 feet 8 cycles push and pull 50#, 20 feet 8 cycles push and pull    Step ups, forward and laterally        4" step, 10 reps each 4" step, 10 reps each 4" step, 10 reps each                                          SLS with weight bearing and and reaching      20 reps, 3 second holds 20 reps, 3 second holds 20 reps, 3 second holds 20 reps, 3 second holds; added alternating isometrics- 20 reps each    Gait Training             Ambulation with single crutch                           Modalities

## 2022-04-01 NOTE — PROGRESS NOTES
Orthopedics          Stefanie Anne 61 y o  male MRN: [id number]      Chief Complaint:   bilateral knee pain    HPI:   61 y o male complaining of bilateral knee pain  Patient has a history of left knee revision surgery done almost 10 years ago as well as right total knee arthroplasty done 2-3 years ago  Patient states he did have a left hip fracture with intramedullary nail fixation at another hospital done greater than 3 months ago  Patient did have pain in his knees following his surgery however his pain has since resolved his bilateral knees  Patient states he does have trouble bending his knees however he is able to straighten out his left knee more over the past few weeks time  He denies any pain in his bilateral knees states he does have limited flexion is bilateral knees  Denies any changes numbness tingling bilateral lower extremities                    Review Of Systems:   · Skin: Normal  · Neuro: See HPI  · Musculoskeletal: See HPI  · All other systems reviewed and are negative    Past Medical History:   Past Medical History:   Diagnosis Date    Depression     Diabetes mellitus ([address] )     Hyperlipidemia     Hypertension     Osteoarthritis, knee        Past Surgical History:   Past Surgical History:   Procedure Laterality Date    BACK SURGERY      HAND SURGERY      JOINT REPLACEMENT Left     l tkr    KNEE SURGERY Left     RI TOTAL KNEE ARTHROPLASTY Right 11/19/2018    Procedure: ARTHROPLASTY KNEE TOTAL;  Surgeon: Abby Merritt MD;  Location: [facility];  Service: Orthopedics    TOOTH EXTRACTION      WISDOM TOOTH EXTRACTION         Family History:  Family history reviewed and non-contributory  Family History   Problem Relation Age of Onset    No Known Problems Mother     Heart attack Father     Arthritis Family     Cancer Family     Diabetes Family     Heart disease Family     Osteoporosis Family          Social History:  Social History     Socioeconomic History    Marital status: /Civil Union     Spouse name: None    Number of children: None    Years of education: None    Highest education level: None   Occupational History    None   Tobacco Use    Smoking status: Former Smoker     Types: Cigarettes     Quit date: 3/1/2012     Years since quitting: 10 0    Smokeless tobacco: Never Used   Vaping Use    Vaping Use: Never used   Substance and Sexual Activity    Alcohol use: Yes     Comment: 'couple beers every couple weeks'    Drug use: No    Sexual activity: Not Currently   Other Topics Concern    None   Social History Narrative    Caffeine use, active     Social Determinants of Health     Financial Resource Strain: Not on file   Food Insecurity: Not on file   Transportation Needs: Not on file   Physical Activity: Not on file   Stress: Not on file   Social Connections: Not on file   Intimate Partner Violence: Not on file   Housing Stability: Not on file       Allergies:   No Known Allergies    Labs:  0   Lab Value Date/Time    HCT 35 6 (L) 01/31/2022 0558    HCT 30 1 (L) 01/24/2022 0638    HCT 31 8 (L) 01/20/2022 0506    HGB 11 7 (L) 01/31/2022 0558    HGB 10 1 (L) 01/24/2022 0638    HGB 10 4 (L) 01/20/2022 0506    INR 1 10 10/22/2018 1355    WBC 6 50 01/31/2022 0558    WBC 10 50 01/24/2022 0638    WBC 10 90 01/20/2022 0506    CRP <3 0 10/22/2018 1355       Meds:    Current Outpatient Medications:     acetaminophen (TYLENOL) 325 mg tablet, Take 2 tablets (650 mg total) by mouth every 6 (six) hours as needed for mild pain, moderate pain or fever, Disp: , Rfl: 0    ascorbic acid (VITAMIN C) 500 MG tablet, Take 1 tablet (500 mg total) by mouth daily, Disp: 30 tablet, Rfl: 0    atorvastatin (LIPITOR) 80 mg tablet, TAKE 40MG (ONE-HALF TABLET) BY MOUTH EVERY DAY AT 5 PM FOR CHOLESTEROL, Disp: , Rfl:     cyanocobalamin (VITAMIN B-12) 1000 MCG tablet, Take 1 tablet (1,000 mcg total) by mouth daily, Disp: 30 tablet, Rfl: 0    Diclofenac Sodium (VOLTAREN) 1 %, Apply 2 g topically 4 (four) times a day Topically for pain control as needed, Disp: 100 g, Rfl: 0    docusate sodium (COLACE) 100 mg capsule, Take 1 capsule (100 mg total) by mouth 2 (two) times a day, Disp: 30 capsule, Rfl: 0    Empagliflozin 25 MG TABS, Take 0 5 tablets (12 5 mg total) by mouth in the morning, Disp: 15 tablet, Rfl: 0    ferrous gluconate (FERGON) 324 mg tablet, Take 1 tablet (324 mg total) by mouth daily before breakfast, Disp: 30 tablet, Rfl: 0    gabapentin (NEURONTIN) 300 mg capsule, Take 1 capsule (300 mg total) by mouth every 8 (eight) hours, Disp: 90 capsule, Rfl: 0    glipiZIDE (GLUCOTROL) 5 mg tablet, Take 1 tablet (5 mg total) by mouth daily before breakfast, Disp: 30 tablet, Rfl: 0    insulin glargine (LANTUS SOLOSTAR) 100 units/mL injection pen, Inject 15 Units under the skin daily at bedtime, Disp: 15 mL, Rfl: 0    magnesium oxide (MAG-OX) 400 mg, Take 1 tablet (400 mg total) by mouth daily, Disp: 30 tablet, Rfl: 0    melatonin 5 MG TABS, Take 1 tablet (5 mg total) by mouth daily at bedtime, Disp: 30 tablet, Rfl: 0    metFORMIN (GLUCOPHAGE) 1000 MG tablet, TAKE ONE TABLET BY MOUTH TWICE A DAY WITH MEALS FOR DIABETES, Disp: , Rfl:     methocarbamol (ROBAXIN) 500 mg tablet, Take 1 tablet (500 mg total) by mouth 4 (four) times a day as needed for muscle spasms, Disp: 50 tablet, Rfl: 0    polyethylene glycol (MIRALAX) 17 g packet, Take 17 g by mouth daily, Disp: 14 each, Rfl: 0    enoxaparin (LOVENOX) 40 mg/0 4 mL, Inject 0 4 mL (40 mg total) under the skin every 24 hours for 14 days, Disp: 5 6 mL, Rfl: 0    ergocalciferol (VITAMIN D2) 50,000 units, Take 1 capsule (50,000 Units total) by mouth once a week for 21 days, Disp: 3 capsule, Rfl: 0      Physical Exam:   There were no vitals filed for this visit      General Appearance:    Alert, cooperative, no distress, appears stated age   Head:    Normocephalic, without obvious abnormality, atraumatic   Eyes:    conjunctiva/corneas clear, both eyes        Nose:   Nares normal, septum midline, no drainage    Throat:   Lips normal; teeth and gums normal   Neck:    symmetrical, trachea midline, ;     thyroid:  no enlargement/   Back:     Symmetric, no curvature, ROM normal   Lungs:   No audible wheezing or labored breathing   Chest Wall:    No tenderness or deformity    Heart:    Regular rate and rhythm               Pulses:   2+ and symmetric all extremities   Skin:   Skin color, texture, turgor normal, no rashes or lesions   Neurologic:   normal strength, sensation and reflexes     throughout       Musculoskeletal: bilateral lower extremity   Examination of patient's right knee well-healed anterior incision 5 degrees off of full extension 75 degrees of flexion  Quadriceps hamstring strength 5/5 to varus valgus stress full extension mid flexion  Sensation intact distal pulses present active ankle dorsi plantar flexion  Examination patient's left knee well-healed anterior incisions 10 degrees off of full extension flexion is 70 degrees stable to varus valgus stress full extension and flexion sensation intact distal pulses present    Radiology:   I personally reviewed the films    X-rays bilateral knee shows reveal well-aligned right total knee arthroplasty no evidence of loosening no osseous abnormality noted x-rays of the left knee reviewed x-rays reveal no interval change compared to previous films no evidence loosening left total knee arthroplasty    _*_*_*_*_*_*_*_*_*_*_*_*_*_*_*_*_*_*_*_*_*_*_*_*_*_*_*_*_*_*_*_*_*_*_*_*_*_*_*_*_*    Assessment:  66 y o male with bilateral knee history total knee arthroplasty    Plan:   · Weight bearing as tolerated  bilateral lower extremity  · Range of motion stretching strengthening exercises advised in office today  · Continue activities as tolerated  · Lifetime dental antibiotic prophylaxis recommended  · Follow up on an as-needed basis      Fela Neal PA-C

## 2022-04-04 ENCOUNTER — OFFICE VISIT (OUTPATIENT)
Dept: PHYSICAL THERAPY | Facility: MEDICAL CENTER | Age: 67
End: 2022-04-04
Payer: MEDICARE

## 2022-04-04 DIAGNOSIS — Z87.81 S/P LEFT HIP FRACTURE: Primary | ICD-10-CM

## 2022-04-04 DIAGNOSIS — R26.89 BALANCE PROBLEM: ICD-10-CM

## 2022-04-04 DIAGNOSIS — R53.81 PHYSICAL DECONDITIONING: ICD-10-CM

## 2022-04-04 PROCEDURE — 97112 NEUROMUSCULAR REEDUCATION: CPT

## 2022-04-04 PROCEDURE — 97110 THERAPEUTIC EXERCISES: CPT

## 2022-04-04 NOTE — PROGRESS NOTES
Daily Note     Today's date: 2022  Patient name: Rafiq Ward  : 1955  MRN: [id number]  Referring provider: Kay Melendrez MD  Dx:   Encounter Diagnosis     ICD-10-CM    1  S/p left hip fracture  Z87 81    2  Physical deconditioning  R53 81    3  Balance problem  R26 89        Start Time: 1145  Stop Time: 1215  Total time in clinic (min): 30 minutes    Subjective: Pt reports that he is using his cane, able to ascend and descend his steps and is able to walk a few feet around his house without any AD  Objective: See treatment diary below      Assessment: Pt tolerated treatment well improving with his NM control of quad strength, patient self terminated session due to fatigue and subjected low blood sugar due to him not eating this morning, patient given a piece of candy, patient going to eat breakfast  Patient would benefit from continued PT to continue to decrease sx acuity and increase strength/endurance/functional capacity  Plan: Continue per plan of care       Precautions: DM/polyneuropathy, HTN     Manuals 3/7 3/8 3/14 3/16 3/21 3/23 3/25 3/29 3/30 4/4         PROM     PK 10' PK 5' L LE             IASTM L quad       10' PK 10' PK 10' PK 10'PK                                                Neuro Re-Ed                   SLS with BUE HEP                  Standing marches  NV 2x10 ea 2x10 ea 1 min  1 min 1 min 1 min 1 min 1 min 1 minute         Lateral stepping  NV 10'x6 6'x10  6'x10  7 min, 10' 3 min 10 feet 2 min 10 feet 1 5 min, 10 feet 1 5 min, 10 feet 1 5 min, 10 feet         Wobble board- AP and                                                                             Ther Ex                   Long arc quad  HEP 2x10 2x10, 2# 2x1 min cont, 2# 2x1 min cont, 2# 2x1 min cont, 7# 2x1 min cont, 8#; added yellow theraband resistance in addition  Manual isotonics- extension and flexion with manual therapist    3' Manual isotonics- extension and flexion with manual therapist    3' Seated marches  HEP 2x10 2x10                 Heel raises  HEP 3x10 30x  30x  30 reps, 3 second hold 30 reps, 3 second hold 30 reps, 3 second hold            STS NV  2x10, elevated mat height   2x10, elevated mat height  2x10, elevated mat height 2x10, elevated mat height 2x10, elevated mat height 2x10, elevated mat height 2x10, elevated mat height         Sidelying abduction NV  2x10 3x10  2x10                bridging  2x10 2x10, BTB  2x10 with ab brace 2x10 with ab brace 2x10 with ab brace 2x10 with ab brace 2x10 with ab brace 2x10 with ab brace 2x10 with ab brace, 10# weight         Lunges on 6" step  2x10   3x10  3x10 post IASTM             clamshells  GTB  2x10 Supine 2x10 GTB, 2x10 BTB  3x10  3x10  3x10  3x10  3x10  3x10 3x10         Hamstring curls       8 lbs, 20 reps            Heel slides        20 reps, 3 second holds 20 reps, 3 second holds 20 reps, 3 second holds         Hanging knee flexion stretch        8#, 3 minutes           Knee extension into purple band          20 reps, 3 second holds                                                SLR  2x10 2x10  2x10  2 x10 2#  2 x10 2#  2 x10 8#  2 x10 8#  2 x10 8#  Assisted with purple band,20 reps up and down          Ther Activity                   Squats with 2" wedge under L foot     20 reps, 3 second holds              Pushing sled        50#, 20 feet 8 cycles push and pull 50#, 20 feet 8 cycles push and pull          Step ups, forward and laterally        4" step, 10 reps each 4" step, 10 reps each          Stepping over step stool simulating motorcycle          8" step 2 UE on bar, 5'                                               SLS with weight bearing and and reaching     20 reps, 3 second holds 20 reps, 3 second holds 20 reps, 3 second holds 20 reps, 3 second holds 20 reps, 3 second holds; added alternating isometrics- 20 reps each 20 reps, 3 second holds; added alternating isometrics- 20 reps each         Gait Training Ambulation with single crutch  NV                                      Modalities

## 2022-04-06 ENCOUNTER — OFFICE VISIT (OUTPATIENT)
Dept: PHYSICAL THERAPY | Facility: MEDICAL CENTER | Age: 67
End: 2022-04-06
Payer: MEDICARE

## 2022-04-06 DIAGNOSIS — R26.89 BALANCE PROBLEM: ICD-10-CM

## 2022-04-06 DIAGNOSIS — Z87.81 S/P LEFT HIP FRACTURE: Primary | ICD-10-CM

## 2022-04-06 DIAGNOSIS — R53.81 PHYSICAL DECONDITIONING: ICD-10-CM

## 2022-04-06 PROCEDURE — 97140 MANUAL THERAPY 1/> REGIONS: CPT | Performed by: PHYSICAL THERAPIST

## 2022-04-06 PROCEDURE — 97110 THERAPEUTIC EXERCISES: CPT | Performed by: PHYSICAL THERAPIST

## 2022-04-06 NOTE — PROGRESS NOTES
Daily Note     Today's date: 2022  Patient name: Jose Gan  : 1955  MRN: [id number]  Referring provider: Ashwin Laboy MD  Dx:   Encounter Diagnosis     ICD-10-CM    1  S/p left hip fracture  Z87 81    2  Physical deconditioning  R53 81    3  Balance problem  R26 89        Start Time: 1010  Stop Time: 1042  Total time in clinic (min): 32 minutes    Subjective: pt reports to clinic with decreased motivation  No change in symptoms  Objective: See treatment diary below      Assessment: Tolerated treatment well  Pt completed all documented exercises with good form and fair tolerane  Rest breaks provided upon request to assist in managing symptoms  Pt encouraged to continue walking in order to improve functional leg strength  Patient would benefit from continued PT      Plan: Continue per plan of care  Precautions: DM/polyneuropathy, HTN     Manuals 4/6     3/23 3/25 3/29 3/30 4/1   PROM RK     PK 5' L LE       IASTM L quad       10' PK 10' PK 10' PK 10'PK RK STM                             Neuro Re-Ed             SLS with BUE             Standing marches  x20     1 min 1 min 1 min 1 min    Lateral stepping       3 min 10 feet 2 min 10 feet 1 5 min, 10 feet 1 5 min, 10 feet    Wobble board- AP and              Weight shift On foam 1 min side to side                                      Ther Ex             Long arc quad  x20 #5     2x1 min cont, 7# 2x1 min cont, 8#; added yellow theraband resistance in addition  Manual isotonics- extension and flexion with manual therapist    3'    Seated marches           standing b/l UE x20 ea     Heel raises  x20     30 reps, 3 second hold 30 reps, 3 second hold      STS      2x10, elevated mat height 2x10, elevated mat height 2x10, elevated mat height 2x10, elevated mat height x1   Sidelying abduction standing abd and ext x10 ea             bridging 2x10     2x10 with ab brace 2x10 with ab brace 2x10 with ab brace 2x10 with ab brace refused   Lunges on 6" step      3x10 post IASTM       clamshells      3x10  3x10  3x10  3x10 refused   Hamstring curls x30 #5      8 lbs, 20 reps      Heel slides 20 reps, 3 second holds       20 reps, 3 second holds 20 reps, 3 second holds 20 reps, 3 second holds   Hanging knee flexion stretch        8#, 3 minutes     SAQ          x20   Mini squats x25                         SLR x20 #5     2 x10 2#  2 x10 8#  2 x10 8#  2 x10 8#  x20 #0   Ther Activity             Squats with 2" wedge under L foot             Pushing sled        50#, 20 feet 8 cycles push and pull 50#, 20 feet 8 cycles push and pull    Step ups, forward and laterally        4" step, 10 reps each 4" step, 10 reps each 4" step, 10 reps each                                          SLS with weight bearing and and reaching      20 reps, 3 second holds 20 reps, 3 second holds 20 reps, 3 second holds 20 reps, 3 second holds; added alternating isometrics- 20 reps each    Gait Training             Ambulation with single crutch                           Modalities

## 2022-04-08 ENCOUNTER — APPOINTMENT (OUTPATIENT)
Dept: PHYSICAL THERAPY | Facility: MEDICAL CENTER | Age: 67
End: 2022-04-08
Payer: MEDICARE

## 2022-04-11 ENCOUNTER — OFFICE VISIT (OUTPATIENT)
Dept: PHYSICAL THERAPY | Facility: MEDICAL CENTER | Age: 67
End: 2022-04-11
Payer: MEDICARE

## 2022-04-11 DIAGNOSIS — Z87.81 S/P LEFT HIP FRACTURE: Primary | ICD-10-CM

## 2022-04-11 DIAGNOSIS — R26.89 BALANCE PROBLEM: ICD-10-CM

## 2022-04-11 DIAGNOSIS — R53.81 PHYSICAL DECONDITIONING: ICD-10-CM

## 2022-04-11 PROCEDURE — 97110 THERAPEUTIC EXERCISES: CPT

## 2022-04-11 PROCEDURE — 97530 THERAPEUTIC ACTIVITIES: CPT

## 2022-04-11 NOTE — PROGRESS NOTES
Daily Note     Today's date: 2022  Patient name: Ana Bustamante  : 1955  MRN: [id number]  Referring provider: Chasity Cullen MD  Dx:   Encounter Diagnosis     ICD-10-CM    1  S/p left hip fracture  Z87 81    2  Physical deconditioning  R53 81    3  Balance problem  R26 89        Start Time: 1145  Stop Time: 1230  Total time in clinic (min): 45 minutes    Subjective: Patient states that his groin pain on the L side is decreasing, 3/10 today, patient notes he is able to attempt to improve his ambulation without SPC  Patient has been performing HEP at home  Objective: See treatment diary below- ; 4# cuff weights on ankles bilaterally     Standing hip flexion- 20 reps, 3 second holds  Standing hip extension- 20 reps, 3 second holds  Standing hip abduction- 20 reps, 3 second holds    Side stepping with lunge- 10 cycles of 10 feet  Monster walks- 10 cycles of 10 feet, 4# cuff weights on feet  Stepping over hurdles- 10 cycles of 10 feet forward and laterally- 2"  Sit to stands- 2 foam on seat, 10 reps, 2 sets, 2 foam pads on seat  Stepping over raised seat- 20 reps, 3 second holds, foot even with 8" step      Assessment: Tolerated treatment well  Pt completed all documented exercises with good form and fair tolerane  Rest breaks provided upon request to assist in managing symptoms, patient tolerated increased weights and activities today with only mild discomfort, progressed HEP to Hip flexion, hip extension, and hip abduction  Patient would benefit from continued PT to maximize function post surgery  Plan: Continue per plan of care        Precautions: DM/polyneuropathy, HTN

## 2022-04-13 ENCOUNTER — OFFICE VISIT (OUTPATIENT)
Dept: PHYSICAL THERAPY | Facility: MEDICAL CENTER | Age: 67
End: 2022-04-13
Payer: MEDICARE

## 2022-04-13 DIAGNOSIS — Z87.81 S/P LEFT HIP FRACTURE: Primary | ICD-10-CM

## 2022-04-13 DIAGNOSIS — R26.89 BALANCE PROBLEM: ICD-10-CM

## 2022-04-13 DIAGNOSIS — R53.81 PHYSICAL DECONDITIONING: ICD-10-CM

## 2022-04-13 PROCEDURE — 97530 THERAPEUTIC ACTIVITIES: CPT

## 2022-04-13 PROCEDURE — 97112 NEUROMUSCULAR REEDUCATION: CPT

## 2022-04-13 PROCEDURE — 97110 THERAPEUTIC EXERCISES: CPT

## 2022-04-13 NOTE — PROGRESS NOTES
Daily Note     Today's date: 2022  Patient name: Kurtis Darden  : 1955  MRN: [id number]  Referring provider: Serene Martínez MD  Dx:   Encounter Diagnosis     ICD-10-CM    1  S/p left hip fracture  Z87 81    2  Physical deconditioning  R53 81    3  Balance problem  R26 89        Start Time: 1130  Stop Time: 1230  Total time in clinic (min): 60 minutes    Subjective: Patient states that he is improving his overall intervention tolerance, patient able to ascend and descend steps with assistance of his hands  Objective: See treatment diary below- ; 5# cuff weights on ankles bilaterally     Standing hip flexion- 20 reps, 3 second holds  Standing hip extension- 20 reps, 3 second holds  Standing hip abduction- 20 reps, 3 second holds    Side stepping with lunge- 10 cycles of 10 feet  Monster walks- 10 cycles of 10 feet, 4# cuff weights on feet  Stepping over hurdles- 10 cycles of 10 feet forward and laterally- 2"  Sit to stands- 2 foam on seat, 10 reps, 2 sets, 2 foam pads on seat  Stepping over raised seat- 20 reps, 3 second holds, foot even with 8" step  Step ups- forward and laterally- 4" step, 20 reps each direction 1 UE  Step up backwards, eccentric down- 20 reps bilaterally       Assessment: Tolerated treatment well  Patient improving functional status and improving with step ups and step downs today, patient improving but challenged with tolerance to ambulation without SPC, continued antalgic gait pattern noted  Patient would benefit from continued PT to maximize function post surgery  Plan: Continue per plan of care        Precautions: DM/polyneuropathy, HTN

## 2022-04-15 ENCOUNTER — OFFICE VISIT (OUTPATIENT)
Dept: PHYSICAL THERAPY | Facility: MEDICAL CENTER | Age: 67
End: 2022-04-15
Payer: MEDICARE

## 2022-04-15 DIAGNOSIS — R26.89 BALANCE PROBLEM: ICD-10-CM

## 2022-04-15 DIAGNOSIS — Z87.81 S/P LEFT HIP FRACTURE: Primary | ICD-10-CM

## 2022-04-15 DIAGNOSIS — R53.81 PHYSICAL DECONDITIONING: ICD-10-CM

## 2022-04-15 PROCEDURE — 97110 THERAPEUTIC EXERCISES: CPT

## 2022-04-15 NOTE — PROGRESS NOTES
Daily Note     Today's date: 4/15/2022  Patient name: Augustus Martinez  : 1955  MRN: [id number]  Referring provider: Sarath Nunez MD  Dx:   Encounter Diagnosis     ICD-10-CM    1  S/p left hip fracture  Z87 81    2  Physical deconditioning  R53 81    3  Balance problem  R26 89        Start Time: 1100  Stop Time: 1130  Total time in clinic (min): 30 minutes    Subjective: Patient states that he has increased L groin pain from his hip injury, patient notes that it is 6/10 pain  Objective: See treatment diary below- 4/15    Seated Gastroc stretch- 5' on L gastroc, MH applied   Seated heel slides- 5' on L knee, MH applied  Seated Quad sets- 3' on L knee, MH applied   Seated Adduction stretch- 5' on L LE, MH applied  Side stepping with lunge- 10 cycles of 10 feet  Sit to stands- 2 foam on seat, 10 reps, 2 sets, 2 foam pads on seat  Stepping over raised seat- 20 reps, 3 second holds, foot even with 8" step      Assessment: Tolerated treatment well  Patient improving with pain post session, moist heat application with proper towels, skin integrity intact pre/post  Patient reported decrease in pain post session  Patient would benefit from continued PT to maximize function post surgery  Plan: Continue per plan of care        Precautions: DM/polyneuropathy, HTN

## 2022-04-18 ENCOUNTER — OFFICE VISIT (OUTPATIENT)
Dept: PHYSICAL THERAPY | Facility: MEDICAL CENTER | Age: 67
End: 2022-04-18
Payer: MEDICARE

## 2022-04-18 DIAGNOSIS — R26.89 BALANCE PROBLEM: ICD-10-CM

## 2022-04-18 DIAGNOSIS — Z87.81 S/P LEFT HIP FRACTURE: Primary | ICD-10-CM

## 2022-04-18 DIAGNOSIS — R53.81 PHYSICAL DECONDITIONING: ICD-10-CM

## 2022-04-18 PROCEDURE — 97110 THERAPEUTIC EXERCISES: CPT

## 2022-04-18 PROCEDURE — 97112 NEUROMUSCULAR REEDUCATION: CPT

## 2022-04-18 NOTE — PROGRESS NOTES
Daily Note     Today's date: 2022  Patient name: Ruth Tran  : 1955  MRN: [id number]  Referring provider: Kecia Pate MD  Dx:   Encounter Diagnosis     ICD-10-CM    1  S/p left hip fracture  Z87 81    2  Physical deconditioning  R53 81    3  Balance problem  R26 89        Start Time: 1230  Stop Time: 1315  Total time in clinic (min): 45 minutes    Subjective: Patient states that he is feeling much better today, states he has weights at home  10 lbs total for each weight     Objective: See treatment diary below-     Seated Gastroc stretch- 5' on L gastroc, MH applied   Seated heel slides- 5' on L knee, MH applied  Seated Quad sets- 3' on L knee, MH applied   Seated Adduction stretch- 5' on L LE, MH applied  Side stepping with lunge- 10 cycles of 10 feet, black theraband around knees  Monster walks- 10 cycles of 10 feet, black theraband around knees  Stepping over hurdles- 10 cycles of 10 feet forward and laterally- 2"  Sit to stands- 2 foam on seat, 10 reps, 2 sets, 2 foam pads on seat  Stepping over raised seat- 20 reps, 3 second holds, foot even with 8" step  Step up with lunge with black theraband around knees- 20 reps on 4" and 6" step  TRX stand to sit- 20 reps       Assessment: Tolerated treatment well  Patient improving with pain post session, moist heat application with proper towels, skin integrity intact pre/post  Patient reported decrease in pain post session, patient improving step height with overall intervention tolerance, 6 inch with difficulty with Weight bearing on L  Patient would benefit from continued PT to maximize function post surgery  Plan: Continue per plan of care        Precautions: DM/polyneuropathy, HTN

## 2022-04-20 ENCOUNTER — OFFICE VISIT (OUTPATIENT)
Dept: PHYSICAL THERAPY | Facility: MEDICAL CENTER | Age: 67
End: 2022-04-20
Payer: MEDICARE

## 2022-04-20 DIAGNOSIS — R26.89 BALANCE PROBLEM: ICD-10-CM

## 2022-04-20 DIAGNOSIS — R53.81 PHYSICAL DECONDITIONING: ICD-10-CM

## 2022-04-20 DIAGNOSIS — Z87.81 S/P LEFT HIP FRACTURE: Primary | ICD-10-CM

## 2022-04-20 PROCEDURE — 97110 THERAPEUTIC EXERCISES: CPT | Performed by: PHYSICAL THERAPIST

## 2022-04-20 PROCEDURE — 97112 NEUROMUSCULAR REEDUCATION: CPT | Performed by: PHYSICAL THERAPIST

## 2022-04-20 NOTE — PROGRESS NOTES
Daily Note     Today's date: 2022  Patient name: Gabrielle Potter  : 1955  MRN: [id number]  Referring provider: Maren Baird MD  Dx:   Encounter Diagnosis     ICD-10-CM    1  S/p left hip fracture  Z87 81    2  Physical deconditioning  R53 81    3  Balance problem  R26 89        Start Time: 1148  Stop Time: 1228  Total time in clinic (min): 40 minutes    Subjective: pt reports that he is feeling the same, "maybe a little better"  Objective: See treatment diary below    Not performed:  Seated Gastroc stretch- 5' on L gastroc, MH applied   Seated heel slides- 5' on L knee, MH applied  Seated Quad sets- 3' on L knee, MH applied   Seated Adduction stretch- 5' on L LE, MH applied  Stepping over hurdles- 10 cycles of 10 feet forward and laterally- 2"  Sit to stands- 2 foam on seat, 10 reps, 2 sets, 2 foam pads on seat  Stepping over raised seat- 20 reps, 3 second holds, foot even with 8" step    Performed:  Side stepping with lunge- 3 cycles of 10 feet, black theraband around knees  Monster walks- 10 cycles of 10 feet, black theraband around knees  Step up with lunge- 20 reps on 4" and 6" step b/l, no UE x10  TRX stand to sit- 20 reps   Marching 1 5 laps 1 UE  Standing hip abd/ext  Bilateral, alternating x20 ea  Assessment: Tolerated treatment well  Pt completed all documented exercises with subjective report of improved WB tolerance and decreased pain  Pt encouraged to continue HEP as directed to further improve WB tolerance  We will continue to progress as tolerated  Patient would benefit from continued PT      Plan: Continue per plan of care        Precautions: DM/polyneuropathy, HTN

## 2022-04-22 ENCOUNTER — OFFICE VISIT (OUTPATIENT)
Dept: PHYSICAL THERAPY | Facility: MEDICAL CENTER | Age: 67
End: 2022-04-22
Payer: MEDICARE

## 2022-04-22 DIAGNOSIS — Z87.81 S/P LEFT HIP FRACTURE: Primary | ICD-10-CM

## 2022-04-22 DIAGNOSIS — R53.81 PHYSICAL DECONDITIONING: ICD-10-CM

## 2022-04-22 PROCEDURE — 97112 NEUROMUSCULAR REEDUCATION: CPT

## 2022-04-22 NOTE — PROGRESS NOTES
Daily Note     Today's date: 2022  Patient name: Yasmin Molina  : 1955  MRN: [id number]  Referring provider: Nay Orozco MD  Dx:   Encounter Diagnosis     ICD-10-CM    1  S/p left hip fracture  Z87 81    2  Physical deconditioning  R53 81        Start Time: 1015          Subjective: pt reports that he is feeling the same, "maybe a little better"  Objective: See treatment diary below    Not performed:  Seated Gastroc stretch- 5' on L gastroc, MH applied   Seated heel slides- 5' on L knee, MH applied  Seated Quad sets- 3' on L knee, MH applied   Seated Adduction stretch- 5' on L LE, MH applied  Stepping over hurdles- 10 cycles of 10 feet forward and laterally- 2"  Sit to stands- 2 foam on seat, 10 reps, 2 sets, 2 foam pads on seat  Stepping over raised seat- 20 reps, 3 second holds, foot even with 8" step    Performed:  Side stepping with lunge- 3 cycles of 10 feet, black theraband around knees  Monster walks- 10 cycles of 10 feet, black theraband around knees  Step up with lunge- 20 reps on 4" and 6" step b/l, no UE x10  TRX stand to sit- 20 reps     NP  Marching 1 5 laps 1 UE      NP  Standing hip abd/ext  Bilateral, alternating x20 ea  With black theraband   Pt 1:1 [id number]    Assessment: Tolerated treatment well  25 minutes into session, pt chooses to terminate session  Patient educated about benefits of continued participation in skilled PT intervention, pt declines further participation at this time  Patient would benefit from continued PT      Plan: Continue per plan of care        Precautions: DM/polyneuropathy, HTN

## 2022-04-25 ENCOUNTER — OFFICE VISIT (OUTPATIENT)
Dept: PHYSICAL THERAPY | Facility: MEDICAL CENTER | Age: 67
End: 2022-04-25
Payer: MEDICARE

## 2022-04-25 DIAGNOSIS — Z87.81 S/P LEFT HIP FRACTURE: Primary | ICD-10-CM

## 2022-04-25 DIAGNOSIS — R26.89 BALANCE PROBLEM: ICD-10-CM

## 2022-04-25 DIAGNOSIS — R53.81 PHYSICAL DECONDITIONING: ICD-10-CM

## 2022-04-25 PROCEDURE — 97110 THERAPEUTIC EXERCISES: CPT

## 2022-04-25 NOTE — PROGRESS NOTES
Daily Note     Today's date: 2022  Patient name: Anabelle Haider  : 1955  MRN: [id number]  Referring provider: Katlyn Segura MD  Dx:   Encounter Diagnosis     ICD-10-CM    1  S/p left hip fracture  Z87 81    2  Physical deconditioning  R53 81    3  Balance problem  R26 89        Start Time: 1230  Stop Time: 1315  Total time in clinic (min): 45 minutes    Subjective: Patient reports that he was sore upon presentation to therapy today, patient was attempting to place railings on his deck and drove 4 hours each way on Friday and Saturday  Objective: See treatment diary below    Long sitting heat applied to L LE during all the following interventions for pain relief     Seated Gastroc stretch- 5' on L gastroc, MH applied to L hip and L knee  Seated heel slides- 5' on L knee, MH applied to L hip and L knee  Seated Quad sets- 3' on L knee, MH applied to L hip and L knee  Seated Adduction stretch- 5' on L LE, MH applied to L hip and L knee  Stepping over raised seat- 20 reps, 3 second holds, foot even with 8" step    Performed:  Side stepping with lunge- 3 cycles of 10 feet, black theraband around knees  Monster walks- 10 cycles of 10 feet, black theraband around knees  Step up with lunge- 20 reps on 4" and 6" step b/l, no UE x 20  Step up with lunge- 8" step, 20 reps  Reverse walking weighted- 60# on Cable column, 10 cycles of 10 feet      Assessment: Tolerated treatment well  Progressed interventions today with reverse walking and eccentric activities with weighted walking  Patient reported increase in abilities post stretching and heat  Decrease in antalgic gait pattern  Patient would benefit from continued PT to maximize function post hip surgery  Plan: Continue per plan of care        Precautions: DM/polyneuropathy, HTN

## 2022-04-27 ENCOUNTER — OFFICE VISIT (OUTPATIENT)
Dept: PHYSICAL THERAPY | Facility: MEDICAL CENTER | Age: 67
End: 2022-04-27
Payer: MEDICARE

## 2022-04-27 DIAGNOSIS — R53.81 PHYSICAL DECONDITIONING: ICD-10-CM

## 2022-04-27 DIAGNOSIS — R26.89 BALANCE PROBLEM: ICD-10-CM

## 2022-04-27 DIAGNOSIS — Z87.81 S/P LEFT HIP FRACTURE: Primary | ICD-10-CM

## 2022-04-27 PROCEDURE — 97110 THERAPEUTIC EXERCISES: CPT

## 2022-04-27 PROCEDURE — 97164 PT RE-EVAL EST PLAN CARE: CPT

## 2022-04-27 NOTE — LETTER
2022    Dedra Tam MD  [address] [facility] [place],  [address] [place] [address]    Patient: Mignon Crowder   YOB: 1955   Date of Visit: 2022     Encounter Diagnosis     ICD-10-CM    1  S/p left hip fracture  Z87 81    2  Physical deconditioning  R53 81    3  Balance problem  R26 89        Dear Dr Bello Sethi: Thank you for your recent referral of Mignon Crowder  Please review the attached evaluation summary from Joseph's recent visit  Please verify that you agree with the plan of care by signing the attached order  If you have any questions or concerns, please do not hesitate to call  I sincerely appreciate the opportunity to share in the care of one of your patients and hope to have another opportunity to work with you in the near future  Sincerely,    Neal Taylor, PT      Referring Provider:      I certify that I have read the below Plan of Care and certify the need for these services furnished under this plan of treatment while under my care  Dedra Tam MD  [address] [facility] [place] [address]  Via Fax: [phone]          PT Re-Evaluation /Daily Note    Today's date: 2022  Patient name: Mignon Crowder  : 1955  MRN: [id number]  Referring provider: Yohana Forbes MD  Dx:   Encounter Diagnosis     ICD-10-CM    1  S/p left hip fracture  Z87 81    2  Physical deconditioning  R53 81    3  Balance problem  R26 89        Start Time: 1100  Stop Time: 1145  Total time in clinic (min): 45 minutes    Assessment  Assessment details: Mignon Crowder is a 61 y o  male who has completed 19 sessions to date  The patient has made improvements in LE strength, improved weight bearing tolerance of L LE being able to ambulate with SPC and improved activity tolerance occasionally ambulating without SPC    However, the patient continues to have increased difficulty with increased pain in the L LE, gait impairments, decreased LE strength and decreased activity tolerance as well as an antalgic gait pattern  Endurance testing increasing but below age related norms  Pt relies heavily on UEs to assist with chair transfers and ambulating with SPC  Patient continues to demonstrate fall risk per objective testing and per outcome measure results per research  Patient will continue to benefit from skilled PT for LE strengthening exercises, balance training, gait training and ROM exercises to improve limitations and assist the patient to improved QOL, and also reducing hospital costs  Impairments: abnormal or restricted ROM, abnormal movement, activity intolerance, impaired physical strength, lacks appropriate home exercise program and pain with function    Symptom irritability: lowUnderstanding of Dx/Px/POC: good   Prognosis: good    Goals    Short Term Goals: In 4 weeks, the patient will:  1  Increase 2 minute walk test by 50 ft (> MDC of 40 ft) to indicate improving CV and muscular endurance- ] met  2  Increase L hip flexion strength to 3+/5 to indicate improving L hip flexor strength - met  3  Independent with HEP for improved carryover from PT-  met  4  Increase 30s STS to > repetitions - met  5  Increase SLS to >5s B with no UE support - not met    Long Term Goals: In 8 weeks, the patient will:  1  Increase 2 minute walk test to >490 ft with LRAD or no AD to indicate pt has met age matched norms for ambulation endurance - not met  2  Increase SLS to > 15s B - not met  3  Increase L hip strength to 4+/5 globally- not met   4  Improve 30s STS to 10 repetitions with less than total reliance on BUE to indicate improve LE strength/power - not met  5  Increase FOTO to greater than predicted value to indicate significantly improved level of function - not met  6   Independent with HEP for selfcare- not met    New Goals- added 4/27/2022  Long Term Goals (12 weeks):  - Patient will improve scoring on DGI by 2 6 points to progress safety  - Patient will improve gait speed by 0 18 m/s to improve safety with community ambulation  - Patient will improve BECKER by 6 points in order to improve static balance and reduce risk for falls  - Patient will improve scoring on FGA by 4 points  to progress safety with dynamic tasks  - Patient will be able to demonstrate HT in gait without veering  - Patient will improve 6 Minute Walk Test score by 190 feet to promote improved cardiovascular endurance  - Patient will report 50% reduction in near falls in order to improve safety with functional tasks and reduce his risk for falls  - Patient will report going on walks at least 3 days per week to promote independence and improved cardiovascular endurance  - Patient will be able to ascend/descend stairs reciprocally with 1 UE assist to promote independence and safety with ADLs  - Patient will report 50% reduction in near falls when ambulating on uneven terrain      Cut off score    All date taken from APTA Neuro Section or Rehab Measures      Becker/64  MDC: 6 pts  Age Norms:  61-76: M - 54   F - 55  70-79: M - 47   F - 53  80-89: M - 48   F - 50 5xSTS: Sumeet et al 2010  MDC: 2 3 sec  Age Norms:  60-69: 11 1 sec  70-79: 12 6 sec  80-89: 14 8 sec  TUG  MDC: 4 14 sec  Cut off score:  >13 5 sec community dwelling adults  >32 2 frail elderly  <20 I for basic transfers  >30 dependent on transfers 10 Meter Walk Test: Nargis baker   MDC: 0 18 m/s  20-29: M - 1 35 m   F - 1 34 m  30-39: M - 1 43 m   F - 1 34 m  40-49: M - 1 43 m   F - 1 39 m  50-59: M - 1 43 m   F - 1 31 m  60-69: M - 1 34 m   F - 1 24 m  70-79: M - 1 26 m   F - 1 13 m  80-89: M - 0 97 m   F - 0 94 m    Household Ambulator < 0 4 m/s  Limited Community Ambulator 0 4 - 0 8 m/s  [employer] 0 8 - 1 2 m/s  Safely cross the street > 1 2 m/s  FGA  MCID: 4 pts  Geriatrics/community < 22/30 fall risk  Geriatrics/community < 20/30 unexplained falls    DGI  MDC: vestibular - 4 pts  MDC: geriatric/community - 3 pts  Falls risk <19/24 mCTSIB  Norm: 20-60 yrs  Eyes open firm: norm sway 0 21-0 48  Eyes closed firm: norm sway 0 48-0 99  Eyes open foam: norm sway 0 38-0 71  Eyes closed foam: norm sway 0 70-2 22  6 Minute Walk Test  MDC: 190 98 ft  MCID: 164 ft    Age Norms  61-76: M - 1876 ft (571 80 m)  F - 4398 ft (537 98 m)  70-79: M - 1729 ft (527 00 m)  F - 6116 ft (470 92 m)  80-89: M - 1368 ft (416 97 m)  F - 1286 ft (391 97 m) ABC: Vignesh & Nanette, 2003  <67% increased risk for falls          Plan  Patient would benefit from: skilled physical therapy  Referral necessary: No  Planned modality interventions: biofeedback, cryotherapy, electrical stimulation/Russian stimulation, TENS and thermotherapy: hydrocollator packs  Planned therapy interventions: functional ROM exercises, graded activity, graded exercise, home exercise program, joint mobilization, manual therapy, neuromuscular re-education, patient education, strengthening, stretching, therapeutic activities, therapeutic exercise, balance, balance/weight bearing training, transfer training, self care, postural training, ADL retraining, IADL retraining, abdominal trunk stabilization, activity modification, ADL training, Briones taping, motor coordination training, muscle pump exercises, body mechanics training, breathing training, canalith repositioning, compression, coordination, fine motor coordination training, flexibility, therapeutic training, gait training and graded motor  Frequency: 3x week  Duration in weeks: 12  Plan of Care beginning date: 4/27/2022  Plan of Care expiration date: 7/20/2022  Treatment plan discussed with: patient        Subjective  Subjective Comments: pt reports sine beginning PT his L leg has been getting better  He notes improvement in pain and ROM  He notes slight improvement in strength  Pt reports that when he is weight bearing, he will get groin discomfort  Pt also notes that his L knee is very tight since surgery        Pain   Rest: 0/10   Best: 0/10   Worst: 3/10    4/27/2022  Oma Russell states that he is noting improvement with is overall function daily  He states that he is improving with his ambulation, now using only a R SPC to navigate  He is doing more of his activities at home, but is still limited with his ability to perform his daily tasks and hobbies, such as riding his motor cycle, wood working, ascending and descending stairs, as well as limited daily by pain  It should be noted that patient has an extensive Past surgical history of bilateral knee replacements and orthopedic trauma  Objective      Range of Motion: Goniometric revealed the following findings (in degrees)  Joint Motion Right: 4/27/2022 Left: 4/27/2022   Hip flexion  PROM-122  AROM-110 PROM-110  AROM-105   Hip Extension Measured via Edgard Test- 10 degrees Measured via Edgard Test- 5 degrees   Hip External Rotation 10 10   Hip Internal Rotation 10 8   Knee Flexion  PROM-85  AROM-75 PROM-80  AROM-75   Hip Abduction 50 passive 45 passive     Strength: MMT revealed the following findings    Joint Motion Right: 4/27/2022 Left: 4/27/2022   Hip Flexion 5/5 3+/5   Hip Abduction 4+/5 4+/5   Hip ER  5/5 4+/5   Hip Extension 4+/5 5/5   Knee Extension 5/5 5/5   Knee Flexion 5/5 4+/5   Ankle Plantarflexion 5/5 5/5   Ankle Dorsiflexion 5/5 5/5     Additional Assessments:  Palpation: TTP at medial groin on L hip, decreases at distal adductor group  Heel raise: able to perform > 10 reps   Gait Pattern: SPC use, no crutches, patient can walk 50 feet without the use of SPC, but prefers to use SPC, antalgic gait with decreased step length, height, and stride length  Balance: SLS: R: 5s, L: unable   Transfers: STS: Total BUE reliance with LLE kicked out anteriorly       Outcome Measures:   5x sit to stand- 29 23 with 2 UE support  6 minute walk test- 650 feet with SPC  BECKER- 43/56    Pertinent Findings and Outcome Measures: Test / Measure  3/7/2022 4/1/2022 4/26/2022    FOTO 56 41 55    2  ft, B crutches 255ft  ft SPC    30s 4 repetitions, BUE use refused 5 reps bilateral UE    L hip flexion MMT 2+/5  3+/5 4/5    L SLS Unable  unable 2 seconds        Flowsheet Rows      Most Recent Value   PT/OT G-Codes    Current Score 55   Projected Score 65           Precautions: DM/polyneuropathy, HTN   Long sitting heat applied to L LE during all the following interventions for pain relief     Seated Gastroc stretch- 5' on L gastroc, MH applied to L hip and L knee  Seated heel slides- 5' on L knee, MH applied to L hip and L knee  Seated Quad sets- 3' on L knee, MH applied to L hip and L knee  Seated Adduction stretch- 5' on L LE, MH applied to L hip and L knee  Stepping over raised seat- 20 reps, 3 second holds, foot even with 8" step    Reverse walking- 50 pounds, 2'  Belt around waist, walking forward with controlled backwards walking- 3'

## 2022-04-27 NOTE — PROGRESS NOTES
PT Re-Evaluation /Daily Note    Today's date: 2022  Patient name: Garner Jeans  : 1955  MRN: [id number]  Referring provider: Sheryl Minor MD  Dx:   Encounter Diagnosis     ICD-10-CM    1  S/p left hip fracture  Z87 81    2  Physical deconditioning  R53 81    3  Balance problem  R26 89        Start Time: 1100  Stop Time: 1145  Total time in clinic (min): 45 minutes    Assessment  Assessment details: Garner Jeans is a 61 y o  male who has completed 19 sessions to date  The patient has made improvements in LE strength, improved weight bearing tolerance of L LE being able to ambulate with SPC and improved activity tolerance occasionally ambulating without SPC  However, the patient continues to have increased difficulty with increased pain in the L LE, gait impairments, decreased LE strength and decreased activity tolerance as well as an antalgic gait pattern  Endurance testing increasing but below age related norms  Pt relies heavily on UEs to assist with chair transfers and ambulating with SPC  Patient continues to demonstrate fall risk per objective testing and per outcome measure results per research  Patient will continue to benefit from skilled PT for LE strengthening exercises, balance training, gait training and ROM exercises to improve limitations and assist the patient to improved QOL, and also reducing hospital costs  Impairments: abnormal or restricted ROM, abnormal movement, activity intolerance, impaired physical strength, lacks appropriate home exercise program and pain with function    Symptom irritability: lowUnderstanding of Dx/Px/POC: good   Prognosis: good    Goals    Short Term Goals: In 4 weeks, the patient will:  1  Increase 2 minute walk test by 50 ft (> MDC of 40 ft) to indicate improving CV and muscular endurance- ] met  2  Increase L hip flexion strength to 3+/5 to indicate improving L hip flexor strength - met  3   Independent with HEP for improved carryover from PT- met  4  Increase 30s STS to > repetitions - met  5  Increase SLS to >5s B with no UE support - not met    Long Term Goals: In 8 weeks, the patient will:  1  Increase 2 minute walk test to >490 ft with LRAD or no AD to indicate pt has met age matched norms for ambulation endurance - not met  2  Increase SLS to > 15s B - not met  3  Increase L hip strength to 4+/5 globally- not met   4  Improve 30s STS to 10 repetitions with less than total reliance on BUE to indicate improve LE strength/power - not met  5  Increase FOTO to greater than predicted value to indicate significantly improved level of function - not met  6   Independent with HEP for selfcare- not met    New Goals- added 2022  Long Term Goals (12 weeks):  - Patient will improve scoring on DGI by 2 6 points to progress safety  - Patient will improve gait speed by 0 18 m/s to improve safety with community ambulation  - Patient will improve BECKER by 6 points in order to improve static balance and reduce risk for falls  - Patient will improve scoring on FGA by 4 points  to progress safety with dynamic tasks  - Patient will be able to demonstrate HT in gait without veering  - Patient will improve 6 Minute Walk Test score by 190 feet to promote improved cardiovascular endurance  - Patient will report 50% reduction in near falls in order to improve safety with functional tasks and reduce his risk for falls  - Patient will report going on walks at least 3 days per week to promote independence and improved cardiovascular endurance  - Patient will be able to ascend/descend stairs reciprocally with 1 UE assist to promote independence and safety with ADLs  - Patient will report 50% reduction in near falls when ambulating on uneven terrain      Cut off score    All date taken from APTA Neuro Section or Rehab Measures      Becker/64  MDC: 6 pts  Age Norms:  60-69: M - 54   F - 55  70-79: M - 47   F - 53  80-89: M - 48   F - 50 5xSTS: Sumeet et al 2010  MDC: 2 3 sec  Age Norms:  60-69: 11 1 sec  70-79: 12 6 sec  80-89: 14 8 sec  TUG  MDC: 4 14 sec  Cut off score:  >13 5 sec community dwelling adults  >32 2 frail elderly  <20 I for basic transfers  >30 dependent on transfers 10 Meter Walk Test: Eliud baker 2011  [address]: 0 18 m/s  20-29: M - 1 35 m   F - 1 34 m  30-39: M - 1 43 m   F - 1 34 m  40-49: M - 1 43 m   F - 1 39 m  50-59: M - 1 43 m   F - 1 31 m  60-69: M - 1 34 m   F - 1 24 m  70-79: M - 1 26 m   F - 1 13 m  80-89: M - 0 97 m   F - 0 94 m    Household Ambulator < 0 4 m/s  Limited Community Ambulator 0 4 - 0 8 m/s  [employer] 0 8 - 1 2 m/s  Safely cross the street > 1 2 m/s  FGA  MCID: 4 pts  Geriatrics/community < 22/30 fall risk  Geriatrics/community < 20/30 unexplained falls    DGI  MDC: vestibular - 4 pts  MDC: geriatric/community - 3 pts  Falls risk <19/24 mCTSIB  Norm: 20-60 yrs  Eyes open firm: norm sway 0 21-0 48  Eyes closed firm: norm sway 0 48-0 99  Eyes open foam: norm sway 0 38-0 71  Eyes closed foam: norm sway 0 70-2 22  6 Minute Walk Test  MDC: 190 98 ft  MCID: 164 ft    Age Norms  61-76: M - 1876 ft (571 80 m)  F - 1765 ft (537 98 m)  70-79: M - 1729 ft (527 00 m)  F - 1545 ft (470 92 m)  80-89: M - 1368 ft (416 97 m)  F - 1286 ft (391 97 m) ABC: Vignesh & Fitzpatrick, 2003  <67% increased risk for falls          Plan  Patient would benefit from: skilled physical therapy  Referral necessary: No  Planned modality interventions: biofeedback, cryotherapy, electrical stimulation/Russian stimulation, TENS and thermotherapy: hydrocollator packs  Planned therapy interventions: functional ROM exercises, graded activity, graded exercise, home exercise program, joint mobilization, manual therapy, neuromuscular re-education, patient education, strengthening, stretching, therapeutic activities, therapeutic exercise, balance, balance/weight bearing training, transfer training, self care, postural training, ADL retraining, IADL retraining, abdominal trunk stabilization, activity modification, ADL training, Briones taping, motor coordination training, muscle pump exercises, body mechanics training, breathing training, canalith repositioning, compression, coordination, fine motor coordination training, flexibility, therapeutic training, gait training and graded motor  Frequency: 3x week  Duration in weeks: 12  Plan of Care beginning date: 4/27/2022  Plan of Care expiration date: 7/20/2022  Treatment plan discussed with: patient        Subjective  Subjective Comments: pt reports sine beginning PT his L leg has been getting better  He notes improvement in pain and ROM  He notes slight improvement in strength  Pt reports that when he is weight bearing, he will get groin discomfort  Pt also notes that his L knee is very tight since surgery  Pain   Rest: 0/10   Best: 0/10   Worst: 3/10    4/27/2022  Aarti Miller states that he is noting improvement with is overall function daily  He states that he is improving with his ambulation, now using only a R SPC to navigate  He is doing more of his activities at home, but is still limited with his ability to perform his daily tasks and hobbies, such as riding his motor cycle, wood working, ascending and descending stairs, as well as limited daily by pain  It should be noted that patient has an extensive Past surgical history of bilateral knee replacements and orthopedic trauma  Objective      Range of Motion: Goniometric revealed the following findings (in degrees)  Joint Motion Right: 4/27/2022 Left: 4/27/2022   Hip flexion  PROM-122  AROM-110 PROM-110  AROM-105   Hip Extension Measured via Edgard Test- 10 degrees Measured via Edgard Test- 5 degrees   Hip External Rotation 10 10   Hip Internal Rotation 10 8   Knee Flexion  PROM-85  AROM-75 PROM-80  AROM-75   Hip Abduction 50 passive 45 passive     Strength: MMT revealed the following findings    Joint Motion Right: 4/27/2022 Left: 4/27/2022   Hip Flexion 5/5 3+/5   Hip Abduction 4+/5 4+/5   Hip ER  5/5 4+/5   Hip Extension 4+/5 5/5   Knee Extension 5/5 5/5   Knee Flexion 5/5 4+/5   Ankle Plantarflexion 5/5 5/5   Ankle Dorsiflexion 5/5 5/5     Additional Assessments:  Palpation: TTP at medial groin on L hip, decreases at distal adductor group  Heel raise: able to perform > 10 reps   Gait Pattern: SPC use, no crutches, patient can walk 50 feet without the use of SPC, but prefers to use SPC, antalgic gait with decreased step length, height, and stride length  Balance: SLS: R: 5s, L: unable   Transfers: STS: Total BUE reliance with LLE kicked out anteriorly       Outcome Measures:   5x sit to stand- 29 23 with 2 UE support  6 minute walk test- 650 feet with SPC  BECKER- 43/56    Pertinent Findings and Outcome Measures:                                                                                                                                                                         Test / Measure  3/7/2022 4/1/2022 4/26/2022    FOTO 56 41 55    2  ft, B crutches 255ft  ft SPC    30s 4 repetitions, BUE use refused 5 reps bilateral UE    L hip flexion MMT 2+/5  3+/5 4/5    L SLS Unable  unable 2 seconds        Flowsheet Rows      Most Recent Value   PT/OT G-Codes    Current Score 55   Projected Score 65           Precautions: DM/polyneuropathy, HTN   Long sitting heat applied to L LE during all the following interventions for pain relief     Seated Gastroc stretch- 5' on L gastroc, MH applied to L hip and L knee  Seated heel slides- 5' on L knee, MH applied to L hip and L knee  Seated Quad sets- 3' on L knee, MH applied to L hip and L knee  Seated Adduction stretch- 5' on L LE, MH applied to L hip and L knee  Stepping over raised seat- 20 reps, 3 second holds, foot even with 8" step    Reverse walking- 50 pounds, 2'  Belt around waist, walking forward with controlled backwards walking- 3'

## 2022-04-29 ENCOUNTER — OFFICE VISIT (OUTPATIENT)
Dept: PHYSICAL THERAPY | Facility: MEDICAL CENTER | Age: 67
End: 2022-04-29
Payer: MEDICARE

## 2022-04-29 DIAGNOSIS — R26.89 BALANCE PROBLEM: ICD-10-CM

## 2022-04-29 DIAGNOSIS — R53.81 PHYSICAL DECONDITIONING: ICD-10-CM

## 2022-04-29 DIAGNOSIS — Z87.81 S/P LEFT HIP FRACTURE: Primary | ICD-10-CM

## 2022-04-29 PROCEDURE — 97530 THERAPEUTIC ACTIVITIES: CPT

## 2022-04-29 PROCEDURE — 97112 NEUROMUSCULAR REEDUCATION: CPT

## 2022-04-29 PROCEDURE — 97110 THERAPEUTIC EXERCISES: CPT

## 2022-04-29 NOTE — PROGRESS NOTES
Daily Note     Today's date: 2022  Patient name: Ana Bustamante  : 1955  MRN: [id number]  Referring provider: Chasity Cullen MD  Dx:   Encounter Diagnosis     ICD-10-CM    1  S/p left hip fracture  Z87 81    2  Physical deconditioning  R53 81    3  Balance problem  R26 89        Start Time: 1100  Stop Time: 1145  Total time in clinic (min): 45 minutes    Subjective: Patient reports that he was feeling pretty good today, patient notes that he is doing more without the [address]  Objective: See treatment diary below    Long sitting heat applied to L LE during all the following interventions for pain relief     Seated Gastroc stretch- 5' on L gastroc  Seated heel slides- 5' on L knee  Seated Quad sets- 3' on L knee  Seated Adduction stretch- 5' on L LE  Stepping over raised seat- 20 reps, 3 second holds, foot even with 8" step    Performed:  Side stepping with lunge- 3 cycles of 10 feet, black theraband around knees  Monster walks- 10 cycles of 10 feet, black theraband around knees  Step up with lunge- 20 reps on 4" and 6" step b/l, no UE x 20  Step up with lunge- 8" step, 20 reps  Reverse walking weighted- 60# on Cable column, 10 cycles of 10 feet  Sled Push -80# 10 cycles of 20 feet   Step over hurdles- 6" no UE 20 reps each LE        Assessment: Tolerated treatment well  Progressed through reducing hand support with hurdles, patient challenged with reducing circumduction with overall interventions with stepping over obstacles, reverse walking improving for his overall quad and glute strength  Patient would benefit from continued PT to maximize function post hip surgery  Plan: Continue per plan of care        Precautions: DM/polyneuropathy, HTN

## 2022-05-02 ENCOUNTER — OFFICE VISIT (OUTPATIENT)
Dept: PHYSICAL THERAPY | Facility: MEDICAL CENTER | Age: 67
End: 2022-05-02
Payer: MEDICARE

## 2022-05-02 DIAGNOSIS — Z87.81 S/P LEFT HIP FRACTURE: Primary | ICD-10-CM

## 2022-05-02 DIAGNOSIS — R53.81 PHYSICAL DECONDITIONING: ICD-10-CM

## 2022-05-02 DIAGNOSIS — R26.89 BALANCE PROBLEM: ICD-10-CM

## 2022-05-02 PROCEDURE — 97530 THERAPEUTIC ACTIVITIES: CPT

## 2022-05-02 PROCEDURE — 97110 THERAPEUTIC EXERCISES: CPT

## 2022-05-02 PROCEDURE — 97112 NEUROMUSCULAR REEDUCATION: CPT

## 2022-05-02 NOTE — PROGRESS NOTES
Daily Note     Today's date: 2022  Patient name: Isaias Gomez  : 1955  MRN: [id number]  Referring provider: Ghada Ward MD  Dx:   Encounter Diagnosis     ICD-10-CM    1  S/p left hip fracture  Z87 81    2  Physical deconditioning  R53 81    3  Balance problem  R26 89        Start Time: 0930  Stop Time: 1015  Total time in clinic (min): 45 minutes    Subjective: Patient reports that he was feeling pretty good today, patient reported without the [facility] today and states that he is feeling good  Objective: See treatment diary below    No heat, long sitting to L LE during all the following interventions for pain relief     Seated Gastroc stretch- 5' on L gastroc  Seated heel slides- 5' on L knee  Seated Quad sets- 3' on L knee  Seated Adduction stretch- 5' on L LE  Stepping over raised seat- 20 reps, 3 second holds, foot even with 8" step    Performed:  Step up with lunge- 20 reps on 4" and 6" step b/l, no UE x 20  Step up with lunge- 8" step, 20 reps  Reverse walking weighted- 60# on Cable column, 10 cycles of 10 feet  Side Stepping walking weighted with cable column- 50# side stepping 10 cycles of 10 feet   Sled Push -80# 10 cycles of 20 feet   Step over hurdles- 6" no UE 20 reps each LE  Step up 6" step- 20 reps each direction bilaterally        Assessment: Tolerated treatment well  Progressed all interventions with no SPC or UE support, continued to need UE support with step ups, patient added lateral stepping to cable column  Patient would benefit from continued PT to maximize function post hip surgery  Plan: Continue per plan of care        Precautions: DM/polyneuropathy, HTN

## 2022-05-02 NOTE — PROGRESS NOTES
05/02/22 1337   Hello, [Guardians Name / Bulmaro Madrigal, this is [Caller Nellie Ng from [facility], and our clinical care team wanted to check on you / your child after your recent visit to the hospital  It will only take 3-5 minutes  Is this a good time? Discharge Call Type/ Specific Diagnosis: ARC 90 Day Call   ARC 90 Day Discharge Call   Assessment Source 1   Call Complete for 90 Days call back? Complete   Call Complete   Hi, This is ________ from [facility]  This is just a courtesy call, and there is no need to call us back  Have a great day     (Called by [facility])

## 2022-05-04 ENCOUNTER — APPOINTMENT (OUTPATIENT)
Dept: PHYSICAL THERAPY | Facility: MEDICAL CENTER | Age: 67
End: 2022-05-04
Payer: MEDICARE

## 2022-05-05 ENCOUNTER — OFFICE VISIT (OUTPATIENT)
Dept: PHYSICAL THERAPY | Facility: MEDICAL CENTER | Age: 67
End: 2022-05-05
Payer: MEDICARE

## 2022-05-05 DIAGNOSIS — R26.89 BALANCE PROBLEM: ICD-10-CM

## 2022-05-05 DIAGNOSIS — Z87.81 S/P LEFT HIP FRACTURE: Primary | ICD-10-CM

## 2022-05-05 DIAGNOSIS — R53.81 PHYSICAL DECONDITIONING: ICD-10-CM

## 2022-05-05 PROCEDURE — 97530 THERAPEUTIC ACTIVITIES: CPT

## 2022-05-05 PROCEDURE — 97110 THERAPEUTIC EXERCISES: CPT

## 2022-05-05 NOTE — PROGRESS NOTES
Daily Note     Today's date: 2022  Patient name: Tex Gagnon  : 1955  MRN: [id number]  Referring provider: Cordelia Song MD  Dx:   Encounter Diagnosis     ICD-10-CM    1  S/p left hip fracture  Z87 81    2  Physical deconditioning  R53 81    3  Balance problem  R26 89        Start Time: 1015  Stop Time: 1100  Total time in clinic (min): 45 minutes    Subjective: Patient reports that he was feeling pretty good today, patient reported without the [facility] today and states that he is feeling good  Reports occasional soreness in hip, no falls noted patient able to walk backwards better today  Objective: See treatment diary below    No heat, long sitting to L LE during all the following interventions for pain relief     Seated Gastroc stretch- 5' on L gastroc  Seated heel slides- 5' on L knee  Seated Quad sets- 3' on L knee  Seated Adduction stretch- 5' on L LE  Stepping over raised seat- 20 reps, 3 second holds, foot even with 8" step      Step up with lunge- 20 reps on 4" and 6" step b/l, no UE x 20  Step up with lunge- 8" step, 20 reps  Step down- 4" and 6"- 20 reps, minimal pain noted, 4/10  Reverse walking weighted- 60# on Cable column, 10 cycles of 10 feet  Side Stepping walking weighted with cable column- 50# side stepping 10 cycles of 10 feet   Sled Push -100# 10 cycles of 20 feet   Hip abduction, hip extension standing- 20 reps, 3 second holds   Step up 6" step- 20 reps each direction bilaterally        Assessment: Tolerated treatment well  Progressed all interventions with no SPC or UE support, continued to need UE support with step ups, patient demonstrating antalgic step up and step down noted, patient improving with quad strength and glute strength with resistance  Patient would benefit from continued PT to maximize function post hip surgery  Plan: Continue per plan of care        Precautions: DM/polyneuropathy, HTN

## 2022-05-06 ENCOUNTER — APPOINTMENT (OUTPATIENT)
Dept: PHYSICAL THERAPY | Facility: MEDICAL CENTER | Age: 67
End: 2022-05-06
Payer: MEDICARE

## 2022-05-09 ENCOUNTER — OFFICE VISIT (OUTPATIENT)
Dept: PHYSICAL THERAPY | Facility: MEDICAL CENTER | Age: 67
End: 2022-05-09
Payer: MEDICARE

## 2022-05-09 DIAGNOSIS — R26.89 BALANCE PROBLEM: ICD-10-CM

## 2022-05-09 DIAGNOSIS — Z87.81 S/P LEFT HIP FRACTURE: Primary | ICD-10-CM

## 2022-05-09 DIAGNOSIS — R53.81 PHYSICAL DECONDITIONING: ICD-10-CM

## 2022-05-09 PROCEDURE — 97530 THERAPEUTIC ACTIVITIES: CPT

## 2022-05-09 PROCEDURE — 97110 THERAPEUTIC EXERCISES: CPT

## 2022-05-09 NOTE — PROGRESS NOTES
Daily Note     Today's date: 2022  Patient name: Isaias Gomez  : 1955  MRN: [id number]  Referring provider: Ghada Ward MD  Dx:   Encounter Diagnosis     ICD-10-CM    1  S/p left hip fracture  Z87 81    2  Physical deconditioning  R53 81    3  Balance problem  R26 89        Start Time: 1230  Stop Time: 1315  Total time in clinic (min): 45 minutes    Subjective: Patient reports that he was feeling pretty good today, patient reported without the [facility] today and states that he is feeling good  Reports occasional soreness in hip, no falls noted patient able to walk backwards better today  Objective: See treatment diary below    No heat, long sitting to L LE during all the following interventions for pain relief     Seated Gastroc stretch- 5' on L gastroc  Seated heel slides- 5' on L knee  Seated Quad sets- 3' on L knee  Seated Adduction stretch- 5' on L LE  Stepping over raised seat- 20 reps, 3 second holds, foot even with 8" step      Step up with lunge- 20 reps 8" step b/l, no UE x 20  Step up with lunge- 8" step, 20 reps  Step down- 8"- 20 reps, minimal pain noted, 2/10  Reverse walking weighted- 60# on Cable column, 10 cycles of 10 feet  Forward walking weighted- 60# on Cable column, 10 cycles of 10 feet  Side Stepping walking weighted with cable column- 50# side stepping 10 cycles of 10 feet   Sled Push -100# 10 cycles of 20 feet   Hip abduction, hip extension standing- 20 reps, 3 second holds   Step up 6" step- 20 reps each direction bilaterally        Assessment: Tolerated treatment well  Progressed all interventions with no SPC or UE support, patient noting improvements with his posterior balance control as well as his ability to control the weight  Patient challenged with step up and placing full weight on L LE  Patient would benefit from continued PT to maximize function post hip surgery and to improve his fall risk and deconditioning  Plan: Continue per plan of care  Precautions: DM/polyneuropathy, HTN

## 2022-05-12 ENCOUNTER — APPOINTMENT (OUTPATIENT)
Dept: PHYSICAL THERAPY | Facility: MEDICAL CENTER | Age: 67
End: 2022-05-12
Payer: MEDICARE

## 2022-05-16 ENCOUNTER — OFFICE VISIT (OUTPATIENT)
Dept: PHYSICAL THERAPY | Facility: MEDICAL CENTER | Age: 67
End: 2022-05-16
Payer: MEDICARE

## 2022-05-16 DIAGNOSIS — Z87.81 S/P LEFT HIP FRACTURE: Primary | ICD-10-CM

## 2022-05-16 DIAGNOSIS — R53.81 PHYSICAL DECONDITIONING: ICD-10-CM

## 2022-05-16 DIAGNOSIS — R26.89 BALANCE PROBLEM: ICD-10-CM

## 2022-05-16 PROCEDURE — 97530 THERAPEUTIC ACTIVITIES: CPT

## 2022-05-16 PROCEDURE — 97110 THERAPEUTIC EXERCISES: CPT

## 2022-05-16 NOTE — PROGRESS NOTES
Daily Note     Today's date: 2022  Patient name: Yasmin Molina  : 1955  MRN: [id number]  Referring provider: Nay Orozco MD  Dx:   Encounter Diagnosis     ICD-10-CM    1  S/p left hip fracture  Z87 81    2  Physical deconditioning  R53 81    3  Balance problem  R26 89        Start Time: 1230  Stop Time: 1313  Total time in clinic (min): 43 minutes    Subjective: Patient reports no change in status  No falls or losses of balance were reported since last session  He reports that he generally has some soreness after leaving therapy  Objective: See treatment diary below    No heat, long sitting to L LE during all the following interventions for pain relief     Seated Gastroc stretch- 5' on L gastroc- (not performed today)   Seated heel slides- 5' on L knee  Seated Quad sets- 3' on L knee  Seated Adduction stretch- 5' on L LE  Stepping over raised seat- 20 reps, 3 second holds, foot even with 8" step      Step up with lunge- 20 reps 8" step b/l, no UE x 20  Step down- 8"- 20 reps, minimal pain noted, 2/10  Reverse walking weighted- 80# on Cable column, 10 cycles of 10 feet  Forward walking weighted- 80# on Cable column, 10 cycles of 10 feet  Side Stepping walking weighted with cable column- 70# side stepping 5 cycles of 10 feet each side  Sled Push -100# 10 cycles of 20 feet (not performed today)   Hip abduction, hip extension standing- 20 reps, 3 second holds (not performed today)   Step up 6" step- 20 reps each direction bilaterally        Assessment: Tolerated treatment well  Patient indicated he had minor difficulty putting weight through his left leg during step up exercise  Patient was able to perform cable column walk outs under more resistance today although he was fatigued by the conclusion of the exercise  Seated gastroc stretch was discontinued for today due to patient reports of not feeling a stretch with the activity   Patient would benefit from continued PT to maximize function post hip surgery and to improve his fall risk and deconditioning  Plan: Continue per plan of care        Precautions: DM/polyneuropathy, HTN

## 2022-05-18 ENCOUNTER — APPOINTMENT (OUTPATIENT)
Dept: PHYSICAL THERAPY | Facility: MEDICAL CENTER | Age: 67
End: 2022-05-18
Payer: MEDICARE

## 2022-05-19 ENCOUNTER — OFFICE VISIT (OUTPATIENT)
Dept: PHYSICAL THERAPY | Facility: MEDICAL CENTER | Age: 67
End: 2022-05-19
Payer: MEDICARE

## 2022-05-19 DIAGNOSIS — Z87.81 S/P LEFT HIP FRACTURE: Primary | ICD-10-CM

## 2022-05-19 DIAGNOSIS — R53.81 PHYSICAL DECONDITIONING: ICD-10-CM

## 2022-05-19 DIAGNOSIS — R26.89 BALANCE PROBLEM: ICD-10-CM

## 2022-05-19 PROCEDURE — 97110 THERAPEUTIC EXERCISES: CPT

## 2022-05-19 PROCEDURE — 97530 THERAPEUTIC ACTIVITIES: CPT

## 2022-05-19 NOTE — PROGRESS NOTES
Daily Note     Today's date: 2022  Patient name: Rosalba Farah  : 1955  MRN: [id number]  Referring provider: Firman Dandy, MD  Dx:   Encounter Diagnosis     ICD-10-CM    1  S/p left hip fracture  Z87 81    2  Physical deconditioning  R53 81    3  Balance problem  R26 89        Start Time: 1315  Stop Time: 1400  Total time in clinic (min): 45 minutes    Subjective: Patient reports no change in status  No falls or losses of balance were reported since last session, states he feels he can get on his motor cycle  Objective: See treatment diary below    No heat, long sitting to L LE during all the following interventions for pain relief     Seated Gastroc stretch- 5' on L gastroc  Seated heel slides- 5' on L knee  Seated Quad sets- 3' on L knee  Seated Adduction stretch- 5' on L LE      Step up with lunge- 20 reps 8" step b/l, no UE x 20  Step down- 8"- 20 reps, minimal pain noted, 1/10  Reverse walking weighted- 80# on Cable column, 10 cycles of 10 feet  Forward walking weighted- 80# on Cable column, 10 cycles of 10 feet  Side Stepping walking weighted with cable column- 80# side stepping 5 cycles of 10 feet each side  Sled Push -100# 10 cycles of 20 feet (not performed today)   Hip abduction, hip extension standing- 20 reps, 3 second holds (not performed today)   Step up 6" step- 20 reps each direction bilaterally, attempted 8" step, 5 reps         Assessment: Tolerated treatment well  Patient improving but challenged with increased step height, improving resistance tolerance  Patient would benefit from continued PT to maximize function post hip surgery and to improve his fall risk and deconditioning  Plan: Continue per plan of care        Precautions: DM/polyneuropathy, HTN

## 2022-05-23 ENCOUNTER — APPOINTMENT (OUTPATIENT)
Dept: PHYSICAL THERAPY | Facility: MEDICAL CENTER | Age: 67
End: 2022-05-23
Payer: MEDICARE

## 2022-05-25 ENCOUNTER — APPOINTMENT (OUTPATIENT)
Dept: PHYSICAL THERAPY | Facility: MEDICAL CENTER | Age: 67
End: 2022-05-25
Payer: MEDICARE

## 2022-05-31 ENCOUNTER — APPOINTMENT (OUTPATIENT)
Dept: PHYSICAL THERAPY | Facility: MEDICAL CENTER | Age: 67
End: 2022-05-31
Payer: MEDICARE

## 2022-07-15 ENCOUNTER — OFFICE VISIT (OUTPATIENT)
Dept: OBGYN CLINIC | Facility: CLINIC | Age: 67
End: 2022-07-15
Payer: MEDICARE

## 2022-07-15 VITALS
HEIGHT: 74 IN | DIASTOLIC BLOOD PRESSURE: 90 MMHG | BODY MASS INDEX: 32.73 KG/M2 | RESPIRATION RATE: 18 BRPM | WEIGHT: 255 LBS | SYSTOLIC BLOOD PRESSURE: 130 MMHG | HEART RATE: 97 BPM

## 2022-07-15 DIAGNOSIS — M79.671 PAIN IN RIGHT FOOT: ICD-10-CM

## 2022-07-15 DIAGNOSIS — M19.011 GLENOHUMERAL ARTHRITIS, RIGHT: ICD-10-CM

## 2022-07-15 DIAGNOSIS — M19.012 GLENOHUMERAL ARTHRITIS, LEFT: Primary | ICD-10-CM

## 2022-07-15 PROCEDURE — 99214 OFFICE O/P EST MOD 30 MIN: CPT | Performed by: ORTHOPAEDIC SURGERY

## 2022-07-15 PROCEDURE — 20610 DRAIN/INJ JOINT/BURSA W/O US: CPT | Performed by: ORTHOPAEDIC SURGERY

## 2022-07-15 RX ADMIN — BUPIVACAINE HYDROCHLORIDE 2 ML: 2.5 INJECTION, SOLUTION INFILTRATION; PERINEURAL at 10:31

## 2022-07-15 RX ADMIN — TRIAMCINOLONE ACETONIDE 80 MG: 40 INJECTION, SUSPENSION INTRA-ARTICULAR; INTRAMUSCULAR at 10:31

## 2022-07-15 RX ADMIN — LIDOCAINE HYDROCHLORIDE 2 ML: 10 INJECTION, SOLUTION INFILTRATION; PERINEURAL at 10:31

## 2022-07-15 NOTE — PROGRESS NOTES
HPI:  Patient is a 74y o  year old male who presents for follow-up of bilateral shoulder glenohumeral osteoarthritis  Patient was last seen in the office on 3/11/2022 where patient received bilateral shoulder steroid injections  Patient states pain returned last month  Patient took oxycodone 5mg as needed for pain, which was left over from previous hip surgery  Patient states pain is worse at night  Right shoulder pain is worse than left       ROS:   General: No fever, no chills, no weight loss, no weight gain  HEENT:  No loss of hearing, no nose bleeds, no sore throat  Eyes:  No eye pain, no red eyes, no visual disturbance  Respiratory:  No cough, no shortness of breath, no wheezing  Cardiovascular:  No chest pain, no palpitations, no edema  GI: No abdominal pain, no nausea, no vomiting  Endocrine: No frequent urination, no excessive thirst  Urinary:  No dysuria, no hematuria, no incontinence  Musculoskeletal: see HPI and PE  Skin:  No rash, no wounds  Neurological:  No dizziness, no headache, no numbness  Psychiatric:  No difficulty concentrating, no depression, no suicide thoughts, no anxiety  Review of all other systems is negative    PMH:  Past Medical History:   Diagnosis Date    Depression     Diabetes mellitus ([facility] [address] )     Hyperlipidemia     Hypertension     Osteoarthritis, knee        PSH:  Past Surgical History:   Procedure Laterality Date    BACK SURGERY      HAND SURGERY      JOINT REPLACEMENT Left     l tkr    KNEE SURGERY Left     RI TOTAL KNEE ARTHROPLASTY Right 11/19/2018    Procedure: ARTHROPLASTY KNEE TOTAL;  Surgeon: Philomena Galeano MD;  [facility];  Service: Orthopedics    TOOTH EXTRACTION      WISDOM TOOTH EXTRACTION         Medications:  Current Outpatient Medications   Medication Sig Dispense Refill    acetaminophen (TYLENOL) 325 mg tablet Take 2 tablets (650 mg total) by mouth every 6 (six) hours as needed for mild pain, moderate pain or fever  0    atorvastatin (LIPITOR) 80 mg tablet TAKE 40MG (ONE-HALF TABLET) BY MOUTH EVERY DAY AT 5 PM FOR CHOLESTEROL      Diclofenac Sodium (VOLTAREN) 1 % Apply 2 g topically 4 (four) times a day Topically for pain control as needed 100 g 0    Empagliflozin 25 MG TABS Take 0 5 tablets (12 5 mg total) by mouth in the morning 15 tablet 0    glipiZIDE (GLUCOTROL) 5 mg tablet Take 1 tablet (5 mg total) by mouth daily before breakfast 30 tablet 0    insulin glargine (LANTUS SOLOSTAR) 100 units/mL injection pen Inject 15 Units under the skin daily at bedtime 15 mL 0    melatonin 5 MG TABS Take 1 tablet (5 mg total) by mouth daily at bedtime 30 tablet 0    metFORMIN (GLUCOPHAGE) 1000 MG tablet TAKE ONE TABLET BY MOUTH TWICE A DAY WITH MEALS FOR DIABETES      enoxaparin (LOVENOX) 40 mg/0 4 mL Inject 0 4 mL (40 mg total) under the skin every 24 hours for 14 days 5 6 mL 0    ergocalciferol (VITAMIN D2) 50,000 units Take 1 capsule (50,000 Units total) by mouth once a week for 21 days 3 capsule 0     No current facility-administered medications for this visit  Allergies:  No Known Allergies    Family History:  Family History   Problem Relation Age of Onset    No Known Problems Mother     Heart attack Father     Arthritis Family     Cancer Family     Diabetes Family     Heart disease Family     Osteoporosis Family        Social History:  Social History     Occupational History    Not on file   Tobacco Use    Smoking status: Former Smoker     Types: Cigarettes     Quit date: 3/1/2012     Years since quitting: 10 3    Smokeless tobacco: Never Used   Vaping Use    Vaping Use: Never used   Substance and Sexual Activity    Alcohol use: Yes     Comment: 'couple beers every couple weeks'    Drug use: No    Sexual activity: Not Currently       Physical Exam:  General :  Alert, cooperative, no distress, appears stated age  Blood pressure 130/90, pulse 97, resp  rate 18, height 6' 2" (1 88 m), weight 116 kg (255 lb)     Head: Normocephalic, without obvious abnormality, atraumatic   Eyes:  Conjunctiva/corneas clear, EOM's intact,   Ears: Both ears normal appearance, no hearing deficits  Nose: Nares normal, septum midline, no drainage    Neck: Supple,  trachea midline, no adenopathy, no tenderness, no mass   Back:   Symmetric, no curvature, ROM normal, no tenderness   Lungs:   Respirations unlabored   Chest Wall:  No tenderness or deformity   Extremities: Extremities normal, atraumatic, no cyanosis or edema      Pulses: 2+ and symmetric   Skin: Skin color, texture, turgor normal, no rashes or lesions      Neurologic: Normal           Right Shoulder Exam     Tenderness   The patient is experiencing tenderness in the acromioclavicular joint  Range of Motion   Active abduction: 90   Forward flexion: 110     Muscle Strength   The patient has normal right shoulder strength  Other   Sensation: normal      Left Shoulder Exam     Tenderness   The patient is experiencing no tenderness  Range of Motion   Active abduction: 90   Forward flexion: 110     Muscle Strength   The patient has normal left shoulder strength  Other   Sensation: normal         Large joint arthrocentesis: R glenohumeral  [place] Protocol:  Consent: Verbal consent obtained    Risks and benefits: risks, benefits and alternatives were discussed  Consent given by: patient  Patient understanding: patient states understanding of the procedure being performed  Patient consent: the patient's understanding of the procedure matches consent given  Site marked: the operative site was marked  Patient identity confirmed: verbally with patient    Supporting Documentation  Indications: pain   Procedure Details  Location: shoulder - R glenohumeral  Needle size: 22 G  Medications administered: 2 mL bupivacaine 0 25 %; 2 mL lidocaine 1 %; 80 mg triamcinolone acetonide 40 mg/mL    Patient tolerance: patient tolerated the procedure well with no immediate complications    Large joint arthrocentesis: L glenohumeral  Universal Protocol:  Consent: Verbal consent obtained  Risks and benefits: risks, benefits and alternatives were discussed  Consent given by: patient  Patient understanding: patient states understanding of the procedure being performed  Patient consent: the patient's understanding of the procedure matches consent given  Site marked: the operative site was marked  Patient identity confirmed: verbally with patient    Supporting Documentation  Indications: pain   Procedure Details  Location: shoulder - L glenohumeral  Needle size: 22 G  Medications administered: 2 mL bupivacaine 0 25 %; 2 mL lidocaine 1 %; 80 mg triamcinolone acetonide 40 mg/mL    Patient tolerance: patient tolerated the procedure well with no immediate complications          Imaging Studies: The following imaging studies were reviewed in office today  My findings are noted  Left sholder xray 6/10/20 moderate glenohumeral djd  MRI right shoulder 7/8/2019 severe glenohumeral arthritis  No RCT  Positive calcific tendonitis    Assessment  Encounter Diagnoses   Name Primary?  Glenohumeral arthritis, left Yes    Glenohumeral arthritis, right          Plan:  Patient is a 74-year-old male with bilateral glenohumeral osteoarthritis   *Offered bilateral shoulder glenohumeral joint injections  Patient accepts  *Injections given without immediate complications  *Referral placed to Dr Rhoda Hernandez for right foot pain  *Patient may follow-up as needed for new or worsening symptoms         Scribe Attestation    I,:  Cheikh Moreno PA-C am acting as a scribe while in the presence of the attending physician :       I,:  Jacinto Sommer MD personally performed the services described in this documentation    as scribed in my presence :

## 2022-07-17 RX ORDER — LIDOCAINE HYDROCHLORIDE 10 MG/ML
2 INJECTION, SOLUTION INFILTRATION; PERINEURAL
Status: COMPLETED | OUTPATIENT
Start: 2022-07-15 | End: 2022-07-15

## 2022-07-17 RX ORDER — BUPIVACAINE HYDROCHLORIDE 2.5 MG/ML
2 INJECTION, SOLUTION INFILTRATION; PERINEURAL
Status: COMPLETED | OUTPATIENT
Start: 2022-07-15 | End: 2022-07-15

## 2022-07-17 RX ORDER — TRIAMCINOLONE ACETONIDE 40 MG/ML
80 INJECTION, SUSPENSION INTRA-ARTICULAR; INTRAMUSCULAR
Status: COMPLETED | OUTPATIENT
Start: 2022-07-15 | End: 2022-07-15

## 2022-09-23 ENCOUNTER — OFFICE VISIT (OUTPATIENT)
Dept: OBGYN CLINIC | Facility: MEDICAL CENTER | Age: 67
End: 2022-09-23
Payer: MEDICARE

## 2022-09-23 ENCOUNTER — APPOINTMENT (OUTPATIENT)
Dept: RADIOLOGY | Facility: MEDICAL CENTER | Age: 67
End: 2022-09-23
Payer: MEDICARE

## 2022-09-23 VITALS
WEIGHT: 255 LBS | HEIGHT: 77 IN | SYSTOLIC BLOOD PRESSURE: 168 MMHG | DIASTOLIC BLOOD PRESSURE: 99 MMHG | BODY MASS INDEX: 30.11 KG/M2 | HEART RATE: 111 BPM

## 2022-09-23 DIAGNOSIS — M19.079 ARTHRITIS OF BIG TOE: ICD-10-CM

## 2022-09-23 DIAGNOSIS — M79.671 PAIN IN RIGHT FOOT: ICD-10-CM

## 2022-09-23 DIAGNOSIS — M25.571 PAIN IN JOINT INVOLVING RIGHT ANKLE AND FOOT: Primary | ICD-10-CM

## 2022-09-23 DIAGNOSIS — M25.571 PAIN IN JOINT INVOLVING RIGHT ANKLE AND FOOT: ICD-10-CM

## 2022-09-23 PROCEDURE — 99213 OFFICE O/P EST LOW 20 MIN: CPT | Performed by: ORTHOPAEDIC SURGERY

## 2022-09-23 PROCEDURE — 73610 X-RAY EXAM OF ANKLE: CPT

## 2022-09-23 NOTE — PROGRESS NOTES
Assessment:   Diagnosis ICD-10-CM Associated Orders   1  Pain in joint involving right ankle and foot  M25 571 XR ankle 3+ vw right   2  Pain in right foot  M79 671 Ambulatory Referral to Orthopedic Surgery   3  Arthritis of big toe  M19 079        Plan:    Spoke with patient about lack of knee flexibility due to contracture and Big toe arthritis  Pt demonstrates lacks of full knee extension causing gait dysfuntion  Advised to see Dr Danielito Medley regarding lack of knee ROM   Pt understood and had no further questions    To do next visit:  Return for refere to Dr Danielito Medley for right knee contracture  The above stated was discussed in layman's terms and the patient expressed understanding  All questions were answered to the patient's satisfaction  Scribe Attestation    I,:  Addis Tan am acting as a scribe while in the presence of the attending physician :       I,:  Moises Sheppard MD personally performed the services described in this documentation    as scribed in my presence :             Subjective:   Brendan Ramesh is a 74 y o  male who presents today for evaluation of his Right medial ankle pain    Pt reports that he has had increasing right ankle pain due to big toe pain  Pt states he ambulates with an externally rotated hip to avoid placing pain on his toe  He states that this forces his ankle to roll medially, causing pain  Pt has history of Bilateral TKA as well as previous Right hip fracture DOI 1/5/2022 DOS 1/7/2022  Pt states he was in Physical Therapy for his hip  Increased weight bearing time causes 5/10 ankle pain so he limits his time on his feet          Review of systems negative unless otherwise specified in HPI  Review of Systems   Constitutional: Negative for chills and fever  HENT: Negative for ear pain and sore throat  Eyes: Negative for pain and visual disturbance  Respiratory: Negative for cough and shortness of breath      Cardiovascular: Negative for chest pain and palpitations  Gastrointestinal: Negative for abdominal pain and vomiting  Genitourinary: Negative for dysuria and hematuria  Musculoskeletal: Positive for arthralgias  Negative for back pain  Skin: Negative for color change and rash  Neurological: Negative for seizures and syncope  All other systems reviewed and are negative        Past Medical History:   Diagnosis Date    Depression     Diabetes mellitus ([address] )     Hyperlipidemia     Hypertension     Osteoarthritis, knee        Past Surgical History:   Procedure Laterality Date    BACK SURGERY      HAND SURGERY      JOINT REPLACEMENT Left     l tkr    KNEE SURGERY Left     [place] TOTAL KNEE ARTHROPLASTY Right 11/19/2018    Procedure: ARTHROPLASTY KNEE TOTAL;  Surgeon: Freddy Day MD;  Location:  MAIN OR;  Service: Orthopedics    TOOTH EXTRACTION      WISDOM TOOTH EXTRACTION         Family History   Problem Relation Age of Onset    No Known Problems Mother     Heart attack Father     Arthritis Family     Cancer Family     Diabetes Family     Heart disease Family     Osteoporosis Family        Social History     Occupational History    Not on file   Tobacco Use    Smoking status: Former Smoker     Types: Cigarettes     Quit date: 3/1/2012     Years since quitting: 10 5    Smokeless tobacco: Never Used   Vaping Use    Vaping Use: Never used   Substance and Sexual Activity    Alcohol use: Yes     Comment: 'couple beers every couple weeks'    Drug use: No    Sexual activity: Not Currently         Current Outpatient Medications:     acetaminophen (TYLENOL) 325 mg tablet, Take 2 tablets (650 mg total) by mouth every 6 (six) hours as needed for mild pain, moderate pain or fever, Disp: , Rfl: 0    atorvastatin (LIPITOR) 80 mg tablet, TAKE 40MG (ONE-HALF TABLET) BY MOUTH EVERY DAY AT 5 PM FOR CHOLESTEROL, Disp: , Rfl:     Diclofenac Sodium (VOLTAREN) 1 %, Apply 2 g topically 4 (four) times a day Topically for pain control as needed, Disp: 100 g, Rfl: 0    Empagliflozin 25 MG TABS, Take 0 5 tablets (12 5 mg total) by mouth in the morning, Disp: 15 tablet, Rfl: 0    glipiZIDE (GLUCOTROL) 5 mg tablet, Take 1 tablet (5 mg total) by mouth daily before breakfast, Disp: 30 tablet, Rfl: 0    insulin glargine (LANTUS SOLOSTAR) 100 units/mL injection pen, Inject 15 Units under the skin daily at bedtime, Disp: 15 mL, Rfl: 0    melatonin 5 MG TABS, Take 1 tablet (5 mg total) by mouth daily at bedtime, Disp: 30 tablet, Rfl: 0    metFORMIN (GLUCOPHAGE) 1000 MG tablet, TAKE ONE TABLET BY MOUTH TWICE A DAY WITH MEALS FOR DIABETES, Disp: , Rfl:     enoxaparin (LOVENOX) 40 mg/0 4 mL, Inject 0 4 mL (40 mg total) under the skin every 24 hours for 14 days, Disp: 5 6 mL, Rfl: 0    ergocalciferol (VITAMIN D2) 50,000 units, Take 1 capsule (50,000 Units total) by mouth once a week for 21 days, Disp: 3 capsule, Rfl: 0    No Known Allergies       Vitals:    09/23/22 0954   BP: 168/99   Pulse: (!) 111       Objective:                    Right Ankle Exam   Swelling: none    Range of Motion   Dorsiflexion: normal   Plantar flexion: normal   Eversion: normal   Inversion: normal     Muscle Strength   Dorsiflexion:  4/5  Plantar flexion:  4/5  Anterior tibial:  4/5  Posterior tibial:  4/5    Other   Erythema: absent  Sensation: normal  Pulse: present     Comments:  Arthritic 1st distal Ray      Right Knee Exam     Range of Motion   Extension: -15   Flexion: 80     Other   Scars: present  Sensation: normal  Pulse: present  Swelling: none  Effusion: no effusion present    Comments:  Past Total knee arthoplasty             Diagnostics, reviewed and taken today if performed as documented:    None performed     Procedures, if performed today:    Procedures    None performed     Portions of the record may have been created with voice recognition software    Occasional wrong word or "sound a like" substitutions may have occurred due to the inherent limitations of voice recognition software  Read the chart carefully and recognize, using context, where substitutions have occurred

## 2022-11-29 ENCOUNTER — OFFICE VISIT (OUTPATIENT)
Dept: OBGYN CLINIC | Facility: CLINIC | Age: 67
End: 2022-11-29

## 2022-11-29 VITALS
WEIGHT: 255.8 LBS | HEART RATE: 116 BPM | HEIGHT: 77 IN | SYSTOLIC BLOOD PRESSURE: 131 MMHG | BODY MASS INDEX: 30.2 KG/M2 | DIASTOLIC BLOOD PRESSURE: 79 MMHG

## 2022-11-29 DIAGNOSIS — M19.012 GLENOHUMERAL ARTHRITIS, LEFT: Primary | ICD-10-CM

## 2022-11-29 DIAGNOSIS — M19.011 GLENOHUMERAL ARTHRITIS, RIGHT: ICD-10-CM

## 2022-11-29 RX ORDER — BETAMETHASONE SODIUM PHOSPHATE AND BETAMETHASONE ACETATE 3; 3 MG/ML; MG/ML
12 INJECTION, SUSPENSION INTRA-ARTICULAR; INTRALESIONAL; INTRAMUSCULAR; SOFT TISSUE
Status: COMPLETED | OUTPATIENT
Start: 2022-11-29 | End: 2022-11-29

## 2022-11-29 RX ORDER — BUPIVACAINE HYDROCHLORIDE 2.5 MG/ML
2 INJECTION, SOLUTION INFILTRATION; PERINEURAL
Status: COMPLETED | OUTPATIENT
Start: 2022-11-29 | End: 2022-11-29

## 2022-11-29 RX ADMIN — BETAMETHASONE SODIUM PHOSPHATE AND BETAMETHASONE ACETATE 12 MG: 3; 3 INJECTION, SUSPENSION INTRA-ARTICULAR; INTRALESIONAL; INTRAMUSCULAR; SOFT TISSUE at 15:38

## 2022-11-29 RX ADMIN — BUPIVACAINE HYDROCHLORIDE 2 ML: 2.5 INJECTION, SOLUTION INFILTRATION; PERINEURAL at 15:38

## 2022-11-29 NOTE — PROGRESS NOTES
Orthopaedics Office Visit - Follow up Patient Visit    ASSESSMENT/PLAN:    Assessment:   Bilateral glenohumeral OA       Plan:   - Discussed conservative treatment with patient at length  - Offered patient cortisone injection  Patient accepted and tolerated well  - Discussed that cortisone injections can be repeated every 3 months as needed  - Over the counter analgesics as needed / directed   - Ice / heat as directed   - Follow up 3-4 months       To Do Next Visit:  Evaluate shoulder pain     _____________________________________________________  CHIEF COMPLAINT:  Chief Complaint   Patient presents with   • Right Shoulder - Pain   • Left Shoulder - Pain         SUBJECTIVE:  Anibal Drake is a 74 y o  male who presents to the office for initial evaluation of bilateral shoulder pain  Patient was previously treating with Dr Alejandro Emmanuel in the past for bilateral shoulder osteoarthritis and received cortisone injections approximately 4 5 months ago  Patient states that the cortisone injections last approximately 3-4 months in duration  Patient states that the pain is predominantly in the anterior aspect of shoulder becomes worse range of motion of the shoulder and overhead activities  Patient states that he has not been taking a type pain medication currently  Patient states his pain is localized his shoulders in do not extend into the arms  Patient offers no other complaints at this time        PAST MEDICAL HISTORY:  Past Medical History:   Diagnosis Date   • Depression    • Diabetes mellitus ([address] )    • Hyperlipidemia    • Hypertension    • Osteoarthritis, knee        PAST SURGICAL HISTORY:  Past Surgical History:   Procedure Laterality Date   • BACK SURGERY     • HAND SURGERY     • JOINT REPLACEMENT Left     l tkr   • KNEE SURGERY Left    • [place] TOTAL KNEE ARTHROPLASTY Right 11/19/2018    Procedure: ARTHROPLASTY KNEE TOTAL;  Surgeon: Eliecer Mcgraw MD;  Location: [facility];  Service: Orthopedics   • TOOTH EXTRACTION     • WISDOM TOOTH EXTRACTION         FAMILY HISTORY:  Family History   Problem Relation Age of Onset   • No Known Problems Mother    • Heart attack Father    • Arthritis Family    • Cancer Family    • Diabetes Family    • Heart disease Family    • Osteoporosis Family        SOCIAL HISTORY:  Social History     Tobacco Use   • Smoking status: Former     Types: Cigarettes     Quit date: 3/1/2012     Years since quitting: 10 7   • Smokeless tobacco: Never   Vaping Use   • Vaping Use: Never used   Substance Use Topics   • Alcohol use: Yes     Comment: 'couple beers every couple weeks'   • Drug use: No       MEDICATIONS:    Current Outpatient Medications:   •  acetaminophen (TYLENOL) 325 mg tablet, Take 2 tablets (650 mg total) by mouth every 6 (six) hours as needed for mild pain, moderate pain or fever, Disp: , Rfl: 0  •  atorvastatin (LIPITOR) 80 mg tablet, TAKE 40MG (ONE-HALF TABLET) BY MOUTH EVERY DAY AT 5 PM FOR CHOLESTEROL, Disp: , Rfl:   •  Diclofenac Sodium (VOLTAREN) 1 %, Apply 2 g topically 4 (four) times a day Topically for pain control as needed, Disp: 100 g, Rfl: 0  •  Empagliflozin 25 MG TABS, Take 0 5 tablets (12 5 mg total) by mouth in the morning, Disp: 15 tablet, Rfl: 0  •  glipiZIDE (GLUCOTROL) 5 mg tablet, Take 1 tablet (5 mg total) by mouth daily before breakfast, Disp: 30 tablet, Rfl: 0  •  insulin glargine (LANTUS SOLOSTAR) 100 units/mL injection pen, Inject 15 Units under the skin daily at bedtime, Disp: 15 mL, Rfl: 0  •  melatonin 5 MG TABS, Take 1 tablet (5 mg total) by mouth daily at bedtime, Disp: 30 tablet, Rfl: 0  •  metFORMIN (GLUCOPHAGE) 1000 MG tablet, TAKE ONE TABLET BY MOUTH TWICE A DAY WITH MEALS FOR DIABETES, Disp: , Rfl:   •  enoxaparin (LOVENOX) 40 mg/0 4 mL, Inject 0 4 mL (40 mg total) under the skin every 24 hours for 14 days, Disp: 5 6 mL, Rfl: 0  •  ergocalciferol (VITAMIN D2) 50,000 units, Take 1 capsule (50,000 Units total) by mouth once a week for 21 days, Disp: 3 capsule, Rfl: 0    ALLERGIES:  No Known Allergies    REVIEW OF SYSTEMS:  MSK: bilateral shoulder pain   Neuro: WNL   Pertinent items are otherwise noted in HPI  A comprehensive review of systems was otherwise negative  LABS:  HgA1c:   Lab Results   Component Value Date    HGBA1C 7 2 (H) 01/08/2022     BMP:   Lab Results   Component Value Date    CALCIUM 9 0 01/31/2022    K 4 0 01/31/2022    CO2 30 01/31/2022     01/31/2022    BUN 16 01/31/2022    CREATININE 0 97 01/31/2022     CBC: No components found for: CBC    _____________________________________________________  PHYSICAL EXAMINATION:  Vital signs: /79   Pulse (!) 116   Ht 6' 5" (1 956 m)   Wt 116 kg (255 lb 12 8 oz)   BMI 30 33 kg/m²   General: No acute distress, awake and alert  Psychiatric: Mood and affect appear appropriate  HEENT: Trachea Midline, No torticollis, no apparent facial trauma  Cardiovascular: No audible murmurs; Extremities appear perfused  Pulmonary: No audible wheezing or stridor  Skin: No open lesions; see further details (if any) below      MUSCULOSKELETAL EXAMINATION:  Bilateral shoulder examination  - Patient sitting comfortably in the office in no acute distress   - no acute physical abnormalities present in the shoulders  Extremities appear well perfused overall  - tenderness palpation noted over the anterior aspect of the shoulder  No other bony or soft tissue tenderness to palpation noted at this time  - 80° of forward flexion and abduction limited by pain,   10 degrees of internal /  External rotation present   - NV intact      ____________________________________________________  STUDIES REVIEWED:  I personally reviewed the images and interpretation is as follows:  N/A       PROCEDURES PERFORMED:  Large joint arthrocentesis: R glenohumeral  [place] Protocol:  Consent: Verbal consent obtained    Risks and benefits: risks, benefits and alternatives were discussed  Consent given by: patient  Patient understanding: patient states understanding of the procedure being performed  Site marked: the operative site was marked  Patient identity confirmed: verbally with patient    Supporting Documentation  Indications: pain   Procedure Details  Location: shoulder - R glenohumeral  Needle size: 22 G  Ultrasound guidance: no  Approach: posterior  Medications administered: 2 mL bupivacaine 0 25 %; 12 mg betamethasone acetate-betamethasone sodium phosphate 6 (3-3) mg/mL    Patient tolerance: patient tolerated the procedure well with no immediate complications  Dressing:  Sterile dressing applied    Large joint arthrocentesis: L glenohumeral  Universal Protocol:  Consent: Verbal consent obtained  Risks and benefits: risks, benefits and alternatives were discussed  Consent given by: patient  Patient understanding: patient states understanding of the procedure being performed  Site marked: the operative site was marked  Patient identity confirmed: verbally with patient    Supporting Documentation  Indications: pain   Procedure Details  Location: shoulder - L glenohumeral  Needle size: 22 G  Ultrasound guidance: no  Approach: posterior  Medications administered: 2 mL bupivacaine 0 25 %; 12 mg betamethasone acetate-betamethasone sodium phosphate 6 (3-3) mg/mL    Patient tolerance: patient tolerated the procedure well with no immediate complications  Dressing:  Sterile dressing applied              Radha Tan PA-C - assisting  Cristina Juan              Portions of the record may have been created with voice recognition software  Occasional wrong word or "sound a like" substitutions may have occurred due to the inherent limitations of voice recognition software  Read the chart carefully and recognize, using context, where substitutions have occurred

## 2022-11-29 NOTE — PATIENT INSTRUCTIONS
- Discussed conservative treatment with patient at length  - Offered patient cortisone injection  Patient accepted and tolerated well     - Discussed that cortisone injections can be repeated every 3 months as needed  - Over the counter analgesics as needed / directed   - Ice / heat as directed   - Follow up 3-4 months

## 2023-03-08 ENCOUNTER — OFFICE VISIT (OUTPATIENT)
Dept: OBGYN CLINIC | Facility: CLINIC | Age: 68
End: 2023-03-08

## 2023-03-08 ENCOUNTER — APPOINTMENT (OUTPATIENT)
Dept: RADIOLOGY | Facility: CLINIC | Age: 68
End: 2023-03-08

## 2023-03-08 VITALS
OXYGEN SATURATION: 98 % | HEIGHT: 77 IN | RESPIRATION RATE: 18 BRPM | BODY MASS INDEX: 29.99 KG/M2 | SYSTOLIC BLOOD PRESSURE: 114 MMHG | DIASTOLIC BLOOD PRESSURE: 79 MMHG | WEIGHT: 254 LBS | HEART RATE: 97 BPM

## 2023-03-08 DIAGNOSIS — M19.011 GLENOHUMERAL ARTHRITIS, RIGHT: ICD-10-CM

## 2023-03-08 DIAGNOSIS — M19.012 GLENOHUMERAL ARTHRITIS, LEFT: Primary | ICD-10-CM

## 2023-03-08 DIAGNOSIS — M19.012 GLENOHUMERAL ARTHRITIS, LEFT: ICD-10-CM

## 2023-03-08 RX ADMIN — METHYLPREDNISOLONE ACETATE 2 ML: 40 INJECTION, SUSPENSION INTRA-ARTICULAR; INTRALESIONAL; INTRAMUSCULAR; SOFT TISSUE at 11:02

## 2023-03-08 RX ADMIN — BUPIVACAINE HYDROCHLORIDE 2 ML: 2.5 INJECTION, SOLUTION INFILTRATION; PERINEURAL at 11:02

## 2023-03-08 RX ADMIN — LIDOCAINE HYDROCHLORIDE 2 ML: 10 INJECTION, SOLUTION INFILTRATION; PERINEURAL at 11:02

## 2023-03-08 NOTE — PROGRESS NOTES
HPI:  Patient is a 74y o  year old male  who presents for follow up evaluation for bilateral shoulder glenohumeral osteoarthritis  He was last seen on 7/15/22 and received bilateral corticosteroid injections at that time  He notes this provided him approximately 3 months of relief  He then saw Dr Rufino Ordonez on 11/29/22 and received bilateral repeat CSI at that time  He states is right is worse than his left  He would like to continue with non-operative management at this time      ROS:   General: No fever, no chills, no weight loss, no weight gain  HEENT:  No loss of hearing, no nose bleeds, no sore throat  Eyes:  No eye pain, no red eyes, no visual disturbance  Respiratory:  No cough, no shortness of breath, no wheezing  Cardiovascular:  No chest pain, no palpitations, no edema  GI: No abdominal pain, no nausea, no vomiting  Endocrine: No frequent urination, no excessive thirst  Urinary:  No dysuria, no hematuria, no incontinence  Musculoskeletal: see HPI and PE  Skin:  No rash, no wounds  Neurological:  No dizziness, no headache, no numbness  Psychiatric:  No difficulty concentrating, no depression, no suicide thoughts, no anxiety  Review of all other systems is negative    PMH:  Past Medical History:   Diagnosis Date   • Depression    • Diabetes mellitus ([facility] [address] )    • Hyperlipidemia    • Hypertension    • Osteoarthritis, knee        PSH:  Past Surgical History:   Procedure Laterality Date   • BACK SURGERY     • HAND SURGERY     • JOINT REPLACEMENT Left     l tkr   • KNEE SURGERY Left    • [place] ARTHRP KNE CONDYLE&PLATU MEDIAL&LAT COMPARTMENTS Right 11/19/2018    Procedure: ARTHROPLASTY KNEE TOTAL;  Surgeon: Alicia Renee MD;  Location: [facility];  Service: Orthopedics   • TOOTH EXTRACTION     • WISDOM TOOTH EXTRACTION         Medications:  Current Outpatient Medications   Medication Sig Dispense Refill   • atorvastatin (LIPITOR) 80 mg tablet TAKE 40MG (ONE-HALF TABLET) BY MOUTH EVERY DAY AT 5 PM FOR CHOLESTEROL "  • Empagliflozin 25 MG TABS Take 0 5 tablets (12 5 mg total) by mouth in the morning 15 tablet 0   • glipiZIDE (GLUCOTROL) 5 mg tablet Take 1 tablet (5 mg total) by mouth daily before breakfast 30 tablet 0   • insulin glargine (LANTUS SOLOSTAR) 100 units/mL injection pen Inject 15 Units under the skin daily at bedtime 15 mL 0   • melatonin 5 MG TABS Take 1 tablet (5 mg total) by mouth daily at bedtime 30 tablet 0   • metFORMIN (GLUCOPHAGE) 1000 MG tablet TAKE ONE TABLET BY MOUTH TWICE A DAY WITH MEALS FOR DIABETES     • acetaminophen (TYLENOL) 325 mg tablet Take 2 tablets (650 mg total) by mouth every 6 (six) hours as needed for mild pain, moderate pain or fever (Patient not taking: Reported on 3/8/2023)  0   • Diclofenac Sodium (VOLTAREN) 1 % Apply 2 g topically 4 (four) times a day Topically for pain control as needed (Patient not taking: Reported on 3/8/2023) 100 g 0     No current facility-administered medications for this visit  Allergies:  No Known Allergies    Family History:  Family History   Problem Relation Age of Onset   • No Known Problems Mother    • Heart attack Father    • Arthritis Family    • Cancer Family    • Diabetes Family    • Heart disease Family    • Osteoporosis Family        Social History:  Social History     Occupational History   • Not on file   Tobacco Use   • Smoking status: Former     Types: Cigarettes     Quit date: 3/1/2012     Years since quittin 0   • Smokeless tobacco: Never   Vaping Use   • Vaping Use: Never used   Substance and Sexual Activity   • Alcohol use: Yes     Comment: 'couple beers every couple weeks'   • Drug use: No   • Sexual activity: Not Currently       Physical Exam:  General :  Alert, cooperative, no distress, appears stated age  Blood pressure 114/79, pulse 97, resp  rate 18, height 6' 5\" (1 956 m), weight 115 kg (254 lb), SpO2 98 %     Head:  Normocephalic, without obvious abnormality, atraumatic   Eyes:  Conjunctiva/corneas clear, EOM's intact, " Ears:  Both ears normal appearance, no hearing deficits  Nose: Nares normal, septum midline, no drainage    Neck: Supple,  trachea midline, no adenopathy, no tenderness, no mass   Back:   Symmetric, no curvature, ROM normal, no tenderness   Lungs:   Respirations unlabored   Chest Wall:  No tenderness or deformity   Extremities: Extremities normal, atraumatic, no cyanosis or edema      Pulses: 2+ and symmetric   Skin: Skin color, texture, turgor normal, no rashes or lesions      Neurologic: Normal           Right Shoulder Exam     Range of Motion   Active abduction: 50   External rotation: 20   Forward flexion: 120   Internal rotation 0 degrees: Sacrum     Muscle Strength   The patient has normal right shoulder strength  Other   Erythema: absent  Scars: absent  Sensation: normal  Pulse: present      Left Shoulder Exam     Range of Motion   Active abduction: 50   External rotation: 20   Forward flexion: 120   Internal rotation 0 degrees: Sacrum     Muscle Strength   The patient has normal left shoulder strength  Other   Erythema: absent  Scars: absent  Sensation: normal  Pulse: present           Large joint arthrocentesis: R glenohumeral  [place] Protocol:  Consent: Verbal consent obtained    Risks and benefits: risks, benefits and alternatives were discussed  Consent given by: patient  Patient understanding: patient states understanding of the procedure being performed  Patient consent: the patient's understanding of the procedure matches consent given  Site marked: the operative site was marked  Patient identity confirmed: verbally with patient    Supporting Documentation  Indications: pain   Procedure Details  Location: shoulder - R glenohumeral  Preparation: Patient was prepped and draped in the usual sterile fashion  Needle size: 22 G  Approach: anterior  Medications administered: 2 mL lidocaine 1 %; 2 mL bupivacaine 0 25 %; 2 mL methylPREDNISolone acetate 40 mg/mL    Patient tolerance: patient tolerated the procedure well with no immediate complications  Dressing:  Sterile dressing applied    Large joint arthrocentesis: L glenohumeral  Universal Protocol:  Consent: Verbal consent obtained  Risks and benefits: risks, benefits and alternatives were discussed  Consent given by: patient  Patient understanding: patient states understanding of the procedure being performed  Patient consent: the patient's understanding of the procedure matches consent given  Site marked: the operative site was marked  Patient identity confirmed: verbally with patient    Supporting Documentation  Indications: pain   Procedure Details  Location: shoulder - L glenohumeral  Preparation: Patient was prepped and draped in the usual sterile fashion  Needle size: 22 G  Approach: anterior  Medications administered: 2 mL lidocaine 1 %; 2 mL bupivacaine 0 25 %; 2 mL methylPREDNISolone acetate 40 mg/mL    Patient tolerance: patient tolerated the procedure well with no immediate complications  Dressing:  Sterile dressing applied            Imaging Studies: The following imaging studies were reviewed in office today  My findings are noted  XR of bilateral shoulder taken 3/8/23 demonstrates severe glenohumeral osteoarthritis  There is glenohumeral joint space narrowing and osteophytes present  Assessment  Encounter Diagnoses   Name Primary? • Glenohumeral arthritis, left Yes   • Glenohumeral arthritis, right          Plan: Shilpa Lomax is a 74 y o  male who presents with bilateral glenohumeral osteoarthritis  · Patient has received lasting benefit from CSI injections in the past    · I offered to repeat this treatment today in the office  Patient consented and underwent the procedure without issues or complications  Post injection instructions provided to the patient  · Follow up if symptoms worsen or fail to improve        Scribe Attestation    I,:  Shadia Still am acting as a scribe while in the presence of the attending physician :       I,:  Shana Rodriguez MD personally performed the services described in this documentation    as scribed in my presence :

## 2023-04-04 RX ORDER — LIDOCAINE HYDROCHLORIDE 10 MG/ML
2 INJECTION, SOLUTION INFILTRATION; PERINEURAL
Status: COMPLETED | OUTPATIENT
Start: 2023-03-08 | End: 2023-03-08

## 2023-04-04 RX ORDER — BUPIVACAINE HYDROCHLORIDE 2.5 MG/ML
2 INJECTION, SOLUTION INFILTRATION; PERINEURAL
Status: COMPLETED | OUTPATIENT
Start: 2023-03-08 | End: 2023-03-08

## 2023-04-04 RX ORDER — METHYLPREDNISOLONE ACETATE 40 MG/ML
2 INJECTION, SUSPENSION INTRA-ARTICULAR; INTRALESIONAL; INTRAMUSCULAR; SOFT TISSUE
Status: COMPLETED | OUTPATIENT
Start: 2023-03-08 | End: 2023-03-08

## 2023-06-09 ENCOUNTER — OFFICE VISIT (OUTPATIENT)
Dept: OBGYN CLINIC | Facility: CLINIC | Age: 68
End: 2023-06-09
Payer: MEDICARE

## 2023-06-09 VITALS
WEIGHT: 250 LBS | HEART RATE: 105 BPM | RESPIRATION RATE: 20 BRPM | HEIGHT: 74 IN | DIASTOLIC BLOOD PRESSURE: 72 MMHG | OXYGEN SATURATION: 95 % | SYSTOLIC BLOOD PRESSURE: 115 MMHG | BODY MASS INDEX: 32.08 KG/M2

## 2023-06-09 DIAGNOSIS — M19.012 GLENOHUMERAL ARTHRITIS, LEFT: Primary | ICD-10-CM

## 2023-06-09 DIAGNOSIS — M19.011 GLENOHUMERAL ARTHRITIS, RIGHT: ICD-10-CM

## 2023-06-09 PROCEDURE — 20610 DRAIN/INJ JOINT/BURSA W/O US: CPT | Performed by: ORTHOPAEDIC SURGERY

## 2023-06-09 PROCEDURE — 99214 OFFICE O/P EST MOD 30 MIN: CPT | Performed by: ORTHOPAEDIC SURGERY

## 2023-06-09 RX ADMIN — BUPIVACAINE HYDROCHLORIDE 2 ML: 2.5 INJECTION, SOLUTION INFILTRATION; PERINEURAL at 09:15

## 2023-06-09 RX ADMIN — METHYLPREDNISOLONE ACETATE 2 ML: 40 INJECTION, SUSPENSION INTRA-ARTICULAR; INTRALESIONAL; INTRAMUSCULAR; SOFT TISSUE at 09:15

## 2023-06-09 RX ADMIN — LIDOCAINE HYDROCHLORIDE 2 ML: 10 INJECTION, SOLUTION INFILTRATION; PERINEURAL at 09:15

## 2023-06-09 NOTE — PROGRESS NOTES
HPI:  Patient is a 79y o  year old  male  who presents for follow-up of bilateral glenohumeral osteoarthritis s/p corticosteroid injection on 3/8/23  Pain is rated a 7/10  He reports the injections provide roughly 2 months of relief  He is interested in attending physical therapy  He is also interested in a referral to pain management to assist with his symptoms once the injections stop working after 2 months          ROS:   General: No fever, no chills, no weight loss, no weight gain  HEENT:  No loss of hearing, no nose bleeds, no sore throat  Eyes:  No eye pain, no red eyes, no visual disturbance  Respiratory:  No cough, no shortness of breath, no wheezing  Cardiovascular:  No chest pain, no palpitations, no edema  GI: No abdominal pain, no nausea, no vomiting  Endocrine: No frequent urination, no excessive thirst  Urinary:  No dysuria, no hematuria, no incontinence  Musculoskeletal: see HPI and PE  Skin:  No rash, no wounds  Neurological:  No dizziness, no headache, no numbness  Psychiatric:  No difficulty concentrating, no depression, no suicide thoughts, no anxiety  Review of all other systems is negative    PMH:  Past Medical History:   Diagnosis Date   • Depression    • Diabetes mellitus ([facility] [address] )    • Hyperlipidemia    • Hypertension    • Osteoarthritis, knee        PSH:  Past Surgical History:   Procedure Laterality Date   • BACK SURGERY     • HAND SURGERY     • JOINT REPLACEMENT Left     l tkr   • KNEE SURGERY Left    • [place] ARTHRP KNE CONDYLE&PLATU MEDIAL&LAT COMPARTMENTS Right 11/19/2018    Procedure: ARTHROPLASTY KNEE TOTAL;  Surgeon: Js Cross MD;  [facility];  Service: Orthopedics   • TOOTH EXTRACTION     • WISDOM TOOTH EXTRACTION         Medications:  Current Outpatient Medications   Medication Sig Dispense Refill   • acetaminophen (TYLENOL) 325 mg tablet Take 2 tablets (650 mg total) by mouth every 6 (six) hours as needed for mild pain, moderate pain or fever (Patient taking "differently: Take 650 mg by mouth every 6 (six) hours as needed for mild pain, moderate pain or fever PRN)  0   • atorvastatin (LIPITOR) 80 mg tablet TAKE 40MG (ONE-HALF TABLET) BY MOUTH EVERY DAY AT 5 PM FOR CHOLESTEROL     • glipiZIDE (GLUCOTROL) 5 mg tablet Take 1 tablet (5 mg total) by mouth daily before breakfast 30 tablet 0   • insulin glargine (LANTUS SOLOSTAR) 100 units/mL injection pen Inject 15 Units under the skin daily at bedtime 15 mL 0   • metFORMIN (GLUCOPHAGE) 1000 MG tablet TAKE ONE TABLET BY MOUTH TWICE A DAY WITH MEALS FOR DIABETES     • Diclofenac Sodium (VOLTAREN) 1 % Apply 2 g topically 4 (four) times a day 100 g 0   • Empagliflozin 25 MG TABS Take 0 5 tablets (12 5 mg total) by mouth in the morning 15 tablet 0   • melatonin 5 MG TABS Take 1 tablet (5 mg total) by mouth daily at bedtime (Patient not taking: Reported on 2023) 30 tablet 0   • meloxicam (MOBIC) 15 mg tablet Take 1 tablet (15 mg total) by mouth daily as needed for moderate pain 30 tablet 0     No current facility-administered medications for this visit  Allergies:  No Known Allergies    Family History:  Family History   Problem Relation Age of Onset   • No Known Problems Mother    • Heart attack Father    • Arthritis Family    • Cancer Family    • Diabetes Family    • Heart disease Family    • Osteoporosis Family        Social History:  Social History     Occupational History   • Not on file   Tobacco Use   • Smoking status: Former     Types: Cigarettes     Quit date: 3/1/2012     Years since quittin 3   • Smokeless tobacco: Never   Vaping Use   • Vaping Use: Never used   Substance and Sexual Activity   • Alcohol use: Yes     Comment: 'couple beers every couple weeks'   • Drug use: Yes     Types: Marijuana   • Sexual activity: Not Currently       Physical Exam:  General :  Alert, cooperative, no distress, appears stated age  Blood pressure 115/72, pulse 105, resp   rate 20, height 6' 2\" (1 88 m), weight 113 kg (250 " "lb), SpO2 95 %  Head:  Normocephalic, without obvious abnormality, atraumatic   Eyes:  Conjunctiva/corneas clear, EOM's intact,   Ears: Both ears normal appearance, no hearing deficits  Nose: Nares normal, septum midline, no drainage    Neck: Supple,  trachea midline, no adenopathy, no tenderness, no mass   Back:   Symmetric, no curvature, ROM normal, no tenderness   Lungs:   Respirations unlabored   Chest Wall:  No tenderness or deformity   Extremities: Extremities normal, atraumatic, no cyanosis or edema      Pulses: 2+ and symmetric   Skin: Skin color, texture, turgor normal, no rashes or lesions      Neurologic: Normal           Ortho Exam  Right Shoulder: Active range of motion   120 degrees forward flexion  50 degrees abduction  20 degrees external rotation   sacrum internal rotation      No anatomical deformity    There is 5/5 strength with supraspinatus testing  There is 5/5 strength with infraspinatus testing  There is 5/5 strength with subscapularis testing  Fingers are pink and mobile  Compartments are soft  Brisk capillary refill  Sensation is intact   The patient is neurovascularly intact distally in the extremity  Left  Shoulder: Active range of motion   120 degrees forward flexion  50 degrees abduction  20 degrees external rotation   Sacrum internal rotation      No anatomical deformity    There is 5/5 strength with supraspinatus testing  There is 5/5 strength with infraspinatus testing  There is 5/5 strength with subscapularis testing  Fingers are pink and mobile  Compartments are soft  Brisk capillary refill  Sensation is intact   The patient is neurovascularly intact distally in the extremity  Large joint arthrocentesis: L glenohumeral  Universal Protocol:  Consent: Verbal consent obtained    Risks and benefits: risks, benefits and alternatives were discussed  Consent given by: patient  Time out: Immediately prior to procedure a \"time out\" was called to verify the correct " "patient, procedure, equipment, support staff and site/side marked as required  Timeout called at: 6/9/2023 9:21 AM   Patient understanding: patient states understanding of the procedure being performed  Site marked: the operative site was marked  Patient identity confirmed: verbally with patient    Supporting Documentation  Indications: pain   Procedure Details  Location: shoulder - L glenohumeral  Preparation: Patient was prepped and draped in the usual sterile fashion  Needle size: 22 G  Ultrasound guidance: no  Approach: anterior  Medications administered: 2 mL bupivacaine 0 25 %; 2 mL lidocaine 1 %; 2 mL methylPREDNISolone acetate 40 mg/mL    Patient tolerance: patient tolerated the procedure well with no immediate complications  Dressing:  Sterile dressing applied    Large joint arthrocentesis: R glenohumeral  [place] Protocol:  Consent: Verbal consent obtained  Risks and benefits: risks, benefits and alternatives were discussed  Consent given by: patient  Time out: Immediately prior to procedure a \"time out\" was called to verify the correct patient, procedure, equipment, support staff and site/side marked as required  Timeout called at: 6/9/2023 9:23 AM   Patient understanding: patient states understanding of the procedure being performed  Site marked: the operative site was marked  Patient identity confirmed: verbally with patient    Supporting Documentation  Indications: pain   Procedure Details  Location: shoulder - R glenohumeral  Preparation: Patient was prepped and draped in the usual sterile fashion  Needle size: 22 G  Ultrasound guidance: no  Approach: anterior  Medications administered: 2 mL bupivacaine 0 25 %; 2 mL lidocaine 1 %; 2 mL methylPREDNISolone acetate 40 mg/mL    Patient tolerance: patient tolerated the procedure well with no immediate complications  Dressing:  Sterile dressing applied            Imaging Studies: The following imaging studies were reviewed in office today   My " findings are noted  No new images today  Assessment  Encounter Diagnoses   Name Primary? • Glenohumeral arthritis, left Yes   • Glenohumeral arthritis, right          Plan:  Dino Jenkins is a 79 y o  male with bilateral glenohumeral osteoarthritis    · The patient was referred to outpatient physical therapy  · The patient was also referred to pain management for continued symptom management  · The patient was offered a corticosteroid injection for their bilateral shoulders due to receiving benefit from a corticosteroid injection in the past   They tolerated the procedure well  · The patient was educated they may have some irritation in the next few days and should rest, ice, elevate and perform gentle range of motion exercises  They were advised the medicine should begin to work in a few days time  · I will see the patient back in approximately 3 months for repeat evaluation and possible repeat corticosteroid injection          Scribe Attestation    I,:  Amber Abernathy am acting as a scribe while in the presence of the attending physician :       I,:  Narda Fuller MD personally performed the services described in this documentation    as scribed in my presence :

## 2023-06-12 ENCOUNTER — CONSULT (OUTPATIENT)
Dept: PAIN MEDICINE | Facility: CLINIC | Age: 68
End: 2023-06-12
Payer: MEDICARE

## 2023-06-12 VITALS
SYSTOLIC BLOOD PRESSURE: 164 MMHG | DIASTOLIC BLOOD PRESSURE: 94 MMHG | WEIGHT: 245.8 LBS | HEART RATE: 100 BPM | BODY MASS INDEX: 31.56 KG/M2

## 2023-06-12 DIAGNOSIS — M19.011 GLENOHUMERAL ARTHRITIS, RIGHT: ICD-10-CM

## 2023-06-12 DIAGNOSIS — M19.012 GLENOHUMERAL ARTHRITIS, LEFT: ICD-10-CM

## 2023-06-12 DIAGNOSIS — M47.816 LUMBAR FACET ARTHROPATHY: Primary | ICD-10-CM

## 2023-06-12 PROCEDURE — 99204 OFFICE O/P NEW MOD 45 MIN: CPT | Performed by: STUDENT IN AN ORGANIZED HEALTH CARE EDUCATION/TRAINING PROGRAM

## 2023-06-12 RX ORDER — MELOXICAM 15 MG/1
15 TABLET ORAL DAILY PRN
Qty: 30 TABLET | Refills: 0 | Status: SHIPPED | OUTPATIENT
Start: 2023-06-12

## 2023-06-12 NOTE — PROGRESS NOTES
Assessment:  1  Lumbar facet arthropathy    2  Glenohumeral arthritis, left    3  Glenohumeral arthritis, right        Plan:  No orders of the defined types were placed in this encounter  New Medications Ordered This Visit   Medications   • meloxicam (MOBIC) 15 mg tablet     Sig: Take 1 tablet (15 mg total) by mouth daily as needed for moderate pain     Dispense:  30 tablet     Refill:  0   • Diclofenac Sodium (VOLTAREN) 1 %     Sig: Apply 2 g topically 4 (four) times a day     Dispense:  100 g     Refill:  0       My impressions and treatment recommendations were discussed in detail with the patient, who verbalized understanding and had no further questions  71-year-old male presents her office chief complaint of bilateral shoulder pain secondary to severe glenohumeral osteoarthritis  He receives intra-articular injection with Dr Thomas Ba for relief, however procedure was last for about 2 months  I discussed performing injection fluoroscopic guidance and doing 1 shoulder at a time to use maximum amount of Depo-Medrol  This will allow us to use 80 mg/mL per joint and hopefully have the procedure last for a longer period of time  I will also order meloxicam 15 mg daily as needed as well as Voltaren gel to apply over the shoulders  He was instructed to call us as his injection starts to wear off so we can schedule him for repeat procedure  His last procedure was done on 6/9/2023, therefore we will not be able to schedule him until September 2023 [facility] Prescription Drug Monitoring Program report was reviewed and was appropriate     Complete risks and benefits including bleeding, infection, tissue reaction, nerve injury and allergic reaction were discussed  The approach was demonstrated using models and literature was provided  Verbal and written consent was obtained  Discharge instructions were provided   I personally saw and examined the patient and I agree with the above discussed plan of care  History of Present Illness:    Gabino Gonzalez is a 79 y o  male who presents to [facility] for initial evaluation of the above stated pain complaints  The patient has a past medical and chronic pain history as outlined in the assessment section  He was referred by Naveen Martinez MD  [address] [facility],  [address]   Chief complaint of right-sided low back pain and bilateral shoulder pain  Pain is moderate to severe over the past month  6 out of 10  Nearly constant  Worse in the evening  Sharp in nature  Pain is increased with bending, walking, exercise  Decreased relaxation, lying down, standing, sitting  Patient gets bilateral glenohumeral joint injections with Dr Ludivina Salgado every several months with about 2 months of relief  Review of Systems:    Review of Systems   Constitutional: Negative for fever and unexpected weight change  HENT: Negative for trouble swallowing  Eyes: Negative for visual disturbance  Respiratory: Negative for shortness of breath and wheezing  Cardiovascular: Negative for chest pain and palpitations  Gastrointestinal: Negative for constipation, diarrhea, nausea and vomiting  Endocrine: Negative for cold intolerance, heat intolerance and polydipsia  Genitourinary: Negative for difficulty urinating and frequency  Musculoskeletal: Positive for myalgias  Negative for arthralgias, gait problem and joint swelling  Skin: Negative for rash  Neurological: Negative for dizziness, seizures, syncope, weakness and headaches  Hematological: Does not bruise/bleed easily  Psychiatric/Behavioral: Negative for dysphoric mood  All other systems reviewed and are negative            Past Medical History:   Diagnosis Date   • Depression    • Diabetes mellitus ([facility] [address] )    • Hyperlipidemia    • Hypertension    • Osteoarthritis, knee        Past Surgical History:   Procedure Laterality Date   • BACK SURGERY     • HAND SURGERY • JOINT REPLACEMENT Left     l tkr   • KNEE SURGERY Left    • [place] ARTHRP KNE CONDYLE&PLATU MEDIAL&LAT COMPARTMENTS Right 2018    Procedure: ARTHROPLASTY KNEE TOTAL;  Surgeon: Haritha Rojas MD;  Location[facility];  Service: Orthopedics   • TOOTH EXTRACTION     • WISDOM TOOTH EXTRACTION         Family History   Problem Relation Age of Onset   • No Known Problems Mother    • Heart attack Father    • Arthritis Family    • Cancer Family    • Diabetes Family    • Heart disease Family    • Osteoporosis Family        Social History     Occupational History   • Not on file   Tobacco Use   • Smoking status: Former     Types: Cigarettes     Quit date: 3/1/2012     Years since quittin 2   • Smokeless tobacco: Never   Vaping Use   • Vaping Use: Never used   Substance and Sexual Activity   • Alcohol use: Yes     Comment: 'couple beers every couple weeks'   • Drug use: Yes     Types: Marijuana   • Sexual activity: Not Currently         Current Outpatient Medications:   •  acetaminophen (TYLENOL) 325 mg tablet, Take 2 tablets (650 mg total) by mouth every 6 (six) hours as needed for mild pain, moderate pain or fever (Patient taking differently: Take 650 mg by mouth every 6 (six) hours as needed for mild pain, moderate pain or fever PRN), Disp: , Rfl: 0  •  atorvastatin (LIPITOR) 80 mg tablet, TAKE 40MG (ONE-HALF TABLET) BY MOUTH EVERY DAY AT 5 PM FOR CHOLESTEROL, Disp: , Rfl:   •  Diclofenac Sodium (VOLTAREN) 1 %, Apply 2 g topically 4 (four) times a day, Disp: 100 g, Rfl: 0  •  Empagliflozin 25 MG TABS, Take 0 5 tablets (12 5 mg total) by mouth in the morning, Disp: 15 tablet, Rfl: 0  •  insulin glargine (LANTUS SOLOSTAR) 100 units/mL injection pen, Inject 15 Units under the skin daily at bedtime, Disp: 15 mL, Rfl: 0  •  meloxicam (MOBIC) 15 mg tablet, Take 1 tablet (15 mg total) by mouth daily as needed for moderate pain, Disp: 30 tablet, Rfl: 0  •  metFORMIN (GLUCOPHAGE) 1000 MG tablet, TAKE ONE TABLET BY MOUTH TWICE A DAY WITH MEALS FOR DIABETES, Disp: , Rfl:   •  glipiZIDE (GLUCOTROL) 5 mg tablet, Take 1 tablet (5 mg total) by mouth daily before breakfast, Disp: 30 tablet, Rfl: 0  •  melatonin 5 MG TABS, Take 1 tablet (5 mg total) by mouth daily at bedtime (Patient not taking: Reported on 6/9/2023), Disp: 30 tablet, Rfl: 0    No Known Allergies    Physical Exam:    /94 (BP Location: Left arm, Patient Position: Sitting, Cuff Size: Standard)   Pulse 100   Wt 111 kg (245 lb 12 8 oz)   BMI 31 56 kg/m²     Constitutional: normal, well developed, well nourished, alert, in no distress and non-toxic and no overt pain behavior  Eyes: anicteric  HEENT: grossly intact  Neck: supple, symmetric, trachea midline and no masses   Pulmonary:even and unlabored  Cardiovascular:No edema or pitting edema present  Skin:Normal without rashes or lesions and well hydrated  Psychiatric:Mood and affect appropriate  Neurologic:Cranial Nerves II-XII grossly intact  Musculoskeletal: Tender to palpation over the bilateral shoulders  Imaging  Bilateral shoulders:  3/8/23     INDICATION:   M19 012: Primary osteoarthritis, left shoulder  M19 011: Primary osteoarthritis, right shoulder      COMPARISON:  Left shoulder 6/10/2020, right shoulder 5/9/2019     VIEWS:  XR SHOULDER 2+ VW RIGHT, XR SHOULDER 2+ VW LEFT        FINDINGS:     There is no acute fracture or dislocation      Severe osteoarthritis of the bilateral glenohumeral joints  Moderate degenerative changes of the bilateral acromioclavicular joints      No lytic or blastic osseous lesion      Soft tissues are unremarkable      IMPRESSION:     No acute osseous abnormality      Degenerative changes as described        Bilateral shoulders:3/8/23     INDICATION:   M19 012: Primary osteoarthritis, left shoulder  M19 011: Primary osteoarthritis, right shoulder      COMPARISON:  Left shoulder 6/10/2020, right shoulder 5/9/2019     VIEWS:  XR SHOULDER 2+ VW RIGHT, XR SHOULDER 2+ VW LEFT        FINDINGS:     There is no acute fracture or dislocation      Severe osteoarthritis of the bilateral glenohumeral joints  Moderate degenerative changes of the bilateral acromioclavicular joints      No lytic or blastic osseous lesion      Soft tissues are unremarkable      IMPRESSION:     No acute osseous abnormality      Degenerative changes as described        No orders to display       No orders of the defined types were placed in this encounter

## 2023-06-12 NOTE — PATIENT INSTRUCTIONS
Steroid Joint Injection   AMBULATORY CARE:   What you need to know about steroid joint injection:  A steroid joint injection is a procedure to inject steroid medicine into a joint  Steroid medicine decreases pain and inflammation  The injection may also contain an anesthetic (numbing medicine) to decrease pain  It may be done to treat conditions such as arthritis, gout, or carpal tunnel syndrome  The injections may be given in your knee, ankle, shoulder, elbow, or wrist  Injections may also be given in your hip, toe, thumb, or finger  How to prepare for steroid joint injection:  Your healthcare provider will talk to you about how to prepare for this procedure  He or she will tell you what medicines to take or not take on the day of your procedure  You may need to stop taking blood thinners several days before your procedure  What will happen during steroid joint injection:  You may be given local anesthesia to numb the area where the injection will be given  With local anesthesia, you may still feel pressure during the procedure, but you should not feel any pain  Your provider may use ultrasound or fluoroscopy (a type of x-ray) to guide the needle to the right area  He or she will then inject the steroid into your joint  A bandage will be placed on the injection site  What will happen after steroid joint injection:  You may have redness, warmth, or sweating in your face and chest right after the steroid injection  Steroids can affect blood sugar levels  If you have diabetes, you should check your blood sugars closely in the first 24 hours after your procedure  Risks of steroid joint injection:  You may get an infection in your joint  The injection may also cause more pain during the first 24 to 36 hours  You may need more than one injection to feel pain relief  The skin near the injection site may be damaged and become discolored or indented  This can happen if the steroid is placed too close to your skin   A tendon near the injection site may rupture or a nerve can be damaged  Contact your healthcare provider if:   You have fever or chills  You have redness or swelling in the injection site  You have more pain than usual in your joint for more than 72 hours  You have questions or concerns about your condition or care  Medicines:   Pain medicine  may be given  Ask how to take this medicine safely  Take your medicine as directed  Contact your healthcare provider if you think your medicine is not helping or if you have side effects  Tell your provider if you are allergic to any medicine  Keep a list of the medicines, vitamins, and herbs you take  Include the amounts, and when and why you take them  Bring the list or the pill bottles to follow-up visits  Carry your medicine list with you in case of an emergency  Self-care:   Leave the bandage on for 8 to 12 hours  Care for your wound as directed  Rest the area  as directed  You may need to decrease weight on certain joints, such as the knee, for a period of time  Ask when you can return to your daily activities  Elevate  your limb where the steroid injection was given  Elevate the limb above the level of your heart as often as you can  This will help decrease swelling and pain  Prop your limb on pillows or blankets to keep it elevated comfortably  Apply ice  on your joint for 15 to 20 minutes every hour or as directed  Use an ice pack, or put crushed ice in a plastic bag  Cover it with a towel  Ice helps prevent tissue damage and decreases swelling and pain  Follow up with your doctor as directed:  Write down your questions so you remember to ask them during your visits  © Copyright [employer] 2022 Information is for End User's use only and may not be sold, redistributed or otherwise used for commercial purposes  The above information is an  only   It is not intended as medical advice for individual conditions or treatments  Talk to your doctor, nurse or pharmacist before following any medical regimen to see if it is safe and effective for you

## 2023-06-21 ENCOUNTER — EVALUATION (OUTPATIENT)
Dept: PHYSICAL THERAPY | Facility: MEDICAL CENTER | Age: 68
End: 2023-06-21
Payer: MEDICARE

## 2023-06-21 DIAGNOSIS — M19.012 GLENOHUMERAL ARTHRITIS, LEFT: Primary | ICD-10-CM

## 2023-06-21 DIAGNOSIS — M19.011 GLENOHUMERAL ARTHRITIS, RIGHT: ICD-10-CM

## 2023-06-21 PROCEDURE — 97110 THERAPEUTIC EXERCISES: CPT

## 2023-06-21 PROCEDURE — 97161 PT EVAL LOW COMPLEX 20 MIN: CPT

## 2023-06-21 NOTE — PROGRESS NOTES
PT Evaluation     Today's date: 2023  Patient name: Morro Szymanski  : 1955  MRN: [id number]  Referring provider: Syl Mcqueen MD  Dx:   Encounter Diagnosis     ICD-10-CM    1  Glenohumeral arthritis, left  M19 012 Ambulatory Referral to Physical Therapy     PT plan of care cert/re-cert      2  Glenohumeral arthritis, right  M19 011 Ambulatory Referral to Physical Therapy     PT plan of care cert/re-cert          Start Time: 1145  Stop Time: 1245  Total time in clinic (min): 60 minutes    Assessment  Assessment details: Pt is a 79 y o male who presents with L and R shoulder pain associated with potential bilateral shoulder arthritis noted by orthopedic doctor  Pt presents today with increased L and R shoulder pain, decreased L and R shoulder ROM, decreased bilateral UE strength and decreased activity tolerance  These impairments limit the patient from participating in ADLs at [facility], decreased tolerance to completing home care activities and decreased tolerance to participating in exercises and ADLs with the bilateral UE  I believe this patient is a good candidate for and will benefit from skilled physical therapy for manual therapy to the L and R shoulder, L and R shoulder ROM exercises, bilateral UE strengthening exercises and mechanics training to improve symptoms and assist the patient to return to maximal level of function  Patient will benefit from skilled PT in order to maximize function due to bilateral shoulder pain     Impairments: abnormal coordination, abnormal muscle firing, abnormal muscle tone, abnormal or restricted ROM, abnormal movement, activity intolerance, difficulty understanding, impaired physical strength, lacks appropriate home exercise program, pain with function, weight-bearing intolerance, poor posture  and poor body mechanics  Understanding of Dx/Px/POC: excellent   Prognosis: good    Goals  Goals  STGs: 4 weeks  1) Pt will have SPR decrease of 2 units at worst  2) pt "will have improved L and R shoulder flexion AROM to 150*  3) pt will have improved foto score of 10 points    LTGs: 8 weeks  1) pt will be independent with HEP by D/C  2) pt will be independent with symptom management by D/C  3) pt will subjectively report return to all prior activities with no more than 2/10 pain in order to demonstrate improved activity tolerance by DC  Plan  Planned modality interventions: biofeedback, cryotherapy, electrical stimulation/Russian stimulation, TENS and thermotherapy: hydrocollator packs  Planned therapy interventions: abdominal trunk stabilization, IADL retraining, activity modification, ADL retraining, joint mobilization, manual therapy, massage, Briones taping, ADL training, aquatic therapy, balance, balance/weight bearing training, motor coordination training, muscle pump exercises, neuromuscular re-education, body mechanics training, breathing training, canalith repositioning, patient education, postural training, coordination, compression, strengthening, stretching, fine motor coordination training, functional ROM exercises, flexibility, gait training, graded activity, graded exercise, graded motor, home exercise program, transfer training, therapeutic training, therapeutic exercise and therapeutic activities  Frequency: 2x week  Duration in weeks: 12  Plan of Care beginning date: 6/21/2023  Plan of Care expiration date: 9/21/2023  Treatment plan discussed with: patient        Subjective Evaluation    History of Present Illness  Date of onset: 6/21/2023  Mechanism of injury: Patient is a 79year old male reporting to skilled PT with reports of bilateral shoulder arthritis  Patient recently saw Dr Monique Camarena for a consult, summary of appointment noted below on 6/9/2023:     \"Patient is a 74y o  year old  male  who presents for follow-up of bilateral glenohumeral osteoarthritis s/p corticosteroid injection on 3/8/23  Pain is rated a 7/10   He reports the injections provide " "roughly 2 months of relief  He is interested in attending physical therapy  He is also interested in a referral to pain management to assist with his symptoms once the injections stop working after 2 months  \"    Patient followed up with pain management, summary of appointment noted below: \"My impressions and treatment recommendations were discussed in detail with the patient, who verbalized understanding and had no further questions      60-year-old male presents her office chief complaint of bilateral shoulder pain secondary to severe glenohumeral osteoarthritis  He receives intra-articular injection with Dr Elpidio Landau for relief, however procedure was last for about 2 months  I discussed performing injection fluoroscopic guidance and doing 1 shoulder at a time to use maximum amount of Depo-Medrol  This will allow us to use 80 mg/mL per joint and hopefully have the procedure last for a longer period of time  I will also order meloxicam 15 mg daily as needed as well as Voltaren gel to apply over the shoulders      He was instructed to call us as his injection starts to wear off so we can schedule him for repeat procedure  His last procedure was done on 2023, therefore we will not be able to schedule him until 2023\"    Patient reports challenges with overhead motion, notes improvements with pain post shots             Recurrent probem    Quality of life: good    Pain  Current pain rating: 3  At best pain ratin  At worst pain ratin  Location: Bilateral shoulders  Quality: dull ache and pressure  Relieving factors: change in position, heat, relaxation, rest and support  Aggravating factors: overhead activity and lifting  Progression: improved (improved since shots)    Social Support  Steps to enter house: yes  2  Stairs in house: yes   12  Lives in: multiple-level home  Lives with: spouse    Employment status: not working  Hand dominance: right      Diagnostic Tests  No diagnostic tests " performed  Treatments  Current treatment: injection treatment and physical therapy  Patient Goals  Patient goals for therapy: decreased pain, improved balance, increased motion, increased strength and independence with ADLs/IADLs  Patient goal: Patient's goals are to improve his overhead motion and pain  Objective     Static Posture     Shoulders  Elevated and rounded  Postural Observations  Seated posture: fair  Standing posture: fair  Correction of posture: makes symptoms better        Palpation   Left   Hypertonic in the levator scapulae, pectoralis major, pectoralis minor, scalenes and upper trapezius  Hypotonic in the lower trapezius, middle trapezius and serratus anterior  Right   Hypertonic in the levator scapulae, pectoralis major, pectoralis minor, scalenes and upper trapezius  Hypotonic in the lower trapezius, middle trapezius and serratus anterior  Tenderness     Left Shoulder   No tenderness in the [facility] joint, acromion, biceps tendon (proximal), bicipital groove, clavicle, infraspinatus tendon, subscapularis tendon and supraspinatus tendon  Right Shoulder  No tenderness in the [facility] joint, acromion, biceps tendon (proximal), bicipital groove, clavicle, infraspinatus tendon, subscapularis tendon and supraspinatus tendon       Active Range of Motion   Cervical/Thoracic Spine       Cervical  Subcranial protraction:  WFL   Subcranial retraction:  WFL   Flexion:  WFL  Extension:  WFL  Left lateral flexion:  WFL  Right lateral flexion:  WFL  Left rotation:  WFL  Right rotation:  WFL  Left Shoulder   Flexion: 120 degrees   Extension: 15 degrees   Abduction: 80 degrees   External rotation BTH: C4   Internal rotation BTB: sacrum     Right Shoulder   Flexion: 125 degrees   Extension: 15 degrees   Abduction: 85 degrees   External rotation BTH: C6   Internal rotation BTB: sacrum     Passive Range of Motion   Left Shoulder   Flexion: 130 degrees   Extension: 20 degrees   Abduction: 90 degrees External rotation 0°: 60 degrees   Internal rotation 0°: 70 degrees     Right Shoulder   Flexion: 120 degrees   Extension: 20 degrees   Abduction: 80 degrees   External rotation 0°: 65 degrees   Internal rotation 0°: 75 degrees     Joint Play   Left Shoulder  Hypomobile in the anterior capsule, posterior capsule and inferior capsule  Right Shoulder  Hypomobile in the anterior capsule, posterior capsule and inferior capsule  Strength/Myotome Testing     Left Shoulder     Planes of Motion   Flexion: 4   Extension: 4   Abduction: 4-   Adduction: 4   External rotation at 0°: 4   External rotation at 45°: 4   External rotation at 90°: 4   Internal rotation at 0°: 4   Internal rotation at 45°: 4   Internal rotation at 90°: 4     Isolated Muscles   Biceps: 5   Lower trapezius: 4-   Middle trapezius: 4-   Triceps: 5   Upper trapezius: 5     Right Shoulder     Planes of Motion   Flexion: 4   Extension: 4   Abduction: 4-   Adduction: 4   External rotation at 0°: 4   External rotation at 45°: 4   External rotation at 90°: 4   Internal rotation at 0°: 4   Internal rotation at 45°: 4   Internal rotation at 90°: 4     Isolated Muscles   Biceps: 5   Lower trapezius: 4-   Middle trapezius: 4-   Triceps: 5   Upper trapezius: 5     Tests     Left Shoulder   Positive painful arc  Negative belly press, drop arm, empty can and Hawkin's  Right Shoulder   Positive painful arc  Negative belly press, drop arm, empty can and Hawkin's  Access Code: [email or web address]  URL: [email or web address]  Date: 06/21/2023  Prepared by: Levon Russell    Exercises  - Supine Shoulder Flexion Extension AAROM with Dowel  - 1 x daily - 7 x weekly - 3 sets - 10 reps - 3 hold  - Supine Shoulder Abduction AAROM with Dowel  - 1 x daily - 7 x weekly - 3 sets - 10 reps - 3 hold  -Pullies 5 minutes bilateral shoulder flexion, patient has set of pullies at home    Morgan's Most Recent Value   PT/OT G-Codes Current Score 44   Projected Score 65             Precautions:   Past Medical History:   Diagnosis Date   • Depression    • Diabetes mellitus ([facility] [address] )    • Hyperlipidemia    • Hypertension    • Osteoarthritis, knee              Manuals                                                                 Neuro Re-Ed                                                                                                        Ther Ex                                                                                                                     Ther Activity                                       Gait Training                                       Modalities

## 2023-06-27 ENCOUNTER — OFFICE VISIT (OUTPATIENT)
Dept: PHYSICAL THERAPY | Facility: MEDICAL CENTER | Age: 68
End: 2023-06-27
Payer: MEDICARE

## 2023-06-27 DIAGNOSIS — M19.012 GLENOHUMERAL ARTHRITIS, LEFT: Primary | ICD-10-CM

## 2023-06-27 DIAGNOSIS — M19.011 GLENOHUMERAL ARTHRITIS, RIGHT: ICD-10-CM

## 2023-06-27 PROCEDURE — 97110 THERAPEUTIC EXERCISES: CPT

## 2023-06-27 PROCEDURE — 97140 MANUAL THERAPY 1/> REGIONS: CPT

## 2023-06-27 NOTE — PROGRESS NOTES
Daily Note     Today's date: 2023  Patient name: Maria Del Carmen Azevedo  : 1955  MRN: [id number]  Referring provider: Cornell Melendez MD  Dx:   Encounter Diagnosis     ICD-10-CM    1  Glenohumeral arthritis, left  M19 012       2  Glenohumeral arthritis, right  M19 011           Start Time: 1230  Stop Time: 1315  Total time in clinic (min): 45 minutes    Subjective: Patient reported mild bilateral shoulder pain, patient challenged with overhead movement  Objective: See treatment diary below    MT- 15'  Bilateral shoulder mobilizations- grade 3-4, posterior, inferior glide, oscillations, distractions    Exercises- Review of HEP from IE  - Supine Shoulder Flexion Extension AAROM with Dowel  - 1 x daily - 7 x weekly - 3 sets - 10 reps - 3 hold  - Supine Shoulder Abduction AAROM with Dowel  - 1 x daily - 7 x weekly - 3 sets - 10 reps - 3 hold  -Pullies 5 minutes bilateral shoulder flexion    Access Code: [email or web address]  URL: [email or web address]  Date: 2023  Prepared by: Deion Montejo    Exercises  - Shoulder Internal Rotation with Resistance  - 1 x daily - 7 x weekly - 3 sets - 10 reps - 3 hold  - Shoulder extension with resistance - Neutral  - 1 x daily - 7 x weekly - 3 sets - 10 reps - 3 hold  - Standing Shoulder Row with Anchored Resistance  - 1 x daily - 7 x weekly - 3 sets - 10 reps - 3 hold  - Shoulder Flexion Wall Slide with Towel  - 1 x daily - 7 x weekly - 3 sets - 10 reps - 3 hold  - Shoulder External Rotation with Anchored Resistance  - 1 x daily - 7 x weekly - 3 sets - 10 reps - 3 hold      Assessment: Tolerated treatment well  Patient demonstrated fatigue post treatment, exhibited good technique with therapeutic exercises and would benefit from continued PT to improve bilateral shoulder pain  Patient tolerated HEP well today, patient demonstrated all HEP exercises well with minimal discomfort  Plan: Continue per plan of care  Progress treatment as tolerated  Precautions:   Past Medical History:   Diagnosis Date   • Depression    • Diabetes mellitus ([facility] [address] )    • Hyperlipidemia    • Hypertension    • Osteoarthritis, knee              Manuals                                                                 Neuro Re-Ed                                                                                                        Ther Ex                                                                                                                     Ther Activity                                       Gait Training                                       Modalities

## 2023-06-29 ENCOUNTER — OFFICE VISIT (OUTPATIENT)
Dept: PHYSICAL THERAPY | Facility: MEDICAL CENTER | Age: 68
End: 2023-06-29
Payer: MEDICARE

## 2023-06-29 DIAGNOSIS — M19.012 GLENOHUMERAL ARTHRITIS, LEFT: Primary | ICD-10-CM

## 2023-06-29 DIAGNOSIS — M19.011 GLENOHUMERAL ARTHRITIS, RIGHT: ICD-10-CM

## 2023-06-29 PROCEDURE — 97140 MANUAL THERAPY 1/> REGIONS: CPT

## 2023-06-29 PROCEDURE — 97112 NEUROMUSCULAR REEDUCATION: CPT

## 2023-06-29 PROCEDURE — 97110 THERAPEUTIC EXERCISES: CPT

## 2023-06-29 RX ORDER — BUPIVACAINE HYDROCHLORIDE 2.5 MG/ML
2 INJECTION, SOLUTION INFILTRATION; PERINEURAL
Status: COMPLETED | OUTPATIENT
Start: 2023-06-09 | End: 2023-06-09

## 2023-06-29 RX ORDER — METHYLPREDNISOLONE ACETATE 40 MG/ML
2 INJECTION, SUSPENSION INTRA-ARTICULAR; INTRALESIONAL; INTRAMUSCULAR; SOFT TISSUE
Status: COMPLETED | OUTPATIENT
Start: 2023-06-09 | End: 2023-06-09

## 2023-06-29 RX ORDER — LIDOCAINE HYDROCHLORIDE 10 MG/ML
2 INJECTION, SOLUTION INFILTRATION; PERINEURAL
Status: COMPLETED | OUTPATIENT
Start: 2023-06-09 | End: 2023-06-09

## 2023-06-29 NOTE — PROGRESS NOTES
Daily Note     Today's date: 2023  Patient name: Sawyer Hatfield  : 1955  MRN: [id number]  Referring provider: Lanette Pagan MD  Dx:   Encounter Diagnosis     ICD-10-CM    1  Glenohumeral arthritis, left  M19 012       2  Glenohumeral arthritis, right  M19 011           Start Time: 1100  Stop Time: 1145  Total time in clinic (min): 45 minutes    Subjective: Patient reported mild bilateral shoulder pain, patient challenged with overhead movement, noted that after his last session he has noted improvement  Objective: See treatment diary below    MT- 15'  Bilateral shoulder mobilizations- grade 3-4, posterior, inferior glide, oscillations, distractions    Exercises- Review of HEP from IE  - Supine Shoulder Flexion Extension AAROM with Dowel  - 1 x daily - 7 x weekly - 3 sets - 10 reps - 3 hold  - Supine Shoulder Abduction AAROM with Dowel  - 1 x daily - 7 x weekly - 3 sets - 10 reps - 3 hold  -Pullies 5 minutes bilateral shoulder flexion    Access Code: [email or web address]  URL: [email or web address]  Date: 2023  Prepared by: Forrestine Abdelrahman    Exercises  - Shoulder Internal Rotation with Resistance  - 1 x daily - 7 x weekly - 3 sets - 10 reps - 3 hold  - Shoulder extension with resistance - Neutral  - 1 x daily - 7 x weekly - 3 sets - 10 reps - 3 hold  - Standing Shoulder Row with Anchored Resistance  - 1 x daily - 7 x weekly - 3 sets - 10 reps - 3 hold  - Shoulder Flexion Wall Slide with Towel  - 1 x daily - 7 x weekly - 3 sets - 10 reps - 3 hold  - Shoulder External Rotation with Anchored Resistance  - 1 x daily - 7 x weekly - 3 sets - 10 reps - 3 hold    Access Code: [email or web address]  URL: [email or web address]  Date: 2023  Prepared by: Forrestine Abdelrahman    Exercises  - Standing Shoulder Horizontal Abduction with Anchored Resistance  - 1 x daily - 7 x weekly - 3 sets - 10 reps - 3 hold  - Standing Isometric Shoulder Flexion with Doorway - Arm Bent  - 1 x daily - 7 x weekly - 3 sets - 10 reps - 3 hold  - Standing Isometric Shoulder External Rotation with Doorway  - 1 x daily - 7 x weekly - 3 sets - 10 reps - 3 hold      Assessment: Tolerated treatment well  Patient demonstrated fatigue post treatment, exhibited good technique with therapeutic exercises and would benefit from continued PT to improve bilateral shoulder pain  Patient tolerated HEP well today, patient demonstrated all HEP exercises well with minimal discomfort with progression, patient noted challenges with L shoulder abduction today  Patient demonstrated new HEP interventions well today  Plan: Continue per plan of care  Progress treatment as tolerated         Precautions:   Past Medical History:   Diagnosis Date   • Depression    • Diabetes mellitus ([facility] [address] )    • Hyperlipidemia    • Hypertension    • Osteoarthritis, knee              Manuals                                                                 Neuro Re-Ed                                                                                                        Ther Ex                                                                                                                     Ther Activity                                       Gait Training                                       Modalities

## 2023-07-05 ENCOUNTER — OFFICE VISIT (OUTPATIENT)
Dept: PHYSICAL THERAPY | Facility: MEDICAL CENTER | Age: 68
End: 2023-07-05
Payer: MEDICARE

## 2023-07-05 DIAGNOSIS — M19.012 GLENOHUMERAL ARTHRITIS, LEFT: Primary | ICD-10-CM

## 2023-07-05 DIAGNOSIS — M19.011 GLENOHUMERAL ARTHRITIS, RIGHT: ICD-10-CM

## 2023-07-05 PROCEDURE — 97110 THERAPEUTIC EXERCISES: CPT

## 2023-07-05 PROCEDURE — 97140 MANUAL THERAPY 1/> REGIONS: CPT

## 2023-07-05 PROCEDURE — 97112 NEUROMUSCULAR REEDUCATION: CPT

## 2023-07-05 NOTE — PROGRESS NOTES
Daily Note     Today's date: 2023  Patient name: Stephanie Swenson  : 1955  MRN: [id number]  Referring provider: Ernestine Wheeler MD  Dx:   Encounter Diagnosis     ICD-10-CM    1. Glenohumeral arthritis, left  M19.012       2. Glenohumeral arthritis, right  M19.011           Start Time: 1232  Stop Time: 1315  Total time in clinic (min): 43 minutes    Subjective: Patient reported mild bilateral shoulder pain, patient reported difficulty with washing his back. Objective: See treatment diary below    MT- 15'  Bilateral shoulder mobilizations- grade 3-4, posterior, inferior glide, oscillations, distractions    Exercises- Performed  - Supine Shoulder Flexion Extension AAROM with Dowel  - 1 x daily - 7 x weekly - 3 sets - 10 reps - 3 hold  - Supine Shoulder Abduction AAROM with Dowel  - 1 x daily - 7 x weekly - 3 sets - 10 reps - 3 hold  -Pullies 5 minutes bilateral shoulder flexion    Access Code: [email or web address]  URL: [email or web address]  Date: 2023  Prepared by: Joseph Curly    Exercises- Reviewed and Performed  - Shoulder Internal Rotation with Resistance  - 1 x daily - 7 x weekly - 3 sets - 10 reps - 3 hold  - Shoulder extension with resistance - Neutral  - 1 x daily - 7 x weekly - 3 sets - 10 reps - 3 hold  - Standing Shoulder Row with Anchored Resistance  - 1 x daily - 7 x weekly - 3 sets - 10 reps - 3 hold  - Shoulder Flexion Wall Slide with Towel  - 1 x daily - 7 x weekly - 3 sets - 10 reps - 3 hold  - Shoulder External Rotation with Anchored Resistance  - 1 x daily - 7 x weekly - 3 sets - 10 reps - 3 hold    Access Code: [email or web address]  URL: [email or web address]  Date: 2023  Prepared by: Joseph Curly    Exercises- Reviewed and Performed  - Standing Shoulder Horizontal Abduction with Anchored Resistance  - 1 x daily - 7 x weekly - 3 sets - 10 reps - 3 hold  - Standing Isometric Shoulder Flexion with Doorway - Arm Bent  - 1 x daily - 7 x weekly - 3 sets - 10 reps - 3 hold  - Standing Isometric Shoulder External Rotation with Doorway  - 1 x daily - 7 x weekly - 3 sets - 10 reps - 3 hold    Access Code: [email or web address]  URL: [email or web address]  Date: 07/05/2023  Prepared by: Conner Augustin    Exercises  - Standing Shoulder Internal Rotation Stretch with Towel  - 1 x daily - 7 x weekly - 3 sets - 10 reps - 30 hold  - Shoulder External Rotation and Scapular Retraction with Resistance  - 1 x daily - 7 x weekly - 3 sets - 10 reps - 3 hold- red TB    Finger ladder- 5 cycles each UE        Assessment: Tolerated treatment well. Patient demonstrated fatigue post treatment, exhibited good technique with therapeutic exercises and would benefit from continued PT to improve bilateral shoulder pain. Patient tolerated HEP well today, patient demonstrated all HEP exercises well with minimal discomfort with progression, patient progressed HEP today. Patient challenged with overall intervention activities today but improving with strength in RTC, patient improving with functional ROM with finger ladder to top rung. Plan: Continue per plan of care. Progress treatment as tolerated.        Precautions:   Past Medical History:   Diagnosis Date   • Depression    • Diabetes mellitus ([address])    • Hyperlipidemia    • Hypertension    • Osteoarthritis, knee              Manuals                                                                 Neuro Re-Ed                                                                                                        Ther Ex                                                                                                                     Ther Activity                                       Gait Training                                       Modalities

## 2023-07-07 ENCOUNTER — APPOINTMENT (OUTPATIENT)
Dept: PHYSICAL THERAPY | Facility: MEDICAL CENTER | Age: 68
End: 2023-07-07
Payer: MEDICARE

## 2023-07-11 ENCOUNTER — OFFICE VISIT (OUTPATIENT)
Dept: PHYSICAL THERAPY | Facility: MEDICAL CENTER | Age: 68
End: 2023-07-11
Payer: MEDICARE

## 2023-07-11 DIAGNOSIS — M19.012 GLENOHUMERAL ARTHRITIS, LEFT: Primary | ICD-10-CM

## 2023-07-11 DIAGNOSIS — M19.011 GLENOHUMERAL ARTHRITIS, RIGHT: ICD-10-CM

## 2023-07-11 PROCEDURE — 97140 MANUAL THERAPY 1/> REGIONS: CPT

## 2023-07-11 PROCEDURE — 97110 THERAPEUTIC EXERCISES: CPT

## 2023-07-11 PROCEDURE — 97112 NEUROMUSCULAR REEDUCATION: CPT

## 2023-07-11 NOTE — PROGRESS NOTES
Daily Note     Today's date: 2023  Patient name: Cabrera Casey  : 1955  MRN: [id number]  Referring provider: Jae Mcghee MD  Dx:   Encounter Diagnosis     ICD-10-CM    1. Glenohumeral arthritis, left  M19.012       2. Glenohumeral arthritis, right  M19.011           Start Time: 1100  Stop Time: 1145  Total time in clinic (min): 45 minutes    Subjective: Patient reported mild bilateral shoulder pain, patient reported difficulty with washing his back.        Objective: See treatment diary below    MT- 15'  Bilateral shoulder mobilizations- grade 3-4, posterior, inferior glide, oscillations, distractions    Exercises- Performed  - Supine Shoulder Flexion Extension AAROM with Dowel  - 1 x daily - 7 x weekly - 3 sets - 10 reps - 3 hold  - Supine Shoulder Abduction AAROM with Dowel  - 1 x daily - 7 x weekly - 3 sets - 10 reps - 3 hold  -Pullies 5 minutes bilateral shoulder flexion    -NM- Random perturbations to L UE- 5' L shoulder at 90 degrees of shoulder flexion    Exercises- Reviewed and Performed  - Shoulder Internal Rotation with Resistance  - 1 x daily - 7 x weekly - 3 sets - 10 reps - 3 hold  - Shoulder extension with resistance - Neutral  - 1 x daily - 7 x weekly - 3 sets - 10 reps - 3 hold  - Standing Shoulder Row with Anchored Resistance  - 1 x daily - 7 x weekly - 3 sets - 10 reps - 3 hold  - Shoulder Flexion Wall Slide with Towel  - 1 x daily - 7 x weekly - 3 sets - 10 reps - 3 hold  - Shoulder External Rotation with Anchored Resistance  - 1 x daily - 7 x weekly - 3 sets - 10 reps - 3 hold    - Standing Shoulder Horizontal Abduction with Anchored Resistance  - 1 x daily - 7 x weekly - 3 sets - 10 reps - 3 hold  - Standing Isometric Shoulder Flexion with Doorway - Arm Bent  - 1 x daily - 7 x weekly - 3 sets - 10 reps - 3 hold  - Standing Isometric Shoulder External Rotation with Doorway  - 1 x daily - 7 x weekly - 3 sets - 10 reps - 3 hold    Access Code: [email or web address]  URL: [email or web address]  Date: 07/11/2023  Prepared by: Conner Augustin    Exercises  - Scapular Retraction with Resistance Advanced  - 1 x daily - 7 x weekly - 3 sets - 10 reps  - Squatting High Shoulder Row with Resistance  - 1 x daily - 7 x weekly - 3 sets - 10 reps  - Shoulder extension with resistance - Neutral  - 1 x daily - 7 x weekly - 3 sets - 10 reps    Finger ladder- 5 cycles each UE    NMES L shoulder- 5' 40 mghz- L flexion and L abduction        Assessment: Tolerated treatment well. Patient demonstrated fatigue post treatment, exhibited good technique with therapeutic exercises and would benefit from continued PT to improve bilateral shoulder pain. Patient tolerated HEP well today, patient demonstrated all HEP exercises well with minimal discomfort with progression, patient progressed HEP today as noted with scapular interventions. Patient improves shoulder strength and ROM with NMES, patient skin inspected pre/post with no adverse findings. Plan: Continue per plan of care. Progress treatment as tolerated.        Precautions:   Past Medical History:   Diagnosis Date   • Depression    • Diabetes mellitus ([address])    • Hyperlipidemia    • Hypertension    • Osteoarthritis, knee              Manuals                                                                 Neuro Re-Ed                                                                                                        Ther Ex                                                                                                                     Ther Activity                                       Gait Training                                       Modalities

## 2023-07-13 ENCOUNTER — OFFICE VISIT (OUTPATIENT)
Dept: PHYSICAL THERAPY | Facility: MEDICAL CENTER | Age: 68
End: 2023-07-13
Payer: MEDICARE

## 2023-07-13 DIAGNOSIS — M19.012 GLENOHUMERAL ARTHRITIS, LEFT: Primary | ICD-10-CM

## 2023-07-13 DIAGNOSIS — M19.011 GLENOHUMERAL ARTHRITIS, RIGHT: ICD-10-CM

## 2023-07-13 PROCEDURE — 97140 MANUAL THERAPY 1/> REGIONS: CPT

## 2023-07-13 PROCEDURE — 97110 THERAPEUTIC EXERCISES: CPT

## 2023-07-13 PROCEDURE — 97112 NEUROMUSCULAR REEDUCATION: CPT

## 2023-07-13 NOTE — PROGRESS NOTES
Daily Note     Today's date: 2023  Patient name: Connie Gonzalez  : 1955  MRN: [id number]  Referring provider: Aubrey Melendrez MD  Dx:   Encounter Diagnosis     ICD-10-CM    1. Glenohumeral arthritis, left  M19.012       2. Glenohumeral arthritis, right  M19.011           Start Time: 1100  Stop Time: 1145  Total time in clinic (min): 45 minutes    Subjective: Patient reported mild bilateral shoulder pain, patient reported that he found some benefits from the NMES last session.        Objective: See treatment diary below    MT- 10'  Bilateral shoulder mobilizations- grade 3-4, posterior, inferior glide, oscillations, distractions    Exercises- Performed  - Supine Shoulder Flexion Extension AAROM with Dowel  - 1 x daily - 7 x weekly - 3 sets - 10 reps - 3 hold, 2#  - Supine Shoulder Abduction AAROM with Dowel  - 1 x daily - 7 x weekly - 3 sets - 10 reps - 3 hold, 2#  - Pullies 5 minutes bilateral shoulder flexion  - Shoulder Flexion Wall Slide with Towel  - 1 x daily - 7 x weekly - 3 sets - 10 reps - 3 hold  - Shoulder External Rotation with Anchored Resistance  - 1 x daily - 7 x weekly - 3 sets - 10 reps - 3 hold  - Standing Shoulder Horizontal Abduction with Anchored Resistance  - 1 x daily - 7 x weekly - 3 sets - 10 reps - 3 hold  - Standing Isometric Shoulder Flexion with Doorway - Arm Bent  - 1 x daily - 7 x weekly - 3 sets - 10 reps - 3 hold  - Standing Isometric Shoulder External Rotation with Doorway  - 1 x daily - 7 x weekly - 3 sets - 10 reps - 3 hold  - Scapular Retraction with Resistance Advanced  - 1 x daily - 7 x weekly - 3 sets - 10 reps  - Squatting High Shoulder Row with Resistance  - 1 x daily - 7 x weekly - 3 sets - 10 reps  - Shoulder extension with resistance - Neutral  - 1 x daily - 7 x weekly - 3 sets - 10 reps  - Standing shoulder flexion/extension, horizontal abduction/adduction, CW/CCW rotations/oscillations into ball- 20 reps each    NMES L shoulder- 10' 40 mghz- L flexion and L abduction, added L ER and L Scaption        Assessment: Tolerated treatment well. Patient demonstrated fatigue post treatment, exhibited good technique with therapeutic exercises and would benefit from continued PT to improve bilateral shoulder pain. Patient improves shoulder strength and ROM with NMES, patient skin inspected pre/post with no adverse findings. Added new motions today, patient challenged with overall intervention tolerance added scapular stabilization exercises, L shoulder causes more pain with scapular stabilization with overall interventions with small motions, abduction limited to 90 degrees actively. Plan: Continue per plan of care. Progress treatment as tolerated.        Precautions:   Past Medical History:   Diagnosis Date   • Depression    • Diabetes mellitus ([address])    • Hyperlipidemia    • Hypertension    • Osteoarthritis, knee              Manuals                                                                 Neuro Re-Ed                                                                                                        Ther Ex                                                                                                                     Ther Activity                                       Gait Training                                       Modalities

## 2023-07-18 ENCOUNTER — APPOINTMENT (OUTPATIENT)
Dept: PHYSICAL THERAPY | Facility: MEDICAL CENTER | Age: 68
End: 2023-07-18
Payer: MEDICARE

## 2023-07-20 ENCOUNTER — APPOINTMENT (OUTPATIENT)
Dept: PHYSICAL THERAPY | Facility: MEDICAL CENTER | Age: 68
End: 2023-07-20
Payer: MEDICARE

## 2023-07-25 ENCOUNTER — APPOINTMENT (OUTPATIENT)
Dept: PHYSICAL THERAPY | Facility: MEDICAL CENTER | Age: 68
End: 2023-07-25
Payer: MEDICARE

## 2023-07-27 ENCOUNTER — APPOINTMENT (OUTPATIENT)
Dept: PHYSICAL THERAPY | Facility: MEDICAL CENTER | Age: 68
End: 2023-07-27
Payer: MEDICARE

## 2023-08-16 ENCOUNTER — TELEPHONE (OUTPATIENT)
Age: 68
End: 2023-08-16

## 2023-08-16 DIAGNOSIS — M19.012 GLENOHUMERAL ARTHRITIS, LEFT: ICD-10-CM

## 2023-08-16 DIAGNOSIS — M19.011 GLENOHUMERAL ARTHRITIS, RIGHT: Primary | ICD-10-CM

## 2023-08-16 NOTE — TELEPHONE ENCOUNTER
Caller: Christine Ames     Doctor: Dr Myriam Corbin     Reason for call:      Call back#: Vania Johnson

## 2023-08-16 NOTE — TELEPHONE ENCOUNTER
Caller: patient     Doctor: Brea Henderson    Reason for call: would like to schedule two procedure    Call back#:

## 2023-08-16 NOTE — TELEPHONE ENCOUNTER
Please review Consult from 6/12  Will shoulder injections one at a time mentioned  Please place orders

## 2023-09-09 ENCOUNTER — HOSPITAL ENCOUNTER (EMERGENCY)
Facility: HOSPITAL | Age: 68
Discharge: HOME | End: 2023-09-09
Attending: EMERGENCY MEDICINE | Admitting: EMERGENCY MEDICINE
Payer: MEDICARE

## 2023-09-09 ENCOUNTER — APPOINTMENT (EMERGENCY)
Dept: RADIOLOGY | Facility: HOSPITAL | Age: 68
End: 2023-09-09
Payer: MEDICARE

## 2023-09-09 VITALS
DIASTOLIC BLOOD PRESSURE: 90 MMHG | TEMPERATURE: 98.5 F | OXYGEN SATURATION: 94 % | RESPIRATION RATE: 20 BRPM | SYSTOLIC BLOOD PRESSURE: 136 MMHG | HEART RATE: 81 BPM

## 2023-09-09 DIAGNOSIS — M25.511 BILATERAL SHOULDER PAIN: ICD-10-CM

## 2023-09-09 DIAGNOSIS — I10 HYPERTENSION: Primary | ICD-10-CM

## 2023-09-09 DIAGNOSIS — M25.512 BILATERAL SHOULDER PAIN: ICD-10-CM

## 2023-09-09 LAB
2HR DELTA HS TROPONIN: 1 NG/L
ALBUMIN SERPL BCP-MCNC: 4.5 G/DL (ref 3.5–5)
ALP SERPL-CCNC: 65 U/L (ref 34–104)
ALT SERPL W P-5'-P-CCNC: 87 U/L (ref 7–52)
ANION GAP SERPL CALCULATED.3IONS-SCNC: 10 MMOL/L
AST SERPL W P-5'-P-CCNC: 57 U/L (ref 13–39)
BASOPHILS # BLD AUTO: 0.04 THOUSANDS/ÂΜL (ref 0–0.1)
BASOPHILS NFR BLD AUTO: 1 % (ref 0–1)
BILIRUB SERPL-MCNC: 0.57 MG/DL (ref 0.2–1)
BUN SERPL-MCNC: 10 MG/DL (ref 5–25)
CALCIUM SERPL-MCNC: 10.2 MG/DL (ref 8.4–10.2)
CARDIAC TROPONIN I PNL SERPL HS: 11 NG/L
CARDIAC TROPONIN I PNL SERPL HS: 12 NG/L
CHLORIDE SERPL-SCNC: 103 MMOL/L (ref 96–108)
CO2 SERPL-SCNC: 24 MMOL/L (ref 21–32)
CREAT SERPL-MCNC: 0.91 MG/DL (ref 0.6–1.3)
EOSINOPHIL # BLD AUTO: 0.09 THOUSAND/ÂΜL (ref 0–0.61)
EOSINOPHIL NFR BLD AUTO: 1 % (ref 0–6)
ERYTHROCYTE [DISTWIDTH] IN BLOOD BY AUTOMATED COUNT: 12.9 % (ref 11.6–15.1)
GFR SERPL CREATININE-BSD FRML MDRD: 87 ML/MIN/1.73SQ M
GLUCOSE SERPL-MCNC: 148 MG/DL (ref 65–140)
HCT VFR BLD AUTO: 43.4 % (ref 36.5–49.3)
HGB BLD-MCNC: 15 G/DL (ref 12–17)
IMM GRANULOCYTES # BLD AUTO: 0.02 THOUSAND/UL (ref 0–0.2)
IMM GRANULOCYTES NFR BLD AUTO: 0 % (ref 0–2)
LYMPHOCYTES # BLD AUTO: 1.55 THOUSANDS/ÂΜL (ref 0.6–4.47)
LYMPHOCYTES NFR BLD AUTO: 21 % (ref 14–44)
MCH RBC QN AUTO: 32.3 PG (ref 26.8–34.3)
MCHC RBC AUTO-ENTMCNC: 34.6 G/DL (ref 31.4–37.4)
MCV RBC AUTO: 93 FL (ref 82–98)
MONOCYTES # BLD AUTO: 0.78 THOUSAND/ÂΜL (ref 0.17–1.22)
MONOCYTES NFR BLD AUTO: 10 % (ref 4–12)
NEUTROPHILS # BLD AUTO: 5.01 THOUSANDS/ÂΜL (ref 1.85–7.62)
NEUTS SEG NFR BLD AUTO: 67 % (ref 43–75)
NRBC BLD AUTO-RTO: 0 /100 WBCS
PLATELET # BLD AUTO: 225 THOUSANDS/UL (ref 149–390)
PMV BLD AUTO: 9.3 FL (ref 8.9–12.7)
POTASSIUM SERPL-SCNC: 4 MMOL/L (ref 3.5–5.3)
PROT SERPL-MCNC: 7.8 G/DL (ref 6.4–8.4)
RBC # BLD AUTO: 4.65 MILLION/UL (ref 3.88–5.62)
SODIUM SERPL-SCNC: 137 MMOL/L (ref 135–147)
WBC # BLD AUTO: 7.49 THOUSAND/UL (ref 4.31–10.16)

## 2023-09-09 PROCEDURE — 99284 EMERGENCY DEPT VISIT MOD MDM: CPT

## 2023-09-09 PROCEDURE — 84484 ASSAY OF TROPONIN QUANT: CPT | Performed by: EMERGENCY MEDICINE

## 2023-09-09 PROCEDURE — 71045 X-RAY EXAM CHEST 1 VIEW: CPT

## 2023-09-09 PROCEDURE — 96360 HYDRATION IV INFUSION INIT: CPT

## 2023-09-09 PROCEDURE — 36415 COLL VENOUS BLD VENIPUNCTURE: CPT | Performed by: EMERGENCY MEDICINE

## 2023-09-09 PROCEDURE — 93005 ELECTROCARDIOGRAM TRACING: CPT

## 2023-09-09 PROCEDURE — 96361 HYDRATE IV INFUSION ADD-ON: CPT

## 2023-09-09 PROCEDURE — 80053 COMPREHEN METABOLIC PANEL: CPT | Performed by: EMERGENCY MEDICINE

## 2023-09-09 PROCEDURE — 99285 EMERGENCY DEPT VISIT HI MDM: CPT | Performed by: EMERGENCY MEDICINE

## 2023-09-09 PROCEDURE — 85025 COMPLETE CBC W/AUTO DIFF WBC: CPT | Performed by: EMERGENCY MEDICINE

## 2023-09-09 RX ORDER — OXYCODONE HYDROCHLORIDE AND ACETAMINOPHEN 5; 325 MG/1; MG/1
1 TABLET ORAL ONCE
Status: COMPLETED | OUTPATIENT
Start: 2023-09-09 | End: 2023-09-09

## 2023-09-09 RX ORDER — HYDROCODONE BITARTRATE AND ACETAMINOPHEN 5; 325 MG/1; MG/1
1 TABLET ORAL EVERY 6 HOURS PRN
Qty: 20 TABLET | Refills: 0 | Status: SHIPPED | OUTPATIENT
Start: 2023-09-09 | End: 2023-09-19

## 2023-09-09 RX ADMIN — SODIUM CHLORIDE 1000 ML: 0.9 INJECTION, SOLUTION INTRAVENOUS at 13:53

## 2023-09-09 RX ADMIN — OXYCODONE HYDROCHLORIDE AND ACETAMINOPHEN 1 TABLET: 5; 325 TABLET ORAL at 13:44

## 2023-09-09 NOTE — ED PROVIDER NOTES
History  Chief Complaint   Patient presents with   • Hypertension     Pt reports chronic hypertension and reports high daily blood pressures. [facility] and follow up in a week. /107 at home     68M presents w HTN - over the past week he has had pressures at home w systolic as high as 329. Presents here w no complaints except chronic pain, not treated yet, and concern for HTN. Takes BP meds - no change to his BP meds, he denies high intake of salt. Denies CP, no SOB, no N/V, no neuro deficits, no headaches. PCP advised to follow up next week for medication change if needed, but patient became concerned so presented here. On eval, /90            Prior to Admission Medications   Prescriptions Last Dose Informant Patient Reported? Taking?    Diclofenac Sodium (VOLTAREN) 1 %   No No   Sig: Apply 2 g topically 4 (four) times a day   Empagliflozin 25 MG TABS  Self No No   Sig: Take 0.5 tablets (12.5 mg total) by mouth in the morning   acetaminophen (TYLENOL) 325 mg tablet  Self No No   Sig: Take 2 tablets (650 mg total) by mouth every 6 (six) hours as needed for mild pain, moderate pain or fever   Patient taking differently: Take 650 mg by mouth every 6 (six) hours as needed for mild pain, moderate pain or fever PRN   atorvastatin (LIPITOR) 80 mg tablet  Self Yes No   Sig: TAKE 40MG (ONE-HALF TABLET) BY MOUTH EVERY DAY AT 5 PM FOR CHOLESTEROL   glipiZIDE (GLUCOTROL) 5 mg tablet  Self No No   Sig: Take 1 tablet (5 mg total) by mouth daily before breakfast   insulin glargine (LANTUS SOLOSTAR) 100 units/mL injection pen  Self No No   Sig: Inject 15 Units under the skin daily at bedtime   melatonin 5 MG TABS  Self No No   Sig: Take 1 tablet (5 mg total) by mouth daily at bedtime   Patient not taking: Reported on 6/9/2023   meloxicam (MOBIC) 15 mg tablet   No No   Sig: Take 1 tablet (15 mg total) by mouth daily as needed for moderate pain   metFORMIN (GLUCOPHAGE) 1000 MG tablet  Self Yes No   Sig: TAKE ONE TABLET BY MOUTH TWICE A DAY WITH MEALS FOR DIABETES      Facility-Administered Medications: None       Past Medical History:   Diagnosis Date   • Depression    • Diabetes mellitus ([address])    • Hyperlipidemia    • Hypertension    • Osteoarthritis, knee        Past Surgical History:   Procedure Laterality Date   • BACK SURGERY     • HAND SURGERY     • JOINT REPLACEMENT Left     l tkr   • KNEE SURGERY Left    • [place] ARTHRP KNE CONDYLE&PLATU MEDIAL&LAT COMPARTMENTS Right 2018    Procedure: ARTHROPLASTY KNEE TOTAL;  Surgeon: Floresita Norman MD;  [facility];  Service: Orthopedics   • TOOTH EXTRACTION     • WISDOM TOOTH EXTRACTION         Family History   Problem Relation Age of Onset   • No Known Problems Mother    • Heart attack Father    • Arthritis Family    • Cancer Family    • Diabetes Family    • Heart disease Family    • Osteoporosis Family      I have reviewed and agree with the history as documented. E-Cigarette/Vaping   • E-Cigarette Use Never User      E-Cigarette/Vaping Substances     Social History     Tobacco Use   • Smoking status: Former     Types: Cigarettes     Quit date: 3/1/2012     Years since quittin.5   • Smokeless tobacco: Never   Vaping Use   • Vaping Use: Never used   Substance Use Topics   • Alcohol use: Yes     Comment: 'couple beers every couple weeks'   • Drug use: Yes     Types: Marijuana       Review of Systems   Constitutional: Negative for activity change, appetite change, chills, fatigue and fever. HENT: Negative for congestion and rhinorrhea. Eyes: Negative for photophobia and visual disturbance. Respiratory: Negative for chest tightness and shortness of breath. Cardiovascular: Negative for chest pain and leg swelling. Gastrointestinal: Negative for abdominal pain, nausea and vomiting. Musculoskeletal: Negative for back pain and neck pain. Chronic pain to b/l shoulders   Skin: Negative for rash and wound.    Neurological: Negative for dizziness, weakness, light-headedness, numbness and headaches. Hematological: Does not bruise/bleed easily. Psychiatric/Behavioral: Negative for confusion. Physical Exam  Physical Exam  Vitals reviewed. Constitutional:       General: He is not in acute distress. Appearance: He is well-developed. He is not ill-appearing, toxic-appearing or diaphoretic. HENT:      Head: Normocephalic and atraumatic. Mouth/Throat:      Mouth: Mucous membranes are moist.   Eyes:      Conjunctiva/sclera: Conjunctivae normal.   Cardiovascular:      Rate and Rhythm: Normal rate and regular rhythm. Heart sounds: No murmur heard. Pulmonary:      Effort: Pulmonary effort is normal. No respiratory distress. Breath sounds: Normal breath sounds. No wheezing, rhonchi or rales. Chest:      Chest wall: No tenderness. Abdominal:      Tenderness: There is no abdominal tenderness. There is no guarding. Musculoskeletal:         General: Tenderness (chornic pain, b/l shoulders) present. Normal range of motion. Cervical back: Normal range of motion and neck supple. Skin:     General: Skin is warm and dry. Coloration: Skin is not pale. Findings: No rash. Neurological:      Mental Status: He is alert and oriented to person, place, and time. Cranial Nerves: No cranial nerve deficit.    Psychiatric:         Behavior: Behavior normal.         Vital Signs  ED Triage Vitals   Temperature Pulse Respirations Blood Pressure SpO2   09/09/23 1228 09/09/23 1228 09/09/23 1228 09/09/23 1228 09/09/23 1228   98.5 °F (36.9 °C) 99 17 145/100 97 %      Temp Source Heart Rate Source Patient Position - Orthostatic VS BP Location FiO2 (%)   09/09/23 1228 09/09/23 1228 09/09/23 1228 09/09/23 1228 --   Tympanic Monitor Sitting Left arm       Pain Score       09/09/23 1344       6           Vitals:    09/09/23 1228 09/09/23 1330 09/09/23 1430 09/09/23 1630   BP: 145/100 160/90 132/83 136/90   Pulse: 99 93 76 81   Patient Position - Orthostatic VS: Sitting Sitting Sitting Sitting         Visual Acuity      ED Medications  Medications   sodium chloride 0.9 % bolus 1,000 mL (0 mL Intravenous Stopped 9/9/23 1701)   oxyCODONE-acetaminophen (PERCOCET) 5-325 mg per tablet 1 tablet (1 tablet Oral Given 9/9/23 1344)       Diagnostic Studies  Results Reviewed     Procedure Component Value Units Date/Time    HS Troponin I 2hr [[id number]]  (Normal) Collected: 09/09/23 1544    Lab Status: Final result Specimen: Blood from Arm, Left Updated: 09/09/23 1617     hs TnI 2hr 12 ng/L      Delta 2hr hsTnI 1 ng/L     HS Troponin 0hr (reflex protocol) [[id number]]  (Normal) Collected: 09/09/23 1343    Lab Status: Final result Specimen: Blood from Arm, Left Updated: 09/09/23 1419     hs TnI 0hr 11 ng/L     Comprehensive metabolic panel [[id number]]  (Abnormal) Collected: 09/09/23 1343    Lab Status: Final result Specimen: Blood from Arm, Left Updated: 09/09/23 1415     Sodium 137 mmol/L      Potassium 4.0 mmol/L      Chloride 103 mmol/L      CO2 24 mmol/L      ANION GAP 10 mmol/L      BUN 10 mg/dL      Creatinine 0.91 mg/dL      Glucose 148 mg/dL      Calcium 10.2 mg/dL      AST 57 U/L      ALT 87 U/L      Alkaline Phosphatase 65 U/L      Total Protein 7.8 g/dL      Albumin 4.5 g/dL      Total Bilirubin 0.57 mg/dL      eGFR 87 ml/min/1.73sq m     Narrative:      [place] guidelines for Chronic Kidney Disease (CKD):   •  Stage 1 with normal or high GFR (GFR > 90 mL/min/1.73 square meters)  •  Stage 2 Mild CKD (GFR = 60-89 mL/min/1.73 square meters)  •  Stage 3A Moderate CKD (GFR = 45-59 mL/min/1.73 square meters)  •  Stage 3B Moderate CKD (GFR = 30-44 mL/min/1.73 square meters)  •  Stage 4 Severe CKD (GFR = 15-29 mL/min/1.73 square meters)  •  Stage 5 End Stage CKD (GFR <15 mL/min/1.73 square meters)  Note: GFR calculation is accurate only with a steady state creatinine    CBC and differential [[id number]] Collected: 09/09/23 1343    Lab Status: Final result Specimen: Blood from Arm, Left Updated: 09/09/23 1353     WBC 7.49 Thousand/uL      RBC 4.65 Million/uL      Hemoglobin 15.0 g/dL      Hematocrit 43.4 %      MCV 93 fL      MCH 32.3 pg      MCHC 34.6 g/dL      RDW 12.9 %      MPV 9.3 fL      Platelets 989 Thousands/uL      nRBC 0 /100 WBCs      Neutrophils Relative 67 %      Immat GRANS % 0 %      Lymphocytes Relative 21 %      Monocytes Relative 10 %      Eosinophils Relative 1 %      Basophils Relative 1 %      Neutrophils Absolute 5.01 Thousands/µL      Immature Grans Absolute 0.02 Thousand/uL      Lymphocytes Absolute 1.55 Thousands/µL      Monocytes Absolute 0.78 Thousand/µL      Eosinophils Absolute 0.09 Thousand/µL      Basophils Absolute 0.04 Thousands/µL                  XR chest 1 view portable   Final Result by Christina Woodruff MD (09/10 1048)      No acute cardiopulmonary disease. Workstation performed: [id number]                    Procedures  ECG 12 Lead Documentation Only    Date/Time: 9/9/2023 1:51 PM    Performed by: Vangie Judge DO  Authorized by: Vangie Judge DO    ECG reviewed by me, the ED Provider: yes    Patient location:  ED  Previous ECG:     Previous ECG:  Compared to current    Similarity:  No change    Comparison to cardiac monitor: Yes    Interpretation:     Interpretation: normal    Rate:     ECG rate assessment: normal    Rhythm:     Rhythm: sinus rhythm    Ectopy:     Ectopy: none    QRS:     QRS axis:  Normal    QRS intervals:  Normal  Conduction:     Conduction: normal    ST segments:     ST segments:  Normal  T waves:     T waves: non-specific               ED Course  ED Course as of 09/10/23 1353   Sat Sep 09, 2023   1432 hs TnI 0hr: 11   1548 hs TnI 0hr: 11   1624 Delta 2hr hsTnI: 1   1636 Delta 2hr hsTnI: 1                                             Medical Decision Making  Eval for end organ damage. Hydration.   Pain control of chronic pain to assess for change on BP. Ultimately plan for OP follow up w PCP - PCP can manage BP meds for any changes needed so that patient can be monitored. Patient has significant improvement in blood pressure after IV fluid for hydration and appropriate pain control. Patient was discharged home with pain control, advised to continue observing blood pressure and follow-up with primary care provider to determine if any blood pressure medication changes are needed. Discharged in stable condition. Advise return precautions of any signs of endorgan damage. Amount and/or Complexity of Data Reviewed  Labs: ordered. Decision-making details documented in ED Course. Radiology: ordered. Risk  Prescription drug management. Disposition  Final diagnoses:   Hypertension   Bilateral shoulder pain     Time reflects when diagnosis was documented in both MDM as applicable and the Disposition within this note     Time User Action Codes Description Comment    9/9/2023  4:24 PM Luan Austin Add [I10] Hypertension     9/9/2023  4:24 PM Luan Austin Add [M25.511,  M25.512] Bilateral shoulder pain       ED Disposition     ED Disposition   Discharge    Condition   Stable    Date/Time   Sat Sep 9, 2023  4:24 PM    Comment   Alfredo Bensno discharge to home/self care.                Follow-up Information     Follow up With Specialties Details Why Contact Info Additional Information    [facility] Emergency Medicine  If symptoms worsen [address] [facility] [id number] [address] [facility], [place], [place], [address] Palliative Medicine  for pain management [facility] [address] [facility] [id number] [address], [facility], [place], [address] [place], [place], [address] [place] [address] follow up w PCP for BP control               Discharge Medication List as of 9/9/2023  4:27 PM      CONTINUE these medications which have NOT CHANGED    Details   acetaminophen (TYLENOL) 325 mg tablet Take 2 tablets (650 mg total) by mouth every 6 (six) hours as needed for mild pain, moderate pain or fever, Starting Mon 1/31/2022, No Print      atorvastatin (LIPITOR) 80 mg tablet TAKE 40MG (ONE-HALF TABLET) BY MOUTH EVERY DAY AT 5 PM FOR CHOLESTEROL, Historical Med      Diclofenac Sodium (VOLTAREN) 1 % Apply 2 g topically 4 (four) times a day, Starting Mon 6/12/2023, Normal      Empagliflozin 25 MG TABS Take 0.5 tablets (12.5 mg total) by mouth in the morning, Starting Tue 2/1/2022, Print      glipiZIDE (GLUCOTROL) 5 mg tablet Take 1 tablet (5 mg total) by mouth daily before breakfast, Starting Tue 2/1/2022, Print      insulin glargine (LANTUS SOLOSTAR) 100 units/mL injection pen Inject 15 Units under the skin daily at bedtime, Starting Mon 1/31/2022, No Print      melatonin 5 MG TABS Take 1 tablet (5 mg total) by mouth daily at bedtime, Starting Mon 1/31/2022, Print      meloxicam (MOBIC) 15 mg tablet Take 1 tablet (15 mg total) by mouth daily as needed for moderate pain, Starting Mon 6/12/2023, Normal      metFORMIN (GLUCOPHAGE) 1000 MG tablet TAKE ONE TABLET BY MOUTH TWICE A DAY WITH MEALS FOR DIABETES, Historical Med             No discharge procedures on file.     PDMP Review       Value Time User    PDMP Reviewed  Yes 1/31/2022  1:00 PM Susi Owens MD          ED Provider  Electronically Signed by           Fay Rose DO  09/10/23 3757

## 2023-09-09 NOTE — DISCHARGE INSTRUCTIONS
Your blood pressure came down nicely once your pain had better control. Please work with pain management to help control your pain, and your PCP for further BP control if needed. No noted damage to your organs despite the high blood pressure.    Return to ED if worsening condition, headache, chest pain, etc.

## 2023-09-10 LAB
ATRIAL RATE: 83 BPM
P AXIS: 56 DEGREES
PR INTERVAL: 180 MS
QRS AXIS: 17 DEGREES
QRSD INTERVAL: 84 MS
QT INTERVAL: 380 MS
QTC INTERVAL: 446 MS
T WAVE AXIS: 112 DEGREES
VENTRICULAR RATE: 83 BPM

## 2023-09-10 PROCEDURE — 93010 ELECTROCARDIOGRAM REPORT: CPT | Performed by: INTERNAL MEDICINE

## 2023-09-21 ENCOUNTER — HOSPITAL ENCOUNTER (OUTPATIENT)
Dept: RADIOLOGY | Facility: CLINIC | Age: 68
Discharge: HOME | End: 2023-09-21
Admitting: STUDENT IN AN ORGANIZED HEALTH CARE EDUCATION/TRAINING PROGRAM
Payer: MEDICARE

## 2023-09-21 VITALS
DIASTOLIC BLOOD PRESSURE: 92 MMHG | HEART RATE: 98 BPM | OXYGEN SATURATION: 97 % | TEMPERATURE: 97.1 F | RESPIRATION RATE: 18 BRPM | SYSTOLIC BLOOD PRESSURE: 161 MMHG

## 2023-09-21 DIAGNOSIS — M19.012 GLENOHUMERAL ARTHRITIS, LEFT: ICD-10-CM

## 2023-09-21 PROCEDURE — 77002 NEEDLE LOCALIZATION BY XRAY: CPT | Performed by: STUDENT IN AN ORGANIZED HEALTH CARE EDUCATION/TRAINING PROGRAM

## 2023-09-21 PROCEDURE — 20610 DRAIN/INJ JOINT/BURSA W/O US: CPT | Performed by: STUDENT IN AN ORGANIZED HEALTH CARE EDUCATION/TRAINING PROGRAM

## 2023-09-21 PROCEDURE — 77002 NEEDLE LOCALIZATION BY XRAY: CPT

## 2023-09-21 RX ORDER — BUPIVACAINE HCL/PF 2.5 MG/ML
4 VIAL (ML) INJECTION ONCE
Status: COMPLETED | OUTPATIENT
Start: 2023-09-21 | End: 2023-09-21

## 2023-09-21 RX ORDER — BUPIVACAINE HCL/PF 2.5 MG/ML
1 VIAL (ML) INJECTION ONCE
Status: DISCONTINUED | OUTPATIENT
Start: 2023-09-21 | End: 2023-09-21

## 2023-09-21 RX ORDER — METHYLPREDNISOLONE ACETATE 80 MG/ML
80 INJECTION, SUSPENSION INTRA-ARTICULAR; INTRALESIONAL; INTRAMUSCULAR; PARENTERAL; SOFT TISSUE ONCE
Status: COMPLETED | OUTPATIENT
Start: 2023-09-21 | End: 2023-09-21

## 2023-09-21 RX ADMIN — METHYLPREDNISOLONE ACETATE 80 MG: 80 INJECTION, SUSPENSION INTRA-ARTICULAR; INTRALESIONAL; INTRAMUSCULAR; PARENTERAL; SOFT TISSUE at 10:21

## 2023-09-21 RX ADMIN — IOHEXOL 1 ML: 300 INJECTION, SOLUTION INTRAVENOUS at 10:21

## 2023-09-21 RX ADMIN — Medication 4 ML: at 10:21

## 2023-09-21 NOTE — H&P
History of Present Illness:  The patient is a 79 y.o. male who presents with complaints of left shoulder pain    Past Medical History:   Diagnosis Date   • Depression    • Diabetes mellitus ([address])    • Hyperlipidemia    • Hypertension    • Osteoarthritis, knee        Past Surgical History:   Procedure Laterality Date   • BACK SURGERY     • HAND SURGERY     • JOINT REPLACEMENT Left     l tkr   • KNEE SURGERY Left    • [place] ARTHRP KNE CONDYLE&PLATU MEDIAL&LAT COMPARTMENTS Right 11/19/2018    Procedure: ARTHROPLASTY KNEE TOTAL;  Surgeon: Dominga Kidd MD;  [facility];  Service: Orthopedics   • TOOTH EXTRACTION     • WISDOM TOOTH EXTRACTION           Current Outpatient Medications:   •  acetaminophen (TYLENOL) 325 mg tablet, Take 2 tablets (650 mg total) by mouth every 6 (six) hours as needed for mild pain, moderate pain or fever (Patient taking differently: Take 650 mg by mouth every 6 (six) hours as needed for mild pain, moderate pain or fever PRN), Disp: , Rfl: 0  •  atorvastatin (LIPITOR) 80 mg tablet, TAKE 40MG (ONE-HALF TABLET) BY MOUTH EVERY DAY AT 5 PM FOR CHOLESTEROL, Disp: , Rfl:   •  Diclofenac Sodium (VOLTAREN) 1 %, Apply 2 g topically 4 (four) times a day, Disp: 100 g, Rfl: 0  •  Empagliflozin 25 MG TABS, Take 0.5 tablets (12.5 mg total) by mouth in the morning, Disp: 15 tablet, Rfl: 0  •  glipiZIDE (GLUCOTROL) 5 mg tablet, Take 1 tablet (5 mg total) by mouth daily before breakfast, Disp: 30 tablet, Rfl: 0  •  insulin glargine (LANTUS SOLOSTAR) 100 units/mL injection pen, Inject 15 Units under the skin daily at bedtime, Disp: 15 mL, Rfl: 0  •  melatonin 5 MG TABS, Take 1 tablet (5 mg total) by mouth daily at bedtime (Patient not taking: Reported on 6/9/2023), Disp: 30 tablet, Rfl: 0  •  meloxicam (MOBIC) 15 mg tablet, Take 1 tablet (15 mg total) by mouth daily as needed for moderate pain, Disp: 30 tablet, Rfl: 0  •  metFORMIN (GLUCOPHAGE) 1000 MG tablet, TAKE ONE TABLET BY MOUTH TWICE A DAY WITH MEALS FOR DIABETES, Disp: , Rfl:     No Known Allergies    Physical Exam:   Vitals:    09/21/23 1003   BP: 151/88   Pulse: 92   Resp: 20   Temp: (!) 97.1 °F (36.2 °C)   SpO2: 95%     General: Awake, Alert, Oriented x 3, Mood and affect appropriate  Respiratory: Respirations even and unlabored  Cardiovascular: Peripheral pulses intact; no edema  Musculoskeletal Exam: pain with abduction left shoulder    ASA Score: 3    Patient/Chart Verification  Patient ID Verified: Verbal  ID Band Applied: No  Consents Confirmed: To be obtained in the Pre-Procedure area  H&P( within 30 days) Verified: To be obtained in the Pre-Procedure area  Interval H&P(within 24 hr) Complete (required for Outpatients and Surgery Admit only): To be obtained in the Pre-Procedure area  Allergies Reviewed: Yes  Anticoag/NSAID held?: NA  Currently on antibiotics?: No  Pregnancy denied?: NA    Assessment:   1.  Glenohumeral arthritis, left        Plan: left glenohumeral joint injection depo 80

## 2023-09-21 NOTE — DISCHARGE INSTR - LAB

## 2023-09-21 NOTE — H&P (VIEW-ONLY)
History of Present Illness:  The patient is a 79 y.o. male who presents with complaints of left shoulder pain    Past Medical History:   Diagnosis Date   • Depression    • Diabetes mellitus ([address])    • Hyperlipidemia    • Hypertension    • Osteoarthritis, knee        Past Surgical History:   Procedure Laterality Date   • BACK SURGERY     • HAND SURGERY     • JOINT REPLACEMENT Left     l tkr   • KNEE SURGERY Left    • [place] ARTHRP KNE CONDYLE&PLATU MEDIAL&LAT COMPARTMENTS Right 11/19/2018    Procedure: ARTHROPLASTY KNEE TOTAL;  Surgeon: Bipin Stevens MD;  [facility];  Service: Orthopedics   • TOOTH EXTRACTION     • WISDOM TOOTH EXTRACTION           Current Outpatient Medications:   •  acetaminophen (TYLENOL) 325 mg tablet, Take 2 tablets (650 mg total) by mouth every 6 (six) hours as needed for mild pain, moderate pain or fever (Patient taking differently: Take 650 mg by mouth every 6 (six) hours as needed for mild pain, moderate pain or fever PRN), Disp: , Rfl: 0  •  atorvastatin (LIPITOR) 80 mg tablet, TAKE 40MG (ONE-HALF TABLET) BY MOUTH EVERY DAY AT 5 PM FOR CHOLESTEROL, Disp: , Rfl:   •  Diclofenac Sodium (VOLTAREN) 1 %, Apply 2 g topically 4 (four) times a day, Disp: 100 g, Rfl: 0  •  Empagliflozin 25 MG TABS, Take 0.5 tablets (12.5 mg total) by mouth in the morning, Disp: 15 tablet, Rfl: 0  •  glipiZIDE (GLUCOTROL) 5 mg tablet, Take 1 tablet (5 mg total) by mouth daily before breakfast, Disp: 30 tablet, Rfl: 0  •  insulin glargine (LANTUS SOLOSTAR) 100 units/mL injection pen, Inject 15 Units under the skin daily at bedtime, Disp: 15 mL, Rfl: 0  •  melatonin 5 MG TABS, Take 1 tablet (5 mg total) by mouth daily at bedtime (Patient not taking: Reported on 6/9/2023), Disp: 30 tablet, Rfl: 0  •  meloxicam (MOBIC) 15 mg tablet, Take 1 tablet (15 mg total) by mouth daily as needed for moderate pain, Disp: 30 tablet, Rfl: 0  •  metFORMIN (GLUCOPHAGE) 1000 MG tablet, TAKE ONE TABLET BY MOUTH TWICE A DAY WITH MEALS FOR DIABETES, Disp: , Rfl:     No Known Allergies    Physical Exam:   Vitals:    09/21/23 1003   BP: 151/88   Pulse: 92   Resp: 20   Temp: (!) 97.1 °F (36.2 °C)   SpO2: 95%     General: Awake, Alert, Oriented x 3, Mood and affect appropriate  Respiratory: Respirations even and unlabored  Cardiovascular: Peripheral pulses intact; no edema  Musculoskeletal Exam: pain with abduction left shoulder    ASA Score: 3    Patient/Chart Verification  Patient ID Verified: Verbal  ID Band Applied: No  Consents Confirmed: To be obtained in the Pre-Procedure area  H&P( within 30 days) Verified: To be obtained in the Pre-Procedure area  Interval H&P(within 24 hr) Complete (required for Outpatients and Surgery Admit only): To be obtained in the Pre-Procedure area  Allergies Reviewed: Yes  Anticoag/NSAID held?: NA  Currently on antibiotics?: No  Pregnancy denied?: NA    Assessment:   1.  Glenohumeral arthritis, left        Plan: left glenohumeral joint injection depo 80

## 2023-09-28 ENCOUNTER — TELEPHONE (OUTPATIENT)
Dept: PAIN MEDICINE | Facility: CLINIC | Age: 68
End: 2023-09-28

## 2023-10-05 ENCOUNTER — HOSPITAL ENCOUNTER (OUTPATIENT)
Dept: RADIOLOGY | Facility: CLINIC | Age: 68
Discharge: HOME | End: 2023-10-05
Admitting: STUDENT IN AN ORGANIZED HEALTH CARE EDUCATION/TRAINING PROGRAM
Payer: MEDICARE

## 2023-10-05 VITALS
RESPIRATION RATE: 18 BRPM | DIASTOLIC BLOOD PRESSURE: 84 MMHG | HEART RATE: 95 BPM | OXYGEN SATURATION: 94 % | TEMPERATURE: 97.1 F | SYSTOLIC BLOOD PRESSURE: 144 MMHG

## 2023-10-05 DIAGNOSIS — M19.011 GLENOHUMERAL ARTHRITIS, RIGHT: ICD-10-CM

## 2023-10-05 PROCEDURE — 77002 NEEDLE LOCALIZATION BY XRAY: CPT | Performed by: STUDENT IN AN ORGANIZED HEALTH CARE EDUCATION/TRAINING PROGRAM

## 2023-10-05 PROCEDURE — 20610 DRAIN/INJ JOINT/BURSA W/O US: CPT | Performed by: STUDENT IN AN ORGANIZED HEALTH CARE EDUCATION/TRAINING PROGRAM

## 2023-10-05 PROCEDURE — 77002 NEEDLE LOCALIZATION BY XRAY: CPT

## 2023-10-05 RX ORDER — BUPIVACAINE HCL/PF 2.5 MG/ML
4 VIAL (ML) INJECTION ONCE
Status: COMPLETED | OUTPATIENT
Start: 2023-10-05 | End: 2023-10-05

## 2023-10-05 RX ORDER — METHYLPREDNISOLONE ACETATE 80 MG/ML
80 INJECTION, SUSPENSION INTRA-ARTICULAR; INTRALESIONAL; INTRAMUSCULAR; PARENTERAL; SOFT TISSUE ONCE
Status: COMPLETED | OUTPATIENT
Start: 2023-10-05 | End: 2023-10-05

## 2023-10-05 RX ADMIN — IOHEXOL 1 ML: 300 INJECTION, SOLUTION INTRAVENOUS at 09:42

## 2023-10-05 RX ADMIN — METHYLPREDNISOLONE ACETATE 80 MG: 80 INJECTION, SUSPENSION INTRA-ARTICULAR; INTRALESIONAL; INTRAMUSCULAR; PARENTERAL; SOFT TISSUE at 09:43

## 2023-10-05 RX ADMIN — Medication 4 ML: at 09:43

## 2023-10-05 NOTE — INTERVAL H&P NOTE
Update: (This section must be completed if the H&P was completed greater than 24 hrs to procedure or admission)    H&P updated. The patient was examined and PATIENT WITH RIGHT SHOULDER PAIN TODAY. SCHEDULE RIGHT SHOULDER INJECTION    Patient re-evaluated.  Accept as history and physical.    Halie Mireles MD/October 5, 2023/9:35 AM

## 2023-10-05 NOTE — DISCHARGE INSTR - LAB

## 2023-10-12 ENCOUNTER — TELEPHONE (OUTPATIENT)
Dept: PAIN MEDICINE | Facility: CLINIC | Age: 68
End: 2023-10-12

## 2023-10-12 NOTE — TELEPHONE ENCOUNTER
Pt reports he is doing a lot better post inj  Refused % and pain level   He said he is doing good.

## 2023-10-30 ENCOUNTER — TELEPHONE (OUTPATIENT)
Age: 68
End: 2023-10-30

## 2023-10-30 DIAGNOSIS — M19.012 GLENOHUMERAL ARTHRITIS, LEFT: ICD-10-CM

## 2023-10-30 DIAGNOSIS — M19.011 GLENOHUMERAL ARTHRITIS, RIGHT: Primary | ICD-10-CM

## 2023-10-30 NOTE — TELEPHONE ENCOUNTER
Caller: Patient     Doctor/Office: SPA     Call regarding :  Appt     Call was transferred to: [facility]

## 2023-10-30 NOTE — TELEPHONE ENCOUNTER
Caller: Patient     Doctor/Office: Kacey Mediate    Call regarding :  Returning call      Call was transferred to: [facility]

## 2023-10-30 NOTE — TELEPHONE ENCOUNTER
Pt would like to schedule left and right shoulder injections for 3 months from last procedures done 9/21 and 10/5  Ok to schedule?

## 2023-10-30 NOTE — TELEPHONE ENCOUNTER
Caller: Ro Noriega    Doctor: Mahnaz Harris    Reason for call: Pt is calling to schedule his next procedures that is for 3 months out     Please advise    Call back#: [phone]

## 2023-10-30 NOTE — TELEPHONE ENCOUNTER
Ok to schedule left shoulder injection. Must be 3 months from last injection done 9/21   Ok to schedule right shoulder injection.  Must be 3 months from last right shoulder injection done 10/5

## 2023-12-10 ENCOUNTER — APPOINTMENT (EMERGENCY)
Dept: RADIOLOGY | Facility: HOSPITAL | Age: 68
End: 2023-12-10
Payer: MEDICARE

## 2023-12-10 ENCOUNTER — HOSPITAL ENCOUNTER (EMERGENCY)
Facility: HOSPITAL | Age: 68
Discharge: HOME | End: 2023-12-10
Attending: STUDENT IN AN ORGANIZED HEALTH CARE EDUCATION/TRAINING PROGRAM
Payer: MEDICARE

## 2023-12-10 ENCOUNTER — APPOINTMENT (EMERGENCY)
Dept: CT IMAGING | Facility: HOSPITAL | Age: 68
End: 2023-12-10
Payer: MEDICARE

## 2023-12-10 VITALS
HEART RATE: 90 BPM | RESPIRATION RATE: 18 BRPM | OXYGEN SATURATION: 94 % | TEMPERATURE: 98.3 F | DIASTOLIC BLOOD PRESSURE: 86 MMHG | SYSTOLIC BLOOD PRESSURE: 129 MMHG

## 2023-12-10 DIAGNOSIS — M25.562 LEFT KNEE PAIN: ICD-10-CM

## 2023-12-10 DIAGNOSIS — M25.511 BILATERAL SHOULDER PAIN: ICD-10-CM

## 2023-12-10 DIAGNOSIS — S22.41XA CLOSED FRACTURE OF MULTIPLE RIBS OF RIGHT SIDE, INITIAL ENCOUNTER: Primary | ICD-10-CM

## 2023-12-10 DIAGNOSIS — M25.512 BILATERAL SHOULDER PAIN: ICD-10-CM

## 2023-12-10 DIAGNOSIS — M25.552 LEFT HIP PAIN: ICD-10-CM

## 2023-12-10 LAB
ALBUMIN SERPL BCP-MCNC: 4.2 G/DL (ref 3.5–5)
ALP SERPL-CCNC: 72 U/L (ref 34–104)
ALT SERPL W P-5'-P-CCNC: 39 U/L (ref 7–52)
ANION GAP SERPL CALCULATED.3IONS-SCNC: 9 MMOL/L
AST SERPL W P-5'-P-CCNC: 26 U/L (ref 13–39)
BASOPHILS # BLD AUTO: 0.04 THOUSANDS/ÂΜL (ref 0–0.1)
BASOPHILS NFR BLD AUTO: 0 % (ref 0–1)
BILIRUB SERPL-MCNC: 0.78 MG/DL (ref 0.2–1)
BUN SERPL-MCNC: 11 MG/DL (ref 5–25)
CALCIUM SERPL-MCNC: 9.4 MG/DL (ref 8.4–10.2)
CHLORIDE SERPL-SCNC: 102 MMOL/L (ref 96–108)
CO2 SERPL-SCNC: 25 MMOL/L (ref 21–32)
CREAT SERPL-MCNC: 1.01 MG/DL (ref 0.6–1.3)
EOSINOPHIL # BLD AUTO: 0.08 THOUSAND/ÂΜL (ref 0–0.61)
EOSINOPHIL NFR BLD AUTO: 1 % (ref 0–6)
ERYTHROCYTE [DISTWIDTH] IN BLOOD BY AUTOMATED COUNT: 13.1 % (ref 11.6–15.1)
GFR SERPL CREATININE-BSD FRML MDRD: 77 ML/MIN/1.73SQ M
GLUCOSE SERPL-MCNC: 186 MG/DL (ref 65–140)
HCT VFR BLD AUTO: 40 % (ref 36.5–49.3)
HGB BLD-MCNC: 14.2 G/DL (ref 12–17)
IMM GRANULOCYTES # BLD AUTO: 0.03 THOUSAND/UL (ref 0–0.2)
IMM GRANULOCYTES NFR BLD AUTO: 0 % (ref 0–2)
LYMPHOCYTES # BLD AUTO: 1.5 THOUSANDS/ÂΜL (ref 0.6–4.47)
LYMPHOCYTES NFR BLD AUTO: 15 % (ref 14–44)
MCH RBC QN AUTO: 32 PG (ref 26.8–34.3)
MCHC RBC AUTO-ENTMCNC: 35.5 G/DL (ref 31.4–37.4)
MCV RBC AUTO: 90 FL (ref 82–98)
MONOCYTES # BLD AUTO: 0.94 THOUSAND/ÂΜL (ref 0.17–1.22)
MONOCYTES NFR BLD AUTO: 9 % (ref 4–12)
NEUTROPHILS # BLD AUTO: 7.63 THOUSANDS/ÂΜL (ref 1.85–7.62)
NEUTS SEG NFR BLD AUTO: 75 % (ref 43–75)
NRBC BLD AUTO-RTO: 0 /100 WBCS
PLATELET # BLD AUTO: 213 THOUSANDS/UL (ref 149–390)
PMV BLD AUTO: 9.3 FL (ref 8.9–12.7)
POTASSIUM SERPL-SCNC: 3.5 MMOL/L (ref 3.5–5.3)
PROT SERPL-MCNC: 7.3 G/DL (ref 6.4–8.4)
RBC # BLD AUTO: 4.44 MILLION/UL (ref 3.88–5.62)
SODIUM SERPL-SCNC: 136 MMOL/L (ref 135–147)
WBC # BLD AUTO: 10.22 THOUSAND/UL (ref 4.31–10.16)

## 2023-12-10 PROCEDURE — 96374 THER/PROPH/DIAG INJ IV PUSH: CPT

## 2023-12-10 PROCEDURE — 73552 X-RAY EXAM OF FEMUR 2/>: CPT

## 2023-12-10 PROCEDURE — G1004 CDSM NDSC: HCPCS

## 2023-12-10 PROCEDURE — 73564 X-RAY EXAM KNEE 4 OR MORE: CPT

## 2023-12-10 PROCEDURE — 99284 EMERGENCY DEPT VISIT MOD MDM: CPT

## 2023-12-10 PROCEDURE — 99285 EMERGENCY DEPT VISIT HI MDM: CPT | Performed by: PHYSICIAN ASSISTANT

## 2023-12-10 PROCEDURE — 71260 CT THORAX DX C+: CPT

## 2023-12-10 PROCEDURE — 85025 COMPLETE CBC W/AUTO DIFF WBC: CPT | Performed by: PHYSICIAN ASSISTANT

## 2023-12-10 PROCEDURE — 73030 X-RAY EXAM OF SHOULDER: CPT

## 2023-12-10 PROCEDURE — 36415 COLL VENOUS BLD VENIPUNCTURE: CPT | Performed by: PHYSICIAN ASSISTANT

## 2023-12-10 PROCEDURE — 74177 CT ABD & PELVIS W/CONTRAST: CPT

## 2023-12-10 PROCEDURE — 80053 COMPREHEN METABOLIC PANEL: CPT | Performed by: PHYSICIAN ASSISTANT

## 2023-12-10 RX ORDER — OXYCODONE HYDROCHLORIDE AND ACETAMINOPHEN 5; 325 MG/1; MG/1
1 TABLET ORAL EVERY 8 HOURS PRN
Qty: 15 TABLET | Refills: 0 | Status: SHIPPED | OUTPATIENT
Start: 2023-12-10 | End: 2023-12-20

## 2023-12-10 RX ORDER — LIDOCAINE 50 MG/G
1 PATCH TOPICAL ONCE
Status: DISCONTINUED | OUTPATIENT
Start: 2023-12-10 | End: 2023-12-10 | Stop reason: HOSPADM

## 2023-12-10 RX ORDER — MORPHINE SULFATE 4 MG/ML
4 INJECTION, SOLUTION INTRAMUSCULAR; INTRAVENOUS ONCE
Status: COMPLETED | OUTPATIENT
Start: 2023-12-10 | End: 2023-12-10

## 2023-12-10 RX ADMIN — MORPHINE SULFATE 4 MG: 4 INJECTION INTRAVENOUS at 10:20

## 2023-12-10 RX ADMIN — LIDOCAINE 1 PATCH: 50 PATCH TOPICAL at 12:19

## 2023-12-10 RX ADMIN — IOHEXOL 100 ML: 350 INJECTION, SOLUTION INTRAVENOUS at 10:48

## 2023-12-10 NOTE — DISCHARGE INSTRUCTIONS
Rest, icing, topical Lidoderm patches and Voltaren gel topically to the extremities, Tylenol for mild to moderate pain relief, Percocet as needed for breakthrough pain. Take deep breaths as able. Outpatient pain management/orthopedic follow-up for further management. Please return immediately to the emergency department if you experience any new or worsening symptoms.

## 2023-12-21 ENCOUNTER — HOSPITAL ENCOUNTER (OUTPATIENT)
Dept: RADIOLOGY | Facility: CLINIC | Age: 68
End: 2023-12-21
Payer: MEDICARE

## 2023-12-21 VITALS
SYSTOLIC BLOOD PRESSURE: 132 MMHG | OXYGEN SATURATION: 97 % | HEART RATE: 91 BPM | DIASTOLIC BLOOD PRESSURE: 85 MMHG | RESPIRATION RATE: 20 BRPM

## 2023-12-21 DIAGNOSIS — M19.012 GLENOHUMERAL ARTHRITIS, LEFT: ICD-10-CM

## 2023-12-21 PROCEDURE — 20610 DRAIN/INJ JOINT/BURSA W/O US: CPT | Performed by: STUDENT IN AN ORGANIZED HEALTH CARE EDUCATION/TRAINING PROGRAM

## 2023-12-21 PROCEDURE — 77002 NEEDLE LOCALIZATION BY XRAY: CPT

## 2023-12-21 PROCEDURE — 77002 NEEDLE LOCALIZATION BY XRAY: CPT | Performed by: STUDENT IN AN ORGANIZED HEALTH CARE EDUCATION/TRAINING PROGRAM

## 2023-12-21 RX ORDER — BUPIVACAINE HCL/PF 2.5 MG/ML
4 VIAL (ML) INJECTION ONCE
Status: COMPLETED | OUTPATIENT
Start: 2023-12-21 | End: 2023-12-21

## 2023-12-21 RX ORDER — METHYLPREDNISOLONE ACETATE 80 MG/ML
80 INJECTION, SUSPENSION INTRA-ARTICULAR; INTRALESIONAL; INTRAMUSCULAR; PARENTERAL; SOFT TISSUE ONCE
Status: COMPLETED | OUTPATIENT
Start: 2023-12-21 | End: 2023-12-21

## 2023-12-21 RX ADMIN — METHYLPREDNISOLONE ACETATE 80 MG: 80 INJECTION, SUSPENSION INTRA-ARTICULAR; INTRALESIONAL; INTRAMUSCULAR; PARENTERAL; SOFT TISSUE at 09:12

## 2023-12-21 RX ADMIN — Medication 4 ML: at 09:12

## 2023-12-21 RX ADMIN — IOHEXOL 1 ML: 300 INJECTION, SOLUTION INTRAVENOUS at 09:12

## 2023-12-21 NOTE — DISCHARGE INSTR - LAB

## 2023-12-21 NOTE — H&P
History of Present Illness: The patient is a 68 y.o. male who presents with complaints of left shoulder pain    Past Medical History:   Diagnosis Date    Depression     Diabetes mellitus (HCC)     Hyperlipidemia     Hypertension     Osteoarthritis, knee        Past Surgical History:   Procedure Laterality Date    BACK SURGERY      HAND SURGERY      JOINT REPLACEMENT Left     l tkr    KNEE SURGERY Left     [place] ARTHRP KNE CONDYLE&PLATU MEDIAL&LAT COMPARTMENTS Right 11/19/2018    Procedure: ARTHROPLASTY KNEE TOTAL;  Surgeon: Franny Grimaldo MD;  Location: [facility];  Service: Orthopedics    TOOTH EXTRACTION      WISDOM TOOTH EXTRACTION           Current Outpatient Medications:     acetaminophen (TYLENOL) 325 mg tablet, Take 2 tablets (650 mg total) by mouth every 6 (six) hours as needed for mild pain, moderate pain or fever (Patient taking differently: Take 650 mg by mouth every 6 (six) hours as needed for mild pain, moderate pain or fever PRN), Disp: , Rfl: 0    atorvastatin (LIPITOR) 80 mg tablet, TAKE 40MG (ONE-HALF TABLET) BY MOUTH EVERY DAY AT 5 PM FOR CHOLESTEROL, Disp: , Rfl:     Diclofenac Sodium (VOLTAREN) 1 %, Apply 2 g topically 4 (four) times a day, Disp: 100 g, Rfl: 0    Empagliflozin 25 MG TABS, Take 0.5 tablets (12.5 mg total) by mouth in the morning, Disp: 15 tablet, Rfl: 0    glipiZIDE (GLUCOTROL) 5 mg tablet, Take 1 tablet (5 mg total) by mouth daily before breakfast, Disp: 30 tablet, Rfl: 0    insulin glargine (LANTUS SOLOSTAR) 100 units/mL injection pen, Inject 15 Units under the skin daily at bedtime, Disp: 15 mL, Rfl: 0    melatonin 5 MG TABS, Take 1 tablet (5 mg total) by mouth daily at bedtime (Patient not taking: Reported on 6/9/2023), Disp: 30 tablet, Rfl: 0    meloxicam (MOBIC) 15 mg tablet, Take 1 tablet (15 mg total) by mouth daily as needed for moderate pain, Disp: 30 tablet, Rfl: 0    metFORMIN (GLUCOPHAGE) 1000 MG tablet, TAKE ONE TABLET BY MOUTH TWICE A DAY WITH MEALS FOR DIABETES,  Disp: , Rfl:     No Known Allergies    Physical Exam:   Vitals:    12/21/23 0854   BP: 148/97   Pulse: 102   Resp: 20   SpO2: 95%     General: Awake, Alert, Oriented x 3, Mood and affect appropriate  Respiratory: Respirations even and unlabored  Cardiovascular: Peripheral pulses intact; no edema  Musculoskeletal Exam: pain with left shoulder abduction    ASA Score: 3    Patient/Chart Verification  Patient ID Verified: Verbal  ID Band Applied: No  Consents Confirmed: To be obtained in the Pre-Procedure area  H&P( within 30 days) Verified: To be obtained in the Pre-Procedure area  Interval H&P(within 24 hr) Complete (required for Outpatients and Surgery Admit only): To be obtained in the Pre-Procedure area  Allergies Reviewed: Yes  Anticoag/NSAID held?: NA  Currently on antibiotics?: No  Pregnancy denied?: NA    Assessment:   1. Glenohumeral arthritis, left        Plan: left glenohumeral joint injection depo 80

## 2023-12-28 ENCOUNTER — TELEPHONE (OUTPATIENT)
Dept: PAIN MEDICINE | Facility: CLINIC | Age: 68
End: 2023-12-28

## 2023-12-28 NOTE — TELEPHONE ENCOUNTER
Caller: Yusuf SOTO    Doctor: Dr Viramontes     Reason for call: Pt called in with  % improvement and pain level /10 . Pt did not provide any information , he felt that he was being asked too many questions with authentication and disconnected the call      Call back#: [phone]

## 2024-01-10 ENCOUNTER — TELEPHONE (OUTPATIENT)
Age: 69
End: 2024-01-10

## 2024-01-10 NOTE — TELEPHONE ENCOUNTER
Received call from patient requesting New Patient appointment for [facility].    Explained wait/cancellation list processes. Understanding was verbalized.    Created wait/cancellation list for patient.

## 2024-01-11 ENCOUNTER — HOSPITAL ENCOUNTER (OUTPATIENT)
Dept: RADIOLOGY | Facility: CLINIC | Age: 69
End: 2024-01-11
Payer: MEDICARE

## 2024-01-11 VITALS
OXYGEN SATURATION: 97 % | RESPIRATION RATE: 20 BRPM | TEMPERATURE: 97.2 F | DIASTOLIC BLOOD PRESSURE: 94 MMHG | SYSTOLIC BLOOD PRESSURE: 142 MMHG | HEART RATE: 104 BPM

## 2024-01-11 DIAGNOSIS — M19.011 GLENOHUMERAL ARTHRITIS, RIGHT: ICD-10-CM

## 2024-01-11 PROCEDURE — 20610 DRAIN/INJ JOINT/BURSA W/O US: CPT | Performed by: STUDENT IN AN ORGANIZED HEALTH CARE EDUCATION/TRAINING PROGRAM

## 2024-01-11 PROCEDURE — 77002 NEEDLE LOCALIZATION BY XRAY: CPT | Performed by: STUDENT IN AN ORGANIZED HEALTH CARE EDUCATION/TRAINING PROGRAM

## 2024-01-11 PROCEDURE — 77002 NEEDLE LOCALIZATION BY XRAY: CPT

## 2024-01-11 RX ORDER — METHYLPREDNISOLONE ACETATE 80 MG/ML
80 INJECTION, SUSPENSION INTRA-ARTICULAR; INTRALESIONAL; INTRAMUSCULAR; PARENTERAL; SOFT TISSUE ONCE
Status: COMPLETED | OUTPATIENT
Start: 2024-01-11 | End: 2024-01-11

## 2024-01-11 RX ORDER — BUPIVACAINE HCL/PF 2.5 MG/ML
4 VIAL (ML) INJECTION ONCE
Status: COMPLETED | OUTPATIENT
Start: 2024-01-11 | End: 2024-01-11

## 2024-01-11 RX ADMIN — METHYLPREDNISOLONE ACETATE 80 MG: 80 INJECTION, SUSPENSION INTRA-ARTICULAR; INTRALESIONAL; INTRAMUSCULAR; PARENTERAL; SOFT TISSUE at 11:10

## 2024-01-11 RX ADMIN — IOHEXOL 1 ML: 300 INJECTION, SOLUTION INTRAVENOUS at 11:10

## 2024-01-11 RX ADMIN — Medication 4 ML: at 11:10

## 2024-01-11 NOTE — H&P
History of Present Illness: The patient is a 68 y.o. male who presents with complaints of right shoulder pain    Past Medical History:   Diagnosis Date    Depression     Diabetes mellitus (HCC)     Hyperlipidemia     Hypertension     Osteoarthritis, knee        Past Surgical History:   Procedure Laterality Date    BACK SURGERY      HAND SURGERY      JOINT REPLACEMENT Left     l tkr    KNEE SURGERY Left     [place] ARTHRP KNE CONDYLE&PLATU MEDIAL&LAT COMPARTMENTS Right 11/19/2018    Procedure: ARTHROPLASTY KNEE TOTAL;  Surgeon: Franny Grimaldo MD;  Location: [facility];  Service: Orthopedics    TOOTH EXTRACTION      WISDOM TOOTH EXTRACTION           Current Outpatient Medications:     acetaminophen (TYLENOL) 325 mg tablet, Take 2 tablets (650 mg total) by mouth every 6 (six) hours as needed for mild pain, moderate pain or fever (Patient taking differently: Take 650 mg by mouth every 6 (six) hours as needed for mild pain, moderate pain or fever PRN), Disp: , Rfl: 0    atorvastatin (LIPITOR) 80 mg tablet, TAKE 40MG (ONE-HALF TABLET) BY MOUTH EVERY DAY AT 5 PM FOR CHOLESTEROL, Disp: , Rfl:     Diclofenac Sodium (VOLTAREN) 1 %, Apply 2 g topically 4 (four) times a day, Disp: 100 g, Rfl: 0    Empagliflozin 25 MG TABS, Take 0.5 tablets (12.5 mg total) by mouth in the morning, Disp: 15 tablet, Rfl: 0    glipiZIDE (GLUCOTROL) 5 mg tablet, Take 1 tablet (5 mg total) by mouth daily before breakfast, Disp: 30 tablet, Rfl: 0    insulin glargine (LANTUS SOLOSTAR) 100 units/mL injection pen, Inject 15 Units under the skin daily at bedtime, Disp: 15 mL, Rfl: 0    melatonin 5 MG TABS, Take 1 tablet (5 mg total) by mouth daily at bedtime (Patient not taking: Reported on 6/9/2023), Disp: 30 tablet, Rfl: 0    meloxicam (MOBIC) 15 mg tablet, Take 1 tablet (15 mg total) by mouth daily as needed for moderate pain, Disp: 30 tablet, Rfl: 0    metFORMIN (GLUCOPHAGE) 1000 MG tablet, TAKE ONE TABLET BY MOUTH TWICE A DAY WITH MEALS FOR DIABETES,  Disp: , Rfl:     No Known Allergies    Physical Exam: There were no vitals filed for this visit.  General: Awake, Alert, Oriented x 3, Mood and affect appropriate  Respiratory: Respirations even and unlabored  Cardiovascular: Peripheral pulses intact; no edema  Musculoskeletal Exam: pain with right arm abduction    ASA Score: 3         Assessment:   1. Glenohumeral arthritis, right        Plan: R glenohumeral injection

## 2024-01-11 NOTE — DISCHARGE INSTR - LAB

## 2024-01-18 ENCOUNTER — TELEPHONE (OUTPATIENT)
Dept: PAIN MEDICINE | Facility: CLINIC | Age: 69
End: 2024-01-18

## 2024-01-18 DIAGNOSIS — M19.011 PRIMARY OSTEOARTHRITIS OF SHOULDERS, BILATERAL: Primary | ICD-10-CM

## 2024-01-18 DIAGNOSIS — M19.012 GLENOHUMERAL ARTHRITIS, LEFT: ICD-10-CM

## 2024-01-18 DIAGNOSIS — M19.011 GLENOHUMERAL ARTHRITIS, RIGHT: ICD-10-CM

## 2024-01-18 DIAGNOSIS — M19.012 PRIMARY OSTEOARTHRITIS OF SHOULDERS, BILATERAL: Primary | ICD-10-CM

## 2024-01-18 RX ORDER — MELOXICAM 15 MG/1
15 TABLET ORAL DAILY PRN
Qty: 30 TABLET | Refills: 0 | Status: SHIPPED | OUTPATIENT
Start: 2024-01-18

## 2024-01-18 NOTE — TELEPHONE ENCOUNTER
"FYI:  S/W pt and advised of below  Pt states he has seen Dr's at that office  Attempted to give pt Dr Egan's contact information  Pt states he had nothing to write with.  Advised I could send through [employer] if it was active.  Pt stated \"You cannot text it to me?\"  Advised pt I could not  Pt then stated \"Well I guess this whole phone call was useless then, thank you very much.\"  Pt ended call  "

## 2024-01-18 NOTE — TELEPHONE ENCOUNTER
Pt reports no improvement post inj   Pain level 4/10  He said the majority of the pain is on his left side  He also said he would like something for the pain he said he can't sleep.

## 2024-01-30 ENCOUNTER — TELEPHONE (OUTPATIENT)
Age: 69
End: 2024-01-30

## 2024-04-05 ENCOUNTER — OFFICE VISIT (OUTPATIENT)
Dept: OBGYN CLINIC | Facility: CLINIC | Age: 69
End: 2024-04-05
Payer: MEDICARE

## 2024-04-05 VITALS
HEIGHT: 74 IN | HEART RATE: 91 BPM | SYSTOLIC BLOOD PRESSURE: 168 MMHG | BODY MASS INDEX: 31.88 KG/M2 | DIASTOLIC BLOOD PRESSURE: 103 MMHG | WEIGHT: 248.4 LBS

## 2024-04-05 DIAGNOSIS — M19.011 PRIMARY OSTEOARTHRITIS OF SHOULDERS, BILATERAL: Primary | ICD-10-CM

## 2024-04-05 DIAGNOSIS — G89.29 CHRONIC RIGHT SHOULDER PAIN: ICD-10-CM

## 2024-04-05 DIAGNOSIS — G89.29 CHRONIC LEFT SHOULDER PAIN: ICD-10-CM

## 2024-04-05 DIAGNOSIS — M25.511 CHRONIC RIGHT SHOULDER PAIN: ICD-10-CM

## 2024-04-05 DIAGNOSIS — M25.512 CHRONIC LEFT SHOULDER PAIN: ICD-10-CM

## 2024-04-05 DIAGNOSIS — M19.012 PRIMARY OSTEOARTHRITIS OF SHOULDERS, BILATERAL: Primary | ICD-10-CM

## 2024-04-05 PROCEDURE — 99214 OFFICE O/P EST MOD 30 MIN: CPT | Performed by: ORTHOPAEDIC SURGERY

## 2024-04-05 PROCEDURE — 20610 DRAIN/INJ JOINT/BURSA W/O US: CPT | Performed by: ORTHOPAEDIC SURGERY

## 2024-04-05 RX ORDER — METHYLPREDNISOLONE ACETATE 40 MG/ML
2 INJECTION, SUSPENSION INTRA-ARTICULAR; INTRALESIONAL; INTRAMUSCULAR; SOFT TISSUE
Status: COMPLETED | OUTPATIENT
Start: 2024-04-05 | End: 2024-04-05

## 2024-04-05 RX ORDER — BUPIVACAINE HYDROCHLORIDE 2.5 MG/ML
2 INJECTION, SOLUTION INFILTRATION; PERINEURAL
Status: COMPLETED | OUTPATIENT
Start: 2024-04-05 | End: 2024-04-05

## 2024-04-05 RX ORDER — LIDOCAINE HYDROCHLORIDE 10 MG/ML
2 INJECTION, SOLUTION INFILTRATION; PERINEURAL
Status: COMPLETED | OUTPATIENT
Start: 2024-04-05 | End: 2024-04-05

## 2024-04-05 RX ADMIN — METHYLPREDNISOLONE ACETATE 2 ML: 40 INJECTION, SUSPENSION INTRA-ARTICULAR; INTRALESIONAL; INTRAMUSCULAR; SOFT TISSUE at 11:00

## 2024-04-05 RX ADMIN — LIDOCAINE HYDROCHLORIDE 2 ML: 10 INJECTION, SOLUTION INFILTRATION; PERINEURAL at 11:00

## 2024-04-05 RX ADMIN — BUPIVACAINE HYDROCHLORIDE 2 ML: 2.5 INJECTION, SOLUTION INFILTRATION; PERINEURAL at 11:00

## 2024-04-05 NOTE — PROGRESS NOTES
Patient Name:  Joseph Aguilar  MRN:  [id number]    Assessment & Plan     1. Primary osteoarthritis of shoulders, bilateral  -     Ambulatory referral to Orthopedic Surgery  -     Large joint arthrocentesis: L glenohumeral  -     Large joint arthrocentesis: R glenohumeral    2. Chronic right shoulder pain  -     Large joint arthrocentesis: R glenohumeral    3. Chronic left shoulder pain  -     Large joint arthrocentesis: L glenohumeral        Bilateral shoulder osteoarthritis with worsening pain.  The patient's x-rays were reviewed today.  Discussed treatment options moving forward including gentle range of motion, physical therapy, corticosteroid injections, over the counter analgesics and topical creams, total shoulder arthoplasty   The patient was offered a corticosteroid injection for their bilateral glenohumeral joints. They tolerated the procedure well.  The patient was educated they may have some irritation in the next few days and should rest, ice, elevate and perform gentle range of motion exercises. They were advised the medicine should begin to work in a few days time.    They were informed their blood sugar levels can temporarily elevate and should reach out to their PCP if this becomes an issue. The patient was informed corticosteroid injections can be repeated at the earliest every 3 months.   I will see the patient back in approximately 3 months for repeat evaluation and possible repeat corticosteroid injection.          Chief Complaint     Bilateral shoulder pain    History of the Present Illness     Joseph Aguilar is a RHD 68 y.o. male with Bilateral shoulder pain and osteoarthritis status post corticosteroid injection 1/11/24 to the right glenohumeral space and 12/21/23 to the left shoulder with roughly 6 weeks of improvement in symptoms. The patient has previously been treated by Dr. Carlisle and Dr. Viramontes. Pain is worse in the left shoulder at this time. The patient is a retired . The  "patient uses oxycodone sparingly. He notes he is not sleeping at night due to the pain. The patient reports he had a fracture in the right shoulder previously. The patient would like to continue with conservative treatment at this time and would like to hold off on surgery.    Review of Systems     Review of Systems   Constitutional:  Negative for appetite change and unexpected weight change.   HENT:  Negative for congestion and trouble swallowing.    Eyes:  Negative for visual disturbance.   Respiratory:  Negative for cough and shortness of breath.    Cardiovascular:  Negative for chest pain and palpitations.   Gastrointestinal:  Negative for nausea and vomiting.   Endocrine: Negative for cold intolerance and heat intolerance.   Musculoskeletal:  Positive for arthralgias (bilateral shoulders). Negative for joint swelling and myalgias.   Skin:  Negative for rash.   Neurological:  Negative for numbness.       Physical Exam     BP (!) 168/103   Pulse 91   Ht 6' 2\" (1.88 m)   Wt 113 kg (248 lb 6.4 oz)   BMI 31.89 kg/m²     Left  Shoulder:   Active range of motion   85 degrees forward flexion  70 degrees abduction  5 degrees external rotation   SI joint internal rotation    Passive range of motion   85 degrees of forward flexion   There is no tenderness present.   The patient is neurovascularly intact distally in the extremity.    Right Shoulder:   Active range of motion   85 degrees forward flexion  90 degrees abduction  5 degrees external rotation   SI joint internal rotation    Passive range of motion   100 degrees of forward flexion   There is no tenderness present.   The patient is neurovascularly intact distally in the extremity.      Eyes:  Anicteric sclerae.  Neck:  Supple.  Lungs:  Normal respiratory effort.  Cardiovascular:  Capillary refill is less than 2 seconds.  Skin:  Intact without erythema.  Neurologic:  Sensation grossly intact to light touch.  Psychiatric:  Mood and affect are appropriate.    Data " Review     I have personally reviewed pertinent films in PACS, and my interpretation follows:    X-rays taken 12/10/23 of Left shoulder demonstrate severe degenerative changes with osteophyte formation.    X-rays taken 12/10/23 of Right shoulder demonstrate severe degenerative changes with osteophyte formation.    Past Medical History:   Diagnosis Date    Depression     Diabetes mellitus (HCC)     Hyperlipidemia     Hypertension     Osteoarthritis, knee        Past Surgical History:   Procedure Laterality Date    BACK SURGERY      HAND SURGERY      JOINT REPLACEMENT Left     l tkr    KNEE SURGERY Left     [place] ARTHRP KNE CONDYLE&PLATU MEDIAL&LAT COMPARTMENTS Right 11/19/2018    Procedure: ARTHROPLASTY KNEE TOTAL;  Surgeon: Franny Grimaldo MD;  Location: [facility];  Service: Orthopedics    TOOTH EXTRACTION      WISDOM TOOTH EXTRACTION         No Known Allergies    Current Outpatient Medications on File Prior to Visit   Medication Sig Dispense Refill    atorvastatin (LIPITOR) 80 mg tablet TAKE 40MG (ONE-HALF TABLET) BY MOUTH EVERY DAY AT 5 PM FOR CHOLESTEROL      Empagliflozin 25 MG TABS Take 0.5 tablets (12.5 mg total) by mouth in the morning 15 tablet 0    glipiZIDE (GLUCOTROL) 5 mg tablet Take 1 tablet (5 mg total) by mouth daily before breakfast 30 tablet 0    insulin glargine (LANTUS SOLOSTAR) 100 units/mL injection pen Inject 15 Units under the skin daily at bedtime 15 mL 0    metFORMIN (GLUCOPHAGE) 1000 MG tablet TAKE ONE TABLET BY MOUTH TWICE A DAY WITH MEALS FOR DIABETES      acetaminophen (TYLENOL) 325 mg tablet Take 2 tablets (650 mg total) by mouth every 6 (six) hours as needed for mild pain, moderate pain or fever (Patient not taking: Reported on 4/5/2024)  0    Diclofenac Sodium (VOLTAREN) 1 % Apply 2 g topically 4 (four) times a day (Patient not taking: Reported on 4/5/2024) 100 g 0    melatonin 5 MG TABS Take 1 tablet (5 mg total) by mouth daily at bedtime (Patient not taking: Reported on 6/9/2023)  "30 tablet 0    meloxicam (MOBIC) 15 mg tablet Take 1 tablet (15 mg total) by mouth daily as needed for moderate pain (Patient not taking: Reported on 2024) 30 tablet 0     No current facility-administered medications on file prior to visit.       Social History     Tobacco Use    Smoking status: Former     Current packs/day: 0.00     Types: Cigarettes     Quit date: 3/1/2012     Years since quittin.1    Smokeless tobacco: Never   Vaping Use    Vaping status: Never Used   Substance Use Topics    Alcohol use: Yes     Comment: 'couple beers every couple weeks'    Drug use: Yes     Types: Marijuana       Family History   Problem Relation Age of Onset    No Known Problems Mother     Heart attack Father     Arthritis Family     Cancer Family     Diabetes Family     Heart disease Family     Osteoporosis Family              Procedures Performed     Large joint arthrocentesis: L glenohumeral  Universal Protocol:  Consent: Verbal consent obtained.  Risks and benefits: risks, benefits and alternatives were discussed  Consent given by: patient  Time out: Immediately prior to procedure a \"time out\" was called to verify the correct patient, procedure, equipment, support staff and site/side marked as required.  Patient understanding: patient states understanding of the procedure being performed  Site marked: the operative site was marked  Patient identity confirmed: verbally with patient  Supporting Documentation  Indications: pain   Procedure Details  Location: shoulder - L glenohumeral  Preparation: Patient was prepped and draped in the usual sterile fashion  Needle size: 22 G  Ultrasound guidance: no  Approach: superior  Medications administered: 2 mL lidocaine 1 %; 2 mL methylPREDNISolone acetate 40 mg/mL; 2 mL bupivacaine 0.25 %    Patient tolerance: patient tolerated the procedure well with no immediate complications  Dressing:  Sterile dressing applied      Large joint arthrocentesis: R glenohumeral  [place] " "Protocol:  Consent: Verbal consent obtained.  Risks and benefits: risks, benefits and alternatives were discussed  Consent given by: patient  Time out: Immediately prior to procedure a \"time out\" was called to verify the correct patient, procedure, equipment, support staff and site/side marked as required.  Patient understanding: patient states understanding of the procedure being performed  Site marked: the operative site was marked  Patient identity confirmed: verbally with patient  Supporting Documentation  Indications: pain   Procedure Details  Location: shoulder - R glenohumeral  Preparation: Patient was prepped and draped in the usual sterile fashion  Needle size: 22 G  Ultrasound guidance: no  Approach: superior  Medications administered: 2 mL lidocaine 1 %; 2 mL methylPREDNISolone acetate 40 mg/mL; 2 mL bupivacaine 0.25 %    Patient tolerance: patient tolerated the procedure well with no immediate complications  Dressing:  Sterile dressing applied              Arthur Egan DO  Scribe Attestation      I,:  Nandini Ennis am acting as a scribe while in the presence of the attending physician.:       I,:  Arthur Egan DO personally performed the services described in this documentation    as scribed in my presence.:             "

## 2024-07-05 ENCOUNTER — OFFICE VISIT (OUTPATIENT)
Dept: OBGYN CLINIC | Facility: CLINIC | Age: 69
End: 2024-07-05
Payer: MEDICARE

## 2024-07-05 VITALS
WEIGHT: 240.4 LBS | SYSTOLIC BLOOD PRESSURE: 153 MMHG | HEART RATE: 106 BPM | HEIGHT: 74 IN | BODY MASS INDEX: 30.85 KG/M2 | DIASTOLIC BLOOD PRESSURE: 110 MMHG

## 2024-07-05 DIAGNOSIS — M25.511 CHRONIC RIGHT SHOULDER PAIN: ICD-10-CM

## 2024-07-05 DIAGNOSIS — M19.012 PRIMARY OSTEOARTHRITIS OF SHOULDERS, BILATERAL: Primary | ICD-10-CM

## 2024-07-05 DIAGNOSIS — G89.29 CHRONIC LEFT SHOULDER PAIN: ICD-10-CM

## 2024-07-05 DIAGNOSIS — M25.512 CHRONIC LEFT SHOULDER PAIN: ICD-10-CM

## 2024-07-05 DIAGNOSIS — G89.29 CHRONIC RIGHT SHOULDER PAIN: ICD-10-CM

## 2024-07-05 DIAGNOSIS — M19.011 PRIMARY OSTEOARTHRITIS OF SHOULDERS, BILATERAL: Primary | ICD-10-CM

## 2024-07-05 PROCEDURE — 20610 DRAIN/INJ JOINT/BURSA W/O US: CPT | Performed by: ORTHOPAEDIC SURGERY

## 2024-07-05 PROCEDURE — 99214 OFFICE O/P EST MOD 30 MIN: CPT | Performed by: ORTHOPAEDIC SURGERY

## 2024-07-05 RX ORDER — METHYLPREDNISOLONE ACETATE 40 MG/ML
2 INJECTION, SUSPENSION INTRA-ARTICULAR; INTRALESIONAL; INTRAMUSCULAR; SOFT TISSUE
Status: COMPLETED | OUTPATIENT
Start: 2024-07-05 | End: 2024-07-05

## 2024-07-05 RX ORDER — GABAPENTIN 300 MG/1
300 CAPSULE ORAL
COMMUNITY
Start: 2024-06-06

## 2024-07-05 RX ORDER — LIDOCAINE HYDROCHLORIDE 10 MG/ML
2 INJECTION, SOLUTION INFILTRATION; PERINEURAL
Status: COMPLETED | OUTPATIENT
Start: 2024-07-05 | End: 2024-07-05

## 2024-07-05 RX ORDER — BUPIVACAINE HYDROCHLORIDE 2.5 MG/ML
2 INJECTION, SOLUTION INFILTRATION; PERINEURAL
Status: COMPLETED | OUTPATIENT
Start: 2024-07-05 | End: 2024-07-05

## 2024-07-05 RX ADMIN — METHYLPREDNISOLONE ACETATE 2 ML: 40 INJECTION, SUSPENSION INTRA-ARTICULAR; INTRALESIONAL; INTRAMUSCULAR; SOFT TISSUE at 11:15

## 2024-07-05 RX ADMIN — LIDOCAINE HYDROCHLORIDE 2 ML: 10 INJECTION, SOLUTION INFILTRATION; PERINEURAL at 11:15

## 2024-07-05 RX ADMIN — BUPIVACAINE HYDROCHLORIDE 2 ML: 2.5 INJECTION, SOLUTION INFILTRATION; PERINEURAL at 11:15

## 2024-07-05 NOTE — PROGRESS NOTES
Patient Name:  Joseph Aguilar  MRN:  [id number]    Assessment & Plan     1. Primary osteoarthritis of shoulders, bilateral  -     Large joint arthrocentesis: R glenohumeral  -     Large joint arthrocentesis: L glenohumeral  2. Chronic right shoulder pain  -     Large joint arthrocentesis: R glenohumeral  3. Chronic left shoulder pain  -     Large joint arthrocentesis: L glenohumeral    Bilateral shoulder osteoarthritis  The patient's x-rays were reviewed today.  Discussed treatment options moving forward including gentle range of motion, physical therapy, corticosteroid injections, over the counter analgesics and topical creams, total shoulder arthoplasty   The patient was offered a corticosteroid injection for their bilateral glenohumeral joints. They tolerated the procedure well.  The patient was educated they may have some irritation in the next few days and should rest, ice, elevate and perform gentle range of motion exercises. They were advised the medicine should begin to work in a few days time.    They were informed their blood sugar levels can temporarily elevate and should reach out to their PCP if this becomes an issue. The patient was informed corticosteroid injections can be repeated at the earliest every 3 months.   I will see the patient back in approximately 3 months for repeat evaluation and possible repeat corticosteroid injection.      History of the Present Illness   Joseph Aguilar is a RHD 69 y.o. male with Bilateral shoulder osteoarthritis s/p bilateral glenohumeral corticosteroid injection on 4/5/2024. He had 100% relief in symptoms until the last 3 weeks. He has been able to do manual labor activity at his house prior to the injections kristina off. He is now having difficulty sleeping and getting comfortable due to pain.         Review of Systems     Review of Systems   Constitutional:  Negative for chills and fever.   HENT:  Negative for ear pain and sore throat.    Eyes:  Negative for pain  "and visual disturbance.   Respiratory:  Negative for cough and shortness of breath.    Cardiovascular:  Negative for chest pain and palpitations.   Gastrointestinal:  Negative for abdominal pain and vomiting.   Genitourinary:  Negative for dysuria and hematuria.   Musculoskeletal:  Negative for arthralgias and back pain.   Skin:  Negative for color change and rash.   Neurological:  Negative for seizures and syncope.   All other systems reviewed and are negative.      Physical Exam     BP (!) 153/110   Pulse (!) 106   Ht 6' 2\" (1.88 m)   Wt 109 kg (240 lb 6.4 oz)   BMI 30.87 kg/m²     Left  Shoulder:   Active range of motion   60 degrees forward flexion  45 degrees abduction  5 degrees external rotation   SI joint internal rotation    Passive range of motion   70 degrees of forward flexion   There is no tenderness present.   4/5 forward flexion  5/5 external rotation  4-5 internal rotation  The patient is neurovascularly intact distally in the extremity.     Right Shoulder:   Active range of motion   90 degrees forward flexion  45 degrees abduction  5 degrees external rotation   SI joint internal rotation    Passive range of motion   90 degrees of forward flexion   There is no tenderness present.   4/5 forward flexion  5/5 external rotation  4-5 internal rotation  The patient is neurovascularly intact distally in the extremity.      Data Review     I have personally reviewed pertinent films in PACS, and my interpretation follows.    X-rays taken 12/10/23 of Left shoulder demonstrate severe degenerative changes with osteophyte formation.     X-rays taken 12/10/23 of Right shoulder demonstrate severe degenerative changes with osteophyte formation.    Social History     Tobacco Use   • Smoking status: Former     Current packs/day: 0.00     Types: Cigarettes     Quit date: 3/1/2012     Years since quittin.3   • Smokeless tobacco: Never   Vaping Use   • Vaping status: Never Used   Substance Use Topics   • Alcohol " "use: Yes     Comment: 'couple beers every couple weeks'   • Drug use: Yes     Types: Marijuana           Large joint arthrocentesis: R glenohumeral  [place] Protocol:  Consent: Verbal consent obtained.  Risks and benefits: risks, benefits and alternatives were discussed  Consent given by: patient  Time out: Immediately prior to procedure a \"time out\" was called to verify the correct patient, procedure, equipment, support staff and site/side marked as required.  Patient understanding: patient states understanding of the procedure being performed  Site marked: the operative site was marked  Patient identity confirmed: verbally with patient  Supporting Documentation  Indications: pain   Procedure Details  Location: shoulder - R glenohumeral  Preparation: Patient was prepped and draped in the usual sterile fashion  Needle size: 22 G  Ultrasound guidance: no  Approach: superior  Medications administered: 2 mL bupivacaine 0.25 %; 2 mL methylPREDNISolone acetate 40 mg/mL; 2 mL lidocaine 1 %    Patient tolerance: patient tolerated the procedure well with no immediate complications  Dressing:  Sterile dressing applied      Large joint arthrocentesis: L glenohumeral  Universal Protocol:  Consent: Verbal consent obtained.  Risks and benefits: risks, benefits and alternatives were discussed  Consent given by: patient  Time out: Immediately prior to procedure a \"time out\" was called to verify the correct patient, procedure, equipment, support staff and site/side marked as required.  Patient understanding: patient states understanding of the procedure being performed  Site marked: the operative site was marked  Patient identity confirmed: verbally with patient  Supporting Documentation  Indications: pain   Procedure Details  Location: shoulder - L glenohumeral  Preparation: Patient was prepped and draped in the usual sterile fashion  Needle size: 22 G  Ultrasound guidance: no  Approach: superior  Medications administered: 2 mL " bupivacaine 0.25 %; 2 mL methylPREDNISolone acetate 40 mg/mL; 2 mL lidocaine 1 %    Patient tolerance: patient tolerated the procedure well with no immediate complications  Dressing:  Sterile dressing applied            Naina Treadwell   Scribe Attestation    I,:  Naina Treadwell am acting as a scribe while in the presence of the attending physician.:       I,:  Arthur Egan DO personally performed the services described in this documentation    as scribed in my presence.:

## 2024-10-07 ENCOUNTER — OFFICE VISIT (OUTPATIENT)
Dept: OBGYN CLINIC | Facility: CLINIC | Age: 69
End: 2024-10-07
Payer: MEDICARE

## 2024-10-07 VITALS
HEART RATE: 92 BPM | DIASTOLIC BLOOD PRESSURE: 89 MMHG | BODY MASS INDEX: 30.8 KG/M2 | SYSTOLIC BLOOD PRESSURE: 131 MMHG | HEIGHT: 74 IN | WEIGHT: 240 LBS

## 2024-10-07 DIAGNOSIS — M19.012 PRIMARY OSTEOARTHRITIS OF SHOULDERS, BILATERAL: Primary | ICD-10-CM

## 2024-10-07 DIAGNOSIS — M25.511 CHRONIC RIGHT SHOULDER PAIN: ICD-10-CM

## 2024-10-07 DIAGNOSIS — G89.29 CHRONIC LEFT SHOULDER PAIN: ICD-10-CM

## 2024-10-07 DIAGNOSIS — M19.011 PRIMARY OSTEOARTHRITIS OF SHOULDERS, BILATERAL: Primary | ICD-10-CM

## 2024-10-07 DIAGNOSIS — G89.29 CHRONIC RIGHT SHOULDER PAIN: ICD-10-CM

## 2024-10-07 DIAGNOSIS — E66.01 MORBID (SEVERE) OBESITY DUE TO EXCESS CALORIES (HCC): ICD-10-CM

## 2024-10-07 DIAGNOSIS — M25.512 CHRONIC LEFT SHOULDER PAIN: ICD-10-CM

## 2024-10-07 PROCEDURE — 20610 DRAIN/INJ JOINT/BURSA W/O US: CPT | Performed by: ORTHOPAEDIC SURGERY

## 2024-10-07 PROCEDURE — 99214 OFFICE O/P EST MOD 30 MIN: CPT | Performed by: ORTHOPAEDIC SURGERY

## 2024-10-07 RX ORDER — BUPIVACAINE HYDROCHLORIDE 2.5 MG/ML
2 INJECTION, SOLUTION INFILTRATION; PERINEURAL
Status: COMPLETED | OUTPATIENT
Start: 2024-10-07 | End: 2024-10-07

## 2024-10-07 RX ORDER — METHYLPREDNISOLONE ACETATE 40 MG/ML
2 INJECTION, SUSPENSION INTRA-ARTICULAR; INTRALESIONAL; INTRAMUSCULAR; SOFT TISSUE
Status: COMPLETED | OUTPATIENT
Start: 2024-10-07 | End: 2024-10-07

## 2024-10-07 RX ORDER — LIDOCAINE HYDROCHLORIDE 10 MG/ML
2 INJECTION, SOLUTION INFILTRATION; PERINEURAL
Status: COMPLETED | OUTPATIENT
Start: 2024-10-07 | End: 2024-10-07

## 2024-10-07 RX ADMIN — LIDOCAINE HYDROCHLORIDE 2 ML: 10 INJECTION, SOLUTION INFILTRATION; PERINEURAL at 11:30

## 2024-10-07 RX ADMIN — METHYLPREDNISOLONE ACETATE 2 ML: 40 INJECTION, SUSPENSION INTRA-ARTICULAR; INTRALESIONAL; INTRAMUSCULAR; SOFT TISSUE at 11:30

## 2024-10-07 RX ADMIN — BUPIVACAINE HYDROCHLORIDE 2 ML: 2.5 INJECTION, SOLUTION INFILTRATION; PERINEURAL at 11:30

## 2024-10-07 NOTE — PROGRESS NOTES
"Patient Name:  Joseph Aguilar  MRN:  [id number]    Assessment & Plan     1. Primary osteoarthritis of shoulders, bilateral  -     Ambulatory referral to Spine & Pain Management; Future  -     Large joint arthrocentesis: R glenohumeral  -     Large joint arthrocentesis: L glenohumeral  2. Morbid (severe) obesity due to excess calories (HCC)  3. Chronic right shoulder pain  -     Large joint arthrocentesis: R glenohumeral  4. Chronic left shoulder pain  -     Large joint arthrocentesis: L glenohumeral      Bilateral shoulder glenohumeral osteoarthritis  In depth conversation had with patient regarding nonoperative vs surgical management. Advised patient we would not perform arthroscopic surgery for either shoulder as this would not provide improvement of glenohumerla osteoarthritis, results would be variable.  Patient does not want to move forward with surgical intervention  Offered referral to pain management for discussion of prescription pain medication, but educated patient about the negative side effects of this medication chronically. Placed pain management referral for consideration of oral medication vs injection therapy  Accepted bilateral glenohumeral CSI. Risks discussed. Tolerated well. Can repeat in 3 months for pain relief.   Continue OTC medication for pain relief  Follow up 3 months     History of the Present Illness   Joseph Aguilar is a 69 y.o. male with Bilateral shoulder glenohumeral osteoarthritis s/p CSI on 07/05/2024. Today, patient reports to severe pain in both shoulders and only getting an hour of sleep. He states he has some pain medication at home that he takes from previous surgeries. He previously saw pain management but was upset they were not providing him oral pain medications. He would like repeat CSI today as he refuses surgical intervention.         Review of Systems     Review of Systems    Physical Exam     /89   Pulse 92   Ht 6' 2\" (1.88 m)   Wt 109 kg (240 lb)   BMI " 30.81 kg/m²     Right Shoulder:   Active range of motion   90 degrees forward flexion  45 degrees abduction  5 degrees external rotation   SI joint internal rotation    Passive range of motion   90 degrees of forward flexion   Forward flexion testing 4/5  External rotation testing 5/5  Internal rotation testing 4-/5   The patient is neurovascularly intact distally in the extremity.    Left Shoulder:   Active range of motion   60 degrees forward flexion  45 degrees abduction  5 degrees external rotation   SI joint internal rotation    Passive range of motion   70 degrees of forward flexion   Forward flexion testing 4/5  External rotation testing 5/5  Internal rotation testing 4-/5   The patient is neurovascularly intact distally in the extremity.    Data Review     I have personally reviewed pertinent films in PACS, and my interpretation follows.    X-rays taken 12/10/23 of Left shoulder demonstrate severe degenerative changes with osteophyte formation.     X-rays taken 12/10/23 of Right shoulder demonstrate severe degenerative changes with osteophyte formation.    Social History     Tobacco Use    Smoking status: Former     Current packs/day: 0.00     Types: Cigarettes     Quit date: 3/1/2012     Years since quittin.6    Smokeless tobacco: Never   Vaping Use    Vaping status: Never Used   Substance Use Topics    Alcohol use: Yes     Comment: 'couple beers every couple weeks'    Drug use: Yes     Types: Marijuana           Large joint arthrocentesis: R glenohumeral  Universal Protocol:  Risks and benefits: risks, benefits and alternatives were discussed  Consent given by: patient  Supporting Documentation  Indications: pain   Procedure Details  Location: shoulder - R glenohumeral  Preparation: Patient was prepped and draped in the usual sterile fashion  Needle size: 22 G  Approach: superior  Medications administered: 2 mL bupivacaine 0.25 %; 2 mL lidocaine 1 %; 2 mL methylPREDNISolone acetate 40 mg/mL    Patient  tolerance: patient tolerated the procedure well with no immediate complications  Dressing:  Sterile dressing applied      Large joint arthrocentesis: L glenohumeral  Supporting Documentation  Indications: pain   Procedure Details  Location: shoulder - L glenohumeral  Needle size: 22 G  Medications administered: 2 mL bupivacaine 0.25 %; 2 mL lidocaine 1 %; 2 mL methylPREDNISolone acetate 40 mg/mL    Patient tolerance: patient tolerated the procedure well with no immediate complications  Dressing:  Sterile dressing applied            Arthur Egan DO

## 2024-10-08 ENCOUNTER — TELEPHONE (OUTPATIENT)
Dept: PAIN MEDICINE | Facility: CLINIC | Age: 69
End: 2024-10-08

## 2024-10-08 NOTE — TELEPHONE ENCOUNTER
Caller: Yusuf    Doctor: Ana M    Reason for call: does not want to see Dr. Viramontes ever again he needs LUZMA but he hung up on me instead.

## 2024-10-18 NOTE — TELEPHONE ENCOUNTER
Caller: patient    Doctor: SP    Reason for call: patient would like to LUZMA   Stated did not like his care and would like to FU with DR FLORENCE at the [place] [facility]      Call back#:

## 2025-01-06 ENCOUNTER — APPOINTMENT (OUTPATIENT)
Dept: RADIOLOGY | Facility: CLINIC | Age: 70
End: 2025-01-06
Payer: MEDICARE

## 2025-01-06 ENCOUNTER — OFFICE VISIT (OUTPATIENT)
Dept: OBGYN CLINIC | Facility: CLINIC | Age: 70
End: 2025-01-06
Payer: MEDICARE

## 2025-01-06 VITALS — HEIGHT: 74 IN | WEIGHT: 247 LBS | BODY MASS INDEX: 31.7 KG/M2

## 2025-01-06 DIAGNOSIS — M25.521 PAIN IN RIGHT ELBOW: ICD-10-CM

## 2025-01-06 DIAGNOSIS — M77.11 LATERAL EPICONDYLITIS OF RIGHT ELBOW: ICD-10-CM

## 2025-01-06 DIAGNOSIS — M19.012 PRIMARY OSTEOARTHRITIS OF SHOULDERS, BILATERAL: Primary | ICD-10-CM

## 2025-01-06 DIAGNOSIS — M25.512 CHRONIC LEFT SHOULDER PAIN: ICD-10-CM

## 2025-01-06 DIAGNOSIS — M70.62 TROCHANTERIC BURSITIS OF LEFT HIP: ICD-10-CM

## 2025-01-06 DIAGNOSIS — G89.29 CHRONIC RIGHT SHOULDER PAIN: ICD-10-CM

## 2025-01-06 DIAGNOSIS — M25.511 CHRONIC RIGHT SHOULDER PAIN: ICD-10-CM

## 2025-01-06 DIAGNOSIS — M19.011 PRIMARY OSTEOARTHRITIS OF SHOULDERS, BILATERAL: Primary | ICD-10-CM

## 2025-01-06 DIAGNOSIS — G89.29 CHRONIC LEFT SHOULDER PAIN: ICD-10-CM

## 2025-01-06 DIAGNOSIS — M25.552 PAIN IN LEFT HIP: ICD-10-CM

## 2025-01-06 PROCEDURE — 20610 DRAIN/INJ JOINT/BURSA W/O US: CPT | Performed by: ORTHOPAEDIC SURGERY

## 2025-01-06 PROCEDURE — 73080 X-RAY EXAM OF ELBOW: CPT

## 2025-01-06 PROCEDURE — 73502 X-RAY EXAM HIP UNI 2-3 VIEWS: CPT

## 2025-01-06 PROCEDURE — 99214 OFFICE O/P EST MOD 30 MIN: CPT | Performed by: ORTHOPAEDIC SURGERY

## 2025-01-06 RX ORDER — LIDOCAINE HYDROCHLORIDE 10 MG/ML
2 INJECTION, SOLUTION INFILTRATION; PERINEURAL
Status: COMPLETED | OUTPATIENT
Start: 2025-01-06 | End: 2025-01-06

## 2025-01-06 RX ORDER — BUPIVACAINE HYDROCHLORIDE 2.5 MG/ML
2 INJECTION, SOLUTION INFILTRATION; PERINEURAL
Status: COMPLETED | OUTPATIENT
Start: 2025-01-06 | End: 2025-01-06

## 2025-01-06 RX ORDER — METHYLPREDNISOLONE ACETATE 40 MG/ML
2 INJECTION, SUSPENSION INTRA-ARTICULAR; INTRALESIONAL; INTRAMUSCULAR; SOFT TISSUE
Status: COMPLETED | OUTPATIENT
Start: 2025-01-06 | End: 2025-01-06

## 2025-01-06 RX ADMIN — BUPIVACAINE HYDROCHLORIDE 2 ML: 2.5 INJECTION, SOLUTION INFILTRATION; PERINEURAL at 11:15

## 2025-01-06 RX ADMIN — METHYLPREDNISOLONE ACETATE 2 ML: 40 INJECTION, SUSPENSION INTRA-ARTICULAR; INTRALESIONAL; INTRAMUSCULAR; SOFT TISSUE at 11:15

## 2025-01-06 RX ADMIN — LIDOCAINE HYDROCHLORIDE 2 ML: 10 INJECTION, SOLUTION INFILTRATION; PERINEURAL at 11:15

## 2025-01-06 NOTE — PROGRESS NOTES
Patient Name:  Joseph Aguilar  MRN:  [id number]    Assessment & Plan     1. Primary osteoarthritis of shoulders, bilateral  -     Large joint arthrocentesis: R glenohumeral  -     Large joint arthrocentesis: L glenohumeral  2. Chronic right shoulder pain  -     Large joint arthrocentesis: R glenohumeral  3. Chronic left shoulder pain  -     Large joint arthrocentesis: L glenohumeral  4. Lateral epicondylitis of right elbow  -     XR elbow 3+ vw right; Future; Expected date: 01/06/2025  -     Ambulatory Referral to Occupational Therapy; Future  5. Trochanteric bursitis of left hip  -     XR hip/pelv 2-3 vws left if performed; Future; Expected date: 01/06/2025  -     Ambulatory Referral to Physical Therapy; Future        Bilateral shoulder glenohumeral osteoarthritis, left hip trochanteric bursitis, right elbow lateral epicondylitis  Nonoperative and operative interventions for bilateral shoulders were discussed.  Patient is not interested in surgical intervention at this time.  The patient was offered a corticosteroid injection for their bilateral glenohumeral joints. They tolerated the procedure well.    The patient was educated they may have some irritation in the next few days and should rest, ice, elevate and perform gentle range of motion exercises. They were advised the medicine should begin to work in a few days time.    The patient was informed corticosteroid injections can be repeated at the earliest every 3 months.   The patient was referred to outpatient occupational therapy for his right elbow  He was referred to outpatient physical therapy for his left hip  Follow up 3 months for reevaluation bilateral shoulders    History of the Present Illness   Joseph Aguilar is a 69 y.o. male with Bilateral shoulder glenohumeral osteoarthritis s/p CSI on 10/07/2024. Today, patient reports  6 weeks of relief from the last injections. He is having pain rated 9/10 today and would like repeat corticosteroid injections.  "Patient is complaining of left hip pain today. Admits he has a tita in his hip s/p ORIF nailing hip at [facility] 1/7/22. Pain began over the summer and has been worsening. Patient had a fall a few weeks ago and admits to pain in his right elbow. He feels a \"knot\" in his elbow.        Review of Systems     Review of Systems   Constitutional:  Negative for chills and fever.   HENT:  Negative for ear pain and sore throat.    Eyes:  Negative for pain and visual disturbance.   Respiratory:  Negative for cough and shortness of breath.    Cardiovascular:  Negative for chest pain and palpitations.   Gastrointestinal:  Negative for abdominal pain and vomiting.   Genitourinary:  Negative for dysuria and hematuria.   Musculoskeletal:  Negative for arthralgias and back pain.   Skin:  Negative for color change and rash.   Neurological:  Negative for seizures and syncope.   All other systems reviewed and are negative.      Physical Exam     Ht 6' 2\" (1.88 m)   Wt 112 kg (247 lb)   BMI 31.71 kg/m²     Right Shoulder:   Active range of motion   90 degrees forward flexion  45 degrees abduction  5 degrees external rotation   SI joint internal rotation    Passive range of motion   90 degrees of forward flexion   Forward flexion testing 4/5  External rotation testing 5/5  Internal rotation testing 4-/5   The patient is neurovascularly intact distally in the extremity.    Left Shoulder:   Active range of motion   60 degrees forward flexion  45 degrees abduction  5 degrees external rotation   SI joint internal rotation    Passive range of motion   70 degrees of forward flexion   Forward flexion testing 4/5  External rotation testing 5/5  Internal rotation testing 4-/5   The patient is neurovascularly intact distally in the extremity.    Left Hip:  Tenderness to palpation over greater trochanter  able to perform straight leg raise  negative log roll  negative Stinchfield  negative NELY, FADIR  Sensation intact to L2-S1 dermatomes   Extremity " "warm and well perfused     Right Elbow    Active range of motion 0 to 140 degrees  No pain with resisted elbow flexion, extension, supination, pronation  Full composite fist  Wrist ROM full and pain free  Varus and valgus stress stable  Pain over lateral epicondyle  Neurovascularly intact distally        Data Review     I have personally reviewed pertinent films in PACS, and my interpretation follows.    X-rays taken 12/10/23 of Left shoulder demonstrate severe degenerative changes with osteophyte formation.     X-rays taken 12/10/23 of Right shoulder demonstrate severe degenerative changes with osteophyte formation.    X-rays taken 2025 of the right elbow demonstrate well maintained joint spaces. No acute fracture or dislocation.    X-rays taken 2025 of the left hip demonstrate orthopedic hardware in stable alignment without evidence of loosening or failure. No acute fracture or dislocation.    Social History     Tobacco Use    Smoking status: Former     Current packs/day: 0.00     Types: Cigarettes     Quit date: 3/1/2012     Years since quittin.8    Smokeless tobacco: Never   Vaping Use    Vaping status: Never Used   Substance Use Topics    Alcohol use: Yes     Comment: 'couple beers every couple weeks'    Drug use: Yes     Types: Marijuana           Large joint arthrocentesis: R glenohumeral  [place] Protocol:  Consent: Verbal consent obtained.  Risks and benefits: risks, benefits and alternatives were discussed  Consent given by: patient  Time out: Immediately prior to procedure a \"time out\" was called to verify the correct patient, procedure, equipment, support staff and site/side marked as required.  Patient understanding: patient states understanding of the procedure being performed  Site marked: the operative site was marked  Patient identity confirmed: verbally with patient  Supporting Documentation  Indications: pain   Procedure Details  Location: shoulder - R glenohumeral  Preparation: " "Patient was prepped and draped in the usual sterile fashion  Needle size: 22 G  Ultrasound guidance: no  Approach: superior  Medications administered: 2 mL bupivacaine 0.25 %; 2 mL methylPREDNISolone acetate 40 mg/mL; 2 mL lidocaine 1 %    Patient tolerance: patient tolerated the procedure well with no immediate complications  Dressing:  Sterile dressing applied      Large joint arthrocentesis: L glenohumeral  Universal Protocol:  Consent: Verbal consent obtained.  Risks and benefits: risks, benefits and alternatives were discussed  Consent given by: patient  Time out: Immediately prior to procedure a \"time out\" was called to verify the correct patient, procedure, equipment, support staff and site/side marked as required.  Patient understanding: patient states understanding of the procedure being performed  Site marked: the operative site was marked  Patient identity confirmed: verbally with patient  Supporting Documentation  Indications: pain   Procedure Details  Location: shoulder - L glenohumeral  Preparation: Patient was prepped and draped in the usual sterile fashion  Needle size: 22 G  Ultrasound guidance: no  Approach: superior  Medications administered: 2 mL bupivacaine 0.25 %; 2 mL methylPREDNISolone acetate 40 mg/mL; 2 mL lidocaine 1 %    Patient tolerance: patient tolerated the procedure well with no immediate complications  Dressing:  Sterile dressing applied            Arthur Egan DO   Scribe Attestation      I,:  Naina Treadwell am acting as a scribe while in the presence of the attending physician.:       I,:  Arthur Egan DO personally performed the services described in this documentation    as scribed in my presence.:             "

## 2025-01-17 ENCOUNTER — APPOINTMENT (EMERGENCY)
Dept: RADIOLOGY | Facility: HOSPITAL | Age: 70
DRG: 270 | End: 2025-01-17
Payer: MEDICARE

## 2025-01-17 ENCOUNTER — HOSPITAL ENCOUNTER (INPATIENT)
Facility: HOSPITAL | Age: 70
LOS: 12 days | DRG: 270 | End: 2025-01-29
Attending: EMERGENCY MEDICINE | Admitting: ANESTHESIOLOGY
Payer: MEDICARE

## 2025-01-17 ENCOUNTER — APPOINTMENT (INPATIENT)
Dept: RADIOLOGY | Facility: HOSPITAL | Age: 70
DRG: 270 | End: 2025-01-17
Payer: MEDICARE

## 2025-01-17 DIAGNOSIS — I21.3 STEMI (ST ELEVATION MYOCARDIAL INFARCTION) (HCC): Primary | ICD-10-CM

## 2025-01-17 DIAGNOSIS — E11.9 TYPE 2 DIABETES MELLITUS WITHOUT COMPLICATION, WITHOUT LONG-TERM CURRENT USE OF INSULIN (HCC): Chronic | ICD-10-CM

## 2025-01-17 DIAGNOSIS — R55 NEAR SYNCOPE: ICD-10-CM

## 2025-01-17 DIAGNOSIS — I95.9 HYPOTENSION: ICD-10-CM

## 2025-01-17 DIAGNOSIS — R57.0 CARDIOGENIC SHOCK (HCC): ICD-10-CM

## 2025-01-17 DIAGNOSIS — I10 ESSENTIAL HYPERTENSION: Primary | Chronic | ICD-10-CM

## 2025-01-17 DIAGNOSIS — I97.410 FEMORAL ARTERY HEMATOMA COMPLICATING CARDIAC CATHETERIZATION: ICD-10-CM

## 2025-01-17 DIAGNOSIS — I25.5 ISCHEMIC CARDIOMYOPATHY: ICD-10-CM

## 2025-01-17 PROBLEM — I21.01 ACUTE ST ELEVATION MYOCARDIAL INFARCTION (STEMI) INVOLVING LEFT MAIN CORONARY ARTERY (HCC): Status: ACTIVE | Noted: 2025-01-17

## 2025-01-17 LAB
2HR DELTA HS TROPONIN: ABNORMAL NG/L
4HR DELTA HS TROPONIN: ABNORMAL NG/L
ALBUMIN SERPL BCG-MCNC: 3.6 G/DL (ref 3.5–5)
ALBUMIN SERPL BCG-MCNC: 3.9 G/DL (ref 3.5–5)
ALP SERPL-CCNC: 74 U/L (ref 34–104)
ALP SERPL-CCNC: 80 U/L (ref 34–104)
ALT SERPL W P-5'-P-CCNC: 102 U/L (ref 7–52)
ALT SERPL W P-5'-P-CCNC: 58 U/L (ref 7–52)
ANION GAP SERPL CALCULATED.3IONS-SCNC: 10 MMOL/L (ref 4–13)
ANION GAP SERPL CALCULATED.3IONS-SCNC: 7 MMOL/L (ref 4–13)
APTT PPP: 24 SECONDS (ref 23–34)
APTT PPP: >210 SECONDS (ref 23–34)
AST SERPL W P-5'-P-CCNC: 30 U/L (ref 13–39)
AST SERPL W P-5'-P-CCNC: 325 U/L (ref 13–39)
BASE EXCESS BLDA CALC-SCNC: -4.5 MMOL/L
BASOPHILS # BLD AUTO: 0.06 THOUSANDS/ΜL (ref 0–0.1)
BASOPHILS NFR BLD AUTO: 0 % (ref 0–1)
BILIRUB SERPL-MCNC: 0.46 MG/DL (ref 0.2–1)
BILIRUB SERPL-MCNC: 0.67 MG/DL (ref 0.2–1)
BUN SERPL-MCNC: 22 MG/DL (ref 5–25)
BUN SERPL-MCNC: 22 MG/DL (ref 5–25)
CA-I BLD-SCNC: 1.08 MMOL/L (ref 1.12–1.32)
CALCIUM SERPL-MCNC: 7.9 MG/DL (ref 8.4–10.2)
CALCIUM SERPL-MCNC: 8.6 MG/DL (ref 8.4–10.2)
CARDIAC TROPONIN I PNL SERPL HS: 113 NG/L (ref ?–50)
CARDIAC TROPONIN I PNL SERPL HS: ABNORMAL NG/L (ref ?–50)
CARDIAC TROPONIN I PNL SERPL HS: ABNORMAL NG/L (ref ?–50)
CHLORIDE SERPL-SCNC: 104 MMOL/L (ref 96–108)
CHLORIDE SERPL-SCNC: 104 MMOL/L (ref 96–108)
CO2 SERPL-SCNC: 24 MMOL/L (ref 21–32)
CO2 SERPL-SCNC: 26 MMOL/L (ref 21–32)
CREAT SERPL-MCNC: 0.9 MG/DL (ref 0.6–1.3)
CREAT SERPL-MCNC: 0.93 MG/DL (ref 0.6–1.3)
EOSINOPHIL # BLD AUTO: 0.16 THOUSAND/ΜL (ref 0–0.61)
EOSINOPHIL NFR BLD AUTO: 1 % (ref 0–6)
ERYTHROCYTE [DISTWIDTH] IN BLOOD BY AUTOMATED COUNT: 13.6 % (ref 11.6–15.1)
ERYTHROCYTE [DISTWIDTH] IN BLOOD BY AUTOMATED COUNT: 13.6 % (ref 11.6–15.1)
GFR SERPL CREATININE-BSD FRML MDRD: 84 ML/MIN/1.73SQ M
GFR SERPL CREATININE-BSD FRML MDRD: 88 ML/MIN/1.73SQ M
GLUCOSE SERPL-MCNC: 172 MG/DL (ref 65–140)
GLUCOSE SERPL-MCNC: 195 MG/DL (ref 65–140)
GLUCOSE SERPL-MCNC: 225 MG/DL (ref 65–140)
GLUCOSE SERPL-MCNC: 233 MG/DL (ref 65–140)
GLUCOSE SERPL-MCNC: 242 MG/DL (ref 65–140)
HCO3 BLDA-SCNC: 20.2 MMOL/L (ref 22–28)
HCT VFR BLD AUTO: 40.2 % (ref 36.5–49.3)
HCT VFR BLD AUTO: 42.2 % (ref 36.5–49.3)
HGB BLD-MCNC: 13.4 G/DL (ref 12–17)
HGB BLD-MCNC: 14.2 G/DL (ref 12–17)
IMM GRANULOCYTES # BLD AUTO: 0.1 THOUSAND/UL (ref 0–0.2)
IMM GRANULOCYTES NFR BLD AUTO: 1 % (ref 0–2)
INR PPP: 1.2 (ref 0.85–1.19)
KCT BLD-ACNC: 231 SEC (ref 89–137)
KCT BLD-ACNC: 306 SEC (ref 89–137)
LACTATE SERPL-SCNC: 2.2 MMOL/L (ref 0.5–2)
LYMPHOCYTES # BLD AUTO: 2.92 THOUSANDS/ΜL (ref 0.6–4.47)
LYMPHOCYTES NFR BLD AUTO: 21 % (ref 14–44)
MAGNESIUM SERPL-MCNC: 1.4 MG/DL (ref 1.9–2.7)
MCH RBC QN AUTO: 31.5 PG (ref 26.8–34.3)
MCH RBC QN AUTO: 31.8 PG (ref 26.8–34.3)
MCHC RBC AUTO-ENTMCNC: 33.3 G/DL (ref 31.4–37.4)
MCHC RBC AUTO-ENTMCNC: 33.6 G/DL (ref 31.4–37.4)
MCV RBC AUTO: 95 FL (ref 82–98)
MCV RBC AUTO: 95 FL (ref 82–98)
MONOCYTES # BLD AUTO: 1.3 THOUSAND/ΜL (ref 0.17–1.22)
MONOCYTES NFR BLD AUTO: 9 % (ref 4–12)
NEUTROPHILS # BLD AUTO: 9.72 THOUSANDS/ΜL (ref 1.85–7.62)
NEUTS SEG NFR BLD AUTO: 68 % (ref 43–75)
NRBC BLD AUTO-RTO: 0 /100 WBCS
O2 CT BLDA-SCNC: 18 ML/DL (ref 16–23)
OXYHGB MFR BLDA: 88.8 % (ref 94–97)
PCO2 BLDA: 36.4 MM HG (ref 36–44)
PH BLDA: 7.36 [PH] (ref 7.35–7.45)
PHOSPHATE SERPL-MCNC: 3.3 MG/DL (ref 2.3–4.1)
PLATELET # BLD AUTO: 211 THOUSANDS/UL (ref 149–390)
PLATELET # BLD AUTO: 219 THOUSANDS/UL (ref 149–390)
PMV BLD AUTO: 9.6 FL (ref 8.9–12.7)
PMV BLD AUTO: 9.6 FL (ref 8.9–12.7)
PO2 BLDA: 61.2 MM HG (ref 75–129)
POTASSIUM SERPL-SCNC: 4.1 MMOL/L (ref 3.5–5.3)
POTASSIUM SERPL-SCNC: 4.3 MMOL/L (ref 3.5–5.3)
PROT SERPL-MCNC: 6 G/DL (ref 6.4–8.4)
PROT SERPL-MCNC: 6.3 G/DL (ref 6.4–8.4)
PROTHROMBIN TIME: 15.9 SECONDS (ref 12.3–15)
RBC # BLD AUTO: 4.25 MILLION/UL (ref 3.88–5.62)
RBC # BLD AUTO: 4.46 MILLION/UL (ref 3.88–5.62)
SODIUM SERPL-SCNC: 137 MMOL/L (ref 135–147)
SODIUM SERPL-SCNC: 138 MMOL/L (ref 135–147)
SPECIMEN SOURCE: ABNORMAL
WBC # BLD AUTO: 14.26 THOUSAND/UL (ref 4.31–10.16)
WBC # BLD AUTO: 16.44 THOUSAND/UL (ref 4.31–10.16)

## 2025-01-17 PROCEDURE — C1887 CATHETER, GUIDING: HCPCS | Performed by: INTERNAL MEDICINE

## 2025-01-17 PROCEDURE — 82805 BLOOD GASES W/O2 SATURATION: CPT | Performed by: NURSE PRACTITIONER

## 2025-01-17 PROCEDURE — C1769 GUIDE WIRE: HCPCS | Performed by: INTERNAL MEDICINE

## 2025-01-17 PROCEDURE — 71045 X-RAY EXAM CHEST 1 VIEW: CPT

## 2025-01-17 PROCEDURE — C1725 CATH, TRANSLUMIN NON-LASER: HCPCS | Performed by: INTERNAL MEDICINE

## 2025-01-17 PROCEDURE — 93005 ELECTROCARDIOGRAM TRACING: CPT

## 2025-01-17 PROCEDURE — 85730 THROMBOPLASTIN TIME PARTIAL: CPT | Performed by: NURSE PRACTITIONER

## 2025-01-17 PROCEDURE — 85347 COAGULATION TIME ACTIVATED: CPT

## 2025-01-17 PROCEDURE — 93458 L HRT ARTERY/VENTRICLE ANGIO: CPT | Performed by: INTERNAL MEDICINE

## 2025-01-17 PROCEDURE — B211YZZ FLUOROSCOPY OF MULTIPLE CORONARY ARTERIES USING OTHER CONTRAST: ICD-10-PCS | Performed by: INTERNAL MEDICINE

## 2025-01-17 PROCEDURE — C9606 PERC D-E COR REVASC W AMI S: HCPCS | Performed by: INTERNAL MEDICINE

## 2025-01-17 PROCEDURE — 99152 MOD SED SAME PHYS/QHP 5/>YRS: CPT | Performed by: INTERNAL MEDICINE

## 2025-01-17 PROCEDURE — 85025 COMPLETE CBC W/AUTO DIFF WBC: CPT | Performed by: EMERGENCY MEDICINE

## 2025-01-17 PROCEDURE — C1894 INTRO/SHEATH, NON-LASER: HCPCS | Performed by: INTERNAL MEDICINE

## 2025-01-17 PROCEDURE — 85730 THROMBOPLASTIN TIME PARTIAL: CPT | Performed by: EMERGENCY MEDICINE

## 2025-01-17 PROCEDURE — 33967 INSERT I-AORT PERCUT DEVICE: CPT | Performed by: INTERNAL MEDICINE

## 2025-01-17 PROCEDURE — 99285 EMERGENCY DEPT VISIT HI MDM: CPT

## 2025-01-17 PROCEDURE — 027035Z DILATION OF CORONARY ARTERY, ONE ARTERY WITH TWO DRUG-ELUTING INTRALUMINAL DEVICES, PERCUTANEOUS APPROACH: ICD-10-PCS | Performed by: INTERNAL MEDICINE

## 2025-01-17 PROCEDURE — 92928 PRQ TCAT PLMT NTRAC ST 1 LES: CPT | Performed by: INTERNAL MEDICINE

## 2025-01-17 PROCEDURE — 80053 COMPREHEN METABOLIC PANEL: CPT | Performed by: NURSE PRACTITIONER

## 2025-01-17 PROCEDURE — 36415 COLL VENOUS BLD VENIPUNCTURE: CPT | Performed by: EMERGENCY MEDICINE

## 2025-01-17 PROCEDURE — 96374 THER/PROPH/DIAG INJ IV PUSH: CPT

## 2025-01-17 PROCEDURE — 84484 ASSAY OF TROPONIN QUANT: CPT | Performed by: EMERGENCY MEDICINE

## 2025-01-17 PROCEDURE — 82948 REAGENT STRIP/BLOOD GLUCOSE: CPT

## 2025-01-17 PROCEDURE — 4A023N7 MEASUREMENT OF CARDIAC SAMPLING AND PRESSURE, LEFT HEART, PERCUTANEOUS APPROACH: ICD-10-PCS | Performed by: INTERNAL MEDICINE

## 2025-01-17 PROCEDURE — 99285 EMERGENCY DEPT VISIT HI MDM: CPT | Performed by: EMERGENCY MEDICINE

## 2025-01-17 PROCEDURE — 92941 PRQ TRLML REVSC TOT OCCL AMI: CPT | Performed by: INTERNAL MEDICINE

## 2025-01-17 PROCEDURE — 5A02210 ASSISTANCE WITH CARDIAC OUTPUT USING BALLOON PUMP, CONTINUOUS: ICD-10-PCS | Performed by: INTERNAL MEDICINE

## 2025-01-17 PROCEDURE — 99291 CRITICAL CARE FIRST HOUR: CPT | Performed by: ANESTHESIOLOGY

## 2025-01-17 PROCEDURE — 85610 PROTHROMBIN TIME: CPT | Performed by: NURSE PRACTITIONER

## 2025-01-17 PROCEDURE — 85027 COMPLETE CBC AUTOMATED: CPT | Performed by: NURSE PRACTITIONER

## 2025-01-17 PROCEDURE — 82330 ASSAY OF CALCIUM: CPT | Performed by: NURSE PRACTITIONER

## 2025-01-17 PROCEDURE — 84100 ASSAY OF PHOSPHORUS: CPT | Performed by: NURSE PRACTITIONER

## 2025-01-17 PROCEDURE — 96361 HYDRATE IV INFUSION ADD-ON: CPT

## 2025-01-17 PROCEDURE — 83605 ASSAY OF LACTIC ACID: CPT | Performed by: NURSE PRACTITIONER

## 2025-01-17 PROCEDURE — 83735 ASSAY OF MAGNESIUM: CPT | Performed by: NURSE PRACTITIONER

## 2025-01-17 PROCEDURE — C1874 STENT, COATED/COV W/DEL SYS: HCPCS | Performed by: INTERNAL MEDICINE

## 2025-01-17 PROCEDURE — 80053 COMPREHEN METABOLIC PANEL: CPT | Performed by: EMERGENCY MEDICINE

## 2025-01-17 DEVICE — EVEROLIMUS-ELUTING PLATINUM CHROMIUM CORONARY STENT SYSTEM
Type: IMPLANTABLE DEVICE | Site: CORONARY | Status: FUNCTIONAL
Brand: SYNERGY™ XD

## 2025-01-17 RX ORDER — BISACODYL 10 MG
10 SUPPOSITORY, RECTAL RECTAL DAILY PRN
Status: DISCONTINUED | OUTPATIENT
Start: 2025-01-17 | End: 2025-01-29 | Stop reason: HOSPADM

## 2025-01-17 RX ORDER — FAMOTIDINE 10 MG/ML
20 INJECTION, SOLUTION INTRAVENOUS 2 TIMES DAILY
Status: DISCONTINUED | OUTPATIENT
Start: 2025-01-17 | End: 2025-01-21

## 2025-01-17 RX ORDER — ATORVASTATIN CALCIUM 40 MG/1
80 TABLET, FILM COATED ORAL
Status: DISCONTINUED | OUTPATIENT
Start: 2025-01-18 | End: 2025-01-29 | Stop reason: HOSPADM

## 2025-01-17 RX ORDER — MAGNESIUM SULFATE HEPTAHYDRATE 40 MG/ML
2 INJECTION, SOLUTION INTRAVENOUS ONCE
Status: COMPLETED | OUTPATIENT
Start: 2025-01-17 | End: 2025-01-18

## 2025-01-17 RX ORDER — HEPARIN SODIUM 1000 [USP'U]/ML
4000 INJECTION, SOLUTION INTRAVENOUS; SUBCUTANEOUS EVERY 6 HOURS PRN
Status: DISCONTINUED | OUTPATIENT
Start: 2025-01-17 | End: 2025-01-21

## 2025-01-17 RX ORDER — PHENYLEPHRINE HYDROCHLORIDE 10 MG/ML
INJECTION INTRAVENOUS CODE/TRAUMA/SEDATION MEDICATION
Status: DISCONTINUED | OUTPATIENT
Start: 2025-01-17 | End: 2025-01-17 | Stop reason: HOSPADM

## 2025-01-17 RX ORDER — ASPIRIN 81 MG/1
324 TABLET, CHEWABLE ORAL ONCE
Status: COMPLETED | OUTPATIENT
Start: 2025-01-17 | End: 2025-01-17

## 2025-01-17 RX ORDER — HEPARIN SODIUM 1000 [USP'U]/ML
2000 INJECTION, SOLUTION INTRAVENOUS; SUBCUTANEOUS EVERY 6 HOURS PRN
Status: DISCONTINUED | OUTPATIENT
Start: 2025-01-17 | End: 2025-01-21

## 2025-01-17 RX ORDER — CHLORHEXIDINE GLUCONATE ORAL RINSE 1.2 MG/ML
15 SOLUTION DENTAL EVERY 12 HOURS SCHEDULED
Status: DISCONTINUED | OUTPATIENT
Start: 2025-01-17 | End: 2025-01-29 | Stop reason: HOSPADM

## 2025-01-17 RX ORDER — HEPARIN SODIUM 10000 [USP'U]/100ML
INJECTION, SOLUTION INTRAVENOUS
Status: COMPLETED | OUTPATIENT
Start: 2025-01-17 | End: 2025-01-17

## 2025-01-17 RX ORDER — AMOXICILLIN 250 MG
2 CAPSULE ORAL 2 TIMES DAILY
Status: DISCONTINUED | OUTPATIENT
Start: 2025-01-17 | End: 2025-01-19

## 2025-01-17 RX ORDER — VERAPAMIL HCL 2.5 MG/ML
AMPUL (ML) INTRAVENOUS CODE/TRAUMA/SEDATION MEDICATION
Status: DISCONTINUED | OUTPATIENT
Start: 2025-01-17 | End: 2025-01-17 | Stop reason: HOSPADM

## 2025-01-17 RX ORDER — SODIUM CHLORIDE 9 MG/ML
50 INJECTION, SOLUTION INTRAVENOUS CONTINUOUS
Status: DISPENSED | OUTPATIENT
Start: 2025-01-17 | End: 2025-01-18

## 2025-01-17 RX ORDER — ASPIRIN 81 MG/1
81 TABLET ORAL DAILY
Status: DISCONTINUED | OUTPATIENT
Start: 2025-01-18 | End: 2025-01-29 | Stop reason: HOSPADM

## 2025-01-17 RX ORDER — HEPARIN SODIUM 10000 [USP'U]/100ML
3-20 INJECTION, SOLUTION INTRAVENOUS
Status: DISCONTINUED | OUTPATIENT
Start: 2025-01-17 | End: 2025-01-21

## 2025-01-17 RX ORDER — HEPARIN SODIUM 1000 [USP'U]/ML
INJECTION, SOLUTION INTRAVENOUS; SUBCUTANEOUS CODE/TRAUMA/SEDATION MEDICATION
Status: DISCONTINUED | OUTPATIENT
Start: 2025-01-17 | End: 2025-01-17 | Stop reason: HOSPADM

## 2025-01-17 RX ORDER — CALCIUM GLUCONATE 20 MG/ML
2 INJECTION, SOLUTION INTRAVENOUS ONCE
Status: COMPLETED | OUTPATIENT
Start: 2025-01-17 | End: 2025-01-17

## 2025-01-17 RX ORDER — HEPARIN SODIUM 1000 [USP'U]/ML
4000 INJECTION, SOLUTION INTRAVENOUS; SUBCUTANEOUS ONCE
Status: COMPLETED | OUTPATIENT
Start: 2025-01-17 | End: 2025-01-17

## 2025-01-17 RX ORDER — LIDOCAINE WITH 8.4% SOD BICARB 0.9%(10ML)
SYRINGE (ML) INJECTION CODE/TRAUMA/SEDATION MEDICATION
Status: DISCONTINUED | OUTPATIENT
Start: 2025-01-17 | End: 2025-01-17 | Stop reason: HOSPADM

## 2025-01-17 RX ADMIN — CHLORHEXIDINE GLUCONATE 0.12% ORAL RINSE 15 ML: 1.2 LIQUID ORAL at 22:27

## 2025-01-17 RX ADMIN — SODIUM CHLORIDE 1000 ML: 0.9 INJECTION, SOLUTION INTRAVENOUS at 18:07

## 2025-01-17 RX ADMIN — HEPARIN SODIUM 4000 UNITS: 1000 INJECTION, SOLUTION INTRAVENOUS; SUBCUTANEOUS at 18:21

## 2025-01-17 RX ADMIN — NOREPINEPHRINE BITARTRATE 2 MCG/MIN: 1 INJECTION, SOLUTION, CONCENTRATE INTRAVENOUS at 22:28

## 2025-01-17 RX ADMIN — SODIUM CHLORIDE 3 UNITS/HR: 9 INJECTION, SOLUTION INTRAVENOUS at 22:26

## 2025-01-17 RX ADMIN — MAGNESIUM SULFATE HEPTAHYDRATE 2 G: 40 INJECTION, SOLUTION INTRAVENOUS at 22:26

## 2025-01-17 RX ADMIN — TICAGRELOR 180 MG: 90 TABLET ORAL at 18:18

## 2025-01-17 RX ADMIN — HEPARIN SODIUM 11.1 UNITS/KG/HR: 10000 INJECTION, SOLUTION INTRAVENOUS at 20:49

## 2025-01-17 RX ADMIN — CALCIUM GLUCONATE 2 G: 20 INJECTION, SOLUTION INTRAVENOUS at 22:27

## 2025-01-17 RX ADMIN — FAMOTIDINE 20 MG: 10 INJECTION, SOLUTION INTRAVENOUS at 22:27

## 2025-01-17 RX ADMIN — SODIUM CHLORIDE 50 ML/HR: 0.9 INJECTION, SOLUTION INTRAVENOUS at 22:27

## 2025-01-17 RX ADMIN — ASPIRIN 81 MG 324 MG: 81 TABLET ORAL at 18:06

## 2025-01-17 NOTE — ED NOTES
Code cart at the bedside at this time per provider. Patient with defib pads in place. Patient remains asymptomatic at this time. Xray at the bedside      Elenita Cruz RN  01/17/25 [id number]

## 2025-01-17 NOTE — ED NOTES
Provider at the bedside at this time. Patient with no complaints. EKG done & given to provider.      Elenita Cruz RN  01/17/25 [id number]

## 2025-01-17 NOTE — ED PROVIDER NOTES
Time reflects when diagnosis was documented in both MDM as applicable and the Disposition within this note       Time User Action Codes Description Comment    1/17/2025  6:41 PM Evita Gomez [I21.3] STEMI (ST elevation myocardial infarction) (HCC)     1/17/2025  6:41 PM Evita Gomez [I95.9] Hypotension     1/17/2025  6:41 PM Evita Gomez [R55] Near syncope           ED Disposition       ED Disposition   Send to Cath Lab    Condition   --    Date/Time   Fri Jan 17, 2025  6:41 PM    Comment   --             Assessment & Plan       Medical Decision Making  69-year-old male presents for evaluation after a near syncopal episode.  Patient reporting jaw pain currently.  On exam, patient hypotensive, otherwise with normal vitals.  He is ill-appearing.  Heart is regular rate and rhythm, lungs clear to auscultation bilaterally.  1+ radial pulses bilaterally.  IV fluids being administered for treatment of hypotension.  EKG was obtained and was notable for ST elevations in leads V1 through 4 meeting criteria for a STEMI.  EKG was sent to the on-call interventional cardiologist who agreed that the patient's EKG was concerning for an acute STEMI.  Patient given aspirin, Brilinta, and bolus of heparin.  Patient sent to Cath Lab for further management.  Patient and his wife updated, all questions answered at this time.    Amount and/or Complexity of Data Reviewed  Labs: ordered.  Radiology: ordered.    Risk  OTC drugs.  Prescription drug management.  Decision regarding hospitalization.        ED Course as of 01/17/25 1841 Fri Jan 17, 2025   1757 Procedure Note: EKG  Date/Time: 01/17/25 5:57 PM   Interpreted by: Evita Gomez D.O.  Indications / Diagnosis: CP, jaw pain, near syncope  ECG reviewed by me, the ED Provider: yes   The EKG demonstrates:  Rhythm: normal sinus  Intervals: normal intervals  Axis: normal axis  QRS/Blocks: normal QRS  ST Changes: ALEXSANDRA in V1-4 concerning for a STEMI.    1830 Procedure Note:  EKG  Date/Time: 01/17/25 6:30 PM   Interpreted by: Evita Gomez D.O.  Indications / Diagnosis: jaw pain  ECG reviewed by me, the ED Provider: yes   The EKG demonstrates:  Rhythm: normal sinus  Intervals: normal intervals  Axis: normal axis  QRS/Blocks: normal QRS  ST Changes: Evolving STEMI with worsening elevations in V1-4 with slight depressions in inferior leads.       Medications   sodium chloride 0.9 % bolus 1,000 mL (1,000 mL Intravenous New Bag 1/17/25 1807)   aspirin chewable tablet 324 mg (324 mg Oral Given 1/17/25 1806)   ticagrelor (BRILINTA) tablet 180 mg (180 mg Oral Given 1/17/25 1818)   heparin (porcine) injection 4,000 Units (4,000 Units Intravenous Given 1/17/25 1821)       ED Risk Strat Scores                          SBIRT 20yo+      Flowsheet Row Most Recent Value   Initial Alcohol Screen: US AUDIT-C     1. How often do you have a drink containing alcohol? 0 Filed at: 01/17/2025 1758   2. How many drinks containing alcohol do you have on a typical day you are drinking?  0 Filed at: 01/17/2025 1758   3b. FEMALE Any Age, or MALE 65+: How often do you have 4 or more drinks on one occassion? 0 Filed at: 01/17/2025 1758   Audit-C Score 0 Filed at: 01/17/2025 1758   IDRIS: How many times in the past year have you...    Used an illegal drug or used a prescription medication for non-medical reasons? Never Filed at: 01/17/2025 1758                            History of Present Illness       Chief Complaint   Patient presents with    Dizziness     C/o dizziness/near syncope when ambulating today. Home BP 60/40. 1L isolyte given by EMS.        Past Medical History:   Diagnosis Date    Depression     Diabetes mellitus (HCC)     Hyperlipidemia     Hypertension     Osteoarthritis, knee       Past Surgical History:   Procedure Laterality Date    BACK SURGERY      HAND SURGERY      JOINT REPLACEMENT Left     l tkr    KNEE SURGERY Left     CO ARTHRP KNE CONDYLE&PLATU MEDIAL&LAT COMPARTMENTS Right 11/19/2018     Procedure: ARTHROPLASTY KNEE TOTAL;  Surgeon: Franny Grimaldo MD;  Location: [facility] OR;  Service: Orthopedics    TOOTH EXTRACTION      WISDOM TOOTH EXTRACTION        Family History   Problem Relation Age of Onset    No Known Problems Mother     Heart attack Father     Arthritis Family     Cancer Family     Diabetes Family     Heart disease Family     Osteoporosis Family       Social History     Tobacco Use    Smoking status: Former     Current packs/day: 0.00     Types: Cigarettes     Quit date: 3/1/2012     Years since quittin.8    Smokeless tobacco: Never   Vaping Use    Vaping status: Never Used   Substance Use Topics    Alcohol use: Yes     Comment: 'couple beers every couple weeks'    Drug use: Yes     Types: Marijuana      E-Cigarette/Vaping    E-Cigarette Use Never User       E-Cigarette/Vaping Substances    Nicotine No     THC No     CBD No     Flavoring No     Other No     Unknown No       I have reviewed and agree with the history as documented.     69-year-old male with a past medical history of hypertension, hyperlipidemia, and diabetes who presents for evaluation after a near syncopal event.  Patient states that he was getting up from a seated position this evening when he suddenly felt dizzy and like he was going to pass out.  He states that during this time he also felt some chest tightness and pain in his jaw.  On EMS arrival, patient was noted to be hypotensive with a systolic blood pressure in the 60s.  He was noted to be diaphoretic initially.  His blood pressure did respond to IV fluids.  Patient states that he no longer has the chest discomfort, but he is still endorsing jaw pain.  He denies any shortness of breath, abdominal pain, or vomiting.  He denies any numbness, tingling, or weakness in his extremities.  Denies any headaches, visual changes, or other concerning symptoms.        Review of Systems   Constitutional:  Positive for diaphoresis. Negative for fever.   HENT:          Jaw  pain   Respiratory:  Negative for shortness of breath.    Cardiovascular:  Positive for chest pain.   Gastrointestinal:  Negative for abdominal pain and vomiting.   Neurological:  Positive for light-headedness.   All other systems reviewed and are negative.          Objective       ED Triage Vitals [01/17/25 1750]   Temperature Pulse Blood Pressure Respirations SpO2 Patient Position - Orthostatic VS   97.5 °F (36.4 °C) 80 (!) 82/50 20 100 % Sitting      Temp Source Heart Rate Source BP Location FiO2 (%) Pain Score    Oral Monitor Left arm -- --      Vitals      Date and Time Temp Pulse SpO2 Resp BP Pain Score FACES Pain Rating User   01/17/25 1830 -- 79 98 % 22 90/59 -- -- AM   01/17/25 1800 -- 77 98 % 20 83/50 -- -- AM   01/17/25 1750 97.5 °F (36.4 °C) 80 100 % 20 82/50 -- -- AM            Physical Exam  Vitals and nursing note reviewed.   Constitutional:       General: He is awake.      Appearance: He is ill-appearing.   HENT:      Head: Normocephalic and atraumatic.   Eyes:      General: Vision grossly intact. Gaze aligned appropriately.   Cardiovascular:      Rate and Rhythm: Normal rate and regular rhythm.      Pulses:           Radial pulses are 1+ on the right side and 1+ on the left side.      Heart sounds: Normal heart sounds.   Pulmonary:      Effort: Pulmonary effort is normal. No respiratory distress.   Musculoskeletal:      Cervical back: Full passive range of motion without pain and neck supple.   Skin:     General: Skin is warm and dry.   Neurological:      General: No focal deficit present.      Mental Status: He is alert and oriented to person, place, and time.         Results Reviewed       Procedure Component Value Units Date/Time    HS Troponin I 2hr [[id number]]     Lab Status: No result Specimen: Blood     HS Troponin 0hr (reflex protocol) [[id number]]  (Abnormal) Collected: 01/17/25 1800    Lab Status: Final result Specimen: Blood from Arm, Left Updated: 01/17/25 1839     hs TnI 0hr 113 ng/L      APTT [[id number]]  (Normal) Collected: 01/17/25 1820    Lab Status: Final result Specimen: Blood from Arm, Right Updated: 01/17/25 1836     PTT 24 seconds     Comprehensive metabolic panel [[id number]]  (Abnormal) Collected: 01/17/25 1800    Lab Status: Final result Specimen: Blood from Arm, Left Updated: 01/17/25 1832     Sodium 138 mmol/L      Potassium 4.1 mmol/L      Chloride 104 mmol/L      CO2 24 mmol/L      ANION GAP 10 mmol/L      BUN 22 mg/dL      Creatinine 0.93 mg/dL      Glucose 195 mg/dL      Calcium 8.6 mg/dL      AST 30 U/L      ALT 58 U/L      Alkaline Phosphatase 80 U/L      Total Protein 6.3 g/dL      Albumin 3.9 g/dL      Total Bilirubin 0.46 mg/dL      eGFR 84 ml/min/1.73sq m     Narrative:      National Kid[employer] Disease Foundation guidelines for Chronic Kidney Disease (CKD):     Stage 1 with normal or high GFR (GFR > 90 mL/min/1.73 square meters)    Stage 2 Mild CKD (GFR = 60-89 mL/min/1.73 square meters)    Stage 3A Moderate CKD (GFR = 45-59 mL/min/1.73 square meters)    Stage 3B Moderate CKD (GFR = 30-44 mL/min/1.73 square meters)    Stage 4 Severe CKD (GFR = 15-29 mL/min/1.73 square meters)    Stage 5 End Stage CKD (GFR <15 mL/min/1.73 square meters)  Note: GFR calculation is accurate only with a steady state creatinine    CBC and differential [[id number]]  (Abnormal) Collected: 01/17/25 1800    Lab Status: Final result Specimen: Blood from Arm, Left Updated: 01/17/25 1806     WBC 14.26 Thousand/uL      RBC 4.46 Million/uL      Hemoglobin 14.2 g/dL      Hematocrit 42.2 %      MCV 95 fL      MCH 31.8 pg      MCHC 33.6 g/dL      RDW 13.6 %      MPV 9.6 fL      Platelets 219 Thousands/uL      nRBC 0 /100 WBCs      Segmented % 68 %      Immature Grans % 1 %      Lymphocytes % 21 %      Monocytes % 9 %      Eosinophils Relative 1 %      Basophils Relative 0 %      Absolute Neutrophils 9.72 Thousands/µL      Absolute Immature Grans 0.10 Thousand/uL      Absolute Lymphocytes 2.92 Thousands/µL       Absolute Monocytes 1.30 Thousand/µL      Eosinophils Absolute 0.16 Thousand/µL      Basophils Absolute 0.06 Thousands/µL             XR chest 1 view portable    (Results Pending)       CriticalCare Time    Date/Time: 1/17/2025 11:52 PM    Performed by: Evita Gomez DO  Authorized by: Evita Gomez DO    Critical care provider statement:     Critical care time (minutes):  35    Critical care time was exclusive of:  Separately billable procedures and treating other patients and teaching time    Critical care was necessary to treat or prevent imminent or life-threatening deterioration of the following conditions:  Shock and cardiac failure    Critical care was time spent personally by me on the following activities:  Obtaining history from patient or surrogate, development of treatment plan with patient or surrogate, discussions with consultants, evaluation of patient's response to treatment, examination of patient, re-evaluation of patient's condition, ordering and review of radiographic studies, ordering and review of laboratory studies and ordering and performing treatments and interventions    I assumed direction of critical care for this patient from another provider in my specialty: no        ED Medication and Procedure Management   Prior to Admission Medications   Prescriptions Last Dose Informant Patient Reported? Taking?   Empagliflozin 25 MG TABS  Self No No   Sig: Take 0.5 tablets (12.5 mg total) by mouth in the morning   atorvastatin (LIPITOR) 80 mg tablet  Self Yes No   Sig: TAKE 40MG (ONE-HALF TABLET) BY MOUTH EVERY DAY AT 5 PM FOR CHOLESTEROL   gabapentin (NEURONTIN) 300 mg capsule  Self Yes No   Sig: Take 300 mg by mouth   glipiZIDE (GLUCOTROL) 5 mg tablet  Self No No   Sig: Take 1 tablet (5 mg total) by mouth daily before breakfast   insulin glargine (LANTUS SOLOSTAR) 100 units/mL injection pen  Self No No   Sig: Inject 15 Units under the skin daily at bedtime   melatonin 5 MG TABS  Self No No    Sig: Take 1 tablet (5 mg total) by mouth daily at bedtime   meloxicam (MOBIC) 15 mg tablet  Self No No   Sig: Take 1 tablet (15 mg total) by mouth daily as needed for moderate pain   metFORMIN (GLUCOPHAGE) 1000 MG tablet  Self Yes No   Sig: TAKE ONE TABLET BY MOUTH TWICE A DAY WITH MEALS FOR DIABETES      Facility-Administered Medications: None     Patient's Medications   Discharge Prescriptions    No medications on file     No discharge procedures on file.  ED SEPSIS DOCUMENTATION   Time reflects when diagnosis was documented in both MDM as applicable and the Disposition within this note       Time User Action Codes Description Comment    1/17/2025  6:41 PM Evita Gomez [I21.3] STEMI (ST elevation myocardial infarction) (HCC)     1/17/2025  6:41 PM Evita Gomez [I95.9] Hypotension     1/17/2025  6:41 PM Evita Gomez [R55] Near syncope                  Evita Gomez, DO  01/17/25 4471

## 2025-01-17 NOTE — Clinical Note
Defib pad site: anterior/posterior and left lower flank.Defib pad site assessment: skin integrity intact. 1.85

## 2025-01-18 ENCOUNTER — APPOINTMENT (INPATIENT)
Dept: RADIOLOGY | Facility: HOSPITAL | Age: 70
DRG: 270 | End: 2025-01-18
Payer: MEDICARE

## 2025-01-18 LAB
ALBUMIN SERPL BCG-MCNC: 3.5 G/DL (ref 3.5–5)
ALBUMIN SERPL BCG-MCNC: 3.7 G/DL (ref 3.5–5)
ALP SERPL-CCNC: 66 U/L (ref 34–104)
ALP SERPL-CCNC: 67 U/L (ref 34–104)
ALT SERPL W P-5'-P-CCNC: 116 U/L (ref 7–52)
ALT SERPL W P-5'-P-CCNC: 132 U/L (ref 7–52)
ANION GAP SERPL CALCULATED.3IONS-SCNC: 6 MMOL/L (ref 4–13)
ANION GAP SERPL CALCULATED.3IONS-SCNC: 6 MMOL/L (ref 4–13)
APTT PPP: 45 SECONDS (ref 23–34)
APTT PPP: 57 SECONDS (ref 23–34)
APTT PPP: 58 SECONDS (ref 23–34)
AST SERPL W P-5'-P-CCNC: 269 U/L (ref 13–39)
AST SERPL W P-5'-P-CCNC: 459 U/L (ref 13–39)
ATRIAL RATE: 72 BPM
ATRIAL RATE: 79 BPM
ATRIAL RATE: 82 BPM
ATRIAL RATE: 84 BPM
BASE EXCESS BLDA CALC-SCNC: -1.6 MMOL/L
BILIRUB SERPL-MCNC: 0.85 MG/DL (ref 0.2–1)
BILIRUB SERPL-MCNC: 0.9 MG/DL (ref 0.2–1)
BUN SERPL-MCNC: 20 MG/DL (ref 5–25)
BUN SERPL-MCNC: 20 MG/DL (ref 5–25)
CA-I BLD-SCNC: 1.15 MMOL/L (ref 1.12–1.32)
CA-I BLD-SCNC: 1.16 MMOL/L (ref 1.12–1.32)
CALCIUM SERPL-MCNC: 8.5 MG/DL (ref 8.4–10.2)
CALCIUM SERPL-MCNC: 8.7 MG/DL (ref 8.4–10.2)
CHLORIDE SERPL-SCNC: 107 MMOL/L (ref 96–108)
CHLORIDE SERPL-SCNC: 107 MMOL/L (ref 96–108)
CHOLEST SERPL-MCNC: 112 MG/DL (ref ?–200)
CO2 SERPL-SCNC: 23 MMOL/L (ref 21–32)
CO2 SERPL-SCNC: 24 MMOL/L (ref 21–32)
CREAT SERPL-MCNC: 0.68 MG/DL (ref 0.6–1.3)
CREAT SERPL-MCNC: 0.78 MG/DL (ref 0.6–1.3)
ERYTHROCYTE [DISTWIDTH] IN BLOOD BY AUTOMATED COUNT: 13.5 % (ref 11.6–15.1)
EST. AVERAGE GLUCOSE BLD GHB EST-MCNC: 189 MG/DL
GFR SERPL CREATININE-BSD FRML MDRD: 93 ML/MIN/1.73SQ M
GFR SERPL CREATININE-BSD FRML MDRD: 98 ML/MIN/1.73SQ M
GLUCOSE SERPL-MCNC: 115 MG/DL (ref 65–140)
GLUCOSE SERPL-MCNC: 115 MG/DL (ref 65–140)
GLUCOSE SERPL-MCNC: 128 MG/DL (ref 65–140)
GLUCOSE SERPL-MCNC: 132 MG/DL (ref 65–140)
GLUCOSE SERPL-MCNC: 133 MG/DL (ref 65–140)
GLUCOSE SERPL-MCNC: 143 MG/DL (ref 65–140)
GLUCOSE SERPL-MCNC: 145 MG/DL (ref 65–140)
GLUCOSE SERPL-MCNC: 158 MG/DL (ref 65–140)
GLUCOSE SERPL-MCNC: 159 MG/DL (ref 65–140)
GLUCOSE SERPL-MCNC: 164 MG/DL (ref 65–140)
GLUCOSE SERPL-MCNC: 166 MG/DL (ref 65–140)
GLUCOSE SERPL-MCNC: 166 MG/DL (ref 65–140)
GLUCOSE SERPL-MCNC: 179 MG/DL (ref 65–140)
GLUCOSE SERPL-MCNC: 182 MG/DL (ref 65–140)
HBA1C MFR BLD: 8.2 %
HCO3 BLDA-SCNC: 21.2 MMOL/L (ref 22–28)
HCT VFR BLD AUTO: 40.5 % (ref 36.5–49.3)
HDLC SERPL-MCNC: 33 MG/DL
HGB BLD-MCNC: 13.6 G/DL (ref 12–17)
LACTATE SERPL-SCNC: 1.3 MMOL/L (ref 0.5–2)
LACTATE SERPL-SCNC: 2 MMOL/L (ref 0.5–2)
LDLC SERPL CALC-MCNC: 58 MG/DL (ref 0–100)
MAGNESIUM SERPL-MCNC: 1.7 MG/DL (ref 1.9–2.7)
MAGNESIUM SERPL-MCNC: 1.9 MG/DL (ref 1.9–2.7)
MCH RBC QN AUTO: 31.9 PG (ref 26.8–34.3)
MCHC RBC AUTO-ENTMCNC: 33.6 G/DL (ref 31.4–37.4)
MCV RBC AUTO: 95 FL (ref 82–98)
O2 CT BLDA-SCNC: 19 ML/DL (ref 16–23)
OXYHGB MFR BLDA: 94.3 % (ref 94–97)
P AXIS: 61 DEGREES
P AXIS: 61 DEGREES
P AXIS: 66 DEGREES
P AXIS: 67 DEGREES
PCO2 BLDA: 30.5 MM HG (ref 36–44)
PH BLDA: 7.46 [PH] (ref 7.35–7.45)
PHOSPHATE SERPL-MCNC: 3.7 MG/DL (ref 2.3–4.1)
PHOSPHATE SERPL-MCNC: 3.8 MG/DL (ref 2.3–4.1)
PLATELET # BLD AUTO: 167 THOUSANDS/UL (ref 149–390)
PMV BLD AUTO: 9.7 FL (ref 8.9–12.7)
PO2 BLDA: 80.4 MM HG (ref 75–129)
POTASSIUM SERPL-SCNC: 3.8 MMOL/L (ref 3.5–5.3)
POTASSIUM SERPL-SCNC: 4 MMOL/L (ref 3.5–5.3)
PR INTERVAL: 176 MS
PR INTERVAL: 176 MS
PR INTERVAL: 180 MS
PR INTERVAL: 190 MS
PROT SERPL-MCNC: 5.7 G/DL (ref 6.4–8.4)
PROT SERPL-MCNC: 6.1 G/DL (ref 6.4–8.4)
QRS AXIS: -16 DEGREES
QRS AXIS: -21 DEGREES
QRS AXIS: -36 DEGREES
QRS AXIS: -41 DEGREES
QRSD INTERVAL: 102 MS
QRSD INTERVAL: 108 MS
QRSD INTERVAL: 98 MS
QRSD INTERVAL: 98 MS
QT INTERVAL: 406 MS
QT INTERVAL: 408 MS
QT INTERVAL: 410 MS
QT INTERVAL: 418 MS
QTC INTERVAL: 444 MS
QTC INTERVAL: 479 MS
QTC INTERVAL: 479 MS
QTC INTERVAL: 482 MS
RBC # BLD AUTO: 4.27 MILLION/UL (ref 3.88–5.62)
SODIUM SERPL-SCNC: 136 MMOL/L (ref 135–147)
SODIUM SERPL-SCNC: 137 MMOL/L (ref 135–147)
SPECIMEN SOURCE: ABNORMAL
T WAVE AXIS: 105 DEGREES
T WAVE AXIS: 107 DEGREES
T WAVE AXIS: 113 DEGREES
T WAVE AXIS: 121 DEGREES
TRIGL SERPL-MCNC: 104 MG/DL (ref ?–150)
VENTRICULAR RATE: 72 BPM
VENTRICULAR RATE: 79 BPM
VENTRICULAR RATE: 82 BPM
VENTRICULAR RATE: 84 BPM
WBC # BLD AUTO: 13.81 THOUSAND/UL (ref 4.31–10.16)

## 2025-01-18 PROCEDURE — 93010 ELECTROCARDIOGRAM REPORT: CPT | Performed by: INTERNAL MEDICINE

## 2025-01-18 PROCEDURE — 83036 HEMOGLOBIN GLYCOSYLATED A1C: CPT | Performed by: NURSE PRACTITIONER

## 2025-01-18 PROCEDURE — 85730 THROMBOPLASTIN TIME PARTIAL: CPT

## 2025-01-18 PROCEDURE — 85730 THROMBOPLASTIN TIME PARTIAL: CPT | Performed by: NURSE PRACTITIONER

## 2025-01-18 PROCEDURE — 82805 BLOOD GASES W/O2 SATURATION: CPT

## 2025-01-18 PROCEDURE — 80053 COMPREHEN METABOLIC PANEL: CPT | Performed by: NURSE PRACTITIONER

## 2025-01-18 PROCEDURE — 85027 COMPLETE CBC AUTOMATED: CPT | Performed by: NURSE PRACTITIONER

## 2025-01-18 PROCEDURE — 99223 1ST HOSP IP/OBS HIGH 75: CPT | Performed by: INTERNAL MEDICINE

## 2025-01-18 PROCEDURE — 82330 ASSAY OF CALCIUM: CPT

## 2025-01-18 PROCEDURE — 80061 LIPID PANEL: CPT | Performed by: NURSE PRACTITIONER

## 2025-01-18 PROCEDURE — 93005 ELECTROCARDIOGRAM TRACING: CPT

## 2025-01-18 PROCEDURE — 84100 ASSAY OF PHOSPHORUS: CPT | Performed by: NURSE PRACTITIONER

## 2025-01-18 PROCEDURE — 83735 ASSAY OF MAGNESIUM: CPT

## 2025-01-18 PROCEDURE — 82330 ASSAY OF CALCIUM: CPT | Performed by: NURSE PRACTITIONER

## 2025-01-18 PROCEDURE — 99233 SBSQ HOSP IP/OBS HIGH 50: CPT | Performed by: ANESTHESIOLOGY

## 2025-01-18 PROCEDURE — 71045 X-RAY EXAM CHEST 1 VIEW: CPT

## 2025-01-18 PROCEDURE — 83735 ASSAY OF MAGNESIUM: CPT | Performed by: NURSE PRACTITIONER

## 2025-01-18 PROCEDURE — 82948 REAGENT STRIP/BLOOD GLUCOSE: CPT

## 2025-01-18 PROCEDURE — 85730 THROMBOPLASTIN TIME PARTIAL: CPT | Performed by: ANESTHESIOLOGY

## 2025-01-18 PROCEDURE — 84100 ASSAY OF PHOSPHORUS: CPT

## 2025-01-18 PROCEDURE — 83605 ASSAY OF LACTIC ACID: CPT

## 2025-01-18 PROCEDURE — 80053 COMPREHEN METABOLIC PANEL: CPT

## 2025-01-18 RX ORDER — POTASSIUM CHLORIDE 1500 MG/1
40 TABLET, EXTENDED RELEASE ORAL ONCE
Status: COMPLETED | OUTPATIENT
Start: 2025-01-18 | End: 2025-01-18

## 2025-01-18 RX ORDER — OXYCODONE HYDROCHLORIDE 5 MG/1
5 TABLET ORAL EVERY 4 HOURS PRN
Refills: 0 | Status: DISCONTINUED | OUTPATIENT
Start: 2025-01-18 | End: 2025-01-18

## 2025-01-18 RX ORDER — DIPHENHYDRAMINE HYDROCHLORIDE 50 MG/ML
25 INJECTION INTRAMUSCULAR; INTRAVENOUS EVERY 6 HOURS PRN
Status: DISCONTINUED | OUTPATIENT
Start: 2025-01-18 | End: 2025-01-18

## 2025-01-18 RX ORDER — DIPHENHYDRAMINE HYDROCHLORIDE 50 MG/ML
25 INJECTION INTRAMUSCULAR; INTRAVENOUS EVERY 6 HOURS PRN
Status: DISCONTINUED | OUTPATIENT
Start: 2025-01-18 | End: 2025-01-20

## 2025-01-18 RX ORDER — OXYCODONE HYDROCHLORIDE 5 MG/1
5 TABLET ORAL EVERY 4 HOURS PRN
Refills: 0 | Status: DISCONTINUED | OUTPATIENT
Start: 2025-01-18 | End: 2025-01-29 | Stop reason: HOSPADM

## 2025-01-18 RX ORDER — IODIXANOL 320 MG/ML
INJECTION, SOLUTION INTRAVASCULAR CODE/TRAUMA/SEDATION MEDICATION
Status: DISCONTINUED | OUTPATIENT
Start: 2025-01-17 | End: 2025-01-18 | Stop reason: HOSPADM

## 2025-01-18 RX ORDER — ONDANSETRON 2 MG/ML
4 INJECTION INTRAMUSCULAR; INTRAVENOUS EVERY 6 HOURS PRN
Status: DISCONTINUED | OUTPATIENT
Start: 2025-01-18 | End: 2025-01-29 | Stop reason: HOSPADM

## 2025-01-18 RX ORDER — MAGNESIUM SULFATE HEPTAHYDRATE 40 MG/ML
2 INJECTION, SOLUTION INTRAVENOUS ONCE
Status: COMPLETED | OUTPATIENT
Start: 2025-01-18 | End: 2025-01-18

## 2025-01-18 RX ORDER — POTASSIUM CHLORIDE 14.9 MG/ML
20 INJECTION INTRAVENOUS ONCE
Status: DISCONTINUED | OUTPATIENT
Start: 2025-01-18 | End: 2025-01-18

## 2025-01-18 RX ORDER — LIDOCAINE 50 MG/G
1 PATCH TOPICAL DAILY
Status: DISCONTINUED | OUTPATIENT
Start: 2025-01-18 | End: 2025-01-29 | Stop reason: HOSPADM

## 2025-01-18 RX ORDER — MAGNESIUM HYDROXIDE/ALUMINUM HYDROXICE/SIMETHICONE 120; 1200; 1200 MG/30ML; MG/30ML; MG/30ML
30 SUSPENSION ORAL EVERY 4 HOURS PRN
Status: DISCONTINUED | OUTPATIENT
Start: 2025-01-18 | End: 2025-01-29 | Stop reason: HOSPADM

## 2025-01-18 RX ORDER — CALCIUM CARBONATE 500 MG/1
1000 TABLET, CHEWABLE ORAL 2 TIMES DAILY PRN
Status: DISCONTINUED | OUTPATIENT
Start: 2025-01-18 | End: 2025-01-18

## 2025-01-18 RX ORDER — MUSCLE RUB CREAM 100; 150 MG/G; MG/G
CREAM TOPICAL 4 TIMES DAILY PRN
Status: DISCONTINUED | OUTPATIENT
Start: 2025-01-18 | End: 2025-01-29 | Stop reason: HOSPADM

## 2025-01-18 RX ADMIN — OXYCODONE HYDROCHLORIDE 5 MG: 5 TABLET ORAL at 14:42

## 2025-01-18 RX ADMIN — HEPARIN SODIUM 2000 UNITS: 1000 INJECTION, SOLUTION INTRAVENOUS; SUBCUTANEOUS at 06:18

## 2025-01-18 RX ADMIN — ALUMINUM HYDROXIDE, MAGNESIUM HYDROXIDE, AND DIMETHICONE 30 ML: 200; 20; 200 SUSPENSION ORAL at 11:14

## 2025-01-18 RX ADMIN — FAMOTIDINE 20 MG: 10 INJECTION, SOLUTION INTRAVENOUS at 09:03

## 2025-01-18 RX ADMIN — HEPARIN SODIUM 2000 UNITS: 1000 INJECTION, SOLUTION INTRAVENOUS; SUBCUTANEOUS at 13:06

## 2025-01-18 RX ADMIN — LIDOCAINE 5% 1 PATCH: 700 PATCH TOPICAL at 09:03

## 2025-01-18 RX ADMIN — OXYCODONE HYDROCHLORIDE 5 MG: 5 TABLET ORAL at 18:59

## 2025-01-18 RX ADMIN — MUSCLE RUB CREAM 1 APPLICATION: 100; 150 CREAM TOPICAL at 18:45

## 2025-01-18 RX ADMIN — OXYCODONE HYDROCHLORIDE 5 MG: 5 TABLET ORAL at 08:43

## 2025-01-18 RX ADMIN — POTASSIUM CHLORIDE 40 MEQ: 1500 TABLET, EXTENDED RELEASE ORAL at 08:44

## 2025-01-18 RX ADMIN — Medication 2.5 MG: at 23:55

## 2025-01-18 RX ADMIN — DIPHENHYDRAMINE HYDROCHLORIDE 25 MG: 50 INJECTION, SOLUTION INTRAMUSCULAR; INTRAVENOUS at 23:54

## 2025-01-18 RX ADMIN — CHLORHEXIDINE GLUCONATE 0.12% ORAL RINSE 15 ML: 1.2 LIQUID ORAL at 09:03

## 2025-01-18 RX ADMIN — TICAGRELOR 90 MG: 90 TABLET ORAL at 08:51

## 2025-01-18 RX ADMIN — FAMOTIDINE 20 MG: 10 INJECTION, SOLUTION INTRAVENOUS at 17:42

## 2025-01-18 RX ADMIN — HEPARIN SODIUM 2000 UNITS: 1000 INJECTION, SOLUTION INTRAVENOUS; SUBCUTANEOUS at 21:41

## 2025-01-18 RX ADMIN — MELATONIN 6 MG: 3 TAB ORAL at 23:55

## 2025-01-18 RX ADMIN — MAGNESIUM SULFATE HEPTAHYDRATE 2 G: 40 INJECTION, SOLUTION INTRAVENOUS at 08:15

## 2025-01-18 RX ADMIN — HEPARIN SODIUM 12.1 UNITS/KG/HR: 10000 INJECTION, SOLUTION INTRAVENOUS at 17:50

## 2025-01-18 RX ADMIN — TICAGRELOR 90 MG: 90 TABLET ORAL at 21:36

## 2025-01-18 RX ADMIN — ASPIRIN 81 MG: 81 TABLET, COATED ORAL at 08:51

## 2025-01-18 NOTE — ASSESSMENT & PLAN NOTE
Continue Lipitor  Monitor hepatic functions given cardiogenic shock, with low threshold to discontinue for transaminitis  Lipid panel in a.m.

## 2025-01-18 NOTE — PLAN OF CARE
Problem: Prexisting or High Potential for Compromised Skin Integrity  Goal: Skin integrity is maintained or improved  Description: INTERVENTIONS:  - Identify patients at risk for skin breakdown  - Assess and monitor skin integrity  - Assess and monitor nutrition and hydration status  - Monitor labs   - Assess for incontinence   - Turn and reposition patient  - Assist with mobility/ambulation  - Relieve pressure over bony prominences  - Avoid friction and shearing  - Provide appropriate hygiene as needed including keeping skin clean and dry  - Evaluate need for skin moisturizer/barrier cream  - Collaborate with interdisciplinary team   - Patient/family teaching  - Consider wound care consult   Outcome: Progressing     Problem: PAIN - ADULT  Goal: Verbalizes/displays adequate comfort level or baseline comfort level  Description: Interventions:  - Encourage patient to monitor pain and request assistance  - Assess pain using appropriate pain scale  - Administer analgesics based on type and severity of pain and evaluate response  - Implement non-pharmacological measures as appropriate and evaluate response  - Consider cultural and social influences on pain and pain management  - Notify physician/advanced practitioner if interventions unsuccessful or patient reports new pain  Outcome: Progressing     Problem: INFECTION - ADULT  Goal: Absence of fever/infection during neutropenic period  Description: INTERVENTIONS:  - Monitor WBC    Outcome: Progressing     Problem: SAFETY ADULT  Goal: Maintain or return to baseline ADL function  Description: INTERVENTIONS:  -  Assess patient's ability to carry out ADLs; assess patient's baseline for ADL function and identify physical deficits which impact ability to perform ADLs (bathing, care of mouth/teeth, toileting, grooming, dressing, etc.)  - Assess/evaluate cause of self-care deficits   - Assess range of motion  - Assess patient's mobility; develop plan if impaired  - Assess  patient's need for assistive devices and provide as appropriate  - Encourage maximum independence but intervene and supervise when necessary  - Involve family in performance of ADLs  - Assess for home care needs following discharge   - Consider OT consult to assist with ADL evaluation and planning for discharge  - Provide patient education as appropriate  Outcome: Progressing     Problem: DISCHARGE PLANNING  Goal: Discharge to home or other facility with appropriate resources  Description: INTERVENTIONS:  - Identify barriers to discharge w/patient and caregiver  - Arrange for needed discharge resources and transportation as appropriate  - Identify discharge learning needs (meds, wound care, etc.)  - Arrange for interpretive services to assist at discharge as needed  - Refer to Case Management Department for coordinating discharge planning if the patient needs post-hospital services based on physician/advanced practitioner order or complex needs related to functional status, cognitive ability, or social support system  Outcome: Progressing     Problem: Knowledge Deficit  Goal: Patient/family/caregiver demonstrates understanding of disease process, treatment plan, medications, and discharge instructions  Description: Complete learning assessment and assess knowledge base.  Interventions:  - Provide teaching at level of understanding  - Provide teaching via preferred learning methods  Outcome: Progressing     Problem: Nutrition/Hydration-ADULT  Goal: Nutrient/Hydration intake appropriate for improving, restoring or maintaining nutritional needs  Description: Monitor and assess patient's nutrition/hydration status for malnutrition. Collaborate with interdisciplinary team and initiate plan and interventions as ordered.  Monitor patient's weight and dietary intake as ordered or per policy. Utilize nutrition screening tool and intervene as necessary. Determine patient's food preferences and provide high-protein,  high-caloric foods as appropriate.     INTERVENTIONS:  - Monitor oral intake, urinary output, labs, and treatment plans  - Assess nutrition and hydration status and recommend course of action  - Evaluate amount of meals eaten  - Assist patient with eating if necessary   - Allow adequate time for meals  - Recommend/ encourage appropriate diets, oral nutritional supplements, and vitamin/mineral supplements  - Order, calculate, and assess calorie counts as needed  - Recommend, monitor, and adjust tube feedings and TPN/PPN based on assessed needs  - Assess need for intravenous fluids  - Provide specific nutrition/hydration education as appropriate  - Include patient/family/caregiver in decisions related to nutrition  Outcome: Progressing     Problem: CARDIOVASCULAR - ADULT  Goal: Maintains optimal cardiac output and hemodynamic stability  Description: INTERVENTIONS:  - Monitor I/O, vital signs and rhythm  - Monitor for S/S and trends of decreased cardiac output  - Administer and titrate ordered vasoactive medications to optimize hemodynamic stability  - Assess quality of pulses, skin color and temperature  - Assess for signs of decreased coronary artery perfusion  - Instruct patient to report change in severity of symptoms  Outcome: Progressing     Problem: CARDIOVASCULAR - ADULT  Goal: Absence of cardiac dysrhythmias or at baseline rhythm  Description: INTERVENTIONS:  - Continuous cardiac monitoring, vital signs, obtain 12 lead EKG if ordered  - Administer antiarrhythmic and heart rate control medications as ordered  - Monitor electrolytes and administer replacement therapy as ordered  Outcome: Progressing     Problem: RESPIRATORY - ADULT  Goal: Achieves optimal ventilation and oxygenation  Description: INTERVENTIONS:  - Assess for changes in respiratory status  - Assess for changes in mentation and behavior  - Position to facilitate oxygenation and minimize respiratory effort  - Oxygen administered by appropriate  delivery if ordered  - Initiate smoking cessation education as indicated  - Encourage broncho-pulmonary hygiene including cough, deep breathe, Incentive Spirometry  - Assess the need for suctioning and aspirate as needed  - Assess and instruct to report SOB or any respiratory difficulty  - Respiratory Therapy support as indicated  Outcome: Progressing     Problem: METABOLIC, FLUID AND ELECTROLYTES - ADULT  Goal: Fluid balance maintained  Description: INTERVENTIONS:  - Monitor labs   - Monitor I/O and WT  - Instruct patient on fluid and nutrition as appropriate  - Assess for signs & symptoms of volume excess or deficit  Outcome: Progressing     Problem: METABOLIC, FLUID AND ELECTROLYTES - ADULT  Goal: Glucose maintained within target range  Description: INTERVENTIONS:  - Monitor Blood Glucose as ordered  - Assess for signs and symptoms of hyperglycemia and hypoglycemia  - Administer ordered medications to maintain glucose within target range  - Assess nutritional intake and initiate nutrition service referral as needed  Outcome: Progressing

## 2025-01-18 NOTE — ASSESSMENT & PLAN NOTE
Status post drug-eluting stent to the LAD and left main, unable to intervene on the circumflex artery.  Significant hypotension in the Cath Lab requiring pressors and intra-aortic balloon pump support  Plan:  Per cardiology, ACS heparin while on balloon pump.  Continue Brilinta and aspirin.  2D echo ordered  Cardiology consulted, interventional cardiology following.

## 2025-01-18 NOTE — PROGRESS NOTES
Progress Note - Critical Care/ICU   Name: Joseph Aguilar 69 y.o. male I MRN: [id number]  Unit/Bed#: ICU 04 I Date of Admission: 1/17/2025   Date of Service: 1/18/2025 I Hospital Day: 1      Assessment & Plan  Acute ST elevation myocardial infarction (STEMI) involving left main coronary artery (HCC)  Status post drug-eluting stent to the LAD and left main, unable to intervene on the circumflex artery.  Significant hypotension in the Cath Lab requiring pressors and intra-aortic balloon pump support  Plan:  Per cardiology, ACS heparin while on balloon pump.  Continue Brilinta and aspirin.  2D echo ordered  Cardiology consulted, interventional cardiology following.    Mixed hyperlipidemia  Holding Lipitor secondary to transaminitis due to shock liver related to cardiogenic shock secondary to STEMI  Lipid panel in a.m.  Type 2 diabetes mellitus without complication, without long-term current use of insulin (HCC)  Lab Results   Component Value Date    HGBA1C 7.2 (H) 01/08/2022       Recent Labs     01/17/25  2205 01/17/25  2328 01/18/25  0153 01/18/25  0357   POCGLU 225* 172* 145* 143*       Blood Sugar Average: Last 72 hrs:    Hold Jardiance, metformin, glipizide.  Insulin drip per protocol  (P) 185.4    Cardiogenic shock (HCC)  Patient presented with hypotension and near syncope likely related to cardiogenic shock due to STEMI  Plan  Continue intra-aortic balloon pump at 1:1, Levophed weaned to off given sufficient augments  Maintain mean arterial pressures greater than 65, augments currently sufficient  Gentle fluid resuscitation given dye load in the setting of metformin  Continue to monitor endpoints      Disposition: Critical care    ICU Core Measures     A: Assess, Prevent, and Manage Pain Has pain been assessed? Yes  Need for changes to pain regimen? NA   B: Both SAT/SAT  N/A   C: Choice of Sedation RASS Goal: N/A patient not on sedation  Need for changes to sedation or analgesia regimen? NA   D: Delirium CAM-ICU:  Negative   E: Early Mobility  Plan for early mobility? No   F: Family Engagement Plan for family engagement today? Yes       Review of Invasive Devices:    Rupal Plan: Continue for accurate I/O monitoring for 48 hours        Prophylaxis:  VTE VTE covered by:  heparin (porcine), Intravenous, 8.1 Units/kg/hr at 01/17/25 2338  heparin (porcine), Intravenous  heparin (porcine), Intravenous       Stress Ulcer  covered byFamotidine (PF) (PEPCID) injection 20 mg [[id number]]         24 Hour Events : Hemodynamically improving overnight.  Pressors weaned to off.  Plan to set IABP 1:2 as tolerated.  Denies chest pain shortness of breath overnight.    Subjective       Objective :                   Vitals I/O      Most Recent Min/Max in 24hrs   Temp 98 °F (36.7 °C) Temp  Min: 97.5 °F (36.4 °C)  Max: 98 °F (36.7 °C)   Pulse 84 Pulse  Min: 77  Max: 88   Resp 20 Resp  Min: 19  Max: 22   BP 93/63 BP  Min: 82/50  Max: 93/63   O2 Sat 94 % SpO2  Min: 93 %  Max: 100 %      Intake/Output Summary (Last 24 hours) at 1/18/2025 0447  Last data filed at 1/18/2025 0400  Gross per 24 hour   Intake 390.88 ml   Output 950 ml   Net -559.12 ml       Diet NPO    Invasive Monitoring           Physical Exam   Physical Exam  Vitals and nursing note reviewed.   Eyes:      General: No scleral icterus.     Extraocular Movements: Extraocular movements intact.      Conjunctiva/sclera: Conjunctivae normal.   Skin:     General: Skin is cool and dry.      Capillary Refill: Capillary refill takes less than 2 seconds.      Coloration: Skin is not jaundiced or pale.   HENT:      Head: Normocephalic and atraumatic.      Mouth/Throat:      Mouth: Mucous membranes are moist.   Neck:      Vascular: No JVD.   Cardiovascular:      Rate and Rhythm: Normal rate and regular rhythm.      Pulses: Decreased pulses.      Heart sounds: Normal heart sounds.   Musculoskeletal:         General: Normal range of motion.      Right lower leg: Trace Edema present.      Left lower  leg: Trace Edema present.      Comments: Right lower extremity immobilized secondary to intra-aortic balloon pump   Abdominal: General: Bowel sounds are normal. There is no distension.      Palpations: Abdomen is soft.      Tenderness: There is no abdominal tenderness. There is no guarding.   Constitutional:       General: He is not in acute distress.     Appearance: He is not ill-appearing.   Pulmonary:      Effort: Pulmonary effort is normal.      Breath sounds: Wheezing present.   Secretions are normal.Chest:      Chest wall: No tenderness.   Psychiatric:         Speech: Speech is not no receptive aphasia or no expressive aphasia.   Neurological:      General: No focal deficit present.      Mental Status: He is alert and oriented to person, place and time. Mental status is at baseline. He is calm.      Cranial Nerves: No dysarthria or facial asymmetry.      Sensory: Sensation is intact.      Motor: gross motor function is at baseline for patient. Strength full and intact in all extremities.   Genitourinary/Anorectal:  Goldsmith present.        Diagnostic Studies        Lab Results: I have reviewed the following results:     Medications:  Scheduled PRN   aspirin, 81 mg, Daily  [Held by provider] atorvastatin, 80 mg, Daily With Dinner  chlorhexidine, 15 mL, Q12H YNES  famotidine, 20 mg, BID  senna-docusate sodium, 2 tablet, BID  ticagrelor, 90 mg, Q12H YNES      bisacodyl, 10 mg, Daily PRN  heparin (porcine), 2,000 Units, Q6H PRN  heparin (porcine), 4,000 Units, Q6H PRN       Continuous    heparin (porcine), 3-20 Units/kg/hr (Order-Specific), Last Rate: 8.1 Units/kg/hr (01/17/25 2338)  insulin regular (HumuLIN R,NovoLIN R) 1 Units/mL in sodium chloride 0.9 % 100 mL infusion, 0.3-21 Units/hr, Last Rate: 1 Units/hr (01/17/25 2329)  norepinephrine, 1-30 mcg/min, Last Rate: Stopped (01/18/25 0014)  sodium chloride, 50 mL/hr, Last Rate: 50 mL/hr (01/17/25 2227)         Labs:   CBC    Recent Labs     01/17/25  1800  01/17/25 2056   WBC 14.26* 16.44*   HGB 14.2 13.4   HCT 42.2 40.2    211     BMP    Recent Labs     01/17/25  1800 01/17/25 2056   SODIUM 138 137   K 4.1 4.3    104   CO2 24 26   AGAP 10 7   BUN 22 22   CREATININE 0.93 0.90   CALCIUM 8.6 7.9*       Coags    Recent Labs     01/17/25  1820 01/17/25 2056   INR  --  1.20*   PTT 24 >210*        Additional Electrolytes  Recent Labs     01/17/25 2056   MG 1.4*   PHOS 3.3   CAIONIZED 1.08*          Blood Gas    Recent Labs     01/17/25 2107   PHART 7.362   LYA8NPN 36.4   PO2ART 61.2*   YAO6TBQ 20.2*   BEART -4.5   SOURCE Line, Arterial     Recent Labs     01/17/25 2107   SOURCE Line, Arterial    LFTs  Recent Labs     01/17/25  1800 01/17/25 2056   ALT 58* 102*   AST 30 325*   ALKPHOS 80 74   ALB 3.9 3.6   TBILI 0.46 0.67       Infectious  No recent results  Glucose  Recent Labs     01/17/25  1800 01/17/25 2056   GLUC 195* 233*

## 2025-01-18 NOTE — ASSESSMENT & PLAN NOTE
"Lab Results   Component Value Date    HGBA1C 7.2 (H) 01/08/2022       No results for input(s): \"POCGLU\" in the last 72 hours.    Blood Sugar Average: Last 72 hrs:    Hold Jardiance, metformin, glipizide.  Insulin drip per protocol      "

## 2025-01-18 NOTE — ASSESSMENT & PLAN NOTE
Lab Results   Component Value Date    HGBA1C 7.2 (H) 01/08/2022     Recent Labs     01/18/25  0153 01/18/25  0357 01/18/25  0557 01/18/25  0802   POCGLU 145* 143* 164* 166*   Blood Sugar Average: Last 72 hrs:  (P) 179.6392769768799346  Management per primary service.

## 2025-01-18 NOTE — ASSESSMENT & PLAN NOTE
Patient presented with hypotension and near syncope likely related to cardiogenic shock due to STEMI  Plan  Continue intra-aortic balloon pump at 1:1, Levophed weaned to off given sufficient augments  Maintain mean arterial pressures greater than 65, augments currently sufficient  Gentle fluid resuscitation given dye load in the setting of metformin  Continue to monitor endpoints

## 2025-01-18 NOTE — ASSESSMENT & PLAN NOTE
Troponin with peak >22,973, recommend repeat troponin tomorrow.  TTE pending.  Telemetry shows NSR with brief runs of PSVT and NSVT, continue to monitor.  Continue ASA, Brilinta.  Hold statin 2/2 transaminitis.  Not on BB at this time due to cardiogenic shock.  On heparin gtt given presence of IABP.

## 2025-01-18 NOTE — ASSESSMENT & PLAN NOTE
Lab Results   Component Value Date    HGBA1C 7.2 (H) 01/08/2022       Recent Labs     01/17/25  2205 01/17/25  2328 01/18/25  0153 01/18/25  0357   POCGLU 225* 172* 145* 143*       Blood Sugar Average: Last 72 hrs:    Hold Jardiance, metformin, glipizide.  Insulin drip per protocol  (P) 185.4

## 2025-01-18 NOTE — ASSESSMENT & PLAN NOTE
Holding Lipitor secondary to transaminitis due to shock liver related to cardiogenic shock secondary to STEMI  Lipid panel in a.m.

## 2025-01-18 NOTE — CONSULTS
Consultation - Cardiology   Joseph Aguilar 69 y.o. male MRN: [id number]  Unit/Bed#: ICU 04 Encounter: [id number]  01/18/25  9:04 AM    Assessment & Plan  Anterior STEMI s/p PCI with CHYNA to LM-ostial LAD and mLAD with IABP;  of LCx (1/17/2024)  Troponin with peak >22,973, recommend repeat troponin tomorrow.  TTE pending.  Telemetry shows NSR with brief runs of PSVT and NSVT, continue to monitor.  Continue ASA, Brilinta.  Hold statin 2/2 transaminitis.  Not on BB at this time due to cardiogenic shock.  On heparin gtt given presence of IABP.  Cardiogenic shock (HCC)  Weaned off Levophed, plan to wean IABP as tolerated.  TTE pending.  History of hypertension  BP now soft, continue to monitor.  See plan above.  Mixed hyperlipidemia  Lipitor held in setting of transaminitis.  Type 2 diabetes mellitus without complication, without long-term current use of insulin ([facility])  Lab Results   Component Value Date    HGBA1C 7.2 (H) 01/08/2022     Recent Labs     01/18/25  0153 01/18/25  0357 01/18/25  0557 01/18/25  0802   POCGLU 145* 143* 164* 166*   Blood Sugar Average: Last 72 hrs:  (P) 179.6458646099699496  Management per primary service.        History of Present Illness   Physician Requesting Consult: Phoenix Nolasco MD    Reason for Consult / Principal Problem: Anterior STEMI    HPI: Joseph Aguilar is a 69 y.o. year old male with history of hypertension, hyperlipidemia, and T2DM who presents with near syncope and hypotension.  Patient was found to have anterior STEMI with significantly elevated troponins.  On-call interventional cardiologist was alerted and patient was taken urgently to Cath Lab where he received successful PTCA and CHYNA to the distal LM and ostial LAD, as well as, successful PTCA and CHYNA to the mLAD.  Patient noted to be in cardiogenic shock with severely elevated LVEDP requiring intra-aortic balloon pump.  He was subsequently admitted to the critical care unit.    Inpatient consult to  Cardiology  Consult performed by: Loyd Olmos PA-C  Consult ordered by: MARGARITA Hernandez            Review of Systems   Constitutional:  Negative for chills and fever.   HENT:  Negative for ear pain and sore throat.    Eyes:  Negative for pain and visual disturbance.   Respiratory:  Negative for cough and shortness of breath.    Cardiovascular:  Negative for chest pain, palpitations and leg swelling.   Gastrointestinal:  Negative for abdominal pain and vomiting.   Genitourinary:  Negative for dysuria and hematuria.   Musculoskeletal:  Negative for arthralgias and back pain.   Skin:  Negative for color change and rash.   Neurological:  Negative for seizures. Syncope: Near syncope.  All other systems reviewed and are negative.      Historical Information   Past Medical History:   Diagnosis Date    Depression     Diabetes mellitus (HCC)     Hyperlipidemia     Hypertension     Osteoarthritis, knee      Past Surgical History:   Procedure Laterality Date    BACK SURGERY      HAND SURGERY      JOINT REPLACEMENT Left     l tkr    KNEE SURGERY Left     [place] ARTHRP KNE CONDYLE&PLATU MEDIAL&LAT COMPARTMENTS Right 2018    Procedure: ARTHROPLASTY KNEE TOTAL;  Surgeon: Franny Grimaldo MD;  Location: [facility];  Service: Orthopedics    TOOTH EXTRACTION      WISDOM TOOTH EXTRACTION       Social History     Substance and Sexual Activity   Alcohol Use Yes    Comment: 'couple beers every couple weeks'     Social History     Substance and Sexual Activity   Drug Use Yes    Types: Marijuana     Social History     Tobacco Use   Smoking Status Former    Current packs/day: 0.00    Types: Cigarettes    Quit date: 3/1/2012    Years since quittin.8    Passive exposure: Past   Smokeless Tobacco Never       Family History:   Family History   Problem Relation Age of Onset    No Known Problems Mother     Heart attack Father     Arthritis Family     Cancer Family     Diabetes Family     Heart disease Family      "Osteoporosis Family        Meds/Allergies   all current active meds have been reviewed  No Known Allergies    Objective   Vitals: Blood pressure 93/63, pulse 75, temperature 97.8 °F (36.6 °C), resp. rate 20, height 6' 2\" (1.88 m), weight 115 kg (253 lb 1.4 oz), SpO2 91%., Body mass index is 32.49 kg/m².,   Orthostatic Blood Pressures      Flowsheet Row Most Recent Value   Blood Pressure 93/63 filed at 2025 2120   Patient Position - Orthostatic VS Sitting filed at 2025 1830            Systolic (24hrs), Av , Min:82 , Max:93     Diastolic (24hrs), Av, Min:50, Max:63        Intake/Output Summary (Last 24 hours) at 2025 0904  Last data filed at 2025 0600  Gross per 24 hour   Intake 407.48 ml   Output 1150 ml   Net -742.52 ml       Invasive Devices       Peripheral Intravenous Line  Duration             Peripheral IV 25 Left Antecubital <1 day    Peripheral IV 25 Right Antecubital <1 day              Line  Duration             Arterial Sheath -- days    IABP 8.0 Fr. 50 mL <1 day              Drain  Duration             Urethral Catheter <1 day                        Physical Exam:  GEN: Alert and oriented x3, in no acute distress.  Ill appearing and well nourished.   HEENT: Sclera anicteric, conjunctivae pink, mucous membranes moist. Oropharynx clear.   NECK: Supple, no carotid bruits, no significant JVD. Trachea midline, no thyromegaly.   HEART: Regular rhythm, normal S1 and S2, no murmurs, clicks, gallops or rubs. PMI nondisplaced, no thrills.   LUNGS: Clear to auscultation bilaterally; no wheezes, rales, or rhonchi. No increased work of breathing or signs of respiratory distress.   ABDOMEN: Soft, nontender, nondistended, normoactive bowel sounds.   EXTREMITIES: Skin warm and well perfused, no clubbing or cyanosis, trace edema.  NEURO: No focal findings. Normal speech. Mood and affect normal.   SKIN: Normal without suspicious lesions on exposed skin.    Lab Results:     Cardiac " Profile:   Results from last 7 days   Lab Units 01/17/25 2246 01/17/25 2056 01/17/25  1800   HS TNI 0HR ng/L  --   --  113*   HS TNI 2HR ng/L  --  >22,973*  --    HSTNI D2 ng/L  --  >22,860*  --    HS TNI 4HR ng/L >22,973*  --   --    HSTNI D4 ng/L >22,860*  --   --        CBC with diff:   Results from last 7 days   Lab Units 01/18/25 0443 01/17/25 2056 01/17/25  1800   WBC Thousand/uL 13.81* 16.44* 14.26*   HEMOGLOBIN g/dL 13.6 13.4 14.2   HEMATOCRIT % 40.5 40.2 42.2   MCV fL 95 95 95   PLATELETS Thousands/uL 167 211 219   RBC Million/uL 4.27 4.25 4.46   MCH pg 31.9 31.5 31.8   MCHC g/dL 33.6 33.3 33.6   RDW % 13.5 13.6 13.6   MPV fL 9.7 9.6 9.6   NRBC AUTO /100 WBCs  --   --  0         CMP:   Results from last 7 days   Lab Units 01/18/25 0443 01/17/25 2056 01/17/25  1800   POTASSIUM mmol/L 3.8 4.3 4.1   CHLORIDE mmol/L 107 104 104   CO2 mmol/L 24 26 24   BUN mg/dL 20 22 22   CREATININE mg/dL 0.68 0.90 0.93   CALCIUM mg/dL 8.5 7.9* 8.6   AST U/L 459* 325* 30   ALT U/L 132* 102* 58*   ALK PHOS U/L 67 74 80   EGFR ml/min/1.73sq m 98 88 84

## 2025-01-18 NOTE — H&P
"H&P - Critical Care/ICU   Name: Joseph Aguilar 69 y.o. male I MRN: [id number]  Unit/Bed#: ICU 04 I Date of Admission: 1/17/2025   Date of Service: 1/17/2025 I Hospital Day: 0       Assessment & Plan  Acute ST elevation myocardial infarction (STEMI) involving left main coronary artery (HCC)  Status post drug-eluting stent to the LAD and left main, unable to intervene on the circumflex artery.  Significant hypotension in the Cath Lab requiring pressors and intra-aortic balloon pump support  Per cardiology, ACS heparin while on balloon pump.  Continue Brilinta and aspirin.  2D echo ordered  Twelve-lead EKG post cath  Cardiology consulted, interventional cardiology following.    Mixed hyperlipidemia  Continue Lipitor  Monitor hepatic functions given cardiogenic shock, with low threshold to discontinue for transaminitis  Lipid panel in a.m.  Type 2 diabetes mellitus without complication, without long-term current use of insulin (HCC)  Lab Results   Component Value Date    HGBA1C 7.2 (H) 01/08/2022       No results for input(s): \"POCGLU\" in the last 72 hours.    Blood Sugar Average: Last 72 hrs:    Hold Jardiance, metformin, glipizide.  Insulin drip per protocol      Cardiogenic shock (HCC)  Patient presented with hypotension and near syncope likely related to cardiogenic shock due to STEMI  Currently on low-dose Levophed and intra-aortic balloon pump at 1:1.  Maintain mean arterial pressures greater than 65, augments currently sufficient  Gentle fluid resuscitation given dye load in the setting of metformin  Continue to monitor endpoints  Chest x-ray for baseline positioning of intra-aortic balloon pump    Disposition: Critical care    History of Present Illness   Joseph Aguilar is a 69 y.o. with past medical history significant for hypertension, hyperlipidemia, diabetes, osteoarthritis who presents with near syncope and hypotension.  Patient denied chest pain, shortness of breath, nausea or vomiting.  Endorses " dizziness during the event.  During evaluation in the emergency department, anterior wall STEMI was identified and interventional cardiology was alerted.  Patient was taken to the Cath Lab, where patient became more hypotensive requiring pressor support and intra-aortic balloon pump insertion.  Per cardiology there was some element of difficulty, however once the left main was stented patient became much more stable.  Left main, LAD were stented however the circumflex artery was unable to be intervened upon.  Critical care was consulted for admission to the intensive care unit for cardiogenic shock status post STEMI.  Postcardiac cath, patient continues to deny chest pain or shortness of breath.    History obtained from chart review and the patient.  Review of Systems: Review of Systems not obtainable due to Clinical Condition    Historical Information   Past Medical History:  No date: Depression  No date: Diabetes mellitus (HCC)  No date: Hyperlipidemia  No date: Hypertension  No date: Osteoarthritis, knee Past Surgical History:  No date: BACK SURGERY  No date: HAND SURGERY  No date: JOINT REPLACEMENT; Left      Comment:  l tkr  No date: KNEE SURGERY; Left  11/19/2018: [place] ARTHRP KNE CONDYLE&PLATU MEDIAL&LAT COMPARTMENTS; Right      Comment:  Procedure: ARTHROPLASTY KNEE TOTAL;  Surgeon: Franny Grimaldo MD;  Location: [facility];  Service:                Orthopedics  No date: TOOTH EXTRACTION  No date: WISDOM TOOTH EXTRACTION   Current Outpatient Medications   Medication Instructions    atorvastatin (LIPITOR) 80 mg tablet TAKE 40MG (ONE-HALF TABLET) BY MOUTH EVERY DAY AT 5 PM FOR CHOLESTEROL    Empagliflozin 12.5 mg, Oral, Daily    gabapentin (NEURONTIN) 300 mg    glipiZIDE (GLUCOTROL) 5 mg, Oral, Daily before breakfast    insulin glargine (LANTUS SOLOSTAR) 15 Units, Subcutaneous, Daily at bedtime    Melatonin 5 mg, Oral, Daily at bedtime    meloxicam (MOBIC) 15 mg, Oral, Daily PRN    metFORMIN  (GLUCOPHAGE) 1000 MG tablet TAKE ONE TABLET BY MOUTH TWICE A DAY WITH MEALS FOR DIABETES    No Known Allergies   Social History     Tobacco Use    Smoking status: Former     Current packs/day: 0.00     Types: Cigarettes     Quit date: 3/1/2012     Years since quittin.8    Smokeless tobacco: Never   Vaping Use    Vaping status: Never Used   Substance Use Topics    Alcohol use: Yes     Comment: 'couple beers every couple weeks'    Drug use: Yes     Types: Marijuana    Family History   Problem Relation Age of Onset    No Known Problems Mother     Heart attack Father     Arthritis Family     Cancer Family     Diabetes Family     Heart disease Family     Osteoporosis Family           Objective :                   Vitals I/O      Most Recent Min/Max in 24hrs   Temp 97.5 °F (36.4 °C) Temp  Min: 97.5 °F (36.4 °C)  Max: 97.5 °F (36.4 °C)   Pulse 79 Pulse  Min: 77  Max: 80   Resp 22 Resp  Min: 20  Max: 22   BP 90/59 BP  Min: 82/50  Max: 90/59   O2 Sat 99 % SpO2  Min: 98 %  Max: 100 %    No intake or output data in the 24 hours ending 25    Diet NPO    Invasive Monitoring           Physical Exam   Physical Exam  Vitals and nursing note reviewed.   Eyes:      General: No scleral icterus.     Extraocular Movements: Extraocular movements intact.      Conjunctiva/sclera: Conjunctivae normal.   Skin:     General: Skin is cool, dry and not mottled extremities.      Capillary Refill: Capillary refill takes 2 to 3 seconds.      Coloration: Skin is not jaundiced or pale.   HENT:      Head: Normocephalic and atraumatic.      Mouth/Throat:      Mouth: Mucous membranes are moist.   Neck:      Vascular: No JVD.   Cardiovascular:      Rate and Rhythm: Normal rate and regular rhythm.      Pulses: Decreased pulses.      Heart sounds: Normal heart sounds.   Musculoskeletal:      Right lower leg: Trace Edema present.      Left lower leg: Trace Edema present.   Abdominal: General: There is no distension.      Palpations: Abdomen  is soft.      Tenderness: There is no abdominal tenderness. There is no guarding.      Comments: Hypoactive bowel sounds   Constitutional:       General: He is not in acute distress.     Appearance: He is ill-appearing.   Pulmonary:      Effort: No accessory muscle usage, respiratory distress or accessory muscle usage.      Comments: Diminished breath sounds throughout, symmetrical excursion  Secretions are normal.Chest:      Chest wall: No tenderness.   Psychiatric:         Speech: Speech is not no receptive aphasia or no expressive aphasia.   Neurological:      General: No focal deficit present.      Mental Status: He is alert and oriented to person, place and time. Mental status is at baseline. He is calm. He is CAM ICU negative.      Cranial Nerves: No dysarthria or facial asymmetry.      Sensory: Sensation is intact.      Motor: gross motor function is at baseline for patient. Strength full and intact in all extremities.      Comments: Right lower extremity immobilized due to intra-aortic balloon pump          Diagnostic Studies        Lab Results: I have reviewed the following results:     Medications:  Scheduled PRN   [START ON 1/18/2025] aspirin, 81 mg, Daily  [START ON 1/18/2025] atorvastatin, 80 mg, Daily With Dinner  chlorhexidine, 15 mL, Q12H YNES  famotidine, 20 mg, BID  senna-docusate sodium, 2 tablet, BID  [START ON 1/18/2025] ticagrelor, 90 mg, Q12H YNES      bisacodyl, 10 mg, Daily PRN  heparin (porcine), 2,000 Units, Q6H PRN  heparin (porcine), 4,000 Units, Q6H PRN       Continuous    heparin (porcine), 3-20 Units/kg/hr (Order-Specific), Last Rate: 11.1 Units/kg/hr (01/17/25 2049)  insulin regular (HumuLIN R,NovoLIN R) 1 Units/mL in sodium chloride 0.9 % 100 mL infusion, 0.3-21 Units/hr  norepinephrine, 1-30 mcg/min  sodium chloride, 50 mL/hr         Labs:   CBC    Recent Labs     01/17/25  1800   WBC 14.26*   HGB 14.2   HCT 42.2        BMP    Recent Labs     01/17/25  1800   SODIUM 138   K  4.1      CO2 24   AGAP 10   BUN 22   CREATININE 0.93   CALCIUM 8.6       Coags    Recent Labs     01/17/25  1820   PTT 24        Additional Electrolytes  No recent results       Blood Gas    No recent results  No recent results LFTs  Recent Labs     01/17/25  1800   ALT 58*   AST 30   ALKPHOS 80   ALB 3.9   TBILI 0.46       Infectious  No recent results  Glucose  Recent Labs     01/17/25  1800   GLUC 195*

## 2025-01-18 NOTE — ASSESSMENT & PLAN NOTE
Patient presented with hypotension and near syncope likely related to cardiogenic shock due to STEMI  Currently on low-dose Levophed and intra-aortic balloon pump at 1:1.  Maintain mean arterial pressures greater than 65, augments currently sufficient  Gentle fluid resuscitation given dye load in the setting of metformin  Continue to monitor endpoints  Chest x-ray for baseline positioning of intra-aortic balloon pump

## 2025-01-18 NOTE — ASSESSMENT & PLAN NOTE
Status post drug-eluting stent to the LAD and left main, unable to intervene on the circumflex artery.  Significant hypotension in the Cath Lab requiring pressors and intra-aortic balloon pump support  Per cardiology, ACS heparin while on balloon pump.  Continue Brilinta and aspirin.  2D echo ordered  Twelve-lead EKG post cath  Cardiology consulted, interventional cardiology following.

## 2025-01-19 ENCOUNTER — APPOINTMENT (INPATIENT)
Dept: RADIOLOGY | Facility: HOSPITAL | Age: 70
DRG: 270 | End: 2025-01-19
Payer: MEDICARE

## 2025-01-19 ENCOUNTER — APPOINTMENT (INPATIENT)
Dept: NON INVASIVE DIAGNOSTICS | Facility: HOSPITAL | Age: 70
DRG: 270 | End: 2025-01-19
Payer: MEDICARE

## 2025-01-19 LAB
ALBUMIN SERPL BCG-MCNC: 3.6 G/DL (ref 3.5–5)
ALP SERPL-CCNC: 61 U/L (ref 34–104)
ALT SERPL W P-5'-P-CCNC: 88 U/L (ref 7–52)
ANION GAP SERPL CALCULATED.3IONS-SCNC: 6 MMOL/L (ref 4–13)
AORTIC VALVE MEAN VELOCITY: 12.6 M/S
APTT PPP: 79 SECONDS (ref 23–34)
APTT PPP: 87 SECONDS (ref 23–34)
AST SERPL W P-5'-P-CCNC: 143 U/L (ref 13–39)
AV AREA BY CONTINUOUS VTI: 1.7 CM2
AV AREA PEAK VELOCITY: 1.7 CM2
AV LVOT MEAN GRADIENT: 2 MMHG
AV LVOT PEAK GRADIENT: 4 MMHG
AV MEAN PRESS GRAD SYS DOP V1V2: 7 MMHG
AV ORIFICE AREA US: 1.74 CM2
AV PEAK GRADIENT: 12 MMHG
AV VELOCITY RATIO: 0.55
AV VMAX SYS DOP: 1.72 M/S
BASE EXCESS BLDA CALC-SCNC: -2.5 MMOL/L
BILIRUB SERPL-MCNC: 0.77 MG/DL (ref 0.2–1)
BODY TEMPERATURE: 98.1 DEGREES FEHRENHEIT
BSA FOR ECHO PROCEDURE: 2.39 M2
BUN SERPL-MCNC: 18 MG/DL (ref 5–25)
CA-I BLD-SCNC: 1.14 MMOL/L (ref 1.12–1.32)
CALCIUM SERPL-MCNC: 8.4 MG/DL (ref 8.4–10.2)
CARDIAC TROPONIN I PNL SERPL HS: ABNORMAL NG/L (ref 8–18)
CHLORIDE SERPL-SCNC: 110 MMOL/L (ref 96–108)
CO2 SERPL-SCNC: 24 MMOL/L (ref 21–32)
CREAT SERPL-MCNC: 0.86 MG/DL (ref 0.6–1.3)
DOP CALC AO VTI: 27.02 CM
DOP CALC LVOT AREA: 3.14 CM2
DOP CALC LVOT CARDIAC INDEX: 2.26 L/MIN/M2
DOP CALC LVOT CARDIAC OUTPUT: 5.42 L/MIN
DOP CALC LVOT DIAMETER: 2 CM
DOP CALC LVOT PEAK VEL VTI: 14.97 CM
DOP CALC LVOT PEAK VEL: 0.95 M/S
DOP CALC LVOT STROKE INDEX: 19.6 ML/M2
DOP CALC LVOT STROKE VOLUME: 47.01
E WAVE DECELERATION TIME: 148 MS
E/A RATIO: 1.4
ERYTHROCYTE [DISTWIDTH] IN BLOOD BY AUTOMATED COUNT: 13.5 % (ref 11.6–15.1)
FRACTIONAL SHORTENING: 21 (ref 28–44)
GFR SERPL CREATININE-BSD FRML MDRD: 89 ML/MIN/1.73SQ M
GLUCOSE SERPL-MCNC: 106 MG/DL (ref 65–140)
GLUCOSE SERPL-MCNC: 109 MG/DL (ref 65–140)
GLUCOSE SERPL-MCNC: 111 MG/DL (ref 65–140)
GLUCOSE SERPL-MCNC: 119 MG/DL (ref 65–140)
GLUCOSE SERPL-MCNC: 132 MG/DL (ref 65–140)
GLUCOSE SERPL-MCNC: 139 MG/DL (ref 65–140)
GLUCOSE SERPL-MCNC: 161 MG/DL (ref 65–140)
GLUCOSE SERPL-MCNC: 173 MG/DL (ref 65–140)
GLUCOSE SERPL-MCNC: 185 MG/DL (ref 65–140)
HCO3 BLDA-SCNC: 19.1 MMOL/L (ref 22–28)
HCT VFR BLD AUTO: 39.3 % (ref 36.5–49.3)
HGB BLD-MCNC: 13 G/DL (ref 12–17)
INTERVENTRICULAR SEPTUM IN DIASTOLE (PARASTERNAL SHORT AXIS VIEW): 1.4 CM
INTERVENTRICULAR SEPTUM: 1.4 CM (ref 0.6–1.1)
LAAS-AP4: 9.8 CM2
LACTATE SERPL-SCNC: 0.6 MMOL/L (ref 0.5–2)
LEFT INTERNAL DIMENSION IN SYSTOLE: 4.1 CM (ref 2.1–4)
LEFT VENTRICULAR INTERNAL DIMENSION IN DIASTOLE: 5.2 CM (ref 3.5–6)
LEFT VENTRICULAR POSTERIOR WALL IN END DIASTOLE: 1.3 CM
LEFT VENTRICULAR STROKE VOLUME: 52 ML
LV EF US.2D.A4C+ESTIMATED: 42 %
LVSV (TEICH): 52 ML
MAGNESIUM SERPL-MCNC: 1.7 MG/DL (ref 1.9–2.7)
MCH RBC QN AUTO: 31.3 PG (ref 26.8–34.3)
MCHC RBC AUTO-ENTMCNC: 33.1 G/DL (ref 31.4–37.4)
MCV RBC AUTO: 95 FL (ref 82–98)
MV E'TISSUE VEL-SEP: 8 CM/S
MV PEAK A VEL: 0.65 M/S
MV PEAK E VEL: 91 CM/S
MV STENOSIS PRESSURE HALF TIME: 43 MS
MV VALVE AREA P 1/2 METHOD: 5.12
NASAL CANNULA: 0
NON VENT ROOM AIR: 21 %
O2 CT BLDA-SCNC: 18.7 ML/DL (ref 16–23)
OXYHGB MFR BLDA: 95.2 % (ref 94–97)
PCO2 BLDA: 25.4 MM HG (ref 36–44)
PH BLDA: 7.49 [PH] (ref 7.35–7.45)
PHOSPHATE SERPL-MCNC: 3.5 MG/DL (ref 2.3–4.1)
PLATELET # BLD AUTO: 151 THOUSANDS/UL (ref 149–390)
PMV BLD AUTO: 9.4 FL (ref 8.9–12.7)
PO2 BLDA: 84.7 MM HG (ref 75–129)
POTASSIUM SERPL-SCNC: 4 MMOL/L (ref 3.5–5.3)
PROT SERPL-MCNC: 6.1 G/DL (ref 6.4–8.4)
RBC # BLD AUTO: 4.15 MILLION/UL (ref 3.88–5.62)
RIGHT ATRIUM AREA SYSTOLE A4C: 12 CM2
RIGHT VENTRICLE ID DIMENSION: 3.3 CM
SL CV LV EF: 25
SL CV PED ECHO LEFT VENTRICLE DIASTOLIC VOLUME (MOD BIPLANE) 2D: 127 ML
SL CV PED ECHO LEFT VENTRICLE SYSTOLIC VOLUME (MOD BIPLANE) 2D: 75 ML
SODIUM SERPL-SCNC: 140 MMOL/L (ref 135–147)
SPECIMEN SOURCE: ABNORMAL
TRICUSPID ANNULAR PLANE SYSTOLIC EXCURSION: 2.6 CM
WBC # BLD AUTO: 13.84 THOUSAND/UL (ref 4.31–10.16)

## 2025-01-19 PROCEDURE — 99233 SBSQ HOSP IP/OBS HIGH 50: CPT | Performed by: INTERNAL MEDICINE

## 2025-01-19 PROCEDURE — 82805 BLOOD GASES W/O2 SATURATION: CPT

## 2025-01-19 PROCEDURE — 83735 ASSAY OF MAGNESIUM: CPT | Performed by: NURSE PRACTITIONER

## 2025-01-19 PROCEDURE — 71045 X-RAY EXAM CHEST 1 VIEW: CPT

## 2025-01-19 PROCEDURE — 84484 ASSAY OF TROPONIN QUANT: CPT

## 2025-01-19 PROCEDURE — 83605 ASSAY OF LACTIC ACID: CPT

## 2025-01-19 PROCEDURE — 85730 THROMBOPLASTIN TIME PARTIAL: CPT | Performed by: NURSE PRACTITIONER

## 2025-01-19 PROCEDURE — 80053 COMPREHEN METABOLIC PANEL: CPT | Performed by: NURSE PRACTITIONER

## 2025-01-19 PROCEDURE — 82948 REAGENT STRIP/BLOOD GLUCOSE: CPT

## 2025-01-19 PROCEDURE — 99233 SBSQ HOSP IP/OBS HIGH 50: CPT | Performed by: ANESTHESIOLOGY

## 2025-01-19 PROCEDURE — 93306 TTE W/DOPPLER COMPLETE: CPT | Performed by: INTERNAL MEDICINE

## 2025-01-19 PROCEDURE — 84100 ASSAY OF PHOSPHORUS: CPT | Performed by: NURSE PRACTITIONER

## 2025-01-19 PROCEDURE — 93306 TTE W/DOPPLER COMPLETE: CPT

## 2025-01-19 PROCEDURE — 82330 ASSAY OF CALCIUM: CPT | Performed by: NURSE PRACTITIONER

## 2025-01-19 PROCEDURE — 85027 COMPLETE CBC AUTOMATED: CPT | Performed by: NURSE PRACTITIONER

## 2025-01-19 RX ORDER — OLANZAPINE 10 MG/2ML
10 INJECTION, POWDER, FOR SOLUTION INTRAMUSCULAR ONCE
Status: COMPLETED | OUTPATIENT
Start: 2025-01-19 | End: 2025-01-19

## 2025-01-19 RX ORDER — MAGNESIUM SULFATE HEPTAHYDRATE 40 MG/ML
2 INJECTION, SOLUTION INTRAVENOUS ONCE
Status: COMPLETED | OUTPATIENT
Start: 2025-01-19 | End: 2025-01-19

## 2025-01-19 RX ORDER — INSULIN GLARGINE 100 [IU]/ML
15 INJECTION, SOLUTION SUBCUTANEOUS
Status: DISCONTINUED | OUTPATIENT
Start: 2025-01-19 | End: 2025-01-21

## 2025-01-19 RX ORDER — ALPRAZOLAM 0.25 MG/1
0.25 TABLET ORAL ONCE
Status: DISCONTINUED | OUTPATIENT
Start: 2025-01-19 | End: 2025-01-19

## 2025-01-19 RX ORDER — INSULIN LISPRO 100 [IU]/ML
2-12 INJECTION, SOLUTION INTRAVENOUS; SUBCUTANEOUS
Status: DISCONTINUED | OUTPATIENT
Start: 2025-01-19 | End: 2025-01-29 | Stop reason: HOSPADM

## 2025-01-19 RX ORDER — WATER 10 ML/10ML
INJECTION INTRAMUSCULAR; INTRAVENOUS; SUBCUTANEOUS
Status: COMPLETED
Start: 2025-01-19 | End: 2025-01-19

## 2025-01-19 RX ORDER — INSULIN GLARGINE 100 [IU]/ML
10 INJECTION, SOLUTION SUBCUTANEOUS ONCE
Status: COMPLETED | OUTPATIENT
Start: 2025-01-19 | End: 2025-01-19

## 2025-01-19 RX ORDER — FUROSEMIDE 10 MG/ML
20 INJECTION INTRAMUSCULAR; INTRAVENOUS ONCE
Status: COMPLETED | OUTPATIENT
Start: 2025-01-19 | End: 2025-01-19

## 2025-01-19 RX ORDER — INSULIN LISPRO 100 [IU]/ML
1-5 INJECTION, SOLUTION INTRAVENOUS; SUBCUTANEOUS
Status: DISCONTINUED | OUTPATIENT
Start: 2025-01-19 | End: 2025-01-19

## 2025-01-19 RX ADMIN — CHLORHEXIDINE GLUCONATE 0.12% ORAL RINSE 15 ML: 1.2 LIQUID ORAL at 21:14

## 2025-01-19 RX ADMIN — MELATONIN 6 MG: 3 TAB ORAL at 21:14

## 2025-01-19 RX ADMIN — INSULIN LISPRO 2 UNITS: 100 INJECTION, SOLUTION INTRAVENOUS; SUBCUTANEOUS at 17:05

## 2025-01-19 RX ADMIN — DICLOFENAC SODIUM 2 G: 10 GEL TOPICAL at 21:15

## 2025-01-19 RX ADMIN — INSULIN GLARGINE 15 UNITS: 100 INJECTION, SOLUTION SUBCUTANEOUS at 21:16

## 2025-01-19 RX ADMIN — DICLOFENAC SODIUM 2 G: 10 GEL TOPICAL at 17:06

## 2025-01-19 RX ADMIN — MAGNESIUM SULFATE HEPTAHYDRATE 2 G: 40 INJECTION, SOLUTION INTRAVENOUS at 07:40

## 2025-01-19 RX ADMIN — OLANZAPINE 10 MG: 10 INJECTION, POWDER, FOR SOLUTION INTRAMUSCULAR at 01:27

## 2025-01-19 RX ADMIN — TICAGRELOR 90 MG: 90 TABLET ORAL at 08:34

## 2025-01-19 RX ADMIN — ASPIRIN 81 MG: 81 TABLET, COATED ORAL at 08:34

## 2025-01-19 RX ADMIN — INSULIN GLARGINE 10 UNITS: 100 INJECTION, SOLUTION SUBCUTANEOUS at 12:03

## 2025-01-19 RX ADMIN — INSULIN LISPRO 2 UNITS: 100 INJECTION, SOLUTION INTRAVENOUS; SUBCUTANEOUS at 21:14

## 2025-01-19 RX ADMIN — FAMOTIDINE 20 MG: 10 INJECTION, SOLUTION INTRAVENOUS at 08:40

## 2025-01-19 RX ADMIN — ATORVASTATIN CALCIUM 80 MG: 40 TABLET, FILM COATED ORAL at 17:08

## 2025-01-19 RX ADMIN — HEPARIN SODIUM 14.1 UNITS/KG/HR: 10000 INJECTION, SOLUTION INTRAVENOUS at 10:19

## 2025-01-19 RX ADMIN — WATER 10 ML: 1 INJECTION, SOLUTION INTRAMUSCULAR; INTRAVENOUS; SUBCUTANEOUS at 01:27

## 2025-01-19 RX ADMIN — MUSCLE RUB CREAM: 100; 150 CREAM TOPICAL at 12:56

## 2025-01-19 RX ADMIN — TICAGRELOR 90 MG: 90 TABLET ORAL at 21:14

## 2025-01-19 RX ADMIN — FUROSEMIDE 20 MG: 10 INJECTION, SOLUTION INTRAMUSCULAR; INTRAVENOUS at 12:06

## 2025-01-19 RX ADMIN — FAMOTIDINE 20 MG: 10 INJECTION, SOLUTION INTRAVENOUS at 17:06

## 2025-01-19 RX ADMIN — LIDOCAINE 5% 1 PATCH: 700 PATCH TOPICAL at 08:34

## 2025-01-19 NOTE — PROGRESS NOTES
Progress Note - Critical Care/ICU   Name: Joseph Aguilar 69 y.o. male I MRN: [id number]  Unit/Bed#: ICU 04 I Date of Admission: 1/17/2025   Date of Service: 1/19/2025 I Hospital Day: 2      Assessment & Plan  Anterior STEMI s/p PCI with CHYNA to LM-ostial LAD and mLAD with IABP;  of LCx (1/17/2024)  Status post drug-eluting stent to the LAD and left main, unable to intervene on the circumflex artery.  Significant hypotension in the Cath Lab requiring pressors and intra-aortic balloon pump support    Plan:  Per cardiology, ACS heparin while on balloon pump.  Continue Brilinta and aspirin.  2D echo pending  Cardiology following, interventional cardiology following.    Mixed hyperlipidemia  Holding Lipitor secondary to transaminitis due to shock liver related to cardiogenic shock secondary to STEMI    Type 2 diabetes mellitus without complication, without long-term current use of insulin (HCC)  Lab Results   Component Value Date    HGBA1C 8.2 (H) 01/18/2025       Recent Labs     01/18/25  1952 01/18/25  2146 01/18/25  2359 01/19/25  0204   POCGLU 159* 115 128 132       Blood Sugar Average: Last 72 hrs:    Hold Jardiance, metformin, glipizide.  Insulin drip per protocol  (P) 160.3125    Cardiogenic shock (HCC)  Patient presented with hypotension and near syncope likely related to cardiogenic shock due to STEMI    Plan  Continue intra-aortic balloon pump at 1:1, Levophed weaned to off given sufficient augments  Maintain mean arterial pressures greater than 65, augments currently sufficient  Continue to monitor endpoints  Awaiting echocardiogram  Low threshold for inotropic agents      History of hypertension  Holding antihypertensives in the setting of marginal blood pressures and cardiogenic shock  Disposition: Critical care    ICU Core Measures     A: Assess, Prevent, and Manage Pain Has pain been assessed? Yes  Need for changes to pain regimen? No   B: Both SAT/SAT  N/A   C: Choice of Sedation RASS Goal: N/A patient  not on sedation  Need for changes to sedation or analgesia regimen? NA   D: Delirium CAM-ICU: Negative   E: Early Mobility  Plan for early mobility? Yes   F: Family Engagement Plan for family engagement today? Yes       Review of Invasive Devices:    Rupal Plan: Continue for accurate I/O monitoring for 48 hours        Prophylaxis:  VTE VTE covered by:  heparin (porcine), Intravenous, 14.1 Units/kg/hr at 01/18/25 2125  heparin (porcine), Intravenous, 2,000 Units at 01/18/25 2141  heparin (porcine), Intravenous       Stress Ulcer  covered byFamotidine (PF) (PEPCID) injection 20 mg [[id number]]         24 Hour Events : Patient with periods of agitation and anxiety overnight.  Remains dependent on the balloon pump at 1:1 with sufficient augments.  Denies chest pain or shortness of breath overnight.    Subjective       Objective :                   Vitals I/O      Most Recent Min/Max in 24hrs   Temp 98 °F (36.7 °C) Temp  Min: 97.8 °F (36.6 °C)  Max: 98.2 °F (36.8 °C)   Pulse 88 Pulse  Min: 70  Max: 94   Resp 21 Resp  Min: 20  Max: 22   /58 BP  Min: 91/71  Max: 131/92   O2 Sat 95 % SpO2  Min: 91 %  Max: 98 %      Intake/Output Summary (Last 24 hours) at 1/19/2025 0348  Last data filed at 1/19/2025 0000  Gross per 24 hour   Intake 413.04 ml   Output 1010 ml   Net -596.96 ml       Diet Juan Carlos/CHO Controlled; Consistent Carbohydrate Diet Level 2 (5 carb servings/75 grams CHO/meal)    Invasive Monitoring           Physical Exam   Physical Exam  Vitals and nursing note reviewed.   Eyes:      General: No scleral icterus.     Extraocular Movements: Extraocular movements intact.      Conjunctiva/sclera: Conjunctivae normal.   Skin:     General: Skin is cool and dry.      Capillary Refill: Capillary refill takes less than 2 seconds.      Coloration: Skin is not jaundiced.   HENT:      Head: Normocephalic and atraumatic.      Mouth/Throat:      Mouth: Mucous membranes are dry.   Neck:      Vascular: No JVD.   Cardiovascular:       Rate and Rhythm: Normal rate and regular rhythm.      Pulses: Decreased pulses.      Heart sounds: Murmur heard.   Musculoskeletal:         General: Normal range of motion.      Right lower leg: Trace Edema present.      Left lower leg: Trace Edema present.      Comments: Right lower extremity immobilized due to balloon pump right groin   Abdominal: General: Bowel sounds are normal. There is no distension.      Palpations: Abdomen is soft.      Tenderness: There is no abdominal tenderness. There is no guarding.   Constitutional:       General: He is not in acute distress.  Pulmonary:      Effort: Pulmonary effort is normal. No accessory muscle usage, respiratory distress or accessory muscle usage.      Breath sounds: Normal breath sounds.   Secretions are normal.Chest:      Chest wall: No tenderness.   Psychiatric:         Speech: Speech is not no expressive aphasia.   Neurological:      General: No focal deficit present.      Mental Status: He is alert and oriented to person, place and time. Mental status is at baseline. He is calm.      Cranial Nerves: No dysarthria or facial asymmetry.      Motor: gross motor function is at baseline for patient.   Genitourinary/Anorectal:  Goldsmith present.        Diagnostic Studies        Lab Results: I have reviewed the following results:     Medications:  Scheduled PRN   aspirin, 81 mg, Daily  [Held by provider] atorvastatin, 80 mg, Daily With Dinner  chlorhexidine, 15 mL, Q12H YNES  famotidine, 20 mg, BID  lidocaine, 1 patch, Daily  melatonin, 6 mg, HS  senna-docusate sodium, 2 tablet, BID  ticagrelor, 90 mg, Q12H YNES      aluminum-magnesium hydroxide-simethicone, 30 mL, Q4H PRN  bisacodyl, 10 mg, Daily PRN  diphenhydrAMINE, 25 mg, Q6H PRN  heparin (porcine), 2,000 Units, Q6H PRN  heparin (porcine), 4,000 Units, Q6H PRN  menthol-methyl salicylate, , 4x Daily PRN  ondansetron, 4 mg, Q6H PRN  oxyCODONE, 2.5 mg, Q4H PRN   Or  oxyCODONE, 5 mg, Q4H PRN       Continuous    heparin  (porcine), 3-20 Units/kg/hr (Order-Specific), Last Rate: 14.1 Units/kg/hr (01/18/25 2125)  insulin regular (HumuLIN R,NovoLIN R) 1 Units/mL in sodium chloride 0.9 % 100 mL infusion, 0.3-21 Units/hr, Last Rate: 1.5 Units/hr (01/18/25 2147)         Labs:   CBC    Recent Labs     01/17/25 2056 01/18/25 0443   WBC 16.44* 13.81*   HGB 13.4 13.6   HCT 40.2 40.5    167     BMP    Recent Labs     01/18/25 0443 01/18/25  1624   SODIUM 137 136   K 3.8 4.0    107   CO2 24 23   AGAP 6 6   BUN 20 20   CREATININE 0.68 0.78   CALCIUM 8.5 8.7       Coags    Recent Labs     01/17/25 2056 01/18/25 0443 01/18/25  1214 01/18/25  1940   INR 1.20*  --   --   --    PTT >210*   < > 58* 57*    < > = values in this interval not displayed.        Additional Electrolytes  Recent Labs     01/18/25 0443 01/18/25  1624   MG 1.7* 1.9   PHOS 3.8 3.7   CAIONIZED 1.16 1.15          Blood Gas    Recent Labs     01/18/25  1624   PHART 7.459*   DCQ1YNM 30.5*   PO2ART 80.4   ROU6RWH 21.2*   BEART -1.6   SOURCE Line, Arterial     Recent Labs     01/18/25  1624   SOURCE Line, Arterial    LFTs  Recent Labs     01/18/25 0443 01/18/25  1624   * 116*   * 269*   ALKPHOS 67 66   ALB 3.5 3.7   TBILI 0.90 0.85       Infectious  No recent results  Glucose  Recent Labs     01/17/25  1800 01/17/25 2056 01/18/25  0443 01/18/25  1624   GLUC 195* 233* 158* 133

## 2025-01-19 NOTE — ASSESSMENT & PLAN NOTE
Lab Results   Component Value Date    HGBA1C 8.2 (H) 01/18/2025     Recent Labs     01/19/25  0353 01/19/25  0603 01/19/25  0820 01/19/25  1018   POCGLU 109 119 106 139   Blood Sugar Average: Last 72 hrs:  (P) 151.9  Management per primary service.

## 2025-01-19 NOTE — ASSESSMENT & PLAN NOTE
Lab Results   Component Value Date    HGBA1C 8.2 (H) 01/18/2025       Recent Labs     01/18/25  1952 01/18/25  2146 01/18/25  2359 01/19/25  0204   POCGLU 159* 115 128 132       Blood Sugar Average: Last 72 hrs:    Hold Jardiance, metformin, glipizide.  Insulin drip per protocol  (P) 160.5300

## 2025-01-19 NOTE — ASSESSMENT & PLAN NOTE
Lab Results   Component Value Date    HGBA1C 8.2 (H) 01/18/2025       Recent Labs     01/19/25  1018 01/19/25  1253 01/19/25  1700 01/19/25 2051   POCGLU 139 173* 185* 161*       Blood Sugar Average: Last 72 hrs:    (P) 154.1686984673087248    Holding home antihyperglycemics  Continue Lantus with sliding scale while inpatient

## 2025-01-19 NOTE — ASSESSMENT & PLAN NOTE
Status post drug-eluting stent to the LAD and left main, unable to intervene on the circumflex artery.  Significant hypotension in the Cath Lab requiring pressors and intra-aortic balloon pump support  Continue aspirin, statin, Brilinta  Awaiting improvement in hemodynamics to begin beta-blocker  Appreciate cardiology recommendations

## 2025-01-19 NOTE — ASSESSMENT & PLAN NOTE
Holding Lipitor secondary to transaminitis due to shock liver related to cardiogenic shock secondary to STEMI     [Appropriately responsive] : appropriately responsive [Alert] : alert [No Acute Distress] : no acute distress [No Lymphadenopathy] : no lymphadenopathy [Soft] : soft [Non-tender] : non-tender [Non-distended] : non-distended [No HSM] : No HSM [No Lesions] : no lesions [No Mass] : no mass [Oriented x3] : oriented x3 [Examination Of The Breasts] : a normal appearance [No Masses] : no breast masses were palpable [Labia Majora] : normal [Labia Minora] : normal [Normal] : normal [Uterine Adnexae] : normal

## 2025-01-19 NOTE — ASSESSMENT & PLAN NOTE
S/p intra-aortic balloon pump on 1/17- currently set 1:2  Continue heparin infusion  Hemodynamics slowly improving  Weaning with cardiology recommendations/support

## 2025-01-19 NOTE — ASSESSMENT & PLAN NOTE
Patient presented with hypotension and near syncope likely related to cardiogenic shock due to STEMI    Plan  Continue intra-aortic balloon pump at 1:1, Levophed weaned to off given sufficient augments  Maintain mean arterial pressures greater than 65, augments currently sufficient  Continue to monitor endpoints  Awaiting echocardiogram  Low threshold for inotropic agents

## 2025-01-19 NOTE — ASSESSMENT & PLAN NOTE
Troponin with peak >22,973.  TTE pending.  Telemetry shows NSR with brief runs of PSVT and NSVT, continue to monitor.  Continue ASA, Brilinta.  Lipitor restarted given improvement of transaminitis.  Not on BB at this time due to cardiogenic shock.  On heparin gtt given presence of IABP.

## 2025-01-19 NOTE — ASSESSMENT & PLAN NOTE
Previously weaned off Levophed.  IABP in place, wean as as tolerated.  Recommend IV Lasix as tolerated.  Plan for eventual optimization of GDMT as tolerated.  Pending results of TTE, may need consideration for LifeVest.

## 2025-01-19 NOTE — ASSESSMENT & PLAN NOTE
Status post drug-eluting stent to the LAD and left main, unable to intervene on the circumflex artery.  Significant hypotension in the Cath Lab requiring pressors and intra-aortic balloon pump support    Plan:  Per cardiology, ACS heparin while on balloon pump.  Continue Brilinta and aspirin.  2D echo pending  Cardiology following, interventional cardiology following.

## 2025-01-20 ENCOUNTER — APPOINTMENT (INPATIENT)
Dept: NON INVASIVE DIAGNOSTICS | Facility: HOSPITAL | Age: 70
DRG: 270 | End: 2025-01-20
Attending: SURGERY
Payer: MEDICARE

## 2025-01-20 PROBLEM — N17.9 ACUTE KIDNEY INJURY (HCC): Status: ACTIVE | Noted: 2025-01-20

## 2025-01-20 PROBLEM — I25.5 ISCHEMIC CARDIOMYOPATHY: Status: ACTIVE | Noted: 2025-01-20

## 2025-01-20 PROBLEM — S80.11XA HEMATOMA OF RIGHT LOWER EXTREMITY: Status: ACTIVE | Noted: 2025-01-20

## 2025-01-20 PROBLEM — I51.3 LV (LEFT VENTRICULAR) MURAL THROMBUS: Status: ACTIVE | Noted: 2025-01-20

## 2025-01-20 LAB
ABO GROUP BLD: NORMAL
ALBUMIN SERPL BCG-MCNC: 3.8 G/DL (ref 3.5–5)
ALBUMIN SERPL BCG-MCNC: 3.8 G/DL (ref 3.5–5)
ALP SERPL-CCNC: 65 U/L (ref 34–104)
ALP SERPL-CCNC: 66 U/L (ref 34–104)
ALT SERPL W P-5'-P-CCNC: 60 U/L (ref 7–52)
ALT SERPL W P-5'-P-CCNC: 64 U/L (ref 7–52)
ANION GAP SERPL CALCULATED.3IONS-SCNC: 10 MMOL/L (ref 4–13)
ANION GAP SERPL CALCULATED.3IONS-SCNC: 10 MMOL/L (ref 4–13)
ANION GAP SERPL CALCULATED.3IONS-SCNC: 9 MMOL/L (ref 4–13)
APTT PPP: 100 SECONDS (ref 23–34)
APTT PPP: 30 SECONDS (ref 23–34)
APTT PPP: 50 SECONDS (ref 23–34)
AST SERPL W P-5'-P-CCNC: 46 U/L (ref 13–39)
AST SERPL W P-5'-P-CCNC: 56 U/L (ref 13–39)
BASE EXCESS BLDA CALC-SCNC: -3.4 MMOL/L
BASE EXCESS BLDA CALC-SCNC: -5.2 MMOL/L
BASE EXCESS BLDA CALC-SCNC: -6.1 MMOL/L
BASOPHILS # BLD AUTO: 0.02 THOUSANDS/ΜL (ref 0–0.1)
BASOPHILS NFR BLD AUTO: 0 % (ref 0–1)
BILIRUB SERPL-MCNC: 1.12 MG/DL (ref 0.2–1)
BILIRUB SERPL-MCNC: 1.21 MG/DL (ref 0.2–1)
BLD GP AB SCN SERPL QL: NEGATIVE
BUN SERPL-MCNC: 23 MG/DL (ref 5–25)
BUN SERPL-MCNC: 37 MG/DL (ref 5–25)
BUN SERPL-MCNC: 40 MG/DL (ref 5–25)
CA-I BLD-SCNC: 1.12 MMOL/L (ref 1.12–1.32)
CA-I BLD-SCNC: 1.13 MMOL/L (ref 1.12–1.32)
CALCIUM SERPL-MCNC: 8.7 MG/DL (ref 8.4–10.2)
CALCIUM SERPL-MCNC: 8.9 MG/DL (ref 8.4–10.2)
CALCIUM SERPL-MCNC: 8.9 MG/DL (ref 8.4–10.2)
CHLORIDE SERPL-SCNC: 106 MMOL/L (ref 96–108)
CO2 SERPL-SCNC: 20 MMOL/L (ref 21–32)
CO2 SERPL-SCNC: 22 MMOL/L (ref 21–32)
CO2 SERPL-SCNC: 22 MMOL/L (ref 21–32)
CREAT SERPL-MCNC: 0.97 MG/DL (ref 0.6–1.3)
CREAT SERPL-MCNC: 1.22 MG/DL (ref 0.6–1.3)
CREAT SERPL-MCNC: 1.35 MG/DL (ref 0.6–1.3)
EOSINOPHIL # BLD AUTO: 0.02 THOUSAND/ΜL (ref 0–0.61)
EOSINOPHIL NFR BLD AUTO: 0 % (ref 0–6)
ERYTHROCYTE [DISTWIDTH] IN BLOOD BY AUTOMATED COUNT: 13.7 % (ref 11.6–15.1)
ERYTHROCYTE [DISTWIDTH] IN BLOOD BY AUTOMATED COUNT: 14.8 % (ref 11.6–15.1)
GFR SERPL CREATININE-BSD FRML MDRD: 53 ML/MIN/1.73SQ M
GFR SERPL CREATININE-BSD FRML MDRD: 60 ML/MIN/1.73SQ M
GFR SERPL CREATININE-BSD FRML MDRD: 80 ML/MIN/1.73SQ M
GLUCOSE SERPL-MCNC: 133 MG/DL (ref 65–140)
GLUCOSE SERPL-MCNC: 163 MG/DL (ref 65–140)
GLUCOSE SERPL-MCNC: 192 MG/DL (ref 65–140)
GLUCOSE SERPL-MCNC: 201 MG/DL (ref 65–140)
GLUCOSE SERPL-MCNC: 210 MG/DL (ref 65–140)
GLUCOSE SERPL-MCNC: 222 MG/DL (ref 65–140)
GLUCOSE SERPL-MCNC: 225 MG/DL (ref 65–140)
GLUCOSE SERPL-MCNC: 232 MG/DL (ref 65–140)
HCO3 BLDA-SCNC: 16.6 MMOL/L (ref 22–28)
HCO3 BLDA-SCNC: 17.2 MMOL/L (ref 22–28)
HCO3 BLDA-SCNC: 19.5 MMOL/L (ref 22–28)
HCT VFR BLD AUTO: 37.7 % (ref 36.5–49.3)
HCT VFR BLD AUTO: 39.7 % (ref 36.5–49.3)
HCT VFR BLD AUTO: 39.7 % (ref 36.5–49.3)
HGB BLD-MCNC: 12.7 G/DL (ref 12–17)
HGB BLD-MCNC: 13.1 G/DL (ref 12–17)
HGB BLD-MCNC: 13.1 G/DL (ref 12–17)
HGB BLD-MCNC: 13.3 G/DL (ref 12–17)
IMM GRANULOCYTES # BLD AUTO: 0.06 THOUSAND/UL (ref 0–0.2)
IMM GRANULOCYTES NFR BLD AUTO: 0 % (ref 0–2)
INR PPP: 1.05 (ref 0.85–1.19)
LACTATE SERPL-SCNC: 0.9 MMOL/L (ref 0.5–2)
LACTATE SERPL-SCNC: 1 MMOL/L (ref 0.5–2)
LYMPHOCYTES # BLD AUTO: 0.88 THOUSANDS/ΜL (ref 0.6–4.47)
LYMPHOCYTES NFR BLD AUTO: 5 % (ref 14–44)
MAGNESIUM SERPL-MCNC: 1.8 MG/DL (ref 1.9–2.7)
MAGNESIUM SERPL-MCNC: 2.3 MG/DL (ref 1.9–2.7)
MCH RBC QN AUTO: 31.4 PG (ref 26.8–34.3)
MCH RBC QN AUTO: 31.5 PG (ref 26.8–34.3)
MCHC RBC AUTO-ENTMCNC: 33 G/DL (ref 31.4–37.4)
MCHC RBC AUTO-ENTMCNC: 33.5 G/DL (ref 31.4–37.4)
MCV RBC AUTO: 94 FL (ref 82–98)
MCV RBC AUTO: 95 FL (ref 82–98)
MONOCYTES # BLD AUTO: 1.53 THOUSAND/ΜL (ref 0.17–1.22)
MONOCYTES NFR BLD AUTO: 9 % (ref 4–12)
NEUTROPHILS # BLD AUTO: 13.88 THOUSANDS/ΜL (ref 1.85–7.62)
NEUTS SEG NFR BLD AUTO: 86 % (ref 43–75)
NON VENT ROOM AIR: 21 %
NRBC BLD AUTO-RTO: 0 /100 WBCS
O2 CT BLDA-SCNC: 18.4 ML/DL (ref 16–23)
O2 CT BLDA-SCNC: 18.4 ML/DL (ref 16–23)
O2 CT BLDA-SCNC: 18.6 ML/DL (ref 16–23)
OXYHGB MFR BLDA: 94.9 % (ref 94–97)
OXYHGB MFR BLDA: 95.2 % (ref 94–97)
OXYHGB MFR BLDA: 96.9 % (ref 94–97)
PCO2 BLDA: 25.7 MM HG (ref 36–44)
PCO2 BLDA: 25.7 MM HG (ref 36–44)
PCO2 BLDA: 29.5 MM HG (ref 36–44)
PH BLDA: 7.43 [PH] (ref 7.35–7.45)
PH BLDA: 7.44 [PH] (ref 7.35–7.45)
PH BLDA: 7.44 [PH] (ref 7.35–7.45)
PHOSPHATE SERPL-MCNC: 4.1 MG/DL (ref 2.3–4.1)
PHOSPHATE SERPL-MCNC: 5.6 MG/DL (ref 2.3–4.1)
PLATELET # BLD AUTO: 159 THOUSANDS/UL (ref 149–390)
PLATELET # BLD AUTO: 170 THOUSANDS/UL (ref 149–390)
PMV BLD AUTO: 9.6 FL (ref 8.9–12.7)
PMV BLD AUTO: 9.9 FL (ref 8.9–12.7)
PO2 BLDA: 117.9 MM HG (ref 75–129)
PO2 BLDA: 84.2 MM HG (ref 75–129)
PO2 BLDA: 87.5 MM HG (ref 75–129)
POTASSIUM SERPL-SCNC: 3.8 MMOL/L (ref 3.5–5.3)
POTASSIUM SERPL-SCNC: 3.9 MMOL/L (ref 3.5–5.3)
POTASSIUM SERPL-SCNC: 4 MMOL/L (ref 3.5–5.3)
PROT SERPL-MCNC: 6.8 G/DL (ref 6.4–8.4)
PROT SERPL-MCNC: 6.8 G/DL (ref 6.4–8.4)
PROTHROMBIN TIME: 14.4 SECONDS (ref 12.3–15)
RBC # BLD AUTO: 4.16 MILLION/UL (ref 3.88–5.62)
RBC # BLD AUTO: 4.23 MILLION/UL (ref 3.88–5.62)
RH BLD: POSITIVE
SODIUM SERPL-SCNC: 136 MMOL/L (ref 135–147)
SODIUM SERPL-SCNC: 137 MMOL/L (ref 135–147)
SODIUM SERPL-SCNC: 138 MMOL/L (ref 135–147)
SPECIMEN EXPIRATION DATE: NORMAL
SPECIMEN SOURCE: ABNORMAL
WBC # BLD AUTO: 16.24 THOUSAND/UL (ref 4.31–10.16)
WBC # BLD AUTO: 16.39 THOUSAND/UL (ref 4.31–10.16)

## 2025-01-20 PROCEDURE — 85014 HEMATOCRIT: CPT | Performed by: PHYSICIAN ASSISTANT

## 2025-01-20 PROCEDURE — 85027 COMPLETE CBC AUTOMATED: CPT | Performed by: NURSE PRACTITIONER

## 2025-01-20 PROCEDURE — 99233 SBSQ HOSP IP/OBS HIGH 50: CPT | Performed by: INTERNAL MEDICINE

## 2025-01-20 PROCEDURE — 36620 INSERTION CATHETER ARTERY: CPT | Performed by: ANESTHESIOLOGY

## 2025-01-20 PROCEDURE — C1894 INTRO/SHEATH, NON-LASER: HCPCS

## 2025-01-20 PROCEDURE — 36620 INSERTION CATHETER ARTERY: CPT | Performed by: PHYSICIAN ASSISTANT

## 2025-01-20 PROCEDURE — C1769 GUIDE WIRE: HCPCS

## 2025-01-20 PROCEDURE — 82330 ASSAY OF CALCIUM: CPT | Performed by: NURSE PRACTITIONER

## 2025-01-20 PROCEDURE — 84100 ASSAY OF PHOSPHORUS: CPT | Performed by: PHYSICIAN ASSISTANT

## 2025-01-20 PROCEDURE — 80053 COMPREHEN METABOLIC PANEL: CPT | Performed by: NURSE PRACTITIONER

## 2025-01-20 PROCEDURE — 86920 COMPATIBILITY TEST SPIN: CPT

## 2025-01-20 PROCEDURE — 83735 ASSAY OF MAGNESIUM: CPT | Performed by: NURSE PRACTITIONER

## 2025-01-20 PROCEDURE — 80048 BASIC METABOLIC PNL TOTAL CA: CPT | Performed by: NURSE PRACTITIONER

## 2025-01-20 PROCEDURE — 85610 PROTHROMBIN TIME: CPT | Performed by: PHYSICIAN ASSISTANT

## 2025-01-20 PROCEDURE — 33968 REMOVE AORTIC ASSIST DEVICE: CPT | Performed by: SURGERY

## 2025-01-20 PROCEDURE — C1760 CLOSURE DEV, VASC: HCPCS

## 2025-01-20 PROCEDURE — 82948 REAGENT STRIP/BLOOD GLUCOSE: CPT

## 2025-01-20 PROCEDURE — 83605 ASSAY OF LACTIC ACID: CPT | Performed by: NURSE PRACTITIONER

## 2025-01-20 PROCEDURE — 85018 HEMOGLOBIN: CPT | Performed by: NURSE PRACTITIONER

## 2025-01-20 PROCEDURE — 84100 ASSAY OF PHOSPHORUS: CPT | Performed by: NURSE PRACTITIONER

## 2025-01-20 PROCEDURE — 86850 RBC ANTIBODY SCREEN: CPT | Performed by: PHYSICIAN ASSISTANT

## 2025-01-20 PROCEDURE — 86901 BLOOD TYPING SEROLOGIC RH(D): CPT | Performed by: PHYSICIAN ASSISTANT

## 2025-01-20 PROCEDURE — 80053 COMPREHEN METABOLIC PANEL: CPT | Performed by: PHYSICIAN ASSISTANT

## 2025-01-20 PROCEDURE — 85018 HEMOGLOBIN: CPT | Performed by: PHYSICIAN ASSISTANT

## 2025-01-20 PROCEDURE — 30233N1 TRANSFUSION OF NONAUTOLOGOUS RED BLOOD CELLS INTO PERIPHERAL VEIN, PERCUTANEOUS APPROACH: ICD-10-PCS | Performed by: ANESTHESIOLOGY

## 2025-01-20 PROCEDURE — 75736 ARTERY X-RAYS PELVIS: CPT

## 2025-01-20 PROCEDURE — 99223 1ST HOSP IP/OBS HIGH 75: CPT | Performed by: SURGERY

## 2025-01-20 PROCEDURE — NC001 PR NO CHARGE: Performed by: SURGERY

## 2025-01-20 PROCEDURE — 83735 ASSAY OF MAGNESIUM: CPT | Performed by: PHYSICIAN ASSISTANT

## 2025-01-20 PROCEDURE — 85730 THROMBOPLASTIN TIME PARTIAL: CPT

## 2025-01-20 PROCEDURE — NC001 PR NO CHARGE: Performed by: ANESTHESIOLOGY

## 2025-01-20 PROCEDURE — 85025 COMPLETE CBC W/AUTO DIFF WBC: CPT | Performed by: PHYSICIAN ASSISTANT

## 2025-01-20 PROCEDURE — 82805 BLOOD GASES W/O2 SATURATION: CPT | Performed by: NURSE PRACTITIONER

## 2025-01-20 PROCEDURE — 85730 THROMBOPLASTIN TIME PARTIAL: CPT | Performed by: PHYSICIAN ASSISTANT

## 2025-01-20 PROCEDURE — 82805 BLOOD GASES W/O2 SATURATION: CPT | Performed by: PHYSICIAN ASSISTANT

## 2025-01-20 PROCEDURE — 99291 CRITICAL CARE FIRST HOUR: CPT | Performed by: ANESTHESIOLOGY

## 2025-01-20 PROCEDURE — 36245 INS CATH ABD/L-EXT ART 1ST: CPT

## 2025-01-20 PROCEDURE — 75625 CONTRAST EXAM ABDOMINL AORTA: CPT

## 2025-01-20 PROCEDURE — 99152 MOD SED SAME PHYS/QHP 5/>YRS: CPT | Performed by: SURGERY

## 2025-01-20 PROCEDURE — 86900 BLOOD TYPING SEROLOGIC ABO: CPT | Performed by: PHYSICIAN ASSISTANT

## 2025-01-20 PROCEDURE — 83605 ASSAY OF LACTIC ACID: CPT | Performed by: PHYSICIAN ASSISTANT

## 2025-01-20 PROCEDURE — 82330 ASSAY OF CALCIUM: CPT | Performed by: PHYSICIAN ASSISTANT

## 2025-01-20 PROCEDURE — P9016 RBC LEUKOCYTES REDUCED: HCPCS

## 2025-01-20 RX ORDER — FENTANYL CITRATE 50 UG/ML
INJECTION, SOLUTION INTRAMUSCULAR; INTRAVENOUS AS NEEDED
Status: DISCONTINUED | OUTPATIENT
Start: 2025-01-20 | End: 2025-01-29 | Stop reason: HOSPADM

## 2025-01-20 RX ORDER — FUROSEMIDE 10 MG/ML
20 INJECTION INTRAMUSCULAR; INTRAVENOUS ONCE
Status: COMPLETED | OUTPATIENT
Start: 2025-01-20 | End: 2025-01-20

## 2025-01-20 RX ORDER — FENTANYL CITRATE 50 UG/ML
50 INJECTION, SOLUTION INTRAMUSCULAR; INTRAVENOUS ONCE
Refills: 0 | Status: COMPLETED | OUTPATIENT
Start: 2025-01-20 | End: 2025-01-20

## 2025-01-20 RX ORDER — MAGNESIUM SULFATE HEPTAHYDRATE 40 MG/ML
2 INJECTION, SOLUTION INTRAVENOUS ONCE
Status: COMPLETED | OUTPATIENT
Start: 2025-01-20 | End: 2025-01-20

## 2025-01-20 RX ORDER — POTASSIUM CHLORIDE 14.9 MG/ML
20 INJECTION INTRAVENOUS ONCE
Status: COMPLETED | OUTPATIENT
Start: 2025-01-20 | End: 2025-01-20

## 2025-01-20 RX ADMIN — NOREPINEPHRINE BITARTRATE 2 MCG/MIN: 1 INJECTION, SOLUTION, CONCENTRATE INTRAVENOUS at 11:18

## 2025-01-20 RX ADMIN — CHLORHEXIDINE GLUCONATE 0.12% ORAL RINSE 15 ML: 1.2 LIQUID ORAL at 08:24

## 2025-01-20 RX ADMIN — HEPARIN SODIUM 2000 UNITS: 1000 INJECTION, SOLUTION INTRAVENOUS; SUBCUTANEOUS at 08:17

## 2025-01-20 RX ADMIN — FENTANYL CITRATE 50 MCG: 50 INJECTION, SOLUTION INTRAMUSCULAR; INTRAVENOUS at 16:05

## 2025-01-20 RX ADMIN — INSULIN GLARGINE 15 UNITS: 100 INJECTION, SOLUTION SUBCUTANEOUS at 21:39

## 2025-01-20 RX ADMIN — CHLORHEXIDINE GLUCONATE 0.12% ORAL RINSE 15 ML: 1.2 LIQUID ORAL at 20:13

## 2025-01-20 RX ADMIN — DICLOFENAC SODIUM 2 G: 10 GEL TOPICAL at 08:23

## 2025-01-20 RX ADMIN — TICAGRELOR 90 MG: 90 TABLET ORAL at 20:14

## 2025-01-20 RX ADMIN — INSULIN LISPRO 2 UNITS: 100 INJECTION, SOLUTION INTRAVENOUS; SUBCUTANEOUS at 08:11

## 2025-01-20 RX ADMIN — LIDOCAINE 5% 1 PATCH: 700 PATCH TOPICAL at 08:24

## 2025-01-20 RX ADMIN — ASPIRIN 81 MG: 81 TABLET, COATED ORAL at 08:22

## 2025-01-20 RX ADMIN — HEPARIN SODIUM 14.1 UNITS/KG/HR: 10000 INJECTION, SOLUTION INTRAVENOUS at 02:58

## 2025-01-20 RX ADMIN — TICAGRELOR 90 MG: 90 TABLET ORAL at 08:22

## 2025-01-20 RX ADMIN — INSULIN LISPRO 4 UNITS: 100 INJECTION, SOLUTION INTRAVENOUS; SUBCUTANEOUS at 12:25

## 2025-01-20 RX ADMIN — FUROSEMIDE 20 MG: 10 INJECTION, SOLUTION INTRAMUSCULAR; INTRAVENOUS at 11:22

## 2025-01-20 RX ADMIN — POTASSIUM CHLORIDE 20 MEQ: 14.9 INJECTION, SOLUTION INTRAVENOUS at 08:19

## 2025-01-20 RX ADMIN — FAMOTIDINE 20 MG: 10 INJECTION, SOLUTION INTRAVENOUS at 08:25

## 2025-01-20 RX ADMIN — FENTANYL CITRATE 50 MCG: 50 INJECTION, SOLUTION INTRAMUSCULAR; INTRAVENOUS at 14:32

## 2025-01-20 RX ADMIN — ATORVASTATIN CALCIUM 80 MG: 40 TABLET, FILM COATED ORAL at 17:05

## 2025-01-20 RX ADMIN — INSULIN LISPRO 4 UNITS: 100 INJECTION, SOLUTION INTRAVENOUS; SUBCUTANEOUS at 21:39

## 2025-01-20 RX ADMIN — MAGNESIUM SULFATE HEPTAHYDRATE 2 G: 40 INJECTION, SOLUTION INTRAVENOUS at 08:19

## 2025-01-20 RX ADMIN — DICLOFENAC SODIUM 2 G: 10 GEL TOPICAL at 11:58

## 2025-01-20 RX ADMIN — DICLOFENAC SODIUM 2 G: 10 GEL TOPICAL at 17:05

## 2025-01-20 RX ADMIN — FAMOTIDINE 20 MG: 10 INJECTION, SOLUTION INTRAVENOUS at 17:13

## 2025-01-20 NOTE — PLAN OF CARE
Problem: PAIN - ADULT  Goal: Verbalizes/displays adequate comfort level or baseline comfort level  Description: Interventions:  - Encourage patient to monitor pain and request assistance  - Assess pain using appropriate pain scale  - Administer analgesics based on type and severity of pain and evaluate response  - Implement non-pharmacological measures as appropriate and evaluate response  - Consider cultural and social influences on pain and pain management  - Notify physician/advanced practitioner if interventions unsuccessful or patient reports new pain  Outcome: Progressing     Problem: INFECTION - ADULT  Goal: Absence of fever/infection during neutropenic period  Description: INTERVENTIONS:  - Monitor WBC    Outcome: Progressing     Problem: SAFETY ADULT  Goal: Maintain or return to baseline ADL function  Description: INTERVENTIONS:  -  Assess patient's ability to carry out ADLs; assess patient's baseline for ADL function and identify physical deficits which impact ability to perform ADLs (bathing, care of mouth/teeth, toileting, grooming, dressing, etc.)  - Assess/evaluate cause of self-care deficits   - Assess range of motion  - Assess patient's mobility; develop plan if impaired  - Assess patient's need for assistive devices and provide as appropriate  - Encourage maximum independence but intervene and supervise when necessary  - Involve family in performance of ADLs  - Assess for home care needs following discharge   - Consider OT consult to assist with ADL evaluation and planning for discharge  - Provide patient education as appropriate  Outcome: Progressing     Problem: DISCHARGE PLANNING  Goal: Discharge to home or other facility with appropriate resources  Description: INTERVENTIONS:  - Identify barriers to discharge w/patient and caregiver  - Arrange for needed discharge resources and transportation as appropriate  - Identify discharge learning needs (meds, wound care, etc.)  - Arrange for  interpretive services to assist at discharge as needed  - Refer to Case Management Department for coordinating discharge planning if the patient needs post-hospital services based on physician/advanced practitioner order or complex needs related to functional status, cognitive ability, or social support system  Outcome: Progressing     Problem: Knowledge Deficit  Goal: Patient/family/caregiver demonstrates understanding of disease process, treatment plan, medications, and discharge instructions  Description: Complete learning assessment and assess knowledge base.  Interventions:  - Provide teaching at level of understanding  - Provide teaching via preferred learning methods  Outcome: Progressing     Problem: Nutrition/Hydration-ADULT  Goal: Nutrient/Hydration intake appropriate for improving, restoring or maintaining nutritional needs  Description: Monitor and assess patient's nutrition/hydration status for malnutrition. Collaborate with interdisciplinary team and initiate plan and interventions as ordered.  Monitor patient's weight and dietary intake as ordered or per policy. Utilize nutrition screening tool and intervene as necessary. Determine patient's food preferences and provide high-protein, high-caloric foods as appropriate.     INTERVENTIONS:  - Monitor oral intake, urinary output, labs, and treatment plans  - Assess nutrition and hydration status and recommend course of action  - Evaluate amount of meals eaten  - Assist patient with eating if necessary   - Allow adequate time for meals  - Recommend/ encourage appropriate diets, oral nutritional supplements, and vitamin/mineral supplements  - Order, calculate, and assess calorie counts as needed  - Recommend, monitor, and adjust tube feedings and TPN/PPN based on assessed needs  - Assess need for intravenous fluids  - Provide specific nutrition/hydration education as appropriate  - Include patient/family/caregiver in decisions related to nutrition  Outcome:  Progressing     Problem: CARDIOVASCULAR - ADULT  Goal: Maintains optimal cardiac output and hemodynamic stability  Description: INTERVENTIONS:  - Monitor I/O, vital signs and rhythm  - Monitor for S/S and trends of decreased cardiac output  - Administer and titrate ordered vasoactive medications to optimize hemodynamic stability  - Assess quality of pulses, skin color and temperature  - Assess for signs of decreased coronary artery perfusion  - Instruct patient to report change in severity of symptoms  Outcome: Progressing     Problem: CARDIOVASCULAR - ADULT  Goal: Absence of cardiac dysrhythmias or at baseline rhythm  Description: INTERVENTIONS:  - Continuous cardiac monitoring, vital signs, obtain 12 lead EKG if ordered  - Administer antiarrhythmic and heart rate control medications as ordered  - Monitor electrolytes and administer replacement therapy as ordered  Outcome: Progressing     Problem: RESPIRATORY - ADULT  Goal: Achieves optimal ventilation and oxygenation  Description: INTERVENTIONS:  - Assess for changes in respiratory status  - Assess for changes in mentation and behavior  - Position to facilitate oxygenation and minimize respiratory effort  - Oxygen administered by appropriate delivery if ordered  - Initiate smoking cessation education as indicated  - Encourage broncho-pulmonary hygiene including cough, deep breathe, Incentive Spirometry  - Assess the need for suctioning and aspirate as needed  - Assess and instruct to report SOB or any respiratory difficulty  - Respiratory Therapy support as indicated  Outcome: Progressing     Problem: METABOLIC, FLUID AND ELECTROLYTES - ADULT  Goal: Electrolytes maintained within normal limits  Description: INTERVENTIONS:  - Monitor labs and assess patient for signs and symptoms of electrolyte imbalances  - Administer electrolyte replacement as ordered  - Monitor response to electrolyte replacements, including repeat lab results as appropriate  - Instruct patient  on fluid and nutrition as appropriate  Outcome: Progressing     Problem: METABOLIC, FLUID AND ELECTROLYTES - ADULT  Goal: Fluid balance maintained  Description: INTERVENTIONS:  - Monitor labs   - Monitor I/O and WT  - Instruct patient on fluid and nutrition as appropriate  - Assess for signs & symptoms of volume excess or deficit  Outcome: Progressing     Problem: METABOLIC, FLUID AND ELECTROLYTES - ADULT  Goal: Glucose maintained within target range  Description: INTERVENTIONS:  - Monitor Blood Glucose as ordered  - Assess for signs and symptoms of hyperglycemia and hypoglycemia  - Administer ordered medications to maintain glucose within target range  - Assess nutritional intake and initiate nutrition service referral as needed  Outcome: Progressing

## 2025-01-20 NOTE — CONSULTS
Consult Note - Vascular Surgery   [facility]: [phone]    Assessment:  Joseph Aguilar is a 70yo male with HTN, HLD, DM2, who presented with near syncope and hypotension.. He was found to have an anterior STEMI with elevated troponins. He underwent cardiac catheterization 1/17/25 with PTCA and CHYNA to the distal LM and ostial LAD, as well as, successful PTCA and CHYNA to the mLAD.  Patient noted to be in cardiogenic shock with severely elevated LVEDP requiring intra-aortic balloon pump placement in left femoral artery. POD#3 patient noted to have bleeding around right femoral IABP with expanding hematoma in the right groin. Manual pressure was held at bedside and a Femostop was placed on the area. Vascular surgery was consulted for evaluation.      Plan:  -LLE pain and bleeding around right femoral intra-aortic balloon pump s/p cardiac catheterization 1/17.   -Discussed with ICU team and cardiology, Dr. Gaines. At this time, patient does not require balloon pump and plan was to remove today. Would recommend removal of balloon pump in IR cath lab and will attempt pro-glide closure device to close the access site safely.   -Patient anticoagulated on aspirin and Brilinta. Heparin has been discontinued.   -Monitor neurovascular exam and hematoma.   -Seen and examined with Dr. Brooke.   ______________________________________________________________________    Consulting Service: Critical Care     Chief Complaint: Bleeding from left femoral artery     HPI: Joseph Aguilar is a 69 y.o. male with HTN, HLD, DM2, who presented with near syncope and hypotension.. He was found to have an anterior STEMI with elevated troponins. He underwent cardiac catheterization 1/17/25 with PTCA and CHYNA to the distal LM and ostial LAD, as well as, successful PTCA and CHYNA to the mLAD.  Patient noted to be in cardiogenic shock with severely elevated LVEDP requiring intra-aortic balloon pump placement in left femoral artery. POD#3  patient noted to have bleeding around right femoral IABP with expanding hematoma in the right groin. Manual pressure was held at bedside and a Femostop was placed on the area. Vascular surgery was consulted for evaluation.     Review of Systems:  General: positive for  - negative  Cardiovascular: no chest pain or dyspnea on exertion  Respiratory: no cough, shortness of breath, or wheezing  Gastrointestinal: no abdominal pain, change in bowel habits, or black or bloody stools  Genitourinary ROS: no dysuria, trouble voiding, or hematuria  Musculoskeletal ROS: left leg pain   Neurological ROS: no TIA or stroke symptoms  Hematological and Lymphatic ROS: negative  Dermatological ROS: negative  Psychological ROS: negative  Ophthalmic ROS: negative  ENT ROS: negative    Past Medical History:  Past Medical History:   Diagnosis Date    Depression     Diabetes mellitus (HCC)     Hyperlipidemia     Hypertension     Osteoarthritis, knee        Past Surgical History:  Past Surgical History:   Procedure Laterality Date    BACK SURGERY      CARDIAC CATHETERIZATION N/A 1/17/2025    Procedure: Cardiac PCI;  Surgeon: Neeraj Gaines MD;  Location: [facility];  Service: Cardiology    CARDIAC CATHETERIZATION Left 1/17/2025    Procedure: Cardiac Left Heart Cath;  Surgeon: Neeraj Gaines MD;  Location: [facility];  Service: Cardiology    CARDIAC CATHETERIZATION N/A 1/17/2025    Procedure: Cardiac iabp;  Surgeon: Neeraj Gaines MD;  Location: [facility];  Service: Cardiology    HAND SURGERY      JOINT REPLACEMENT Left     l tkr    KNEE SURGERY Left     OH ARTHRP KNE CONDYLE&PLATU MEDIAL&LAT COMPARTMENTS Right 11/19/2018    Procedure: ARTHROPLASTY KNEE TOTAL;  Surgeon: Franny Grimaldo MD;  Location: [facility];  Service: Orthopedics    TOOTH EXTRACTION      WISDOM TOOTH EXTRACTION         Social History:  Social History     Substance and Sexual Activity   Alcohol Use Yes    Comment: 'couple beers  every couple weeks'     Social History     Substance and Sexual Activity   Drug Use Yes    Types: Marijuana     Social History     Tobacco Use   Smoking Status Former    Current packs/day: 0.00    Types: Cigarettes    Quit date: 3/1/2012    Years since quittin.8    Passive exposure: Past   Smokeless Tobacco Never       Family History:  Family History   Problem Relation Age of Onset    No Known Problems Mother     Heart attack Father     Arthritis Family     Cancer Family     Diabetes Family     Heart disease Family     Osteoporosis Family        Allergies:  No Known Allergies    Medications:    Current Facility-Administered Medications:     aluminum-magnesium hydroxide-simethicone (MAALOX) oral suspension 30 mL, Q4H PRN    aspirin (ECOTRIN LOW STRENGTH) EC tablet 81 mg, Daily    atorvastatin (LIPITOR) tablet 80 mg, Daily With Dinner    bisacodyl (DULCOLAX) rectal suppository 10 mg, Daily PRN    chlorhexidine (PERIDEX) 0.12 % oral rinse 15 mL, Q12H YNES    Diclofenac Sodium (VOLTAREN) 1 % topical gel 2 g, 4x Daily    Famotidine (PF) (PEPCID) injection 20 mg, BID    heparin (porcine) 25,000 units in 0.45% NaCl 250 mL infusion (premix), Titrated, Last Rate: Stopped (25 1315)    heparin (porcine) injection 2,000 Units, Q6H PRN    heparin (porcine) injection 4,000 Units, Q6H PRN    insulin glargine (LANTUS) subcutaneous injection 15 Units 0.15 mL, HS    insulin lispro (HumALOG/ADMELOG) 100 units/mL subcutaneous injection 2-12 Units, 4x Daily (AC & HS) **AND** Fingerstick Glucose (POCT), 4x Daily AC and at bedtime    lidocaine (LIDODERM) 5 % patch 1 patch, Daily    melatonin tablet 6 mg, HS    menthol-methyl salicylate (BENGAY) 10-15 % cream, 4x Daily PRN    NOREPINEPHRINE 4 MG  ML NSS (CMPD ORDER) infusion, Titrated, Last Rate: 2 mcg/min (25 1118)    ondansetron (ZOFRAN) injection 4 mg, Q6H PRN    oxyCODONE (ROXICODONE) split tablet 2.5 mg, Q4H PRN **OR** oxyCODONE (ROXICODONE) IR tablet 5 mg, Q4H  "PRN    ticagrelor (BRILINTA) tablet 90 mg, Q12H YNES    Vitals:  Vitals:    01/20/25 1430   BP: 105/55   Pulse: 81   Resp: 22   Temp: 98.2 °F (36.8 °C)   SpO2: 99%       I/Os:  I/O last 3 completed shifts:  In: 554.2 [I.V.:504.2; IV Piggyback:50]  Out: 3150 [Urine:3150]  I/O this shift:  In: 553.7 [I.V.:53.7; Blood:350; IV Piggyback:150]  Out: 160 [Urine:160]    Lab Results and Cultures:   CBC with diff:   Lab Results   Component Value Date    WBC 16.24 (H) 01/20/2025    HGB 12.7 01/20/2025    HCT 37.7 01/20/2025    MCV 95 01/20/2025     01/20/2025    RBC 4.16 01/20/2025    MCH 31.5 01/20/2025    MCHC 33.0 01/20/2025    RDW 13.7 01/20/2025    MPV 9.6 01/20/2025    NRBC 0 01/17/2025   ,   BMP/CMP:  Lab Results   Component Value Date    K 3.8 01/20/2025    K 4.1 01/10/2022     01/20/2025     01/10/2022    CO2 22 01/20/2025    CO2 26 01/10/2022    BUN 23 01/20/2025    BUN 17 01/10/2022    CREATININE 0.97 01/20/2025    CREATININE 0.88 01/10/2022    CALCIUM 8.9 01/20/2025    CALCIUM 9.1 01/10/2022    AST 56 (H) 01/20/2025    AST 28 01/06/2022    ALT 64 (H) 01/20/2025    ALT 66 (H) 01/06/2022    ALKPHOS 65 01/20/2025    ALKPHOS 59 01/06/2022    EGFR 80 01/20/2025   ,   Lipid Panel: No results found for: \"CHOL\",   Coags:   Lab Results   Component Value Date    PT 13.4 01/05/2022     (H) 01/20/2025    INR 1.20 (H) 01/17/2025    INR 1.1 01/05/2022   ,     Blood Culture: No results found for: \"BLOODCX\",   Urinalysis:   Lab Results   Component Value Date    COLORU Yellow 01/19/2022    CLARITYU Clear 01/19/2022    SPECGRAV 1.020 01/19/2022    PHUR 5.0 01/19/2022    LEUKOCYTESUR Negative 01/19/2022    NITRITE Negative 01/19/2022    GLUCOSEU >=1000 (1%) (A) 01/19/2022    KETONESU Negative 01/19/2022    BILIRUBINUR Negative 01/19/2022    BLOODU Negative 01/19/2022   ,   Urine Culture: No results found for: \"URINECX\",   Wound Culure: No results found for: \"WOUNDCULT\"    Imaging:  As above     Physical " Exam:    General appearance: alert and ill-appearing  Head: Normocephalic, without obvious abnormality, atraumatic  Lungs:  No respiratory distress. Effort normal   Heart: regular rate and rhythm, S1, S2 normal, no murmur, click, rub or gallop  Genitalia: deferred  Rectal: deferred  Extremities:  Left femoral artery with IABP in place with mild oozing around balloon pump and large hematoma expanding into peripubic area. Pressure being held by RN at bedside.     Neurologic: Grossly normal    Wound/Incision:  Left fem with IABP in place.       Ne Spence PA-C  1/20/2025

## 2025-01-20 NOTE — ASSESSMENT & PLAN NOTE
Previously weaned off Levophed.  IABP in place, continue to wean as tolerated.  If patient does not tolerate weaning of IABP, may be reasonable to consider further evaluation at B, however candidacy for advanced therapies may be limited in setting of LV thrombus.  Recommend IV Lasix as tolerated.  I/O net -3.8 L, creatinine stable.

## 2025-01-20 NOTE — PROGRESS NOTES
Progress Note - Critical Care/ICU   Name: Joseph Aguilar 69 y.o. male I MRN: [id number]  Unit/Bed#: ICU 04 I Date of Admission: 1/17/2025   Date of Service: 1/20/2025 I Hospital Day: 3      Assessment & Plan  Anterior STEMI s/p PCI with CHYNA to LM-ostial LAD and mLAD with IABP;  of LCx (1/17/2024)  Status post drug-eluting stent to the LAD and left main, unable to intervene on the circumflex artery.  Significant hypotension in the Cath Lab requiring pressors and intra-aortic balloon pump support  Continue aspirin, statin, Brilinta  Awaiting improvement in hemodynamics to begin beta-blocker  Appreciate cardiology recommendations  Mixed hyperlipidemia  Continue home statin  Type 2 diabetes mellitus without complication, without long-term current use of insulin (HCC)  Lab Results   Component Value Date    HGBA1C 8.2 (H) 01/18/2025       Recent Labs     01/19/25  1018 01/19/25  1253 01/19/25  1700 01/19/25 2051   POCGLU 139 173* 185* 161*       Blood Sugar Average: Last 72 hrs:    (P) [id number]    Holding home antihyperglycemics  Continue Lantus with sliding scale while inpatient  Cardiogenic shock (HCC)  S/p intra-aortic balloon pump on 1/17- currently set 1:2  Continue heparin infusion  Hemodynamics slowly improving  Weaning with cardiology recommendations/support    History of hypertension  Holding antihypertensives in the setting of marginal blood pressures and cardiogenic shock  Disposition: Critical care    ICU Core Measures     A: Assess, Prevent, and Manage Pain Has pain been assessed? Yes  Need for changes to pain regimen? No   B: Both SAT/SAT  N/A   C: Choice of Sedation RASS Goal: N/A patient not on sedation  Need for changes to sedation or analgesia regimen? NA   D: Delirium CAM-ICU: Negative   E: Early Mobility  Plan for early mobility? Yes   F: Family Engagement Plan for family engagement today? Yes       Review of Invasive Devices:    Rupal Plan: Continue for accurate I/O monitoring for 48  hours        Prophylaxis:  VTE VTE covered by:  heparin (porcine), Intravenous, 14.1 Units/kg/hr at 01/19/25 1019  heparin (porcine), Intravenous, 2,000 Units at 01/18/25 2141  heparin (porcine), Intravenous       Stress Ulcer  covered byFamotidine (PF) (PEPCID) injection 20 mg [[id number]]         24 Hour Events : Balloon pump transitioned to 1:2 which patient has tolerated well, diarrhea overnight  Subjective   Review of Systems: Review of Systems   Constitutional:  Positive for fatigue.   Respiratory:  Negative for shortness of breath.    Cardiovascular:  Negative for chest pain.   Gastrointestinal:  Positive for diarrhea. Negative for nausea and vomiting.   Musculoskeletal:  Positive for arthralgias and back pain.   Psychiatric/Behavioral:  Positive for sleep disturbance. The patient is nervous/anxious.        Objective :                   Vitals I/O      Most Recent Min/Max in 24hrs   Temp 97.6 °F (36.4 °C) Temp  Min: 97.3 °F (36.3 °C)  Max: 98.3 °F (36.8 °C)   Pulse 92 Pulse  Min: 79  Max: 103   Resp 18 Resp  Min: 16  Max: 22   /58 BP  Min: 93/60  Max: 149/90   O2 Sat 94 % SpO2  Min: 90 %  Max: 97 %      Intake/Output Summary (Last 24 hours) at 1/20/2025 0038  Last data filed at 1/19/2025 2200  Gross per 24 hour   Intake 341.4 ml   Output 2615 ml   Net -2273.6 ml       Diet Juan Carlos/CHO Controlled; Consistent Carbohydrate Diet Level 2 (5 carb servings/75 grams CHO/meal)    Invasive Monitoring   Arterial Line  Carly BP    No data recorded   MAP    No data recorded           Physical Exam   Physical Exam  Eyes:      Pupils: Pupils are equal, round, and reactive to light.   Skin:     General: Skin is warm and dry.   HENT:      Head: Normocephalic and atraumatic.      Mouth/Throat:      Mouth: Mucous membranes are dry.   Cardiovascular:      Rate and Rhythm: Normal rate and regular rhythm.      Comments: Right femoral aortic balloon pump- small amount of old, dried blood at insertion site with no erythema   Right  DP detected with doppler  Capillary refill <2 seconds  Musculoskeletal:         General: Tenderness (right leg/groin) present. No swelling, deformity or signs of injury.   Abdominal: General: There is no distension.      Palpations: Abdomen is soft.      Tenderness: There is no abdominal tenderness.   Constitutional:       General: He is not in acute distress.     Appearance: He is ill-appearing.   Pulmonary:      Effort: Pulmonary effort is normal. No respiratory distress.      Breath sounds: Normal breath sounds.   Neurological:      GCS: GCS eye subscore is 4. GCS verbal subscore is 5. GCS motor subscore is 6.   Genitourinary/Anorectal:     Comments: Goldsmith in place with alexander urine  Goldsmith present.        Diagnostic Studies        Lab Results: I have reviewed the following results:     Medications:  Scheduled PRN   aspirin, 81 mg, Daily  atorvastatin, 80 mg, Daily With Dinner  chlorhexidine, 15 mL, Q12H YNES  Diclofenac Sodium, 2 g, 4x Daily  famotidine, 20 mg, BID  insulin glargine, 15 Units, HS  insulin lispro, 2-12 Units, 4x Daily (AC & HS)  lidocaine, 1 patch, Daily  melatonin, 6 mg, HS  ticagrelor, 90 mg, Q12H YNES      aluminum-magnesium hydroxide-simethicone, 30 mL, Q4H PRN  bisacodyl, 10 mg, Daily PRN  diphenhydrAMINE, 25 mg, Q6H PRN  heparin (porcine), 2,000 Units, Q6H PRN  heparin (porcine), 4,000 Units, Q6H PRN  menthol-methyl salicylate, , 4x Daily PRN  ondansetron, 4 mg, Q6H PRN  oxyCODONE, 2.5 mg, Q4H PRN   Or  oxyCODONE, 5 mg, Q4H PRN       Continuous    heparin (porcine), 3-20 Units/kg/hr (Order-Specific), Last Rate: 14.1 Units/kg/hr (01/19/25 1019)         Labs:   CBC    Recent Labs     01/18/25  0443 01/19/25  0456   WBC 13.81* 13.84*   HGB 13.6 13.0   HCT 40.5 39.3    151     BMP    Recent Labs     01/18/25  1624 01/19/25  0456   SODIUM 136 140   K 4.0 4.0    110*   CO2 23 24   AGAP 6 6   BUN 20 18   CREATININE 0.78 0.86   CALCIUM 8.7 8.4       Coags    Recent Labs     01/19/25  7879  01/19/25  1120   PTT 79* 87*        Additional Electrolytes  Recent Labs     01/18/25  1624 01/19/25  0456   MG 1.9 1.7*   PHOS 3.7 3.5   CAIONIZED 1.15 1.14          Blood Gas    Recent Labs     01/19/25  0622   PHART 7.494*   IHL4VNJ 25.4*   PO2ART 84.7   ILC8VGI 19.1*   BEART -2.5   SOURCE Line, Arterial     Recent Labs     01/19/25  0622   SOURCE Line, Arterial    LFTs  Recent Labs     01/18/25  1624 01/19/25  0456   * 88*   * 143*   ALKPHOS 66 61   ALB 3.7 3.6   TBILI 0.85 0.77       Infectious  No recent results  Glucose  Recent Labs     01/18/25  0443 01/18/25  1624 01/19/25  0456   GLUC 158* 133 111

## 2025-01-20 NOTE — ASSESSMENT & PLAN NOTE
Patient s/p surgical closure of right femoral artery due to hematoma related to intra-aortic balloon pump  Patient received 1u PRBC on 1/20  Follow endpoints of resuscitation closely  Frequent neurovascular exams  Appreciate vascular surgery recommendations

## 2025-01-20 NOTE — PROCEDURES
Arterial Line Insertion    Date/Time: 1/20/2025 2:08 PM    Performed by: Estiven Rodas PA-C  Authorized by: Estiven Rodas PA-C    Patient location:  ICU  Consent:     Consent obtained:  Verbal    Consent given by:  Patient    Risks discussed:  Bleeding, ischemia, pain, infection and repeat procedure  Universal protocol:     Procedure explained and questions answered to patient or proxy's satisfaction: yes      Immediately prior to procedure a time out was called: yes      Patient identity confirmed:  Verbally with patient, arm band, provided demographic data and hospital-assigned identification number  Indications:     Indications: hemodynamic monitoring, multiple ABGs and continuous blood pressure monitoring    Pre-procedure details:     Skin preparation:  Chlorhexidine    Preparation: Patient was prepped and draped in sterile fashion    Anesthesia (see MAR for exact dosages):     Anesthesia method:  Local infiltration    Local anesthetic:  Lidocaine 1% w/o epi  Procedure details:     Location / Tip of Catheter:  Radial    Laterality:  Left    Needle gauge:  20 G    Placement technique:  Percutaneous    Number of attempts:  1    Successful placement: yes      Transducer: waveform confirmed    Post-procedure details:     Post-procedure:  Biopatch applied, sutured, sterile dressing applied, wrist guard applied and secured with tape    CMS:  Unchanged    Patient tolerance of procedure:  Tolerated well, no immediate complications

## 2025-01-20 NOTE — ASSESSMENT & PLAN NOTE
Resume heparin infusion when appropriate with vascular surgery service  Discuss when to transition to oral anticoagulation

## 2025-01-20 NOTE — ASSESSMENT & PLAN NOTE
S/p intra-aortic balloon pump on 1/17, discontinued on 1/20  Resume heparin infusion when appropriate with vascular surgery  Continue norepinephrine for MAP>65  Appreciate cardiology recommendations

## 2025-01-20 NOTE — ASSESSMENT & PLAN NOTE
Lab Results   Component Value Date    HGBA1C 8.2 (H) 01/18/2025     Recent Labs     01/19/25  1253 01/19/25  1700 01/19/25 2051 01/20/25  0811   POCGLU 173* 185* 161* 192*   Blood Sugar Average: Last 72 hrs:  (P) 156.2318154391706398  Management per primary service.

## 2025-01-20 NOTE — ASSESSMENT & PLAN NOTE
Awaiting improvements in hemodynamics to initiate goal-directed therapy  PRN Lasix for even to slightly negative  Patient appears euvolemic  Appreciate cardiology recommendations

## 2025-01-20 NOTE — ASSESSMENT & PLAN NOTE
Status post drug-eluting stent to the LAD and left main, unable to intervene on the circumflex artery.  Significant hypotension in the Cath Lab requiring pressors and intra-aortic balloon pump support  IABP discontinued on 1/20  Continue aspirin, statin, Brilinta  Awaiting improvement in hemodynamics to begin beta-blocker  Appreciate cardiology recommendations

## 2025-01-20 NOTE — PROGRESS NOTES
Cardiology Progress Note - Joseph Aguilar 69 y.o. male MRN: [id number]    Unit/Bed#: ICU 04 Encounter: [id number]      Assessment & Plan  Anterior STEMI s/p PCI with CHYNA to LM-ostial LAD and mLAD with IABP;  of LCx (1/17/2024)  Troponin with peak >22,973.  TTE shows EF 25%, severe global hypokinesis with regional variation and is near akinesis of apical and distal septal segments.  Telemetry shows NSR with brief runs of PSVT and NSVT, continue to monitor.  Continue ASA, Brilinta, and Lipitor.  Not on BB at this time due to cardiogenic shock.  On heparin gtt given presence of IABP and LV thrombus.  Cardiogenic shock (HCC)  Previously weaned off Levophed.  IABP in place, continue to wean as tolerated.  If patient does not tolerate weaning of IABP, may be reasonable to consider further evaluation at B, however candidacy for advanced therapies may be limited in setting of LV thrombus.  Recommend IV Lasix as tolerated.  I/O net -3.8 L, creatinine stable.   ICM with EF 25%  Plan for optimization of GDMT as tolerated.  Will need consideration for LifeVest.  LV (left ventricular) mural thrombus  Suspected on TTE, continue heparin gtt.  Plan for transition to oral AC prior to discharge.  History of hypertension  Continue to monitor off antihypertensives. See plan above.  Mixed hyperlipidemia  Lipitor restarted as per above.  Type 2 diabetes mellitus without complication, without long-term current use of insulin ([facility])  Lab Results   Component Value Date    HGBA1C 8.2 (H) 01/18/2025     Recent Labs     01/19/25  1253 01/19/25  1700 01/19/25  2051 01/20/25  0811   POCGLU 173* 185* 161* 192*   Blood Sugar Average: Last 72 hrs:  (P) [id number]  Management per primary service.        Subjective:   Patient seen and examined.  No significant events overnight.  Patient resting in bed.  Denies any new complaints at this time.  All questions were answered.    Objective:     Vitals: Blood pressure 125/83, pulse 105,  "temperature 98.1 °F (36.7 °C), temperature source Oral, resp. rate 16, height 6' 2\" (1.88 m), weight 115 kg (253 lb 3.9 oz), SpO2 96%., Body mass index is 32.51 kg/m².,   Orthostatic Blood Pressures      Flowsheet Row Most Recent Value   Blood Pressure 125/83 filed at 01/20/2025 0821   Patient Position - Orthostatic VS Lying filed at 01/19/2025 0800              Intake/Output Summary (Last 24 hours) at 1/20/2025 0855  Last data filed at 1/20/2025 0800  Gross per 24 hour   Intake 356.9 ml   Output 2390 ml   Net -2033.1 ml         Physical Exam:  GEN: Alert and oriented x 3, in no acute distress.  Ill appearing and well nourished.   HEENT: Sclera anicteric, conjunctivae pink, mucous membranes moist. Oropharynx clear.   NECK: Supple, no carotid bruits, no significant JVD. Trachea midline, no thyromegaly.   HEART: Regular rhythm, normal S1 and S2, + murmur, no clicks, gallops or rubs. PMI nondisplaced, no thrills.   LUNGS: Clear to auscultation bilaterally; no wheezes, rales, or rhonchi. No increased work of breathing or signs of respiratory distress.   ABDOMEN: Soft, nontender, nondistended, normoactive bowel sounds.   EXTREMITIES: Skin warm and well perfused, no clubbing or cyanosis, trace edema.  NEURO: No focal findings. Normal speech. Mood and affect normal.   SKIN: Normal without suspicious lesions on exposed skin.        Medications:      Current Facility-Administered Medications:     aluminum-magnesium hydroxide-simethicone (MAALOX) oral suspension 30 mL, 30 mL, Oral, Q4H PRN, MRAGARITA Galeano, 30 mL at 01/18/25 1114    aspirin (ECOTRIN LOW STRENGTH) EC tablet 81 mg, 81 mg, Oral, Daily, MARGARITA Hernandez, 81 mg at 01/20/25 0822    atorvastatin (LIPITOR) tablet 80 mg, 80 mg, Oral, Daily With Dinner, MARGARITA Galeano, 80 mg at 01/19/25 1708    bisacodyl (DULCOLAX) rectal suppository 10 mg, 10 mg, Rectal, Daily PRN, MARGARITA Hernandez    chlorhexidine (PERIDEX) " 0.12 % oral rinse 15 mL, 15 mL, Mouth/Throat, Q12H YNES, MARGARITA Hernandez, 15 mL at 01/20/25 0824    Diclofenac Sodium (VOLTAREN) 1 % topical gel 2 g, 2 g, Topical, 4x Daily, Phoenix Nolasco MD, 2 g at 01/20/25 0823    Famotidine (PF) (PEPCID) injection 20 mg, 20 mg, Intravenous, BID, MARGARITA Hernandez, 20 mg at 01/20/25 0825    heparin (porcine) 25,000 units in 0.45% NaCl 250 mL infusion (premix), 3-20 Units/kg/hr (Order-Specific), Intravenous, Titrated, MARGARITA Hernandez, Last Rate: 14.5 mL/hr at 01/20/25 0818, 16.1 Units/kg/hr at 01/20/25 0818    heparin (porcine) injection 2,000 Units, 2,000 Units, Intravenous, Q6H PRN, MARGARITA Hernandez, 2,000 Units at 01/20/25 0817    heparin (porcine) injection 4,000 Units, 4,000 Units, Intravenous, Q6H PRN, MARGARITA Hernandez    insulin glargine (LANTUS) subcutaneous injection 15 Units 0.15 mL, 15 Units, Subcutaneous, HS, MARGARITA Galeano, 15 Units at 01/19/25 2116    insulin lispro (HumALOG/ADMELOG) 100 units/mL subcutaneous injection 2-12 Units, 2-12 Units, Subcutaneous, 4x Daily (AC & HS), 2 Units at 01/20/25 0811 **AND** Fingerstick Glucose (POCT), , , 4x Daily AC and at bedtime, MARGARITA Galeano    lidocaine (LIDODERM) 5 % patch 1 patch, 1 patch, Topical, Daily, MARGARITA Galeano, 1 patch at 01/20/25 0824    magnesium sulfate 2 g/50 mL IVPB (premix) 2 g, 2 g, Intravenous, Once, Estiven Rodas PA-C, Last Rate: 25 mL/hr at 01/20/25 0819, 2 g at 01/20/25 0819    melatonin tablet 6 mg, 6 mg, Oral, HS, MARGARITA Hernandez, 6 mg at 01/19/25 2114    menthol-methyl salicylate (BENGAY) 10-15 % cream, , Apply externally, 4x Daily PRN, MARGARITA Hernandez, Given at 01/19/25 1256    ondansetron (ZOFRAN) injection 4 mg, 4 mg, Intravenous, Q6H PRN, MARGARITA Galeano    oxyCODONE (ROXICODONE) split tablet 2.5 mg, 2.5 mg, Oral, Q4H  "PRN, 2.5 mg at 01/18/25 1835 **OR** oxyCODONE (ROXICODONE) IR tablet 5 mg, 5 mg, Oral, Q4H PRN, Camilo Ashraf Didier, CRNP, 5 mg at 01/18/25 1859    potassium chloride 20 mEq IVPB (premix), 20 mEq, Intravenous, Once, Estiven Rodas PA-C, Last Rate: 50 mL/hr at 01/20/25 0819, 20 mEq at 01/20/25 0819    ticagrelor (BRILINTA) tablet 90 mg, 90 mg, Oral, Q12H YNES, MARGARITA Hernandez, 90 mg at 01/20/25 0822     Labs & Results:     Results from last 7 days   Lab Units 01/17/25  2246 01/17/25  2056 01/17/25  1800   HS TNI 0HR ng/L  --   --  113*   HS TNI 2HR ng/L  --  >22,973*  --    HSTNI D2 ng/L  --  >22,860*  --    HS TNI 4HR ng/L >22,973*  --   --    HSTNI D4 ng/L >22,860*  --   --      Results from last 7 days   Lab Units 01/20/25  0532 01/19/25  0456 01/18/25  0443   WBC Thousand/uL 16.24* 13.84* 13.81*   HEMOGLOBIN g/dL 13.1 13.0 13.6   HEMATOCRIT % 39.7 39.3 40.5   PLATELETS Thousands/uL 170 151 167     Results from last 7 days   Lab Units 01/18/25  0443   TRIGLYCERIDES mg/dL 104   HDL mg/dL 33*     Results from last 7 days   Lab Units 01/20/25  0532 01/19/25  0456 01/18/25  1624   POTASSIUM mmol/L 3.8 4.0 4.0   CHLORIDE mmol/L 106 110* 107   CO2 mmol/L 22 24 23   BUN mg/dL 23 18 20   CREATININE mg/dL 0.97 0.86 0.78   CALCIUM mg/dL 8.9 8.4 8.7   ALK PHOS U/L 65 61 66   ALT U/L 64* 88* 116*   AST U/L 56* 143* 269*     Results from last 7 days   Lab Units 01/20/25  0532 01/19/25  1120 01/19/25  0456 01/18/25  0443 01/17/25  2056   INR   --   --   --   --  1.20*   PTT seconds 50* 87* 79*   < > >210*    < > = values in this interval not displayed.     Results from last 7 days   Lab Units 01/20/25  0532 01/19/25  0456 01/18/25  1624   MAGNESIUM mg/dL 1.8* 1.7* 1.9       Vitals: Blood pressure 125/83, pulse 105, temperature 98.1 °F (36.7 °C), temperature source Oral, resp. rate 16, height 6' 2\" (1.88 m), weight 115 kg (253 lb 3.9 oz), SpO2 96%., Body mass index is 32.51 kg/m².,   Orthostatic Blood Pressures "      Flowsheet Row Most Recent Value   Blood Pressure 125/83 filed at 2025 0821   Patient Position - Orthostatic VS Lying filed at 2025 0800            Systolic (24hrs), Av , Min:93 , Max:125     Diastolic (24hrs), Av, Min:52, Max:83        Intake/Output Summary (Last 24 hours) at 2025 0855  Last data filed at 2025 0800  Gross per 24 hour   Intake 356.9 ml   Output 2390 ml   Net -2033.1 ml       Invasive Devices       Peripheral Intravenous Line  Duration             Peripheral IV 25 Right Antecubital 2 days    Peripheral IV 25 Left;Ventral (anterior) Forearm <1 day              Line  Duration             Arterial Sheath -- days    IABP 8.0 Fr. 50 mL 2 days              Drain  Duration             Urethral Catheter 2 days                          BP Readings from Last 3 Encounters:   25 125/83   10/07/24 131/89   24 (!) 153/110      Wt Readings from Last 3 Encounters:   25 115 kg (253 lb 3.9 oz)   25 112 kg (247 lb)   10/07/24 109 kg (240 lb)

## 2025-01-20 NOTE — PROCEDURES
Vascular surgery procedure note:    INDICATIONS:  Patient with acute myocardial infarction who underwent emergency catheterization via radial access with cardiac stents.  Patient also underwent right groin femoral access for placement of balloon pump.  Today afternoon developed a large expanding hematoma around the balloon pump.  Patient on aspirin Brilinta and heparin drip.  Manual pressure was held in the ICU and FemoStop applied and patient brought emergently to cardiac Cath Lab for removal of balloon pump.    Expanding hematoma of right groin femoral artery    PROCEDURE PERFORMED:  Removal of intra-aortic balloon pump and closure of right femoral artery puncture site and control of expanding hematoma      SURGEON:  Demi Brooke MD, [facility]    ANESTHESIA:  Local with sedation, total sedation time was 30 minutes, I supervised the RN in administration of medications (IV versed and fentanyl) and performed cardiopulmonary monitoring during the duration of the procedure.    OPERATIVE PROCEDURE:  After patient identification and informed consent, the patient was taken to the interventional cardiology procedure room and placed in a supine position.  Adequate sedation was administered via IV route.  The patient’s bilateral groins were cleaned and draped in sterile surgical fashion using DuraPrep 1% local Lidocaine was injected into the skin and subcutaneous tissue overlying the right  femoral artery.  We prepped the intra-aortic balloon pump after turning off the machine.  We disconnected the intra-aortic balloon pump and inserted an 018 glide advantage wire into the thoracic aorta.  Then the intra-aortic balloon pump and his sheath were removed as a unit and a 6 Polish slender sheath was inserted into the right femoral access while holding manual pressure to minimize bleeding.  We then quickly placed in 035 J-wire into the thoracic aorta.  Slender sheath was then removed and a Perclose Pro-glide was quickly deployed into  the access.  There continued to be bleeding with 1 Perclose hence an additional Pro-glide was required to secure hemostasis.  Once we have confirmed hemostasis the J-wire was removed and Pro-glide sutures were tied down.  Additional 15 minutes of manual pressure was held at the puncture site.  Dry dressings were then applied.  Patient had posterior tibial signals in the right foot.        At the end of the case patient's bilateral feet were well perfused and had good cap refill.        PLAN:    12-hour bedrest.  Flat for 2 hours, head of bed up to 30 degrees after that.  Resume heparin drip after 6 hours with no bolus.

## 2025-01-20 NOTE — PROGRESS NOTES
Progress Note - Vascular Surgery   Name: Joseph Aguilar 69 y.o. male I MRN: [id number]  Unit/Bed#: ICU 04 I Date of Admission: 1/17/2025   Date of Service: 1/20/2025 I Hospital Day: 3     Called by the ICU team for patient who is having bleeding around the right femoral intra-aortic balloon pump.  It was inserted last Friday for acute MI.  This afternoon suddenly there was an expanding hematoma in the right groin with concern of bleeding around the intra-aortic balloon pump.  Manual pressure was held and a FemoStop was placed.  I discussed with Dr. Gaines from cardiology, patient is not require balloon pump any longer hence best thing would be to remove the balloon pump in the Cath Lab and use Pro-glide's to close the access safely as patient is on Brilinta and aspirin.  Heparin has now been stopped.verbal consent obtained from patient and his was called and a Voice mail was left to update her.

## 2025-01-20 NOTE — PROGRESS NOTES
[facility] Evaluation: Cardiogenic Shock Alert  Critical Care  Joseph Aguilar 69 y.o. male MRN: [id number]  Unit/Bed#: ICU 04 Encounter: [id number]      Patient clinical summary: Joseph Aguilar is a 69 y.o. male who PMH HTN, HLD, DM-2 A1c 8.2. Came in 1/17 for STEMI in cath lab hypotension with cardiogenic shock, IABP right fem placed with improvement of hemodynamics. CHYNA LM to ostial LAD and mid LAD. LCX . Since TTE shows new onset ICM LVEF 25%, RV wnl, moderate sized thrombus. Slowly weaning IABP, today placed on 1:3 with stable hemodynamics. Suddenly complained of Right leg pain and noted to have large hematoma. Heparin gtt stopped and vascular surgery and cardiology team called bedside. Plan to remove IABP in IR suite with vascular surgery for arterial repair.     Available past medical history, social history, surgical history, and medication lists were reviewed.    Referring Provider: Ciro Gregory  [place]: [place]    Providers involved in alert:     Ciro Alvarez [facility] P3 attending  Massimo Valdovinos [facility] attending  Neeraj Gaines [facility] Interventional Cardiology     Recommendations:   Maintain care at [facility]  If decompensation occurs plan to place left fem IABP  If deterioration occurs would possibly transfer to [facility] given IABP weaning failure  Unclear if he would be a candidate for more advanced MCS. Impella likely limited by LV thrombus. LV cavity size in diastole 5.2 cm     End Result of Alert: Maintain care at [facility], at this point we expect him to de-escalate off of IABP and for hemorrhage to be controlled with vascular surgery repair in IR suite. Will reach back out if decompensation occure      All questions answered. Provider is in agreement with the course of action.

## 2025-01-20 NOTE — ASSESSMENT & PLAN NOTE
Lab Results   Component Value Date    HGBA1C 8.2 (H) 01/18/2025       Recent Labs     01/20/25  1210 01/20/25  1224 01/20/25  1704 01/20/25  2139   POCGLU 222* 210* 133 201*       Blood Sugar Average: Last 72 hrs:    (P) 155.04    Holding home antihyperglycemics  Continue Lantus with sliding scale while inpatient

## 2025-01-20 NOTE — ASSESSMENT & PLAN NOTE
Troponin with peak >22,973.  TTE shows EF 25%, severe global hypokinesis with regional variation and is near akinesis of apical and distal septal segments.  Telemetry shows NSR with brief runs of PSVT and NSVT, continue to monitor.  Continue ASA, Brilinta, and Lipitor.  Not on BB at this time due to cardiogenic shock.  On heparin gtt given presence of IABP and LV thrombus.

## 2025-01-21 PROBLEM — R57.0 CARDIOGENIC SHOCK (HCC): Status: RESOLVED | Noted: 2025-01-17 | Resolved: 2025-01-21

## 2025-01-21 LAB
ABO GROUP BLD BPU: NORMAL
ABO GROUP BLD BPU: NORMAL
ALBUMIN SERPL BCG-MCNC: 3.8 G/DL (ref 3.5–5)
ALP SERPL-CCNC: 64 U/L (ref 34–104)
ALT SERPL W P-5'-P-CCNC: 54 U/L (ref 7–52)
ANION GAP SERPL CALCULATED.3IONS-SCNC: 8 MMOL/L (ref 4–13)
APTT PPP: 38 SECONDS (ref 23–34)
APTT PPP: 55 SECONDS (ref 23–34)
APTT PPP: 63 SECONDS (ref 23–34)
AST SERPL W P-5'-P-CCNC: 34 U/L (ref 13–39)
BASOPHILS # BLD AUTO: 0.02 THOUSANDS/ΜL (ref 0–0.1)
BASOPHILS NFR BLD AUTO: 0 % (ref 0–1)
BILIRUB SERPL-MCNC: 1.26 MG/DL (ref 0.2–1)
BPU ID: NORMAL
BPU ID: NORMAL
BUN SERPL-MCNC: 38 MG/DL (ref 5–25)
CA-I BLD-SCNC: 1.12 MMOL/L (ref 1.12–1.32)
CALCIUM SERPL-MCNC: 8.9 MG/DL (ref 8.4–10.2)
CHLORIDE SERPL-SCNC: 107 MMOL/L (ref 96–108)
CO2 SERPL-SCNC: 23 MMOL/L (ref 21–32)
CREAT SERPL-MCNC: 1.13 MG/DL (ref 0.6–1.3)
CROSSMATCH: NORMAL
CROSSMATCH: NORMAL
EOSINOPHIL # BLD AUTO: 0.13 THOUSAND/ΜL (ref 0–0.61)
EOSINOPHIL NFR BLD AUTO: 1 % (ref 0–6)
ERYTHROCYTE [DISTWIDTH] IN BLOOD BY AUTOMATED COUNT: 15.2 % (ref 11.6–15.1)
GFR SERPL CREATININE-BSD FRML MDRD: 66 ML/MIN/1.73SQ M
GLUCOSE SERPL-MCNC: 197 MG/DL (ref 65–140)
GLUCOSE SERPL-MCNC: 204 MG/DL (ref 65–140)
GLUCOSE SERPL-MCNC: 215 MG/DL (ref 65–140)
GLUCOSE SERPL-MCNC: 220 MG/DL (ref 65–140)
GLUCOSE SERPL-MCNC: 222 MG/DL (ref 65–140)
HCT VFR BLD AUTO: 38.1 % (ref 36.5–49.3)
HGB BLD-MCNC: 13 G/DL (ref 12–17)
IMM GRANULOCYTES # BLD AUTO: 0.08 THOUSAND/UL (ref 0–0.2)
IMM GRANULOCYTES NFR BLD AUTO: 1 % (ref 0–2)
LYMPHOCYTES # BLD AUTO: 1.33 THOUSANDS/ΜL (ref 0.6–4.47)
LYMPHOCYTES NFR BLD AUTO: 9 % (ref 14–44)
MAGNESIUM SERPL-MCNC: 2 MG/DL (ref 1.9–2.7)
MCH RBC QN AUTO: 31.2 PG (ref 26.8–34.3)
MCHC RBC AUTO-ENTMCNC: 34.1 G/DL (ref 31.4–37.4)
MCV RBC AUTO: 91 FL (ref 82–98)
MONOCYTES # BLD AUTO: 1.44 THOUSAND/ΜL (ref 0.17–1.22)
MONOCYTES NFR BLD AUTO: 9 % (ref 4–12)
NEUTROPHILS # BLD AUTO: 12.55 THOUSANDS/ΜL (ref 1.85–7.62)
NEUTS SEG NFR BLD AUTO: 80 % (ref 43–75)
NRBC BLD AUTO-RTO: 0 /100 WBCS
PHOSPHATE SERPL-MCNC: 3.6 MG/DL (ref 2.3–4.1)
PLATELET # BLD AUTO: 161 THOUSANDS/UL (ref 149–390)
PMV BLD AUTO: 9.8 FL (ref 8.9–12.7)
POTASSIUM SERPL-SCNC: 3.9 MMOL/L (ref 3.5–5.3)
PROT SERPL-MCNC: 6.7 G/DL (ref 6.4–8.4)
RBC # BLD AUTO: 4.17 MILLION/UL (ref 3.88–5.62)
SODIUM SERPL-SCNC: 138 MMOL/L (ref 135–147)
UNIT DISPENSE STATUS: NORMAL
UNIT DISPENSE STATUS: NORMAL
UNIT PRODUCT CODE: NORMAL
UNIT PRODUCT CODE: NORMAL
UNIT PRODUCT VOLUME: 300 ML
UNIT PRODUCT VOLUME: 350 ML
UNIT RH: NORMAL
UNIT RH: NORMAL
WBC # BLD AUTO: 15.55 THOUSAND/UL (ref 4.31–10.16)

## 2025-01-21 PROCEDURE — 85730 THROMBOPLASTIN TIME PARTIAL: CPT | Performed by: ANESTHESIOLOGY

## 2025-01-21 PROCEDURE — 83735 ASSAY OF MAGNESIUM: CPT | Performed by: PHYSICIAN ASSISTANT

## 2025-01-21 PROCEDURE — 99232 SBSQ HOSP IP/OBS MODERATE 35: CPT

## 2025-01-21 PROCEDURE — 99233 SBSQ HOSP IP/OBS HIGH 50: CPT | Performed by: INTERNAL MEDICINE

## 2025-01-21 PROCEDURE — 97163 PT EVAL HIGH COMPLEX 45 MIN: CPT

## 2025-01-21 PROCEDURE — 85730 THROMBOPLASTIN TIME PARTIAL: CPT | Performed by: PHYSICIAN ASSISTANT

## 2025-01-21 PROCEDURE — 85025 COMPLETE CBC W/AUTO DIFF WBC: CPT | Performed by: PHYSICIAN ASSISTANT

## 2025-01-21 PROCEDURE — 82330 ASSAY OF CALCIUM: CPT | Performed by: PHYSICIAN ASSISTANT

## 2025-01-21 PROCEDURE — 80053 COMPREHEN METABOLIC PANEL: CPT | Performed by: PHYSICIAN ASSISTANT

## 2025-01-21 PROCEDURE — 99233 SBSQ HOSP IP/OBS HIGH 50: CPT | Performed by: ANESTHESIOLOGY

## 2025-01-21 PROCEDURE — 82948 REAGENT STRIP/BLOOD GLUCOSE: CPT

## 2025-01-21 PROCEDURE — 84100 ASSAY OF PHOSPHORUS: CPT | Performed by: PHYSICIAN ASSISTANT

## 2025-01-21 RX ORDER — INSULIN GLARGINE 100 [IU]/ML
15 INJECTION, SOLUTION SUBCUTANEOUS
Status: DISCONTINUED | OUTPATIENT
Start: 2025-01-21 | End: 2025-01-21

## 2025-01-21 RX ORDER — INSULIN GLARGINE 100 [IU]/ML
20 INJECTION, SOLUTION SUBCUTANEOUS
Status: DISCONTINUED | OUTPATIENT
Start: 2025-01-21 | End: 2025-01-29 | Stop reason: HOSPADM

## 2025-01-21 RX ORDER — INSULIN GLARGINE 100 [IU]/ML
20 INJECTION, SOLUTION SUBCUTANEOUS
Status: DISCONTINUED | OUTPATIENT
Start: 2025-01-21 | End: 2025-01-21

## 2025-01-21 RX ORDER — QUETIAPINE FUMARATE 25 MG/1
25 TABLET, FILM COATED ORAL
Status: DISCONTINUED | OUTPATIENT
Start: 2025-01-21 | End: 2025-01-29 | Stop reason: HOSPADM

## 2025-01-21 RX ORDER — HEPARIN SODIUM 10000 [USP'U]/100ML
3-30 INJECTION, SOLUTION INTRAVENOUS
Status: DISCONTINUED | OUTPATIENT
Start: 2025-01-21 | End: 2025-01-29

## 2025-01-21 RX ADMIN — HEPARIN SODIUM 15.1 UNITS/KG/HR: 10000 INJECTION, SOLUTION INTRAVENOUS at 07:47

## 2025-01-21 RX ADMIN — TICAGRELOR 90 MG: 90 TABLET ORAL at 08:51

## 2025-01-21 RX ADMIN — FAMOTIDINE 20 MG: 10 INJECTION, SOLUTION INTRAVENOUS at 09:03

## 2025-01-21 RX ADMIN — ATORVASTATIN CALCIUM 80 MG: 40 TABLET, FILM COATED ORAL at 16:15

## 2025-01-21 RX ADMIN — MELATONIN 6 MG: 3 TAB ORAL at 21:20

## 2025-01-21 RX ADMIN — QUETIAPINE FUMARATE 25 MG: 25 TABLET ORAL at 00:59

## 2025-01-21 RX ADMIN — INSULIN LISPRO 4 UNITS: 100 INJECTION, SOLUTION INTRAVENOUS; SUBCUTANEOUS at 12:10

## 2025-01-21 RX ADMIN — LIDOCAINE 5% 1 PATCH: 700 PATCH TOPICAL at 08:50

## 2025-01-21 RX ADMIN — ASPIRIN 81 MG: 81 TABLET, COATED ORAL at 08:51

## 2025-01-21 RX ADMIN — HEPARIN SODIUM 17.1 UNITS/KG/HR: 10000 INJECTION, SOLUTION INTRAVENOUS at 18:29

## 2025-01-21 RX ADMIN — Medication 12.5 MG: at 10:55

## 2025-01-21 RX ADMIN — INSULIN GLARGINE 20 UNITS: 100 INJECTION, SOLUTION SUBCUTANEOUS at 21:21

## 2025-01-21 RX ADMIN — Medication 12.5 MG: at 21:34

## 2025-01-21 RX ADMIN — DICLOFENAC SODIUM 2 G: 10 GEL TOPICAL at 08:51

## 2025-01-21 RX ADMIN — INSULIN LISPRO 2 UNITS: 100 INJECTION, SOLUTION INTRAVENOUS; SUBCUTANEOUS at 08:57

## 2025-01-21 RX ADMIN — FAMOTIDINE 20 MG: 10 INJECTION, SOLUTION INTRAVENOUS at 17:40

## 2025-01-21 RX ADMIN — INSULIN LISPRO 4 UNITS: 100 INJECTION, SOLUTION INTRAVENOUS; SUBCUTANEOUS at 21:36

## 2025-01-21 RX ADMIN — INSULIN LISPRO 4 UNITS: 100 INJECTION, SOLUTION INTRAVENOUS; SUBCUTANEOUS at 16:35

## 2025-01-21 RX ADMIN — CHLORHEXIDINE GLUCONATE 0.12% ORAL RINSE 15 ML: 1.2 LIQUID ORAL at 08:55

## 2025-01-21 RX ADMIN — QUETIAPINE FUMARATE 25 MG: 25 TABLET ORAL at 22:38

## 2025-01-21 RX ADMIN — HEPARIN SODIUM 11.1 UNITS/KG/HR: 10000 INJECTION, SOLUTION INTRAVENOUS at 00:25

## 2025-01-21 RX ADMIN — TICAGRELOR 90 MG: 90 TABLET ORAL at 21:21

## 2025-01-21 NOTE — ASSESSMENT & PLAN NOTE
Continue low dose beta-blocker  Consult case management assistance with LifeVest   PRN Lasix for even to slightly negative  Patient appears euvolemic  Appreciate cardiology recommendations

## 2025-01-21 NOTE — ASSESSMENT & PLAN NOTE
Troponin with peak >22,973.  TTE shows EF 25%, severe global hypokinesis with regional variation and is near akinesis of apical and distal septal segments.    Telemetry shows NSR with brief runs of PSVT and NSVT, continue to monitor.    Continue ASA, Brilinta, and Lipitor. Started on low dose BB today.  On heparin gtt given LV thrombus; will need transition to oral AC prior to discharge.

## 2025-01-21 NOTE — ASSESSMENT & PLAN NOTE
Question related to shock state  Closely follow intake and output  Daily weights  Trend serum creatinine

## 2025-01-21 NOTE — PLAN OF CARE
Problem: PHYSICAL THERAPY ADULT  Goal: Performs mobility at highest level of function for planned discharge setting.  See evaluation for individualized goals.  Description: Treatment/Interventions: Functional transfer training, LE strengthening/ROM, Therapeutic exercise, Endurance training, Patient/family training, Equipment eval/education, Bed mobility, Continued evaluation, Spoke to nursing, OT          See flowsheet documentation for full assessment, interventions and recommendations.  1/21/2025 1356 by Allison Wyatt PT  Note: Prognosis: Good  Problem List: Decreased strength, Decreased endurance, Impaired balance, Decreased mobility, Decreased cognition, Pain  Assessment: Pt is 69 y.o. male seen for PT evaluation on 1/21/2025 s/p admit to [facility] on 1/17/2025 w/ Acute ST elevation myocardial infarction (STEMI) involving left main coronary artery (HCC). PT was consulted to assess pt's functional mobility and d/c needs. Order placed for PT eval and tx. PTA, pt resides with wife in [place] with 3 ALEXSANDRA, ambulates with cane, retired. At time of eval, pt requiring mod assist x2 for transfers. Upon evaluation, pt presenting with impaired functional mobility d/t decreased strength, decreased endurance, impaired balance, decreased mobility, decreased cognition, pain, and activity intolerance. Pertinent PMHx and current co-morbidities affecting pt's physical performance at time of assessment include: HTN, mixed HLD, type 2 DM, cardiogenic shock, hematoma of R LE, GHJ arthritis, polyneuropathy, anemia, morbid obesity. Personal factors affecting pt at time of eval include: inaccessible home environment, inability to ambulate household distances, inability to navigate level surfaces w/o external assistance, and decreased cognition. The following objective measures performed on IE also reveal limitations: Barthel Index: 30/100, Modified [place]: 4 (moderate/severe disability), and AM-PAC 6-Clicks: 8/24. Pt's clinical  presentation is currently unstable/unpredictable seen in pt's presentation of abnormal lab value(s), need for input for task focus and mobility technique, ongoing medical assessment, and on telemetry monitoring. Overall, pt's rehab potential and prognosis to return to PLOF is good as impacted by objective findings, warranting pt to receive further skilled PT interventions to address identified impairments, activity limitation(s), and participation restriction(s). Pt to benefit from continued PT tx to address deficits as defined above and maximize level of functional independent mobility. From PT/mobility standpoint, recommend level 1, maximum resource intensity in order to facilitate return to PLOF.  Barriers to Discharge: Inaccessible home environment, Decreased caregiver support     Rehab Resource Intensity Level, PT: I (Maximum Resource Intensity)    See flowsheet documentation for full assessment.     1/21/2025 1355 by Allison Wyatt PT  Note: Prognosis: Good  Problem List: Decreased strength, Decreased endurance, Impaired balance, Decreased mobility, Decreased cognition, Pain  Assessment: Pt is 69 y.o. male seen for PT evaluation on 1/21/2025 s/p admit to [facility] on 1/17/2025 w/ Acute ST elevation myocardial infarction (STEMI) involving left main coronary artery (HCC). PT was consulted to assess pt's functional mobility and d/c needs. Order placed for PT eval and tx. PTA, pt resides with wife in [place] with 3 ALEXSANDRA, ambulates with cane, retired. At time of eval, pt requiring mod assist x2 for transfers. Upon evaluation, pt presenting with impaired functional mobility d/t decreased strength, decreased endurance, impaired balance, decreased mobility, decreased cognition, pain, and activity intolerance. Pertinent PMHx and current co-morbidities affecting pt's physical performance at time of assessment include: HTN, mixed HLD, type 2 DM, cardiogenic shock, hematoma of R LE, GHJ arthritis, polyneuropathy,  anemia, morbid obesity. Personal factors affecting pt at time of eval include: inaccessible home environment, inability to ambulate household distances, inability to navigate level surfaces w/o external assistance, and decreased cognition. The following objective measures performed on IE also reveal limitations: Barthel Index: 30/100, Modified [place]: 4 (moderate/severe disability), and AM-PAC 6-Clicks: 8/24. Pt's clinical presentation is currently unstable/unpredictable seen in pt's presentation of abnormal lab value(s), need for input for task focus and mobility technique, ongoing medical assessment, and on telemetry monitoring. Overall, pt's rehab potential and prognosis to return to PLOF is good as impacted by objective findings, warranting pt to receive further skilled PT interventions to address identified impairments, activity limitation(s), and participation restriction(s). Pt to benefit from continued PT tx to address deficits as defined above and maximize level of functional independent mobility. From PT/mobility standpoint, recommend level 1, maximum resource intensity in order to facilitate return to PLOF.  Barriers to Discharge: Inaccessible home environment, Decreased caregiver support     Rehab Resource Intensity Level, PT: I (Maximum Resource Intensity)    See flowsheet documentation for full assessment.

## 2025-01-21 NOTE — PLAN OF CARE
Problem: Prexisting or High Potential for Compromised Skin Integrity  Goal: Skin integrity is maintained or improved  Description: INTERVENTIONS:  - Identify patients at risk for skin breakdown  - Assess and monitor skin integrity  - Assess and monitor nutrition and hydration status  - Monitor labs   - Assess for incontinence   - Turn and reposition patient  - Assist with mobility/ambulation  - Relieve pressure over bony prominences  - Avoid friction and shearing  - Provide appropriate hygiene as needed including keeping skin clean and dry  - Evaluate need for skin moisturizer/barrier cream  - Collaborate with interdisciplinary team   - Patient/family teaching  - Consider wound care consult   Outcome: Progressing     Problem: PAIN - ADULT  Goal: Verbalizes/displays adequate comfort level or baseline comfort level  Description: Interventions:  - Encourage patient to monitor pain and request assistance  - Assess pain using appropriate pain scale  - Administer analgesics based on type and severity of pain and evaluate response  - Implement non-pharmacological measures as appropriate and evaluate response  - Consider cultural and social influences on pain and pain management  - Notify physician/advanced practitioner if interventions unsuccessful or patient reports new pain  Outcome: Progressing     Problem: SAFETY ADULT  Goal: Patient will remain free of falls  Description: INTERVENTIONS:  - Educate patient/family on patient safety including physical limitations  - Instruct patient to call for assistance with activity   - Consult OT/PT to assist with strengthening/mobility   - Keep Call bell within reach  - Keep bed low and locked with side rails adjusted as appropriate  - Keep care items and personal belongings within reach  - Initiate and maintain comfort rounds  - Make Fall Risk Sign visible to staff  - Offer Toileting every 4 Hours, in advance of need  - Initiate/Maintain bed alarm  - Obtain necessary fall risk  management equipment:   - Apply yellow socks and bracelet for high fall risk patients  - Consider moving patient to room near nurses station  Outcome: Progressing  Goal: Maintain or return to baseline ADL function  Description: INTERVENTIONS:  -  Assess patient's ability to carry out ADLs; assess patient's baseline for ADL function and identify physical deficits which impact ability to perform ADLs (bathing, care of mouth/teeth, toileting, grooming, dressing, etc.)  - Assess/evaluate cause of self-care deficits   - Assess range of motion  - Assess patient's mobility; develop plan if impaired  - Assess patient's need for assistive devices and provide as appropriate  - Encourage maximum independence but intervene and supervise when necessary  - Involve family in performance of ADLs  - Assess for home care needs following discharge   - Consider OT consult to assist with ADL evaluation and planning for discharge  - Provide patient education as appropriate  Outcome: Progressing  Goal: Maintains/Returns to pre admission functional level  Description: INTERVENTIONS:  - Perform AM-PAC 6 Click Basic Mobility/ Daily Activity assessment daily.  - Set and communicate daily mobility goal to care team and patient/family/caregiver.   - Collaborate with rehabilitation services on mobility goals if consulted  - Perform Range of Motion 3 times a day.  - Reposition patient every 2 hours.  - Dangle patient 3 times a day  - Stand patient 3 times a day  - Ambulate patient 3 times a day  - Out of bed to chair 3 times a day   - Out of bed for meals 3 times a day  - Out of bed for toileting  - Record patient progress and toleration of activity level   Outcome: Progressing

## 2025-01-21 NOTE — PROGRESS NOTES
Progress Note - Vascular Surgery   Name: Joseph Aguilar 69 y.o. male I MRN: [id number]  Unit/Bed#: ICU 04 I Date of Admission: 1/17/2025   Date of Service: 1/21/2025 I Hospital Day: 4    Assessment & Plan  Hematoma of right lower extremity  Assessment:  Joseph Aguilar is a 68yo male with HTN, HLD, DM2, who presented with near syncope and hypotension.. He was found to have an anterior STEMI with elevated troponins. He underwent cardiac catheterization 1/17/25 with PTCA and CHYNA to the distal LM and ostial LAD, as well as, successful PTCA and CHYNA to the mLAD.  Patient noted to be in cardiogenic shock with severely elevated LVEDP requiring intra-aortic balloon pump placement in RIGHT femoral artery. POD#3 patient noted to have bleeding around right femoral IABP with expanding hematoma in the right groin. Manual pressure was held at bedside and a Femostop was placed on the area. Vascular surgery was consulted for evaluation.      POD#1 s/p removal of intra-aortic balloon pump and closure of right femoral artery puncture site and control of expanding hematoma    Right groin fullness without pulsatile mass. Right groin incision with mild venous oozing, re-enforced with steri-strips. 2+ R femoral pulse. +ve DP and PT doppler signals bilaterally. Motor and sensation intact.      Plan:  -RIGHT leg pain and bleeding around right femoral intra-aortic balloon pump causing expanding hematoma in right groin.   -s/p removal of IABP and closure of right femoral puncture site 1/20 by Dr. Brooke.   -Monitor right groin for bleeding, expanding hematoma, pulsatile mass, infection and for changes in neurovascular exam.   -Continue aspirin, brilinta, statin and heparin (LV thrombus)  -Notify vascular surgery with questions or concerns.   -Will d/w Dr. Ring.       24 Hour Events : None   Subjective : Patient examined at bedside in no acute distress.  He denies any right lower extremity pain, numbness/tingling, or motor deficits.  He  denies any pain in the right groin.      Objective :  Temp:  [97.9 °F (36.6 °C)-99.9 °F (37.7 °C)] 98.8 °F (37.1 °C)  HR:  [] 104  BP: ()/(50-92) 128/87  Resp:  [16-26] 17  SpO2:  [91 %-100 %] 96 %  O2 Device: None (Room air)     I/O         01/19 0701 01/20 0700 01/20 0701 01/21 0700 01/21 0701 01/22 0700    I.V. (mL/kg) 310.1 (2.7) 151.9 (1.4)     Blood  350     IV Piggyback 50 150     Total Intake(mL/kg) 360.1 (3.1) 651.9 (6.1)     Urine (mL/kg/hr) 2580 (0.9) 1210 (0.5)     Total Output 2580 1210     Net -2219.9 -558.1                  Lines/Drains/Airways       Active Status       Name Placement date Placement time Site Days    Urethral Catheter 01/17/25 2105  --  3                  Physical Exam  Vitals and nursing note reviewed.   Constitutional:       Appearance: He is obese. He is ill-appearing.   HENT:      Head: Normocephalic and atraumatic.   Cardiovascular:      Rate and Rhythm: Regular rhythm.      Heart sounds: Normal heart sounds.   Pulmonary:      Effort: Pulmonary effort is normal.   Abdominal:      General: Bowel sounds are normal.      Palpations: Abdomen is soft.   Musculoskeletal:         General: Swelling present.      Cervical back: Normal range of motion and neck supple.      Right lower leg: Edema present.      Left lower leg: Edema present.   Skin:     General: Skin is warm and dry.      Capillary Refill: Capillary refill takes 2 to 3 seconds.      Findings: Bruising present.      Comments: Right groin access site soft with mild venous oozing after bandage removal- re-enforced with steri-strips. Ecchymosis noted in right groin and proximal thigh. Firmness in peripubic area.    Neurological:      General: No focal deficit present.      Mental Status: He is alert and oriented to person, place, and time.       Pulse exam:    Femoral: Right: 2+  DP: Right: doppler signal Left: doppler signal  PT: Right: doppler signal Left: doppler signal       Lab Results: I have reviewed the  "following results:  CBC with diff:   Lab Results   Component Value Date    WBC 15.55 (H) 01/21/2025    HGB 13.0 01/21/2025    HCT 38.1 01/21/2025    MCV 91 01/21/2025     01/21/2025    RBC 4.17 01/21/2025    MCH 31.2 01/21/2025    MCHC 34.1 01/21/2025    RDW 15.2 (H) 01/21/2025    MPV 9.8 01/21/2025    NRBC 0 01/21/2025   ,   BMP/CMP:  Lab Results   Component Value Date    SODIUM 138 01/21/2025    SODIUM 137 01/10/2022    K 3.9 01/21/2025    K 4.1 01/10/2022     01/21/2025     01/10/2022    CO2 23 01/21/2025    CO2 26 01/10/2022    BUN 38 (H) 01/21/2025    BUN 17 01/10/2022    CREATININE 1.13 01/21/2025    CREATININE 0.88 01/10/2022    CALCIUM 8.9 01/21/2025    CALCIUM 9.1 01/10/2022    AST 34 01/21/2025    AST 28 01/06/2022    ALT 54 (H) 01/21/2025    ALT 66 (H) 01/06/2022    ALKPHOS 64 01/21/2025    ALKPHOS 59 01/06/2022    EGFR 66 01/21/2025   ,   Lipid Panel: No results found for: \"CHOL\",   Coags:   Lab Results   Component Value Date    PT 13.4 01/05/2022    PTT 38 (H) 01/21/2025    INR 1.05 01/20/2025    INR 1.1 01/05/2022   ,   Blood Culture: No results found for: \"BLOODCX\",   Urinalysis:   Lab Results   Component Value Date    COLORU Yellow 01/19/2022    CLARITYU Clear 01/19/2022    SPECGRAV 1.020 01/19/2022    PHUR 5.0 01/19/2022    LEUKOCYTESUR Negative 01/19/2022    NITRITE Negative 01/19/2022    GLUCOSEU >=1000 (1%) (A) 01/19/2022    KETONESU Negative 01/19/2022    BILIRUBINUR Negative 01/19/2022    BLOODU Negative 01/19/2022   ,   Urine Culture: No results found for: \"URINECX\",   Wound Culure: No results found for: \"WOUNDCULT\"    Imaging Results Review: No pertinent imaging studies reviewed.  Other Study Results Review: No additional pertinent studies reviewed.    VTE Prophylaxis: VTE covered by:  heparin (porcine), Intravenous, 15.1 Units/kg/hr at 01/21/25 0747     "

## 2025-01-21 NOTE — PHYSICAL THERAPY NOTE
Physical Therapy Evaluation     Patient's Name: Joseph Aguilar    Admitting Diagnosis  Weakness [R53.1]  Hypotension [I95.9]  STEMI (ST elevation myocardial infarction) (HCC) [I21.3]  Near syncope [R55]    Problem List  Patient Active Problem List   Diagnosis    Glenohumeral arthritis, right    Primary osteoarthritis of both knees    History of hypertension    Mixed hyperlipidemia    Type 2 diabetes mellitus without complication, without long-term current use of insulin (HCC)    Obesity (BMI 30-39.9)    S/P TKR (total knee replacement), right    Closed nondisplaced intertrochanteric fracture of left femur with routine healing    Pain in both feet    Polyneuropathy associated with underlying disease (HCC)    Slow transit constipation    Vitamin D deficiency    Anemia    Hypomagnesemia    Right foot pain    Glenohumeral arthritis, left    Morbid (severe) obesity due to excess calories ([facility])    Anterior STEMI s/p PCI with CHYNA to LM-ostial LAD and mLAD with IABP;  of LCx (1/17/2024)    Cardiogenic shock (HCC)    ICM with EF 25%    LV (left ventricular) mural thrombus    Hematoma of right lower extremity    Acute kidney injury (HCC)       Past Medical History  Past Medical History:   Diagnosis Date    Depression     Diabetes mellitus (HCC)     Hyperlipidemia     Hypertension     Osteoarthritis, knee        Past Surgical History  Past Surgical History:   Procedure Laterality Date    BACK SURGERY      CARDIAC CATHETERIZATION N/A 1/17/2025    Procedure: Cardiac PCI;  Surgeon: Neeraj Gaines MD;  Location: [facility];  Service: Cardiology    CARDIAC CATHETERIZATION Left 1/17/2025    Procedure: Cardiac Left Heart Cath;  Surgeon: Neeraj Gaines MD;  Location: [facility];  Service: Cardiology    CARDIAC CATHETERIZATION N/A 1/17/2025    Procedure: Cardiac iabp;  Surgeon: Neeraj Gaines MD;  Location: [facility];  Service: Cardiology    HAND SURGERY      JOINT REPLACEMENT Left     l tkr     KNEE SURGERY Left     AZ ARTHRP KNE CONDYLE&PLATU MEDIAL&LAT COMPARTMENTS Right 11/19/2018    Procedure: ARTHROPLASTY KNEE TOTAL;  Surgeon: Franny Grimaldo MD;  Location: [facility];  Service: Orthopedics    TOOTH EXTRACTION      WISDOM TOOTH EXTRACTION          01/21/25 1345   PT Last Visit   PT Visit Date 01/21/25   Note Type   Note type Evaluation   Pain Assessment   Pain Assessment Tool FLACC   Pain Rating: FLACC (Rest) - Face 1   Pain Rating: FLACC (Rest) - Legs 0   Pain Rating: FLACC (Rest) - Activity 0   Pain Rating: FLACC (Rest) - Cry 1   Pain Rating: FLACC (Rest) - Consolability 0   Score: FLACC (Rest) 2   Pain Rating: FLACC (Activity) - Face 1   Pain Rating: FLACC (Activity) - Legs 0   Pain Rating: FLACC (Activity) - Activity 1   Pain Rating: FLACC (Activity) - Cry 1   Pain Rating: FLACC (Activity) - Consolability 1   Score: FLACC (Activity) 4   Restrictions/Precautions   Weight Bearing Precautions Per Order No   Other Precautions Chair Alarm;Bed Alarm;Multiple lines;Telemetry;Fall Risk   Home Living   Type of Home House   Home Layout Two level;Stairs to enter with rails;Performs ADLs on one level;Able to live on main level with bedroom/bathroom  (3 ALEXSANDRA, optional 1st floor set up)   Home Equipment Cane   Additional Comments pt reports ambulates with cane at baseline   Prior Function   Level of [place] Independent with ADLs;Independent with functional mobility;Needs assistance with IADLS   Lives With Spouse   Receives Help From Family   IADLs Independent with driving;Family/Friend/Other provides transportation;Independent with medication management   Falls in the last 6 months 0   Vocational Retired  ()   Comments information above obtained from pt at time of PT IE, pt questionable historian, CM to follow up   General   Family/Caregiver Present No   Cognition   Overall Cognitive Status Impaired   Arousal/Participation Alert   Orientation Level Oriented to person;Disoriented to  place;Disoriented to time;Disoriented to situation   Memory Decreased short term memory;Decreased recall of recent events;Decreased recall of precautions   Following Commands Follows one step commands with increased time or repetition   RLE Assessment   RLE Assessment   (Grossly 3-/5 observed with functional mobility)   LLE Assessment   LLE Assessment   (Grossly 3-/5 observed with functional mobility)   Bed Mobility   Sit to Supine 3  Moderate assistance   Additional items Assist x 2;HOB elevated;Bedrails;Increased time required;Verbal cues;LE management   Additional Comments pt received OOB Upon arrival   Transfers   Sit to Stand 3  Moderate assistance   Additional items Assist x 2;Armrests;Increased time required;Verbal cues   Stand to Sit 3  Moderate assistance   Additional items Assist x 2;Armrests;Increased time required;Verbal cues   Additional Comments use of landon stedy for transfer BTB for enhanced pt safety   Balance   Static Sitting Fair -   Dynamic Sitting Poor +   Static Standing Poor   Dynamic Standing Poor -   Endurance Deficit   Endurance Deficit Yes   Activity Tolerance   Activity Tolerance Patient limited by fatigue;Treatment limited secondary to medical complications (Comment)   Nurse Made Aware GORDON Kate   Assessment   Prognosis Good   Problem List Decreased strength;Decreased endurance;Impaired balance;Decreased mobility;Decreased cognition;Pain   Assessment Pt is 69 y.o. male seen for PT evaluation on 1/21/2025 s/p admit to [facility] on 1/17/2025 w/ Acute ST elevation myocardial infarction (STEMI) involving left main coronary artery (HCC). PT was consulted to assess pt's functional mobility and d/c needs. Order placed for PT eval and tx. PTA, pt resides with wife in 2SH with 3 ALEXSANDRA, ambulates with cane, retired. At time of eval, pt requiring mod assist x2 for transfers. Upon evaluation, pt presenting with impaired functional mobility d/t decreased strength, decreased endurance, impaired  balance, decreased mobility, decreased cognition, pain, and activity intolerance. Pertinent PMHx and current co-morbidities affecting pt's physical performance at time of assessment include: HTN, mixed HLD, type 2 DM, cardiogenic shock, hematoma of R LE, GHJ arthritis, polyneuropathy, anemia, morbid obesity. Personal factors affecting pt at time of eval include: inaccessible home environment, inability to ambulate household distances, inability to navigate level surfaces w/o external assistance, and decreased cognition. The following objective measures performed on IE also reveal limitations: Barthel Index: 30/100, Modified [place]: 4 (moderate/severe disability), and AM-PAC 6-Clicks: 8/24. Pt's clinical presentation is currently unstable/unpredictable seen in pt's presentation of abnormal lab value(s), need for input for task focus and mobility technique, ongoing medical assessment, and on telemetry monitoring. Overall, pt's rehab potential and prognosis to return to PLOF is good as impacted by objective findings, warranting pt to receive further skilled PT interventions to address identified impairments, activity limitation(s), and participation restriction(s). Pt to benefit from continued PT tx to address deficits as defined above and maximize level of functional independent mobility. From PT/mobility standpoint, recommend level 1, maximum resource intensity in order to facilitate return to PLOF.   Barriers to Discharge Inaccessible home environment;Decreased caregiver support   Goals   Patient Goals to go home   STG Expiration Date 01/31/25   Short Term Goal #1 In 7-10 days: Increase bilateral LE strength 1/2 grade to facilitate independent mobility, Perform all bed mobility tasks with min A of 1 to decrease caregiver burden, Perform all transfers with min A of 1 to improve independence, Increase static and dynamic sitting and static and dynamic standing balance 1/2 grade to decrease risk for falls, Improve  Barthel Index score to 45 or greater to facilitate independence, and PT to see and establish goals for ambulation, ambulatory balance, elevations/stairs when appropriate   PT Treatment Day 0   Plan   Treatment/Interventions Functional transfer training;LE strengthening/ROM;Therapeutic exercise;Endurance training;Patient/family training;Equipment eval/education;Bed mobility;Continued evaluation;Spoke to nursing;OT   PT Frequency 3-5x/wk   Discharge Recommendation   Rehab Resource Intensity Level, PT I (Maximum Resource Intensity)   AM-PAC Basic Mobility Inpatient   Turning in Flat Bed Without Bedrails 2   Lying on Back to Sitting on Edge of Flat Bed Without Bedrails 2   Moving Bed to Chair 1   Standing Up From Chair Using Arms 1   Walk in Room 1   Climb 3-5 Stairs With Railing 1   Basic Mobility Inpatient Raw Score 8   Turning Head Towards Sound 3   Follow Simple Instructions 3   Low Function Basic Mobility Raw Score  14   Low Function Basic Mobility Standardized Score  22.01   [facility] Highest Level Of Mobility   -[facility] Goal 3: Sit at edge of bed   -[facility] Achieved 4: Move to chair/commode   Modified [place] Scale   Modified [place] Scale 4   Barthel Index   Feeding 5   Bathing 0   Grooming Score 0   Dressing Score 5   Bladder Score 5   Bowels Score 5   Toilet Use Score 5   Transfers (Bed/Chair) Score 5   Mobility (Level Surface) Score 0   Stairs Score 0   Barthel Index Score 30           Allison Wyatt, PT

## 2025-01-21 NOTE — ASSESSMENT & PLAN NOTE
S/p IABP and pressor support  BP significantly improved today. Able to tolerate low dose BB.  Patient appears euvolemic, no plan for further diuresis at this time.

## 2025-01-21 NOTE — ASSESSMENT & PLAN NOTE
Status post drug-eluting stent to the LAD and left main, unable to intervene on the circumflex artery.  Significant hypotension in the Cath Lab requiring pressors and intra-aortic balloon pump support  IABP discontinued on 1/20  Continue aspirin, statin, Brilinta  Beta-blocker started on 1/21  Appreciate cardiology recommendations

## 2025-01-21 NOTE — ASSESSMENT & PLAN NOTE
Question related to shock state- resolving  Closely follow intake and output  Daily weights  Trend serum creatinine

## 2025-01-21 NOTE — ASSESSMENT & PLAN NOTE
Assessment:  Joseph Aguilar is a 70yo male with HTN, HLD, DM2, who presented with near syncope and hypotension.. He was found to have an anterior STEMI with elevated troponins. He underwent cardiac catheterization 1/17/25 with PTCA and CHYNA to the distal LM and ostial LAD, as well as, successful PTCA and CHYNA to the mLAD.  Patient noted to be in cardiogenic shock with severely elevated LVEDP requiring intra-aortic balloon pump placement in RIGHT femoral artery. POD#3 patient noted to have bleeding around right femoral IABP with expanding hematoma in the right groin. Manual pressure was held at bedside and a Femostop was placed on the area. Vascular surgery was consulted for evaluation.      POD#1 s/p removal of intra-aortic balloon pump and closure of right femoral artery puncture site and control of expanding hematoma    Right groin fullness without pulsatile mass. Right groin incision with mild venous oozing, re-enforced with steri-strips. 2+ R femoral pulse. +ve DP and PT doppler signals bilaterally. Motor and sensation intact.      Plan:  -RIGHT leg pain and bleeding around right femoral intra-aortic balloon pump causing expanding hematoma in right groin.   -s/p removal of IABP and closure of right femoral puncture site 1/20 by Dr. Brooke.   -Monitor right groin for bleeding, expanding hematoma, pulsatile mass, infection and for changes in neurovascular exam.   -Continue aspirin, brilinta, statin and heparin (LV thrombus)  -Notify vascular surgery with questions or concerns.   -Will d/w Dr. Ring.

## 2025-01-21 NOTE — PLAN OF CARE
Problem: PAIN - ADULT  Goal: Verbalizes/displays adequate comfort level or baseline comfort level  Description: Interventions:  - Encourage patient to monitor pain and request assistance  - Assess pain using appropriate pain scale  - Administer analgesics based on type and severity of pain and evaluate response  - Implement non-pharmacological measures as appropriate and evaluate response  - Consider cultural and social influences on pain and pain management  - Notify physician/advanced practitioner if interventions unsuccessful or patient reports new pain  Outcome: Progressing     Problem: CARDIOVASCULAR - ADULT  Goal: Maintains optimal cardiac output and hemodynamic stability  Description: INTERVENTIONS:  - Monitor I/O, vital signs and rhythm  - Monitor for S/S and trends of decreased cardiac output  - Administer and titrate ordered vasoactive medications to optimize hemodynamic stability  - Assess quality of pulses, skin color and temperature  - Assess for signs of decreased coronary artery perfusion  - Instruct patient to report change in severity of symptoms  Outcome: Progressing  Goal: Absence of cardiac dysrhythmias or at baseline rhythm  Description: INTERVENTIONS:  - Continuous cardiac monitoring, vital signs, obtain 12 lead EKG if ordered  - Administer antiarrhythmic and heart rate control medications as ordered  - Monitor electrolytes and administer replacement therapy as ordered  Outcome: Progressing     Problem: METABOLIC, FLUID AND ELECTROLYTES - ADULT  Goal: Electrolytes maintained within normal limits  Description: INTERVENTIONS:  - Monitor labs and assess patient for signs and symptoms of electrolyte imbalances  - Administer electrolyte replacement as ordered  - Monitor response to electrolyte replacements, including repeat lab results as appropriate  - Instruct patient on fluid and nutrition as appropriate  Outcome: Progressing  Goal: Fluid balance maintained  Description: INTERVENTIONS:  -  Monitor labs   - Monitor I/O and WT  - Instruct patient on fluid and nutrition as appropriate  - Assess for signs & symptoms of volume excess or deficit  Outcome: Progressing  Goal: Glucose maintained within target range  Description: INTERVENTIONS:  - Monitor Blood Glucose as ordered  - Assess for signs and symptoms of hyperglycemia and hypoglycemia  - Administer ordered medications to maintain glucose within target range  - Assess nutritional intake and initiate nutrition service referral as needed  Outcome: Progressing

## 2025-01-21 NOTE — PROGRESS NOTES
Progress Note - Cardiology   Name: Joseph Aguilar 69 y.o. male I MRN: [id number]  Unit/Bed#: ICU 04 I Date of Admission: 1/17/2025   Date of Service: 1/21/2025 I Hospital Day: 4     Assessment & Plan  Anterior STEMI s/p PCI with CHYNA to LM-ostial LAD and mLAD with IABP;  of LCx (1/17/2024)  Troponin with peak >22,973.  TTE shows EF 25%, severe global hypokinesis with regional variation and is near akinesis of apical and distal septal segments.    Telemetry shows NSR with brief runs of PSVT and NSVT, continue to monitor.    Continue ASA, Brilinta, and Lipitor. Started on low dose BB today.  On heparin gtt given LV thrombus; will need transition to oral AC prior to discharge.  Cardiogenic shock (HCC)  S/p IABP and pressor support  BP significantly improved today. Able to tolerate low dose BB.  Patient appears euvolemic, no plan for further diuresis at this time.   ICM with EF 25%  Plan for optimization of GDMT as tolerated.  Will need consideration for LifeVest.  LV (left ventricular) mural thrombus  Suspected on TTE, continue heparin gtt.  Plan for transition to oral AC prior to discharge.  History of hypertension  Continue to monitor off antihypertensives. See plan above.  Mixed hyperlipidemia  Lipitor restarted as per above.  Type 2 diabetes mellitus without complication, without long-term current use of insulin (HCC)  Lab Results   Component Value Date    HGBA1C 8.2 (H) 01/18/2025     Recent Labs     01/20/25  1704 01/20/25  2139 01/21/25  0857 01/21/25  1203   POCGLU 133 201* 197* 215*   Blood Sugar Average: Last 72 hrs:  (P) 158.1528883672597839  Management per primary service.  Hematoma of right lower extremity    Acute kidney injury (HCC)      Subjective   Patient notes that he is feeling much better today. He is sitting comfortable in the recliner.     Objective :  Temp:  [98.4 °F (36.9 °C)-99.9 °F (37.7 °C)] 99.5 °F (37.5 °C)  HR:  [] 95  BP: ()/(55-92) 106/66  Resp:  [16-20] 16  SpO2:  [91  %-100 %] 96 %  O2 Device: None (Room air)  Orthostatic Blood Pressures      Flowsheet Row Most Recent Value   Blood Pressure 106/66 filed at 01/21/2025 1300   Patient Position - Orthostatic VS Lying filed at 01/21/2025 1130          First Weight: Weight - Scale: 115 kg (253 lb 1.4 oz) (01/17/25 2029)  Vitals:    01/20/25 0600 01/21/25 0600   Weight: 115 kg (253 lb 3.9 oz) 106 kg (233 lb 7.5 oz)     Physical Exam  Vitals and nursing note reviewed.   Constitutional:       General: He is not in acute distress.     Appearance: He is well-developed.   HENT:      Head: Normocephalic and atraumatic.   Eyes:      Conjunctiva/sclera: Conjunctivae normal.   Neck:      Vascular: No carotid bruit.   Cardiovascular:      Rate and Rhythm: Normal rate and regular rhythm.      Heart sounds: Normal heart sounds. No murmur heard.  Pulmonary:      Effort: Pulmonary effort is normal. No respiratory distress.      Breath sounds: Normal breath sounds.   Abdominal:      Palpations: Abdomen is soft.      Tenderness: There is no abdominal tenderness.   Musculoskeletal:         General: No swelling.      Cervical back: Neck supple.      Right lower leg: No edema.      Left lower leg: No edema.   Skin:     General: Skin is warm and dry.      Capillary Refill: Capillary refill takes less than 2 seconds.   Neurological:      Mental Status: He is alert and oriented to person, place, and time.   Psychiatric:         Mood and Affect: Mood normal.           Lab Results: I have reviewed the following results:CBC/BMP:   .     01/21/25  0623   WBC 15.55*   HGB 13.0   HCT 38.1      SODIUM 138   K 3.9      CO2 23   BUN 38*   CREATININE 1.13   GLUC 222*   CAIONIZED 1.12   MG 2.0   PHOS 3.6      Results from last 7 days   Lab Units 01/21/25  0623 01/20/25  2149 01/20/25  1706 01/20/25  1336 01/20/25  0532   WBC Thousand/uL 15.55*  --  16.39*  --  16.24*   HEMOGLOBIN g/dL 13.0 13.1 13.3 12.7 13.1   HEMATOCRIT % 38.1  --  39.7 37.7 39.7  "  PLATELETS Thousands/uL 161  --  159  --  170     Results from last 7 days   Lab Units 01/21/25  0623 01/20/25  2149 01/20/25  1706   POTASSIUM mmol/L 3.9 3.9 4.0   CHLORIDE mmol/L 107 106 106   CO2 mmol/L 23 22 20*   BUN mg/dL 38* 40* 37*   CREATININE mg/dL 1.13 1.22 1.35*   CALCIUM mg/dL 8.9 8.9 8.7     Results from last 7 days   Lab Units 01/21/25  1352 01/21/25  0623 01/20/25  1706 01/18/25  0443 01/17/25  2056   INR   --   --  1.05  --  1.20*   PTT seconds 55* 38* 30   < > >210*    < > = values in this interval not displayed.     Lab Results   Component Value Date    HGBA1C 8.2 (H) 01/18/2025     No results found for: \"CKTOTAL\", \"CKMB\", \"CKMBINDEX\", \"TROPONINI\"    Imaging Results Review: I reviewed radiology reports from this admission including: Echocardiogram.  Other Study Results Review: EKG was reviewed.     VTE Pharmacologic Prophylaxis: VTE covered by:  heparin (porcine), Intravenous, 17.1 Units/kg/hr at 01/21/25 1421       "

## 2025-01-21 NOTE — ASSESSMENT & PLAN NOTE
Resume heparin infusion when appropriate with vascular surgery service  Discuss when to transition to oral anticoagulation  Discuss duration of triple therapy with cardiology

## 2025-01-21 NOTE — ASSESSMENT & PLAN NOTE
Lab Results   Component Value Date    HGBA1C 8.2 (H) 01/18/2025     Recent Labs     01/20/25  1704 01/20/25  2139 01/21/25  0857 01/21/25  1203   POCGLU 133 201* 197* 215*   Blood Sugar Average: Last 72 hrs:  (P) 158.9945072429446926  Management per primary service.

## 2025-01-21 NOTE — PROGRESS NOTES
Progress Note - Critical Care/ICU   Name: Joseph Aguilar 69 y.o. male I MRN: [id number]  Unit/Bed#: ICU 04 I Date of Admission: 1/17/2025   Date of Service: 1/21/2025 I Hospital Day: 4      Assessment & Plan  Anterior STEMI s/p PCI with CHYNA to LM-ostial LAD and mLAD with IABP;  of LCx (1/17/2024)  Status post drug-eluting stent to the LAD and left main, unable to intervene on the circumflex artery.  Significant hypotension in the Cath Lab requiring pressors and intra-aortic balloon pump support  IABP discontinued on 1/20  Continue aspirin, statin, Brilinta  Awaiting improvement in hemodynamics to begin beta-blocker  Appreciate cardiology recommendations  Mixed hyperlipidemia  Continue home statin  Type 2 diabetes mellitus without complication, without long-term current use of insulin (HCC)  Lab Results   Component Value Date    HGBA1C 8.2 (H) 01/18/2025       Recent Labs     01/20/25  1210 01/20/25  1224 01/20/25  1704 01/20/25  2139   POCGLU 222* 210* 133 201*       Blood Sugar Average: Last 72 hrs:    (P) 155.04    Holding home antihyperglycemics  Continue Lantus with sliding scale while inpatient  Cardiogenic shock (HCC)  S/p intra-aortic balloon pump on 1/17, discontinued on 1/20  Resume heparin infusion when appropriate with vascular surgery  Continue norepinephrine for MAP>65  Appreciate cardiology recommendations    History of hypertension  Holding antihypertensives in the setting of marginal blood pressures and cardiogenic shock  ICM with EF 25%  Awaiting improvements in hemodynamics to initiate goal-directed therapy  PRN Lasix for even to slightly negative  Patient appears euvolemic  Appreciate cardiology recommendations  LV (left ventricular) mural thrombus  Resume heparin infusion when appropriate with vascular surgery service  Discuss when to transition to oral anticoagulation  Hematoma of right lower extremity  Patient s/p surgical closure of right femoral artery due to hematoma related to  intra-aortic balloon pump  Patient received 1u PRBC on 1/20  Follow endpoints of resuscitation closely  Frequent neurovascular exams  Appreciate vascular surgery recommendations  Acute kidney injury (HCC)  Question related to shock state  Closely follow intake and output  Daily weights  Trend serum creatinine  Disposition: Improving    ICU Core Measures     A: Assess, Prevent, and Manage Pain Has pain been assessed? Yes  Need for changes to pain regimen? Yes   B: Both SAT/SAT  N/A   C: Choice of Sedation RASS Goal: N/A patient not on sedation  Need for changes to sedation or analgesia regimen? NA   D: Delirium CAM-ICU: Negative   E: Early Mobility  Plan for early mobility? Yes   F: Family Engagement Plan for family engagement today? Yes       Review of Invasive Devices:    Goldsmith Plan: Voiding trial after improvement in ambulation     Carly Plan: Keep arterial line for hemodynamic monitoring and frequent labs    Prophylaxis:  VTE VTE covered by:  heparin (porcine), Intravenous, Stopped at 01/20/25 1315  heparin (porcine), Intravenous, 2,000 Units at 01/20/25 0817  heparin (porcine), Intravenous       Stress Ulcer  covered byFamotidine (PF) (PEPCID) injection 20 mg [[id number]]         24 Hour Events : Patient with significant bleeding from intra-aortic balloon pump site, device discontinued and patient had closure with vascular surgery, hemoglobin stable, transitioned off vasopressors, heparin infusion restarted    Subjective   Review of Systems: Review of Systems   Constitutional:  Positive for fatigue.   HENT:  Negative for trouble swallowing.    Respiratory:  Negative for shortness of breath.    Cardiovascular:  Negative for chest pain.   Gastrointestinal:  Negative for diarrhea, nausea and vomiting.   Genitourinary:  Negative for difficulty urinating.   Musculoskeletal:  Positive for arthralgias and back pain.   Neurological:  Positive for weakness.   Psychiatric/Behavioral:  Positive for agitation and sleep  disturbance. The patient is nervous/anxious.        Objective :                   Vitals I/O      Most Recent Min/Max in 24hrs   Temp 99.7 °F (37.6 °C) Temp  Min: 97.9 °F (36.6 °C)  Max: 99.7 °F (37.6 °C)   Pulse (!) 106 Pulse  Min: 76  Max: 127   Resp 18 Resp  Min: 16  Max: 26   /71 BP  Min: 84/52  Max: 148/87   O2 Sat 97 % SpO2  Min: 92 %  Max: 100 %      Intake/Output Summary (Last 24 hours) at 1/21/2025 0013  Last data filed at 1/20/2025 2000  Gross per 24 hour   Intake 682.44 ml   Output 655 ml   Net 27.44 ml       Diet Juan Carlos/CHO Controlled; Consistent Carbohydrate Diet Level 2 (5 carb servings/75 grams CHO/meal)    Invasive Monitoring   Arterial Line  Carly /60  Arterial Line BP  Min: 71/41  Max: 127/64   MAP 77 mmHg  Arterial Line MAP (mmHg)  Min: 52 mmHg  Max: 89 mmHg           Physical Exam   Physical Exam  Eyes:      Pupils: Pupils are equal, round, and reactive to light.   Skin:     General: Skin is warm and dry.   HENT:      Head: Normocephalic and atraumatic.      Mouth/Throat:      Mouth: Mucous membranes are moist.   Cardiovascular:      Rate and Rhythm: Regular rhythm. Tachycardia present.      Pulses:           Dorsalis pedis pulses are detected w/ Doppler on the right side.        Posterior tibial pulses are detected w/ Doppler on the right side.   Musculoskeletal:         General: Tenderness (right groin) and signs of injury (right femoral catheter site with dressing in place, sof-tender to palpation, no obvious drainage/erythema) present. No deformity.      Right lower leg: Trace Edema present.      Left lower leg: Trace Edema present.   Abdominal: General: There is no distension.      Palpations: Abdomen is soft.      Tenderness: There is no abdominal tenderness.   Constitutional:       General: He is not in acute distress.     Appearance: He is ill-appearing.   Pulmonary:      Effort: Pulmonary effort is normal.      Breath sounds: Normal breath sounds.   Neurological:      Mental  Status: He is agitated.      GCS: GCS eye subscore is 4. GCS verbal subscore is 5. GCS motor subscore is 6.   Genitourinary/Anorectal:     Comments: Goldsmith in place with small amount of dark yellow urine  Goldsmith present.        Diagnostic Studies        Lab Results: I have reviewed the following results:     Medications:  Scheduled PRN   aspirin, 81 mg, Daily  atorvastatin, 80 mg, Daily With Dinner  chlorhexidine, 15 mL, Q12H YNES  Diclofenac Sodium, 2 g, 4x Daily  famotidine, 20 mg, BID  insulin glargine, 15 Units, HS  insulin lispro, 2-12 Units, 4x Daily (AC & HS)  lidocaine, 1 patch, Daily  melatonin, 6 mg, HS  ticagrelor, 90 mg, Q12H YNES      aluminum-magnesium hydroxide-simethicone, 30 mL, Q4H PRN  bisacodyl, 10 mg, Daily PRN  fentaNYL, , PRN  heparin (porcine), 2,000 Units, Q6H PRN  heparin (porcine), 4,000 Units, Q6H PRN  menthol-methyl salicylate, , 4x Daily PRN  ondansetron, 4 mg, Q6H PRN  oxyCODONE, 2.5 mg, Q4H PRN   Or  oxyCODONE, 5 mg, Q4H PRN       Continuous    heparin (porcine), 3-20 Units/kg/hr (Order-Specific), Last Rate: Stopped (01/20/25 1315)  norepinephrine, 1-30 mcg/min, Last Rate: Stopped (01/20/25 1601)         Labs:   CBC    Recent Labs     01/20/25  0532 01/20/25  1336 01/20/25  1706 01/20/25  2149   WBC 16.24*  --  16.39*  --    HGB 13.1 12.7 13.3 13.1   HCT 39.7 37.7 39.7  --      --  159  --      BMP    Recent Labs     01/20/25  1706 01/20/25  2149   SODIUM 136 137   K 4.0 3.9    106   CO2 20* 22   AGAP 10 9   BUN 37* 40*   CREATININE 1.35* 1.22   CALCIUM 8.7 8.9       Coags    Recent Labs     01/20/25  1336 01/20/25  1706   INR  --  1.05   * 30        Additional Electrolytes  Recent Labs     01/20/25  0532 01/20/25  1706   MG 1.8* 2.3   PHOS 4.1 5.6*   CAIONIZED 1.12 1.13          Blood Gas    Recent Labs     01/20/25 2149   PHART 7.438   [id number] 29.5*   PO2ART 84.2   [id number] 19.5*   BEART -3.4   SOURCE Line, Arterial     Recent Labs     01/20/25 2149   SOURCE Line,  Arterial    LFTs  Recent Labs     01/20/25  0532 01/20/25  1706   ALT 64* 60*   AST 56* 46*   ALKPHOS 65 66   ALB 3.8 3.8   TBILI 1.12* 1.21*       Infectious  No recent results  Glucose  Recent Labs     01/19/25  0456 01/20/25  0532 01/20/25  1706 01/20/25  2149   GLUC 111 163* 232* 225*

## 2025-01-21 NOTE — ASSESSMENT & PLAN NOTE
Lab Results   Component Value Date    HGBA1C 8.2 (H) 01/18/2025       Recent Labs     01/21/25  0857 01/21/25  1203 01/21/25  1550 01/21/25 2051   POCGLU 197* 215* 204* 220*       Blood Sugar Average: Last 72 hrs:    (P) 171.5673139157313982    Holding home antihyperglycemics  Lantus increased on 1/21  Continue sliding scale  Consider adding standing meal-time insulin

## 2025-01-21 NOTE — CASE MANAGEMENT
Case Management Assessment & Discharge Planning Note    Patient name Joseph Aguilar  Location ICU 04/ICU 04 MRN [id number]  : 1955 Date 2025       Current Admission Date: 2025  Current Admission Diagnosis:Anterior STEMI s/p PCI with CHYNA to LM-ostial LAD and mLAD with IABP;  of LCx (2024)   Patient Active Problem List    Diagnosis Date Noted Date Diagnosed    ICM with EF 25% 2025     LV (left ventricular) mural thrombus 2025     Hematoma of right lower extremity 2025     Acute kidney injury (HCC) 2025     Anterior STEMI s/p PCI with CHYNA to LM-ostial LAD and mLAD with IABP;  of LCx (2024) 2025     Cardiogenic shock (HCC) 2025     Morbid (severe) obesity due to excess calories (HCC) 10/07/2024     Glenohumeral arthritis, left 2022     Right foot pain 2022     Vitamin D deficiency 2022     Anemia 2022     Hypomagnesemia 2022     Closed nondisplaced intertrochanteric fracture of left femur with routine healing 2022     Pain in both feet 2022     Polyneuropathy associated with underlying disease (HCC) 2022     Slow transit constipation 2022     S/P TKR (total knee replacement), right 2018     Obesity (BMI 30-39.9) 10/29/2018     Primary osteoarthritis of both knees 2018     Glenohumeral arthritis, right 2016     Mixed hyperlipidemia 2013     History of hypertension 2012     Type 2 diabetes mellitus without complication, without long-term current use of insulin (HCC) 2012       LOS (days): 4  Geometric Mean LOS (GMLOS) (days): 3.8  Days to GMLOS:0     OBJECTIVE:    Risk of Unplanned Readmission Score: 11.81         Current admission status: Inpatient       Preferred Pharmacy:   [facility]/pharmacy #[id number] - [place], [place] - [address]  [address]  [place] [address]  Phone: [phone] Fax: [phone]    [facility] [place]  "[place] - [address]  [address]  [place] [address]  Phone: [phone] Fax: [phone]     [facility] [place] - [address]  [address]  [place] [address]  Phone: [phone] Fax: [phone]    [facility] [place] - [address]  [address]  [place] [address]  Phone: [phone] Fax: [phone]    Primary Care Provider: No primary care provider on file.    Primary Insurance: MEDICARE  Secondary Insurance: COMMERCIAL MISCELLANEOUS    ASSESSMENT:  Active Health Care Proxies    There are no active Health Care Proxies on file.                 Readmission Root Cause  30 Day Readmission: No    Patient Information  Admitted from:: Home  Mental Status: Alert  During Assessment patient was accompanied by: Not accompanied during assessment  Assessment information provided by:: Patient  Primary Caregiver: Self  Support Systems: Spouse/significant other  County of Residence: [place]  What city do you live in?: [place], [place]  Home entry access options. Select all that apply.: Stairs  Number of steps to enter home.: 3  Do the steps have railings?: Yes  Type of Current Residence: [address] [place] [address] home  Upon entering residence, is there a bedroom on the main floor (no further steps)?: Yes  Upon entering residence, is there a bathroom on the main floor (no further steps)?: Yes  Living Arrangements: Lives w/ Spouse/significant other  Is patient a ?: Yes  Is [facility] with [facility]: Yes ([place])  Is patient service connected?: Yes    Activities of Daily Living Prior to Admission  Functional Status: Independent  Completes ADLs independently?: Yes  Ambulates independently?: Yes  Does patient use assisted devices?: Yes  Assisted Devices (DME) used: Straight Cane, Other (Comment) (\"service dog\" he trained himself)  Does patient currently own DME?: Yes  What DME does the patient currently own?: Straight Cane, " "Other (Comment) (\"service dog\" he trained himself)  Does patient have a history of Outpatient Therapy (PT/OT)?: Yes  Does the patient have a history of Short-Term Rehab?: Yes  Does patient have a history of HHC?: No  Does patient currently have HHC?: No         Patient Information Continued  Income Source: Pension/care home  Does patient have prescription coverage?: Yes  Does patient receive dialysis treatments?: No  Does patient have a history of substance abuse?: No  Does patient have a history of Mental Health Diagnosis?: No         Means of Transportation  Means of Transport to Appts:: Drives Self      Social Determinants of Health (SDOH)      Flowsheet Row Most Recent Value   Housing Stability    In the last 12 months, was there a time when you were not able to pay the mortgage or rent on time? N   In the past 12 months, how many times have you moved where you were living? 0   At any time in the past 12 months, were you homeless or living in a shelter (including now)? N   Transportation Needs    In the past 12 months, has lack of transportation kept you from medical appointments or from getting medications? no   In the past 12 months, has lack of transportation kept you from meetings, work, or from getting things needed for daily living? No   Food Insecurity    Within the past 12 months, you worried that your food would run out before you got the money to buy more. Never true   Within the past 12 months, the food you bought just didn't last and you didn't have money to get more. Never true   Utilities    In the past 12 months has the electric, gas, oil, or water company threatened to shut off services in your home? No            DISCHARGE DETAILS:    Discharge planning discussed with:: pt  Freedom of Choice: Yes  Comments - Freedom of Choice: Met w pt at bedside; introduced self and explained role of CM, including arranging DME, STR, home health, out pt tx.  Advised pt that CM will make appropriate referrals " based on treatment team recommendations and MD orders, and he can consider recommended plan of care and choose from available vendors.  CM contacted family/caregiver?: No- see comments (pt alert and oriented adult)  Were Treatment Team discharge recommendations reviewed with patient/caregiver?:  (none at present)          Contacts  Patient Contacts: Ольга Aguilar (Spouse)  [phone] (Mobile)  Relationship to Patient:: Family         DME Referral Provided  Referral made for DME?: No    Other Referral/Resources/Interventions Provided:  Referral Comments: Pt in ICU d/t STEMI s/p PCI and CHYNA.  Only difficulty pt reports is doing stairs;  he says he sometimes has to crawl up them due to issues w LEs.  Pt reports he has had 3 knee surgeries and has rods in L leg; uses cane for ambulation.  AM-PAC of 8;  PT and OT ordered.  Per ICU rounds, pt will need anticoagulation post d/c and will likely need Lifevest.         Treatment Team Recommendation: Other (TBD)  Discharge Destination Plan:: Other (TBD)  Transport at Discharge : Family

## 2025-01-22 LAB
ANION GAP SERPL CALCULATED.3IONS-SCNC: 7 MMOL/L (ref 4–13)
APTT PPP: 72 SECONDS (ref 23–34)
BUN SERPL-MCNC: 36 MG/DL (ref 5–25)
CA-I BLD-SCNC: 1.15 MMOL/L (ref 1.12–1.32)
CALCIUM SERPL-MCNC: 8.5 MG/DL (ref 8.4–10.2)
CHLORIDE SERPL-SCNC: 107 MMOL/L (ref 96–108)
CO2 SERPL-SCNC: 22 MMOL/L (ref 21–32)
CREAT SERPL-MCNC: 0.93 MG/DL (ref 0.6–1.3)
ERYTHROCYTE [DISTWIDTH] IN BLOOD BY AUTOMATED COUNT: 14.8 % (ref 11.6–15.1)
GFR SERPL CREATININE-BSD FRML MDRD: 84 ML/MIN/1.73SQ M
GLUCOSE SERPL-MCNC: 183 MG/DL (ref 65–140)
GLUCOSE SERPL-MCNC: 184 MG/DL (ref 65–140)
GLUCOSE SERPL-MCNC: 202 MG/DL (ref 65–140)
GLUCOSE SERPL-MCNC: 226 MG/DL (ref 65–140)
GLUCOSE SERPL-MCNC: 270 MG/DL (ref 65–140)
HCT VFR BLD AUTO: 32 % (ref 36.5–49.3)
HGB BLD-MCNC: 10.9 G/DL (ref 12–17)
MAGNESIUM SERPL-MCNC: 1.8 MG/DL (ref 1.9–2.7)
MCH RBC QN AUTO: 31.7 PG (ref 26.8–34.3)
MCHC RBC AUTO-ENTMCNC: 34.1 G/DL (ref 31.4–37.4)
MCV RBC AUTO: 93 FL (ref 82–98)
PHOSPHATE SERPL-MCNC: 3.2 MG/DL (ref 2.3–4.1)
PLATELET # BLD AUTO: 166 THOUSANDS/UL (ref 149–390)
PMV BLD AUTO: 9.8 FL (ref 8.9–12.7)
POTASSIUM SERPL-SCNC: 4.2 MMOL/L (ref 3.5–5.3)
RBC # BLD AUTO: 3.44 MILLION/UL (ref 3.88–5.62)
SODIUM SERPL-SCNC: 136 MMOL/L (ref 135–147)
WBC # BLD AUTO: 13.02 THOUSAND/UL (ref 4.31–10.16)

## 2025-01-22 PROCEDURE — 85730 THROMBOPLASTIN TIME PARTIAL: CPT | Performed by: ANESTHESIOLOGY

## 2025-01-22 PROCEDURE — 99232 SBSQ HOSP IP/OBS MODERATE 35: CPT | Performed by: PHYSICIAN ASSISTANT

## 2025-01-22 PROCEDURE — 99232 SBSQ HOSP IP/OBS MODERATE 35: CPT | Performed by: ANESTHESIOLOGY

## 2025-01-22 PROCEDURE — 82948 REAGENT STRIP/BLOOD GLUCOSE: CPT

## 2025-01-22 PROCEDURE — 82330 ASSAY OF CALCIUM: CPT | Performed by: PHYSICIAN ASSISTANT

## 2025-01-22 PROCEDURE — 83735 ASSAY OF MAGNESIUM: CPT | Performed by: PHYSICIAN ASSISTANT

## 2025-01-22 PROCEDURE — 85027 COMPLETE CBC AUTOMATED: CPT | Performed by: PHYSICIAN ASSISTANT

## 2025-01-22 PROCEDURE — 84100 ASSAY OF PHOSPHORUS: CPT | Performed by: PHYSICIAN ASSISTANT

## 2025-01-22 PROCEDURE — 99233 SBSQ HOSP IP/OBS HIGH 50: CPT | Performed by: INTERNAL MEDICINE

## 2025-01-22 PROCEDURE — NC001 PR NO CHARGE

## 2025-01-22 PROCEDURE — 80048 BASIC METABOLIC PNL TOTAL CA: CPT | Performed by: PHYSICIAN ASSISTANT

## 2025-01-22 RX ORDER — CLOPIDOGREL 300 MG/1
600 TABLET, FILM COATED ORAL ONCE
Status: DISCONTINUED | OUTPATIENT
Start: 2025-01-23 | End: 2025-01-24

## 2025-01-22 RX ORDER — MAGNESIUM SULFATE HEPTAHYDRATE 40 MG/ML
2 INJECTION, SOLUTION INTRAVENOUS ONCE
Status: COMPLETED | OUTPATIENT
Start: 2025-01-22 | End: 2025-01-22

## 2025-01-22 RX ORDER — SACUBITRIL AND VALSARTAN 24; 26 MG/1; MG/1
1 TABLET, FILM COATED ORAL 2 TIMES DAILY
Qty: 60 TABLET | Refills: 0 | Status: SHIPPED | OUTPATIENT
Start: 2025-01-22 | End: 2025-01-23

## 2025-01-22 RX ORDER — CLOPIDOGREL BISULFATE 75 MG/1
75 TABLET ORAL DAILY
Status: DISCONTINUED | OUTPATIENT
Start: 2025-01-24 | End: 2025-01-24

## 2025-01-22 RX ORDER — WARFARIN SODIUM 5 MG/1
10 TABLET ORAL
Status: COMPLETED | OUTPATIENT
Start: 2025-01-22 | End: 2025-01-22

## 2025-01-22 RX ORDER — CARVEDILOL 3.12 MG/1
3.12 TABLET ORAL 2 TIMES DAILY WITH MEALS
Status: DISCONTINUED | OUTPATIENT
Start: 2025-01-22 | End: 2025-01-29 | Stop reason: HOSPADM

## 2025-01-22 RX ADMIN — CHLORHEXIDINE GLUCONATE 0.12% ORAL RINSE 15 ML: 1.2 LIQUID ORAL at 09:31

## 2025-01-22 RX ADMIN — MELATONIN 6 MG: 3 TAB ORAL at 22:28

## 2025-01-22 RX ADMIN — CARVEDILOL 3.12 MG: 3.12 TABLET, FILM COATED ORAL at 17:20

## 2025-01-22 RX ADMIN — MAGNESIUM SULFATE HEPTAHYDRATE 2 G: 40 INJECTION, SOLUTION INTRAVENOUS at 07:41

## 2025-01-22 RX ADMIN — HEPARIN SODIUM 17.1 UNITS/KG/HR: 10000 INJECTION, SOLUTION INTRAVENOUS at 20:35

## 2025-01-22 RX ADMIN — Medication 12.5 MG: at 09:31

## 2025-01-22 RX ADMIN — INSULIN LISPRO 6 UNITS: 100 INJECTION, SOLUTION INTRAVENOUS; SUBCUTANEOUS at 22:28

## 2025-01-22 RX ADMIN — ATORVASTATIN CALCIUM 80 MG: 40 TABLET, FILM COATED ORAL at 17:20

## 2025-01-22 RX ADMIN — INSULIN LISPRO 2 UNITS: 100 INJECTION, SOLUTION INTRAVENOUS; SUBCUTANEOUS at 17:24

## 2025-01-22 RX ADMIN — HEPARIN SODIUM 17.1 UNITS/KG/HR: 10000 INJECTION, SOLUTION INTRAVENOUS at 07:26

## 2025-01-22 RX ADMIN — INSULIN LISPRO 2 UNITS: 100 INJECTION, SOLUTION INTRAVENOUS; SUBCUTANEOUS at 09:36

## 2025-01-22 RX ADMIN — TICAGRELOR 90 MG: 90 TABLET ORAL at 09:32

## 2025-01-22 RX ADMIN — LIDOCAINE 5% 1 PATCH: 700 PATCH TOPICAL at 09:38

## 2025-01-22 RX ADMIN — ASPIRIN 81 MG: 81 TABLET, COATED ORAL at 09:31

## 2025-01-22 RX ADMIN — WARFARIN SODIUM 10 MG: 5 TABLET ORAL at 17:20

## 2025-01-22 RX ADMIN — MUSCLE RUB CREAM: 100; 150 CREAM TOPICAL at 02:36

## 2025-01-22 RX ADMIN — QUETIAPINE FUMARATE 25 MG: 25 TABLET ORAL at 22:28

## 2025-01-22 RX ADMIN — INSULIN GLARGINE 20 UNITS: 100 INJECTION, SOLUTION SUBCUTANEOUS at 22:28

## 2025-01-22 RX ADMIN — INSULIN LISPRO 4 UNITS: 100 INJECTION, SOLUTION INTRAVENOUS; SUBCUTANEOUS at 12:55

## 2025-01-22 NOTE — PROGRESS NOTES
Progress Note - Cardiology   Name: Joseph Aguilar 69 y.o. male I MRN: [id number]  Unit/Bed#: ICU 04 I Date of Admission: 1/17/2025   Date of Service: 1/22/2025 I Hospital Day: 5    Assessment & Plan  Anterior STEMI s/p PCI with CHYNA to LM-ostial LAD and mLAD with IABP;  of LCx (1/17/2024)  Troponin with peak >22,973.    TTE shows EF 25%, severe global hypokinesis with regional variation and is near akinesis of apical and distal septal segments.    Telemetry shows NSR with brief runs of PSVT and NSVT  continue to monitor tele  Continue ASA, Brilinta, and Lipitor.   Started Coreg  On heparin gtt given LV thrombus; will need transition to oral AC prior to discharge.  ICM with EF 25%  Plan for optimization of GDMT as tolerated.  Started on Coreg 3.125  Price check for entresto and jardiance pending  Lifevest ordered  LV (left ventricular) mural thrombus  Suspected on TTE, continue heparin gtt.    Plan for transition to oral AC prior to discharge; eliquis price check pending  History of hypertension  Stable but on the lower side 101/62  Continue Coreg 3.125 bid  Mixed hyperlipidemia  Lipitor restarted as per above.  Type 2 diabetes mellitus without complication, without long-term current use of insulin ([facility])  Lab Results   Component Value Date    HGBA1C 8.2 (H) 01/18/2025     Recent Labs     01/21/25  1550 01/21/25  2051 01/22/25  0808 01/22/25  1134   POCGLU 204* 220* 183* 226*   Blood Sugar Average: Last 72 hrs:  (P) 175.0306263961559641  Management per primary service.  Hematoma of right lower extremity  S/p pelvic angiogram  S/p perclose proglide x2 on 1/20/25 per Vascular    Subjective   Chief Complaint: Im feeling ok. Denies cp. Sitting up in bed, eating breakfast.     Objective :  Temp:  [97.7 °F (36.5 °C)-98.9 °F (37.2 °C)] 97.8 °F (36.6 °C)  HR:  [] 93  BP: (101-131)/(62-81) 112/77  Resp:  [16-20] 20  SpO2:  [92 %-96 %] 95 %  O2 Device: None (Room air)  Orthostatic Blood Pressures      Flowsheet Row Most  Recent Value   Blood Pressure 112/77 filed at 01/22/2025 1132   Patient Position - Orthostatic VS Lying filed at 01/22/2025 0800          First Weight: Weight - Scale: 115 kg (253 lb 1.4 oz) (01/17/25 2029)  Vitals:    01/21/25 0600 01/22/25 0600   Weight: 106 kg (233 lb 7.5 oz) 105 kg (231 lb 14.8 oz)     Physical Exam  Vitals and nursing note reviewed. Exam conducted with a chaperone present.   Constitutional:       Appearance: Normal appearance.   HENT:      Head: Normocephalic and atraumatic.   Cardiovascular:      Rate and Rhythm: Normal rate and regular rhythm.      Pulses: Normal pulses.      Heart sounds: Normal heart sounds.   Pulmonary:      Effort: Pulmonary effort is normal.      Breath sounds: Normal breath sounds.   Musculoskeletal:         General: Normal range of motion.      Cervical back: Normal range of motion and neck supple.      Comments: +soft right groin, improving eccyhmosis   Skin:     General: Skin is warm and dry.   Neurological:      General: No focal deficit present.      Mental Status: He is alert and oriented to person, place, and time.   Psychiatric:         Mood and Affect: Mood normal.         Behavior: Behavior normal.         Thought Content: Thought content normal.         Judgment: Judgment normal.           Lab Results: I have reviewed the following results:CBC/BMP:   .     01/22/25  0544   WBC 13.02*   HGB 10.9*   HCT 32.0*      SODIUM 136   K 4.2      CO2 22   BUN 36*   CREATININE 0.93   GLUC 202*   CAIONIZED 1.15   MG 1.8*   PHOS 3.2    , Creatinine Clearance: Estimated Creatinine Clearance: 99.5 mL/min (by C-G formula based on SCr of 0.93 mg/dL)., LFTs: No new results in last 24 hours. , PTT/INR:  .     01/22/25  0221   PTT 72*    , Troponin,BNP:No new results in last 24 hours. , Lactic Acid: No new results in last 24 hours.   Results from last 7 days   Lab Units 01/22/25  0544 01/21/25  0623 01/20/25  2149 01/20/25  1706   WBC Thousand/uL 13.02* 15.55*  --   "16.39*   HEMOGLOBIN g/dL 10.9* 13.0 13.1 13.3   HEMATOCRIT % 32.0* 38.1  --  39.7   PLATELETS Thousands/uL 166 161  --  159     Results from last 7 days   Lab Units 01/22/25  0544 01/21/25  0623 01/20/25  2149   POTASSIUM mmol/L 4.2 3.9 3.9   CHLORIDE mmol/L 107 107 106   CO2 mmol/L 22 23 22   BUN mg/dL 36* 38* 40*   CREATININE mg/dL 0.93 1.13 1.22   CALCIUM mg/dL 8.5 8.9 8.9     Results from last 7 days   Lab Units 01/22/25  0221 01/21/25 2027 01/21/25  1352 01/21/25  0623 01/20/25  1706 01/18/25  0443 01/17/25 2056   INR   --   --   --   --  1.05  --  1.20*   PTT seconds 72* 63* 55*   < > 30   < > >210*    < > = values in this interval not displayed.     Lab Results   Component Value Date    HGBA1C 8.2 (H) 01/18/2025     No results found for: \"CKTOTAL\", \"CKMB\", \"CKMBINDEX\", \"TROPONINI\"      "

## 2025-01-22 NOTE — ASSESSMENT & PLAN NOTE
Assessment:  69 yoM HTN, HLD, DM2, who presented with near syncope and hypotension. He was found to have an anterior STEMI with elevated troponins. He underwent cardiac catheterization 1/17/25 with PTCA and CHYNA to the distal LM and ostial LAD, as well as, successful PTCA and CHYNA to the mLAD.  Patient noted to be in cardiogenic shock with severely elevated LVEDP requiring intra-aortic balloon pump placement in RIGHT femoral artery. POD#3 (1/20/25) patient noted to have bleeding around right femoral IABP with expanding hematoma in the right groin. Manual pressure was held at bedside and a Femostop was placed on the area. Vascular surgery was consulted for evaluation.      S/p removal of intra-aortic balloon pump and closure of right femoral artery puncture site and control of expanding hematoma    Exam:  R groin/thigh/pelvis moderate ecchymosis. Overall soft. No mass. No necrosis. No current bleeding. 2+ femoral pulse. DP/PT signals present with hand held doppler.     WBC 15.55 (16.39)  BUN/creat 36/0.93    Plan:  -Anterior STEMI, CG shock, isch CMP with apical thrombus s/p primary PCI CHYNA LM, ostial and mid LAD with IABP placement 1/17/25 noted bleeding from R femoral artery on 1/20/25  -RIGHT leg pain and bleeding around right femoral intra-aortic balloon pump causing expanding hematoma in right groin.   -s/p removal of IABP and closure of right femoral puncture site with Perclose Pro-glide x 2 on 1/20 by Dr. Brooke.   -Continue to monitor the R groin for bleeding, skin breakdown, expanding hematoma/ bleeding, infection and neurovascular changes.   -May benefit from barrier dressing in the groin  -Continue aspirin, Brilinta, statin and heparin (LV thrombus)  -Notify vascular surgery with questions or concerns.   -Will d/w Dr. Ring.

## 2025-01-22 NOTE — ASSESSMENT & PLAN NOTE
-Acute STEMI s/p primary angioplasty and stenting, CMP, EF 25% wall motion abnormalities  -s/P intra-aortic pump and pressor support  -Management per cardiology  -Current medications include: Aspirin, Brilinta, heparin (LV thrombus) with plan to transition to oral anticoagulation and atorvastatin

## 2025-01-22 NOTE — PROGRESS NOTES
Progress Note - Vascular Surgery   Name: Joseph Aguilar 69 y.o. male I MRN: [id number]  Unit/Bed#: ICU 04 I Date of Admission: 1/17/2025   Date of Service: 1/22/2025 I Hospital Day: 5    Assessment & Plan  Hematoma of right lower extremity  Assessment:  69 yoM HTN, HLD, DM2, who presented with near syncope and hypotension. He was found to have an anterior STEMI with elevated troponins. He underwent cardiac catheterization 1/17/25 with PTCA and CHYNA to the distal LM and ostial LAD, as well as, successful PTCA and CHYNA to the mLAD.  Patient noted to be in cardiogenic shock with severely elevated LVEDP requiring intra-aortic balloon pump placement in RIGHT femoral artery. POD#3 (1/20/25) patient noted to have bleeding around right femoral IABP with expanding hematoma in the right groin. Manual pressure was held at bedside and a Femostop was placed on the area. Vascular surgery was consulted for evaluation.      S/p removal of intra-aortic balloon pump and closure of right femoral artery puncture site and control of expanding hematoma    Exam:  R groin/thigh/pelvis moderate ecchymosis. Overall soft. No mass. No necrosis. No current bleeding. 2+ femoral pulse. DP/PT signals present with hand held doppler.     WBC 15.55 (16.39)  BUN/creat 36/0.93    Plan:  -Anterior STEMI, CG shock, isch CMP with apical thrombus s/p primary PCI CHYNA LM, ostial and mid LAD with IABP placement 1/17/25 noted bleeding from R femoral artery on 1/20/25  -RIGHT leg pain and bleeding around right femoral intra-aortic balloon pump causing expanding hematoma in right groin.   -s/p removal of IABP and closure of right femoral puncture site with Perclose Pro-glide x 2 on 1/20 by Dr. Brooke.   -Continue to monitor the R groin for bleeding, skin breakdown, expanding hematoma/ bleeding, infection and neurovascular changes.   -May benefit from barrier dressing in the groin  -Continue aspirin, Brilinta, statin and heparin (LV thrombus)  -Notify vascular  surgery with questions or concerns.   -Will d/w Dr. Ring.     Type 2 diabetes mellitus without complication, without long-term current use of insulin ([facility])  Lab Results   Component Value Date    HGBA1C 8.2 (H) 01/18/2025       Recent Labs     01/21/25  1203 01/21/25  1550 01/21/25 2051 01/22/25  0808   POCGLU 215* 204* 220* 183*       Blood Sugar Average: Last 72 hrs:  (P) [id number]    Anterior STEMI s/p PCI with CHYNA to LM-ostial LAD and mLAD with IABP;  of LCx (1/17/2024)  -Acute STEMI s/p primary angioplasty and stenting, CMP, EF 25% wall motion abnormalities  -s/P intra-aortic pump and pressor support  -Management per cardiology  -Current medications include: Aspirin, Brilinta, heparin (LV thrombus) with plan to transition to oral anticoagulation and atorvastatin  ICM with EF 25%  -Management per cardiology/ critical care  LV (left ventricular) mural thrombus  -Suspected on TTE  -Planned for full a/c  Acute kidney injury (HCC)  -BUN/creat 36/0.93  -Improved      Subjective :  Patient seen and examined in the ICU.  Arousable and wakes up, but sleepy.  Offers no complaints.  No leg or foot pain.    Objective :  Temp:  [97.7 °F (36.5 °C)-99.7 °F (37.6 °C)] 97.8 °F (36.6 °C)  HR:  [] 93  BP: (101-131)/(62-81) 101/62  Resp:  [16-17] 17  SpO2:  [92 %-97 %] 95 %  O2 Device: None (Room air)     I/O         01/20 0701  01/21 0700 01/21 0701  01/22 0700 01/22 0701 01/23 0700    I.V. (mL/kg) 151.9 (1.4) 395.4 (3.7)     Blood 350      IV Piggyback 150      Total Intake(mL/kg) 651.9 (6.1) 395.4 (3.7)     Urine (mL/kg/hr) 1210 (0.5) 1150 (0.5)     Total Output 1210 1150     Net -558.1 -754.6                  Sleepy, but arousal on exam which is overall soft  R groin/ thigh/ pelvis ecchymoses    Physical Exam  Vitals and nursing note reviewed.   Constitutional:       Appearance: He is well-developed.   HENT:      Head: Normocephalic and atraumatic.   Eyes:      Pupils: Pupils are equal, round, and  reactive to light.   Neck:      Thyroid: No thyromegaly.      Vascular: No JVD.      Trachea: Trachea normal.   Cardiovascular:      Rate and Rhythm: Normal rate and regular rhythm.      Pulses:           Radial pulses are 2+ on the right side and 2+ on the left side.        Femoral pulses are 2+ on the right side.       Dorsalis pedis pulses are detected w/ Doppler on the right side and 2+ on the left side.        Posterior tibial pulses are detected w/ Doppler on the right side.      Heart sounds: Normal heart sounds, S1 normal and S2 normal. No murmur heard.     No friction rub. No gallop.   Pulmonary:      Effort: Pulmonary effort is normal. No accessory muscle usage or respiratory distress.      Breath sounds: Normal breath sounds. No wheezing or rales.   Abdominal:      General: Bowel sounds are normal. There is no distension.      Palpations: Abdomen is soft.      Tenderness: There is no abdominal tenderness.   Musculoskeletal:         General: No deformity. Normal range of motion.      Cervical back: Neck supple.   Skin:     General: Skin is warm and dry.      Findings: No lesion or rash.      Nails: There is no clubbing.   Neurological:      Mental Status: He is alert and oriented to person, place, and time.      Comments: Grossly normal    Psychiatric:         Behavior: Behavior is cooperative.           Lab Results: I have reviewed the following results:  CBC with diff:   Lab Results   Component Value Date    WBC 13.02 (H) 01/22/2025    HGB 10.9 (L) 01/22/2025    HCT 32.0 (L) 01/22/2025    MCV 93 01/22/2025     01/22/2025    RBC 3.44 (L) 01/22/2025    MCH 31.7 01/22/2025    MCHC 34.1 01/22/2025    RDW 14.8 01/22/2025    MPV 9.8 01/22/2025    NRBC 0 01/21/2025   ,   BMP/CMP:  Lab Results   Component Value Date    SODIUM 136 01/22/2025    SODIUM 137 01/10/2022    K 4.2 01/22/2025    K 4.1 01/10/2022     01/22/2025     01/10/2022    CO2 22 01/22/2025    CO2 26 01/10/2022    BUN 36 (H)  "01/22/2025    BUN 17 01/10/2022    CREATININE 0.93 01/22/2025    CREATININE 0.88 01/10/2022    CALCIUM 8.5 01/22/2025    CALCIUM 9.1 01/10/2022    AST 34 01/21/2025    AST 28 01/06/2022    ALT 54 (H) 01/21/2025    ALT 66 (H) 01/06/2022    ALKPHOS 64 01/21/2025    ALKPHOS 59 01/06/2022    EGFR 84 01/22/2025   ,   Lipid Panel: No results found for: \"CHOL\",   Coags:   Lab Results   Component Value Date    PT 13.4 01/05/2022    PTT 72 (H) 01/22/2025    INR 1.05 01/20/2025    INR 1.1 01/05/2022   ,   Blood Culture: No results found for: \"BLOODCX\",   Urinalysis:   Lab Results   Component Value Date    COLORU Yellow 01/19/2022    CLARITYU Clear 01/19/2022    SPECGRAV 1.020 01/19/2022    PHUR 5.0 01/19/2022    LEUKOCYTESUR Negative 01/19/2022    NITRITE Negative 01/19/2022    GLUCOSEU >=1000 (1%) (A) 01/19/2022    KETONESU Negative 01/19/2022    BILIRUBINUR Negative 01/19/2022    BLOODU Negative 01/19/2022   ,   Urine Culture: No results found for: \"URINECX\",   Wound Culure: No results found for: \"WOUNDCULT\"    Imaging Results Review: No pertinent imaging studies reviewed.  Other Study Results Review: No additional pertinent studies reviewed.    VTE Prophylaxis: VTE covered by:  heparin (porcine), Intravenous, 17.1 Units/kg/hr at 01/22/25 0726       I have reviewed and made appropriate changes to the review of systems input by the medical assistant.    Vitals:    01/21/25 2134 01/21/25 2236 01/22/25 0540 01/22/25 0800   BP: 131/78 114/63 104/67 101/62   BP Location:  Left arm Left arm Left arm   Pulse: 96 95 93    Resp: 16 16 17    Temp: 97.7 °F (36.5 °C) 97.7 °F (36.5 °C) 98.9 °F (37.2 °C) 97.8 °F (36.6 °C)   TempSrc: Axillary Axillary Axillary Oral   SpO2:  95% 95%    Weight:       Height:           Patient Active Problem List   Diagnosis    Glenohumeral arthritis, right    Primary osteoarthritis of both knees    History of hypertension    Mixed hyperlipidemia    Type 2 diabetes mellitus without complication, without " long-term current use of insulin (HCC)    Obesity (BMI 30-39.9)    S/P TKR (total knee replacement), right    Closed nondisplaced intertrochanteric fracture of left femur with routine healing    Pain in both feet    Polyneuropathy associated with underlying disease (HCC)    Slow transit constipation    Vitamin D deficiency    Anemia    Hypomagnesemia    Right foot pain    Glenohumeral arthritis, left    Morbid (severe) obesity due to excess calories ([facility])    Anterior STEMI s/p PCI with CHYNA to LM-ostial LAD and mLAD with IABP;  of LCx (1/17/2024)    ICM with EF 25%    LV (left ventricular) mural thrombus    Hematoma of right lower extremity    Acute kidney injury (HCC)       Past Surgical History:   Procedure Laterality Date    BACK SURGERY      CARDIAC CATHETERIZATION N/A 1/17/2025    Procedure: Cardiac PCI;  Surgeon: Neeraj Gaines MD;  Location: [facility];  Service: Cardiology    CARDIAC CATHETERIZATION Left 1/17/2025    Procedure: Cardiac Left Heart Cath;  Surgeon: Neeraj Gaines MD;  Location: [facility];  Service: Cardiology    CARDIAC CATHETERIZATION N/A 1/17/2025    Procedure: Cardiac iabp;  Surgeon: Neeraj Gaines MD;  Location: [facility];  Service: Cardiology    HAND SURGERY      JOINT REPLACEMENT Left     l tkr    KNEE SURGERY Left     GA ARTHRP KNE CONDYLE&PLATU MEDIAL&LAT COMPARTMENTS Right 11/19/2018    Procedure: ARTHROPLASTY KNEE TOTAL;  Surgeon: Franny Grimaldo MD;  Location:  MAIN OR;  Service: Orthopedics    TOOTH EXTRACTION      WISDOM TOOTH EXTRACTION         Family History   Problem Relation Age of Onset    No Known Problems Mother     Heart attack Father     Arthritis Family     Cancer Family     Diabetes Family     Heart disease Family     Osteoporosis Family        Social History     Socioeconomic History    Marital status: /Civil Union     Spouse name: Not on file    Number of children: Not on file    Years of education: Not on file     Highest education level: Not on file   Occupational History    Not on file   Tobacco Use    Smoking status: Former     Current packs/day: 0.00     Types: Cigarettes     Quit date: 3/1/2012     Years since quittin.9     Passive exposure: Past    Smokeless tobacco: Never   Vaping Use    Vaping status: Never Used   Substance and Sexual Activity    Alcohol use: Yes     Comment: 'couple beers every couple weeks'    Drug use: Yes     Types: Marijuana    Sexual activity: Not Currently   Other Topics Concern    Not on file   Social History Narrative    Caffeine use, active     Social Drivers of Health     Financial Resource Strain: Not on file   Food Insecurity: No Food Insecurity (2025)    Hunger Vital Sign     Worried About Running Out of Food in the Last Year: Never true     Ran Out of Food in the Last Year: Never true   Transportation Needs: No Transportation Needs (2025)    PRAPARE - Transportation     Lack of Transportation (Medical): No     Lack of Transportation (Non-Medical): No   Physical Activity: Not on file   Stress: Not on file   Social Connections: Not on file   Intimate Partner Violence: Unknown (2025)    Nursing IPS     Feels Physically and Emotionally Safe: Not on file     Physically Hurt by Someone: Not on file     Humiliated or Emotionally Abused by Someone: Not on file     Physically Hurt by Someone: No     Hurt or Threatened by Someone: No   Housing Stability: Low Risk  (2025)    Housing Stability Vital Sign     Unable to Pay for Housing in the Last Year: No     Number of Times Moved in the Last Year: 0     Homeless in the Last Year: No       No Known Allergies      Current Facility-Administered Medications:     aluminum-magnesium hydroxide-simethicone (MAALOX) oral suspension 30 mL, 30 mL, Oral, Q4H PRN, MARGARITA Galeano, 30 mL at 25 1114    aspirin (ECOTRIN LOW STRENGTH) EC tablet 81 mg, 81 mg, Oral, Daily, MARGARITA Hernandez, 81 mg at  01/21/25 0851    atorvastatin (LIPITOR) tablet 80 mg, 80 mg, Oral, Daily With Dinner, MARGARITA Galeano, 80 mg at 01/21/25 1615    bisacodyl (DULCOLAX) rectal suppository 10 mg, 10 mg, Rectal, Daily PRN, MARGARITA Hernandez    chlorhexidine (PERIDEX) 0.12 % oral rinse 15 mL, 15 mL, Mouth/Throat, Q12H YNES, MARGARITA Hernandez, 15 mL at 01/21/25 0855    fentaNYL injection, , Intravenous, PRN, Demi Brooke MD, 50 mcg at 01/20/25 1605    heparin (porcine) 25,000 units in 0.45% NaCl 250 mL infusion (premix), 3-30 Units/kg/hr (Order-Specific), Intravenous, Titrated, Estiven Rdoas PA-C, Last Rate: 18 mL/hr at 01/22/25 0726, 17.1 Units/kg/hr at 01/22/25 0726    insulin glargine (LANTUS) subcutaneous injection 20 Units 0.2 mL, 20 Units, Subcutaneous, HS, MARGARITA Coulter, 20 Units at 01/21/25 2121    insulin lispro (HumALOG/ADMELOG) 100 units/mL subcutaneous injection 2-12 Units, 2-12 Units, Subcutaneous, 4x Daily (AC & HS), 4 Units at 01/21/25 2136 **AND** Fingerstick Glucose (POCT), , , 4x Daily AC and at bedtime, MRAGARITA Galeano    lidocaine (LIDODERM) 5 % patch 1 patch, 1 patch, Topical, Daily, MARGARITA Galeano, 1 patch at 01/21/25 0850    magnesium sulfate 2 g/50 mL IVPB (premix) 2 g, 2 g, Intravenous, Once, MARGARITA Galeano, Last Rate: 25 mL/hr at 01/22/25 0741, 2 g at 01/22/25 0741    melatonin tablet 6 mg, 6 mg, Oral, HS, MARGARITA Hernandez, 6 mg at 01/21/25 2120    menthol-methyl salicylate (BENGAY) 10-15 % cream, , Apply externally, 4x Daily PRN, MARGARITA Hernandez, Given at 01/22/25 0236    metoprolol tartrate (LOPRESSOR) partial tablet 12.5 mg, 12.5 mg, Oral, Q12H YNES, Ciro Gregory MD, 12.5 mg at 01/21/25 2134    ondansetron (ZOFRAN) injection 4 mg, 4 mg, Intravenous, Q6H PRN, MARGARITA Galeano    oxyCODONE (ROXICODONE) split tablet 2.5 mg, 2.5 mg, Oral, Q4H PRN, 2.5 mg at 01/18/25  6185 **OR** oxyCODONE (ROXICODONE) IR tablet 5 mg, 5 mg, Oral, Q4H PRN, MARGARITA Galeano, 5 mg at 01/18/25 1859    QUEtiapine (SEROquel) tablet 25 mg, 25 mg, Oral, HS, MARGARITA Coulter, 25 mg at 01/21/25 2238    ticagrelor (BRILINTA) tablet 90 mg, 90 mg, Oral, Q12H YNESIvelisse CRNP, 90 mg at 01/21/25 2121

## 2025-01-22 NOTE — Clinical Note
Defib pad site: anterior/posterior and left lower flank. [Headache] : headache [Dizziness] : dizziness [Normal] : Psychiatric

## 2025-01-22 NOTE — ASSESSMENT & PLAN NOTE
Troponin with peak >22,973.    TTE shows EF 25%, severe global hypokinesis with regional variation and is near akinesis of apical and distal septal segments.    Telemetry shows NSR with brief runs of PSVT and NSVT  continue to monitor tele  Continue ASA, Brilinta, and Lipitor.   Started Coreg  On heparin gtt given LV thrombus; will need transition to oral AC prior to discharge.

## 2025-01-22 NOTE — ASSESSMENT & PLAN NOTE
Lab Results   Component Value Date    HGBA1C 8.2 (H) 01/18/2025     Recent Labs     01/21/25  1550 01/21/25  2051 01/22/25  0808 01/22/25  1134   POCGLU 204* 220* 183* 226*   Blood Sugar Average: Last 72 hrs:  (P) [id number]  Management per primary service.

## 2025-01-22 NOTE — PROGRESS NOTES
Progress Note - Critical Care/ICU   Name: Joseph Aguilar 69 y.o. male I MRN: [id number]  Unit/Bed#: ICU 04 I Date of Admission: 1/17/2025   Date of Service: 1/22/2025 I Hospital Day: 5      Assessment & Plan  Anterior STEMI s/p PCI with CHYNA to LM-ostial LAD and mLAD with IABP;  of LCx (1/17/2024)  Status post drug-eluting stent to the LAD and left main, unable to intervene on the circumflex artery.  Significant hypotension in the Cath Lab requiring pressors and intra-aortic balloon pump support  IABP discontinued on 1/20  Continue aspirin, statin, Brilinta  Beta-blocker started on 1/21  Appreciate cardiology recommendations  Mixed hyperlipidemia  Continue home statin  Type 2 diabetes mellitus without complication, without long-term current use of insulin ([facility])  Lab Results   Component Value Date    HGBA1C 8.2 (H) 01/18/2025       Recent Labs     01/21/25  0857 01/21/25  1203 01/21/25  1550 01/21/25  2051   POCGLU 197* 215* 204* 220*       Blood Sugar Average: Last 72 hrs:    (P) [id number]    Holding home antihyperglycemics  Lantus increased on 1/21  Continue sliding scale  Consider adding standing meal-time insulin  History of hypertension  Continue low dose beta-blocker  ICM with EF 25%  Continue low dose beta-blocker  Consult case management assistance with LifeVest   PRN Lasix for even to slightly negative  Patient appears euvolemic  Appreciate cardiology recommendations  LV (left ventricular) mural thrombus  Resume heparin infusion when appropriate with vascular surgery service  Discuss when to transition to oral anticoagulation  Discuss duration of triple therapy with cardiology  Hematoma of right lower extremity  Patient s/p surgical closure of right femoral artery due to hematoma related to intra-aortic balloon pump  Patient received 1u PRBC on 1/20  Follow endpoints of resuscitation closely  Frequent neurovascular exams  Appreciate vascular surgery recommendations  Acute kidney injury  ([facility])  Question related to shock state- resolving  Closely follow intake and output  Daily weights  Trend serum creatinine  Disposition: Improving    ICU Core Measures     A: Assess, Prevent, and Manage Pain Has pain been assessed? Yes  Need for changes to pain regimen? No   B: Both SAT/SAT  N/A   C: Choice of Sedation RASS Goal: N/A patient not on sedation  Need for changes to sedation or analgesia regimen? NA   D: Delirium CAM-ICU: Negative   E: Early Mobility  Plan for early mobility? Yes   F: Family Engagement Plan for family engagement today? Yes       Review of Invasive Devices:            Prophylaxis:  VTE VTE covered by:  heparin (porcine), Intravenous, 17.1 Units/kg/hr at 01/21/25 1829       Stress Ulcer  not ordered         24 Hour Events : Patient started on beta-blocker yesterday  Subjective   Review of Systems: Review of Systems   Constitutional:  Positive for fatigue.   Respiratory:  Negative for shortness of breath.    Cardiovascular:  Negative for chest pain.   Musculoskeletal:  Positive for arthralgias and back pain.   Psychiatric/Behavioral:  Positive for sleep disturbance.        Objective :                   Vitals I/O      Most Recent Min/Max in 24hrs   Temp 97.7 °F (36.5 °C) Temp  Min: 97.7 °F (36.5 °C)  Max: 99.7 °F (37.6 °C)   Pulse 95 Pulse  Min: 88  Max: 118   Resp 16 Resp  Min: 16  Max: 16   /63 BP  Min: 103/68  Max: 141/86   O2 Sat 95 % SpO2  Min: 92 %  Max: 97 %      Intake/Output Summary (Last 24 hours) at 1/22/2025 0311  Last data filed at 1/22/2025 0057  Gross per 24 hour   Intake 278.95 ml   Output 1525 ml   Net -1246.05 ml       Diet Juan Carlos/CHO Controlled; Consistent Carbohydrate Diet Level 2 (5 carb servings/75 grams CHO/meal)    Invasive Monitoring           Physical Exam   Physical Exam  Eyes:      Pupils: Pupils are equal, round, and reactive to light.   Skin:     General: Skin is warm and dry.   HENT:      Head: Normocephalic and atraumatic.      Mouth/Throat:      Mouth:  Mucous membranes are moist.   Cardiovascular:      Rate and Rhythm: Normal rate and regular rhythm.      Pulses:           Dorsalis pedis pulses are detected w/ Doppler on the right side.        Posterior tibial pulses are detected w/ Doppler on the right side.   Musculoskeletal:         General: Tenderness (right groin) present.      Right lower leg: Trace Edema present.      Left lower leg: Trace Edema present.      Comments: Ecchymotic changes in right groin, slightly past marker line  Dressing in place with slight firmness   Abdominal:      Palpations: Abdomen is soft.   Constitutional:       General: He is not in acute distress.     Appearance: He is ill-appearing.   Pulmonary:      Effort: Pulmonary effort is normal. No respiratory distress.   Neurological:      GCS: GCS eye subscore is 4. GCS verbal subscore is 5. GCS motor subscore is 6.          Diagnostic Studies        Lab Results: I have reviewed the following results:     Medications:  Scheduled PRN   aspirin, 81 mg, Daily  atorvastatin, 80 mg, Daily With Dinner  chlorhexidine, 15 mL, Q12H YNES  insulin glargine, 20 Units, HS  insulin lispro, 2-12 Units, 4x Daily (AC & HS)  lidocaine, 1 patch, Daily  melatonin, 6 mg, HS  metoprolol tartrate, 12.5 mg, Q12H YNES  QUEtiapine, 25 mg, HS  ticagrelor, 90 mg, Q12H YNES      aluminum-magnesium hydroxide-simethicone, 30 mL, Q4H PRN  bisacodyl, 10 mg, Daily PRN  fentaNYL, , PRN  menthol-methyl salicylate, , 4x Daily PRN  ondansetron, 4 mg, Q6H PRN  oxyCODONE, 2.5 mg, Q4H PRN   Or  oxyCODONE, 5 mg, Q4H PRN       Continuous    heparin (porcine), 3-30 Units/kg/hr (Order-Specific), Last Rate: 17.1 Units/kg/hr (01/21/25 1829)         Labs:   CBC    Recent Labs     01/20/25  1706 01/20/25 2149 01/21/25  0623   WBC 16.39*  --  15.55*   HGB 13.3 13.1 13.0   HCT 39.7  --  38.1     --  161     BMP    Recent Labs     01/20/25 2149 01/21/25  0623   SODIUM 137 138   K 3.9 3.9    107   CO2 22 23   AGAP 9 8   BUN  40* 38*   CREATININE 1.22 1.13   CALCIUM 8.9 8.9       Coags    Recent Labs     01/20/25  1706 01/21/25  0623 01/21/25 2027 01/22/25  0221   INR 1.05  --   --   --    PTT 30   < > 63* 72*    < > = values in this interval not displayed.        Additional Electrolytes  Recent Labs     01/20/25 1706 01/21/25  0623   MG 2.3 2.0   PHOS 5.6* 3.6   CAIONIZED 1.13 1.12          Blood Gas    Recent Labs     01/20/25 2149   PHART 7.438   AKE3MXC 29.5*   PO2ART 84.2   AQF4MAB 19.5*   BEART -3.4   SOURCE Line, Arterial     Recent Labs     01/20/25 2149   SOURCE Line, Arterial    LFTs  Recent Labs     01/20/25 1706 01/21/25  0623   ALT 60* 54*   AST 46* 34   ALKPHOS 66 64   ALB 3.8 3.8   TBILI 1.21* 1.26*       Infectious  No recent results  Glucose  Recent Labs     01/20/25  0532 01/20/25 1706 01/20/25 2149 01/21/25  0623   GLUC 163* 232* 225* 222*

## 2025-01-22 NOTE — PROGRESS NOTES
Progress Note - Critical Care/ICU   Name: Joseph Aguilar 69 y.o. male I MRN: [id number]  Unit/Bed#: ICU 04 I Date of Admission: 1/17/2025   Date of Service: 1/22/2025 I Hospital Day: 5       Critical Care Interval Transfer Note:    Brief Hospital Summary:   [  ] 68 YO with PMHx of HTN, DMII presenting to [facility] ED 1/17 as STEMI alert. Noted to have left main, distal LAD, and circ occlusions. CHYNA to L. Main, LAD. Circ with   [  ] Given hemodynamics was admitted to ICU with IABP. Initially maintained to augment diuresis. Was noted to have right groin hematoma 1/20, IABP removed with vascular. Fem stop closure.    Barriers to discharge:   [  ] Hemodynamics since improved, tolerating low dose beta blocker. S/p diuresis  [  ] Will need continued AC for L. Apical thrombus  [  ] Life Vest     Consults: IP CONSULT TO CARDIOLOGY  IP CONSULT TO VASCULAR SURGERY    Recommended to review admission imaging for incidental findings and document in discharge navigator: Chart reviewed, no known incidental findings noted at this time.      Discharge Plan: Anticipate discharge in 24-48 hrs to prior assisted or independent living facility.    OT Recommendations: Home with home health rehabilitation   Patient seen and evaluated by Critical Care today and deemed to be appropriate for transfer to . Spoke to Dr. Alan Miller from [facility] to accept transfer. Critical care can be contacted via SecureChat with any questions or concerns. Please use the Critical Care AP Role in Secure Chat for any provider inquires until the patient is transferred out of the ICU or until tomorrow at 0600.    MARGARITA Galeano

## 2025-01-22 NOTE — ASSESSMENT & PLAN NOTE
Lab Results   Component Value Date    HGBA1C 8.2 (H) 01/18/2025       Recent Labs     01/21/25  1203 01/21/25  1550 01/21/25 2051 01/22/25  0808   POCGLU 215* 204* 220* 183*       Blood Sugar Average: Last 72 hrs:  (P) [id number]

## 2025-01-22 NOTE — PLAN OF CARE
Problem: PAIN - ADULT  Goal: Verbalizes/displays adequate comfort level or baseline comfort level  Description: Interventions:  - Encourage patient to monitor pain and request assistance  - Assess pain using appropriate pain scale  - Administer analgesics based on type and severity of pain and evaluate response  - Implement non-pharmacological measures as appropriate and evaluate response  - Consider cultural and social influences on pain and pain management  - Notify physician/advanced practitioner if interventions unsuccessful or patient reports new pain  Outcome: Progressing     Problem: INFECTION - ADULT  Goal: Absence or prevention of progression during hospitalization  Description: INTERVENTIONS:  - Assess and monitor for signs and symptoms of infection  - Monitor lab/diagnostic results  - Monitor all insertion sites, i.e. indwelling lines, tubes, and drains  - Monitor endotracheal if appropriate and nasal secretions for changes in amount and color  - [place] appropriate cooling/warming therapies per order  - Administer medications as ordered  - Instruct and encourage patient and family to use good hand hygiene technique  - Identify and instruct in appropriate isolation precautions for identified infection/condition  Outcome: Progressing     Problem: SAFETY ADULT  Goal: Patient will remain free of falls  Description: INTERVENTIONS:  - Educate patient/family on patient safety including physical limitations  - Instruct patient to call for assistance with activity   - Consult OT/PT to assist with strengthening/mobility   - Keep Call bell within reach  - Keep bed low and locked with side rails adjusted as appropriate  - Keep care items and personal belongings within reach  - Initiate and maintain comfort rounds  - Make Fall Risk Sign visible to staff  - Offer Toileting every 2 Hours, in advance of need  - Initiate/Maintain bed alarm  - Apply yellow socks and bracelet for high fall risk patients  - Consider  moving patient to room near nurses station  Outcome: Progressing

## 2025-01-22 NOTE — ASSESSMENT & PLAN NOTE
Suspected on TTE, continue heparin gtt.    Plan for transition to oral AC prior to discharge; eliquis price check pending

## 2025-01-22 NOTE — ASSESSMENT & PLAN NOTE
Plan for optimization of GDMT as tolerated.  Started on Coreg 3.125  Price check for entresto and jardiance pending  Lifevest ordered

## 2025-01-22 NOTE — CASE MANAGEMENT
Case Management Discharge Planning Note    Patient name Joseph Aguilar  Location ICU 04/ICU 04 MRN [id number]  : 1955 Date 2025       Current Admission Date: 2025  Current Admission Diagnosis:Anterior STEMI s/p PCI with CHYNA to LM-ostial LAD and mLAD with IABP;  of LCx (2024)   Patient Active Problem List    Diagnosis Date Noted Date Diagnosed    ICM with EF 25% 2025     LV (left ventricular) mural thrombus 2025     Hematoma of right lower extremity 2025     Acute kidney injury (HCC) 2025     Anterior STEMI s/p PCI with CHYNA to LM-ostial LAD and mLAD with IABP;  of LCx (2024) 2025     Morbid (severe) obesity due to excess calories (HCC) 10/07/2024     Glenohumeral arthritis, left 2022     Right foot pain 2022     Vitamin D deficiency 2022     Anemia 2022     Hypomagnesemia 2022     Closed nondisplaced intertrochanteric fracture of left femur with routine healing 2022     Pain in both feet 2022     Polyneuropathy associated with underlying disease (HCC) 2022     Slow transit constipation 2022     S/P TKR (total knee replacement), right 2018     Obesity (BMI 30-39.9) 10/29/2018     Primary osteoarthritis of both knees 2018     Glenohumeral arthritis, right 2016     Mixed hyperlipidemia 2013     History of hypertension 2012     Type 2 diabetes mellitus without complication, without long-term current use of insulin (HCC) 2012       LOS (days): 5  Geometric Mean LOS (GMLOS) (days): 3.8  Days to GMLOS:-0.7     OBJECTIVE:  Risk of Unplanned Readmission Score: 12.79         Current admission status: Inpatient   Preferred Pharmacy:   [facility] #[id number] - [place], [place] - [address]  [address] [place] [address]  Phone: [phone] Fax: [phone]    [facility] [place] - [address]  [address]  [place] [address]  Phone: [phone] Fax: [phone]     [facility] [place] - [address]  [address]  [place] [address]  Phone: [phone] Fax: [phone]    [facility] - [place] - [address]  [address]  [place] [address]  Phone: [phone] Fax: [phone]    Primary Care Provider: No primary care provider on file.    Primary Insurance: MEDICARE  Secondary Insurance: COMMERCIAL MISCELLANEOUS    DISCHARGE DETAILS:                                          Other Referral/Resources/Interventions Provided:  Referral Comments: Level I recommended by therapy;  blanket referrals sent to acute and subacute facilities.  Responses pending.         Treatment Team Recommendation: Acute Rehab, Short Term Rehab, SNF  Discharge Destination Plan:: Short Term Rehab, SNF, Acute Rehab  Transport at Discharge : Other (Comment) (TBD)

## 2025-01-22 NOTE — PLAN OF CARE
Problem: Prexisting or High Potential for Compromised Skin Integrity  Goal: Skin integrity is maintained or improved  Description: INTERVENTIONS:  - Identify patients at risk for skin breakdown  - Assess and monitor skin integrity  - Assess and monitor nutrition and hydration status  - Monitor labs   - Assess for incontinence   - Turn and reposition patient  - Assist with mobility/ambulation  - Relieve pressure over bony prominences  - Avoid friction and shearing  - Provide appropriate hygiene as needed including keeping skin clean and dry  - Evaluate need for skin moisturizer/barrier cream  - Collaborate with interdisciplinary team   - Patient/family teaching  - Consider wound care consult   Outcome: Progressing     Problem: PAIN - ADULT  Goal: Verbalizes/displays adequate comfort level or baseline comfort level  Description: Interventions:  - Encourage patient to monitor pain and request assistance  - Assess pain using appropriate pain scale  - Administer analgesics based on type and severity of pain and evaluate response  - Implement non-pharmacological measures as appropriate and evaluate response  - Consider cultural and social influences on pain and pain management  - Notify physician/advanced practitioner if interventions unsuccessful or patient reports new pain  Outcome: Progressing     Problem: INFECTION - ADULT  Goal: Absence or prevention of progression during hospitalization  Description: INTERVENTIONS:  - Assess and monitor for signs and symptoms of infection  - Monitor lab/diagnostic results  - Monitor all insertion sites, i.e. indwelling lines, tubes, and drains  - Monitor endotracheal if appropriate and nasal secretions for changes in amount and color  - [place] appropriate cooling/warming therapies per order  - Administer medications as ordered  - Instruct and encourage patient and family to use good hand hygiene technique  - Identify and instruct in appropriate isolation precautions for  identified infection/condition  Outcome: Progressing  Goal: Absence of fever/infection during neutropenic period  Description: INTERVENTIONS:  - Monitor WBC    Outcome: Progressing     Problem: SAFETY ADULT  Goal: Patient will remain free of falls  Description: INTERVENTIONS:  - Educate patient/family on patient safety including physical limitations  - Instruct patient to call for assistance with activity   - Consult OT/PT to assist with strengthening/mobility   - Keep Call bell within reach  - Keep bed low and locked with side rails adjusted as appropriate  - Keep care items and personal belongings within reach  - Initiate and maintain comfort rounds  - Make Fall Risk Sign visible to staff  - Offer Toileting every 2 Hours, in advance of need  - Initiate/Maintain bed alarm  - Obtain necessary fall risk management equipment:   - Apply yellow socks and bracelet for high fall risk patients  - Consider moving patient to room near nurses station  Outcome: Progressing  Goal: Maintain or return to baseline ADL function  Description: INTERVENTIONS:  -  Assess patient's ability to carry out ADLs; assess patient's baseline for ADL function and identify physical deficits which impact ability to perform ADLs (bathing, care of mouth/teeth, toileting, grooming, dressing, etc.)  - Assess/evaluate cause of self-care deficits   - Assess range of motion  - Assess patient's mobility; develop plan if impaired  - Assess patient's need for assistive devices and provide as appropriate  - Encourage maximum independence but intervene and supervise when necessary  - Involve family in performance of ADLs  - Assess for home care needs following discharge   - Consider OT consult to assist with ADL evaluation and planning for discharge  - Provide patient education as appropriate  Outcome: Progressing  Goal: Maintains/Returns to pre admission functional level  Description: INTERVENTIONS:  - Perform AM-PAC 6 Click Basic Mobility/ Daily Activity  assessment daily.  - Set and communicate daily mobility goal to care team and patient/family/caregiver.   - Collaborate with rehabilitation services on mobility goals if consulted  - Perform Range of Motion 3 times a day.  - Reposition patient every 2 hours.  - Dangle patient 2 times a day  - Stand patient 3 times a day  - Ambulate patient 3 times a day  - Out of bed to chair 2 times a day   - Out of bed for meals 3 times a day  - Out of bed for toileting  - Record patient progress and toleration of activity level   Outcome: Progressing     Problem: Knowledge Deficit  Goal: Patient/family/caregiver demonstrates understanding of disease process, treatment plan, medications, and discharge instructions  Description: Complete learning assessment and assess knowledge base.  Interventions:  - Provide teaching at level of understanding  - Provide teaching via preferred learning methods  Outcome: Progressing     Problem: CARDIOVASCULAR - ADULT  Goal: Maintains optimal cardiac output and hemodynamic stability  Description: INTERVENTIONS:  - Monitor I/O, vital signs and rhythm  - Monitor for S/S and trends of decreased cardiac output  - Administer and titrate ordered vasoactive medications to optimize hemodynamic stability  - Assess quality of pulses, skin color and temperature  - Assess for signs of decreased coronary artery perfusion  - Instruct patient to report change in severity of symptoms  Outcome: Progressing  Goal: Absence of cardiac dysrhythmias or at baseline rhythm  Description: INTERVENTIONS:  - Continuous cardiac monitoring, vital signs, obtain 12 lead EKG if ordered  - Administer antiarrhythmic and heart rate control medications as ordered  - Monitor electrolytes and administer replacement therapy as ordered  Outcome: Progressing

## 2025-01-23 ENCOUNTER — TELEPHONE (OUTPATIENT)
Dept: INTERNAL MEDICINE CLINIC | Facility: CLINIC | Age: 70
End: 2025-01-23

## 2025-01-23 PROBLEM — N17.9 ACUTE KIDNEY INJURY (HCC): Status: RESOLVED | Noted: 2025-01-20 | Resolved: 2025-01-23

## 2025-01-23 LAB
ANION GAP SERPL CALCULATED.3IONS-SCNC: 8 MMOL/L (ref 4–13)
APTT PPP: 103 SECONDS (ref 23–34)
APTT PPP: 68 SECONDS (ref 23–34)
APTT PPP: 76 SECONDS (ref 23–34)
BASOPHILS # BLD AUTO: 0.03 THOUSANDS/ΜL (ref 0–0.1)
BASOPHILS NFR BLD AUTO: 0 % (ref 0–1)
BUN SERPL-MCNC: 29 MG/DL (ref 5–25)
CA-I BLD-SCNC: 1.21 MMOL/L (ref 1.12–1.32)
CALCIUM SERPL-MCNC: 9 MG/DL (ref 8.4–10.2)
CHLORIDE SERPL-SCNC: 105 MMOL/L (ref 96–108)
CO2 SERPL-SCNC: 22 MMOL/L (ref 21–32)
CREAT SERPL-MCNC: 0.88 MG/DL (ref 0.6–1.3)
EOSINOPHIL # BLD AUTO: 0.4 THOUSAND/ΜL (ref 0–0.61)
EOSINOPHIL NFR BLD AUTO: 4 % (ref 0–6)
ERYTHROCYTE [DISTWIDTH] IN BLOOD BY AUTOMATED COUNT: 14.6 % (ref 11.6–15.1)
GFR SERPL CREATININE-BSD FRML MDRD: 88 ML/MIN/1.73SQ M
GLUCOSE SERPL-MCNC: 176 MG/DL (ref 65–140)
GLUCOSE SERPL-MCNC: 178 MG/DL (ref 65–140)
GLUCOSE SERPL-MCNC: 186 MG/DL (ref 65–140)
GLUCOSE SERPL-MCNC: 190 MG/DL (ref 65–140)
GLUCOSE SERPL-MCNC: 234 MG/DL (ref 65–140)
HCT VFR BLD AUTO: 32.1 % (ref 36.5–49.3)
HGB BLD-MCNC: 10.5 G/DL (ref 12–17)
IMM GRANULOCYTES # BLD AUTO: 0.06 THOUSAND/UL (ref 0–0.2)
IMM GRANULOCYTES NFR BLD AUTO: 1 % (ref 0–2)
INR PPP: 1.12 (ref 0.85–1.19)
LYMPHOCYTES # BLD AUTO: 1.78 THOUSANDS/ΜL (ref 0.6–4.47)
LYMPHOCYTES NFR BLD AUTO: 16 % (ref 14–44)
MAGNESIUM SERPL-MCNC: 1.7 MG/DL (ref 1.9–2.7)
MCH RBC QN AUTO: 31 PG (ref 26.8–34.3)
MCHC RBC AUTO-ENTMCNC: 32.7 G/DL (ref 31.4–37.4)
MCV RBC AUTO: 95 FL (ref 82–98)
MONOCYTES # BLD AUTO: 1.36 THOUSAND/ΜL (ref 0.17–1.22)
MONOCYTES NFR BLD AUTO: 12 % (ref 4–12)
NEUTROPHILS # BLD AUTO: 7.46 THOUSANDS/ΜL (ref 1.85–7.62)
NEUTS SEG NFR BLD AUTO: 67 % (ref 43–75)
NRBC BLD AUTO-RTO: 0 /100 WBCS
PLATELET # BLD AUTO: 171 THOUSANDS/UL (ref 149–390)
PMV BLD AUTO: 9.7 FL (ref 8.9–12.7)
POTASSIUM SERPL-SCNC: 4 MMOL/L (ref 3.5–5.3)
PROTHROMBIN TIME: 15.1 SECONDS (ref 12.3–15)
RBC # BLD AUTO: 3.39 MILLION/UL (ref 3.88–5.62)
SODIUM SERPL-SCNC: 135 MMOL/L (ref 135–147)
WBC # BLD AUTO: 11.09 THOUSAND/UL (ref 4.31–10.16)

## 2025-01-23 PROCEDURE — 99232 SBSQ HOSP IP/OBS MODERATE 35: CPT | Performed by: INTERNAL MEDICINE

## 2025-01-23 PROCEDURE — 82330 ASSAY OF CALCIUM: CPT

## 2025-01-23 PROCEDURE — 97530 THERAPEUTIC ACTIVITIES: CPT

## 2025-01-23 PROCEDURE — 80048 BASIC METABOLIC PNL TOTAL CA: CPT

## 2025-01-23 PROCEDURE — 83735 ASSAY OF MAGNESIUM: CPT

## 2025-01-23 PROCEDURE — 82948 REAGENT STRIP/BLOOD GLUCOSE: CPT

## 2025-01-23 PROCEDURE — 85610 PROTHROMBIN TIME: CPT | Performed by: PHYSICIAN ASSISTANT

## 2025-01-23 PROCEDURE — 85025 COMPLETE CBC W/AUTO DIFF WBC: CPT

## 2025-01-23 PROCEDURE — 85730 THROMBOPLASTIN TIME PARTIAL: CPT

## 2025-01-23 PROCEDURE — 97167 OT EVAL HIGH COMPLEX 60 MIN: CPT

## 2025-01-23 PROCEDURE — 85730 THROMBOPLASTIN TIME PARTIAL: CPT | Performed by: INTERNAL MEDICINE

## 2025-01-23 RX ORDER — LISINOPRIL 2.5 MG/1
2.5 TABLET ORAL DAILY
Status: DISCONTINUED | OUTPATIENT
Start: 2025-01-23 | End: 2025-01-29 | Stop reason: HOSPADM

## 2025-01-23 RX ORDER — WARFARIN SODIUM 5 MG/1
5 TABLET ORAL
Status: DISCONTINUED | OUTPATIENT
Start: 2025-01-23 | End: 2025-01-24

## 2025-01-23 RX ORDER — MAGNESIUM SULFATE HEPTAHYDRATE 40 MG/ML
2 INJECTION, SOLUTION INTRAVENOUS ONCE
Status: COMPLETED | OUTPATIENT
Start: 2025-01-23 | End: 2025-01-23

## 2025-01-23 RX ORDER — INSULIN LISPRO 100 [IU]/ML
4 INJECTION, SOLUTION INTRAVENOUS; SUBCUTANEOUS
Status: DISCONTINUED | OUTPATIENT
Start: 2025-01-23 | End: 2025-01-24

## 2025-01-23 RX ADMIN — MAGNESIUM SULFATE HEPTAHYDRATE 2 G: 40 INJECTION, SOLUTION INTRAVENOUS at 09:12

## 2025-01-23 RX ADMIN — ATORVASTATIN CALCIUM 80 MG: 40 TABLET, FILM COATED ORAL at 17:22

## 2025-01-23 RX ADMIN — MELATONIN 6 MG: 3 TAB ORAL at 22:09

## 2025-01-23 RX ADMIN — CHLORHEXIDINE GLUCONATE 0.12% ORAL RINSE 15 ML: 1.2 LIQUID ORAL at 09:14

## 2025-01-23 RX ADMIN — INSULIN LISPRO 2 UNITS: 100 INJECTION, SOLUTION INTRAVENOUS; SUBCUTANEOUS at 17:23

## 2025-01-23 RX ADMIN — INSULIN LISPRO 2 UNITS: 100 INJECTION, SOLUTION INTRAVENOUS; SUBCUTANEOUS at 22:10

## 2025-01-23 RX ADMIN — CHLORHEXIDINE GLUCONATE 0.12% ORAL RINSE 15 ML: 1.2 LIQUID ORAL at 22:09

## 2025-01-23 RX ADMIN — HEPARIN SODIUM 15.1 UNITS/KG/HR: 10000 INJECTION, SOLUTION INTRAVENOUS at 10:49

## 2025-01-23 RX ADMIN — INSULIN GLARGINE 20 UNITS: 100 INJECTION, SOLUTION SUBCUTANEOUS at 22:09

## 2025-01-23 RX ADMIN — Medication 2.5 MG: at 02:42

## 2025-01-23 RX ADMIN — QUETIAPINE FUMARATE 25 MG: 25 TABLET ORAL at 22:09

## 2025-01-23 RX ADMIN — ASPIRIN 81 MG: 81 TABLET, COATED ORAL at 09:14

## 2025-01-23 RX ADMIN — INSULIN LISPRO 4 UNITS: 100 INJECTION, SOLUTION INTRAVENOUS; SUBCUTANEOUS at 11:51

## 2025-01-23 RX ADMIN — INSULIN LISPRO 4 UNITS: 100 INJECTION, SOLUTION INTRAVENOUS; SUBCUTANEOUS at 17:24

## 2025-01-23 RX ADMIN — LIDOCAINE 5% 1 PATCH: 700 PATCH TOPICAL at 09:14

## 2025-01-23 RX ADMIN — WARFARIN SODIUM 5 MG: 5 TABLET ORAL at 17:22

## 2025-01-23 RX ADMIN — MUSCLE RUB CREAM 1 APPLICATION: 100; 150 CREAM TOPICAL at 02:19

## 2025-01-23 RX ADMIN — INSULIN LISPRO 2 UNITS: 100 INJECTION, SOLUTION INTRAVENOUS; SUBCUTANEOUS at 09:15

## 2025-01-23 NOTE — ASSESSMENT & PLAN NOTE
Patient s/p surgical closure of right femoral artery due to hematoma related to intra-aortic balloon pump  Appreciate vascular surgery recommendations

## 2025-01-23 NOTE — ASSESSMENT & PLAN NOTE
Per Cards no Lifevest at this time; pt also has no prescription coverage at this time, will need to utilize lower cost alternative for HF GDMT  Patient appears euvolemic  Continue low dose beta-blocker, start lisinopril 2.5 qd,   If pt is taking Jardiance 25mg outpt, can continue will need price check  Appreciate cardiology recommendations

## 2025-01-23 NOTE — PLAN OF CARE
Problem: OCCUPATIONAL THERAPY ADULT  Goal: Performs self-care activities at highest level of function for planned discharge setting.  See evaluation for individualized goals.  Description: Treatment Interventions: ADL retraining, Functional transfer training, UE strengthening/ROM, Endurance training, Patient/family training, Equipment evaluation/education          See flowsheet documentation for full assessment, interventions and recommendations.   Outcome: Progressing  Note: Limitation: Decreased ADL status, Decreased endurance, Decreased high-level ADLs     Assessment: Pt is a 69 y.o. male seen for OT evaluation s/p admit to [facility] on 1/17/2025 w/ Acute ST elevation myocardial infarction (STEMI) involving left main coronary artery (HCC).  Comorbidities affecting pt's functional performance at time of assessment include: DM, HTN, obesity, previous surgery, neuropathy, and chronic pain . Personal factors affecting pt at time of IE include:steps to enter environment, difficulty performing ADLS, difficulty performing IADLS , decreased initiation and engagement , and health management . Prior to admission, pt was independent with ADLs and functional mobility with use of SPC prn. Upon evaluation: Pt requires Moderate Assistance to Maximal Assistance x2 for mobility, and Moderate Assistance to Maximal Assistance for some basic ADLs 2* the following deficits impacting occupational performance: weakness, decreased strength, decreased balance, and decreased tolerance. Pt to benefit from continued skilled OT tx while in the hospital to address deficits as defined above and maximize level of functional independence w ADL's and functional mobility. Occupational Performance areas to address include: grooming, bathing/shower, toilet hygiene, dressing, and functional mobility. From OT standpoint, recommendation at time of d/c would be Level 1 Maximum Resource Intensity.     Rehab Resource Intensity Level, OT: I (Maximum Resource  Intensity)        Cullen Ramsay, OTR/L

## 2025-01-23 NOTE — PHYSICAL THERAPY NOTE
"Physical Therapy Treatment Note       01/23/25 1002   PT Last Visit   PT Visit Date 01/23/25   Note Type   Note Type Treatment for insurance authorization   Pain Assessment   Pain Assessment Tool 0-10   Pain Score No Pain   Restrictions/Precautions   Weight Bearing Precautions Per Order No   Other Precautions Bed Alarm;Chair Alarm;Multiple lines;Fall Risk;Pain   General   Chart Reviewed Yes   Response to Previous Treatment Patient with no complaints from previous session.   Family/Caregiver Present No   Cognition   Arousal/Participation Alert;Cooperative   Attention Attends with cues to redirect   Orientation Level Oriented to person;Oriented to place;Oriented to time;Oriented to situation   Memory Decreased short term memory;Decreased recall of recent events;Decreased recall of precautions   Following Commands Follows multistep commands with increased time or repetition   Comments pt agreeable to PT session   Subjective   Subjective \"I can get up\"   Bed Mobility   Supine to Sit 4  Minimal assistance   Additional items Assist x 1;HOB elevated;Increased time required;Verbal cues   Transfers   Sit to Stand 3  Moderate assistance   Additional items Assist x 2;Armrests;Increased time required;Verbal cues   Stand to Sit 3  Moderate assistance   Additional items Assist x 2;Armrests;Increased time required;Verbal cues   Sit pivot 2  Maximal assistance   Additional items Assist x 2;Armrests;Increased time required;Verbal cues  (external assistance needed to block R knee)   Additional Comments pt did not achieve full upright posture, recommend nursing use of landon stedy for return BTB for enhanced safety   Balance   Static Sitting Fair -   Dynamic Sitting Poor +   Static Standing Poor   Dynamic Standing Poor -   Endurance Deficit   Endurance Deficit Yes   Activity Tolerance   Activity Tolerance Patient limited by fatigue   Nurse Made Aware GORDON Mathews, Dr. Rut Rodriguez   Assessment   Prognosis Good   Problem List Decreased " strength;Decreased endurance;Impaired balance;Decreased mobility;Decreased cognition;Pain   Assessment Pt seen for PT treatment session this date, consisting of ther act focused on bed mobility, sit pivot transfer, sitting balance/posture facilitation . Since previous session, pt has made good progress in terms of decreased assist for bed mobility; max assist x2 for sit pivot transfer with assist to block R knee; mod assist x2 for sit to stand. Pt declining LB exercise, reports he has been performing on his own. Current goals and POC established on IE remain appropriate, pt continues to have rehab potential and is making good progress towards STGs. Pt prognosis for achieving goals is good, pending pt progress with hospitalization/medical status improvements, and indicated by Stimulability and ability to follow directions. Pt continues to be functioning below baseline level, and remains limited 2* factors listed above. PT will continue to see pt during current hospitalization in order to address the deficits listed above and provide interventions consistent w/ POC in effort to achieve STGs. PT recommends level 1, maximum resource intensity upon discharge.   Barriers to Discharge Inaccessible home environment;Decreased caregiver support   Goals   Patient Goals to go home   STG Expiration Date 01/31/25   PT Treatment Day 1   Plan   Treatment/Interventions Functional transfer training;LE strengthening/ROM;Therapeutic exercise;Endurance training;Patient/family training;Equipment eval/education;Bed mobility;Continued evaluation;Spoke to nursing;OT;Spoke to MD   Progress Slow progress, decreased activity tolerance   PT Frequency 3-5x/wk   Discharge Recommendation   Rehab Resource Intensity Level, PT I (Maximum Resource Intensity)   AM-PAC Basic Mobility Inpatient   Turning in Flat Bed Without Bedrails 3   Lying on Back to Sitting on Edge of Flat Bed Without Bedrails 3   Moving Bed to Chair 1   Standing Up From Chair Using  Arms 1   Walk in Room 1   Climb 3-5 Stairs With Railing 1   Basic Mobility Inpatient Raw Score 10   Turning Head Towards Sound 4   Follow Simple Instructions 3   Low Function Basic Mobility Raw Score  17   Low Function Basic Mobility Standardized Score  27.46   [facility] Highest Level Of Mobility   -[facility] Goal 4: Move to chair/commode   -HL Achieved 4: Move to chair/commode   Education   Education Provided Mobility training   Patient Reinforcement needed   End of Consult   Patient Position at End of Consult Bedside chair;Bed/Chair alarm activated;All needs within reach         Allison Wyatt, PT, DPT

## 2025-01-23 NOTE — ASSESSMENT & PLAN NOTE
Lab Results   Component Value Date    HGBA1C 8.2 (H) 01/18/2025       Recent Labs     01/22/25  1618 01/22/25  2124 01/23/25  0758 01/23/25  1044   POCGLU 184* 270* 178* 234*       Blood Sugar Average: Last 72 hrs:  (P) 204.6  Holding home antihyperglycemics  Lantus 20u qhs  Start mealtime lispro 4 u tid  SSI algo 4

## 2025-01-23 NOTE — PLAN OF CARE
Problem: CARDIOVASCULAR - ADULT  Goal: Maintains optimal cardiac output and hemodynamic stability  Description: INTERVENTIONS:  - Monitor I/O, vital signs and rhythm  - Monitor for S/S and trends of decreased cardiac output  - Administer and titrate ordered vasoactive medications to optimize hemodynamic stability  - Assess quality of pulses, skin color and temperature  - Assess for signs of decreased coronary artery perfusion  - Instruct patient to report change in severity of symptoms  Outcome: Progressing     Problem: RESPIRATORY - ADULT  Goal: Achieves optimal ventilation and oxygenation  Description: INTERVENTIONS:  - Assess for changes in respiratory status  - Assess for changes in mentation and behavior  - Position to facilitate oxygenation and minimize respiratory effort  - Oxygen administered by appropriate delivery if ordered  - Initiate smoking cessation education as indicated  - Encourage broncho-pulmonary hygiene including cough, deep breathe, Incentive Spirometry  - Assess the need for suctioning and aspirate as needed  - Assess and instruct to report SOB or any respiratory difficulty  - Respiratory Therapy support as indicated  Outcome: Progressing     Problem: METABOLIC, FLUID AND ELECTROLYTES - ADULT  Goal: Electrolytes maintained within normal limits  Description: INTERVENTIONS:  - Monitor labs and assess patient for signs and symptoms of electrolyte imbalances  - Administer electrolyte replacement as ordered  - Monitor response to electrolyte replacements, including repeat lab results as appropriate  - Instruct patient on fluid and nutrition as appropriate  Outcome: Progressing     Problem: Prexisting or High Potential for Compromised Skin Integrity  Goal: Skin integrity is maintained or improved  Description: INTERVENTIONS:  - Identify patients at risk for skin breakdown  - Assess and monitor skin integrity  - Assess and monitor nutrition and hydration status  - Monitor labs   - Assess for  incontinence   - Turn and reposition patient  - Assist with mobility/ambulation  - Relieve pressure over bony prominences  - Avoid friction and shearing  - Provide appropriate hygiene as needed including keeping skin clean and dry  - Evaluate need for skin moisturizer/barrier cream  - Collaborate with interdisciplinary team   - Patient/family teaching  - Consider wound care consult   Outcome: Progressing     Problem: PAIN - ADULT  Goal: Verbalizes/displays adequate comfort level or baseline comfort level  Description: Interventions:  - Encourage patient to monitor pain and request assistance  - Assess pain using appropriate pain scale  - Administer analgesics based on type and severity of pain and evaluate response  - Implement non-pharmacological measures as appropriate and evaluate response  - Consider cultural and social influences on pain and pain management  - Notify physician/advanced practitioner if interventions unsuccessful or patient reports new pain  Outcome: Progressing

## 2025-01-23 NOTE — TELEPHONE ENCOUNTER
Mckinley at [facility] in [place] called because Dr Medina called and asked the price of certain medications. The patient has no coverage for drugs so the pharmacist said would be hundreds of dollars

## 2025-01-23 NOTE — CASE MANAGEMENT
Case Management Progress Note    Patient name Joseph Aguilar  Location /-01 MRN [id number]  : 1955 Date 2025       LOS (days): 6  Geometric Mean LOS (GMLOS) (days): 3.8  Days to GMLOS:-2        OBJECTIVE:        Current admission status: Inpatient  Preferred Pharmacy:   [id number] - [facility] KISHOR Durno - [address]  [address] [place] [address]  Phone: [phone] Fax: [phone]    [facility] [place] - [address]  [address]  [place] [address]  Phone: [phone] Fax: [phone]     [facility] [address]  [address] [place] [address] [place] [address]  Phone: [phone] Fax: [phone]    [facility] - KISHOR BARRETT - [address]  [address]  [place] [address]  Phone: [phone] Fax: [phone]    Primary Care Provider: No primary care provider on file.    Primary Insurance: MEDICARE  Secondary Insurance: COMMERCIAL MISCELLANEOUS    PROGRESS NOTE:  CM met with patient and spouse at bedside to update on [facility] and speaking with medical team. They will followup with cardiology after DC from rehab. Additionally, waiting for update on Lifevest from cardiology team. Will reach out to [facility] representative.  CM discussed [facility] options, and they stated that someone else had already been into room to discuss and they wanted [facility]. CM will discuss with Christine from [facility].

## 2025-01-23 NOTE — PROGRESS NOTES
Progress Note - Hospitalist   Name: Joseph Aguilar 69 y.o. male I MRN: [id number]  Unit/Bed#: -01 I Date of Admission: 1/17/2025   Date of Service: 1/23/2025 I Hospital Day: 6     Assessment & Plan  Anterior STEMI s/p PCI with CHYNA to LM-ostial LAD and mLAD with IABP;  of LCx (1/17/2024)  Status post drug-eluting stent to the LAD and left main, unable to intervene on the circumflex artery.  Significant hypotension in the Cath Lab requiring pressors and intra-aortic balloon pump support  IABP discontinued on 1/20  Continue aspirin 81, plavix 75mg qd, statin  Continue Coreg 3.125 bid  Appreciate cardiology recommendations  History of hypertension  Continue low dose beta-blocker   Mixed hyperlipidemia  Continue home statin   Type 2 diabetes mellitus without complication, without long-term current use of insulin (HCC)  Lab Results   Component Value Date    HGBA1C 8.2 (H) 01/18/2025       Recent Labs     01/22/25  1618 01/22/25  2124 01/23/25  0758 01/23/25  1044   POCGLU 184* 270* 178* 234*       Blood Sugar Average: Last 72 hrs:  (P) 204.6  Holding home antihyperglycemics  Lantus 20u qhs  Start mealtime lispro 4 u tid  SSI algo 4  ICM with EF 25%  Per Cards no Lifevest at this time; pt also has no prescription coverage at this time, will need to utilize lower cost alternative for HF GDMT  Patient appears euvolemic  Continue low dose beta-blocker, start lisinopril 2.5 qd,   If pt is taking Jardiance 25mg outpt, can continue will need price check  Appreciate cardiology recommendations  LV (left ventricular) mural thrombus  continue heparin infusion per cards will need warfarin given apical thrombus  Hematoma of right lower extremity  Patient s/p surgical closure of right femoral artery due to hematoma related to intra-aortic balloon pump  Appreciate vascular surgery recommendations  Acute kidney injury (HCC) (Resolved: 1/23/2025)  Question related to shock state- resolving  Closely follow intake and output  Daily  weights  Trend serum creatinine    24 Hour Events : remained on overnight heparin s/p warfarin 10mg x1. Bp stable overnight.   Subjective : Feeling well. Some back pain. Some frustration with nursing and providers this am / multiple providers    Objective :  Temp:  [97.5 °F (36.4 °C)-98.4 °F (36.9 °C)] 97.7 °F (36.5 °C)  HR:  [92-93] 93  BP: ()/(57-72) 102/72  Resp:  [15-20] 20  SpO2:  [93 %-94 %] 93 %  O2 Device: None (Room air)    Physical Exam  Vitals and nursing note reviewed. Exam conducted with a chaperone present (nurse in room).   Constitutional:       General: He is not in acute distress.     Appearance: He is well-developed.   HENT:      Head: Normocephalic and atraumatic.   Eyes:      Conjunctiva/sclera: Conjunctivae normal.   Cardiovascular:      Rate and Rhythm: Normal rate and regular rhythm.      Heart sounds: No murmur heard.  Pulmonary:      Effort: Pulmonary effort is normal. No respiratory distress.      Breath sounds: Normal breath sounds.   Abdominal:      Palpations: Abdomen is soft.      Tenderness: There is no abdominal tenderness.   Musculoskeletal:         General: No swelling.      Cervical back: Neck supple.   Skin:     General: Skin is warm and dry.      Capillary Refill: Capillary refill takes less than 2 seconds.   Neurological:      Mental Status: He is alert and oriented to person, place, and time. Mental status is at baseline.   Psychiatric:         Mood and Affect: Mood normal.         Lab Results: I have reviewed the following results:          VTE Pharmacologic Prophylaxis: VTE covered by:  heparin (porcine), Intravenous, 15.1 Units/kg/hr at 01/23/25 1049     VTE Mechanical Prophylaxis: sequential compression device

## 2025-01-23 NOTE — ARC ADMISSION
[facility]  met with patient and spouse  in person at bedside: introduced self, explained role, reviewed ARC program, services offered, acute rehab criteria, review of referral by ARC Medical Director, insurance authorization process, three ARC locations and anticipated rehab length of stay. ARC Rehab folder left with patient; all questions answered. Patient's only choice is  [facility]. Patient  made aware ARC Reviewer will communicate with their Care Manager who will keep patient updated on referral status.

## 2025-01-23 NOTE — QUICK NOTE
Called patients wife, unable to reach them, left a voice message explaining ongoing treatment plan and any patient updates.      Shanelle Medina DO  Internal Medicine Residency PGY-2  [facility]  Available on Outsell

## 2025-01-23 NOTE — ASSESSMENT & PLAN NOTE
Lab Results   Component Value Date    HGBA1C 8.2 (H) 01/18/2025     Recent Labs     01/22/25  1618 01/22/25  2124 01/23/25  0758 01/23/25  1044   POCGLU 184* 270* 178* 234*   Blood Sugar Average: Last 72 hrs:  (P) 204.6  Management per primary service.

## 2025-01-23 NOTE — CASE MANAGEMENT
Case Management Progress Note    Patient name Joseph Aguilar  Location /-01 MRN [id number]  : 1955 Date 2025       LOS (days): 6  Geometric Mean LOS (GMLOS) (days): 3.8  Days to GMLOS:-1.8        OBJECTIVE:        Current admission status: Inpatient  Preferred Pharmacy:   [id number] - [facility] KISHOR Duron - [address]  [address] [place] [address]  Phone: [phone] Fax: [phone]    [facility] [place] - [address]  [address]  [place] [address]  Phone: [phone] Fax: [phone]     [facility] - [address]  [address] [place] [address] [place] [address]  Phone: [phone] Fax: [phone]    [facility] - KISHOR BARRETT - [address]  [address]  [place] [address]  Phone: [phone] Fax: [phone]    Primary Care Provider: No primary care provider on file.    Primary Insurance: MEDICARE  Secondary Insurance: COMMERCIAL MISCELLANEOUS    PROGRESS NOTE:  Discussed patient's case in interdisciplinary rounds this morning with [facility] provider. Patient is not medically cleared for discharge- hematoma around IABP catheter site repaired by vascular surgery; Lifevest ordered by Cardiology service. Needs GDMT for HF post-STEMI. Pending price checks of eliquis, entresto, and jardiance. Anticipating discharge in 48h to rehab. Need to review options with patient and family. CM will continue to follow.

## 2025-01-23 NOTE — OCCUPATIONAL THERAPY NOTE
Occupational Therapy Evaluation     Patient Name: Joseph Aguilar  Today's Date: 1/23/2025  Problem List  Principal Problem:    Anterior STEMI s/p PCI with CHYNA to LM-ostial LAD and mLAD with IABP;  of LCx (1/17/2024)  Active Problems:    History of hypertension    Mixed hyperlipidemia    Type 2 diabetes mellitus without complication, without long-term current use of insulin (HCC)    ICM with EF 25%    LV (left ventricular) mural thrombus    Hematoma of right lower extremity    Acute kidney injury (HCC)    Past Medical History  Past Medical History:   Diagnosis Date    Depression     Diabetes mellitus (HCC)     Hyperlipidemia     Hypertension     Osteoarthritis, knee      Past Surgical History  Past Surgical History:   Procedure Laterality Date    BACK SURGERY      CARDIAC CATHETERIZATION N/A 1/17/2025    Procedure: Cardiac PCI;  Surgeon: Neeraj Gaines MD;  Location: [facility];  Service: Cardiology    CARDIAC CATHETERIZATION Left 1/17/2025    Procedure: Cardiac Left Heart Cath;  Surgeon: Neeraj Gaines MD;  [facility]: [facility];  Service: Cardiology    CARDIAC CATHETERIZATION N/A 1/17/2025    Procedure: Cardiac iabp;  Surgeon: Neeraj Gaines MD;  [facility]: [facility];  Service: Cardiology    HAND SURGERY      JOINT REPLACEMENT Left     l tkr    KNEE SURGERY Left     VT ARTHRP KNE CONDYLE&PLATU MEDIAL&LAT COMPARTMENTS Right 11/19/2018    Procedure: ARTHROPLASTY KNEE TOTAL;  Surgeon: Franny Grimaldo MD;  Location:  MAIN OR;  Service: Orthopedics    TOOTH EXTRACTION      WISDOM TOOTH EXTRACTION           01/23/25 0940   OT Last Visit   OT Visit Date 01/23/25   Note Type   Note type Evaluation   Pain Assessment   Pain Assessment Tool 0-10   Pain Score No Pain   Restrictions/Precautions   Weight Bearing Precautions Per Order No   Other Precautions Chair Alarm;Bed Alarm;Multiple lines;Fall Risk;Pain   Home Living   Type of Home House   Home Layout Two level;Performs ADLs  "on one level;Able to live on main level with bedroom/bathroom  (3 [address], optional 1st floor set up)   Bathroom Shower/Tub Tub/shower unit   Bathroom Toilet Raised   Bathroom Equipment Grab bars in shower   Bathroom Accessibility Accessible   Home Equipment Cane;Crutches;Walker   Prior Function   Level of Isabella Independent with ADLs;Independent with functional mobility;Needs assistance with IADLS  (used SPC prn, pt does some housework prn)   Lives With Spouse   Receives Help From Family   IADLs Independent with medication management;Independent with driving;Independent with meal prep   Falls in the last 6 months   (pt denies, does admit to \"a couple\" close calls)   Vocational Retired  ()   Subjective   Subjective Pt agreeable to therapy evaluation   ADL   Where Assessed Other (Comment)  (Assist levels for some self care tasks are based on functional assessment of performance skills and deficits observed during session.)   Eating Assistance 5  Supervision/Setup   Eating Deficit Setup   Grooming Assistance 5  Supervision/Setup   Grooming Deficit Setup;Supervision/safety;Increased time to complete  (supine or seated, pt unable to perform during standing)   UB Bathing Assistance 5  Supervision/Setup   UB Bathing Deficit Setup;Supervision/safety;Increased time to complete  (seated)   LB Bathing Assistance 3  Moderate Assistance   LB Bathing Deficit Setup;Supervision/safety;Increased time to complete   UB Dressing Assistance 5  Supervision/Setup   UB Dressing Deficit Setup;Supervision/safety;Increased time to complete  (seated)   LB Dressing Assistance 2  Maximal Assistance   LB Dressing Deficit Setup;Requires assistive device for steadying;Supervision/safety;Increased time to complete;Don/doff R sock;Don/doff L sock;Thread RLE into pants;Thread LLE into pants;Pull up over hips   Toileting Assistance  3  Moderate Assistance   Toileting Deficit Setup;Supervison/safety;Increased time to complete;Perineal hygiene "   Bed Mobility   Supine to Sit 4  Minimal assistance   Additional items Assist x 1;HOB elevated;Bedrails;Increased time required;Verbal cues   Transfers   Sit to Stand 3  Moderate assistance   Additional items Assist x 2;Armrests;Increased time required   Stand to Sit 3  Moderate assistance   Additional items Assist x 2;Armrests;Increased time required;Verbal cues   Stand pivot 2  Maximal assistance   Sit pivot 2  Maximal assistance   Additional items Assist x 2;Armrests;Increased time required;Verbal cues  (R knee block)   Additional Comments pt declined to use RW this date. Pt unable to achieve full stand without AD, and performed a sit pivot stand with R knee blocked during sit pivot from bed to chair   Activity Tolerance   Activity Tolerance Patient limited by fatigue   Medical Staff Made Aware Allison PT  (Pt seen for co-evaluation with Physical Therapist due to pt's medical complexity, functional limitations and limited activity tolerance.)   Nurse Made Aware Reid RN   RUE Assessment   RUE Assessment WFL   LUE Assessment   LUE Assessment WFL   Hand Function   Gross Motor Coordination Functional   Fine Motor Coordination Functional   Sensation   Light Touch Partial deficits in the LUE  (baseline numbness in hand)   Vision-Basic Assessment   Current Vision Wears glasses all the time  (needs a new prescription)   Psychosocial   Psychosocial (WDL) WDL   Cognition   Overall Cognitive Status WFL   Arousal/Participation Alert;Responsive   Attention Attends with cues to redirect   Orientation Level Oriented X4   Memory Decreased recall of recent events   Following Commands Follows multistep commands without difficulty   Assessment   Limitation Decreased ADL status;Decreased endurance;Decreased high-level ADLs   Assessment Pt is a 69 y.o. male seen for OT evaluation s/p admit to [facility] on 1/17/2025 w/ Acute ST elevation myocardial infarction (STEMI) involving left main coronary artery (HCC).  Comorbidities affecting pt's  functional performance at time of assessment include: DM, HTN, obesity, previous surgery, neuropathy, and chronic pain . Personal factors affecting pt at time of IE include:steps to enter environment, difficulty performing ADLS, difficulty performing IADLS , decreased initiation and engagement , and health management . Prior to admission, pt was independent with ADLs and functional mobility with use of SPC prn. Upon evaluation: Pt requires Moderate Assistance to Maximal Assistance x2 for mobility, and Moderate Assistance to Maximal Assistance for some basic ADLs 2* the following deficits impacting occupational performance: weakness, decreased strength, decreased balance, and decreased tolerance. Pt to benefit from continued skilled OT tx while in the hospital to address deficits as defined above and maximize level of functional independence w ADL's and functional mobility. Occupational Performance areas to address include: grooming, bathing/shower, toilet hygiene, dressing, and functional mobility. From OT standpoint, recommendation at time of d/c would be Level 1 Maximum Resource Intensity.   Goals   Patient Goals to go home   Plan   Treatment Interventions ADL retraining;Functional transfer training;UE strengthening/ROM;Endurance training;Patient/family training;Equipment evaluation/education   Goal Expiration Date 02/06/25   OT Treatment Day 0   OT Frequency 3-5x/wk   Discharge Recommendation   Rehab Resource Intensity Level, OT I (Maximum Resource Intensity)   AM-PAC Daily Activity Inpatient   Lower Body Dressing 2   Bathing 2   Toileting 2   Upper Body Dressing 3   Grooming 3   Eating 4   Daily Activity Raw Score 16   Daily Activity Standardized Score (Calc for Raw Score >=11) 35.96   AM-PAC Applied Cognition Inpatient   Following a Speech/Presentation 4   Understanding Ordinary Conversation 4   Taking Medications 3   Remembering Where Things Are Placed or Put Away 4   Remembering List of 4-5 Errands 3   Taking  Care of Complicated Tasks 3   Applied Cognition Raw Score 21   Applied Cognition Standardized Score 44.3       Goals: to be met by 02/26/025    Patient will perform functional bed mobility with Standby Assistance, with HOB flat, no rails  Patient will perform functional transfers with Minimal Assistance using LRAD in preparation for ADL tasks, with good safety awareness  Patient will perform UB dressing task with modified independence while seated, with set up  Patient will perform LB dressing task with Minimal Assistance  Patient will perform toilet transfer with Minimal Assistance to BSC  Patient will perform toileting with Minimal Assistance, including hygiene and clothing management   Patient will perform BUE HEP with independence in preparation to increase ability to participate in ADL tasks  Patient will improve activity tolerance by participating in 25 minutes of session at a time in preparation for participation in ADL tasks  Patient will identify 3 potential fall hazards and identify compensatory techniques to decrease fall risk in the home environment         IDRIS Vega/L

## 2025-01-23 NOTE — ASSESSMENT & PLAN NOTE
Troponin with peak >22,973.    TTE shows EF 25%, severe global hypokinesis with regional variation and is near akinesis of apical and distal septal segments.    Currently on intravenous heparin along with the Coumadin therapy because of LV thrombus continue with the guideline mediated therapy.  -Have started him today on low-dose lisinopril 2.5 mg.

## 2025-01-23 NOTE — PLAN OF CARE
Problem: PHYSICAL THERAPY ADULT  Goal: Performs mobility at highest level of function for planned discharge setting.  See evaluation for individualized goals.  Description: Treatment/Interventions: Functional transfer training, LE strengthening/ROM, Therapeutic exercise, Endurance training, Patient/family training, Equipment eval/education, Bed mobility, Continued evaluation, Spoke to nursing, OT          See flowsheet documentation for full assessment, interventions and recommendations.  Outcome: Progressing  Note: Prognosis: Good  Problem List: Decreased strength, Decreased endurance, Impaired balance, Decreased mobility, Decreased cognition, Pain  Assessment: Pt seen for PT treatment session this date, consisting of ther act focused on bed mobility, sit pivot transfer, sitting balance/posture facilitation . Since previous session, pt has made good progress in terms of decreased assist for bed mobility; max assist x2 for sit pivot transfer with assist to block R knee; mod assist x2 for sit to stand. Pt declining LB exercise, reports he has been performing on his own. Current goals and POC established on IE remain appropriate, pt continues to have rehab potential and is making good progress towards STGs. Pt prognosis for achieving goals is good, pending pt progress with hospitalization/medical status improvements, and indicated by Stimulability and ability to follow directions. Pt continues to be functioning below baseline level, and remains limited 2* factors listed above. PT will continue to see pt during current hospitalization in order to address the deficits listed above and provide interventions consistent w/ POC in effort to achieve STGs. PT recommends level 1, maximum resource intensity upon discharge.  Barriers to Discharge: Inaccessible home environment, Decreased caregiver support     Rehab Resource Intensity Level, PT: I (Maximum Resource Intensity)    See flowsheet documentation for full assessment.

## 2025-01-23 NOTE — ASSESSMENT & PLAN NOTE
Status post drug-eluting stent to the LAD and left main, unable to intervene on the circumflex artery.  Significant hypotension in the Cath Lab requiring pressors and intra-aortic balloon pump support  IABP discontinued on 1/20  Continue aspirin 81, plavix 75mg qd, statin  Continue Coreg 3.125 bid  Appreciate cardiology recommendations

## 2025-01-23 NOTE — PROGRESS NOTES
Cardiology Progress Note - Joseph Aguilar 69 y.o. male MRN: [id number]    Unit/Bed#: -01 Encounter: [id number]      Assessment/Plan: Joseph Aguilar admitted with the acute anterior myocardial infarction, stent response angioplasty and stenting of left main/LAD.  From cardiac standpoint to be he is doing well.  Clinically remains euvolemic.  With him today on low-dose lisinopril 2.5 mg.  Coreg will continue.    If clinically stable discharge planning in the next 48 hours.    Assessment & Plan  Anterior STEMI s/p PCI with CHYNA to LM-ostial LAD and mLAD with IABP;  of LCx (1/17/2024)  Troponin with peak >22,973.    TTE shows EF 25%, severe global hypokinesis with regional variation and is near akinesis of apical and distal septal segments.    Currently on intravenous heparin along with the Coumadin therapy because of LV thrombus continue with the guideline mediated therapy.  -Have started him today on low-dose lisinopril 2.5 mg.  ICM with EF 25%  Plan for optimization of GDMT as tolerated.  Started on Coreg 3.125  Price check for entresto and jardiance pending  Lifevest ordered  LV (left ventricular) mural thrombus  Suspected on TTE, continue heparin gtt.    Plan for transition to oral AC prior to discharge; eliquis price check pending  History of hypertension  Stable but on the lower side 101/62  Continue Coreg 3.125 bid  Mixed hyperlipidemia  Lipitor restarted as per above.  Type 2 diabetes mellitus without complication, without long-term current use of insulin ([facility])  Lab Results   Component Value Date    HGBA1C 8.2 (H) 01/18/2025     Recent Labs     01/22/25  1618 01/22/25  2124 01/23/25  0758 01/23/25  1044   POCGLU 184* 270* 178* 234*   Blood Sugar Average: Last 72 hrs:  (P) 204.6  Management per primary service.  Hematoma of right lower extremity  S/p pelvic angiogram  S/p perclose proglide x2 on 1/20/25 per Vascular      Subjective:   Patient seen and examined.  From the cardiac standpoint to be is  "doing well.  Sitting comfortably.  No evidence of having any chest pain shortness of breath or orthopnea.  So far he has been able to tolerate the medications without having any side effects.  All the previous evaluations including the catheterization films have been reviewed.    Objective:     Vitals: Blood pressure 107/72, pulse 93, temperature 97.7 °F (36.5 °C), resp. rate 20, height 6' 2\" (1.88 m), weight 106 kg (233 lb 11 oz), SpO2 93%., Body mass index is 30 kg/m².,   Orthostatic Blood Pressures      Flowsheet Row Most Recent Value   Blood Pressure 107/72 filed at 01/23/2025 0801   Patient Position - Orthostatic VS Lying filed at 01/22/2025 2100              Intake/Output Summary (Last 24 hours) at 1/23/2025 1151  Last data filed at 1/23/2025 0846  Gross per 24 hour   Intake 872.1 ml   Output 1800 ml   Net -927.9 ml         Physical Exam  Vitals and nursing note reviewed.   Constitutional:       Appearance: Normal appearance.   HENT:      Head: Normocephalic.   Eyes:      Pupils: Pupils are equal, round, and reactive to light.   Cardiovascular:      Rate and Rhythm: Normal rate and regular rhythm.      Pulses: Normal pulses.      Heart sounds: Normal heart sounds. No murmur heard.     No gallop.   Pulmonary:      Effort: Pulmonary effort is normal. No respiratory distress.      Breath sounds: Normal breath sounds. No wheezing or rales.   Abdominal:      General: Abdomen is flat.      Palpations: Abdomen is soft.   Musculoskeletal:         General: Normal range of motion.      Cervical back: Normal range of motion and neck supple.   Skin:     General: Skin is warm and dry.   Neurological:      Mental Status: He is alert.   Psychiatric:         Mood and Affect: Mood normal.               Telemetry:    Telemetry Reviewed: Normal Sinus Rhythm  Indication for Continued Telemetry Use: Acute MI/Unstable Angina/Rule out ACS    Medications:      Current Facility-Administered Medications:     aluminum-magnesium " hydroxide-simethicone (MAALOX) oral suspension 30 mL, 30 mL, Oral, Q4H PRN, MARGARITA Galeano, 30 mL at 01/18/25 1114    aspirin (ECOTRIN LOW STRENGTH) EC tablet 81 mg, 81 mg, Oral, Daily, MARGARITA Galeano, 81 mg at 01/23/25 0914    atorvastatin (LIPITOR) tablet 80 mg, 80 mg, Oral, Daily With Dinner, MARGARITA Galeano, 80 mg at 01/22/25 1720    bisacodyl (DULCOLAX) rectal suppository 10 mg, 10 mg, Rectal, Daily PRN, MARGARITA Galeano    carvedilol (COREG) tablet 3.125 mg, 3.125 mg, Oral, BID With Meals, MARGARITA Neville, 3.125 mg at 01/22/25 1720    chlorhexidine (PERIDEX) 0.12 % oral rinse 15 mL, 15 mL, Mouth/Throat, Q12H YNES, MARGARITA Galeano, 15 mL at 01/23/25 0914    clopidogrel (PLAVIX) tablet 600 mg, 600 mg, Oral, Once, MARGARITA Neville    [START ON 1/24/2025] clopidogrel (PLAVIX) tablet 75 mg, 75 mg, Oral, Daily, MARGARITA Neville    fentaNYL injection, , Intravenous, PRN, Demi Brooke MD, 50 mcg at 01/20/25 1605    heparin (porcine) 25,000 units in 0.45% NaCl 250 mL infusion (premix), 3-30 Units/kg/hr (Order-Specific), Intravenous, Titrated, MARGARITA Galeano, Last Rate: 15.9 mL/hr at 01/23/25 1049, 15.1 Units/kg/hr at 01/23/25 1049    insulin glargine (LANTUS) subcutaneous injection 20 Units 0.2 mL, 20 Units, Subcutaneous, HS, MARGARITA Galeano, 20 Units at 01/22/25 2228    insulin lispro (HumALOG/ADMELOG) 100 units/mL subcutaneous injection 2-12 Units, 2-12 Units, Subcutaneous, 4x Daily (AC & HS), 2 Units at 01/23/25 0915 **AND** Fingerstick Glucose (POCT), , , 4x Daily AC and at bedtime, MARAGRITA Galeano    lidocaine (LIDODERM) 5 % patch 1 patch, 1 patch, Topical, Daily, MARGARITA Galeano, 1 patch at 01/23/25 0914    lisinopril (ZESTRIL) tablet 2.5 mg, 2.5 mg, Oral, Daily, MARGARITA Neville    melatonin tablet 6 mg, 6 mg, Oral, HS, MARGARITA Galeano, 6 mg at 01/22/25  2228    menthol-methyl salicylate (BENGAY) 10-15 % cream, , Apply externally, 4x Daily PRN, MARGARITA Galeano, 1 Application at 01/23/25 0219    ondansetron (ZOFRAN) injection 4 mg, 4 mg, Intravenous, Q6H PRN, MARGARITA Galeano    oxyCODONE (ROXICODONE) split tablet 2.5 mg, 2.5 mg, Oral, Q4H PRN, 2.5 mg at 01/23/25 0242 **OR** oxyCODONE (ROXICODONE) IR tablet 5 mg, 5 mg, Oral, Q4H PRN, MARGARITA Galeano, 5 mg at 01/18/25 1859    QUEtiapine (SEROquel) tablet 25 mg, 25 mg, Oral, HS, MARGARITA Galeano, 25 mg at 01/22/25 2228     Labs & Results:    Results from last 7 days   Lab Units 01/17/25  2246 01/17/25 2056 01/17/25  1800   HS TNI 0HR ng/L  --   --  113*   HS TNI 2HR ng/L  --  >22,973*  --    HS TNI 4HR ng/L >22,973*  --   --    HSTNI D4 ng/L >22,860*  --   --      Results from last 7 days   Lab Units 01/23/25  0528 01/22/25  0544 01/21/25  0623   WBC Thousand/uL 11.09* 13.02* 15.55*   HEMOGLOBIN g/dL 10.5* 10.9* 13.0   HEMATOCRIT % 32.1* 32.0* 38.1   PLATELETS Thousands/uL 171 166 161     Results from last 7 days   Lab Units 01/18/25  0443   TRIGLYCERIDES mg/dL 104   HDL mg/dL 33*     Results from last 7 days   Lab Units 01/23/25  0528 01/22/25  0544 01/21/25  0623 01/20/25  2149 01/20/25  1706 01/20/25  0532   POTASSIUM mmol/L 4.0 4.2 3.9   < > 4.0 3.8   CHLORIDE mmol/L 105 107 107   < > 106 106   CO2 mmol/L 22 22 23   < > 20* 22   BUN mg/dL 29* 36* 38*   < > 37* 23   CREATININE mg/dL 0.88 0.93 1.13   < > 1.35* 0.97   CALCIUM mg/dL 9.0 8.5 8.9   < > 8.7 8.9   ALK PHOS U/L  --   --  64  --  66 65   ALT U/L  --   --  54*  --  60* 64*   AST U/L  --   --  34  --  46* 56*    < > = values in this interval not displayed.     Results from last 7 days   Lab Units 01/23/25  0643 01/23/25  0528 01/22/25  0221 01/21/25 2027 01/21/25  0623 01/20/25  1706 01/18/25  0443 01/17/25 2056   INR   --  1.12  --   --   --  1.05  --  1.20*   PTT seconds 103*  --  72* 63*   < > 30   < >  >210*    < > = values in this interval not displayed.     Results from last 7 days   Lab Units 01/23/25  0528 01/22/25  0544 01/21/25  0623   MAGNESIUM mg/dL 1.7* 1.8* 2.0     Time spent 35 minutes in reviewing  earlier inpatient hospitalization notes, reviewing and interpreting labs, reviewing interpreting EKG and telemetry strips, management planning with the inpatient hospitalist team, discussion and educating patient, documentation.     ** Please Note: Fluency DirectDictation voice to text software may have been used in the creation of this document. **

## 2025-01-23 NOTE — ARC ADMISSION
Reviewed patient's case with ARC physician - patient is likely acute rehab appropriate pending medical stability, functional progress and bed availability. Additionally, patient will need to be fitted with LifeVest prior to ARC consideration. CM has been updated. Will continue to follow at this time.

## 2025-01-23 NOTE — CASE MANAGEMENT
Case Management Progress Note    Patient name Joseph Aguilar  Location /-01 MRN [id number]  : 1955 Date 2025       LOS (days): 6  Geometric Mean LOS (GMLOS) (days): 3.8  Days to GMLOS:-1.9        OBJECTIVE:        Current admission status: Inpatient  Preferred Pharmacy:   [id number] - [facility] [place] - [address]  [address] [address]  Phone: [phone] Fax: [phone]    [facility] [place] - [address]  [address]  [place] [address]  Phone: [phone] Fax: [phone]     [facility] [place] [facility] [address]  [address] [place] [address] [place] [address]  Phone: [phone] Fax: [phone]    [facility] - [place] - [address]  [address]  [place] [address]  Phone: [phone] Fax: [phone]    Primary Care Provider: No primary care provider on file.    Primary Insurance: MEDICARE  Secondary Insurance: COMMERCIAL MISCELLANEOUS    PROGRESS NOTE:  QUIQUE met with patient at the bedside to review STR and ARC options. Patient is unsure, and states will review with his wife who will be here this afternoon. Patient does not have Lifevest yet, states 'some isaac came into my room the other night, but I don't have anything. No one told me anything'. CM will followup with cardiology and/or [employer] representative.   CM reviewed medications that need to be price checked. He states [facility], but they have to be written by a [facility] doctor to his knowledge. States his PCP is Dr Beck- he sees her every 6 months and was last seen Tuesday. Also states he read somewhere that there was a program for free medications but they had to be written 2 weeks prior. CM will followup and determine what needs to be done to have this accomplished.     CM called [facility] to price check entresto, eliquis, and jardiance. Patient  does not have insurance on file with [facility] so the cash prices are jardiance $285.90, entresto $834.99, and eliquis $570.12.   CM to update SLIM.

## 2025-01-24 ENCOUNTER — TELEPHONE (OUTPATIENT)
Dept: CARDIOLOGY CLINIC | Facility: CLINIC | Age: 70
End: 2025-01-24

## 2025-01-24 PROBLEM — Z79.01 SUBTHERAPEUTIC ANTICOAGULATION: Status: ACTIVE | Noted: 2025-01-24

## 2025-01-24 PROBLEM — Z51.81 SUBTHERAPEUTIC ANTICOAGULATION: Status: ACTIVE | Noted: 2025-01-24

## 2025-01-24 LAB
ABO GROUP BLD BPU: NORMAL
ABO GROUP BLD BPU: NORMAL
ANION GAP SERPL CALCULATED.3IONS-SCNC: 8 MMOL/L (ref 4–13)
APTT PPP: 71 SECONDS (ref 23–34)
BASOPHILS # BLD AUTO: 0.03 THOUSANDS/ΜL (ref 0–0.1)
BASOPHILS NFR BLD AUTO: 0 % (ref 0–1)
BPU ID: NORMAL
BPU ID: NORMAL
BUN SERPL-MCNC: 24 MG/DL (ref 5–25)
CALCIUM SERPL-MCNC: 8.9 MG/DL (ref 8.4–10.2)
CHLORIDE SERPL-SCNC: 104 MMOL/L (ref 96–108)
CO2 SERPL-SCNC: 23 MMOL/L (ref 21–32)
CREAT SERPL-MCNC: 0.9 MG/DL (ref 0.6–1.3)
CROSSMATCH: NORMAL
CROSSMATCH: NORMAL
EOSINOPHIL # BLD AUTO: 0.32 THOUSAND/ΜL (ref 0–0.61)
EOSINOPHIL NFR BLD AUTO: 3 % (ref 0–6)
ERYTHROCYTE [DISTWIDTH] IN BLOOD BY AUTOMATED COUNT: 14.4 % (ref 11.6–15.1)
GFR SERPL CREATININE-BSD FRML MDRD: 88 ML/MIN/1.73SQ M
GLUCOSE SERPL-MCNC: 168 MG/DL (ref 65–140)
GLUCOSE SERPL-MCNC: 179 MG/DL (ref 65–140)
GLUCOSE SERPL-MCNC: 180 MG/DL (ref 65–140)
GLUCOSE SERPL-MCNC: 205 MG/DL (ref 65–140)
HCT VFR BLD AUTO: 32.5 % (ref 36.5–49.3)
HGB BLD-MCNC: 11 G/DL (ref 12–17)
IMM GRANULOCYTES # BLD AUTO: 0.04 THOUSAND/UL (ref 0–0.2)
IMM GRANULOCYTES NFR BLD AUTO: 0 % (ref 0–2)
INR PPP: 1.23 (ref 0.85–1.19)
LYMPHOCYTES # BLD AUTO: 1.61 THOUSANDS/ΜL (ref 0.6–4.47)
LYMPHOCYTES NFR BLD AUTO: 16 % (ref 14–44)
MAGNESIUM SERPL-MCNC: 1.5 MG/DL (ref 1.9–2.7)
MCH RBC QN AUTO: 31.8 PG (ref 26.8–34.3)
MCHC RBC AUTO-ENTMCNC: 33.8 G/DL (ref 31.4–37.4)
MCV RBC AUTO: 94 FL (ref 82–98)
MONOCYTES # BLD AUTO: 1.16 THOUSAND/ΜL (ref 0.17–1.22)
MONOCYTES NFR BLD AUTO: 12 % (ref 4–12)
NEUTROPHILS # BLD AUTO: 6.72 THOUSANDS/ΜL (ref 1.85–7.62)
NEUTS SEG NFR BLD AUTO: 69 % (ref 43–75)
NRBC BLD AUTO-RTO: 0 /100 WBCS
PHOSPHATE SERPL-MCNC: 3.6 MG/DL (ref 2.3–4.1)
PLATELET # BLD AUTO: 184 THOUSANDS/UL (ref 149–390)
PMV BLD AUTO: 9.6 FL (ref 8.9–12.7)
POTASSIUM SERPL-SCNC: 4 MMOL/L (ref 3.5–5.3)
PROTHROMBIN TIME: 16.2 SECONDS (ref 12.3–15)
RBC # BLD AUTO: 3.46 MILLION/UL (ref 3.88–5.62)
SODIUM SERPL-SCNC: 135 MMOL/L (ref 135–147)
UNIT DISPENSE STATUS: NORMAL
UNIT DISPENSE STATUS: NORMAL
UNIT PRODUCT CODE: NORMAL
UNIT PRODUCT CODE: NORMAL
UNIT PRODUCT VOLUME: 350 ML
UNIT PRODUCT VOLUME: 350 ML
UNIT RH: NORMAL
UNIT RH: NORMAL
WBC # BLD AUTO: 9.88 THOUSAND/UL (ref 4.31–10.16)

## 2025-01-24 PROCEDURE — 85730 THROMBOPLASTIN TIME PARTIAL: CPT | Performed by: INTERNAL MEDICINE

## 2025-01-24 PROCEDURE — 80048 BASIC METABOLIC PNL TOTAL CA: CPT

## 2025-01-24 PROCEDURE — 85610 PROTHROMBIN TIME: CPT

## 2025-01-24 PROCEDURE — 82948 REAGENT STRIP/BLOOD GLUCOSE: CPT

## 2025-01-24 PROCEDURE — 85025 COMPLETE CBC W/AUTO DIFF WBC: CPT

## 2025-01-24 PROCEDURE — 84100 ASSAY OF PHOSPHORUS: CPT

## 2025-01-24 PROCEDURE — 83735 ASSAY OF MAGNESIUM: CPT

## 2025-01-24 RX ORDER — WARFARIN SODIUM 5 MG/1
10 TABLET ORAL
Status: DISCONTINUED | OUTPATIENT
Start: 2025-01-24 | End: 2025-01-29 | Stop reason: HOSPADM

## 2025-01-24 RX ORDER — CLOPIDOGREL 300 MG/1
600 TABLET, FILM COATED ORAL ONCE
Status: DISCONTINUED | OUTPATIENT
Start: 2025-01-24 | End: 2025-01-24

## 2025-01-24 RX ORDER — CLOPIDOGREL BISULFATE 75 MG/1
75 TABLET ORAL DAILY
Status: DISCONTINUED | OUTPATIENT
Start: 2025-01-25 | End: 2025-01-29 | Stop reason: HOSPADM

## 2025-01-24 RX ORDER — INSULIN GLARGINE 100 [IU]/ML
7 INJECTION, SOLUTION SUBCUTANEOUS ONCE
Status: COMPLETED | OUTPATIENT
Start: 2025-01-24 | End: 2025-01-24

## 2025-01-24 RX ORDER — MAGNESIUM SULFATE HEPTAHYDRATE 40 MG/ML
4 INJECTION, SOLUTION INTRAVENOUS ONCE
Status: COMPLETED | OUTPATIENT
Start: 2025-01-24 | End: 2025-01-25

## 2025-01-24 RX ORDER — INSULIN LISPRO 100 [IU]/ML
6 INJECTION, SOLUTION INTRAVENOUS; SUBCUTANEOUS
Status: DISCONTINUED | OUTPATIENT
Start: 2025-01-24 | End: 2025-01-24

## 2025-01-24 RX ORDER — CLOPIDOGREL BISULFATE 75 MG/1
75 TABLET ORAL DAILY
Status: DISCONTINUED | OUTPATIENT
Start: 2025-01-25 | End: 2025-01-24

## 2025-01-24 RX ORDER — INSULIN LISPRO 100 [IU]/ML
8 INJECTION, SOLUTION INTRAVENOUS; SUBCUTANEOUS
Status: DISCONTINUED | OUTPATIENT
Start: 2025-01-24 | End: 2025-01-29 | Stop reason: HOSPADM

## 2025-01-24 RX ADMIN — CHLORHEXIDINE GLUCONATE 0.12% ORAL RINSE 15 ML: 1.2 LIQUID ORAL at 22:11

## 2025-01-24 RX ADMIN — INSULIN LISPRO 2 UNITS: 100 INJECTION, SOLUTION INTRAVENOUS; SUBCUTANEOUS at 16:52

## 2025-01-24 RX ADMIN — ASPIRIN 81 MG: 81 TABLET, COATED ORAL at 09:02

## 2025-01-24 RX ADMIN — MAGNESIUM SULFATE HEPTAHYDRATE 4 G: 40 INJECTION, SOLUTION INTRAVENOUS at 09:11

## 2025-01-24 RX ADMIN — INSULIN GLARGINE 7 UNITS: 100 INJECTION, SOLUTION SUBCUTANEOUS at 22:05

## 2025-01-24 RX ADMIN — CARVEDILOL 3.12 MG: 3.12 TABLET, FILM COATED ORAL at 09:01

## 2025-01-24 RX ADMIN — LISINOPRIL 2.5 MG: 2.5 TABLET ORAL at 09:02

## 2025-01-24 RX ADMIN — INSULIN LISPRO 2 UNITS: 100 INJECTION, SOLUTION INTRAVENOUS; SUBCUTANEOUS at 09:11

## 2025-01-24 RX ADMIN — MELATONIN 6 MG: 3 TAB ORAL at 22:06

## 2025-01-24 RX ADMIN — HEPARIN SODIUM 15.1 UNITS/KG/HR: 10000 INJECTION, SOLUTION INTRAVENOUS at 03:05

## 2025-01-24 RX ADMIN — ATORVASTATIN CALCIUM 80 MG: 40 TABLET, FILM COATED ORAL at 16:51

## 2025-01-24 RX ADMIN — LIDOCAINE 5% 1 PATCH: 700 PATCH TOPICAL at 09:01

## 2025-01-24 RX ADMIN — CLOPIDOGREL BISULFATE 600 MG: 300 TABLET, FILM COATED ORAL at 09:10

## 2025-01-24 RX ADMIN — HEPARIN SODIUM 15.1 UNITS/KG/HR: 10000 INJECTION, SOLUTION INTRAVENOUS at 18:00

## 2025-01-24 RX ADMIN — CHLORHEXIDINE GLUCONATE 0.12% ORAL RINSE 15 ML: 1.2 LIQUID ORAL at 09:01

## 2025-01-24 RX ADMIN — INSULIN LISPRO 8 UNITS: 100 INJECTION, SOLUTION INTRAVENOUS; SUBCUTANEOUS at 16:53

## 2025-01-24 RX ADMIN — INSULIN LISPRO 4 UNITS: 100 INJECTION, SOLUTION INTRAVENOUS; SUBCUTANEOUS at 12:14

## 2025-01-24 RX ADMIN — OXYCODONE HYDROCHLORIDE 5 MG: 5 TABLET ORAL at 16:51

## 2025-01-24 RX ADMIN — INSULIN LISPRO 6 UNITS: 100 INJECTION, SOLUTION INTRAVENOUS; SUBCUTANEOUS at 12:14

## 2025-01-24 RX ADMIN — QUETIAPINE FUMARATE 25 MG: 25 TABLET ORAL at 22:06

## 2025-01-24 RX ADMIN — CARVEDILOL 3.12 MG: 3.12 TABLET, FILM COATED ORAL at 16:51

## 2025-01-24 RX ADMIN — OXYCODONE HYDROCHLORIDE 5 MG: 5 TABLET ORAL at 12:08

## 2025-01-24 RX ADMIN — DEXTRAN 70, GLYCERIN, HYPROMELLOSE 1 DROP: 1; 2; 3 SOLUTION/ DROPS OPHTHALMIC at 21:46

## 2025-01-24 RX ADMIN — WARFARIN SODIUM 10 MG: 5 TABLET ORAL at 16:58

## 2025-01-24 NOTE — ASSESSMENT & PLAN NOTE
Warfarin started given pt mural thrombus. Will increase to 10mg qd with daily pt/inr goal inr 2-3 per AHA guidelines  Remains on heparin gtt for time being  Watch for bleeding at previous IABP site / given on triple therapy with additional heparin gtt

## 2025-01-24 NOTE — PROGRESS NOTES
Progress Note - Hospitalist   Name: Joseph Aguilar 69 y.o. male I MRN: [id number]  Unit/Bed#: -01 I Date of Admission: 1/17/2025   Date of Service: 1/24/2025 I Hospital Day: 7     Assessment & Plan  Anterior STEMI s/p PCI with CHYNA to LM-ostial LAD and mLAD with IABP;  of LCx (1/17/2024)  Status post drug-eluting stent to the LAD and left main, unable to intervene on the circumflex artery.  Significant hypotension in the Cath Lab requiring pressors and intra-aortic balloon pump support  IABP discontinued on 1/20  Continue aspirin 81,statin  Plavix load 600mg today, start daily dose tomorrow  Continue Coreg 3.125 bid  Appreciate cardiology recommendations  History of hypertension  Continue low dose beta-blocker   Mixed hyperlipidemia  Continue home statin   Type 2 diabetes mellitus without complication, without long-term current use of insulin ([facility])  Lab Results   Component Value Date    HGBA1C 8.2 (H) 01/18/2025       Recent Labs     01/23/25  1532 01/23/25  2057 01/24/25  0729 01/24/25  1112   POCGLU 186* 190* 168* 205*       Blood Sugar Average: Last 72 hrs:  (P) 204.9537101629632183  Holding home antihyperglycemics  Lantus 20u qhs  increase mealtime lispro 8 u tid  SSI algo 4  ICM with EF 25%  Per Cards no Lifevest at this time; pt also has no prescription coverage at this time, will need to utilize lower cost alternative for HF GDMT  Patient appears euvolemic  Continue low dose beta-blocker, lisinopril 2.5 qd,   If pt is taking Jardiance 25mg outpt, per CM [facility]. [facility] but states he does not want to travel to [place] where he believes the nearest one is.  Appreciate cardiology recommendations  LV (left ventricular) mural thrombus  continue heparin infusion per cards will need warfarin given apical thrombus  Hematoma of right lower extremity  Patient s/p surgical closure of right femoral artery due to hematoma related to  intra-aortic balloon pump  Appreciate vascular surgery recommendations  Subtherapeutic anticoagulation  Warfarin started given pt mural thrombus. Will increase to 10mg qd with daily pt/inr goal inr 2-3 per AHA guidelines  Remains on heparin gtt for time being  Watch for bleeding at previous IABP site / given on triple therapy with additional heparin gtt    24 Hour Events : no overnight events  Subjective : states feeling well. Getting bathed per nursing in room this am. [facility] Does not wish to [facility] for other physicians such [facility] [place]. No cp/sob/ n/v.    Objective :  Temp:  [97.9 °F (36.6 °C)-99.3 °F (37.4 °C)] 97.9 °F (36.6 °C)  HR:  [87-98] 98  BP: ()/(59-83) 127/83  Resp:  [19-20] 20  SpO2:  [93 %-98 %] 96 %  O2 Device: None (Room air)    Physical Exam  Vitals and nursing note reviewed.   Constitutional:       General: He is not in acute distress.     Appearance: He is well-developed.   HENT:      Head: Normocephalic and atraumatic.   Eyes:      Conjunctiva/sclera: Conjunctivae normal.   Cardiovascular:      Rate and Rhythm: Normal rate and regular rhythm.      Heart sounds: No murmur heard.  Pulmonary:      Effort: Pulmonary effort is normal. No respiratory distress.      Breath sounds: Normal breath sounds.   Abdominal:      Palpations: Abdomen is soft.      Tenderness: There is no abdominal tenderness.   Musculoskeletal:         General: No swelling.      Cervical back: Neck supple.      Right lower leg: No edema.      Left lower leg: No edema.   Skin:     General: Skin is warm and dry.      Capillary Refill: Capillary refill takes less than 2 seconds.   Neurological:      Mental Status: He is alert and oriented to person, place, and time. Mental status is at baseline.   Psychiatric:         Mood and Affect: Mood normal.         Lab Results: I have reviewed the following results:          VTE Pharmacologic Prophylaxis: VTE covered  by:  heparin (porcine), Intravenous, 15.1 Units/kg/hr at 01/24/25 0305  warfarin, Oral     VTE Mechanical Prophylaxis: sequential compression device

## 2025-01-24 NOTE — CASE MANAGEMENT
Case Management Discharge Planning Note    Patient name Joseph Aguilar  Location /-01 MRN [id number]  : 1955 Date 2025       Current Admission Date: 2025  Current Admission Diagnosis:Anterior STEMI s/p PCI with CHYNA to LM-ostial LAD and mLAD with IABP;  of LCx (2024)   Patient Active Problem List    Diagnosis Date Noted Date Diagnosed    Subtherapeutic anticoagulation 2025     ICM with EF 25% 2025     LV (left ventricular) mural thrombus 2025     Hematoma of right lower extremity 2025     Anterior STEMI s/p PCI with CHYNA to LM-ostial LAD and mLAD with IABP;  of LCx (2024) 2025     Morbid (severe) obesity due to excess calories (HCC) 10/07/2024     Glenohumeral arthritis, left 2022     Right foot pain 2022     Vitamin D deficiency 2022     Anemia 2022     Hypomagnesemia 2022     Closed nondisplaced intertrochanteric fracture of left femur with routine healing 2022     Pain in both feet 2022     Polyneuropathy associated with underlying disease (HCC) 2022     Slow transit constipation 2022     S/P TKR (total knee replacement), right 2018     Obesity (BMI 30-39.9) 10/29/2018     Primary osteoarthritis of both knees 2018     Glenohumeral arthritis, right 2016     Mixed hyperlipidemia 2013     History of hypertension 2012     Type 2 diabetes mellitus without complication, without long-term current use of insulin (HCC) 2012       LOS (days): 7  Geometric Mean LOS (GMLOS) (days): 3.8  Days to GMLOS:-2.9     OBJECTIVE:  Risk of Unplanned Readmission Score: 13.38         Current admission status: Inpatient   Preferred Pharmacy:   [id number] - [place], [place] - [address]  [address]  [place] [address]  Phone: [phone] Fax: [phone]    [facility] - [place], [place] - [address]  [address]  [place] [address]  Phone: [phone] Fax: [phone]     [facility] [place] - [address]  [address]  [place] [address]  Phone: [phone] Fax: [phone]    [facility] - [place] - [address]  [address]  [place] [address]  Phone: [phone] Fax: [phone]    Primary Care Provider: No primary care provider on file.    Primary Insurance: MEDICARE  Secondary Insurance: COMMERCIAL MISCELLANEOUS    DISCHARGE DETAILS:    Discharge planning discussed with:: Patient and family at the bedside  Freedom of Choice: Yes  Comments - Freedom of Choice: Met with patient and family member at bedside to inform that a [employer] representative will be here at 6p to fit him for LifeVest. Patient and family assented to time. Also informed them CM was told he was accepted for [facility]  CM contacted family/caregiver?: Yes  Were Treatment Team discharge recommendations reviewed with patient/caregiver?: Yes  Did patient/caregiver verbalize understanding of patient care needs?: N/A- going to facility  Were patient/caregiver advised of the risks associated with not following Treatment Team discharge recommendations?: Yes    Contacts  Patient Contacts: Ольга Aguilar (Spouse)  [phone] (Mobile)  Relationship to Patient:: Family  Reason/Outcome: Emergency Contact    Requested Home Health Care         Is the patient interested in HHC at discharge?: No    DME Referral Provided  Referral made for DME?: No              Treatment Team Recommendation: Acute Rehab, Short Term Rehab, SNF  Discharge Destination Plan:: Acute Rehab  Transport at Discharge : BLS Ambulance                             IMM Given (Date):: 01/24/25  IMM Given to:: Patient     Additional Comments: IMM and Medicare rights reviewed with patient at the bedside. Patient verbalized understanding and signed original. Copy given to patient, signed original filed to medical records.

## 2025-01-24 NOTE — QUICK NOTE
Called patients wife, updating them about ongoing treatment plan and any patient updates. Made aware of need to get to therapeutic INR for warfarin.    Per wife, wants to make sure medical team is aware the pt does want lifevest at this time.       Shanelle Medina, DO  Internal Medicine Residency PGY-2  [facility]  Available on moziy

## 2025-01-24 NOTE — ASSESSMENT & PLAN NOTE
Per Cards no Lifevest at this time; pt also has no prescription coverage at this time, will need to utilize lower cost alternative for HF GDMT  Patient appears euvolemic  Continue low dose beta-blocker, lisinopril 2.5 qd,   If pt is taking Jardiance 25mg outpt, per CM [facility]. [facility] but states he does not want to travel to [place] where he believes the nearest one is.  Appreciate cardiology recommendations

## 2025-01-24 NOTE — ASSESSMENT & PLAN NOTE
Lab Results   Component Value Date    HGBA1C 8.2 (H) 01/18/2025       Recent Labs     01/23/25  1532 01/23/25 2057 01/24/25  0729 01/24/25  1112   POCGLU 186* 190* 168* 205*       Blood Sugar Average: Last 72 hrs:  (P) [id number]  Holding home antihyperglycemics  Lantus 20u qhs  increase mealtime lispro 8 u tid  SSI algo 4

## 2025-01-24 NOTE — PROGRESS NOTES
Paget and I attempted to take standing weight  for pt but unsuccessful, pt stated he wasn't ready to stand and he did not put up any effort to help us stand him up on the scale.

## 2025-01-24 NOTE — PLAN OF CARE
CERTIFICATE OF WORK      November 12, 2019      Re: Frances Vasquez  [address] [address]      This is to certify that Frances Vasquez has been under my care for pregnancy with an estimated due date of 2/14/2020. Frances can continue to work with the following restrictions.     RESTRICTIONS: Frances has a 10# weight restriction for lifting, pushing and pulling. Frances should avoid any situation that could result in physical harm and should not work for more then 12 hours per shift.               SIGNATURE:___________________________________________          Li Mora MD  [facility] [address]  [address]  [address] [address]  Dept Phone: [phone]       Problem: Prexisting or High Potential for Compromised Skin Integrity  Goal: Skin integrity is maintained or improved  Description: INTERVENTIONS:  - Identify patients at risk for skin breakdown  - Assess and monitor skin integrity  - Assess and monitor nutrition and hydration status  - Monitor labs   - Assess for incontinence   - Turn and reposition patient  - Assist with mobility/ambulation  - Relieve pressure over bony prominences  - Avoid friction and shearing  - Provide appropriate hygiene as needed including keeping skin clean and dry  - Evaluate need for skin moisturizer/barrier cream  - Collaborate with interdisciplinary team   - Patient/family teaching  - Consider wound care consult   Outcome: Progressing     Problem: PAIN - ADULT  Goal: Verbalizes/displays adequate comfort level or baseline comfort level  Description: Interventions:  - Encourage patient to monitor pain and request assistance  - Assess pain using appropriate pain scale  - Administer analgesics based on type and severity of pain and evaluate response  - Implement non-pharmacological measures as appropriate and evaluate response  - Consider cultural and social influences on pain and pain management  - Notify physician/advanced practitioner if interventions unsuccessful or patient reports new pain  Outcome: Progressing     Problem: INFECTION - ADULT  Goal: Absence or prevention of progression during hospitalization  Description: INTERVENTIONS:  - Assess and monitor for signs and symptoms of infection  - Monitor lab/diagnostic results  - Monitor all insertion sites, i.e. indwelling lines, tubes, and drains  - Monitor endotracheal if appropriate and nasal secretions for changes in amount and color  - [place] appropriate cooling/warming therapies per order  - Administer medications as ordered  - Instruct and encourage patient and family to use good hand hygiene technique  - Identify and instruct in appropriate isolation precautions for  identified infection/condition  Outcome: Progressing  Goal: Absence of fever/infection during neutropenic period  Description: INTERVENTIONS:  - Monitor WBC    Outcome: Progressing     Problem: SAFETY ADULT  Goal: Patient will remain free of falls  Description: INTERVENTIONS:  - Educate patient/family on patient safety including physical limitations  - Instruct patient to call for assistance with activity   - Consult OT/PT to assist with strengthening/mobility   - Keep Call bell within reach  - Keep bed low and locked with side rails adjusted as appropriate  - Keep care items and personal belongings within reach  - Initiate and maintain comfort rounds  - Apply yellow socks and bracelet for high fall risk patients  - Consider moving patient to room near nurses station  Outcome: Progressing  Goal: Maintain or return to baseline ADL function  Description: INTERVENTIONS:  -  Assess patient's ability to carry out ADLs; assess patient's baseline for ADL function and identify physical deficits which impact ability to perform ADLs (bathing, care of mouth/teeth, toileting, grooming, dressing, etc.)  - Assess/evaluate cause of self-care deficits   - Assess range of motion  - Assess patient's mobility; develop plan if impaired  - Assess patient's need for assistive devices and provide as appropriate  - Encourage maximum independence but intervene and supervise when necessary  - Involve family in performance of ADLs  - Assess for home care needs following discharge   - Consider OT consult to assist with ADL evaluation and planning for discharge  - Provide patient education as appropriate  Outcome: Progressing  Goal: Maintains/Returns to pre admission functional level  Description: INTERVENTIONS:  - Perform AM-PAC 6 Click Basic Mobility/ Daily Activity assessment daily.  - Set and communicate daily mobility goal to care team and patient/family/caregiver.   - Collaborate with rehabilitation services on mobility goals if  consulted  - Out of bed for toileting  - Record patient progress and toleration of activity level   Outcome: Progressing     Problem: DISCHARGE PLANNING  Goal: Discharge to home or other facility with appropriate resources  Description: INTERVENTIONS:  - Identify barriers to discharge w/patient and caregiver  - Arrange for needed discharge resources and transportation as appropriate  - Identify discharge learning needs (meds, wound care, etc.)  - Arrange for interpretive services to assist at discharge as needed  - Refer to Case Management Department for coordinating discharge planning if the patient needs post-hospital services based on physician/advanced practitioner order or complex needs related to functional status, cognitive ability, or social support system  Outcome: Progressing     Problem: Knowledge Deficit  Goal: Patient/family/caregiver demonstrates understanding of disease process, treatment plan, medications, and discharge instructions  Description: Complete learning assessment and assess knowledge base.  Interventions:  - Provide teaching at level of understanding  - Provide teaching via preferred learning methods  Outcome: Progressing     Problem: Nutrition/Hydration-ADULT  Goal: Nutrient/Hydration intake appropriate for improving, restoring or maintaining nutritional needs  Description: Monitor and assess patient's nutrition/hydration status for malnutrition. Collaborate with interdisciplinary team and initiate plan and interventions as ordered.  Monitor patient's weight and dietary intake as ordered or per policy. Utilize nutrition screening tool and intervene as necessary. Determine patient's food preferences and provide high-protein, high-caloric foods as appropriate.     INTERVENTIONS:  - Monitor oral intake, urinary output, labs, and treatment plans  - Assess nutrition and hydration status and recommend course of action  - Evaluate amount of meals eaten  - Assist patient with eating if necessary    - Allow adequate time for meals  - Recommend/ encourage appropriate diets, oral nutritional supplements, and vitamin/mineral supplements  - Order, calculate, and assess calorie counts as needed  - Recommend, monitor, and adjust tube feedings and TPN/PPN based on assessed needs  - Assess need for intravenous fluids  - Provide specific nutrition/hydration education as appropriate  - Include patient/family/caregiver in decisions related to nutrition  Outcome: Progressing     Problem: CARDIOVASCULAR - ADULT  Goal: Maintains optimal cardiac output and hemodynamic stability  Description: INTERVENTIONS:  - Monitor I/O, vital signs and rhythm  - Monitor for S/S and trends of decreased cardiac output  - Administer and titrate ordered vasoactive medications to optimize hemodynamic stability  - Assess quality of pulses, skin color and temperature  - Assess for signs of decreased coronary artery perfusion  - Instruct patient to report change in severity of symptoms  Outcome: Progressing  Goal: Absence of cardiac dysrhythmias or at baseline rhythm  Description: INTERVENTIONS:  - Continuous cardiac monitoring, vital signs, obtain 12 lead EKG if ordered  - Administer antiarrhythmic and heart rate control medications as ordered  - Monitor electrolytes and administer replacement therapy as ordered  Outcome: Progressing     Problem: RESPIRATORY - ADULT  Goal: Achieves optimal ventilation and oxygenation  Description: INTERVENTIONS:  - Assess for changes in respiratory status  - Assess for changes in mentation and behavior  - Position to facilitate oxygenation and minimize respiratory effort  - Oxygen administered by appropriate delivery if ordered  - Initiate smoking cessation education as indicated  - Encourage broncho-pulmonary hygiene including cough, deep breathe, Incentive Spirometry  - Assess the need for suctioning and aspirate as needed  - Assess and instruct to report SOB or any respiratory difficulty  - Respiratory  Therapy support as indicated  Outcome: Progressing     Problem: METABOLIC, FLUID AND ELECTROLYTES - ADULT  Goal: Electrolytes maintained within normal limits  Description: INTERVENTIONS:  - Monitor labs and assess patient for signs and symptoms of electrolyte imbalances  - Administer electrolyte replacement as ordered  - Monitor response to electrolyte replacements, including repeat lab results as appropriate  - Instruct patient on fluid and nutrition as appropriate  Outcome: Progressing  Goal: Fluid balance maintained  Description: INTERVENTIONS:  - Monitor labs   - Monitor I/O and WT  - Instruct patient on fluid and nutrition as appropriate  - Assess for signs & symptoms of volume excess or deficit  Outcome: Progressing  Goal: Glucose maintained within target range  Description: INTERVENTIONS:  - Monitor Blood Glucose as ordered  - Assess for signs and symptoms of hyperglycemia and hypoglycemia  - Administer ordered medications to maintain glucose within target range  - Assess nutritional intake and initiate nutrition service referral as needed  Outcome: Progressing

## 2025-01-24 NOTE — TELEPHONE ENCOUNTER
----- Message from MARGARITA Asif sent at 1/23/2025  3:53 PM EST -----  Regarding: Hospital Follow-up  Cardiology Follow-up:    Patient clinical visit in 1 week at the Cardio location: [place] office. .    Schedule visit with Cardio [facility] Providers: first available provider.    Type of Visit: VISIT TYPE: in-person office visit.    Test ordered:     Additional Details: STEMI

## 2025-01-24 NOTE — PROGRESS NOTES
Patient:    MRN:  [id number]    Nadine Request ID:  [id number]    Level of care reserved:  Inpatient Rehab Facility    Partner Reserved:  [facility] - ([place]), [place] [address] [phone]    Clinical needs requested:    Geography searched:  40 miles around [address]    Start of Service:    Request sent:  9:35am EST on 1/22/2025 by Mary Patiño    Partner reserved:  12:37pm EST on 1/24/2025 by Louisa Steve    Choice list shared:  1:21pm EST on 1/23/2025 by Louisa Steve

## 2025-01-24 NOTE — PLAN OF CARE
Problem: Prexisting or High Potential for Compromised Skin Integrity  Goal: Skin integrity is maintained or improved  Description: INTERVENTIONS:  - Identify patients at risk for skin breakdown  - Assess and monitor skin integrity  - Assess and monitor nutrition and hydration status  - Monitor labs   - Assess for incontinence   - Turn and reposition patient  - Assist with mobility/ambulation  - Relieve pressure over bony prominences  - Avoid friction and shearing  - Provide appropriate hygiene as needed including keeping skin clean and dry  - Evaluate need for skin moisturizer/barrier cream  - Collaborate with interdisciplinary team   - Patient/family teaching  - Consider wound care consult   Outcome: Progressing     Problem: INFECTION - ADULT  Goal: Absence or prevention of progression during hospitalization  Description: INTERVENTIONS:  - Assess and monitor for signs and symptoms of infection  - Monitor lab/diagnostic results  - Monitor all insertion sites, i.e. indwelling lines, tubes, and drains  - Monitor endotracheal if appropriate and nasal secretions for changes in amount and color  - [place] appropriate cooling/warming therapies per order  - Administer medications as ordered  - Instruct and encourage patient and family to use good hand hygiene technique  - Identify and instruct in appropriate isolation precautions for identified infection/condition  Outcome: Progressing     Problem: PAIN - ADULT  Goal: Verbalizes/displays adequate comfort level or baseline comfort level  Description: Interventions:  - Encourage patient to monitor pain and request assistance  - Assess pain using appropriate pain scale  - Administer analgesics based on type and severity of pain and evaluate response  - Implement non-pharmacological measures as appropriate and evaluate response  - Consider cultural and social influences on pain and pain management  - Notify physician/advanced practitioner if interventions unsuccessful or  patient reports new pain  Outcome: Progressing     Problem: SAFETY ADULT  Goal: Patient will remain free of falls  Description: INTERVENTIONS:  - Educate patient/family on patient safety including physical limitations  - Instruct patient to call for assistance with activity   - Consult OT/PT to assist with strengthening/mobility   - Keep Call bell within reach  - Keep bed low and locked with side rails adjusted as appropriate  - Keep care items and personal belongings within reach  - Initiate and maintain comfort rounds  -  Problem: CARDIOVASCULAR - ADULT  Goal: Maintains optimal cardiac output and hemodynamic stability  Description: INTERVENTIONS:  - Monitor I/O, vital signs and rhythm  - Monitor for S/S and trends of decreased cardiac output  - Administer and titrate ordered vasoactive medications to optimize hemodynamic stability  - Assess quality of pulses, skin color and temperature  - Assess for signs of decreased coronary artery perfusion  - Instruct patient to report change in severity of symptoms  Outcome: Progressing     Problem: METABOLIC, FLUID AND ELECTROLYTES - ADULT  Goal: Electrolytes maintained within normal limits  Description: INTERVENTIONS:  - Monitor labs and assess patient for signs and symptoms of electrolyte imbalances  - Administer electrolyte replacement as ordered  - Monitor response to electrolyte replacements, including repeat lab results as appropriate  - Instruct patient on fluid and nutrition as appropriate  Outcome: Progressing   - Apply yellow socks and bracelet for high fall risk patients  - Consider moving patient to room near nurses station  Outcome: Progressing

## 2025-01-24 NOTE — CASE MANAGEMENT
Case Management Progress Note    Patient name Joseph Aguilar  Location /-01 MRN [id number]  : 1955 Date 2025       LOS (days): 7  Geometric Mean LOS (GMLOS) (days): 3.8  Days to GMLOS:-2.8        OBJECTIVE:        Current admission status: Inpatient  Preferred Pharmacy:   [id number] [place] [facility] [address]  [address] [facility] [address]  Phone: [phone] Fax: [phone]    [facility] [place] - [address]  [address]  [place] [address]  Phone: [phone] Fax: [phone]     [facility] [place] [facility] [address]  [address] [place] [address] [place] [address]  Phone: [phone] Fax: [phone]    [facility] - [place] - [address]  [address]  [place] [address]  Phone: [phone] Fax: [phone]    Primary Care Provider: No primary care provider on file.    Primary Insurance: MEDICARE  Secondary Insurance: COMMERCIAL MISCELLANEOUS    PROGRESS NOTE:  Discussed patient's case in interdisciplinary rounds this morning with [facility] provider. Patient is not medically cleared for discharge- Lifevest ordered by Cardiology service which patient initially refused but is now agreeable to. [facility] at 6pm. RN notified to inform patient. Needs GDMT for HF post-STEMI. Pending therapeutic INR after restart. Agreeable to -SLA. Anticipating discharge in 48-72h to [facility]. CM will continue to follow.

## 2025-01-25 PROBLEM — H10.9 CONJUNCTIVITIS: Status: ACTIVE | Noted: 2025-01-25

## 2025-01-25 LAB
ANION GAP SERPL CALCULATED.3IONS-SCNC: 8 MMOL/L (ref 4–13)
APTT PPP: 68 SECONDS (ref 23–34)
BASOPHILS # BLD AUTO: 0.04 THOUSANDS/ΜL (ref 0–0.1)
BASOPHILS NFR BLD AUTO: 0 % (ref 0–1)
BUN SERPL-MCNC: 21 MG/DL (ref 5–25)
CALCIUM SERPL-MCNC: 9.1 MG/DL (ref 8.4–10.2)
CHLORIDE SERPL-SCNC: 102 MMOL/L (ref 96–108)
CO2 SERPL-SCNC: 24 MMOL/L (ref 21–32)
CREAT SERPL-MCNC: 0.89 MG/DL (ref 0.6–1.3)
EOSINOPHIL # BLD AUTO: 0.25 THOUSAND/ΜL (ref 0–0.61)
EOSINOPHIL NFR BLD AUTO: 2 % (ref 0–6)
ERYTHROCYTE [DISTWIDTH] IN BLOOD BY AUTOMATED COUNT: 14.4 % (ref 11.6–15.1)
GFR SERPL CREATININE-BSD FRML MDRD: 88 ML/MIN/1.73SQ M
GLUCOSE SERPL-MCNC: 168 MG/DL (ref 65–140)
GLUCOSE SERPL-MCNC: 178 MG/DL (ref 65–140)
GLUCOSE SERPL-MCNC: 189 MG/DL (ref 65–140)
GLUCOSE SERPL-MCNC: 200 MG/DL (ref 65–140)
GLUCOSE SERPL-MCNC: 243 MG/DL (ref 65–140)
HCT VFR BLD AUTO: 33.3 % (ref 36.5–49.3)
HGB BLD-MCNC: 11.1 G/DL (ref 12–17)
IMM GRANULOCYTES # BLD AUTO: 0.05 THOUSAND/UL (ref 0–0.2)
IMM GRANULOCYTES NFR BLD AUTO: 0 % (ref 0–2)
INR PPP: 1.42 (ref 0.85–1.19)
LYMPHOCYTES # BLD AUTO: 1.52 THOUSANDS/ΜL (ref 0.6–4.47)
LYMPHOCYTES NFR BLD AUTO: 13 % (ref 14–44)
MAGNESIUM SERPL-MCNC: 1.7 MG/DL (ref 1.9–2.7)
MCH RBC QN AUTO: 31.4 PG (ref 26.8–34.3)
MCHC RBC AUTO-ENTMCNC: 33.3 G/DL (ref 31.4–37.4)
MCV RBC AUTO: 94 FL (ref 82–98)
MONOCYTES # BLD AUTO: 1.23 THOUSAND/ΜL (ref 0.17–1.22)
MONOCYTES NFR BLD AUTO: 10 % (ref 4–12)
NEUTROPHILS # BLD AUTO: 9.04 THOUSANDS/ΜL (ref 1.85–7.62)
NEUTS SEG NFR BLD AUTO: 75 % (ref 43–75)
NRBC BLD AUTO-RTO: 0 /100 WBCS
PLATELET # BLD AUTO: 195 THOUSANDS/UL (ref 149–390)
PMV BLD AUTO: 9.3 FL (ref 8.9–12.7)
POTASSIUM SERPL-SCNC: 4.3 MMOL/L (ref 3.5–5.3)
PROTHROMBIN TIME: 18.1 SECONDS (ref 12.3–15)
RBC # BLD AUTO: 3.53 MILLION/UL (ref 3.88–5.62)
SODIUM SERPL-SCNC: 134 MMOL/L (ref 135–147)
WBC # BLD AUTO: 12.13 THOUSAND/UL (ref 4.31–10.16)

## 2025-01-25 PROCEDURE — 83735 ASSAY OF MAGNESIUM: CPT

## 2025-01-25 PROCEDURE — 85025 COMPLETE CBC W/AUTO DIFF WBC: CPT

## 2025-01-25 PROCEDURE — 82948 REAGENT STRIP/BLOOD GLUCOSE: CPT

## 2025-01-25 PROCEDURE — 85730 THROMBOPLASTIN TIME PARTIAL: CPT | Performed by: INTERNAL MEDICINE

## 2025-01-25 PROCEDURE — 80048 BASIC METABOLIC PNL TOTAL CA: CPT

## 2025-01-25 PROCEDURE — 85610 PROTHROMBIN TIME: CPT

## 2025-01-25 PROCEDURE — 99232 SBSQ HOSP IP/OBS MODERATE 35: CPT | Performed by: INTERNAL MEDICINE

## 2025-01-25 RX ORDER — ACETAMINOPHEN 325 MG/1
650 TABLET ORAL EVERY 6 HOURS PRN
Status: DISCONTINUED | OUTPATIENT
Start: 2025-01-25 | End: 2025-01-29 | Stop reason: HOSPADM

## 2025-01-25 RX ORDER — ALBUMIN (HUMAN) 12.5 G/50ML
25 SOLUTION INTRAVENOUS ONCE
Status: COMPLETED | OUTPATIENT
Start: 2025-01-25 | End: 2025-01-25

## 2025-01-25 RX ADMIN — LIDOCAINE 5% 1 PATCH: 700 PATCH TOPICAL at 08:01

## 2025-01-25 RX ADMIN — ALBUMIN (HUMAN) 25 G: 0.25 INJECTION, SOLUTION INTRAVENOUS at 16:09

## 2025-01-25 RX ADMIN — ACETAMINOPHEN 650 MG: 325 TABLET, FILM COATED ORAL at 21:59

## 2025-01-25 RX ADMIN — CHLORHEXIDINE GLUCONATE 0.12% ORAL RINSE 15 ML: 1.2 LIQUID ORAL at 08:01

## 2025-01-25 RX ADMIN — WARFARIN SODIUM 10 MG: 5 TABLET ORAL at 17:31

## 2025-01-25 RX ADMIN — CIPROFLOXACIN HYDROCHLORIDE: 3 OINTMENT OPHTHALMIC at 16:10

## 2025-01-25 RX ADMIN — CIPROFLOXACIN HYDROCHLORIDE: 3 OINTMENT OPHTHALMIC at 21:50

## 2025-01-25 RX ADMIN — QUETIAPINE FUMARATE 25 MG: 25 TABLET ORAL at 21:49

## 2025-01-25 RX ADMIN — ATORVASTATIN CALCIUM 80 MG: 40 TABLET, FILM COATED ORAL at 16:09

## 2025-01-25 RX ADMIN — INSULIN LISPRO 8 UNITS: 100 INJECTION, SOLUTION INTRAVENOUS; SUBCUTANEOUS at 07:58

## 2025-01-25 RX ADMIN — ASPIRIN 81 MG: 81 TABLET, COATED ORAL at 08:01

## 2025-01-25 RX ADMIN — CLOPIDOGREL 75 MG: 75 TABLET ORAL at 08:00

## 2025-01-25 RX ADMIN — MELATONIN 6 MG: 3 TAB ORAL at 21:49

## 2025-01-25 RX ADMIN — INSULIN LISPRO 4 UNITS: 100 INJECTION, SOLUTION INTRAVENOUS; SUBCUTANEOUS at 21:48

## 2025-01-25 RX ADMIN — OXYCODONE HYDROCHLORIDE 5 MG: 5 TABLET ORAL at 06:22

## 2025-01-25 RX ADMIN — INSULIN GLARGINE 20 UNITS: 100 INJECTION, SOLUTION SUBCUTANEOUS at 21:48

## 2025-01-25 RX ADMIN — CARVEDILOL 3.12 MG: 3.12 TABLET, FILM COATED ORAL at 08:00

## 2025-01-25 RX ADMIN — LISINOPRIL 2.5 MG: 2.5 TABLET ORAL at 08:01

## 2025-01-25 RX ADMIN — INSULIN LISPRO 4 UNITS: 100 INJECTION, SOLUTION INTRAVENOUS; SUBCUTANEOUS at 12:34

## 2025-01-25 RX ADMIN — CHLORHEXIDINE GLUCONATE 0.12% ORAL RINSE 15 ML: 1.2 LIQUID ORAL at 21:49

## 2025-01-25 RX ADMIN — HEPARIN SODIUM 15.1 UNITS/KG/HR: 10000 INJECTION, SOLUTION INTRAVENOUS at 10:04

## 2025-01-25 RX ADMIN — INSULIN LISPRO 2 UNITS: 100 INJECTION, SOLUTION INTRAVENOUS; SUBCUTANEOUS at 07:57

## 2025-01-25 RX ADMIN — CIPROFLOXACIN HYDROCHLORIDE 1 INCH: 3 OINTMENT OPHTHALMIC at 09:53

## 2025-01-25 RX ADMIN — INSULIN LISPRO 2 UNITS: 100 INJECTION, SOLUTION INTRAVENOUS; SUBCUTANEOUS at 16:15

## 2025-01-25 RX ADMIN — INSULIN LISPRO 8 UNITS: 100 INJECTION, SOLUTION INTRAVENOUS; SUBCUTANEOUS at 16:15

## 2025-01-25 RX ADMIN — INSULIN LISPRO 8 UNITS: 100 INJECTION, SOLUTION INTRAVENOUS; SUBCUTANEOUS at 12:34

## 2025-01-25 NOTE — ASSESSMENT & PLAN NOTE
Warfarin started given pt mural thrombus. Will increase to 10mg qd with daily pt/inr goal inr 2-3 per AHA guidelines  Remains on heparin gtt for time being  Watch for bleeding at previous IABP site / given on triple therapy with additional heparin gtt  Continue Coumadin

## 2025-01-25 NOTE — ASSESSMENT & PLAN NOTE
continue heparin infusion per cards will need warfarin given apical thrombus  Continue Coumadin  INR remains subtherapeutic at 1.4  Continue to monitor

## 2025-01-25 NOTE — NURSING NOTE
"1/25/25 1540 Pt wife at bedside reports not being present for initial teaching,pt stated \" I was kind of out of it when the isaac told me everything\". Life vest contact information retrieved off Life vest packaging at bedside. Primary nurse contacted Cole with technical support and connected him with pt wife Ольга to reschedule a teaching demonstration.   1/26/25 10:19 am Kylie Life vest representative at bedside providing pt & spouse teaching demonstration.   "

## 2025-01-25 NOTE — PROGRESS NOTES
Patient or progress Note - Hospitalist   Name: Joseph Aguilar 69 y.o. male I MRN: [id number]  Unit/Bed#: -01 I Date of Admission: 1/17/2025   Date of Service: 1/25/2025 I Hospital Day: 8    Assessment & Plan  Anterior STEMI s/p PCI with CHYNA to LM-ostial LAD and mLAD with IABP;  of LCx (1/17/2024)  Status post drug-eluting stent to the LAD and left main, unable to intervene on the circumflex artery.  Significant hypotension in the Cath Lab requiring pressors and intra-aortic balloon pump support  IABP discontinued on 1/20  Continue aspirin, Plavix, statin  Continue Coreg 3.125 bid  Appreciate cardiology recommendations  History of hypertension  Continue low dose beta-blocker   Mixed hyperlipidemia  Continue home statin   Type 2 diabetes mellitus without complication, without long-term current use of insulin ([facility])  Lab Results   Component Value Date    HGBA1C 8.2 (H) 01/18/2025       Recent Labs     01/24/25  0729 01/24/25  1112 01/24/25  1602 01/25/25  0742   POCGLU 168* 205* 180* 178*       Blood Sugar Average: Last 72 hrs:  (P) 198.5  Holding home antihyperglycemics  Lantus 20u qhs  mealtime lispro 8 u tid  SSI algo 4  Monitor Blood glucose  ICM with EF 25%  Per Cards no Lifevest at this time; pt also has no prescription coverage at this time, will need to utilize lower cost alternative for HF GDMT  Patient appears euvolemic  Continue low dose beta-blocker, lisinopril 2.5 qd,   If pt is taking Jardiance 25mg outpt, per CM [facility]. [facility] but states he does not want to travel to [place] where he believes the nearest one is.  Appreciate cardiology recommendations  LV (left ventricular) mural thrombus  continue heparin infusion per cards will need warfarin given apical thrombus  Continue Coumadin  INR remains subtherapeutic at 1.4  Continue to monitor  Hematoma of right lower extremity  Patient s/p surgical closure of right femoral  artery due to hematoma related to intra-aortic balloon pump  Appreciate vascular surgery recommendations  Subtherapeutic anticoagulation  Warfarin started given pt mural thrombus. Will increase to 10mg qd with daily pt/inr goal inr 2-3 per AHA guidelines  Remains on heparin gtt for time being  Watch for bleeding at previous IABP site / given on triple therapy with additional heparin gtt  Continue Coumadin  Conjunctivitis  Unclear viral versus bacterial  Will initiate on Cipro eyedrops    VTE Pharmacologic Prophylaxis:   Moderate Risk (Score 3-4) - Pharmacological DVT Prophylaxis Ordered: heparin drip.    Mobility:   Basic Mobility Inpatient Raw Score: 10  JH-HLM Goal: 4: Move to chair/commode  JH-HLM Achieved: 4: Move to chair/commode  JH-HLM Goal achieved. Continue to encourage appropriate mobility.    Patient Centered Rounds: I performed bedside rounds with nursing staff today.   Discussions with Specialists or Other Care Team Provider: cm, nursing    Education and Discussions with Family / Patient: Patient declined call to .     Current Length of Stay: 8 day(s)  Current Patient Status: Inpatient   Certification Statement: The patient will continue to require additional inpatient hospital stay due to see below  Discharge Plan:  Still requiring heparin infusion until INR therapeutic.  Expect e approximate 48 hours.  Will need rehab as well    Code Status: Level 1 - Full Code    Subjective   Denies chest pain, shortness of breath, cough or any complaints at this time    Objective :  Temp:  [98.1 °F (36.7 °C)-99.1 °F (37.3 °C)] 99.1 °F (37.3 °C)  HR:  [87-97] 87  BP: (113-117)/(73-77) 114/75  Resp:  [18-20] 19  SpO2:  [90 %-97 %] 90 %  O2 Device: None (Room air)    Body mass index is 30.09 kg/m².     Input and Output Summary (last 24 hours):     Intake/Output Summary (Last 24 hours) at 1/25/2025 1039  Last data filed at 1/25/2025 0917  Gross per 24 hour   Intake 440 ml   Output 1200 ml   Net -760 ml        Physical Exam  Constitutional:       General: He is not in acute distress.     Appearance: He is well-developed. He is not diaphoretic.   HENT:      Head: Normocephalic and atraumatic.      Nose: Nose normal.      Mouth/Throat:      Pharynx: No oropharyngeal exudate.   Eyes:      General: No scleral icterus.     Conjunctiva/sclera: Conjunctivae normal.   Cardiovascular:      Rate and Rhythm: Normal rate and regular rhythm.      Heart sounds: Normal heart sounds. No murmur heard.     No friction rub. No gallop.   Pulmonary:      Effort: Pulmonary effort is normal. No respiratory distress.      Breath sounds: Normal breath sounds. No wheezing or rales.   Chest:      Chest wall: No tenderness.   Abdominal:      General: Bowel sounds are normal. There is no distension.      Palpations: Abdomen is soft.      Tenderness: There is no abdominal tenderness. There is no guarding.   Musculoskeletal:         General: No tenderness or deformity. Normal range of motion.      Cervical back: Normal range of motion and neck supple.   Skin:     General: Skin is warm and dry.      Findings: No erythema.   Neurological:      Mental Status: He is alert. Mental status is at baseline.           Lines/Drains:        Telemetry:  Telemetry Orders (From admission, onward)               LifeVest Patient: Continuous Telemetry Monitoring during hospitalization (non-expiring)  Continuous LifeVest Telemetry Monitoring        References:    LifeVest Policy                     Telemetry Reviewed: Normal Sinus Rhythm  Indication for Continued Telemetry Use: LifeVest               Lab Results: I have reviewed the following results:   Results from last 7 days   Lab Units 01/25/25  0604   WBC Thousand/uL 12.13*   HEMOGLOBIN g/dL 11.1*   HEMATOCRIT % 33.3*   PLATELETS Thousands/uL 195   SEGS PCT % 75   LYMPHO PCT % 13*   MONO PCT % 10   EOS PCT % 2     Results from last 7 days   Lab Units 01/25/25  0604 01/22/25  0544 01/21/25  0623   SODIUM mmol/L  134*   < > 138   POTASSIUM mmol/L 4.3   < > 3.9   CHLORIDE mmol/L 102   < > 107   CO2 mmol/L 24   < > 23   BUN mg/dL 21   < > 38*   CREATININE mg/dL 0.89   < > 1.13   ANION GAP mmol/L 8   < > 8   CALCIUM mg/dL 9.1   < > 8.9   ALBUMIN g/dL  --   --  3.8   TOTAL BILIRUBIN mg/dL  --   --  1.26*   ALK PHOS U/L  --   --  64   ALT U/L  --   --  54*   AST U/L  --   --  34   GLUCOSE RANDOM mg/dL 189*   < > 222*    < > = values in this interval not displayed.     Results from last 7 days   Lab Units 01/25/25  0604   INR  1.42*     Results from last 7 days   Lab Units 01/25/25  0742 01/24/25  1602 01/24/25  1112 01/24/25  0729 01/23/25  2057 01/23/25  1532 01/23/25  1044 01/23/25  0758 01/22/25  2124 01/22/25  1618 01/22/25  1134 01/22/25  0808   POC GLUCOSE mg/dl 178* 180* 205* 168* 190* 186* 234* 178* 270* 184* 226* 183*         Results from last 7 days   Lab Units 01/20/25  2149 01/20/25  1706 01/19/25  0622 01/18/25  1624   LACTIC ACID mmol/L 0.9 1.0 0.6 1.3       Recent Cultures (last 7 days):         Imaging Results Review: I personally reviewed the following image studies/reports in PACS and discussed pertinent findings with Radiology: chest xray. My interpretation of the radiology images/reports is:  .  Other Study Results Review: EKG was reviewed.     Last 24 Hours Medication List:     Current Facility-Administered Medications:     aluminum-magnesium hydroxide-simethicone (MAALOX) oral suspension 30 mL, Q4H PRN    Artificial Tears Op Soln 1 drop, Q4H PRN    aspirin (ECOTRIN LOW STRENGTH) EC tablet 81 mg, Daily    atorvastatin (LIPITOR) tablet 80 mg, Daily With Dinner    bisacodyl (DULCOLAX) rectal suppository 10 mg, Daily PRN    carvedilol (COREG) tablet 3.125 mg, BID With Meals    chlorhexidine (PERIDEX) 0.12 % oral rinse 15 mL, Q12H YNES    ciprofloxacin (CILOXAN) 0.3 % ophthalmic ointment, TID    clopidogrel (PLAVIX) tablet 75 mg, Daily    fentaNYL injection, PRN    heparin (porcine) 25,000 units in 0.45% NaCl 250  mL infusion (premix), Titrated, Last Rate: 15.1 Units/kg/hr (01/25/25 1004)    insulin glargine (LANTUS) subcutaneous injection 20 Units 0.2 mL, HS    insulin lispro (HumALOG/ADMELOG) 100 units/mL subcutaneous injection 2-12 Units, 4x Daily (AC & HS) **AND** Fingerstick Glucose (POCT), 4x Daily AC and at bedtime    insulin lispro (HumALOG/ADMELOG) 100 units/mL subcutaneous injection 8 Units, TID With Meals    lidocaine (LIDODERM) 5 % patch 1 patch, Daily    lisinopril (ZESTRIL) tablet 2.5 mg, Daily    melatonin tablet 6 mg, HS    menthol-methyl salicylate (BENGAY) 10-15 % cream, 4x Daily PRN    ondansetron (ZOFRAN) injection 4 mg, Q6H PRN    oxyCODONE (ROXICODONE) split tablet 2.5 mg, Q4H PRN **OR** oxyCODONE (ROXICODONE) IR tablet 5 mg, Q4H PRN    QUEtiapine (SEROquel) tablet 25 mg, HS    warfarin (COUMADIN) tablet 10 mg, Daily (warfarin)    Administrative Statements   Today, Patient Was Seen By: Pancho Bettencourt MD  I have spent a total time of 30 minutes in caring for this patient on the day of the visit/encounter including Diagnostic results.    **Please Note: This note may have been constructed using a voice recognition system.**

## 2025-01-25 NOTE — ASSESSMENT & PLAN NOTE
Status post drug-eluting stent to the LAD and left main, unable to intervene on the circumflex artery.  Significant hypotension in the Cath Lab requiring pressors and intra-aortic balloon pump support  IABP discontinued on 1/20  Continue aspirin, Plavix, statin  Continue Coreg 3.125 bid  Appreciate cardiology recommendations

## 2025-01-25 NOTE — PROGRESS NOTES
Notified dr. Bettencourt of BP 92/58 manual right arm, pt laying in bed just woke up. Pt denies any dizziness or light headedness. No objective s/s cardiac distress noted. Charge RN assessed pt Telle. Pt A&Ox4.

## 2025-01-25 NOTE — ASSESSMENT & PLAN NOTE
Lab Results   Component Value Date    HGBA1C 8.2 (H) 01/18/2025       Recent Labs     01/24/25  0729 01/24/25  1112 01/24/25  1602 01/25/25  0742   POCGLU 168* 205* 180* 178*       Blood Sugar Average: Last 72 hrs:  (P) 198.5  Holding home antihyperglycemics  Lantus 20u qhs  mealtime lispro 8 u tid  SSI algo 4  Monitor Blood glucose

## 2025-01-25 NOTE — ASSESSMENT & PLAN NOTE
Per Cards no Lifevest at this time; pt also has no prescription coverage at this time, will need to utilize lower cost alternative for HF GDMT  Patient appears euvolemic  Continue low dose beta-blocker, lisinopril 2.5 qd,   If pt is taking Jardiance 25mg outpt, per CM pt [facility]. [facility] but states he does not want to travel to [place] where he believes the nearest one is.  Appreciate cardiology recommendations

## 2025-01-25 NOTE — PROGRESS NOTES
"Pt reports \" I dont want to wear the lifevest while I'm in the hospital\" Dr. Bettencourt made aware. Tellny ordered, pt tolerated placement of leads. Charge RN Printed & interpreted telle strip.  Pt A&Ox4 with no c/o pain or discomfort. Pt denies any dizziness or lightheaded. No acute cardiac s/s observed.    "

## 2025-01-26 LAB
ANION GAP SERPL CALCULATED.3IONS-SCNC: 8 MMOL/L (ref 4–13)
APTT PPP: 67 SECONDS (ref 23–34)
BASOPHILS # BLD AUTO: 0.03 THOUSANDS/ΜL (ref 0–0.1)
BASOPHILS NFR BLD AUTO: 0 % (ref 0–1)
BUN SERPL-MCNC: 23 MG/DL (ref 5–25)
CALCIUM SERPL-MCNC: 9.1 MG/DL (ref 8.4–10.2)
CHLORIDE SERPL-SCNC: 103 MMOL/L (ref 96–108)
CO2 SERPL-SCNC: 23 MMOL/L (ref 21–32)
CREAT SERPL-MCNC: 0.88 MG/DL (ref 0.6–1.3)
EOSINOPHIL # BLD AUTO: 0.27 THOUSAND/ΜL (ref 0–0.61)
EOSINOPHIL NFR BLD AUTO: 3 % (ref 0–6)
ERYTHROCYTE [DISTWIDTH] IN BLOOD BY AUTOMATED COUNT: 14.5 % (ref 11.6–15.1)
GFR SERPL CREATININE-BSD FRML MDRD: 88 ML/MIN/1.73SQ M
GLUCOSE SERPL-MCNC: 136 MG/DL (ref 65–140)
GLUCOSE SERPL-MCNC: 168 MG/DL (ref 65–140)
GLUCOSE SERPL-MCNC: 192 MG/DL (ref 65–140)
GLUCOSE SERPL-MCNC: 200 MG/DL (ref 65–140)
GLUCOSE SERPL-MCNC: 203 MG/DL (ref 65–140)
HCT VFR BLD AUTO: 33.8 % (ref 36.5–49.3)
HGB BLD-MCNC: 11.3 G/DL (ref 12–17)
IMM GRANULOCYTES # BLD AUTO: 0.04 THOUSAND/UL (ref 0–0.2)
IMM GRANULOCYTES NFR BLD AUTO: 1 % (ref 0–2)
INR PPP: 1.75 (ref 0.85–1.19)
LYMPHOCYTES # BLD AUTO: 1.62 THOUSANDS/ΜL (ref 0.6–4.47)
LYMPHOCYTES NFR BLD AUTO: 19 % (ref 14–44)
MAGNESIUM SERPL-MCNC: 1.6 MG/DL (ref 1.9–2.7)
MCH RBC QN AUTO: 31.1 PG (ref 26.8–34.3)
MCHC RBC AUTO-ENTMCNC: 33.4 G/DL (ref 31.4–37.4)
MCV RBC AUTO: 93 FL (ref 82–98)
MONOCYTES # BLD AUTO: 0.96 THOUSAND/ΜL (ref 0.17–1.22)
MONOCYTES NFR BLD AUTO: 11 % (ref 4–12)
NEUTROPHILS # BLD AUTO: 5.68 THOUSANDS/ΜL (ref 1.85–7.62)
NEUTS SEG NFR BLD AUTO: 66 % (ref 43–75)
NRBC BLD AUTO-RTO: 0 /100 WBCS
PLATELET # BLD AUTO: 209 THOUSANDS/UL (ref 149–390)
PMV BLD AUTO: 9.2 FL (ref 8.9–12.7)
POTASSIUM SERPL-SCNC: 3.9 MMOL/L (ref 3.5–5.3)
PROTHROMBIN TIME: 21.1 SECONDS (ref 12.3–15)
RBC # BLD AUTO: 3.63 MILLION/UL (ref 3.88–5.62)
SODIUM SERPL-SCNC: 134 MMOL/L (ref 135–147)
WBC # BLD AUTO: 8.6 THOUSAND/UL (ref 4.31–10.16)

## 2025-01-26 PROCEDURE — 83735 ASSAY OF MAGNESIUM: CPT | Performed by: INTERNAL MEDICINE

## 2025-01-26 PROCEDURE — 85730 THROMBOPLASTIN TIME PARTIAL: CPT | Performed by: INTERNAL MEDICINE

## 2025-01-26 PROCEDURE — 85610 PROTHROMBIN TIME: CPT

## 2025-01-26 PROCEDURE — 99232 SBSQ HOSP IP/OBS MODERATE 35: CPT | Performed by: INTERNAL MEDICINE

## 2025-01-26 PROCEDURE — 85025 COMPLETE CBC W/AUTO DIFF WBC: CPT | Performed by: INTERNAL MEDICINE

## 2025-01-26 PROCEDURE — 80048 BASIC METABOLIC PNL TOTAL CA: CPT | Performed by: INTERNAL MEDICINE

## 2025-01-26 PROCEDURE — 82948 REAGENT STRIP/BLOOD GLUCOSE: CPT

## 2025-01-26 RX ADMIN — INSULIN LISPRO 2 UNITS: 100 INJECTION, SOLUTION INTRAVENOUS; SUBCUTANEOUS at 23:05

## 2025-01-26 RX ADMIN — CIPROFLOXACIN HYDROCHLORIDE: 3 OINTMENT OPHTHALMIC at 23:06

## 2025-01-26 RX ADMIN — INSULIN LISPRO 2 UNITS: 100 INJECTION, SOLUTION INTRAVENOUS; SUBCUTANEOUS at 11:32

## 2025-01-26 RX ADMIN — HEPARIN SODIUM 15.1 UNITS/KG/HR: 10000 INJECTION, SOLUTION INTRAVENOUS at 01:31

## 2025-01-26 RX ADMIN — CIPROFLOXACIN HYDROCHLORIDE: 3 OINTMENT OPHTHALMIC at 17:13

## 2025-01-26 RX ADMIN — ASPIRIN 81 MG: 81 TABLET, COATED ORAL at 08:21

## 2025-01-26 RX ADMIN — CARVEDILOL 3.12 MG: 3.12 TABLET, FILM COATED ORAL at 17:10

## 2025-01-26 RX ADMIN — CLOPIDOGREL 75 MG: 75 TABLET ORAL at 08:21

## 2025-01-26 RX ADMIN — MELATONIN 6 MG: 3 TAB ORAL at 23:04

## 2025-01-26 RX ADMIN — INSULIN LISPRO 2 UNITS: 100 INJECTION, SOLUTION INTRAVENOUS; SUBCUTANEOUS at 17:11

## 2025-01-26 RX ADMIN — CHLORHEXIDINE GLUCONATE 0.12% ORAL RINSE 15 ML: 1.2 LIQUID ORAL at 20:19

## 2025-01-26 RX ADMIN — INSULIN LISPRO 8 UNITS: 100 INJECTION, SOLUTION INTRAVENOUS; SUBCUTANEOUS at 08:23

## 2025-01-26 RX ADMIN — INSULIN LISPRO 8 UNITS: 100 INJECTION, SOLUTION INTRAVENOUS; SUBCUTANEOUS at 17:11

## 2025-01-26 RX ADMIN — INSULIN GLARGINE 20 UNITS: 100 INJECTION, SOLUTION SUBCUTANEOUS at 23:05

## 2025-01-26 RX ADMIN — ATORVASTATIN CALCIUM 80 MG: 40 TABLET, FILM COATED ORAL at 17:10

## 2025-01-26 RX ADMIN — LIDOCAINE 5% 1 PATCH: 700 PATCH TOPICAL at 08:20

## 2025-01-26 RX ADMIN — HEPARIN SODIUM 15.1 UNITS/KG/HR: 10000 INJECTION, SOLUTION INTRAVENOUS at 20:20

## 2025-01-26 RX ADMIN — CIPROFLOXACIN HYDROCHLORIDE: 3 OINTMENT OPHTHALMIC at 08:24

## 2025-01-26 RX ADMIN — CHLORHEXIDINE GLUCONATE 0.12% ORAL RINSE 15 ML: 1.2 LIQUID ORAL at 08:22

## 2025-01-26 RX ADMIN — WARFARIN SODIUM 10 MG: 5 TABLET ORAL at 17:10

## 2025-01-26 RX ADMIN — QUETIAPINE FUMARATE 25 MG: 25 TABLET ORAL at 23:04

## 2025-01-26 RX ADMIN — INSULIN LISPRO 8 UNITS: 100 INJECTION, SOLUTION INTRAVENOUS; SUBCUTANEOUS at 11:32

## 2025-01-26 NOTE — PROGRESS NOTES
Progress Note - Hospitalist   Name: Joseph Aguilar 69 y.o. male I MRN: [id number]  Unit/Bed#: -01 I Date of Admission: 1/17/2025   Date of Service: 1/26/2025 I Hospital Day: 9    Assessment & Plan  Anterior STEMI s/p PCI with CHYNA to LM-ostial LAD and mLAD with IABP;  of LCx (1/17/2024)  Status post drug-eluting stent to the LAD and left main, unable to intervene on the circumflex artery.  Significant hypotension in the Cath Lab requiring pressors and intra-aortic balloon pump support  IABP discontinued on 1/20  Continue aspirin, Plavix, statin  Continue Coreg 3.125 bid  Appreciate cardiology recommendations  History of hypertension  BP controlled  Continue lisinopril, beta-blocker  Mixed hyperlipidemia  Continue statin  Type 2 diabetes mellitus without complication, without long-term current use of insulin ([facility])  Lab Results   Component Value Date    HGBA1C 8.2 (H) 01/18/2025       Recent Labs     01/25/25  1113 01/25/25  1539 01/25/25  2040 01/26/25  0710   POCGLU 200* 168* 243* 136       Blood Sugar Average: Last 72 hrs:  (P) 188.0853928425353656  Holding home antihyperglycemics  Lantus 20u qhs  mealtime lispro 8 u tid  Continue sliding scale insulin  Monitor blood glucose  ICM with EF 25%  Per Cards no Lifevest at this time; pt also has no prescription coverage at this time, will need to utilize lower cost alternative for HF GDMT  Patient appears euvolemic  Continue low dose beta-blocker, lisinopril 2.5 qd,   If pt is taking Jardiance 25mg outpt, per CM pt [facility]. [facility] but states he does not want to travel to [place] where he believes the nearest one is.  Appreciate cardiology recommendations  LV (left ventricular) mural thrombus  continue heparin infusion per cards will need warfarin given apical thrombus  Continue Coumadin  INR remains subtherapeutic at 1.7  But significantly improved  Repeat INR in the morning  Continue to  monitor  Hematoma of right lower extremity  Patient s/p surgical closure of right femoral artery due to hematoma related to intra-aortic balloon pump  Appreciate vascular surgery recommendations  Subtherapeutic anticoagulation  Warfarin started given pt mural thrombus. Will increase to 10mg qd with daily pt/inr goal inr 2-3 per AHA guidelines  Remains on heparin gtt for time being  Watch for bleeding at previous IABP site / given on triple therapy with additional heparin gtt  Continue Coumadin  Follow-up INR tomorrow  Conjunctivitis  Unclear viral versus bacterial  Slowly improving  Continue Cipro eyedrops    VTE Pharmacologic Prophylaxis:   Moderate Risk (Score 3-4) - Pharmacological DVT Prophylaxis Ordered: warfarin (Coumadin).    Mobility:   Basic Mobility Inpatient Raw Score: 10  [facility] Goal: 4: Move to chair/commode  JH-HLM Achieved: 2: Bed activities/Dependent transfer  JH-HLM Goal achieved. Continue to encourage appropriate mobility.    Patient Centered Rounds: I performed bedside rounds with nursing staff today.   Discussions with Specialists or Other Care Team Provider: cm, nursing    Education and Discussions with Family / Patient: Patient declined call to .     Current Length of Stay: 9 day(s)  Current Patient Status: Inpatient   Certification Statement: The patient will continue to require additional inpatient hospital stay due to see below  Discharge Plan:  Still requiring therapeutic INR.  Anticipate discharge medically in 24 to 48 hours.  Will need rehab    Code Status: Level 1 - Full Code    Subjective   Denies chest pain, shortness breath and cough, fevers, chills    Objective :  Temp:  [97.4 °F (36.3 °C)-100.5 °F (38.1 °C)] 97.4 °F (36.3 °C)  BP: (84-98)/(52-62) 92/58  Resp:  [18-20] 18  O2 Device: None (Room air)    Body mass index is 30.03 kg/m².     Input and Output Summary (last 24 hours):     Intake/Output Summary (Last 24 hours) at 1/26/2025 1038  Last data filed at 1/26/2025  0900  Gross per 24 hour   Intake 720 ml   Output 200 ml   Net 520 ml       Physical Exam  Constitutional:       General: He is not in acute distress.     Appearance: He is well-developed. He is not diaphoretic.   HENT:      Head: Normocephalic and atraumatic.      Nose: Nose normal.      Mouth/Throat:      Pharynx: No oropharyngeal exudate.   Eyes:      General: No scleral icterus.     Conjunctiva/sclera: Conjunctivae normal.   Cardiovascular:      Rate and Rhythm: Normal rate and regular rhythm.      Heart sounds: Normal heart sounds. No murmur heard.     No friction rub. No gallop.   Pulmonary:      Effort: Pulmonary effort is normal. No respiratory distress.      Breath sounds: Normal breath sounds. No wheezing or rales.   Chest:      Chest wall: No tenderness.   Abdominal:      General: Bowel sounds are normal. There is no distension.      Palpations: Abdomen is soft.      Tenderness: There is no abdominal tenderness. There is no guarding.   Musculoskeletal:         General: No tenderness or deformity. Normal range of motion.      Cervical back: Normal range of motion and neck supple.   Skin:     General: Skin is warm and dry.      Findings: No erythema.   Neurological:      Mental Status: He is alert. Mental status is at baseline.           Lines/Drains:        Telemetry:  Telemetry Orders (From admission, onward)               LifeVest Patient: Continuous Telemetry Monitoring during hospitalization (non-expiring)  Continuous LifeVest Telemetry Monitoring        References:    LifeVest Policy                     Telemetry Reviewed: Normal Sinus Rhythm  Indication for Continued Telemetry Use: Acute MI/Unstable Angina/Rule out ACS               Lab Results: I have reviewed the following results:   Results from last 7 days   Lab Units 01/26/25  0955   WBC Thousand/uL 8.60   HEMOGLOBIN g/dL 11.3*   HEMATOCRIT % 33.8*   PLATELETS Thousands/uL 209   SEGS PCT % 66   LYMPHO PCT % 19   MONO PCT % 11   EOS PCT % 3      Results from last 7 days   Lab Units 01/26/25  0955 01/22/25  0544 01/21/25  0623   SODIUM mmol/L 134*   < > 138   POTASSIUM mmol/L 3.9   < > 3.9   CHLORIDE mmol/L 103   < > 107   CO2 mmol/L 23   < > 23   BUN mg/dL 23   < > 38*   CREATININE mg/dL 0.88   < > 1.13   ANION GAP mmol/L 8   < > 8   CALCIUM mg/dL 9.1   < > 8.9   ALBUMIN g/dL  --   --  3.8   TOTAL BILIRUBIN mg/dL  --   --  1.26*   ALK PHOS U/L  --   --  64   ALT U/L  --   --  54*   AST U/L  --   --  34   GLUCOSE RANDOM mg/dL 203*   < > 222*    < > = values in this interval not displayed.     Results from last 7 days   Lab Units 01/26/25  0543   INR  1.75*     Results from last 7 days   Lab Units 01/26/25  0710 01/25/25  2040 01/25/25  1539 01/25/25  1113 01/25/25  0742 01/24/25  1602 01/24/25  1112 01/24/25  0729 01/23/25  2057 01/23/25  1532 01/23/25  1044 01/23/25  0758   POC GLUCOSE mg/dl 136 243* 168* 200* 178* 180* 205* 168* 190* 186* 234* 178*         Results from last 7 days   Lab Units 01/20/25  2149 01/20/25  1706   LACTIC ACID mmol/L 0.9 1.0       Recent Cultures (last 7 days):         Imaging Results Review: I personally reviewed the following image studies/reports in PACS and discussed pertinent findings with Radiology: chest xray. My interpretation of the radiology images/reports is:  .  Other Study Results Review: EKG was reviewed.     Last 24 Hours Medication List:     Current Facility-Administered Medications:     acetaminophen (TYLENOL) tablet 650 mg, Q6H PRN    aluminum-magnesium hydroxide-simethicone (MAALOX) oral suspension 30 mL, Q4H PRN    Artificial Tears Op Soln 1 drop, Q4H PRN    aspirin (ECOTRIN LOW STRENGTH) EC tablet 81 mg, Daily    atorvastatin (LIPITOR) tablet 80 mg, Daily With Dinner    bisacodyl (DULCOLAX) rectal suppository 10 mg, Daily PRN    carvedilol (COREG) tablet 3.125 mg, BID With Meals    chlorhexidine (PERIDEX) 0.12 % oral rinse 15 mL, Q12H YNES    ciprofloxacin (CILOXAN) 0.3 % ophthalmic ointment, TID     clopidogrel (PLAVIX) tablet 75 mg, Daily    fentaNYL injection, PRN    heparin (porcine) 25,000 units in 0.45% NaCl 250 mL infusion (premix), Titrated, Last Rate: 15.1 Units/kg/hr (01/26/25 0131)    insulin glargine (LANTUS) subcutaneous injection 20 Units 0.2 mL, HS    insulin lispro (HumALOG/ADMELOG) 100 units/mL subcutaneous injection 2-12 Units, 4x Daily (AC & HS) **AND** Fingerstick Glucose (POCT), 4x Daily AC and at bedtime    insulin lispro (HumALOG/ADMELOG) 100 units/mL subcutaneous injection 8 Units, TID With Meals    lidocaine (LIDODERM) 5 % patch 1 patch, Daily    lisinopril (ZESTRIL) tablet 2.5 mg, Daily    melatonin tablet 6 mg, HS    menthol-methyl salicylate (BENGAY) 10-15 % cream, 4x Daily PRN    ondansetron (ZOFRAN) injection 4 mg, Q6H PRN    oxyCODONE (ROXICODONE) split tablet 2.5 mg, Q4H PRN **OR** oxyCODONE (ROXICODONE) IR tablet 5 mg, Q4H PRN    QUEtiapine (SEROquel) tablet 25 mg, HS    warfarin (COUMADIN) tablet 10 mg, Daily (warfarin)    Administrative Statements   Today, Patient Was Seen By: Pancho Bettencourt MD  I have spent a total time of 30 minutes in caring for this patient on the day of the visit/encounter including Diagnostic results.4    **Please Note: This note may have been constructed using a voice recognition system.**

## 2025-01-26 NOTE — ASSESSMENT & PLAN NOTE
Warfarin started given pt mural thrombus. Will increase to 10mg qd with daily pt/inr goal inr 2-3 per AHA guidelines  Remains on heparin gtt for time being  Watch for bleeding at previous IABP site / given on triple therapy with additional heparin gtt  Continue Coumadin  Follow-up INR tomorrow

## 2025-01-26 NOTE — ASSESSMENT & PLAN NOTE
continue heparin infusion per cards will need warfarin given apical thrombus  Continue Coumadin  INR remains subtherapeutic at 1.7  But significantly improved  Repeat INR in the morning  Continue to monitor

## 2025-01-26 NOTE — ASSESSMENT & PLAN NOTE
Lab Results   Component Value Date    HGBA1C 8.2 (H) 01/18/2025       Recent Labs     01/25/25  1113 01/25/25  1539 01/25/25  2040 01/26/25  0710   POCGLU 200* 168* 243* 136       Blood Sugar Average: Last 72 hrs:  (P) 188.0332120545519873  Holding home antihyperglycemics  Lantus 20u qhs  mealtime lispro 8 u tid  Continue sliding scale insulin  Monitor blood glucose

## 2025-01-26 NOTE — ASSESSMENT & PLAN NOTE
Per Cards no Lifevest at this time; pt also has no prescription coverage at this time, will need to utilize lower cost alternative for HF GDMT  Patient appears euvolemic  Continue low dose beta-blocker, lisinopril 2.5 qd,   If pt is taking Jardiance 25mg outpt, per CM [facility]. [facility] but states he does not want to travel to [place] where he believes the nearest one is.  Appreciate cardiology recommendations

## 2025-01-27 LAB
ANION GAP SERPL CALCULATED.3IONS-SCNC: 7 MMOL/L (ref 4–13)
APTT PPP: 122 SECONDS (ref 23–34)
APTT PPP: 57 SECONDS (ref 23–34)
APTT PPP: >210 SECONDS (ref 23–34)
BASOPHILS # BLD AUTO: 0.04 THOUSANDS/ΜL (ref 0–0.1)
BASOPHILS NFR BLD AUTO: 1 % (ref 0–1)
BUN SERPL-MCNC: 22 MG/DL (ref 5–25)
CALCIUM SERPL-MCNC: 8.8 MG/DL (ref 8.4–10.2)
CHLORIDE SERPL-SCNC: 104 MMOL/L (ref 96–108)
CO2 SERPL-SCNC: 23 MMOL/L (ref 21–32)
CREAT SERPL-MCNC: 0.83 MG/DL (ref 0.6–1.3)
EOSINOPHIL # BLD AUTO: 0.25 THOUSAND/ΜL (ref 0–0.61)
EOSINOPHIL NFR BLD AUTO: 3 % (ref 0–6)
ERYTHROCYTE [DISTWIDTH] IN BLOOD BY AUTOMATED COUNT: 14.4 % (ref 11.6–15.1)
GFR SERPL CREATININE-BSD FRML MDRD: 91 ML/MIN/1.73SQ M
GLUCOSE SERPL-MCNC: 138 MG/DL (ref 65–140)
GLUCOSE SERPL-MCNC: 152 MG/DL (ref 65–140)
GLUCOSE SERPL-MCNC: 159 MG/DL (ref 65–140)
GLUCOSE SERPL-MCNC: 203 MG/DL (ref 65–140)
GLUCOSE SERPL-MCNC: 244 MG/DL (ref 65–140)
HCT VFR BLD AUTO: 33.5 % (ref 36.5–49.3)
HGB BLD-MCNC: 11.1 G/DL (ref 12–17)
IMM GRANULOCYTES # BLD AUTO: 0.04 THOUSAND/UL (ref 0–0.2)
IMM GRANULOCYTES NFR BLD AUTO: 1 % (ref 0–2)
INR PPP: 1.78 (ref 0.85–1.19)
LYMPHOCYTES # BLD AUTO: 2.12 THOUSANDS/ΜL (ref 0.6–4.47)
LYMPHOCYTES NFR BLD AUTO: 26 % (ref 14–44)
MAGNESIUM SERPL-MCNC: 1.5 MG/DL (ref 1.9–2.7)
MCH RBC QN AUTO: 31.1 PG (ref 26.8–34.3)
MCHC RBC AUTO-ENTMCNC: 33.1 G/DL (ref 31.4–37.4)
MCV RBC AUTO: 94 FL (ref 82–98)
MONOCYTES # BLD AUTO: 1 THOUSAND/ΜL (ref 0.17–1.22)
MONOCYTES NFR BLD AUTO: 12 % (ref 4–12)
NEUTROPHILS # BLD AUTO: 4.62 THOUSANDS/ΜL (ref 1.85–7.62)
NEUTS SEG NFR BLD AUTO: 57 % (ref 43–75)
NRBC BLD AUTO-RTO: 0 /100 WBCS
PLATELET # BLD AUTO: 212 THOUSANDS/UL (ref 149–390)
PMV BLD AUTO: 9 FL (ref 8.9–12.7)
POTASSIUM SERPL-SCNC: 4.1 MMOL/L (ref 3.5–5.3)
PROTHROMBIN TIME: 21.5 SECONDS (ref 12.3–15)
RBC # BLD AUTO: 3.57 MILLION/UL (ref 3.88–5.62)
SODIUM SERPL-SCNC: 134 MMOL/L (ref 135–147)
WBC # BLD AUTO: 8.07 THOUSAND/UL (ref 4.31–10.16)

## 2025-01-27 PROCEDURE — 85025 COMPLETE CBC W/AUTO DIFF WBC: CPT | Performed by: INTERNAL MEDICINE

## 2025-01-27 PROCEDURE — 80048 BASIC METABOLIC PNL TOTAL CA: CPT | Performed by: INTERNAL MEDICINE

## 2025-01-27 PROCEDURE — 85730 THROMBOPLASTIN TIME PARTIAL: CPT | Performed by: INTERNAL MEDICINE

## 2025-01-27 PROCEDURE — 82948 REAGENT STRIP/BLOOD GLUCOSE: CPT

## 2025-01-27 PROCEDURE — 83735 ASSAY OF MAGNESIUM: CPT | Performed by: INTERNAL MEDICINE

## 2025-01-27 PROCEDURE — 99232 SBSQ HOSP IP/OBS MODERATE 35: CPT | Performed by: INTERNAL MEDICINE

## 2025-01-27 PROCEDURE — 85610 PROTHROMBIN TIME: CPT

## 2025-01-27 RX ADMIN — QUETIAPINE FUMARATE 25 MG: 25 TABLET ORAL at 21:36

## 2025-01-27 RX ADMIN — INSULIN LISPRO 8 UNITS: 100 INJECTION, SOLUTION INTRAVENOUS; SUBCUTANEOUS at 16:36

## 2025-01-27 RX ADMIN — ASPIRIN 81 MG: 81 TABLET, COATED ORAL at 10:06

## 2025-01-27 RX ADMIN — LIDOCAINE 5% 1 PATCH: 700 PATCH TOPICAL at 10:06

## 2025-01-27 RX ADMIN — INSULIN LISPRO 4 UNITS: 100 INJECTION, SOLUTION INTRAVENOUS; SUBCUTANEOUS at 16:35

## 2025-01-27 RX ADMIN — MELATONIN 6 MG: 3 TAB ORAL at 21:36

## 2025-01-27 RX ADMIN — HEPARIN SODIUM 15.1 UNITS/KG/HR: 10000 INJECTION, SOLUTION INTRAVENOUS at 12:20

## 2025-01-27 RX ADMIN — CARVEDILOL 3.12 MG: 3.12 TABLET, FILM COATED ORAL at 16:34

## 2025-01-27 RX ADMIN — CLOPIDOGREL 75 MG: 75 TABLET ORAL at 10:06

## 2025-01-27 RX ADMIN — CIPROFLOXACIN HYDROCHLORIDE: 3 OINTMENT OPHTHALMIC at 16:35

## 2025-01-27 RX ADMIN — CIPROFLOXACIN HYDROCHLORIDE: 3 OINTMENT OPHTHALMIC at 21:37

## 2025-01-27 RX ADMIN — WARFARIN SODIUM 10 MG: 5 TABLET ORAL at 16:34

## 2025-01-27 RX ADMIN — ATORVASTATIN CALCIUM 80 MG: 40 TABLET, FILM COATED ORAL at 16:34

## 2025-01-27 RX ADMIN — CHLORHEXIDINE GLUCONATE 0.12% ORAL RINSE 15 ML: 1.2 LIQUID ORAL at 21:36

## 2025-01-27 RX ADMIN — INSULIN LISPRO 4 UNITS: 100 INJECTION, SOLUTION INTRAVENOUS; SUBCUTANEOUS at 21:37

## 2025-01-27 RX ADMIN — CHLORHEXIDINE GLUCONATE 0.12% ORAL RINSE 15 ML: 1.2 LIQUID ORAL at 10:06

## 2025-01-27 RX ADMIN — CIPROFLOXACIN HYDROCHLORIDE: 3 OINTMENT OPHTHALMIC at 10:11

## 2025-01-27 RX ADMIN — INSULIN LISPRO 8 UNITS: 100 INJECTION, SOLUTION INTRAVENOUS; SUBCUTANEOUS at 07:59

## 2025-01-27 RX ADMIN — INSULIN GLARGINE 20 UNITS: 100 INJECTION, SOLUTION SUBCUTANEOUS at 21:36

## 2025-01-27 RX ADMIN — INSULIN LISPRO 2 UNITS: 100 INJECTION, SOLUTION INTRAVENOUS; SUBCUTANEOUS at 07:59

## 2025-01-27 RX ADMIN — HEPARIN SODIUM 15.1 UNITS/KG/HR: 10000 INJECTION, SOLUTION INTRAVENOUS at 16:32

## 2025-01-27 RX ADMIN — INSULIN LISPRO 8 UNITS: 100 INJECTION, SOLUTION INTRAVENOUS; SUBCUTANEOUS at 12:19

## 2025-01-27 NOTE — PROGRESS NOTES
B) a progress Note - Hospitalist   Name: Joseph Aguilar 69 y.o. male I MRN: [id number]  Unit/Bed#: -01 I Date of Admission: 1/17/2025   Date of Service: 1/27/2025 I Hospital Day: 10    Assessment & Plan  Anterior STEMI s/p PCI with CHYNA to LM-ostial LAD and mLAD with IABP;  of LCx (1/17/2024)  Status post drug-eluting stent to the LAD and left main, unable to intervene on the circumflex artery.  Significant hypotension in the Cath Lab requiring pressors and intra-aortic balloon pump support  IABP discontinued on 1/20  Continue aspirin, Plavix, statin  Continue Coreg 3.125 bid  Appreciate cardiology recommendations  History of hypertension  BP controlled  Continue lisinopril, beta-blocker  Mixed hyperlipidemia  Continue statin  Type 2 diabetes mellitus without complication, without long-term current use of insulin ([facility])  Lab Results   Component Value Date    HGBA1C 8.2 (H) 01/18/2025       Recent Labs     01/26/25  1046 01/26/25  1617 01/26/25  2123 01/27/25  0716   POCGLU 192* 200* 168* 152*       Blood Sugar Average: Last 72 hrs:  (P) 182.5  Holding home antihyperglycemics  Lantus 20u qhs  mealtime lispro 8 u tid  Continue sliding scale insulin  Monitor blood glucose  ICM with EF 25%  Per Cards will need lifevest  need to utilize lower cost alternative for HF GDMT  Patient appears euvolemic  Continue low dose beta-blocker, lisinopril 2.5 qd,   If pt is taking Jardiance 25mg outpt, per CM pt [facility]. [facility] but states he does not want to travel to [place] where he believes the nearest one is.  Appreciate cardiology recommendations  LV (left ventricular) mural thrombus  continue heparin infusion per cards will need warfarin given apical thrombus  Continue Coumadin  INR remains subtherapeutic at 1.7  But significantly improved  Repeat INR in the morning  Continue Coumadin 10 mg daily for now  Hematoma of right lower extremity  Patient s/p  surgical closure of right femoral artery due to hematoma related to intra-aortic balloon pump  Appreciate vascular surgery recommendations  Subtherapeutic anticoagulation  Warfarin started given pt mural thrombus. Will increase to 10mg qd with daily pt/inr goal inr 2-3 per AHA guidelines  Remains on heparin gtt for time being  Watch for bleeding at previous IABP site / given on triple therapy with additional heparin gtt  Continue Coumadin  Follow-up INR tomorrow  Conjunctivitis  Unclear viral versus bacterial  Much improved  Continue Cipro eyedrops    VTE Pharmacologic Prophylaxis:   Moderate Risk (Score 3-4) - Pharmacological DVT Prophylaxis Ordered: warfarin (Coumadin).    Mobility:   Basic Mobility Inpatient Raw Score: 10  -[facility] Goal: 4: Move to chair/commode  JH-HLM Achieved: 2: Bed activities/Dependent transfer  JH-HLM Goal achieved. Continue to encourage appropriate mobility.    Patient Centered Rounds: I performed bedside rounds with nursing staff today.   Discussions with Specialists or Other Care Team Provider: cm, nursing    Education and Discussions with Family / Patient: Patient declined call to .     Current Length of Stay: 10 day(s)  Current Patient Status: Inpatient   Certification Statement: The patient will continue to require additional inpatient hospital stay due to see below  Discharge Plan:   Expect medically clear in 24 to 48 hours pending therapeutic INR.  Will need rehab    Code Status: Level 1 - Full Code    Subjective   Denies chest pain, shortness breath, cough, fevers, chills    Objective :  Temp:  [98.4 °F (36.9 °C)-98.9 °F (37.2 °C)] 98.4 °F (36.9 °C)  HR:  [78-95] 78  BP: (107-111)/(72-77) 109/76  Resp:  [12-21] 12  SpO2:  [98 %] 98 %  O2 Device: None (Room air)    Body mass index is 29.32 kg/m².     Input and Output Summary (last 24 hours):     Intake/Output Summary (Last 24 hours) at 1/27/2025 1049  Last data filed at 1/27/2025 1001  Gross per 24 hour   Intake 180 ml    Output 1425 ml   Net -1245 ml       Physical Exam  Constitutional:       General: He is not in acute distress.     Appearance: He is well-developed. He is not diaphoretic.   HENT:      Head: Normocephalic and atraumatic.      Nose: Nose normal.      Mouth/Throat:      Pharynx: No oropharyngeal exudate.   Eyes:      General: No scleral icterus.     Conjunctiva/sclera: Conjunctivae normal.   Cardiovascular:      Rate and Rhythm: Normal rate and regular rhythm.      Heart sounds: Normal heart sounds. No murmur heard.     No friction rub. No gallop.   Pulmonary:      Effort: Pulmonary effort is normal. No respiratory distress.      Breath sounds: Normal breath sounds. No wheezing or rales.   Chest:      Chest wall: No tenderness.   Abdominal:      General: Bowel sounds are normal. There is no distension.      Palpations: Abdomen is soft.      Tenderness: There is no abdominal tenderness. There is no guarding.   Musculoskeletal:         General: No tenderness or deformity. Normal range of motion.      Cervical back: Normal range of motion and neck supple.   Skin:     General: Skin is warm and dry.      Findings: No erythema.   Neurological:      Mental Status: He is alert. Mental status is at baseline.           Lines/Drains:        Telemetry:  Telemetry Orders (From admission, onward)               LifeVest Patient: Continuous Telemetry Monitoring during hospitalization (non-expiring)  Continuous LifeVest Telemetry Monitoring        References:    LifeVest Policy                     Telemetry Reviewed: Normal Sinus Rhythm  Indication for Continued Telemetry Use: Acute MI/Unstable Angina/Rule out ACS               Lab Results: I have reviewed the following results:   Results from last 7 days   Lab Units 01/27/25  0612   WBC Thousand/uL 8.07   HEMOGLOBIN g/dL 11.1*   HEMATOCRIT % 33.5*   PLATELETS Thousands/uL 212   SEGS PCT % 57   LYMPHO PCT % 26   MONO PCT % 12   EOS PCT % 3     Results from last 7 days   Lab Units  01/27/25  0612 01/22/25  0544 01/21/25  0623   SODIUM mmol/L 134*   < > 138   POTASSIUM mmol/L 4.1   < > 3.9   CHLORIDE mmol/L 104   < > 107   CO2 mmol/L 23   < > 23   BUN mg/dL 22   < > 38*   CREATININE mg/dL 0.83   < > 1.13   ANION GAP mmol/L 7   < > 8   CALCIUM mg/dL 8.8   < > 8.9   ALBUMIN g/dL  --   --  3.8   TOTAL BILIRUBIN mg/dL  --   --  1.26*   ALK PHOS U/L  --   --  64   ALT U/L  --   --  54*   AST U/L  --   --  34   GLUCOSE RANDOM mg/dL 159*   < > 222*    < > = values in this interval not displayed.     Results from last 7 days   Lab Units 01/27/25  0612   INR  1.78*     Results from last 7 days   Lab Units 01/27/25  0716 01/26/25  2123 01/26/25  1617 01/26/25  1046 01/26/25  0710 01/25/25  2040 01/25/25  1539 01/25/25  1113 01/25/25  0742 01/24/25  1602 01/24/25  1112 01/24/25  0729   POC GLUCOSE mg/dl 152* 168* 200* 192* 136 243* 168* 200* 178* 180* 205* 168*         Results from last 7 days   Lab Units 01/20/25  2149 01/20/25  1706   LACTIC ACID mmol/L 0.9 1.0       Recent Cultures (last 7 days):         Imaging Results Review: I personally reviewed the following image studies/reports in PACS and discussed pertinent findings with Radiology: chest xray. My interpretation of the radiology images/reports is:  .  Other Study Results Review: EKG was reviewed.     Last 24 Hours Medication List:     Current Facility-Administered Medications:     acetaminophen (TYLENOL) tablet 650 mg, Q6H PRN    aluminum-magnesium hydroxide-simethicone (MAALOX) oral suspension 30 mL, Q4H PRN    Artificial Tears Op Soln 1 drop, Q4H PRN    aspirin (ECOTRIN LOW STRENGTH) EC tablet 81 mg, Daily    atorvastatin (LIPITOR) tablet 80 mg, Daily With Dinner    bisacodyl (DULCOLAX) rectal suppository 10 mg, Daily PRN    carvedilol (COREG) tablet 3.125 mg, BID With Meals    chlorhexidine (PERIDEX) 0.12 % oral rinse 15 mL, Q12H YNES    ciprofloxacin (CILOXAN) 0.3 % ophthalmic ointment, TID    clopidogrel (PLAVIX) tablet 75 mg, Daily     fentaNYL injection, PRN    heparin (porcine) 25,000 units in 0.45% NaCl 250 mL infusion (premix), Titrated, Last Rate: Stopped (01/27/25 0728)    insulin glargine (LANTUS) subcutaneous injection 20 Units 0.2 mL, HS    insulin lispro (HumALOG/ADMELOG) 100 units/mL subcutaneous injection 2-12 Units, 4x Daily (AC & HS) **AND** Fingerstick Glucose (POCT), 4x Daily AC and at bedtime    insulin lispro (HumALOG/ADMELOG) 100 units/mL subcutaneous injection 8 Units, TID With Meals    lidocaine (LIDODERM) 5 % patch 1 patch, Daily    lisinopril (ZESTRIL) tablet 2.5 mg, Daily    melatonin tablet 6 mg, HS    menthol-methyl salicylate (BENGAY) 10-15 % cream, 4x Daily PRN    ondansetron (ZOFRAN) injection 4 mg, Q6H PRN    oxyCODONE (ROXICODONE) split tablet 2.5 mg, Q4H PRN **OR** oxyCODONE (ROXICODONE) IR tablet 5 mg, Q4H PRN    QUEtiapine (SEROquel) tablet 25 mg, HS    warfarin (COUMADIN) tablet 10 mg, Daily (warfarin)    Administrative Statements   Today, Patient Was Seen By: Pancho Bettencourt MD  I have spent a total time of 30 minutes in caring for this patient on the day of the visit/encounter including Diagnostic results.  Thank you    **Please Note: This note may have been constructed using a voice recognition system.**

## 2025-01-27 NOTE — CASE MANAGEMENT
Case Management Discharge Planning Note    Patient name Joseph Aguilar  Location /-01 MRN [id number]  : 1955 Date 2025       Current Admission Date: 2025  Current Admission Diagnosis:Anterior STEMI s/p PCI with CHYNA to LM-ostial LAD and mLAD with IABP;  of LCx (2024)   Patient Active Problem List    Diagnosis Date Noted Date Diagnosed    Conjunctivitis 2025     Subtherapeutic anticoagulation 2025     ICM with EF 25% 2025     LV (left ventricular) mural thrombus 2025     Hematoma of right lower extremity 2025     Anterior STEMI s/p PCI with CHYNA to LM-ostial LAD and mLAD with IABP;  of LCx (2024) 2025     Morbid (severe) obesity due to excess calories (HCC) 10/07/2024     Glenohumeral arthritis, left 2022     Right foot pain 2022     Vitamin D deficiency 2022     Anemia 2022     Hypomagnesemia 2022     Closed nondisplaced intertrochanteric fracture of left femur with routine healing 2022     Pain in both feet 2022     Polyneuropathy associated with underlying disease (HCC) 2022     Slow transit constipation 2022     S/P TKR (total knee replacement), right 2018     Obesity (BMI 30-39.9) 10/29/2018     Primary osteoarthritis of both knees 2018     Glenohumeral arthritis, right 2016     Mixed hyperlipidemia 2013     History of hypertension 2012     Type 2 diabetes mellitus without complication, without long-term current use of insulin (HCC) 2012       LOS (days): 10  Geometric Mean LOS (GMLOS) (days): 3.8  Days to GMLOS:-5.9     OBJECTIVE:  Risk of Unplanned Readmission Score: 13.03         Current admission status: Inpatient   Preferred Pharmacy:   [facility]/pharmacy #[id number] - [place], [place] - [address]  [address]  [place] [address]  Phone: [phone] Fax: [phone]    [facility] - [place], [place] - [address]  [address]  [place] [address]  Phone: [phone] Fax: [phone]     [facility] - [address] [place] [address] STREET  [address] [place] [address]  [place] [address]  Phone: [phone] Fax: [phone]    [facility] - [place] - [address] [place] [address]  [place] [address]  Phone: [phone] Fax: [phone]    Primary Care Provider: No primary care provider on file.    Primary Insurance: MEDICARE  Secondary Insurance: COMMERCIAL MISCELLANEOUS    DISCHARGE DETAILS:    Discharge planning discussed with:: Patient at the bedside  [place] of Choice: Yes                                                                                IMM Given (Date):: 01/27/25  IMM Given to:: Patient     Additional Comments: IMM and Medicare rights reviewed with patient at the bedside. Patient verbalized understanding and signed original. Copy given to patient, signed original filed to medical records.

## 2025-01-27 NOTE — PHYSICAL THERAPY NOTE
Physical Therapy Cancellation Note         01/27/25 0950   PT Last Visit   PT Visit Date 01/27/25   Note Type   Note Type Cancelled Session   Cancel Reasons Medical status  (PT attempted to see this pt fro continued skilled therapy. Per chart review pt with elevated PTT level. Spoke with MD. PT session cancelled at this time.)     Jacinto Denton, PT, DPT

## 2025-01-27 NOTE — ASSESSMENT & PLAN NOTE
continue heparin infusion per cards will need warfarin given apical thrombus  Continue Coumadin  INR remains subtherapeutic at 1.7  But significantly improved  Repeat INR in the morning  Continue Coumadin 10 mg daily for now

## 2025-01-27 NOTE — ASSESSMENT & PLAN NOTE
Lab Results   Component Value Date    HGBA1C 8.2 (H) 01/18/2025       Recent Labs     01/26/25  1046 01/26/25  1617 01/26/25  2123 01/27/25  0716   POCGLU 192* 200* 168* 152*       Blood Sugar Average: Last 72 hrs:  (P) 182.5  Holding home antihyperglycemics  Lantus 20u qhs  mealtime lispro 8 u tid  Continue sliding scale insulin  Monitor blood glucose

## 2025-01-27 NOTE — ASSESSMENT & PLAN NOTE
Per Cards will need lifevest  need to utilize lower cost alternative for HF GDMT  Patient appears euvolemic  Continue low dose beta-blocker, lisinopril 2.5 qd,   If pt is taking Jardiance 25mg outpt, per CM [facility]. [facility] but states he does not want to travel to [place] where he believes the nearest one is.  Appreciate cardiology recommendations

## 2025-01-27 NOTE — PROGRESS NOTES
Primary LPN approached this nurse. Elevated PTT redrawn this am, Slim ordered for redraw and hold of heparin gtt until resulted. Will disregard initial result from this morning and continue dose per protocol and second result.

## 2025-01-28 LAB
ANION GAP SERPL CALCULATED.3IONS-SCNC: 9 MMOL/L (ref 4–13)
APTT PPP: 107 SECONDS (ref 23–34)
APTT PPP: 124 SECONDS (ref 23–34)
APTT PPP: 44 SECONDS (ref 23–34)
APTT PPP: 79 SECONDS (ref 23–34)
BASOPHILS # BLD AUTO: 0.04 THOUSANDS/ΜL (ref 0–0.1)
BASOPHILS NFR BLD AUTO: 1 % (ref 0–1)
BUN SERPL-MCNC: 24 MG/DL (ref 5–25)
CALCIUM SERPL-MCNC: 9.5 MG/DL (ref 8.4–10.2)
CHLORIDE SERPL-SCNC: 104 MMOL/L (ref 96–108)
CO2 SERPL-SCNC: 22 MMOL/L (ref 21–32)
CREAT SERPL-MCNC: 0.86 MG/DL (ref 0.6–1.3)
EOSINOPHIL # BLD AUTO: 0.24 THOUSAND/ΜL (ref 0–0.61)
EOSINOPHIL NFR BLD AUTO: 3 % (ref 0–6)
ERYTHROCYTE [DISTWIDTH] IN BLOOD BY AUTOMATED COUNT: 14.3 % (ref 11.6–15.1)
GFR SERPL CREATININE-BSD FRML MDRD: 89 ML/MIN/1.73SQ M
GLUCOSE SERPL-MCNC: 162 MG/DL (ref 65–140)
GLUCOSE SERPL-MCNC: 181 MG/DL (ref 65–140)
GLUCOSE SERPL-MCNC: 192 MG/DL (ref 65–140)
GLUCOSE SERPL-MCNC: 202 MG/DL (ref 65–140)
GLUCOSE SERPL-MCNC: 269 MG/DL (ref 65–140)
HCT VFR BLD AUTO: 33.2 % (ref 36.5–49.3)
HGB BLD-MCNC: 11 G/DL (ref 12–17)
IMM GRANULOCYTES # BLD AUTO: 0.04 THOUSAND/UL (ref 0–0.2)
IMM GRANULOCYTES NFR BLD AUTO: 1 % (ref 0–2)
INR PPP: 1.81 (ref 0.85–1.19)
LYMPHOCYTES # BLD AUTO: 2.11 THOUSANDS/ΜL (ref 0.6–4.47)
LYMPHOCYTES NFR BLD AUTO: 24 % (ref 14–44)
MAGNESIUM SERPL-MCNC: 1.5 MG/DL (ref 1.9–2.7)
MCH RBC QN AUTO: 31.2 PG (ref 26.8–34.3)
MCHC RBC AUTO-ENTMCNC: 33.1 G/DL (ref 31.4–37.4)
MCV RBC AUTO: 94 FL (ref 82–98)
MONOCYTES # BLD AUTO: 0.88 THOUSAND/ΜL (ref 0.17–1.22)
MONOCYTES NFR BLD AUTO: 10 % (ref 4–12)
NEUTROPHILS # BLD AUTO: 5.35 THOUSANDS/ΜL (ref 1.85–7.62)
NEUTS SEG NFR BLD AUTO: 61 % (ref 43–75)
NRBC BLD AUTO-RTO: 0 /100 WBCS
PLATELET # BLD AUTO: 262 THOUSANDS/UL (ref 149–390)
PMV BLD AUTO: 9.2 FL (ref 8.9–12.7)
POTASSIUM SERPL-SCNC: 4.1 MMOL/L (ref 3.5–5.3)
PROTHROMBIN TIME: 21.7 SECONDS (ref 12.3–15)
RBC # BLD AUTO: 3.53 MILLION/UL (ref 3.88–5.62)
SODIUM SERPL-SCNC: 135 MMOL/L (ref 135–147)
WBC # BLD AUTO: 8.66 THOUSAND/UL (ref 4.31–10.16)

## 2025-01-28 PROCEDURE — 85730 THROMBOPLASTIN TIME PARTIAL: CPT | Performed by: INTERNAL MEDICINE

## 2025-01-28 PROCEDURE — 82948 REAGENT STRIP/BLOOD GLUCOSE: CPT

## 2025-01-28 PROCEDURE — 97530 THERAPEUTIC ACTIVITIES: CPT

## 2025-01-28 PROCEDURE — 85610 PROTHROMBIN TIME: CPT | Performed by: INTERNAL MEDICINE

## 2025-01-28 PROCEDURE — 83735 ASSAY OF MAGNESIUM: CPT | Performed by: INTERNAL MEDICINE

## 2025-01-28 PROCEDURE — 85730 THROMBOPLASTIN TIME PARTIAL: CPT | Performed by: STUDENT IN AN ORGANIZED HEALTH CARE EDUCATION/TRAINING PROGRAM

## 2025-01-28 PROCEDURE — 99233 SBSQ HOSP IP/OBS HIGH 50: CPT | Performed by: STUDENT IN AN ORGANIZED HEALTH CARE EDUCATION/TRAINING PROGRAM

## 2025-01-28 PROCEDURE — 85025 COMPLETE CBC W/AUTO DIFF WBC: CPT | Performed by: INTERNAL MEDICINE

## 2025-01-28 PROCEDURE — 80048 BASIC METABOLIC PNL TOTAL CA: CPT | Performed by: INTERNAL MEDICINE

## 2025-01-28 PROCEDURE — 97535 SELF CARE MNGMENT TRAINING: CPT

## 2025-01-28 RX ORDER — LANOLIN ALCOHOL/MO/W.PET/CERES
800 CREAM (GRAM) TOPICAL ONCE
Status: COMPLETED | OUTPATIENT
Start: 2025-01-28 | End: 2025-01-28

## 2025-01-28 RX ORDER — MAGNESIUM SULFATE HEPTAHYDRATE 40 MG/ML
2 INJECTION, SOLUTION INTRAVENOUS ONCE
Status: COMPLETED | OUTPATIENT
Start: 2025-01-28 | End: 2025-01-28

## 2025-01-28 RX ADMIN — WARFARIN SODIUM 10 MG: 5 TABLET ORAL at 16:56

## 2025-01-28 RX ADMIN — MELATONIN 6 MG: 3 TAB ORAL at 21:26

## 2025-01-28 RX ADMIN — CHLORHEXIDINE GLUCONATE 0.12% ORAL RINSE 15 ML: 1.2 LIQUID ORAL at 09:08

## 2025-01-28 RX ADMIN — INSULIN LISPRO 2 UNITS: 100 INJECTION, SOLUTION INTRAVENOUS; SUBCUTANEOUS at 09:09

## 2025-01-28 RX ADMIN — INSULIN LISPRO 8 UNITS: 100 INJECTION, SOLUTION INTRAVENOUS; SUBCUTANEOUS at 16:55

## 2025-01-28 RX ADMIN — ASPIRIN 81 MG: 81 TABLET, COATED ORAL at 09:08

## 2025-01-28 RX ADMIN — INSULIN LISPRO 8 UNITS: 100 INJECTION, SOLUTION INTRAVENOUS; SUBCUTANEOUS at 14:08

## 2025-01-28 RX ADMIN — INSULIN GLARGINE 20 UNITS: 100 INJECTION, SOLUTION SUBCUTANEOUS at 21:26

## 2025-01-28 RX ADMIN — LIDOCAINE 5% 1 PATCH: 700 PATCH TOPICAL at 09:07

## 2025-01-28 RX ADMIN — ATORVASTATIN CALCIUM 80 MG: 40 TABLET, FILM COATED ORAL at 16:55

## 2025-01-28 RX ADMIN — Medication 800 MG: at 14:01

## 2025-01-28 RX ADMIN — INSULIN LISPRO 8 UNITS: 100 INJECTION, SOLUTION INTRAVENOUS; SUBCUTANEOUS at 09:10

## 2025-01-28 RX ADMIN — QUETIAPINE FUMARATE 25 MG: 25 TABLET ORAL at 21:26

## 2025-01-28 RX ADMIN — INSULIN LISPRO 6 UNITS: 100 INJECTION, SOLUTION INTRAVENOUS; SUBCUTANEOUS at 14:07

## 2025-01-28 RX ADMIN — CHLORHEXIDINE GLUCONATE 0.12% ORAL RINSE 15 ML: 1.2 LIQUID ORAL at 21:26

## 2025-01-28 RX ADMIN — HEPARIN SODIUM 10.1 UNITS/KG/HR: 10000 INJECTION, SOLUTION INTRAVENOUS at 13:59

## 2025-01-28 RX ADMIN — MAGNESIUM SULFATE HEPTAHYDRATE 2 G: 40 INJECTION, SOLUTION INTRAVENOUS at 14:00

## 2025-01-28 RX ADMIN — CLOPIDOGREL 75 MG: 75 TABLET ORAL at 09:08

## 2025-01-28 RX ADMIN — CARVEDILOL 3.12 MG: 3.12 TABLET, FILM COATED ORAL at 16:55

## 2025-01-28 RX ADMIN — INSULIN LISPRO 4 UNITS: 100 INJECTION, SOLUTION INTRAVENOUS; SUBCUTANEOUS at 16:55

## 2025-01-28 RX ADMIN — OXYCODONE HYDROCHLORIDE 5 MG: 5 TABLET ORAL at 21:35

## 2025-01-28 NOTE — PLAN OF CARE
Problem: PAIN - ADULT  Goal: Verbalizes/displays adequate comfort level or baseline comfort level  Description: Interventions:  - Encourage patient to monitor pain and request assistance  - Assess pain using appropriate pain scale  - Administer analgesics based on type and severity of pain and evaluate response  - Implement non-pharmacological measures as appropriate and evaluate response  - Consider cultural and social influences on pain and pain management  - Notify physician/advanced practitioner if interventions unsuccessful or patient reports new pain  Outcome: Progressing     Problem: INFECTION - ADULT  Goal: Absence or prevention of progression during hospitalization  Description: INTERVENTIONS:  - Assess and monitor for signs and symptoms of infection  - Monitor lab/diagnostic results  - Monitor all insertion sites, i.e. indwelling lines, tubes, and drains  - Monitor endotracheal if appropriate and nasal secretions for changes in amount and color  - [place] appropriate cooling/warming therapies per order  - Administer medications as ordered  - Instruct and encourage patient and family to use good hand hygiene technique  - Identify and instruct in appropriate isolation precautions for identified infection/condition  Outcome: Progressing  Goal: Absence of fever/infection during neutropenic period  Description: INTERVENTIONS:  - Monitor WBC    Outcome: Progressing     Problem: SAFETY ADULT  Goal: Patient will remain free of falls  Description: INTERVENTIONS:  - Educate patient/family on patient safety including physical limitations  - Instruct patient to call for assistance with activity   - Consult OT/PT to assist with strengthening/mobility   - Keep Call bell within reach  - Keep bed low and locked with side rails adjusted as appropriate  - Keep care items and personal belongings within reach  - Initiate and maintain comfort rounds  - Make Fall Risk Sign visible to staff  - Offer Toileting every 2 Hours,  in advance of need  - Initiate/Maintain bed alarm  - Obtain necessary fall risk management equipment: walker  - Apply yellow socks and bracelet for high fall risk patients  - Consider moving patient to room near nurses station  Outcome: Progressing  Goal: Maintain or return to baseline ADL function  Description: INTERVENTIONS:  -  Assess patient's ability to carry out ADLs; assess patient's baseline for ADL function and identify physical deficits which impact ability to perform ADLs (bathing, care of mouth/teeth, toileting, grooming, dressing, etc.)  - Assess/evaluate cause of self-care deficits   - Assess range of motion  - Assess patient's mobility; develop plan if impaired  - Assess patient's need for assistive devices and provide as appropriate  - Encourage maximum independence but intervene and supervise when necessary  - Involve family in performance of ADLs  - Assess for home care needs following discharge   - Consider OT consult to assist with ADL evaluation and planning for discharge  - Provide patient education as appropriate  Outcome: Progressing  Goal: Maintains/Returns to pre admission functional level  Description: INTERVENTIONS:  - Perform AM-PAC 6 Click Basic Mobility/ Daily Activity assessment daily.  - Set and communicate daily mobility goal to care team and patient/family/caregiver.   - Collaborate with rehabilitation services on mobility goals if consulted  - Perform Range of Motion 2 times a day.  - Reposition patient every 2 hours.  - Dangle patient 2 times a day  - Stand patient 2 times a day  - Ambulate patient 2 times a day  - Out of bed to chair 2 times a day   - Out of bed for meals 3 times a day  - Out of bed for toileting  - Record patient progress and toleration of activity level   Outcome: Progressing

## 2025-01-28 NOTE — OCCUPATIONAL THERAPY NOTE
Occupational Therapy Progress Note     Patient Name: Joseph Aguilar  Today's Date: 1/28/2025  Problem List  Principal Problem:    Anterior STEMI s/p PCI with CHYNA to LM-ostial LAD and mLAD with IABP;  of LCx (1/17/2024)  Active Problems:    History of hypertension    Mixed hyperlipidemia    Type 2 diabetes mellitus without complication, without long-term current use of insulin (HCC)    ICM with EF 25%    LV (left ventricular) mural thrombus    Hematoma of right lower extremity    Subtherapeutic anticoagulation    Conjunctivitis          01/28/25 1029   OT Last Visit   OT Visit Date 01/28/25   Note Type   Note Type Treatment for insurance authorization   Pain Assessment   Pain Assessment Tool FLACC   Pain Score No Pain   Pain Location/Orientation Location: Back   Pain Onset/Description Onset: Ongoing   Patient's Stated Pain Goal No pain   Hospital Pain Intervention(s) Repositioned   Pain Rating: FLACC (Rest) - Face 0   Pain Rating: FLACC (Rest) - Legs 0   Pain Rating: FLACC (Rest) - Activity 0   Pain Rating: FLACC (Rest) - Cry 0   Pain Rating: FLACC (Rest) - Consolability 0   Score: FLACC (Rest) 0   Pain Rating: FLACC (Activity) - Face 1   Pain Rating: FLACC (Activity) - Legs 1   Pain Rating: FLACC (Activity) - Activity 1   Pain Rating: FLACC (Activity) - Cry 1   Pain Rating: FLACC (Activity) - Consolability 1   Score: FLACC (Activity) 5   Restrictions/Precautions   Weight Bearing Precautions Per Order No   Other Precautions Chair Alarm;Bed Alarm;Multiple lines;Pain;Fall Risk   ADL   Where Assessed Other (Comment)  (Assist levels for some self care tasks are based on functional assessment of performance skills and deficits observed during session.)   Eating Assistance 6  Modified independent   Eating Deficit Setup   Grooming Assistance 5  Supervision/Setup   Grooming Deficit Setup;Supervision/safety;Increased time to complete;Wash/dry hands;Wash/dry face;Brushing hair  (increased time)   UB Bathing Assistance 4   Minimal Assistance   UB Bathing Deficit Setup;Supervision/safety;Increased time to complete  (seated)   LB Bathing Assistance 3  Moderate Assistance   LB Bathing Deficit Setup;Supervision/safety;Increased time to complete;Perineal area;Buttocks;Right lower leg including foot;Left lower leg including foot  (pt able to wash groin and upper legs)   UB Dressing Assistance 4  Minimal Assistance   UB Dressing Deficit Setup;Supervision/safety;Increased time to complete  (seated)   LB Dressing Assistance 2  Maximal Assistance   LB Dressing Deficit Setup;Supervision/safety;Increased time to complete;Don/doff R sock;Don/doff L sock;Thread RLE into pants;Thread LLE into pants   Toileting Assistance  3  Moderate Assistance   Toileting Deficit Setup;Supervison/safety;Increased time to complete;Perineal hygiene   Bed Mobility   Rolling R 4  Minimal assistance   Additional items Assist x 1;Bedrails;Increased time required;Verbal cues   Rolling L 4  Minimal assistance   Additional items Assist x 1;Bedrails;Increased time required;Verbal cues   Supine to Sit 4  Minimal assistance   Additional items Assist x 1;Bedrails;HOB elevated;Increased time required;Verbal cues   Sit to Supine 4  Minimal assistance   Additional items Assist x 1;Increased time required;Verbal cues;HOB elevated   Transfers   Additional Comments Pt declined to perform STS transfer this date 2/2 pain and fatigue. Pt did sit EOB for ~18 minutes during bathing ADLs and supervision   Subjective   Subjective Pt agreeable to therapy treatment session   Cognition   Overall Cognitive Status WFL   Arousal/Participation Alert;Responsive   Attention Attends with cues to redirect   Orientation Level Oriented X4   Memory Decreased recall of recent events   Following Commands Follows multistep commands without difficulty   Comments Pt with labile, depressed mood   Activity Tolerance   Activity Tolerance Patient limited by pain;Patient limited by fatigue   Medical Staff Made  Aware Jacinto PT  (Pt seen for co-treatment with Physical Therapist due to pt's medical complexity, functional limitations and limited activity tolerance.)   Assessment   Assessment Pt seen this date for skilled OT session focused on ADLs, functional transfers and mobility, safety education. The patient was received supine in bed, NAD, PIV access, tele, masimo, on RA. He participated in functional bed mobility, bathing UB/LB while seated EOB, and UB dressing while supine. Pt required Minimal Assistance x1 for bed mobility, and Minimal Assistance for bathing ADLs, Maximal Assistance for LB ADLs. At end of session the patient was located supine in bed with call bell in reach and all needs met. Overall the patient remains below his functional baseline, and is primarily limited at this time due to pain, generalized deconditioning, RLE weakness, and decreased activity tolerance. OT will continue to follow while acute to address POC. At this time, recommend Level 1 Maximum Resource Intensity upon d/c.   Plan   Treatment Interventions ADL retraining;Functional transfer training;UE strengthening/ROM;Endurance training;Patient/family training;Equipment evaluation/education   Goal Expiration Date 02/06/25   OT Treatment Day 1   OT Frequency 3-5x/wk   Discharge Recommendation   Rehab Resource Intensity Level, OT I (Maximum Resource Intensity)   AM-PAC Daily Activity Inpatient   Lower Body Dressing 2   Bathing 2   Toileting 2   Upper Body Dressing 3   Grooming 3   Eating 4   Daily Activity Raw Score 16   Daily Activity Standardized Score (Calc for Raw Score >=11) 35.96   AM-PAC Applied Cognition Inpatient   Following a Speech/Presentation 4   Understanding Ordinary Conversation 4   Taking Medications 3   Remembering Where Things Are Placed or Put Away 4   Remembering List of 4-5 Errands 3   Taking Care of Complicated Tasks 3   Applied Cognition Raw Score 21   Applied Cognition Standardized Score 44.3       Cullen Ramsay, OTR/L

## 2025-01-28 NOTE — CASE MANAGEMENT
Case Management Progress Note    Patient name Joseph Aguilar  Location /-01 MRN [id number]  : 1955 Date 2025       LOS (days): 11  Geometric Mean LOS (GMLOS) (days): 3.8  Days to GMLOS:-7        OBJECTIVE:        Current admission status: Inpatient  Preferred Pharmacy:   [facility]/pharmacy #[id number] - [place], [place] - [address]  [address] [place] [address]  Phone: [phone] Fax: [phone]    [facility]  [place], [place] - [address]  [address]  [place] [address]  Phone: [phone] Fax: [phone]     [facility] [place] [facility] - [address] [place] [address] [place] [address]  Phone: [phone] Fax: [phone]    [facility] - [place] - [address]  [address]  [place] [address]  Phone: [phone] Fax: [phone]    Primary Care Provider: No primary care provider on file.    Primary Insurance: MEDICARE  Secondary Insurance: COMMERCIAL MISCELLANEOUS    PROGRESS NOTE:  CM updated [facility] on INR status and delay in DC until tomorrow.

## 2025-01-28 NOTE — ASSESSMENT & PLAN NOTE
continue heparin infusion per cards will need warfarin given apical thrombus  Continue Coumadin  INR remains subtherapeutic at 1.8  But significantly improved  Repeat INR in the morning  Continue Coumadin 10 mg daily for now

## 2025-01-28 NOTE — PLAN OF CARE
Problem: OCCUPATIONAL THERAPY ADULT  Goal: Performs self-care activities at highest level of function for planned discharge setting.  See evaluation for individualized goals.  Description: Treatment Interventions: ADL retraining, Functional transfer training, UE strengthening/ROM, Endurance training, Patient/family training, Equipment evaluation/education          See flowsheet documentation for full assessment, interventions and recommendations.   Outcome: Progressing  Note: Limitation: Decreased ADL status, Decreased endurance, Decreased high-level ADLs     Assessment: Pt seen this date for skilled OT session focused on ADLs, functional transfers and mobility, safety education. The patient was received supine in bed, NAD, PIV access, tele, masimo, on RA. He participated in functional bed mobility, bathing UB/LB while seated EOB, and UB dressing while supine. Pt required Minimal Assistance x1 for bed mobility, and Minimal Assistance for bathing ADLs, Maximal Assistance for LB ADLs. At end of session the patient was located supine in bed with call bell in reach and all needs met. Overall the patient remains below his functional baseline, and is primarily limited at this time due to pain, generalized deconditioning, RLE weakness, and decreased activity tolerance. OT will continue to follow while acute to address POC. At this time, recommend Level 1 Maximum Resource Intensity upon d/c.     Rehab Resource Intensity Level, OT: I (Maximum Resource Intensity)        IDRIS Vega/L

## 2025-01-28 NOTE — PLAN OF CARE
Problem: SAFETY ADULT  Goal: Patient will remain free of falls  Description: INTERVENTIONS:  - Educate patient/family on patient safety including physical limitations  - Instruct patient to call for assistance with activity   - Consult OT/PT to assist with strengthening/mobility   - Keep Call bell within reach  - Keep bed low and locked with side rails adjusted as appropriate  - Keep care items and personal belongings within reach  - Initiate and maintain comfort rounds  - Make Fall Risk Sign visible to staff  - Offer Toileting every 2 Hours, in advance of need  - Initiate/Maintain bed alarm  - Obtain necessary fall risk management equipment  - Apply yellow socks and bracelet for high fall risk patients  - Consider moving patient to room near nurses station  Outcome: Progressing     Problem: DISCHARGE PLANNING  Goal: Discharge to home or other facility with appropriate resources  Description: INTERVENTIONS:  - Identify barriers to discharge w/patient and caregiver  - Arrange for needed discharge resources and transportation as appropriate  - Identify discharge learning needs (meds, wound care, etc.)  - Arrange for interpretive services to assist at discharge as needed  - Refer to Case Management Department for coordinating discharge planning if the patient needs post-hospital services based on physician/advanced practitioner order or complex needs related to functional status, cognitive ability, or social support system  Outcome: Progressing     Problem: Knowledge Deficit  Goal: Patient/family/caregiver demonstrates understanding of disease process, treatment plan, medications, and discharge instructions  Description: Complete learning assessment and assess knowledge base.  Interventions:  - Provide teaching at level of understanding  - Provide teaching via preferred learning methods  Outcome: Progressing

## 2025-01-28 NOTE — PHYSICAL THERAPY NOTE
PHYSICAL THERAPY NOTE          Patient Name: Joseph Aguilar  Today's Date: 1/28/2025 01/28/25 1103   PT Last Visit   PT Visit Date 01/28/25   Note Type   Note Type Treatment for insurance authorization   Pain Assessment   Pain Assessment Tool FLACC   Pain Location/Orientation Location: Back   Pain Rating: FLACC (Rest) - Face 0   Pain Rating: FLACC (Rest) - Legs 0   Pain Rating: FLACC (Rest) - Activity 0   Pain Rating: FLACC (Rest) - Cry 0   Pain Rating: FLACC (Rest) - Consolability 0   Score: FLACC (Rest) 0   Pain Rating: FLACC (Activity) - Face 1   Pain Rating: FLACC (Activity) - Legs 1   Pain Rating: FLACC (Activity) - Activity 1   Pain Rating: FLACC (Activity) - Cry 1   Pain Rating: FLACC (Activity) - Consolability 1   Score: FLACC (Activity) 5   Restrictions/Precautions   Weight Bearing Precautions Per Order No   Other Precautions Chair Alarm;Bed Alarm;Fall Risk;Pain;Multiple lines   General   Chart Reviewed Yes   Response to Previous Treatment Patient with no complaints from previous session.   Family/Caregiver Present No   Cognition   Overall Cognitive Status WFL   Arousal/Participation Alert;Cooperative   Attention Attends with cues to redirect   Orientation Level Oriented X4   Memory Decreased short term memory;Decreased recall of recent events;Decreased recall of precautions   Following Commands Follows multistep commands with increased time or repetition   Comments pt agreeable to PT session   Bed Mobility   Rolling R 4  Minimal assistance   Additional items Assist x 1;Bedrails;Increased time required;Verbal cues   Rolling L 4  Minimal assistance   Additional items Assist x 1;Bedrails;Increased time required;Verbal cues   Supine to Sit 4  Minimal assistance   Additional items Assist x 1;Bedrails;HOB elevated;Increased time required;Verbal cues   Sit to Supine 4  Minimal assistance   Additional items Assist x  1;Increased time required;Verbal cues;Bedrails;HOB elevated   Additional Comments Pt sat EOB for ~18 minutes with no LOB. Pt with weight shifts when sitting EOB and varying amounts of UE support on stable surface.   Transfers   Additional Comments Pt refused STS with assistance or with landon stedy. Pt with increased pain after sitting EOB for some time and returned to supine.   Balance   Static Sitting Fair -   Dynamic Sitting Fair -   Endurance Deficit   Endurance Deficit Yes   Activity Tolerance   Activity Tolerance Patient limited by fatigue   Medical Staff Made Aware Pt seen as co-evaluation with MARTHA Berman due to pt's co-morbidities, decreased activity tolerance, and possible assist of 2 for functional mobility   Assessment   Prognosis Good   Problem List Decreased strength;Decreased endurance;Impaired balance;Decreased mobility;Decreased cognition;Pain   Assessment Chart review and two person identifiers were completed. Pt seen for PT treatment session this date, consisting of ther act focused on bed mobility, sitting balance, sitting endurence .Pt greeted supine in bed and agreeable to PT session. Pt completed supine to sit with min A x1. Pt remained sitting EOB for ~18 minutes with no LOB. Pt with dynamic sitting with weight shifts in all directions. Pt with increased back pain when sitting EOB. Pt refusing standing trial with landon stedy and assistance. Once in supine pt refused LE exercises. Pt completed rolls bilaterally with min A x1.  Pt ended session supine in bed with alarm activated and all needs within reach. Spoke to RN about session outcomes. Pertinent barriers during this session include pain. Current goals and POC established on IE remain appropriate. Pt prognosis for achieving goals is good, pending pt progress with hospitalization/medical status improvements, and indicated by previous response to intervention. Pt limited d/t fear of pain provocation. Pt continues to be functioning below baseline  level, and remains limited due to factors listed above. PT will continue to see pt during current hospitalization in order to address the deficits listed above and provide interventions consistent w/ POC in effort to achieve STGs. PT recommends level 1, maximum resource intensity upon discharge.   Barriers to Discharge Inaccessible home environment;Decreased caregiver support   Goals   STG Expiration Date 01/31/25   PT Treatment Day 2   Plan   Treatment/Interventions Functional transfer training;LE strengthening/ROM;Therapeutic exercise;Endurance training;Patient/family training;Equipment eval/education;Bed mobility;Continued evaluation;OT   Progress Slow progress, decreased activity tolerance   PT Frequency 3-5x/wk   Discharge Recommendation   Rehab Resource Intensity Level, PT I (Maximum Resource Intensity)   AM-PAC Basic Mobility Inpatient   Turning in Flat Bed Without Bedrails 3   Lying on Back to Sitting on Edge of Flat Bed Without Bedrails 3   Moving Bed to Chair 1   Standing Up From Chair Using Arms 1   Walk in Room 1   Climb 3-5 Stairs With Railing 1   Basic Mobility Inpatient Raw Score 10   Turning Head Towards Sound 3   Follow Simple Instructions 3   Low Function Basic Mobility Raw Score  16   Low Function Basic Mobility Standardized Score  25.72   [facility] Highest Level Of Mobility   -HLM Goal 4: Move to chair/commode   -HLM Achieved 3: Sit at edge of bed   Education   Education Provided Mobility training   Patient Reinforcement needed   End of Consult   Patient Position at End of Consult All needs within reach;Supine;Bed/Chair alarm activated     Jacinto Denton, PT, DPT

## 2025-01-28 NOTE — ASSESSMENT & PLAN NOTE
Lab Results   Component Value Date    HGBA1C 8.2 (H) 01/18/2025       Recent Labs     01/27/25  1622 01/27/25  2120 01/28/25  0645 01/28/25  1055   POCGLU 203* 244* 181* 269*       Blood Sugar Average: Last 72 hrs:  (P) [id number]  Holding home antihyperglycemics  Lantus 20u qhs  mealtime lispro 8 u tid  Continue sliding scale insulin  Monitor blood glucose

## 2025-01-28 NOTE — PROGRESS NOTES
Progress Note - Hospitalist   Name: Joseph Aguilar 69 y.o. male I MRN: [id number]  Unit/Bed#: -01 I Date of Admission: 1/17/2025   Date of Service: 1/28/2025 I Hospital Day: 11    Assessment & Plan  Anterior STEMI s/p PCI with CHYNA to LM-ostial LAD and mLAD with IABP;  of LCx (1/17/2024)  Status post drug-eluting stent to the LAD and left main, unable to intervene on the circumflex artery.  Significant hypotension in the Cath Lab requiring pressors and intra-aortic balloon pump support  IABP discontinued on 1/20  Continue aspirin, Plavix, statin  Continue Coreg 3.125 bid  Appreciate cardiology recommendations  History of hypertension  BP controlled  Continue lisinopril, beta-blocker  Mixed hyperlipidemia  Continue statin  Type 2 diabetes mellitus without complication, without long-term current use of insulin ([facility])  Lab Results   Component Value Date    HGBA1C 8.2 (H) 01/18/2025       Recent Labs     01/27/25  1622 01/27/25  2120 01/28/25  0645 01/28/25  1055   POCGLU 203* 244* 181* 269*       Blood Sugar Average: Last 72 hrs:  (P) 190.4939498402171568  Holding home antihyperglycemics  Lantus 20u qhs  mealtime lispro 8 u tid  Continue sliding scale insulin  Monitor blood glucose  ICM with EF 25%  Per Cards will need lifevest  need to utilize lower cost alternative for HF GDMT  Patient appears euvolemic  Continue low dose beta-blocker, lisinopril 2.5 qd,   If pt is taking Jardiance 25mg outpt, per CM pt [facility]. [facility]ed [facility] but states he does not want to travel to [place] where he believes the nearest one is.  Appreciate cardiology recommendations  LV (left ventricular) mural thrombus  continue heparin infusion per cards will need warfarin given apical thrombus  Continue Coumadin  INR remains subtherapeutic at 1.8  But significantly improved  Repeat INR in the morning  Continue Coumadin 10 mg daily for now  Hematoma of right lower  extremity  Patient s/p surgical closure of right femoral artery due to hematoma related to intra-aortic balloon pump  Appreciate vascular surgery recommendations  Subtherapeutic anticoagulation  Warfarin started given pt mural thrombus. Will increase to 10mg qd with daily pt/inr goal inr 2-3 per AHA guidelines  Remains on heparin gtt for time being  Watch for bleeding at previous IABP site / given on triple therapy with additional heparin gtt  Continue Coumadin  Follow-up INR tomorrow  Conjunctivitis  Unclear viral versus bacterial  Much improved  Continue Cipro eyedrops    VTE Pharmacologic Prophylaxis: VTE Score: 2 Moderate Risk (Score 3-4) - Pharmacological DVT Prophylaxis Ordered: heparin drip.    Mobility:   Basic Mobility Inpatient Raw Score: 10  -[facility] Goal: 4: Move to chair/commode  JH-HLM Achieved: 3: Sit at edge of bed  JH-HLM Goal NOT achieved. Continue with multidisciplinary rounding and encourage appropriate mobility to improve upon -[facility] goals.    Patient Centered Rounds: I performed bedside rounds with nursing staff today.   Discussions with Specialists or Other Care Team Provider:     Education and Discussions with Family / Patient:  Updated patient.     Current Length of Stay: 11 day(s)  Current Patient Status: Inpatient   Certification Statement: The patient will continue to require additional inpatient hospital stay due to IV heparin with Coumadin bridging  Discharge Plan: Anticipate discharge in 24-48 hrs to rehab facility.    Code Status: Level 1 - Full Code    Subjective   Patient seen and examined at bedside  No nausea, vomiting, diarrhea    Objective :  Temp:  [98.6 °F (37 °C)-99.5 °F (37.5 °C)] 98.9 °F (37.2 °C)  HR:  [84-89] 88  BP: ()/(64-82) 112/75  Resp:  [15-20] 16  SpO2:  [96 %-98 %] 96 %  O2 Device: None (Room air)    Body mass index is 29.3 kg/m².     Input and Output Summary (last 24 hours):     Intake/Output Summary (Last 24 hours) at 1/28/2025 1524  Last data  filed at 1/28/2025 1233  Gross per 24 hour   Intake 1110 ml   Output 850 ml   Net 260 ml       Physical Exam  Constitutional: no Acute distress  HEENT: Pallor or icterus  CVS: S1 plus S2  Respiratory: Normal vascular breathe without crackles and wheeze  Gastroenterology: Soft nontender without any palpable mass  Skin: Bruise which is improving on the right femoral area due to prior intra-aortic balloon pump placement  Neurology: No focal logical deficit     Lines/Drains:        Telemetry:  Telemetry Orders (From admission, onward)               LifeVest Patient: Continuous Telemetry Monitoring during hospitalization (non-expiring)  Continuous LifeVest Telemetry Monitoring        References:    LifeVest Policy                     Telemetry Reviewed: Normal Sinus Rhythm  Indication for Continued Telemetry Use: Metabolic/electrolyte disturbance with high probability of dysrhythmia               Lab Results: I have reviewed the following results:   Results from last 7 days   Lab Units 01/28/25  0226   WBC Thousand/uL 8.66   HEMOGLOBIN g/dL 11.0*   HEMATOCRIT % 33.2*   PLATELETS Thousands/uL 262   SEGS PCT % 61   LYMPHO PCT % 24   MONO PCT % 10   EOS PCT % 3     Results from last 7 days   Lab Units 01/28/25  0226   SODIUM mmol/L 135   POTASSIUM mmol/L 4.1   CHLORIDE mmol/L 104   CO2 mmol/L 22   BUN mg/dL 24   CREATININE mg/dL 0.86   ANION GAP mmol/L 9   CALCIUM mg/dL 9.5   GLUCOSE RANDOM mg/dL 162*     Results from last 7 days   Lab Units 01/28/25  0226   INR  1.81*     Results from last 7 days   Lab Units 01/28/25  1055 01/28/25  0645 01/27/25  2120 01/27/25  1622 01/27/25  1048 01/27/25  0716 01/26/25  2123 01/26/25  1617 01/26/25  1046 01/26/25  0710 01/25/25  2040 01/25/25  1539   POC GLUCOSE mg/dl 269* 181* 244* 203* 138 152* 168* 200* 192* 136 243* 168*               Recent Cultures (last 7 days):         Imaging Results Review: I reviewed radiology reports from this admission including: chest xray.  Other Study  Results Review: My interpretation of other studies include: CBC and BMP.    Last 24 Hours Medication List:     Current Facility-Administered Medications:     acetaminophen (TYLENOL) tablet 650 mg, Q6H PRN    aluminum-magnesium hydroxide-simethicone (MAALOX) oral suspension 30 mL, Q4H PRN    Artificial Tears Op Soln 1 drop, Q4H PRN    aspirin (ECOTRIN LOW STRENGTH) EC tablet 81 mg, Daily    atorvastatin (LIPITOR) tablet 80 mg, Daily With Dinner    bisacodyl (DULCOLAX) rectal suppository 10 mg, Daily PRN    carvedilol (COREG) tablet 3.125 mg, BID With Meals    chlorhexidine (PERIDEX) 0.12 % oral rinse 15 mL, Q12H YNES    ciprofloxacin (CILOXAN) 0.3 % ophthalmic ointment, TID    clopidogrel (PLAVIX) tablet 75 mg, Daily    fentaNYL injection, PRN    heparin (porcine) 25,000 units in 0.45% NaCl 250 mL infusion (premix), Titrated, Last Rate: 10.1 Units/kg/hr (01/28/25 1359)    insulin glargine (LANTUS) subcutaneous injection 20 Units 0.2 mL, HS    insulin lispro (HumALOG/ADMELOG) 100 units/mL subcutaneous injection 2-12 Units, 4x Daily (AC & HS) **AND** Fingerstick Glucose (POCT), 4x Daily AC and at bedtime    insulin lispro (HumALOG/ADMELOG) 100 units/mL subcutaneous injection 8 Units, TID With Meals    lidocaine (LIDODERM) 5 % patch 1 patch, Daily    lisinopril (ZESTRIL) tablet 2.5 mg, Daily    magnesium sulfate 2 g/50 mL IVPB (premix) 2 g, Once, Last Rate: 2 g (01/28/25 1400)    melatonin tablet 6 mg, HS    menthol-methyl salicylate (BENGAY) 10-15 % cream, 4x Daily PRN    ondansetron (ZOFRAN) injection 4 mg, Q6H PRN    oxyCODONE (ROXICODONE) split tablet 2.5 mg, Q4H PRN **OR** oxyCODONE (ROXICODONE) IR tablet 5 mg, Q4H PRN    QUEtiapine (SEROquel) tablet 25 mg, HS    warfarin (COUMADIN) tablet 10 mg, Daily (warfarin)    Administrative Statements   Today, Patient Was Seen By: Joel Bustamante MD      **Please Note: This note may have been constructed using a voice recognition system.**

## 2025-01-28 NOTE — PLAN OF CARE
Problem: PHYSICAL THERAPY ADULT  Goal: Performs mobility at highest level of function for planned discharge setting.  See evaluation for individualized goals.  Description: Treatment/Interventions: Functional transfer training, LE strengthening/ROM, Therapeutic exercise, Endurance training, Patient/family training, Equipment eval/education, Bed mobility, Continued evaluation, Spoke to nursing, OT          See flowsheet documentation for full assessment, interventions and recommendations.  Note: Prognosis: Good  Problem List: Decreased strength, Decreased endurance, Impaired balance, Decreased mobility, Decreased cognition, Pain  Assessment: Chart review and two person identifiers were completed. Pt seen for PT treatment session this date, consisting of ther act focused on bed mobility, sitting balance, sitting endurence .Pt greeted supine in bed and agreeable to PT session. Pt completed supine to sit with min A x1. Pt remained sitting EOB for ~18 minutes with no LOB. Pt with dynamic sitting with weight shifts in all directions. Pt with increased back pain when sitting EOB. Pt refusing standing trial with landon stedy and assistance. Once in supine pt refused LE exercises. Pt completed rolls bilaterally with min A x1.  Pt ended session supine in bed with alarm activated and all needs within reach. Spoke to RN about session outcomes. Pertinent barriers during this session include pain. Current goals and POC established on IE remain appropriate. Pt prognosis for achieving goals is good, pending pt progress with hospitalization/medical status improvements, and indicated by previous response to intervention. Pt limited d/t fear of pain provocation. Pt continues to be functioning below baseline level, and remains limited due to factors listed above. PT will continue to see pt during current hospitalization in order to address the deficits listed above and provide interventions consistent w/ POC in effort to achieve STGs.  PT recommends level 1, maximum resource intensity upon discharge.  Barriers to Discharge: Inaccessible home environment, Decreased caregiver support     Rehab Resource Intensity Level, PT: I (Maximum Resource Intensity)    See flowsheet documentation for full assessment.

## 2025-01-28 NOTE — ASSESSMENT & PLAN NOTE
Per Cards will need lifevest  need to utilize lower cost alternative for HF GDMT  Patient appears euvolemic  Continue low dose beta-blocker, lisinopril 2.5 qd,   If pt is taking Jardiance 25mg outpt, per CM pt [facility]. [facility]ed [facility] but states he does not want to travel to [place] where he believes the nearest one is.  Appreciate cardiology recommendations

## 2025-01-29 ENCOUNTER — HOSPITAL ENCOUNTER (INPATIENT)
Facility: HOSPITAL | Age: 70
LOS: 12 days | Discharge: HOME | DRG: 282 | End: 2025-02-10
Attending: INTERNAL MEDICINE | Admitting: INTERNAL MEDICINE
Payer: MEDICARE

## 2025-01-29 VITALS
SYSTOLIC BLOOD PRESSURE: 118 MMHG | HEART RATE: 90 BPM | HEIGHT: 74 IN | WEIGHT: 226.85 LBS | RESPIRATION RATE: 15 BRPM | OXYGEN SATURATION: 93 % | DIASTOLIC BLOOD PRESSURE: 79 MMHG | BODY MASS INDEX: 29.11 KG/M2 | TEMPERATURE: 98.1 F

## 2025-01-29 DIAGNOSIS — I21.01 ACUTE ST ELEVATION MYOCARDIAL INFARCTION (STEMI) INVOLVING LEFT MAIN CORONARY ARTERY (HCC): Primary | ICD-10-CM

## 2025-01-29 DIAGNOSIS — E66.9 OBESITY (BMI 30-39.9): ICD-10-CM

## 2025-01-29 DIAGNOSIS — G63 POLYNEUROPATHY ASSOCIATED WITH UNDERLYING DISEASE (HCC): ICD-10-CM

## 2025-01-29 DIAGNOSIS — I25.5 ISCHEMIC CARDIOMYOPATHY: ICD-10-CM

## 2025-01-29 DIAGNOSIS — E66.01 MORBID (SEVERE) OBESITY DUE TO EXCESS CALORIES (HCC): ICD-10-CM

## 2025-01-29 DIAGNOSIS — I51.3 LV (LEFT VENTRICULAR) MURAL THROMBUS: ICD-10-CM

## 2025-01-29 PROBLEM — M54.9 CHRONIC BACK PAIN: Status: ACTIVE | Noted: 2025-01-29

## 2025-01-29 PROBLEM — R26.2 AMBULATORY DYSFUNCTION: Status: ACTIVE | Noted: 2025-01-29

## 2025-01-29 PROBLEM — M15.9 OSTEOARTHRITIS OF MULTIPLE JOINTS: Status: ACTIVE | Noted: 2025-01-29

## 2025-01-29 PROBLEM — G89.29 CHRONIC BACK PAIN: Status: ACTIVE | Noted: 2025-01-29

## 2025-01-29 PROBLEM — K59.00 CONSTIPATION: Status: ACTIVE | Noted: 2025-01-29

## 2025-01-29 LAB
APTT PPP: 78 SECONDS (ref 23–34)
GLUCOSE SERPL-MCNC: 162 MG/DL (ref 65–140)
GLUCOSE SERPL-MCNC: 165 MG/DL (ref 65–140)
GLUCOSE SERPL-MCNC: 167 MG/DL (ref 65–140)
GLUCOSE SERPL-MCNC: 218 MG/DL (ref 65–140)
INR PPP: 2.22 (ref 0.85–1.19)
PROTHROMBIN TIME: 25.3 SECONDS (ref 12.3–15)

## 2025-01-29 PROCEDURE — 99239 HOSP IP/OBS DSCHRG MGMT >30: CPT | Performed by: STUDENT IN AN ORGANIZED HEALTH CARE EDUCATION/TRAINING PROGRAM

## 2025-01-29 PROCEDURE — 82948 REAGENT STRIP/BLOOD GLUCOSE: CPT

## 2025-01-29 PROCEDURE — 99223 1ST HOSP IP/OBS HIGH 75: CPT | Performed by: INTERNAL MEDICINE

## 2025-01-29 PROCEDURE — 85610 PROTHROMBIN TIME: CPT | Performed by: STUDENT IN AN ORGANIZED HEALTH CARE EDUCATION/TRAINING PROGRAM

## 2025-01-29 PROCEDURE — 85730 THROMBOPLASTIN TIME PARTIAL: CPT | Performed by: STUDENT IN AN ORGANIZED HEALTH CARE EDUCATION/TRAINING PROGRAM

## 2025-01-29 PROCEDURE — NC001 PR NO CHARGE: Performed by: INTERNAL MEDICINE

## 2025-01-29 RX ORDER — OXYCODONE HYDROCHLORIDE 5 MG/1
5 TABLET ORAL EVERY 4 HOURS PRN
Refills: 0 | Status: DISCONTINUED | OUTPATIENT
Start: 2025-01-29 | End: 2025-01-29

## 2025-01-29 RX ORDER — LANOLIN ALCOHOL/MO/W.PET/CERES
400 CREAM (GRAM) TOPICAL 2 TIMES DAILY
Status: DISCONTINUED | OUTPATIENT
Start: 2025-01-29 | End: 2025-02-03

## 2025-01-29 RX ORDER — ACETAMINOPHEN 325 MG/1
650 TABLET ORAL EVERY 6 HOURS PRN
Status: DISCONTINUED | OUTPATIENT
Start: 2025-01-29 | End: 2025-02-10 | Stop reason: HOSPADM

## 2025-01-29 RX ORDER — CARVEDILOL 3.12 MG/1
3.12 TABLET ORAL 2 TIMES DAILY WITH MEALS
Qty: 60 TABLET | Refills: 0 | Status: ON HOLD
Start: 2025-01-29 | End: 2025-02-06

## 2025-01-29 RX ORDER — ATORVASTATIN CALCIUM 80 MG/1
80 TABLET, FILM COATED ORAL DAILY
Qty: 30 TABLET | Refills: 0 | Status: ON HOLD
Start: 2025-01-29 | End: 2025-02-06

## 2025-01-29 RX ORDER — INSULIN LISPRO 100 [IU]/ML
9 INJECTION, SOLUTION INTRAVENOUS; SUBCUTANEOUS
Status: DISCONTINUED | OUTPATIENT
Start: 2025-01-29 | End: 2025-02-03

## 2025-01-29 RX ORDER — MAGNESIUM HYDROXIDE/ALUMINUM HYDROXICE/SIMETHICONE 120; 1200; 1200 MG/30ML; MG/30ML; MG/30ML
30 SUSPENSION ORAL EVERY 4 HOURS PRN
Status: DISCONTINUED | OUTPATIENT
Start: 2025-01-29 | End: 2025-02-10 | Stop reason: HOSPADM

## 2025-01-29 RX ORDER — WARFARIN SODIUM 5 MG/1
10 TABLET ORAL
Status: DISCONTINUED | OUTPATIENT
Start: 2025-01-29 | End: 2025-01-29

## 2025-01-29 RX ORDER — WARFARIN SODIUM 5 MG/1
5 TABLET ORAL
Qty: 30 TABLET | Refills: 1 | Status: ON HOLD
Start: 2025-01-29 | End: 2025-02-06

## 2025-01-29 RX ORDER — CARVEDILOL 3.12 MG/1
3.12 TABLET ORAL 2 TIMES DAILY WITH MEALS
Status: DISCONTINUED | OUTPATIENT
Start: 2025-01-29 | End: 2025-02-10

## 2025-01-29 RX ORDER — CLOPIDOGREL BISULFATE 75 MG/1
75 TABLET ORAL DAILY
Qty: 30 TABLET | Refills: 0 | Status: ON HOLD
Start: 2025-01-30 | End: 2025-02-06

## 2025-01-29 RX ORDER — LIDOCAINE 50 MG/G
1 PATCH TOPICAL DAILY
Status: DISCONTINUED | OUTPATIENT
Start: 2025-01-30 | End: 2025-01-30

## 2025-01-29 RX ORDER — CLOPIDOGREL BISULFATE 75 MG/1
75 TABLET ORAL DAILY
Status: DISCONTINUED | OUTPATIENT
Start: 2025-01-30 | End: 2025-02-10 | Stop reason: HOSPADM

## 2025-01-29 RX ORDER — MUSCLE RUB CREAM 100; 150 MG/G; MG/G
CREAM TOPICAL 4 TIMES DAILY PRN
Status: DISCONTINUED | OUTPATIENT
Start: 2025-01-29 | End: 2025-02-10 | Stop reason: HOSPADM

## 2025-01-29 RX ORDER — ASPIRIN 81 MG/1
81 TABLET ORAL DAILY
Qty: 30 TABLET | Refills: 0 | Status: ON HOLD
Start: 2025-01-30 | End: 2025-02-06

## 2025-01-29 RX ORDER — ATORVASTATIN CALCIUM 80 MG/1
80 TABLET, FILM COATED ORAL
Status: DISCONTINUED | OUTPATIENT
Start: 2025-01-29 | End: 2025-02-10 | Stop reason: HOSPADM

## 2025-01-29 RX ORDER — QUETIAPINE FUMARATE 25 MG/1
25 TABLET, FILM COATED ORAL
Status: DISCONTINUED | OUTPATIENT
Start: 2025-01-29 | End: 2025-02-06

## 2025-01-29 RX ORDER — INSULIN GLARGINE 100 [IU]/ML
20 INJECTION, SOLUTION SUBCUTANEOUS
Status: DISCONTINUED | OUTPATIENT
Start: 2025-01-29 | End: 2025-02-05

## 2025-01-29 RX ORDER — LISINOPRIL 5 MG/1
2.5 TABLET ORAL DAILY
Status: DISCONTINUED | OUTPATIENT
Start: 2025-01-30 | End: 2025-01-31

## 2025-01-29 RX ORDER — CIPROFLOXACIN HYDROCHLORIDE 3.5 MG/ML
1 SOLUTION/ DROPS TOPICAL EVERY 8 HOURS
Status: DISCONTINUED | OUTPATIENT
Start: 2025-01-29 | End: 2025-01-31

## 2025-01-29 RX ORDER — LISINOPRIL 2.5 MG/1
2.5 TABLET ORAL DAILY
Qty: 30 TABLET | Refills: 0 | Status: ON HOLD
Start: 2025-01-30

## 2025-01-29 RX ORDER — ASPIRIN 81 MG/1
81 TABLET ORAL DAILY
Status: DISCONTINUED | OUTPATIENT
Start: 2025-01-30 | End: 2025-02-10 | Stop reason: HOSPADM

## 2025-01-29 RX ORDER — CHLORHEXIDINE GLUCONATE ORAL RINSE 1.2 MG/ML
15 SOLUTION DENTAL EVERY 12 HOURS SCHEDULED
Status: DISCONTINUED | OUTPATIENT
Start: 2025-01-29 | End: 2025-01-29

## 2025-01-29 RX ORDER — GABAPENTIN 300 MG/1
300 CAPSULE ORAL 2 TIMES DAILY
Status: DISCONTINUED | OUTPATIENT
Start: 2025-01-29 | End: 2025-02-10 | Stop reason: HOSPADM

## 2025-01-29 RX ORDER — INSULIN LISPRO 100 [IU]/ML
8 INJECTION, SOLUTION INTRAVENOUS; SUBCUTANEOUS
Status: DISCONTINUED | OUTPATIENT
Start: 2025-01-29 | End: 2025-01-29

## 2025-01-29 RX ORDER — BISACODYL 10 MG
10 SUPPOSITORY, RECTAL RECTAL DAILY PRN
Status: DISCONTINUED | OUTPATIENT
Start: 2025-01-29 | End: 2025-02-10 | Stop reason: HOSPADM

## 2025-01-29 RX ORDER — INSULIN LISPRO 100 [IU]/ML
2-12 INJECTION, SOLUTION INTRAVENOUS; SUBCUTANEOUS
Status: DISCONTINUED | OUTPATIENT
Start: 2025-01-29 | End: 2025-02-10 | Stop reason: HOSPADM

## 2025-01-29 RX ORDER — ONDANSETRON 2 MG/ML
4 INJECTION INTRAMUSCULAR; INTRAVENOUS EVERY 6 HOURS PRN
Status: DISCONTINUED | OUTPATIENT
Start: 2025-01-29 | End: 2025-02-10 | Stop reason: HOSPADM

## 2025-01-29 RX ADMIN — INSULIN LISPRO 2 UNITS: 100 INJECTION, SOLUTION INTRAVENOUS; SUBCUTANEOUS at 08:54

## 2025-01-29 RX ADMIN — Medication 400 MG: at 17:34

## 2025-01-29 RX ADMIN — INSULIN LISPRO 8 UNITS: 100 INJECTION, SOLUTION INTRAVENOUS; SUBCUTANEOUS at 08:54

## 2025-01-29 RX ADMIN — GABAPENTIN 300 MG: 300 CAPSULE ORAL at 17:29

## 2025-01-29 RX ADMIN — ATORVASTATIN CALCIUM 80 MG: 80 TABLET, FILM COATED ORAL at 17:29

## 2025-01-29 RX ADMIN — INSULIN LISPRO 4 UNITS: 100 INJECTION, SOLUTION INTRAVENOUS; SUBCUTANEOUS at 11:53

## 2025-01-29 RX ADMIN — INSULIN LISPRO 2 UNITS: 100 INJECTION, SOLUTION INTRAVENOUS; SUBCUTANEOUS at 17:30

## 2025-01-29 RX ADMIN — INSULIN LISPRO 9 UNITS: 100 INJECTION, SOLUTION INTRAVENOUS; SUBCUTANEOUS at 17:30

## 2025-01-29 RX ADMIN — Medication 6 MG: at 21:30

## 2025-01-29 RX ADMIN — WARFARIN SODIUM 7.5 MG: 5 TABLET ORAL at 17:29

## 2025-01-29 RX ADMIN — CARVEDILOL 3.12 MG: 3.12 TABLET, FILM COATED ORAL at 17:30

## 2025-01-29 RX ADMIN — INSULIN GLARGINE 20 UNITS: 100 INJECTION, SOLUTION SUBCUTANEOUS at 21:31

## 2025-01-29 RX ADMIN — INSULIN LISPRO 2 UNITS: 100 INJECTION, SOLUTION INTRAVENOUS; SUBCUTANEOUS at 21:31

## 2025-01-29 RX ADMIN — CLOPIDOGREL 75 MG: 75 TABLET ORAL at 08:52

## 2025-01-29 RX ADMIN — INSULIN LISPRO 8 UNITS: 100 INJECTION, SOLUTION INTRAVENOUS; SUBCUTANEOUS at 11:52

## 2025-01-29 RX ADMIN — ASPIRIN 81 MG: 81 TABLET, COATED ORAL at 08:52

## 2025-01-29 RX ADMIN — CHLORHEXIDINE GLUCONATE 0.12% ORAL RINSE 15 ML: 1.2 LIQUID ORAL at 08:53

## 2025-01-29 RX ADMIN — QUETIAPINE FUMARATE 25 MG: 25 TABLET ORAL at 21:31

## 2025-01-29 NOTE — DISCHARGE SUMMARY
Medical Problems       Resolved Problems  Date Reviewed: 1/26/2025          Resolved    Cardiogenic shock (HCC) 1/21/2025     Resolved by  MARGARITA Coulter    Acute kidney injury (HCC) 1/23/2025     Resolved by  Shanelle Medina DO        Discharging Physician / Practitioner: Joel Bustamante MD  PCP: No primary care provider on file.  Admission Date:   Admission Orders (From admission, onward)       Ordered        01/17/25 2028  Inpatient Admission  Once                          Discharge Date: 01/29/25    Consultations During Hospital Stay:  Vascular surgery, cardiology    Procedures Performed:   Stenting of left main/LAD and intra-aortic balloon pump    Significant Findings / Test Results:   XR chest portable ICU   Final Result      Similar mild pulmonary edema.      Intra-aortic balloon pump in satisfactory radiographic position positioned over the aortic arch.      Workstation performed: [id number]         XR chest portable ICU   Final Result      Improving pulmonary edema.      Balloon pump 5 mm below the aortic arch.            Workstation performed: [id number]         XR chest portable ICU   Final Result      Moderate pulmonary venous congestion.      Balloon pump 1 cm below the aortic arch.            Workstation performed: [id number]         XR chest 1 view portable   Final Result      Mild pulmonary venous congestion.            Workstation performed: [id number]         IR pelvic angiogram    (Results Pending)         Incidental Findings:   none    Test Results Pending at Discharge (will require follow up):   none     Outpatient Tests Requested:  none    Complications: Cardiogenic shock    Reason for Admission: STEMI    Hospital Course:   Joseph Aguilar is a 69 y.o. male patient who originally presented to the hospital on 1/17/2025 due to  Anterior STEMI s/p PCI with CHYNA to LM-ostial LAD and mLAD with IABP;  of LCx (1/17/2024)  Status post drug-eluting stent to the LAD and left main, unable to  intervene on the circumflex artery.  Significant hypotension in the Cath Lab requiring pressors and intra-aortic balloon pump support  IABP discontinued on 1/20  Continue aspirin, Plavix, statin  Continue Coreg 3.125 bid  Appreciate cardiology recommendations  History of hypertension  BP controlled  Continue lisinopril, beta-blocker  Mixed hyperlipidemia  Continue statin  Type 2 diabetes mellitus without complication, without long-term current use of insulin (HCC)        Lab Results   Component Value Date     HGBA1C 8.2 (H) 01/18/2025                Recent Labs     01/27/25  1622 01/27/25  2120 01/28/25  0645 01/28/25  1055   POCGLU 203* 244* 181* 269*         Blood Sugar Average: Last 72 hrs:  (P) 190.7358986969298076  Can resume his home medication regime  ICM with EF 25%  Per Cards will need lifevest  need to utilize lower cost alternative for HF GDMT  Patient appears euvolemic  Continue low dose beta-blocker, lisinopril 2.5 qd, carvedilol, Jardiance, Coumadin, aspirin, Plavix and Lipitor  If pt is taking Jardiance 25mg outpt, per CM pt [facility]. [facility]ed [facility] but states he does not want to travel to [place] where he believes the nearest one is.  Appreciate cardiology recommendations  LV (left ventricular) mural thrombus  continue heparin infusion per cards will need warfarin given apical thrombus  Continue Coumadin   INR is more than 2 today  Hematoma of right lower extremity  Patient s/p surgical closure of right femoral artery due to hematoma related to intra-aortic balloon pump  Appreciate vascular surgery recommendations  Stable  Subtherapeutic anticoagulation  Warfarin started given pt mural thrombus. Will increase to 10mg qd with daily pt/inr goal inr 2-3 per AHA guidelines    Conjunctivitis  Unclear viral versus bacterial  Much improved  Continue Cipro eyedrops    Condition at Discharge: good    Discharge Day Visit / Exam:   Subjective: No  "overnight event.  No active complaint  Vitals: Blood Pressure: 105/68 (01/29/25 0843)  Pulse: 90 (01/29/25 0733)  Temperature: 98.1 °F (36.7 °C) (01/29/25 0733)  Temp Source: Oral (01/28/25 2147)  Respirations: 15 (01/28/25 2147)  Height: 6' 2\" (188 cm) (01/19/25 1010)  Weight - Scale: 103 kg (226 lb 13.7 oz) (pt. refused to stand on the scale) (01/29/25 0600)  SpO2: 93 % (01/29/25 0733)  Exam:   Physical Exam   Constitutional: no Acute distress  HEENT:no  Pallor or icterus  CVS: S1 plus S2  Respiratory: Normal vascular breathe without crackles and wheeze  Gastroenterology: Soft nontender without any palpable mass  Skin:  bruises in the right femoral area which is improving  Neurology: No focal logical deficit     Discussion with Family:  Updated patient.     Discharge instructions/Information to patient and family:   See after visit summary for information provided to patient and family.      Provisions for Follow-Up Care:  See after visit summary for information related to follow-up care and any pertinent home health orders.      Mobility at time of Discharge:   Basic Mobility Inpatient Raw Score: 10  JH-HLM Goal: 4: Move to chair/commode  JH-HLM Achieved: 3: Sit at edge of bed  HLM Goal NOT achieved. Continue to encourage mobility in post discharge setting.     Disposition:   Other Skilled Nursing Facility at      Planned Readmission: none     Discharge Statement:  I spent 37 minutes discharging the patient. This time was spent on the day of discharge. I had direct contact with the patient on the day of discharge. Greater than 50% of the total time was spent examining patient, answering all patient questions, arranging and discussing plan of care with patient as well as directly providing post-discharge instructions.  Additional time then spent on discharge activities.    Discharge Medications:  See after visit summary for reconciled discharge medications provided to patient and/or family.      **Please Note: " This note may have been constructed using a voice recognition system**

## 2025-01-29 NOTE — H&P
PHYSICAL MEDICINE AND REHABILITATION [facility] REHAB CONSULT  Joseph Aguilar 69 y.o. male MRN: [id number]  Unit/Bed#: [facility] 270-02 Encounter: [id number]     Rehab Diagnosis: Impairment of mobility, safety and Activities of Daily Living (ADLs) due to Cardiac:  09 Cardiac STEMI  Etiologic Diagnosis:     History of Present Illness:   Joseph Aguilar is a 69 y.o. male who presented to the [facility]  on 1/17/2025, with PMH significant for hypertension, hyperlipidemia, diabetes, osteoarthritis, who presents with near syncope and hypotension. Patient denied chest pain, shortness of breath, nausea or vomiting. Endorses dizziness during the event. During evaluation in the emergency department, anterior wall STEMI was identified and interventional cardiology was alerted. Patient was taken to the Cath Lab, where patient became more hypotensive requiring pressor support and intra-aortic balloon pump insertion. Per cardiology there was some element of difficulty, however once the left main was stented patient became much more stable. Left main, LAD were stented however the circumflex artery was unable to be intervened upon. Critical care was consulted for admission to the intensive care unit for cardiogenic shock status post STEMI. Maintained on Heparin gtt d/t LV thrombus, and continued on Brilinta and ASA. Statin was placed on hold d/t transaminitis. ECHO showed EF25%, severe global hypokinesis with apical and distal septal near akinesis, poss mod size thrombus at apex, with plan for JANET pending medical stability. On 1/20, Vascular Surgery was consulted re: LE bleeding with hematoma formation. IABP was D/C'd, and Heparin gtt was placed on hold. He was transfused 1 unit PRBCs. Heparin gtt was restarted on 1/21. Repeat ECHO showed EF 25%, and LifeVest was ordered (delivered on 1/25). Patient continued on Heparin gtt with Coumadin bridging, with goal INR level 2-3. As of 1/29, patient's INR is 2.22, and Heparin  "gtt was D/C'd. He continues on Coumadin. Admitted to the [facility] 1/29.    Subjective: Patient is seen in bed on admission. He endorses some constipation but states that it normal for him and chronic back pain. Denies numbness and tingling. Last BM was a couple days ago. Reports that left leg feels weak.    Review of Systems: A 10-point review of systems was performed. Negative except as listed above.    Plan:     Constipation  Assessment & Plan  - pt reports he usually has a BM every 2-3 days but feels like pain meds are \"backing him up\"  -PRN dulcolax   - monitor BM    Chronic back pain  Assessment & Plan  -patient reports old injury of back  -takes tylenol for pain at home  -monitor pain levels and titrate meds  -offer different modalities- heat, lidocaine patches    * Ambulatory dysfunction  Assessment & Plan  - s/p drug eluting stent to the LAD, hematoma of right lower extremity s/p removal of IABP  -hospitalized since 1/17  - PT/OT    Hematoma of right lower extremity  Assessment & Plan  S/p removal of IABP and closure of right femoral puncture site with Perclose Pro-glide 1/20 by Dr. Brooke  Monitoring the R groin for bleeding, skin breakdown, expanding hematoma  Will consult vascular surgery with questions    LV (left ventricular) mural thrombus  Assessment & Plan  TTE 1/19: \"There is a possible moderately-sized thrombus at the apex, difficult imaging.\"  Cardiology consulted on previous admission  Continue Coumadin 10 mg  Monitor PT, INR (2-3 per guidelines)  F/o cardiology outpt      ICM with EF 25%  Assessment & Plan  Echo:Left ventricular cavity size is normal. Wall thickness is normal. The left ventricular ejection fraction is 25%. Systolic function is severely reduced.   Cardiology consulted and recommended lifevest  F/o cardiology as outpt    Anterior STEMI s/p PCI with CHYNA to LM-ostial LAD and mLAD with IABP;  of LCx (1/17/2024)  Assessment & Plan  S/p drug eluting stent to the LAD and left " main  Hypotension in cath lab requiring pressors and intra-aortic baloon pump  Cw aspirin, plavix, statin  Continue coreg 3.125 bid  Follow up cardiology outpt    Type 2 diabetes mellitus without complication, without long-term current use of insulin ([facility])  Assessment & Plan  Lab Results   Component Value Date    HGBA1C 8.2 (H) 01/18/2025       Recent Labs     01/28/25  1618 01/28/25  2042 01/29/25  0730 01/29/25  1127   POCGLU 202* 192* 167* 218*   A1c 8.2% on admission  Lantus 20u at HS and lispro 8u with meals  IM starting metformin 500 BID today  Monitor glucose levels  Cw diabetic diet    Blood Sugar Average: Last 72 hrs:        Mixed hyperlipidemia  Assessment & Plan  -cw statin  -f/o primary care on dc    History of hypertension  Assessment & Plan  Cw lisinopril and BB  Monitor vitals and titrate meds as needed       Drug regimen reviewed, all potential adverse effects identified and addressed:    Scheduled Meds:  Current Facility-Administered Medications   Medication Dose Route Frequency Provider Last Rate    acetaminophen  650 mg Oral Q6H PRN Shivani Yuan MD      aluminum-magnesium hydroxide-simethicone  30 mL Oral Q4H PRN Shivani Yuan MD      Artificial Tears  1 drop Left Eye Q4H PRN Shivani Yuan MD      [START ON 1/30/2025] aspirin  81 mg Oral Daily Shivani Yuan MD      atorvastatin  80 mg Oral Daily With Dinner Shivani Yuan MD      bisacodyl  10 mg Rectal Daily PRN Shivani Yuan MD      carvedilol  3.125 mg Oral BID With Meals Shivani Yuan MD      ciprofloxacin  1 drop Left Eye Q8H Shivani Yuan MD      [START ON 1/30/2025] clopidogrel  75 mg Oral Daily Shivani Yuan MD      gabapentin  300 mg Oral BID Shivani Yuan MD      insulin glargine  20 Units Subcutaneous HS Shivani Yuan MD      insulin lispro  2-12 Units Subcutaneous 4x Daily (AC & HS) Shivani Yuan MD      insulin lispro  8 Units Subcutaneous TID With Meals Shivani Yuan MD      [START ON 1/30/2025] lidocaine  1 patch Topical Daily  Shivani Yuan MD      [START ON 2025] lisinopril  2.5 mg Oral Daily Shivani Yuan MD      melatonin  6 mg Oral HS Shivani Yuan MD      menthol-methyl salicylate   Apply externally 4x Daily PRN Shivani Yuan MD      metFORMIN  500 mg Oral BID With Meals MARGARITA Hester      ondansetron  4 mg Intravenous Q6H PRN Shivani Yuan MD      oxyCODONE  2.5 mg Oral Q8H PRN Shivani Yuan MD      QUEtiapine  25 mg Oral HS Shivani Yuan MD      warfarin  10 mg Oral Daily (warfarin) Shivani Yuan MD          Restrictions include:  none Fall precautions      Functional History/Home Set-up - Prior to Admission:      Functional Status: Patient was independent with mobility/ambulation, transfers, ADL's, IADL's.    Functional Status Upon Admission to [facility]:  CARE SCORES:  Self Care:  Eatin: Independent  Oral hygiene: 04: Supervision or touching  assistance  Toilet hygiene: 03: Partial/moderate assistance  Shower/bathing self: 03: Partial/moderate assistance  Upper body dressin: Partial/moderate assistance  Lower body dressin: Substantial/maximal assistance  Putting on/taking off footwear: 02: Substantial/maximal assistance  Transfers:  Roll left and right: 03: Partial/moderate assistance  Sit to lyin: Partial/moderate assistance  Lying to sitting on side of bed: 03: Partial/moderate assistance  Sit to stand: 07: Patient Refused  Chair/bed to chair transfer: 07: Patient Refused  Toilet transfer: 09: Not applicable  Mobility:  Walk 10 ft: 09: Not applicable  Walk 50 ft with two turns: 09: Not applicable  Walk 150ft: 09: Not applicable     Physical Exam:  Temp:  [98.1 °F (36.7 °C)-99.4 °F (37.4 °C)] 98.7 °F (37.1 °C)  HR:  [84-96] 96  Resp:  [6-20] 6  BP: ()/(65-79) 121/76  SpO2:  [93 %-96 %] 96 %  Physical Exam  Eyes:      Comments: Left eye redness   Cardiovascular:      Rate and Rhythm: Normal rate and regular rhythm.      Comments: Wearing life vest  Pulmonary:      Effort: Pulmonary effort is  "normal.      Breath sounds: Normal breath sounds.   Abdominal:      General: Bowel sounds are normal. There is distension.   Musculoskeletal:         General: Deformity (right pinky) present.   Skin:     General: Skin is warm and dry.   Neurological:      Mental Status: He is alert.           MMT:   Strength:   Right  Left  Site  Right  Left  Site    5 5  S Ab: Shoulder Abductors  3 5  HF: Hip Flexors    5 5  EF: Elbow Flexors  4  5 KF: Knee Flexors    5  5  EE: Elbow Extensors  4  5  KE: Knee Extensors    5  5  WE: Wrist Extensors  5  5  DR: Dorsi Flexors    5  5  FF: Finger Flexors  5  5  PF: Plantar Flexors    5 5  HI: Hand Intrinsics  5  5  EHL: Extensor Hallucis Longus     Laboratory:    Results from last 7 days   Lab Units 01/28/25  0226 01/27/25  0612 01/26/25  0955   HEMOGLOBIN g/dL 11.0* 11.1* 11.3*   HEMATOCRIT % 33.2* 33.5* 33.8*   WBC Thousand/uL 8.66 8.07 8.60     Results from last 7 days   Lab Units 01/28/25  0226 01/27/25  0612 01/26/25  0955   BUN mg/dL 24 22 23   SODIUM mmol/L 135 134* 134*   POTASSIUM mmol/L 4.1 4.1 3.9   CHLORIDE mmol/L 104 104 103   CREATININE mg/dL 0.86 0.83 0.88     Results from last 7 days   Lab Units 01/29/25  0630 01/28/25  0226 01/27/25  0612   PROTIME seconds 25.3* 21.7* 21.5*   INR  2.22* 1.81* 1.78*        Wt Readings from Last 1 Encounters:   01/29/25 103 kg (227 lb 1.2 oz)     Estimated body mass index is 29.15 kg/m² as calculated from the following:    Height as of this encounter: 6' 2\" (1.88 m).    Weight as of this encounter: 103 kg (227 lb 1.2 oz).    Imaging: reviewed  XR chest portable ICU  Result Date: 1/20/2025  Impression: Similar mild pulmonary edema. Intra-aortic balloon pump in satisfactory radiographic position positioned over the aortic arch. Workstation performed: [id number]      XR chest portable ICU  Result Date: 1/19/2025  Impression: Improving pulmonary edema. Balloon pump 5 mm below the aortic arch. Workstation performed: [id number]      XR chest " portable ICU  Result Date: 1/18/2025  Impression: Moderate pulmonary venous congestion. Balloon pump 1 cm below the aortic arch. Workstation performed: [id number]      XR chest 1 view portable  Result Date: 1/18/2025  Impression: Mild pulmonary venous congestion. Workstation performed: [id number]      Rehabilitation Prognosis: good     Tolerance for three hours of therapy a day: good     Family/Patient Goals:  Patient/family's goals: Return to previous home/apartment.    Patient will receive PT and OT 90 minutes each per day, five days per week to achieve rehab goals or participate in 900 minutes of therapy within a 7 day week period.    Mobility Goals: independent  Transfer Goals: independent  Activities of Daily Living (ADLs) Goals: Supervision / Standby Assist    Discharge Planning:  Rehabilitation and discharge goals discussed with the patient and/or family.    Case Managment and Social Work to review patient/family resources and to coordinate Discharge Planning.    Estimated length of stay: 10 to 14 days    Patient and Family Education and Training:  Rehabilitation and discharge goals discussed with the patient and/or family.  Patient/family education/training needs to be discussed in weekly team meeting.    Equipment/DME needs: Therapy teams to assess and evaluate for additional equipment/DME needs throughout rehabilitation stay    Past Medical History:   Past Surgical History:   Family History:   Social history:   Past Medical History:   Diagnosis Date    Depression     Diabetes mellitus (HCC)     Hyperlipidemia     Hypertension     Osteoarthritis, knee     Past Surgical History:   Procedure Laterality Date    BACK SURGERY      CARDIAC CATHETERIZATION N/A 01/17/2025    Procedure: Cardiac PCI;  Surgeon: Neeraj Gaines MD;  Location: [facility];  Service: Cardiology    CARDIAC CATHETERIZATION Left 01/17/2025    Procedure: Cardiac Left Heart Cath;  Surgeon: Neeraj Gaines MD;  Location: [facility]  CATH LAB;  Service: Cardiology    CARDIAC CATHETERIZATION N/A 2025    Procedure: Cardiac iabp;  Surgeon: Neeraj Gaines MD;  Location: [facility];  Service: Cardiology    HAND SURGERY      JOINT REPLACEMENT Left     l tkr    KNEE SURGERY Left     [place] ARTHRP KNE CONDYLE&PLATU MEDIAL&LAT COMPARTMENTS Right 2018    Procedure: ARTHROPLASTY KNEE TOTAL;  Surgeon: Franny Grimaldo MD;  Location: [facility];  Service: Orthopedics    TOOTH EXTRACTION      WISDOM TOOTH EXTRACTION       Family History   Problem Relation Age of Onset    No Known Problems Mother     Heart attack Father     Arthritis Family     Cancer Family     Diabetes Family     Heart disease Family     Osteoporosis Family       Social History     Socioeconomic History    Marital status: /Civil Union     Spouse name: Not on file    Number of children: Not on file    Years of education: Not on file    Highest education level: Not on file   Occupational History    Not on file   Tobacco Use    Smoking status: Former     Current packs/day: 0.00     Types: Cigarettes     Quit date: 3/1/2012     Years since quittin.9     Passive exposure: Past    Smokeless tobacco: Never   Vaping Use    Vaping status: Former    Quit date: 2010   Substance and Sexual Activity    Alcohol use: Yes     Alcohol/week: 3.0 standard drinks of alcohol     Types: 3 Cans of beer per week     Comment: 'couple beers every couple weeks'    Drug use: Not Currently    Sexual activity: Not Currently   Other Topics Concern    Not on file   Social History Narrative    Caffeine use, active     Social Drivers of Health     Financial Resource Strain: Not on file   Food Insecurity: Patient Declined (2025)    Nursing - Inadequate Food Risk Classification     Worried About Running Out of Food in the Last Year: Never true     Ran Out of Food in the Last Year: Never true     Ran Out of Food in the Last Year: Patient declined   Transportation Needs: No  Transportation Needs (2025)    Nursing - Transportation Risk Classification     Lack of Transportation: Not on file     Lack of Transportation: No   Physical Activity: Not on file   Stress: Not on file   Social Connections: Not on file   Intimate Partner Violence: Unknown (2025)    Nursing IPS     Feels Physically and Emotionally Safe: Not on file     Physically Hurt by Someone: Not on file     Humiliated or Emotionally Abused by Someone: Not on file     Physically Hurt by Someone: No     Hurt or Threatened by Someone: No   Housing Stability: Patient Declined (2025)    Nursing: Inadequate Housing Risk Classification     Has Housing: Not on file     Worried About Losing Housing: Not on file     Unable to Get Utilities: Not on file     Unable to Pay for Housing in the Last Year: Patient declined     Has Housin          Current Medical Diagnosis Allergies   Patient Active Problem List   Diagnosis    Glenohumeral arthritis, right    Primary osteoarthritis of both knees    History of hypertension    Mixed hyperlipidemia    Type 2 diabetes mellitus without complication, without long-term current use of insulin (HCC)    Obesity (BMI 30-39.9)    S/P TKR (total knee replacement), right    Closed nondisplaced intertrochanteric fracture of left femur with routine healing    Pain in both feet    Polyneuropathy associated with underlying disease (HCC)    Slow transit constipation    Vitamin D deficiency    Anemia    Hypomagnesemia    Right foot pain    Glenohumeral arthritis, left    Morbid (severe) obesity due to excess calories (HCC)    Anterior STEMI s/p PCI with CHYNA to LM-ostial LAD and mLAD with IABP;  of LCx (2024)    ICM with EF 25%    LV (left ventricular) mural thrombus    Hematoma of right lower extremity    Subtherapeutic anticoagulation    Conjunctivitis    Osteoarthritis of multiple joints    Chronic back pain    Constipation    Ambulatory dysfunction    No Known Allergies        Medical  Necessity Criteria for [facility] Admission: Hypertension. In addition, the preadmission screen, post-admission physical evaluation, overall plan of care and admissions order demonstrate a reasonable expectation that the following criteria were met at the time of admission to the [facility].  The patient requires active and ongoing therapeutic intervention of multiple therapy disciplines (physical therapy, occupational therapy, speech-language pathology, or prosthetics/orthotics), one of which is physical or occupational therapy.    Patient requires an intensive rehabilitation therapy program, as defined in Chapter 1, section 110.2.2 of the CMS Medicare Policy Manual. This intensive rehabilitation therapy program will consist of at least 3 hours of therapy per day at least 5 days per week or at least 15 hours of intensive rehabilitation therapy within a 7 consecutive day period, beginning with the date of admission to the [facility].    The patient is reasonably expected to actively participate in, and benefit significantly from, the intensive rehabilitation therapy program as defined in Chapter 1, section 110.2.2 of the CMS Medicare Policy Manual at this time of admission to the [facility]. He can reasonably be expected to make measurable improvement (that will be of practical value to improve the patient’s functional capacity or adaptation to impairments) as a result of the rehabilitation treatment, as defined in section 110.3, and such improvement can be expected to be made within the prescribed period of time. As noted in the CMS Medicare Policy Manual, the patient need not be expected to achieve complete independence in the domain of self-care nor be expected to return to his or her prior level of functioning in order to meet this standard.  The patient must require physician supervision by a rehabilitation physician. As such, a rehabilitation physician will conduct face-to-face visits with the patient at least 3 days per week throughout  the patient’s stay in the [facility] to assess the patient both medically and functionally, as well as to modify the course of treatment as needed to maximize the patient’s capacity to benefit from the rehabilitation process.  The patient requires an intensive and coordinated interdisciplinary approach to providing rehabilitation, as defined in Chapter 1, section 110.2.5 of the CMS Medicare Policy Manual. This will be achieved through periodic team conferences, conducted at least once in a 7-day period, and comprising of an interdisciplinary team of medical professionals consisting of: a rehabilitation physician, registered nurse,  and/or , and a licensed/certified therapist from each therapy discipline involved in treating the patient.       ** Please Note: Fluency Direct voice to text software may have been used in the creation of this document. **

## 2025-01-29 NOTE — ASSESSMENT & PLAN NOTE
S/p drug eluting stent to the LAD and left main; unable to stent the left circumflex 1/17  IABP dc 1/20  Hypotension in cath lab requiring pressors and intra-aortic baloon pump  Cw aspirin, plavix, statin, coumadin (goal 2-3),   Continue coreg 3.125 bid  Follow up cardiology outpt

## 2025-01-29 NOTE — ASSESSMENT & PLAN NOTE
Multiple joints - shoulders, knees, back.  Recently had steroid injection to B/L shoulders on 1/6.  Follows with Dr. Egan (Orthopedics) as outpatient.  Considerations with therapies.  Continue current pain regimen.

## 2025-01-29 NOTE — ASSESSMENT & PLAN NOTE
Echo:Left ventricular cavity size is normal. Wall thickness is normal. The left ventricular ejection fraction is 25%. Systolic function is severely reduced.   Cw coreg 3.125;   Cardiology consulted and recommended lifevest  F/o cardiology as outpt

## 2025-01-29 NOTE — CASE MANAGEMENT
Case Management Progress Note    Patient name Joseph Aguilar  Location /-01 MRN [id number]  : 1955 Date 2025       LOS (days): 12  Geometric Mean LOS (GMLOS) (days): 3.8  Days to GMLOS:-7.8        OBJECTIVE:        Current admission status: Inpatient  Preferred Pharmacy:   [facility]/pharmacy #[id number] - [place] - [address]  [address] [place] [address]  Phone: [phone] Fax: [phone]    [facility] [place] - [address]  [address]  [place] [address]  Phone: [phone] Fax: [phone]     [facility] [place] - [address]  [address] [place] [address] [place] [address]  Phone: [phone] Fax: [phone]    [facility] - [place] - [address]  [address]  [place] [address]  Phone: [phone] Fax: [phone]    Primary Care Provider: No primary care provider on file.    Primary Insurance: MEDICARE  Secondary Insurance: COMMERCIAL MISCELLANEOUS    PROGRESS NOTE:  Spoke with [facility] liaison who initially told CM that they had beds at [facility]. CM updated patient who was upset and insistent that he only go to [facility] as he had a terrible experience at [facility]. CM returned to speak with liaison who updated that they have a bed at [place] and he can come today. CM updated patient and RN. Setting up transport. Patient states he will update his wife.

## 2025-01-29 NOTE — PLAN OF CARE
Problem: Prexisting or High Potential for Compromised Skin Integrity  Goal: Skin integrity is maintained or improved  Description: INTERVENTIONS:  - Identify patients at risk for skin breakdown  - Assess and monitor skin integrity  - Assess and monitor nutrition and hydration status  - Monitor labs   - Assess for incontinence   - Turn and reposition patient  - Assist with mobility/ambulation  - Relieve pressure over bony prominences  - Avoid friction and shearing  - Provide appropriate hygiene as needed including keeping skin clean and dry  - Evaluate need for skin moisturizer/barrier cream  - Collaborate with interdisciplinary team   - Patient/family teaching  - Consider wound care consult   Outcome: Progressing     Problem: PAIN - ADULT  Goal: Verbalizes/displays adequate comfort level or baseline comfort level  Description: Interventions:  - Encourage patient to monitor pain and request assistance  - Assess pain using appropriate pain scale  - Administer analgesics based on type and severity of pain and evaluate response  - Implement non-pharmacological measures as appropriate and evaluate response  - Consider cultural and social influences on pain and pain management  - Notify physician/advanced practitioner if interventions unsuccessful or patient reports new pain  Outcome: Progressing     Problem: INFECTION - ADULT  Goal: Absence or prevention of progression during hospitalization  Description: INTERVENTIONS:  - Assess and monitor for signs and symptoms of infection  - Monitor lab/diagnostic results  - Monitor all insertion sites, i.e. indwelling lines, tubes, and drains  - Monitor endotracheal if appropriate and nasal secretions for changes in amount and color  - Albers appropriate cooling/warming therapies per order  - Administer medications as ordered  - Instruct and encourage patient and family to use good hand hygiene technique  - Identify and instruct in appropriate isolation precautions for  identified infection/condition  Outcome: Progressing  Goal: Absence of fever/infection during neutropenic period  Description: INTERVENTIONS:  - Monitor WBC    Outcome: Progressing     Problem: SAFETY ADULT  Goal: Patient will remain free of falls  Description: INTERVENTIONS:  - Educate patient/family on patient safety including physical limitations  - Instruct patient to call for assistance with activity   - Consult OT/PT to assist with strengthening/mobility   - Keep Call bell within reach  - Keep bed low and locked with side rails adjusted as appropriate  - Keep care items and personal belongings within reach  - Initiate and maintain comfort rounds  - Make Fall Risk Sign visible to staff  - Offer Toileting every 2 Hours, in advance of need  - Initiate/Maintain bed alarm  - Obtain necessary fall risk management equipment: bed alarm  - Apply yellow socks and bracelet for high fall risk patients  - Consider moving patient to room near nurses station  Outcome: Progressing  Goal: Maintain or return to baseline ADL function  Description: INTERVENTIONS:  -  Assess patient's ability to carry out ADLs; assess patient's baseline for ADL function and identify physical deficits which impact ability to perform ADLs (bathing, care of mouth/teeth, toileting, grooming, dressing, etc.)  - Assess/evaluate cause of self-care deficits   - Assess range of motion  - Assess patient's mobility; develop plan if impaired  - Assess patient's need for assistive devices and provide as appropriate  - Encourage maximum independence but intervene and supervise when necessary  - Involve family in performance of ADLs  - Assess for home care needs following discharge   - Consider OT consult to assist with ADL evaluation and planning for discharge  - Provide patient education as appropriate  Outcome: Progressing  Goal: Maintains/Returns to pre admission functional level  Description: INTERVENTIONS:  - Perform AM-PAC 6 Click Basic Mobility/ Daily  Activity assessment daily.  - Set and communicate daily mobility goal to care team and patient/family/caregiver.   - Collaborate with rehabilitation services on mobility goals if consulted  - Perform Range of Motion 4 times a day.  - Reposition patient every 2 hours.  - Dangle patient 3 times a day  - Stand patient 3 times a day  - Ambulate patient 3 times a day  - Out of bed to chair 3 times a day   - Out of bed for meals 3 times a day  - Out of bed for toileting  - Record patient progress and toleration of activity level   Outcome: Progressing     Problem: DISCHARGE PLANNING  Goal: Discharge to home or other facility with appropriate resources  Description: INTERVENTIONS:  - Identify barriers to discharge w/patient and caregiver  - Arrange for needed discharge resources and transportation as appropriate  - Identify discharge learning needs (meds, wound care, etc.)  - Arrange for interpretive services to assist at discharge as needed  - Refer to Case Management Department for coordinating discharge planning if the patient needs post-hospital services based on physician/advanced practitioner order or complex needs related to functional status, cognitive ability, or social support system  Outcome: Progressing     Problem: Knowledge Deficit  Goal: Patient/family/caregiver demonstrates understanding of disease process, treatment plan, medications, and discharge instructions  Description: Complete learning assessment and assess knowledge base.  Interventions:  - Provide teaching at level of understanding  - Provide teaching via preferred learning methods  Outcome: Progressing     Problem: Nutrition/Hydration-ADULT  Goal: Nutrient/Hydration intake appropriate for improving, restoring or maintaining nutritional needs  Description: Monitor and assess patient's nutrition/hydration status for malnutrition. Collaborate with interdisciplinary team and initiate plan and interventions as ordered.  Monitor patient's weight and  dietary intake as ordered or per policy. Utilize nutrition screening tool and intervene as necessary. Determine patient's food preferences and provide high-protein, high-caloric foods as appropriate.     INTERVENTIONS:  - Monitor oral intake, urinary output, labs, and treatment plans  - Assess nutrition and hydration status and recommend course of action  - Evaluate amount of meals eaten  - Assist patient with eating if necessary   - Allow adequate time for meals  - Recommend/ encourage appropriate diets, oral nutritional supplements, and vitamin/mineral supplements  - Order, calculate, and assess calorie counts as needed  - Recommend, monitor, and adjust tube feedings and TPN/PPN based on assessed needs  - Assess need for intravenous fluids  - Provide specific nutrition/hydration education as appropriate  - Include patient/family/caregiver in decisions related to nutrition  Outcome: Progressing     Problem: CARDIOVASCULAR - ADULT  Goal: Maintains optimal cardiac output and hemodynamic stability  Description: INTERVENTIONS:  - Monitor I/O, vital signs and rhythm  - Monitor for S/S and trends of decreased cardiac output  - Administer and titrate ordered vasoactive medications to optimize hemodynamic stability  - Assess quality of pulses, skin color and temperature  - Assess for signs of decreased coronary artery perfusion  - Instruct patient to report change in severity of symptoms  Outcome: Progressing  Goal: Absence of cardiac dysrhythmias or at baseline rhythm  Description: INTERVENTIONS:  - Continuous cardiac monitoring, vital signs, obtain 12 lead EKG if ordered  - Administer antiarrhythmic and heart rate control medications as ordered  - Monitor electrolytes and administer replacement therapy as ordered  Outcome: Progressing     Problem: RESPIRATORY - ADULT  Goal: Achieves optimal ventilation and oxygenation  Description: INTERVENTIONS:  - Assess for changes in respiratory status  - Assess for changes in  mentation and behavior  - Position to facilitate oxygenation and minimize respiratory effort  - Oxygen administered by appropriate delivery if ordered  - Initiate smoking cessation education as indicated  - Encourage broncho-pulmonary hygiene including cough, deep breathe, Incentive Spirometry  - Assess the need for suctioning and aspirate as needed  - Assess and instruct to report SOB or any respiratory difficulty  - Respiratory Therapy support as indicated  Outcome: Progressing     Problem: METABOLIC, FLUID AND ELECTROLYTES - ADULT  Goal: Electrolytes maintained within normal limits  Description: INTERVENTIONS:  - Monitor labs and assess patient for signs and symptoms of electrolyte imbalances  - Administer electrolyte replacement as ordered  - Monitor response to electrolyte replacements, including repeat lab results as appropriate  - Instruct patient on fluid and nutrition as appropriate  Outcome: Progressing  Goal: Fluid balance maintained  Description: INTERVENTIONS:  - Monitor labs   - Monitor I/O and WT  - Instruct patient on fluid and nutrition as appropriate  - Assess for signs & symptoms of volume excess or deficit  Outcome: Progressing  Goal: Glucose maintained within target range  Description: INTERVENTIONS:  - Monitor Blood Glucose as ordered  - Assess for signs and symptoms of hyperglycemia and hypoglycemia  - Administer ordered medications to maintain glucose within target range  - Assess nutritional intake and initiate nutrition service referral as needed  Outcome: Progressing

## 2025-01-29 NOTE — DISCHARGE INSTR - AVS FIRST PAGE
Please start taking aspirin 81 mg daily  Carvedilol 3.125 mg 1 tablet twice daily  Plavix 75 mg daily  Lisinopril 2.5 mg daily  Coumadin 5 mg daily to keep INR between 2 and 3  Please follow-up with Coumadin clinic  Please start taking Lipitor 80 mg daily  Empagliflozin 12.5 mg daily  Please follow-up with cardiology and PCP as per schedule

## 2025-01-29 NOTE — ASSESSMENT & PLAN NOTE
S/p removal of IABP and closure of right femoral puncture site with Perclose Pro-glide 1/20 by Dr. Brooke  Monitoring the R groin for bleeding, skin breakdown, expanding hematoma  Will consult vascular surgery with questions

## 2025-01-29 NOTE — ASSESSMENT & PLAN NOTE
"- pt reports he usually has a BM every 2-3 days but feels like pain meds are \"backing him up\"  -PRN dulcolax; add senna, colase and miralax as patient taking opiods   - monitor BM  "

## 2025-01-29 NOTE — CONSULTS
Consultation - Internal Medicine   Name: Joseph Aguilar 69 y.o. male I MRN: [id number]  Unit/Bed#: -02 I Date of Admission: 1/29/2025   Date of Service: 1/29/2025 I Hospital Day: 0   Inpatient consult to Internal Medicine  Consult performed by: MARGARITA Hester  Consult ordered by: Shivani Yuan MD        Physician Requesting Evaluation: Shivani Yuan MD       Assessment & Plan  Type 2 diabetes mellitus without complication, without long-term current use of insulin (HCC)  Lab Results   Component Value Date    HGBA1C 8.2 (H) 01/18/2025       Recent Labs     01/28/25  1618 01/28/25  2042 01/29/25  0730 01/29/25  1127   POCGLU 202* 192* 167* 218*       Blood Sugar Average: Last 72 hrs:    Last A1c was 8.2 on 1/18/2025.  Home regimen: Lantus 15u at HS, glipizide 5mg daily, metformin 1000mg BID, and Jardiance 12.5mg daily.  Currently receiving Lantus 20u at HS and lispro 8u with meals.  Start on metformin 500mg BID today.  Continue SSI, QID accuchecks, and cons. carb diet.  Anemia  Hgb currently 11.0.  Developed large R groin hematoma s/p balloon pump.  No active s/s of bleeding.  Obtain iron panel with next set of labs.  Continue to trend routine CBC.  Hypomagnesemia  Mg 1.5 on 1/28.  Obtain repeat Mg tomorrow.  Anterior STEMI s/p PCI with CHYNA to LM-ostial LAD and mLAD with IABP;  of LCx (1/17/2024)  Presented on 1/17 with near syncopal episode with jaw pain and hypotension.  NSTEMI alert - cardiac cath on 1/17 with CHYNA to LAD and L main with IABP support.  IABP discontinued on 1/20.  Continue ASA 81mg daily, Plavix 75mg daily, Coumadin, and Coreg 3.125mg BID.  Recommend establishing with Cardiology on discharge.  ICM with EF 25%  ECHO on 1/19 showed EF 25%, systolic function severely reduced, global hypokinesis.  Currently wearing Lifevest.  Continue Coreg 3.125mg BID.  Consider restarting home Jardiance tomorrow.  Recommend establishing care with Cardiology as outpatient.  LV (left ventricular)  mural thrombus  ECHO on 1/19 showed possible moderately sized thrombus at the apex.  Started on Coumadin in acute setting.  Currently on Coumadin 10mg daily.  INR 2.22 today.  Decrease Coumadin to 7.5mg daily.  Repeat INR tomorrow.  Follow-up with Cardiology as outpatient.  Hematoma of right lower extremity  Developed R groin hematoma at IABP insertion site.  IR suite on 1/20 for closure of R femoral artery puncture site and control of expanding hematoma performed by Dr. Brooke (Vascular).  Monitor site daily for s/s of reoccurring bleeding.  Continue to trend H&H.  Conjunctivitis  Continue ciprofloxacin to L eye for 7-10 days.  Osteoarthritis of multiple joints  Multiple joints - shoulders, knees, back.  Recently had steroid injection to B/L shoulders on 1/6.  Follows with Dr. Egan (Orthopedics) as outpatient.  Considerations with therapies.  Continue current pain regimen.     History of Present Illness:    Joseph Aguilar is a 69 y.o. male with a PMH of HLD, HTN, T2DM, and osteoarthritis who originally presented to [facility] on 1/17/2025 after experiencing a near syncopal episode with hypotension and jaw pain.  EKG showed signs of acute STEMI.  Emergently taken to the cath lab for CHYNA to the LAD and L main along with insertion of IABP.  Started on aspirin and Brilinta initially.  ECHO on 1/19 showed EF 25%, systolic function severely reduced, global hypokinesis, and possible moderately sized thrombus at the apex.  On 1/20, developed hematoma at the IABP insertion site.  Taken to IR for removal of IABP and closure of R femoral artery puncture site and control of expanding hematoma performed by Dr. Brooke.  Patient had been on heparin drip due to thrombus and was eventually transitioned to Coumadin.  Also placed on Lifevest due to EF of 25%.  Will require follow-up with Cardiology on discharge.  Evaluated by PT/OT and recommended for acute inpatient rehabilitation.     Admitted to [facility]  on 1/29/2025; we are consulted for medical clearance.      Pt examined while pt lying down in pt bed in pt room.  Has complaints of lower back pain which is chronic.  Had not been taking anything at home for the pain besides Tylenol but has been taking oxycodone while in the hospital.  Denies any lightheadedness, dizziness, SOB, CP, palpitations, or abdominal pain.  Denies any pain to the R groin.  Has a hx of neuropathy and does admit to numbness/tingling to lower extremities but no different than baseline.  LBM was a few days ago.  States that it is normal for him to go a few days between BMs.  Denies any urinary complaints.      Review of Systems:    Review of Systems   Constitutional:  Negative for appetite change, chills, fatigue and fever.   HENT:  Negative for trouble swallowing.    Eyes:  Negative for visual disturbance.   Respiratory:  Negative for cough, shortness of breath, wheezing and stridor.    Cardiovascular:  Negative for chest pain, palpitations and leg swelling.   Gastrointestinal:  Negative for abdominal distention, abdominal pain, constipation, diarrhea, nausea and vomiting.        LBM a few days ago   Genitourinary:  Negative for difficulty urinating, dysuria, frequency, hematuria and urgency.   Musculoskeletal:  Positive for back pain (chronic lower back pain, has been taking oxycodone with relieft). Negative for arthralgias and gait problem.   Neurological:  Negative for dizziness, weakness, light-headedness, numbness and headaches.   Psychiatric/Behavioral:  Negative for dysphoric mood and sleep disturbance. The patient is not nervous/anxious.    All other systems reviewed and are negative.      Past Medical and Surgical History:     Past Medical History:   Diagnosis Date    Depression     Diabetes mellitus (HCC)     Hyperlipidemia     Hypertension     Osteoarthritis, knee        Past Surgical History:   Procedure Laterality Date    BACK SURGERY      CARDIAC CATHETERIZATION N/A 01/17/2025     "Procedure: Cardiac PCI;  Surgeon: Neeraj Gaines MD;  Location: [facility];  Service: Cardiology    CARDIAC CATHETERIZATION Left 2025    Procedure: Cardiac Left Heart Cath;  Surgeon: Neeraj Gaines MD;  Location: [facility];  Service: Cardiology    CARDIAC CATHETERIZATION N/A 2025    Procedure: Cardiac iabp;  Surgeon: Neeraj Gaines MD;  Location: [facility];  Service: Cardiology    HAND SURGERY      JOINT REPLACEMENT Left     l tkr    KNEE SURGERY Left     NE ARTHRP KNE CONDYLE&PLATU MEDIAL&LAT COMPARTMENTS Right 2018    Procedure: ARTHROPLASTY KNEE TOTAL;  Surgeon: Franny Grimaldo MD;  Location: [facility];  Service: Orthopedics    TOOTH EXTRACTION      WISDOM TOOTH EXTRACTION         Meds/Allergies:    all medications and allergies reviewed    Allergies: No Known Allergies    Social History:     Marital Status: /Civil Union    Substance Use History:   Social History     Substance and Sexual Activity   Alcohol Use Yes    Alcohol/week: 3.0 standard drinks of alcohol    Types: 3 Cans of beer per week    Comment: 'couple beers every couple weeks'     Social History     Tobacco Use   Smoking Status Former    Current packs/day: 0.00    Types: Cigarettes    Quit date: 3/1/2012    Years since quittin.9    Passive exposure: Past   Smokeless Tobacco Never     Social History     Substance and Sexual Activity   Drug Use Not Currently       Family History:  Reviewed    Physical Exam:     Vitals:   Blood Pressure: 121/76 (25 1333)  Pulse: 96 (25 1333)  Temperature: 98.7 °F (37.1 °C) (25 1333)  Temp Source: Temporal (25 1333)  Respirations: (!) 6 (25 1333)  Height: 6' 2\" (188 cm) (25 1333)  Weight - Scale: 103 kg (227 lb 1.2 oz) (25 1333)  SpO2: 96 % (25 1333)    Physical Exam  Vitals and nursing note reviewed.   Constitutional:       General: He is not in acute distress.     Appearance: Normal appearance. He " is not ill-appearing.   HENT:      Head: Normocephalic and atraumatic.   Cardiovascular:      Rate and Rhythm: Normal rate and regular rhythm.      Pulses: Normal pulses.      Heart sounds: Murmur heard.      Systolic murmur is present with a grade of 1/6.      No friction rub.      Comments: Wearing lifevest.  Pulmonary:      Effort: Pulmonary effort is normal. No respiratory distress.      Breath sounds: Normal breath sounds. No wheezing or rhonchi.   Abdominal:      General: Abdomen is flat. Bowel sounds are normal. There is no distension.      Palpations: Abdomen is soft. There is no mass.      Tenderness: There is no abdominal tenderness. There is no guarding or rebound.   Musculoskeletal:      Cervical back: Normal range of motion and neck supple. No tenderness.      Right lower leg: No edema.      Left lower leg: No edema.   Skin:     General: Skin is warm and dry.      Capillary Refill: Capillary refill takes less than 2 seconds.      Findings: Bruising (R groin ecchymosis) present.   Neurological:      Mental Status: He is alert and oriented to person, place, and time.   Psychiatric:         Mood and Affect: Mood normal.         Behavior: Behavior normal.         Additional Data:     Labs and imaging reviewed.    EKG Reviewed - last EKG on 1/18/25 showed NSR with QTc of 406.    M*Modal software was used to dictate this note.  It may contain errors with dictating incorrect words or incorrect spelling. Please contact the provider directly with any questions.

## 2025-01-29 NOTE — TREATMENT PLAN
Individualized Plan of Care - [facility] [place]  Joseph Aguilar 69 y.o. male MRN: [id number]  Unit/Bed#: [facility] 270-02 Encounter: [id number]     PATIENT INFORMATION  ADMISSION DATE: 1/29/2025  1:27 PM HAYDE CATEGORY:Cardiac:  09 Cardiac STEMI   ADMISSION DIAGNOSIS: STEMI (ST elevation myocardial infarction) (HCC) [I21.3]  EXPECTED LOS: 10 to 14 days     MEDICAL/FUNCTIONAL PROGNOSIS  Based on my assessment of the patient's medical conditions and current functional status, the prognosis for attaining medical and functional goals or the IRF stay is:  Fair    Medical Goals: Patient will be medically stable for discharge to [facility] upon completion of rehab program    Cardiopulmonary function: Ensure cardiopulmonary stability and optimize cardiopulmonary function not only at rest but with activity as patient's activity level significantly increases in acute rehab compared with prior to transfer in preparation for safe discharge from [facility].  Must closely and frequently monitor blood pressure and HR to ensure adequate cardiac output during ADLs and ambulation as patient is at increased risk for orthostatic hypotension/syncope and potential injury if not monitored for and managed adequately.    Blood pressure management:    Frequent monitoring of blood pressure with appropriate adjustments in blood pressure medication management to optimize blood pressure control and prevent/limit renal complications.   Monitoring impact of blood pressure and side-effects of blood pressure medications at rest and with activity.   DM: Patient will improve/maintain blood sugar control to ensure optimal healing and decrease risks of complications associated with DM through medication monitoring, adjustments as indicated, and optimal dietary intake and education.  Pain management:  Pain will improve with frequent evaluation of pain, careful adjustments in medications, frequent re-evaluation of patient's pain and  medical/neurologic status to ensure optimal pain control, avoidance of potential serious and even life-threatening side-effects and drug interactions, as well as weaning pain medications as soon as possible to decrease risk of short and long-term use.     Inpatient rehabilitation education/teaching:  To be provided to patient and typically family/caregiver (if able to be identified) by all skilled therapists, rehab nursing, case management, and rehab specialized physician to ensure optimal recovery and decrease risks of complications in both acute rehabilitation setting as well as after discharge.   Bowel dysfunction: Appropriate bowel management with appropriate toileting program from rehab nursing and staff with oversight management by rehabilitation physicians which include adjustments in medications to optimize appropriate bowel function and prevent/decrease risk of constipation and bowel obstruction.    Neurogenic bowel: Appropriate neurogenic bowel management with appropriate toileting program from rehab nursing and staff with oversight management by rehabilitation physicians which include adjustments in medications to optimize appropriate bowel function and prevent/decrease risk of constipation and bowel obstruction.    Obesity:  Close monitoring of nutrition status, nutrition specialist with adjustments in diet.   on appropriate short and long term nutrition and activity.  Obesity increases complexity of patient's overall condition and causes unique challenges during this part of patient's recovery process.  Supervise and if necessary make adjustments in rehab nursing care and skilled therapy care to ensure appropriate toileting, bed mobility, other ADLs, and ambulation to decrease risk of falls/injuries, VTE, skin breakdown/ulceration and optimize functional recovery.        ANTICIPATED DISCHARGE DISPOSITION AND SERVICES  COMMUNITY SETTING: Home - independent/modified independent    Is a 24-hr  caregiver available? No  Has discharge plan been discussed with primary caregiver?  No  Date of Discussion: unknown date    ANTICIPATED FOLLOW-UP SERVICE:   Outpatient Therapy Services: PT and OT      Home Health Services: PT, OT, and Nursing      DISCIPLINE SPECIFIC PLANS:  Required Disciplines & Services: Rehabillitation Nursing, Case Management, and Dietay/Nutrition    REQUIRED THERAPY:  Therapy Hours per Day Days per Week   Physical Therapy 1-2 5-6   Occupational Therapy 1-2 5-6   NOTE: Additional therapy time(s) or changes to allocation of therapies as appropriate to meet patient needs and to achieve functional goals.    Patient will participate in above therapy regimen consisting of PT and OT due to the following medical procedure/condition:Cardiac:  09 Cardiac STEMI    ANTICIPATED FUNCTIONAL OUTCOMES:  ADL:  ind   Bladder/Bowel: Patient will return to premorbid level for bladder/bowel management upon completion of rehab program    Transfers:  ind   Locomotion:  ind    Cognitive:  at premorbid lvl     DISCHARGE PLANNING NEEDS  Equipment needs: Discharge needs to be reviewed with team      REHAB ANTICIPATED PARTICIPATION RESTRICTIONS:  Amubulate Short Distances Only, Assist with Mobility, Assist with Tub Shower Transfer, Rquires Assist with ADLS, and Requires Assit with Steps

## 2025-01-29 NOTE — PROGRESS NOTES
PHYSICAL MEDICINE AND REHABILITATION   PREADMISSION ASSESSMENT     Projected IGC and Rehabilitation Diagnoses:  Impairment of mobility, safety and Activities of Daily Living (ADLs) due to Cardiac:  09 Cardiac STEMI  Etiologic Dx: Anterior STEMI  Date of Onset: 1/17/2025   Date of surgery: 1/17/2025 Cardiac Catheterization s/p Stenting     PATIENT INFORMATION  Name: Joseph Aguilar Phone #: [phone] (home)   Address: [address] [facility] [address]  YOB: 1955 Age: 69 y.o. #   Marital Status: /Civil Union  Ethnicity:   Employment Status: retired  Extended Emergency Contact Information  Primary Emergency Contact: Ольга Aguilar  Address: [address] [place] [id number] [facility]  Mobile Phone: [phone]  Relation: Spouse  Advance Directive: Level 1 Full Code - unknown advanced directive    INSURANCE/COVERAGE:     Primary Payor: MEDICARE / Plan: MEDICARE A AND B / Product Type: Medicare A & B Fee for Service /   Secondary Payer: Commercial Miscellaneous   Payer Contact:  Payer Contact:   Contact Phone:  Contact Phone:     MEDICARE #: [id number]   Medicare Days: 60/30/60  Medical Record #: [id number]    REFERRAL SOURCE:   Referring provider: Joel Bustamante MD  Referring facility: [facility]  Room: [facility]  PCP: No primary care provider on file. PCP phone number: None    MEDICAL INFORMATION  HPI: Patient is a 69 year old male that presented to [facility] on 1/17/2025, with PMH significant for hypertension, hyperlipidemia, diabetes, osteoarthritis, who presents with near syncope and hypotension. Patient denied chest pain, shortness of breath, nausea or vomiting. Endorses dizziness during the event. During evaluation in the emergency department, anterior wall STEMI was identified and interventional cardiology was alerted. Patient was taken to the Cath Lab, where patient  became more hypotensive requiring pressor support and intra-aortic balloon pump insertion. Per cardiology there was some element of difficulty, however once the left main was stented patient became much more stable. Left main, LAD were stented however the circumflex artery was unable to be intervened upon. Critical care was consulted for admission to the intensive care unit for cardiogenic shock status post STEMI. Maintained on Heparin gtt d/t LV thrombus, and continued on Brilinta and ASA. Statin was placed on hold d/t transaminitis. ECHO showed EF25%, severe global hypokinesis with apical and distal septal near akinesis, poss mod size thrombus at apex, with plan for JANET pending medical stability. On 1/20, Vascular Surgery was consulted re: LE bleeding with hematoma formation. IABP was D/C'd, and Heparin gtt was placed on hold. He was transfused 1 unit PRBCs. Heparin gtt was restarted on 1/21. Repeat ECHO showed EF 25%, and LifeVest was ordered (delivered on 1/25). Patient continued on Heparin gtt with Coumadin bridging, with goal INR level 2-3. As of 1/29, patient's INR is 2.22, and Heparin gtt was D/C'd. He continues on Coumadin. Patient is overall hemodynamically stable and medically cleared for discharge to [facility]. PT/OT therapies were consulted, as well as patient's case reviewed with [facility] physician, and they are recommending patient for inpatient Acute Rehab. He has demonstrated that he can tolerate and participate in 3 hours of therapy per day.    Fully COVID vaccinated - Moderna.     Past Medical History:   Past Surgical History:   Allergies:     Past Medical History:   Diagnosis Date    Depression     Diabetes mellitus (HCC)     Hyperlipidemia     Hypertension     Osteoarthritis, knee     Past Surgical History:   Procedure Laterality Date    BACK SURGERY      CARDIAC CATHETERIZATION N/A 1/17/2025    Procedure: Cardiac PCI;  Surgeon: Neeraj Gaines MD;  Location: [facility] CARDIAC CATH LAB;  Service: Cardiology     CARDIAC CATHETERIZATION Left 1/17/2025    Procedure: Cardiac Left Heart Cath;  Surgeon: Neeraj Gaines MD;  Location: [facility]ARD[facility];  Service: Cardiology    CARDIAC CATHETERIZATION N/A 1/17/2025    Procedure: Cardiac iabp;  Surgeon: Neeraj Gaines MD;  Location: [facility] CARDI[facility] C[facility];  Service: Cardiology    HAND SURGERY      JOINT REPLACEMENT Left     l tkr    KNEE SURGERY Left     MN ARTHRP KNE CONDYLE&PLATU MEDIAL&LAT COMPARTMENTS Right 11/19/2018    Procedure: ARTHROPLASTY KNEE TOTAL;  Surgeon: Franny Grimaldo MD;  Location:  MAIN OR;  Service: Orthopedics    TOOTH EXTRACTION      WISDOM TOOTH EXTRACTION       No Known Allergies      Medical/functional conditions requiring inpatient rehabilitation: Anterior STEMI s/p stenting, LE hematoma, ABLA, acute pain, LifeVest due to ischemic cardiomyopathy (EF 25%), LV thrombus, impaired mobility and self care, impaired balance, impaired cognition, decreased strength/endurance    Risk for medical/clinical complications: risk for falls, risk for skin breakdown, risk for uncontrolled pain, risk for DVT/PE, risk for infection, risk for cardiac event, risk for hypo/hypertensive episodes, risk for hypo/hyperglycemia episodes    Comorbidities/Surgeries in the last 100 days:  HTN, HLD, DM, osteoarthritis    1/17/2025 Cardiac Catheterization s/p Stenting     CURRENT VITAL SIGNS:   Temp:  [98.1 °F (36.7 °C)-99.4 °F (37.4 °C)] 98.1 °F (36.7 °C)  HR:  [84-90] 90  BP: ()/(65-79) 118/79  Resp:  [15-20] 15  SpO2:  [93 %-95 %] 93 %   Intake/Output Summary (Last 24 hours) at 1/29/2025 1133  Last data filed at 1/29/2025 1100  Gross per 24 hour   Intake 515 ml   Output 1475 ml   Net -960 ml        LABORATORY RESULTS:      Lab Results   Component Value Date    HGB 11.0 (L) 01/28/2025    HCT 33.2 (L) 01/28/2025    WBC 8.66 01/28/2025     Lab Results   Component Value Date    BUN 24 01/28/2025    BUN 17 01/10/2022    K 4.1 01/28/2025    K 4.1 01/10/2022      2025     01/10/2022    CREATININE 0.86 2025    CREATININE 0.88 01/10/2022     Lab Results   Component Value Date    PROTIME 25.3 (H) 2025    INR 2.22 (H) 2025    INR 1.1 2022        DIAGNOSTIC STUDIES:  XR chest portable ICU  Result Date: 2025  Impression: Similar mild pulmonary edema. Intra-aortic balloon pump in satisfactory radiographic position positioned over the aortic arch. Workstation performed: [id number]     XR chest portable ICU  Result Date: 2025  Impression: Improving pulmonary edema. Balloon pump 5 mm below the aortic arch. Workstation performed: [id number]     XR chest portable ICU  Result Date: 2025  Impression: Moderate pulmonary venous congestion. Balloon pump 1 cm below the aortic arch. Workstation performed: [id number]     XR chest 1 view portable  Result Date: 2025  Impression: Mild pulmonary venous congestion. Workstation performed: [id number]       PRECAUTIONS/SPECIAL NEEDS:  Tobacco:   Social History     Tobacco Use   Smoking Status Former    Current packs/day: 0.00    Types: Cigarettes    Quit date: 3/1/2012    Years since quittin.9    Passive exposure: Past   Smokeless Tobacco Never   , Alcohol:    Social History     Substance and Sexual Activity   Alcohol Use Yes    Comment: 'couple beers every couple weeks'   , Cardiac Precautions/Sternal Precautions, Anticoagulation:  aspirin 81 mg orally every day, clopidogrel 75 mg orally every day, and warfarin, Blood Sugar Management: as per MD recommendations, Edema Management, Safety Concerns, Pain Management, Dietary Restrictions: Diabetic diet, Visually Impaired, Language Preference: English, and Fall Precautions.    MEDICATIONS:     Current Facility-Administered Medications:     acetaminophen (TYLENOL) tablet 650 mg, 650 mg, Oral, Q6H PRN, Juli Huerta PA-C, 650 mg at 25 1178    aluminum-magnesium hydroxide-simethicone (MAALOX) oral suspension 30 mL, 30 mL, Oral, Q4H PRN,  MARGARITA Galeano, 30 mL at 01/18/25 1114    Artificial Tears Op Soln 1 drop, 1 drop, Left Eye, Q4H PRN, Juli Huerta PA-C, 1 drop at 01/24/25 2146    aspirin (ECOTRIN LOW STRENGTH) EC tablet 81 mg, 81 mg, Oral, Daily, MARGARITA Galeano, 81 mg at 01/29/25 0852    atorvastatin (LIPITOR) tablet 80 mg, 80 mg, Oral, Daily With Dinner, MARGARITA Galeano, 80 mg at 01/28/25 1655    bisacodyl (DULCOLAX) rectal suppository 10 mg, 10 mg, Rectal, Daily PRN, MARGARITA Galeano    carvedilol (COREG) tablet 3.125 mg, 3.125 mg, Oral, BID With Meals, MARGARITA Neville, 3.125 mg at 01/28/25 1655    chlorhexidine (PERIDEX) 0.12 % oral rinse 15 mL, 15 mL, Mouth/Throat, Q12H YNES, MARGARITA Galeano, 15 mL at 01/29/25 0853    ciprofloxacin (CILOXAN) 0.3 % ophthalmic ointment, , Left Eye, TID, Pancho Bettencourt MD, Given at 01/27/25 2137    clopidogrel (PLAVIX) tablet 75 mg, 75 mg, Oral, Daily, Shanelle Medina DO, 75 mg at 01/29/25 0852    fentaNYL injection, , Intravenous, PRN, Demi Brooke MD, 50 mcg at 01/20/25 1605    insulin glargine (LANTUS) subcutaneous injection 20 Units 0.2 mL, 20 Units, Subcutaneous, HS, MARGARITA Galeano, 20 Units at 01/28/25 2126    insulin lispro (HumALOG/ADMELOG) 100 units/mL subcutaneous injection 2-12 Units, 2-12 Units, Subcutaneous, 4x Daily (AC & HS), 2 Units at 01/29/25 0854 **AND** Fingerstick Glucose (POCT), , , 4x Daily AC and at bedtime, MARGARITA Galeano    insulin lispro (HumALOG/ADMELOG) 100 units/mL subcutaneous injection 8 Units, 8 Units, Subcutaneous, TID With Meals, Shanelle Medina DO, 8 Units at 01/29/25 0854    lidocaine (LIDODERM) 5 % patch 1 patch, 1 patch, Topical, Daily, MARGARITA Galeano, 1 patch at 01/28/25 0907    lisinopril (ZESTRIL) tablet 2.5 mg, 2.5 mg, Oral, Daily, MARGARITA Neville, 2.5 mg at 01/25/25 0801    melatonin tablet 6 mg, 6 mg, Oral, HS, MARGARITA Galeano,  6 mg at 01/28/25 2126    menthol-methyl salicylate (BENGAY) 10-15 % cream, , Apply externally, 4x Daily PRN, MARGARITA Galeano, 1 Application at 01/23/25 0219    ondansetron (ZOFRAN) injection 4 mg, 4 mg, Intravenous, Q6H PRN, MARGARITA Galeano    oxyCODONE (ROXICODONE) split tablet 2.5 mg, 2.5 mg, Oral, Q4H PRN, 2.5 mg at 01/23/25 0242 **OR** oxyCODONE (ROXICODONE) IR tablet 5 mg, 5 mg, Oral, Q4H PRN, MARGARITA Galeano, 5 mg at 01/28/25 2135    QUEtiapine (SEROquel) tablet 25 mg, 25 mg, Oral, HS, MARGARITA Galeano, 25 mg at 01/28/25 2126    warfarin (COUMADIN) tablet 10 mg, 10 mg, Oral, Daily (warfarin), Pancho Bettencourt MD, 10 mg at 01/28/25 1656    SKIN INTEGRITY:   ecchymoses - groin, right wrist catheter site BAIRON    PRIOR LEVEL OF FUNCTION:  He lives in a(n) single family home  Joseph Aguilar is  and lives with their spouse.  Self Care: Independent, Indoor Mobility: Modified Independent, Stairs (in/outdoor): Modified Independent, and Cognition: Independent  Prior to patient's admission, patient was fully Independent with ADLs and received assistance with IADLs. He was driving. He was Modified Independent with use of cane for mobility as needed.     FALLS IN THE LAST 6 MONTHS: none    HOME ENVIRONMENT:  The living area: can live on one level  There are 2 steps to enter the home.    The patient will not have 24 hour supervision/physical assistance available upon discharge.  Patient's spouse works FT, however she is only 15 minutes away from house and can easily leave work to return home to assist patient if needed. No other help is available.     PREVIOUS DME:  Equipment in home (previous DME): Grab Bars, Rolling Walker, Crutches, Single Point Cane, and Raised Toilet    FUNCTIONAL STATUS:  Physical Therapy Occupational Therapy Speech Therapy   1/28/2025, per PT    Cognition   Overall Cognitive Status WFL   Arousal/Participation Alert;Cooperative   Attention Attends  with cues to redirect   Orientation Level Oriented X4   Memory Decreased short term memory;Decreased recall of recent events;Decreased recall of precautions   Following Commands Follows multistep commands with increased time or repetition   Comments pt agreeable to PT session   Bed Mobility   Rolling R 4  Minimal assistance   Additional items Assist x 1;Bedrails;Increased time required;Verbal cues   Rolling L 4  Minimal assistance   Additional items Assist x 1;Bedrails;Increased time required;Verbal cues   Supine to Sit 4  Minimal assistance   Additional items Assist x 1;Bedrails;HOB elevated;Increased time required;Verbal cues   Sit to Supine 4  Minimal assistance   Additional items Assist x 1;Increased time required;Verbal cues;Bedrails;HOB elevated   Additional Comments Pt sat EOB for ~18 minutes with no LOB. Pt with weight shifts when sitting EOB and varying amounts of UE support on stable surface.   Transfers   Additional Comments Pt refused STS with assistance or with landon stedy. Pt with increased pain after sitting EOB for some time and returned to supine.   Balance   Static Sitting Fair -   Dynamic Sitting Fair -   Endurance Deficit   Endurance Deficit Yes   Activity Tolerance   Activity Tolerance Patient limited by fatigue   Medical Staff Made Aware Pt seen as co-evaluation with MARTHA Berman due to pt's co-morbidities, decreased activity tolerance, and possible assist of 2 for functional mobility   Assessment   Prognosis Good   Problem List Decreased strength;Decreased endurance;Impaired balance;Decreased mobility;Decreased cognition;Pain   Assessment Chart review and two person identifiers were completed. Pt seen for PT treatment session this date, consisting of ther act focused on bed mobility, sitting balance, sitting endurence .Pt greeted supine in bed and agreeable to PT session. Pt completed supine to sit with min A x1. Pt remained sitting EOB for ~18 minutes with no LOB. Pt with dynamic sitting with  weight shifts in all directions. Pt with increased back pain when sitting EOB. Pt refusing standing trial with landon elizalde and assistance. Once in supine pt refused LE exercises. Pt completed rolls bilaterally with min A x1.  Pt ended session supine in bed with alarm activated and all needs within reach. Spoke to RN about session outcomes. Pertinent barriers during this session include pain. Current goals and POC established on IE remain appropriate. Pt prognosis for achieving goals is good, pending pt progress with hospitalization/medical status improvements, and indicated by previous response to intervention. Pt limited d/t fear of pain provocation. Pt continues to be functioning below baseline level, and remains limited due to factors listed above. PT will continue to see pt during current hospitalization in order to address the deficits listed above and provide interventions consistent w/ POC in effort to achieve STGs. PT recommends level 1, maximum resource intensity upon discharge.    1/28/2025, per OT    ADL   Where Assessed Other (Comment)  (Assist levels for some self care tasks are based on functional assessment of performance skills and deficits observed during session.)   Eating Assistance 6  Modified independent   Eating Deficit Setup   Grooming Assistance 5  Supervision/Setup   Grooming Deficit Setup;Supervision/safety;Increased time to complete;Wash/dry hands;Wash/dry face;Brushing hair  (increased time)   UB Bathing Assistance 4  Minimal Assistance   UB Bathing Deficit Setup;Supervision/safety;Increased time to complete  (seated)   LB Bathing Assistance 3  Moderate Assistance   LB Bathing Deficit Setup;Supervision/safety;Increased time to complete;Perineal area;Buttocks;Right lower leg including foot;Left lower leg including foot  (pt able to wash groin and upper legs)   UB Dressing Assistance 4  Minimal Assistance   UB Dressing Deficit Setup;Supervision/safety;Increased time to complete  (seated)    LB Dressing Assistance 2  Maximal Assistance   LB Dressing Deficit Setup;Supervision/safety;Increased time to complete;Don/doff R sock;Don/doff L sock;Thread RLE into pants;Thread LLE into pants   Toileting Assistance  3  Moderate Assistance   Toileting Deficit Setup;Supervison/safety;Increased time to complete;Perineal hygiene   Bed Mobility   Rolling R 4  Minimal assistance   Additional items Assist x 1;Bedrails;Increased time required;Verbal cues   Rolling L 4  Minimal assistance   Additional items Assist x 1;Bedrails;Increased time required;Verbal cues   Supine to Sit 4  Minimal assistance   Additional items Assist x 1;Bedrails;HOB elevated;Increased time required;Verbal cues   Sit to Supine 4  Minimal assistance   Additional items Assist x 1;Increased time required;Verbal cues;HOB elevated   Transfers   Additional Comments Pt declined to perform STS transfer this date 2/2 pain and fatigue. Pt did sit EOB for ~18 minutes during bathing ADLs and supervision   Subjective   Subjective Pt agreeable to therapy treatment session   Cognition   Overall Cognitive Status WFL   Arousal/Participation Alert;Responsive   Attention Attends with cues to redirect   Orientation Level Oriented X4   Memory Decreased recall of recent events   Following Commands Follows multistep commands without difficulty   Comments Pt with labile, depressed mood   Activity Tolerance   Activity Tolerance Patient limited by pain;Patient limited by fatigue   Medical Staff Made Aware Jacinto PT  (Pt seen for co-treatment with Physical Therapist due to pt's medical complexity, functional limitations and limited activity tolerance.)   Assessment   Assessment Pt seen this date for skilled OT session focused on ADLs, functional transfers and mobility, safety education. The patient was received supine in bed, NAD, PIV access, [facility], [facility], on RA. He participated in functional bed mobility, bathing UB/LB while seated EOB, and UB dressing while supine. Pt  required Minimal Assistance x1 for bed mobility, and Minimal Assistance for bathing ADLs, Maximal Assistance for LB ADLs. At end of session the patient was located supine in bed with call bell in reach and all needs met. Overall the patient remains below his functional baseline, and is primarily limited at this time due to pain, generalized deconditioning, RLE weakness, and decreased activity tolerance. OT will continue to follow while acute to address POC. At this time, recommend Level 1 Maximum Resource Intensity upon d/c.    N/A     CARE SCORES:  Self Care:  Eatin: Independent  Oral hygiene: 04: Supervision or touching  assistance  Toilet hygiene: 03: Partial/moderate assistance  Shower/bathing self: 03: Partial/moderate assistance  Upper body dressin: Partial/moderate assistance  Lower body dressin: Substantial/maximal assistance  Putting on/taking off footwear: 02: Substantial/maximal assistance  Transfers:  Roll left and right: 03: Partial/moderate assistance  Sit to lyin: Partial/moderate assistance  Lying to sitting on side of bed: 03: Partial/moderate assistance  Sit to stand: 07: Patient Refused  Chair/bed to chair transfer: 07: Patient Refused  Toilet transfer: 09: Not applicable  Mobility:  Walk 10 ft: 09: Not applicable  Walk 50 ft with two turns: 09: Not applicable  Walk 150ft: 09: Not applicable    CURRENT GAP IN FUNCTION  Prior to Admission: Functional Status: Patient was not independent with mobility/ambulation, transfers, ADL's, IADL's.    Expected functional outcomes: It is expected that with skilled acute rehabilitation services the patient will progress to Supervision for self care and Independent for mobility     Estimated length of stay: 10 to 14 days    Anticipated Post-Discharge Disposition/Treatment  Disposition: Return to previous home/apartment.  Outpatient Services: Physical Therapy (PT) and Occupational Therapy (OT)    BARRIERS TO DISCHARGE  Weakness, Pain,  Diminished cognition/Mentation change, Balance Difficulty, Fatigue, Home Accessibility, Caregiver Accessibility, Financial Resources, Equipment Needs, and Resource Availability    INTERVENTIONS FOR DISCHARGE  Adaptive equipment, Patient/Family/Caregiver Education, Community Resources, Financial Assistance, Arrange DME needs, Medication Changes as per MD recommendations, Therapy exercises, Center of balance support , and Energy conservation education     REQUIRED THERAPY:  Patient will require PT and OT 90 minutes each per day, five days per week to achieve rehab goals.     REQUIRED FUNCTIONAL AND MEDICAL MANAGEMENT FOR INPATIENT REHABILITATION:  Skin:  There are no pressure sores currently, right groin ecchymosis, right wrist catheter site BAIRON, Pain Management: Overall pain is well controlled, Deep Vein Thrombosis (DVT) Prophylaxis:  Coumadin with INR goal 2-3 and ASA/Plavix, Diabetes Management: continue sliding scale insulin, patient to do finger sticks as ordered, SLIM to continue to manage diabetes, and further IM management of additional medical conditions while on ARC, he needs PT/OT intervention, patient/family education and training, possible Neuropsych and Cardiology consults with any other needed consults prn, nursing medication review and management of bowel/bladder function.    RECOMMENDED LEVEL OF CARE:   Patient is a 69 year old male that presented to [facility] on 1/17/2025, with PMH significant for hypertension, hyperlipidemia, diabetes, osteoarthritis, who presents with near syncope and hypotension. Patient denied chest pain, shortness of breath, nausea or vomiting. Endorses dizziness during the event. During evaluation in the emergency department, anterior wall STEMI was identified and interventional cardiology was alerted. Patient was taken to the Cath Lab, where patient became more hypotensive requiring pressor support and intra-aortic balloon pump insertion. Per cardiology there  was some element of difficulty, however once the left main was stented patient became much more stable. Left main, LAD were stented however the circumflex artery was unable to be intervened upon. Critical care was consulted for admission to the intensive care unit for cardiogenic shock status post STEMI. Maintained on Heparin gtt d/t LV thrombus, and continued on Brilinta and ASA. Statin was placed on hold d/t transaminitis. ECHO showed EF25%, severe global hypokinesis with apical and distal septal near akinesis, poss mod size thrombus at apex, with plan for JANET pending medical stability. On 1/20, Vascular Surgery was consulted re: LE bleeding with hematoma formation. IABP was D/C'd, and Heparin gtt was placed on hold. He was transfused 1 unit PRBCs. Heparin gtt was restarted on 1/21. Repeat ECHO showed EF 25%, and LifeVest was ordered (delivered on 1/25). Patient continued on Heparin gtt with Coumadin bridging, with goal INR level 2-3. As of 1/29, patient's INR is 2.22, and Heparin gtt was D/C'd. He continues on Coumadin. Prior to patient's admission, patient was fully Independent with ADLs and received assistance with IADLs. He was driving. He was Modified Independent with use of cane for mobility as needed. Currently, patient is Min assist with bed mobility, and Min assist for UB ADLs, Mod/Max assist for LB ADLs. Close medical management and PM&R management is recommended at this time while patient is on the ARC. Inpatient acute rehab is recommended for patient to maximize overall strength and mobility upon discharge to home with support of family.

## 2025-01-29 NOTE — ASSESSMENT & PLAN NOTE
ECHO on 1/19 showed EF 25%, systolic function severely reduced, global hypokinesis.  Currently wearing Lifevest.  Continue Coreg 3.125mg BID.  Consider restarting home Jardiance tomorrow.  Recommend establishing care with Cardiology as outpatient.

## 2025-01-29 NOTE — PLAN OF CARE
Problem: Prexisting or High Potential for Compromised Skin Integrity  Goal: Skin integrity is maintained or improved  Description: INTERVENTIONS:  - Identify patients at risk for skin breakdown  - Assess and monitor skin integrity  - Assess and monitor nutrition and hydration status  - Monitor labs   - Assess for incontinence   - Turn and reposition patient  - Assist with mobility/ambulation  - Relieve pressure over bony prominences  - Avoid friction and shearing  - Provide appropriate hygiene as needed including keeping skin clean and dry  - Evaluate need for skin moisturizer/barrier cream  - Collaborate with interdisciplinary team   - Patient/family teaching  - Consider wound care consult   1/29/2025 1046 by Phoenix Bourgeois RN  Outcome: Adequate for Discharge  1/29/2025 1046 by Pohenix Bourgeois RN  Outcome: Progressing     Problem: PAIN - ADULT  Goal: Verbalizes/displays adequate comfort level or baseline comfort level  Description: Interventions:  - Encourage patient to monitor pain and request assistance  - Assess pain using appropriate pain scale  - Administer analgesics based on type and severity of pain and evaluate response  - Implement non-pharmacological measures as appropriate and evaluate response  - Consider cultural and social influences on pain and pain management  - Notify physician/advanced practitioner if interventions unsuccessful or patient reports new pain  1/29/2025 1046 by Phoenix Bourgeois RN  Outcome: Adequate for Discharge  1/29/2025 1046 by Phoenix Bourgeois RN  Outcome: Progressing     Problem: INFECTION - ADULT  Goal: Absence or prevention of progression during hospitalization  Description: INTERVENTIONS:  - Assess and monitor for signs and symptoms of infection  - Monitor lab/diagnostic results  - Monitor all insertion sites, i.e. indwelling lines, tubes, and drains  - Monitor endotracheal if appropriate and nasal secretions for changes in amount and color  - [place] appropriate  cooling/warming therapies per order  - Administer medications as ordered  - Instruct and encourage patient and family to use good hand hygiene technique  - Identify and instruct in appropriate isolation precautions for identified infection/condition  1/29/2025 1046 by Phoenix Bourgeois RN  Outcome: Adequate for Discharge  1/29/2025 1046 by Phoenix Bourgeois RN  Outcome: Progressing  Goal: Absence of fever/infection during neutropenic period  Description: INTERVENTIONS:  - Monitor WBC    1/29/2025 1046 by Phoenix Bourgeois RN  Outcome: Adequate for Discharge  1/29/2025 1046 by Phoenix Bourgeois RN  Outcome: Progressing     Problem: SAFETY ADULT  Goal: Patient will remain free of falls  Description: INTERVENTIONS:  - Educate patient/family on patient safety including physical limitations  - Instruct patient to call for assistance with activity   - Consult OT/PT to assist with strengthening/mobility   - Keep Call bell within reach  - Keep bed low and locked with side rails adjusted as appropriate  - Keep care items and personal belongings within reach  - Initiate and maintain comfort rounds  - Make Fall Risk Sign visible to staff  - Offer Toileting every 2 Hours, in advance of need  - Initiate/Maintain bed alarm  - Obtain necessary fall risk management equipment  - Apply yellow socks and bracelet for high fall risk patients  - Consider moving patient to room near nurses station  1/29/2025 1046 by Phoenix Bourgeois RN  Outcome: Adequate for Discharge  1/29/2025 1046 by Phoenix Bourgeois RN  Outcome: Progressing  Goal: Maintain or return to baseline ADL function  Description: INTERVENTIONS:  -  Assess patient's ability to carry out ADLs; assess patient's baseline for ADL function and identify physical deficits which impact ability to perform ADLs (bathing, care of mouth/teeth, toileting, grooming, dressing, etc.)  - Assess/evaluate cause of self-care deficits   - Assess range of motion  - Assess patient's mobility; develop plan if  impaired  - Assess patient's need for assistive devices and provide as appropriate  - Encourage maximum independence but intervene and supervise when necessary  - Involve family in performance of ADLs  - Assess for home care needs following discharge   - Consider OT consult to assist with ADL evaluation and planning for discharge  - Provide patient education as appropriate  1/29/2025 1046 by Phoenix Bourgeois RN  Outcome: Adequate for Discharge  1/29/2025 1046 by Phoenix Bourgeois RN  Outcome: Progressing  Goal: Maintains/Returns to pre admission functional level  Description: INTERVENTIONS:  - Perform AM-PAC 6 Click Basic Mobility/ Daily Activity assessment daily.  - Set and communicate daily mobility goal to care team and patient/family/caregiver.   - Collaborate with rehabilitation services on mobility goals if consulted  - Out of bed for toileting  - Record patient progress and toleration of activity level   1/29/2025 1046 by Phoenix Bourgeois RN  Outcome: Adequate for Discharge  1/29/2025 1046 by Phoenix Bourgeois RN  Outcome: Progressing     Problem: DISCHARGE PLANNING  Goal: Discharge to home or other facility with appropriate resources  Description: INTERVENTIONS:  - Identify barriers to discharge w/patient and caregiver  - Arrange for needed discharge resources and transportation as appropriate  - Identify discharge learning needs (meds, wound care, etc.)  - Arrange for interpretive services to assist at discharge as needed  - Refer to Case Management Department for coordinating discharge planning if the patient needs post-hospital services based on physician/advanced practitioner order or complex needs related to functional status, cognitive ability, or social support system  1/29/2025 1046 by Phoeinx Bourgeois RN  Outcome: Adequate for Discharge  1/29/2025 1046 by Phoenix Bourgeois RN  Outcome: Progressing     Problem: Knowledge Deficit  Goal: Patient/family/caregiver demonstrates understanding of disease process,  treatment plan, medications, and discharge instructions  Description: Complete learning assessment and assess knowledge base.  Interventions:  - Provide teaching at level of understanding  - Provide teaching via preferred learning methods  1/29/2025 1046 by Phoenix Bourgeois RN  Outcome: Adequate for Discharge  1/29/2025 1046 by Phoenix Bourgeois RN  Outcome: Progressing     Problem: Nutrition/Hydration-ADULT  Goal: Nutrient/Hydration intake appropriate for improving, restoring or maintaining nutritional needs  Description: Monitor and assess patient's nutrition/hydration status for malnutrition. Collaborate with interdisciplinary team and initiate plan and interventions as ordered.  Monitor patient's weight and dietary intake as ordered or per policy. Utilize nutrition screening tool and intervene as necessary. Determine patient's food preferences and provide high-protein, high-caloric foods as appropriate.     INTERVENTIONS:  - Monitor oral intake, urinary output, labs, and treatment plans  - Assess nutrition and hydration status and recommend course of action  - Evaluate amount of meals eaten  - Assist patient with eating if necessary   - Allow adequate time for meals  - Recommend/ encourage appropriate diets, oral nutritional supplements, and vitamin/mineral supplements  - Order, calculate, and assess calorie counts as needed  - Recommend, monitor, and adjust tube feedings and TPN/PPN based on assessed needs  - Assess need for intravenous fluids  - Provide specific nutrition/hydration education as appropriate  - Include patient/family/caregiver in decisions related to nutrition  1/29/2025 1046 by Phoenix Bourgeois RN  Outcome: Adequate for Discharge  1/29/2025 1046 by Phoenix Bourgeois RN  Outcome: Progressing     Problem: CARDIOVASCULAR - ADULT  Goal: Maintains optimal cardiac output and hemodynamic stability  Description: INTERVENTIONS:  - Monitor I/O, vital signs and rhythm  - Monitor for S/S and trends of decreased  cardiac output  - Administer and titrate ordered vasoactive medications to optimize hemodynamic stability  - Assess quality of pulses, skin color and temperature  - Assess for signs of decreased coronary artery perfusion  - Instruct patient to report change in severity of symptoms  1/29/2025 1046 by Phoenix Bourgeois RN  Outcome: Adequate for Discharge  1/29/2025 1046 by Phoenix Bourgeois RN  Outcome: Progressing  Goal: Absence of cardiac dysrhythmias or at baseline rhythm  Description: INTERVENTIONS:  - Continuous cardiac monitoring, vital signs, obtain 12 lead EKG if ordered  - Administer antiarrhythmic and heart rate control medications as ordered  - Monitor electrolytes and administer replacement therapy as ordered  1/29/2025 1046 by Phoenix Bourgeois RN  Outcome: Adequate for Discharge  1/29/2025 1046 by Phoenix Bourgeois RN  Outcome: Progressing     Problem: RESPIRATORY - ADULT  Goal: Achieves optimal ventilation and oxygenation  Description: INTERVENTIONS:  - Assess for changes in respiratory status  - Assess for changes in mentation and behavior  - Position to facilitate oxygenation and minimize respiratory effort  - Oxygen administered by appropriate delivery if ordered  - Initiate smoking cessation education as indicated  - Encourage broncho-pulmonary hygiene including cough, deep breathe, Incentive Spirometry  - Assess the need for suctioning and aspirate as needed  - Assess and instruct to report SOB or any respiratory difficulty  - Respiratory Therapy support as indicated  1/29/2025 1046 by Phoenix Bourgeois RN  Outcome: Adequate for Discharge  1/29/2025 1046 by Phoenix Bourgeois RN  Outcome: Progressing     Problem: METABOLIC, FLUID AND ELECTROLYTES - ADULT  Goal: Electrolytes maintained within normal limits  Description: INTERVENTIONS:  - Monitor labs and assess patient for signs and symptoms of electrolyte imbalances  - Administer electrolyte replacement as ordered  - Monitor response to electrolyte replacements,  including repeat lab results as appropriate  - Instruct patient on fluid and nutrition as appropriate  1/29/2025 1046 by Phoenix Bourgeois RN  Outcome: Adequate for Discharge  1/29/2025 1046 by Phoenix Bourgeois RN  Outcome: Progressing  Goal: Fluid balance maintained  Description: INTERVENTIONS:  - Monitor labs   - Monitor I/O and WT  - Instruct patient on fluid and nutrition as appropriate  - Assess for signs & symptoms of volume excess or deficit  1/29/2025 1046 by Phoenix Bourgeois RN  Outcome: Adequate for Discharge  1/29/2025 1046 by Phoenix Bourgeois RN  Outcome: Progressing  Goal: Glucose maintained within target range  Description: INTERVENTIONS:  - Monitor Blood Glucose as ordered  - Assess for signs and symptoms of hyperglycemia and hypoglycemia  - Administer ordered medications to maintain glucose within target range  - Assess nutritional intake and initiate nutrition service referral as needed  1/29/2025 1046 by Phoenix Bourgeois RN  Outcome: Adequate for Discharge  1/29/2025 1046 by Phoenix Bourgeois RN  Outcome: Progressing

## 2025-01-29 NOTE — ASSESSMENT & PLAN NOTE
Presented on 1/17 with near syncopal episode with jaw pain and hypotension.  NSTEMI alert - cardiac cath on 1/17 with CHYNA to LAD and L main with IABP support.  IABP discontinued on 1/20.  Continue ASA 81mg daily, Plavix 75mg daily, Coumadin, and Coreg 3.125mg BID.  Recommend establishing with Cardiology on discharge.

## 2025-01-29 NOTE — ASSESSMENT & PLAN NOTE
Lab Results   Component Value Date    HGBA1C 8.2 (H) 01/18/2025       Recent Labs     01/29/25  1127 01/29/25  1559 01/29/25  2128 01/30/25  0611   POCGLU 218* 162* 165* 166*   A1c 8.2% on admission  Lantus 20u at HS and lispro 8u with meals  IM starting metformin 500 BID today  Monitor glucose levels  Cw diabetic diet    Blood Sugar Average: Last 72 hrs:  (P) [id number]

## 2025-01-29 NOTE — ASSESSMENT & PLAN NOTE
-patient reports old injury of back  -takes tylenol for pain at home  -monitor pain levels and titrate meds  -offer different modalities- heat, lidocaine patches

## 2025-01-29 NOTE — ASSESSMENT & PLAN NOTE
Developed R groin hematoma at IABP insertion site.  IR suite on 1/20 for closure of R femoral artery puncture site and control of expanding hematoma performed by Dr. Brooke (Vascular).  Monitor site daily for s/s of reoccurring bleeding.  Continue to trend H&H.

## 2025-01-29 NOTE — ASSESSMENT & PLAN NOTE
Hgb currently 11.0.  Developed large R groin hematoma s/p balloon pump.  No active s/s of bleeding.  Obtain iron panel with next set of labs.  Continue to trend routine CBC.

## 2025-01-29 NOTE — ARC ADMISSION
Reviewed updates - patient remains acute rehab appropriate, and is medically stable for ARC admission.    He will admit to [facility] room 270 with transport time TBD per CM. Report can be called to (P): [phone]. CM has been updated.

## 2025-01-29 NOTE — CASE MANAGEMENT
Case Management Progress Note    Patient name Joseph Aguilar  Location /-01 MRN [id number]  : 1955 Date 2025       LOS (days): 12  Geometric Mean LOS (GMLOS) (days): 3.8  Days to GMLOS:-7.9        OBJECTIVE:        Current admission status: Inpatient  Preferred Pharmacy:   [facility]/pharmacy #[id number] - [facility] [place] - [address]  [address] [address]  Phone: [phone] Fax: [phone]    [facility]  [place] [place] - [address]  [address]  [place] [address]  Phone: [phone] Fax: [phone]     [facility] [place] [facility] [address] [address]  Phone: [phone] Fax: [phone]    [facility] - [place] - [address]  [address]  [place] [address]  Phone: [phone] Fax: [phone]    Primary Care Provider: No primary care provider on file.    Primary Insurance: MEDICARE  Secondary Insurance: COMMERCIAL MISCELLANEOUS    PROGRESS NOTE:  Updated patient, SLIM, RN, and ARC liaison regarding 12p pickup time. CM followed up with Corrnia Lopez from the coumadin clinic and gave her the information to follow this patient as SLIM recommended him for Coumadin Clinic. LINO Moody will follow outpatient. Phone number was added to patient's followup providers in [facility].

## 2025-01-29 NOTE — ASSESSMENT & PLAN NOTE
Patient was evaluated by the rehabilitation team MD and an appropriate candidate for acute inpatient rehabilitation program at this time.  The patient will tolerate 3 hours/day 5 to 7 days/week of intensive physical, occupational therapy in order to obtain goals for community discharge  Due to the patient's functional Compared to their baseline level of function in addition to their ongoing medical needs, the patient would benefit from daily supervision from a rehabilitation physician as well as rehabilitation nursing to implement and adjust the medical as well as functional plan of care in order to meet the patient's goals.  JHOAN SMITH

## 2025-01-29 NOTE — ASSESSMENT & PLAN NOTE
"TTE 1/19: \"There is a possible moderately-sized thrombus at the apex, difficult imaging.\"  Cardiology consulted on previous admission  Continue Coumadin 10 mg  Monitor PT, INR (2-3 per guidelines)  F/o cardiology outpt    "

## 2025-01-29 NOTE — ASSESSMENT & PLAN NOTE
ECHO on 1/19 showed possible moderately sized thrombus at the apex.  Started on Coumadin in acute setting.  Currently on Coumadin 10mg daily.  INR 2.22 today.  Decrease Coumadin to 7.5mg daily.  Repeat INR tomorrow.  Follow-up with Cardiology as outpatient.

## 2025-01-29 NOTE — ASSESSMENT & PLAN NOTE
Lab Results   Component Value Date    HGBA1C 8.2 (H) 01/18/2025       Recent Labs     01/28/25  1618 01/28/25  2042 01/29/25  0730 01/29/25  1127   POCGLU 202* 192* 167* 218*       Blood Sugar Average: Last 72 hrs:    Last A1c was 8.2 on 1/18/2025.  Home regimen: Lantus 15u at HS, glipizide 5mg daily, metformin 1000mg BID, and Jardiance 12.5mg daily.  Currently receiving Lantus 20u at HS and lispro 8u with meals.  Start on metformin 500mg BID today.  Continue SSI, QID accuchecks, and cons. carb diet.

## 2025-01-30 LAB
ALBUMIN SERPL BCG-MCNC: 3.7 G/DL (ref 3.5–5)
ALP SERPL-CCNC: 74 U/L (ref 34–104)
ALT SERPL W P-5'-P-CCNC: 45 U/L (ref 7–52)
ANION GAP SERPL CALCULATED.3IONS-SCNC: 11 MMOL/L (ref 4–13)
AST SERPL W P-5'-P-CCNC: 21 U/L (ref 13–39)
BASOPHILS # BLD AUTO: 0.03 THOUSANDS/ÂΜL (ref 0–0.1)
BASOPHILS NFR BLD AUTO: 0 % (ref 0–1)
BILIRUB SERPL-MCNC: 0.85 MG/DL (ref 0.2–1)
BUN SERPL-MCNC: 20 MG/DL (ref 5–25)
CALCIUM SERPL-MCNC: 9.5 MG/DL (ref 8.4–10.2)
CHLORIDE SERPL-SCNC: 102 MMOL/L (ref 96–108)
CO2 SERPL-SCNC: 25 MMOL/L (ref 21–32)
CREAT SERPL-MCNC: 0.9 MG/DL (ref 0.6–1.3)
EOSINOPHIL # BLD AUTO: 0.3 THOUSAND/ÂΜL (ref 0–0.61)
EOSINOPHIL NFR BLD AUTO: 3 % (ref 0–6)
ERYTHROCYTE [DISTWIDTH] IN BLOOD BY AUTOMATED COUNT: 14.5 % (ref 11.6–15.1)
FERRITIN SERPL-MCNC: 224 NG/ML (ref 24–336)
GFR SERPL CREATININE-BSD FRML MDRD: 88 ML/MIN/1.73SQ M
GLUCOSE SERPL-MCNC: 154 MG/DL (ref 65–140)
GLUCOSE SERPL-MCNC: 166 MG/DL (ref 65–140)
GLUCOSE SERPL-MCNC: 181 MG/DL (ref 65–140)
GLUCOSE SERPL-MCNC: 222 MG/DL (ref 65–140)
GLUCOSE SERPL-MCNC: 329 MG/DL (ref 65–140)
HCT VFR BLD AUTO: 34.5 % (ref 36.5–49.3)
HGB BLD-MCNC: 11.6 G/DL (ref 12–17)
IMM GRANULOCYTES # BLD AUTO: 0.06 THOUSAND/UL (ref 0–0.2)
IMM GRANULOCYTES NFR BLD AUTO: 1 % (ref 0–2)
INR PPP: 2.37 (ref 0.85–1.19)
IRON SATN MFR SERPL: 23 % (ref 15–50)
IRON SERPL-MCNC: 43 UG/DL (ref 50–212)
LYMPHOCYTES # BLD AUTO: 1.7 THOUSANDS/ÂΜL (ref 0.6–4.47)
LYMPHOCYTES NFR BLD AUTO: 18 % (ref 14–44)
MAGNESIUM SERPL-MCNC: 1.7 MG/DL (ref 1.9–2.7)
MCH RBC QN AUTO: 31.4 PG (ref 26.8–34.3)
MCHC RBC AUTO-ENTMCNC: 33.6 G/DL (ref 31.4–37.4)
MCV RBC AUTO: 93 FL (ref 82–98)
MONOCYTES # BLD AUTO: 0.87 THOUSAND/ÂΜL (ref 0.17–1.22)
MONOCYTES NFR BLD AUTO: 9 % (ref 4–12)
NEUTROPHILS # BLD AUTO: 6.72 THOUSANDS/ÂΜL (ref 1.85–7.62)
NEUTS SEG NFR BLD AUTO: 69 % (ref 43–75)
NRBC BLD AUTO-RTO: 0 /100 WBCS
PLATELET # BLD AUTO: 283 THOUSANDS/UL (ref 149–390)
PMV BLD AUTO: 9 FL (ref 8.9–12.7)
POTASSIUM SERPL-SCNC: 4.1 MMOL/L (ref 3.5–5.3)
PROT SERPL-MCNC: 7.1 G/DL (ref 6.4–8.4)
PROTHROMBIN TIME: 25.9 SECONDS (ref 12.3–15)
RBC # BLD AUTO: 3.7 MILLION/UL (ref 3.88–5.62)
SODIUM SERPL-SCNC: 138 MMOL/L (ref 135–147)
TIBC SERPL-MCNC: 184.8 UG/DL (ref 250–450)
TRANSFERRIN SERPL-MCNC: 132 MG/DL (ref 203–362)
UIBC SERPL-MCNC: 142 UG/DL (ref 155–355)
WBC # BLD AUTO: 9.68 THOUSAND/UL (ref 4.31–10.16)

## 2025-01-30 PROCEDURE — 97163 PT EVAL HIGH COMPLEX 45 MIN: CPT

## 2025-01-30 PROCEDURE — 82948 REAGENT STRIP/BLOOD GLUCOSE: CPT

## 2025-01-30 PROCEDURE — 97535 SELF CARE MNGMENT TRAINING: CPT

## 2025-01-30 PROCEDURE — 97167 OT EVAL HIGH COMPLEX 60 MIN: CPT

## 2025-01-30 PROCEDURE — 80053 COMPREHEN METABOLIC PANEL: CPT | Performed by: NURSE PRACTITIONER

## 2025-01-30 PROCEDURE — 99232 SBSQ HOSP IP/OBS MODERATE 35: CPT | Performed by: INTERNAL MEDICINE

## 2025-01-30 PROCEDURE — 85610 PROTHROMBIN TIME: CPT | Performed by: NURSE PRACTITIONER

## 2025-01-30 PROCEDURE — 99233 SBSQ HOSP IP/OBS HIGH 50: CPT | Performed by: INTERNAL MEDICINE

## 2025-01-30 PROCEDURE — 83550 IRON BINDING TEST: CPT | Performed by: NURSE PRACTITIONER

## 2025-01-30 PROCEDURE — 97530 THERAPEUTIC ACTIVITIES: CPT

## 2025-01-30 PROCEDURE — 83735 ASSAY OF MAGNESIUM: CPT | Performed by: NURSE PRACTITIONER

## 2025-01-30 PROCEDURE — 83540 ASSAY OF IRON: CPT | Performed by: NURSE PRACTITIONER

## 2025-01-30 PROCEDURE — 85025 COMPLETE CBC W/AUTO DIFF WBC: CPT | Performed by: NURSE PRACTITIONER

## 2025-01-30 PROCEDURE — 82728 ASSAY OF FERRITIN: CPT | Performed by: NURSE PRACTITIONER

## 2025-01-30 RX ORDER — POLYETHYLENE GLYCOL 3350 17 G/17G
17 POWDER, FOR SOLUTION ORAL DAILY
Status: DISCONTINUED | OUTPATIENT
Start: 2025-01-30 | End: 2025-02-03

## 2025-01-30 RX ORDER — DOCUSATE SODIUM 100 MG/1
100 CAPSULE, LIQUID FILLED ORAL 2 TIMES DAILY
Status: DISCONTINUED | OUTPATIENT
Start: 2025-01-30 | End: 2025-02-10 | Stop reason: HOSPADM

## 2025-01-30 RX ORDER — SENNOSIDES 8.6 MG
2 TABLET ORAL
Status: DISCONTINUED | OUTPATIENT
Start: 2025-01-30 | End: 2025-02-10 | Stop reason: HOSPADM

## 2025-01-30 RX ADMIN — INSULIN LISPRO 4 UNITS: 100 INJECTION, SOLUTION INTRAVENOUS; SUBCUTANEOUS at 17:55

## 2025-01-30 RX ADMIN — CARVEDILOL 3.12 MG: 3.12 TABLET, FILM COATED ORAL at 17:55

## 2025-01-30 RX ADMIN — INSULIN LISPRO 9 UNITS: 100 INJECTION, SOLUTION INTRAVENOUS; SUBCUTANEOUS at 17:55

## 2025-01-30 RX ADMIN — Medication 2.5 MG: at 21:35

## 2025-01-30 RX ADMIN — ATORVASTATIN CALCIUM 80 MG: 80 TABLET, FILM COATED ORAL at 18:00

## 2025-01-30 RX ADMIN — WARFARIN SODIUM 7.5 MG: 5 TABLET ORAL at 17:55

## 2025-01-30 RX ADMIN — INSULIN LISPRO 2 UNITS: 100 INJECTION, SOLUTION INTRAVENOUS; SUBCUTANEOUS at 21:35

## 2025-01-30 RX ADMIN — ASPIRIN 81 MG: 81 TABLET, COATED ORAL at 09:25

## 2025-01-30 RX ADMIN — INSULIN LISPRO 2 UNITS: 100 INJECTION, SOLUTION INTRAVENOUS; SUBCUTANEOUS at 09:32

## 2025-01-30 RX ADMIN — POLYETHYLENE GLYCOL 3350 17 G: 17 POWDER, FOR SOLUTION ORAL at 12:04

## 2025-01-30 RX ADMIN — INSULIN GLARGINE 20 UNITS: 100 INJECTION, SOLUTION SUBCUTANEOUS at 21:34

## 2025-01-30 RX ADMIN — CLOPIDOGREL BISULFATE 75 MG: 75 TABLET ORAL at 09:25

## 2025-01-30 RX ADMIN — GABAPENTIN 300 MG: 300 CAPSULE ORAL at 17:55

## 2025-01-30 RX ADMIN — SENNOSIDES 17.2 MG: 8.6 TABLET ORAL at 21:35

## 2025-01-30 RX ADMIN — BISACODYL 10 MG: 10 SUPPOSITORY RECTAL at 18:00

## 2025-01-30 RX ADMIN — QUETIAPINE FUMARATE 25 MG: 25 TABLET ORAL at 21:35

## 2025-01-30 RX ADMIN — Medication 400 MG: at 09:25

## 2025-01-30 RX ADMIN — Medication 400 MG: at 17:55

## 2025-01-30 RX ADMIN — INSULIN LISPRO 8 UNITS: 100 INJECTION, SOLUTION INTRAVENOUS; SUBCUTANEOUS at 12:04

## 2025-01-30 RX ADMIN — INSULIN LISPRO 9 UNITS: 100 INJECTION, SOLUTION INTRAVENOUS; SUBCUTANEOUS at 12:04

## 2025-01-30 RX ADMIN — DOCUSATE SODIUM 100 MG: 100 CAPSULE, LIQUID FILLED ORAL at 12:04

## 2025-01-30 RX ADMIN — INSULIN LISPRO 9 UNITS: 100 INJECTION, SOLUTION INTRAVENOUS; SUBCUTANEOUS at 09:32

## 2025-01-30 RX ADMIN — GABAPENTIN 300 MG: 300 CAPSULE ORAL at 09:25

## 2025-01-30 RX ADMIN — Medication 6 MG: at 21:35

## 2025-01-30 RX ADMIN — DOCUSATE SODIUM 100 MG: 100 CAPSULE, LIQUID FILLED ORAL at 18:03

## 2025-01-30 NOTE — PROGRESS NOTES
01/30/25 0700   Patient Data   Rehab Impairment Impairment of mobility, safety and Activities of Daily Living (ADLs) due to Cardiac:  09 Cardiac STEMI   Etiologic Diagnosis anterior STEMI   Date of Onset 01/17/25  (surgery: 1/17/2025 Cardiac Catheterization s/p Stenting)   Support System   Name (S)  Ольга  (works FT but works 15mins away and can come home throughout the day if needed per pt report)   Relationship wife   Home Setup   Type of Home Multi Level   Method of Entry Stairs;Hand Rail Bilateral  (ascending)   Number of Stairs 2  (2-3)   Number of Stairs in Home 12   In Home Hand Rail Right  (ascending)   First Floor Bathroom Full;Tub;Shower;Curtain;Grab Bars   Second Floor Bathroom Half   First Floor Setup Available Yes  (stays on first floor)   Home Modifications Necessary?   (pending progress)   Home Modification Comment pending progress   Available Equipment Roller Walker;Single Point Cane  (crutches)   Baseline Information   Vocation Other  (disability)   Transportation    Prior Device(s) Used Single Point Cane  (in community)   Prior IADL Participation   Meal Preparation   (wife completes)   Laundry Other  (wife completes)   Home Cleaning Other  (wife completes)   Prior Level of Function   Self-Care 3. Independent - Patient completed the activities by him/herself, with or without an assistive device, with no assistance from a helper.   Indoor-Mobility (Ambulation) 3. Independent - Patient completed the activities by him/herself, with or without an assistive device, with no assistance from a helper.   Stairs 3. Independent - Patient completed the activities by him/herself, with or without an assistive device, with no assistance from a helper.   Functional Cognition 3. Independent - Patient completed the activities by him/herself, with or without an assistive device, with no assistance from a helper.   Prior Assistance Needed for Household Chores/Cleaning;Meal Preparation;Money  Management;Shopping  (indep for meds)   Prior Device Used   (SPC in community)   Falls in the Last Year   Number of falls in the past 12 months 0   Patient Preference   Nickname (Patient Preference) Yusuf   Psychosocial   Psychosocial (WDL) WDL   Patient Behaviors/Mood Calm;Cooperative   Restrictions/Precautions   Precautions (S)  Bed/chair alarms;Cardiac/sternal;Fall Risk;Supervision on toilet/commode;Pain  (LIFEVEST)   Weight Bearing Restrictions No   ROM Restrictions No   Pain Assessment   Pain Assessment Tool 0-10   Pain Score   (when asked pain number pt delcined to give number. reports general pain from arthritis)   Pain Location/Orientation Location: Generalized   Patient's Stated Pain Goal No pain   Eating Assessment   Type of Assistance Needed Independent   Comment provided breakfast   Eating CARE Score 6   Oral Hygiene   Type of Assistance Needed Physical assistance   Physical Assistance Level Total assistance   Comment PTA in stance at sink. 2* dec standing tolerance completed setup seated at sink   Oral Hygiene CARE Score 1   Tub/Shower Transfer   Reason Not Assessed Sponge Bath  (lifevest)   Shower/Bathe Self   Type of Assistance Needed Physical assistance   Physical Assistance Level Total assistance   Comment PTA completed in stance in shower. seated in recliner able to wash UB, upper thighs, groin and down to ankles. req TA for feet. performed mild STS at bedrail with TA for buttocks   Shower/Bathe Self CARE Score 1   Bathing   Assessed Bath Style Sponge Bath   Anticipated D/C Bath Style Sponge Bath;Tub  (pending progress)   Able to Wash/Rinse/Dry (body part) Left Arm;Right Arm;L Upper Leg;R Upper Leg;Chest;Abdomen;Perineal Area   Limitations Noted in Balance;Endurance;Strength;Timeliness   Positioning Seated;Standing   Dressing/Undressing Clothing   Remove UB Clothes Pullover Shirt   Don UB Clothes Pullover Shirt   Type of Assistance Needed Supervision   Comment seated OH shirt   Upper Body Dressing  CARE Score 4   Remove LB Clothes Pants;Socks   Don LB Clothes Pants;Socks   Type of Assistance Needed Physical assistance   Physical Assistance Level Total assistance   Comment TA thread pants, performed mild STS at bedrail. TA for CM   Lower Body Dressing CARE Score 1   Limitations Noted In Balance;Endurance;Safety;Strength;ROM;Timeliness   Positioning Supported Sit;Standing   Putting On/Taking Off Footwear   Type of Assistance Needed Physical assistance   Physical Assistance Level Total assistance   Comment TA socks   Putting On/Taking Off Footwear CARE Score 1   Toileting Hygiene   Comment (S)  pleasantly declined, reports he would like to complete when needing to have BM. anticipate use of lat weight shifting. attempt to complete next session   Reason if not Attempted Refused to perform   Toileting Hygiene CARE Score 7   Toilet Transfer   Comment (S)  pleasantly declined, reports he would like to complete when needing to have BM. anticipate Ax2 slideboard transfer. attempt to complete next session   Reason if not Attempted Refused to perform   Toilet Transfer CARE Score 7   Transfer Bed/Chair/Wheelchair   Type of Assistance Needed Physical assistance   Physical Assistance Level Total assistance   Comment PTA no AD, SPC in community. maxA slideboard transfer bed>recliner   Chair/Bed-to-Chair Transfer CARE Score 1   Lying to Sitting on Side of Bed   Type of Assistance Needed Physical assistance   Physical Assistance Level 51%-75%   Comment HOB slightly elevated and use of bedrail   Lying to Sitting on Side of Bed CARE Score 2   Sit to Stand   Type of Assistance Needed Physical assistance   Physical Assistance Level Total assistance   Comment mild STS at bedrail. PTA no AD   Sit to Stand CARE Score 1   Walk 10 Feet   Reason if not Attempted Safety concerns   Walk 10 Feet CARE Score 88   Comprehension   Assist Devices Glasses   QI: Comprehension 4. Undestands: Clear comprehension without cues or repetitions  "  Comprehension (FIM) 6 - Understands complex/abstract but requires  glasses for visual comp   Expression   QI: Expression 4. Express complex messages without difficulty and with speech that is clear and easy to understan   Expression (FIM) 7 - Expresses complex/abstract ideas in a reasonable time w/o devices or helper.   Social Interaction   Social Interaction (FIM) 7 - Interacts appropriately without assistive device, medication or helper   Memory   Memory (FIM) 7 - Remembers daily routines   RUE Assessment   RUE Assessment   (hx arthritis 3/4 range)   LUE Assessment   LUE Assessment   (hx arthritis 3/4 range)   Sensation   Light Touch No apparent deficits  (per pt report)   Cognition   Overall Cognitive Status WFL   Arousal/Participation Alert;Cooperative   Attention Attends with cues to redirect   Orientation Level Oriented X4   Memory Decreased short term memory;Decreased recall of recent events;Decreased recall of precautions   Following Commands Follows multistep commands with increased time or repetition   Vision   Vision Comments wears glasses   Discharge Information   Vocational Plan Other  (disability)   Barriers to Return to Vocation Strength;Endurance   Patient's Discharge Plan Return home indep   Patient's Rehab Expectations \"Walk around\"   Barriers to Discharge Home Decreased Strength;Decreased Endurance;Pain   Impressions Pt is a 69 year old male that presented to [facility] on 1/17/2025, with PMH significant for hypertension, hyperlipidemia, diabetes, osteoarthritis, who presents with near syncope and hypotension. Patient denied chest pain, shortness of breath, nausea or vomiting. Endorses dizziness during the event. During evaluation in the emergency department, anterior wall STEMI was identified and interventional cardiology was alerted. Patient was taken to the Cath Lab, where patient became more hypotensive requiring pressor support and intra-aortic balloon pump insertion. Critical " care was consulted for admission to the intensive care unit for cardiogenic shock status post STEMI.ECHO showed EF25%, severe global hypokinesis with apical and distal septal near akinesis, poss mod size thrombus at apex, with plan for JANET pending medical stability. On 1/20, Vascular Surgery was consulted re: LE bleeding with hematoma formation. Repeat ECHO showed EF 25%, and LifeVest was ordered (delivered on 1/25).    Pt supine in bed upon therapist arrival. Pt reports he live sin a ML level home with wife. Wife works FT but works approx 15mins fro home and can come throughout the day if needed. Pt reports PTA he was indep with all ADLs, no AD. Wife completed all IADLs. Pt has hx of arthritis especially in BUE which limit ROM, but si fx and can complete B dressing. PTA pt did not use AD. At this time 2* standing tolerance/dec WB on RLE req maxA for slideboard transfer. Able to perform mild STS at bedrail in order for therapist to assist with CM. Attempted to complete toilet transfer, however, pt politely declined reporting he will do it another day when he has to have a BM. Therapist educated pt on rehab process, goal setting and ELOS which pt verbalized understanding. Pt presents with the following performance component deficits impacting ADL/IADL skills: BLE weakness, impaired sitting/standing  balance, decreased endurance, decreased coordination, arthritic pain, increased fall risk, new onset of impairment of functional mobility, decreased ADLS, decreased activity tolerance, decreased safety awareness, and SOB upon exertion, that result in activity limitations and/or participation restrictions. Pt to continue to benefit from continued acute rehab OT services during hospital stay to address defined deficits and to maximize level of functional independence in the following Occupational Performance areas: grooming, bathing/shower, toilet hygiene, dressing, medication management, functional mobility, clothing  management,.  Pt presents with good rehab potential. This evaluation requires clinical decision making of high complexity, because the patient presents with comorbidites that affect occupational performance and required significant modification of tasks or assistance with consideration of multiple treatment options. Pt is unsafe to D/C home at this time, recommending ELOS 2-3 weeks to achieve Keron level goals with least restrictive device to address these areas and resume prior occupational roles to maximize independence to reduce risk of fall and decrease risk of readmission.  POC to focus on self-care retraining, transfer training,  improving pt's activity tolerance and endurance, UE strengthening, engagement in ADLs, balance interventions.      OT Therapy Minutes   OT Time In 0700   OT Time Out 0830   OT Total Time (minutes) 90   OT Mode of treatment - Individual (minutes) 90   OT Mode of treatment - Concurrent (minutes) 0   OT Mode of treatment - Group (minutes) 0   OT Mode of treatment - Co-treat (minutes) 0   OT Mode of Treatment - Total time(minutes) 90 minutes   OT Cumulative Minutes 90   Cumulative Minutes   Cumulative therapy minutes 90

## 2025-01-30 NOTE — ASSESSMENT & PLAN NOTE
Newly started on lisinopril 2.5mg daily in acute setting.  Continue Coreg 3.125mg BID and lisinopril 2.5mg daily.  Monitor BP with routine VS.

## 2025-01-30 NOTE — ASSESSMENT & PLAN NOTE
ECHO on 1/19 showed possible moderately sized thrombus at the apex.  Started on Coumadin in acute setting.  Currently on Coumadin 7.5mg daily.  INR 2.37 today.  Continue Coumadin 7.5mg daily.  Repeat PT/INR on Monday.  Follow-up with Cardiology as outpatient.

## 2025-01-30 NOTE — ASSESSMENT & PLAN NOTE
Multiple joint s/p steroid inj b/l shoulders 1/6   F.w Dr. Egan as ouptatient   C/w pain control, tylenol, oxy 2.5Q8h, bengay; titrate off opiods as patient wasn't on any as outpatient

## 2025-01-30 NOTE — PROGRESS NOTES
[facility]  PT EVAL       01/30/25 1030   Patient Data   Rehab Impairment Impairment of mobility, safety and Activities of Daily Living (ADLs) due to Cardiac: 09 Cardiac STEMI   Etiologic Diagnosis anterior STEMI   Date of Onset 01/17/25   Support System   Name Ольга  (works FT)   Relationship wife   Home Setup   Type of Home Multi Level   Method of Entry Hand Rail Bilateral   Number of Stairs 3   Number of Stairs in Home 12   In Home Hand Rail Right   First Floor Bathroom Full   Second Floor Bathroom Half;Full   First Floor Setup Available Yes  (has first floor bedroom. - only goes upstairs when spouse if out of town to feed the cat.)   Home Modification Comment pending progress   Available Equipment Roller Walker;Single Point Cane   Baseline Information   Vocation Other  (retired/disability)   Transportation    Prior Device(s) Used Single Point Cane  (PRN)   Prior Level of Function   Self-Care 3. Independent - Patient completed the activities by him/herself, with or without an assistive device, with no assistance from a helper.   Indoor-Mobility (Ambulation) 3. Independent - Patient completed the activities by him/herself, with or without an assistive device, with no assistance from a helper.   Stairs 3. Independent - Patient completed the activities by him/herself, with or without an assistive device, with no assistance from a helper.   Functional Cognition 3. Independent - Patient completed the activities by him/herself, with or without an assistive device, with no assistance from a helper.   Prior Device Used Z. None of the above  (SPC PRN)   Patient Preference   Nickname (Patient Preference) (S)  Yusuf   Psychosocial   Psychosocial (WDL) WDL   Patient Behaviors/Mood Withdrawn;Other (Comment)  (cranky)   Restrictions/Precautions   Precautions (S)  Bed/chair alarms;Cardiac/sternal;Fall Risk;Supervision on toilet/commode;Pain  (+lifevest)   Pain Assessment   Pain Assessment Tool 0-10  (feet to  shoulders)   Pain Score 5   Pain Location/Orientation Location: Generalized   Transfer Bed/Chair/Wheelchair   Type of Assistance Needed Physical assistance   Physical Assistance Level Total assistance   Comment at baseline pt I without AD, currently unable to stand with out x2 person - use of SBT min A.   Chair/Bed-to-Chair Transfer CARE Score 1   Roll Left and Right   Comment TBA - OOB in recliner   Sit to Lying   Comment TBA - OOB in recliner   Lying to Sitting on Side of Bed   Comment TBA - OOB in recliner   Sit to Stand   Type of Assistance Needed Physical assistance   Physical Assistance Level Total assistance   Comment x2 A for safety, + r knee blocking, pull to stand at bed rail and in parallel bars - mod A x1   Sit to Stand CARE Score 1   Picking Up Object   Type of Assistance Needed Physical assistance   Physical Assistance Level Total assistance   Comment at baseline pt fully I without AD/Reacher PRN - Currently pt unable to stand unsupported for item pickup - can trial task when able.   Picking Up Object CARE Score 1   Car Transfer   Type of Assistance Needed Physical assistance   Physical Assistance Level Total assistance   Comment given pts current clinical presentation - pt to require x2 A vs SBT for car transfer - can simulate trial when able.   Car Transfer CARE Score 1   Ambulation   Primary Mode of Locomotion Prior to Admission Walk   Findings standing trials in PBars only.   Walk 10 Feet   Reason if not Attempted Safety concerns   Walk 10 Feet CARE Score 88   Walk 50 Feet with Two Turns   Reason if not Attempted Safety concerns   Walk 50 Feet with Two Turns CARE Score 88   Walk 150 Feet   Reason if not Attempted Safety concerns   Walk 150 Feet CARE Score 88   Walking 10 Feet on Uneven Surfaces   Reason if not Attempted Safety concerns   Walking 10 Feet on Uneven Surfaces CARE Score 88   Wheelchair mobility   Method Right upper extremity;Left upper extremity   Assistance Provided For Locking  Brakes;Remove Leg Rest;Replace Leg Rest;Remove armrests;Replace armrests   Distance Level Surface (feet) 150 ft   Findings S, prefers use of leg rests, UE ftg/inc shoulder discomfort with self propulsion. goal is for amb pending progress.   Type of Wheelchair Used 1. Manual   Wheel 50 Feet with Two Turns   Type of Assistance Needed Supervision   Physical Assistance Level No physical assistance   Wheel 50 Feet with Two Turns CARE Score 4   Wheel 150 Feet   Type of Assistance Needed Supervision   Physical Assistance Level No physical assistance   Wheel 150 Feet CARE Score 4   Curb or Single Stair   Reason if not Attempted Safety concerns   1 Step (Curb) CARE Score 88   4 Steps   Reason if not Attempted Safety concerns   4 Steps CARE Score 88   12 Steps   Reason if not Attempted Safety concerns   12 Steps CARE Score 88   Memory   Initiates Tasks Yes  (with inc time.)   Short-Term Intact   Long Term Intact   Recalls Precaution Yes   RLE Assessment   RLE Assessment X   Strength RLE   RLE Overall Strength 3+/5  (limited B knee AROM approx 15-80 degrees.)   LLE Assessment   LLE Assessment X   Strength LLE   LLE Overall Strength 4/5  (limited B knee AROM approx 15-80 degrees.)   Sensation   Light Touch Partial deficits in the RLE  (B LE - reports R foot is improving - R great toe has no feeling from being filled with arthritis)   Cognition   Overall Cognitive Status WFL   Arousal/Participation Alert;Cooperative   Attention Attends with cues to redirect   Orientation Level Oriented X4   Memory Decreased short term memory;Decreased recall of recent events;Decreased recall of precautions   Following Commands Follows multistep commands with increased time or repetition   Comments mildy ornery, likes to make jokes, curses, easily annoyed but happy to be here.   Vision   Vision Comments wears glasses at all times.   Perception   Inattention/Neglect Appears intact   Therapeutic Exercise   Therapeutic Exercise/Activity static stand  "in pbars 1x20 sec, 1x60 (+sig B shoulder crepitus); wt shifting in bpars- x20   Discharge Information   Vocational Plan Retired/not working  (+disability)   Patient's Discharge Plan return home with family support.   Patient's Rehab Expectations \"I gotta be able to walk\"   Barriers to Discharge Home Limited Family Support;Decreased Strength;Decreased Endurance;Pain;Safety Considerations   Impressions Pt 69 yr old male present to [facility] on 1/17/25 with near syncope/hypotensive episode, nausea and vomiting, headache.  Noted to have + ant wall STEMI to cardiac cath for PCI. Patient was transferred to ICU for cardiogenic shock s/p STEMI. Noted to have LV thrombus. +some transaminitis. On 1/20 + LE bleeding w/ hematoma formation from cath site rq manual pressure and femostop.  R femoral site was closed by Dr. Brooke. He received 1U PRBC and given life vest (in place on AR[facility] admission). PMH: HTN, HL, DM, OA all joints and back (h/o B TKR - multiple surgeries, dec B knee AROM). At baseline pt fully I at home with first floor living 3 ALEXSANDRA B HR, use of SPC PRN. + , 2 large dogs. Pt OOB in recliner, life vest in place, c/o generalized pain all over since 1974. Reports R LE was not working until a day or 2 ago, sensation improving, reports has not been able to tolerate much weight on his R LE, did a little this AM with OT. Pt currently presents needing x2 A for swap out, vs mod A for SBT due to dec stand and transfers at this time, B shoulder OA also affecting standing and transfers. Pt does feel it is improving since surgery. Pt demonstrates fairly good rehab potential to reach mod I goals with LRAD pending progress and recovery for safe return home with family support and possibly home PT (has gone to outpt PT [facility] in the past) in OS x2-3 weeks.   PT Therapy Minutes   PT Time In 1030   PT Time Out 1200   PT Total Time (minutes) 90   PT Mode of treatment - Individual (minutes) 90   PT Mode of treatment - " Concurrent (minutes) 0   PT Mode of treatment - Group (minutes) 0   PT Mode of treatment - Co-treat (minutes) 0   PT Mode of Treatment - Total time(minutes) 90 minutes   PT Cumulative Minutes 90   Cumulative Minutes   Cumulative therapy minutes 180

## 2025-01-30 NOTE — PROGRESS NOTES
[facility]  PT LTGS  ELOS x2-3 weeks.        01/30/25 1030   Rehab Team Goals   Transfer Team Goal Patient will be independent with transfers with least restrictive device upon completion of rehab program   Locomotion Team Goal Patient will be independent with locomotion with least restrictive device upon completion of rehab program   Rehab Team Interventions   PT Interventions Gait Training;Therapeutic Exercise;Transfer Training;Community Reintegration;Bed Mobility;Modalities;Patient/Family Education;Wheelchair Mobility   PT Transfer Goal   Roll left and right Goal 06. Independent - Patient completes the activity by him/herself with no assistance from a helper.   Sit to lying Goal 06. Independent - Patient completes the activity by him/herself with no assistance from a helper.   Lying to sitting on side of bed Goal 06. Independent - Patient completes the activity by him/herself with no assistance from a helper.   Sit to stand Goal 04. Supervision or touching assistance- [place] provides VERBAL CUES or supervision throughout activity.   Chair/bed-to-chair transfer Goal 04. Supervision or touching assistance- [place] provides VERBAL CUES or supervision throughout activity.   Car Transfer Goal 04. TOUCHING/ STEADYING assistance as patient completes activity.   Status Ongoing;Target goal - two weeks;Target goal - three weeks   Locomotion Goal   Primary discharge mode of locomotion Walking   Walk 10 feet Goal 06. Independent - Patient completes the activity by him/herself with no assistance from a helper.   Walk 50 feet with 2 turns Goal 04. Supervision or touching assistance- [place] provides VERBAL CUES or supervision throughout activity.   Walk 150 feet Goal 04. Supervision or touching assistance- [place] provides VERBAL CUES or supervision throughout activity.   Walking 10 feet on uneven surface 04. TOUCHING/ STEADYING assistance as patient completes activity.   Walking Goal Status Ongoing;Target goal - two weeks;Target  goal - three weeks   Type of Wheelchair Used 1. Manual   Wheel 50 feet with 2 turns Goal 09. Not applicable  (not a goal on admission pending progress - use of WC propulsion for conditioning.)   Wheel 150 feet Goal 09. Not applicable   Stairs Goal   1 step or curb goal 04. TOUCHING/ STEADYING assistance as patient completes activity.   4 steps Goal 04. TOUCHING/ STEADYING assistance as patient completes activity.   12 steps Goal 88. Not attempted due to medical condition or safety concerns   Hand Rail Bilateral   Status Ongoing;Target goal - two weeks;Target goal - three weeks   Object Retrieval Goal   Picking up object Goal 06. Independent - Patient completes the activity by him/herself with no assistance from a helper.   Assistive Device  Reacher   Small Object Picked Up marker from floor - target goal x2-3 weeks.

## 2025-01-30 NOTE — PLAN OF CARE
Problem: CARDIOVASCULAR - ADULT  Goal: Maintains optimal cardiac output and hemodynamic stability  Description: INTERVENTIONS:  - Monitor I/O, vital signs and rhythm  - Monitor for S/S and trends of decreased cardiac output  - Administer and titrate ordered vasoactive medications to optimize hemodynamic stability  - Assess quality of pulses, skin color and temperature  - Assess for signs of decreased coronary artery perfusion  - Instruct patient to report change in severity of symptoms  Outcome: Progressing     Problem: CARDIOVASCULAR - ADULT  Goal: Absence of cardiac dysrhythmias or at baseline rhythm  Description: INTERVENTIONS:  - Continuous cardiac monitoring, vital signs, obtain 12 lead EKG if ordered  - Administer antiarrhythmic and heart rate control medications as ordered  - Monitor electrolytes and administer replacement therapy as ordered  Outcome: Progressing     Problem: METABOLIC, FLUID AND ELECTROLYTES - ADULT  Goal: Glucose maintained within target range  Description: INTERVENTIONS:  - Monitor Blood Glucose as ordered  - Assess for signs and symptoms of hyperglycemia and hypoglycemia  - Administer ordered medications to maintain glucose within target range  - Assess nutritional intake and initiate nutrition service referral as needed  Outcome: Progressing     Problem: MUSCULOSKELETAL - ADULT  Goal: Maintain or return mobility to safest level of function  Description: INTERVENTIONS:  - Assess patient's ability to carry out ADLs; assess patient's baseline for ADL function and identify physical deficits which impact ability to perform ADLs (bathing, care of mouth/teeth, toileting, grooming, dressing, etc.)  - Assess/evaluate cause of self-care deficits   - Assess range of motion  - Assess patient's mobility  - Assess patient's need for assistive devices and provide as appropriate  - Encourage maximum independence but intervene and supervise when necessary  - Involve family in performance of ADLs  -  Assess for home care needs following discharge   - Consider OT consult to assist with ADL evaluation and planning for discharge  - Provide patient education as appropriate  Outcome: Progressing

## 2025-01-30 NOTE — PROGRESS NOTES
01/30/25 0700   Rehab Team Goals   ADL Team Goal Patient will be independent with ADLs with least restrictive device upon completion of rehab program   Cognitive Team Goal Patient will be independent for basic and complex tasks upon completion of rehab program   Rehab Team Interventions   OT Interventions Self Care;Therapeutic Exercise;Energy Conservation;Patient/Family Education;Group Therapy;Other   Eating Goal   Eating Goal 06. Independent - Patient completes the activity by him/herself with no assistance from a helper.   Meal Complete All meals   Status Ongoing;Target goal - two weeks;Target goal - three weeks   Interventions Optimal Position   Grooming Goal   Oral Hygiene Goal 06. Independent - Patient completes the activity by him/herself with no assistance from a helper.   Task Wash/Dry Face;Wash/Dry Hands;Brush Teeth;Comb Hair;Complete Groom   Environment Seated at Sink;Stand at Sink  (pending progress)   Safety Precautions Safety Precaution   Status Ongoing;Target goal - two weeks;Target goal - three weeks   Tub/Shower Transfer Goal   Method   (SB 2* lifevest)   Bathing Goal   Shower/bathe self Goal 06. Independent - Patient completes the activity by him/herself with no assistance from a helper.   Safety Precautions Safety Precaution   Status Ongoing;Target goal - two weeks;Target goal - three weeks   Intervention ADL Training;Assistive Device;Therapeutic Exercise   Upper Body Dressing Goal   Upper body dressing Goal 06. Independent - Patient completes the activity by him/herself with no assistance from a helper.   Task Upper Body;Arms in/out;Over Head   Environment Seated   Safety Precautions Safety Precaution  (cardiac/sternal prec)   Status Ongoing;Target goal - two weeks;Target goal - three weeks   Intervention Balance Work;Therapeutic Exercise;Tolerance Work   Lower Body Dressing Goal   Lower body dressing Goal 06. Independent - Patient completes the activity by him/herself with no assistance from a  helper.   Putting on/taking off footwear Goal 06. Independent - Patient completes the activity by him/herself with no assistance from a helper.   Task Lower Body;Shoe/Slipper;Socks;Pants;Undergarment   Safety Precautions Safety Precaution   Status Ongoing;Target goal - two weeks;Target goal - three weeks   Intervention Assistive Device;Balance Work;Therapeutic Exercise;Tolerance Work   Toileting Transfer Goal   Toilet transfer Goal 06. Independent - Patient completes the activity by him/herself with no assistance from a helper.   Safety Safety Precaution   Status Ongoing;Target goal - two weeks;Target goal - three weeks   Intervention ADL Training;Balance Work   Toileting Goal   Toileting hygiene Goal 06. Independent - Patient completes the activity by him/herself with no assistance from a helper.   Task Pants Up;Pants Down;Hygiene   Status Ongoing;Target goal - two weeks;Target goal - three weeks   Intervention ADL Training;Balance Work   Comprehension Goal   Comprehension Assist Level Moderate Pershing   Status Ongoing;Target goal - two weeks;Target goal - three weeks   Expression Goal   Expression Assist Level Independant   Status Ongoing;Target goal - two weeks;Target goal - three weeks   Social Interaction Goal   Social Interaction Assist Level Independant   Status Ongoing;Target goal - two weeks;Target goal - three weeks   Problem Solving Goal   Problem Solving Assist Level Independant   Status Ongoing;Target goal - two weeks;Target goal - three weeks   Memory Goal   Memory Assist Level Independant   Status Ongoing;Target goal - two weeks;Target goal - three weeks   Meal Prep and Kitchen Mobility   Assist Level   (wife completes PTA)   Medication Management   Assist Level Independent   Status Ongoing;Target goal - two weeks;Target goal - three weeks   Laundry   Assist Level   (wife completes PTA)   Finance Management   Assist Level   (wife completes PTA)

## 2025-01-30 NOTE — ASSESSMENT & PLAN NOTE
Presented on 1/17 with near syncopal episode with jaw pain and hypotension.  NSTEMI alert - cardiac cath on 1/17 with CHYNA to LAD and L main with IABP support.  IABP discontinued on 1/20.  Continue ASA 81mg daily, Plavix 75mg daily, Coumadin, Lipitor 80mg daily, and Coreg 3.125mg BID.  Recommend establishing with Cardiology on discharge.

## 2025-01-30 NOTE — ASSESSMENT & PLAN NOTE
Lab Results   Component Value Date    HGBA1C 8.2 (H) 01/18/2025       Recent Labs     01/29/25  1127 01/29/25  1559 01/29/25  2128 01/30/25  0611   POCGLU 218* 162* 165* 166*       Blood Sugar Average: Last 72 hrs:  (P) [id number]  Last A1c was 8.2 on 1/18/2025.  Home regimen: Lantus 15u at HS, glipizide 5mg daily, metformin 1000mg BID, and Jardiance 12.5mg daily.  Currently receiving Lantus 20u at HS and lispro 9u with meals.  Continue SSI, QID accuchecks, and cons. carb diet.

## 2025-01-30 NOTE — PCC PHYSICAL THERAPY
Pt 69 yr old male present to [facility] on 1/17/25 with near syncope/hypotension, nausea and vomiting, headache.  Noted to have + ant wall STEMI to cardiac cath for PCI. Patient was transferred to ICU for cardiogenic shock s/p STEMI. Noted to have LV thrombus. He noted to have some transaminitis. On 1/20 he was noted to have LE bleeding w/ hematoma formation from cath site rq manual pressure and femostop.  R femoral site was closed by Dr. Brooke. He received 1U PRBC and given life vest (in place on [facility] admission). PMH: HTN, HL, DM, OA all joints and back (h/o B TKR - multiple surgeries, dec B knee AROM). At baseline pt fully I at home with first floor living 3 ALEXSANDRA B HR, use of SPC PRN. + , 2 large dogs. Pt OOB in recliner, life vest in place, c/o generalized pain all over since 1974. Reports R LE was not working until a day or 2 ago, sensation improving, reports has not been able to tolerate much weight on his R LE, did a little this AM with OT. Pt currently presents needing x2 A for swap out, vs mod A for SBT due to dec stand and transfers at this time, B shoulder OA also affecting standing and transfers. Pt does feel it is improving since surgery. Pt demonstrates fairly good rehab potential to reach mod I goals with LRAD pending progress and recovery for safe return home with family support and possibly home PT (has gone to outpt PT [facility] in the past) in OS x2 weeks.     2-4-25  Pt making progress in PT for first time ambulated 40-50ft with RW.  Pt continues to have difficulty getting up from chair due to chronic OA in knees and shoulders.  Pts functional mobility fluctuates from Min A to Close S. Pt needs to perform 3 steps to enter home which he has not performed in therapy yet.  Pt needs continued therapy to work steps and functional ambulation to reach house hold independence. Pt has wife but would be limited help as she works. Continue skilled PT to maximize fucntional returns and stair training.  Anticipate DC home early next week. F/u services TBD.

## 2025-01-30 NOTE — ASSESSMENT & PLAN NOTE
Hgb currently stable at 11.6.  Developed large R groin hematoma s/p balloon pump.  No active s/s of bleeding.  Iron panel pending.  Continue to trend routine CBC.

## 2025-01-30 NOTE — CASE MANAGEMENT
Case Management Open     Met with the patient today to complete CM open. Pt presents to the [facility] after NSTEI. Prior to admission the patient lives at home with wife who works full time. Home setup: 2-3 ALEXSANDRA then 1st floor setup. PTA pt was functioning at independent level. The patient/ family anticipates that at time of discharge the patient will d/c home with family support. Pt's emergency contact is his wife Ольга.     Equipment available to the patient at discharge includes RW, SPC, crutches. The patient uses [facility] pharmacy he believes - will need to clarify location with wife, and was educated on the [facility] program. Of note pt reports he is connected with [facility] and is 100% service connected. [facility] that can lead to options regarding D[facility], etc. He reports he only uses a doctor there, does not have a  and does not require any assistance from them in modifications to home / DME.     Pt has commercial miscellaneous listed as secondary insurance, but he reports he has NO secondary insurance (regarding DME submission)    The patient was educated that other members of the patient's support are able to ask questions and observe as the patient wished. The patient was educated on the rehabilitation process including therapy program, the interdisciplinary team, and weekly team meetings. Estimated length of stay was reviewed with the patient as well as expectations of discussions of discharge planning. The role of the  was reviewed including providing care coordination, discharge planning and discharge facilitation.     IMM was reviewed with the patient and a copy was provided for their reference. The patient verbalized understanding of the information provided and denied any further questions at this time. CM will continue to follow and assist the patient throughout their rehabilitation stay.

## 2025-01-30 NOTE — PROGRESS NOTES
Progress Note - PMR   Name: Joseph Aguilar 69 y.o. male I MRN: [id number]  Unit/Bed#: -02 I Date of Admission: 1/29/2025   Date of Service: 1/30/2025 I Hospital Day: 1     Assessment & Plan  Anterior STEMI s/p PCI with CHYNA to LM-ostial LAD and mLAD with IABP;  of LCx (1/17/2024)  S/p drug eluting stent to the LAD and left main; unable to stent the left circumflex 1/17  IABP dc 1/20  Hypotension in cath lab requiring pressors and intra-aortic baloon pump  Cw aspirin, plavix, statin, coumadin (goal 2-3),   Continue coreg 3.125 bid  Follow up cardiology outpt  Ambulatory dysfunction  Patient was evaluated by the rehabilitation team MD and an appropriate candidate for acute inpatient rehabilitation program at this time.  The patient will tolerate 3 hours/day 5 to 7 days/week of intensive physical, occupational therapy in order to obtain goals for community discharge  Due to the patient's functional Compared to their baseline level of function in addition to their ongoing medical needs, the patient would benefit from daily supervision from a rehabilitation physician as well as rehabilitation nursing to implement and adjust the medical as well as functional plan of care in order to meet the patient's goals.  DC TBD    History of hypertension  Cw lisinopril and BB  Monitor vitals and titrate meds as needed  Mixed hyperlipidemia  -cw statin  -f/o primary care on dc  Type 2 diabetes mellitus without complication, without long-term current use of insulin (HCC)  Lab Results   Component Value Date    HGBA1C 8.2 (H) 01/18/2025       Recent Labs     01/29/25  1127 01/29/25  1559 01/29/25  2128 01/30/25  0611   POCGLU 218* 162* 165* 166*   A1c 8.2% on admission  Lantus 20u at HS and lispro 8u with meals  IM starting metformin 500 BID today  Monitor glucose levels  Cw diabetic diet    Blood Sugar Average: Last 72 hrs:  (P) [id number]    Anemia  Hgb 11.6 1/30  Monitor w/ routine blood work   Hypomagnesemia  Cw Mg  "supplementation goal >2   1.7 1/30   ICM with EF 25%  Echo:Left ventricular cavity size is normal. Wall thickness is normal. The left ventricular ejection fraction is 25%. Systolic function is severely reduced.   Cw coreg 3.125;   Cardiology consulted and recommended lifevest  F/o cardiology as outpt  LV (left ventricular) mural thrombus  TTE 1/19: \"There is a possible moderately-sized thrombus at the apex, difficult imaging.\"  Cardiology consulted on previous admission  Continue Coumadin 10 mg  Monitor PT, INR (2-3 per guidelines)  F/o cardiology outpt    Hematoma of right lower extremity  S/p removal of IABP and closure of right femoral puncture site with Perclose Pro-glide 1/20 by Dr. Brooke  Monitoring the R groin for bleeding, skin breakdown, expanding hematoma  Will consult vascular surgery with questions  Conjunctivitis  Cw cipro goal 7-10 days   Per nursing patient reports splashing urinal at his face   Osteoarthritis of multiple joints  Multiple joint s/p steroid inj b/l shoulders 1/6   F.w Dr. Egan as ouptatient   C/w pain control, tylenol, oxy 2.5Q8h, bengay; titrate off opiods as patient wasn't on any as outpatient   Chronic back pain  -patient reports old injury of back  -takes tylenol for pain at home  -monitor pain levels and titrate meds  -offer different modalities- heat, lidocaine patches  Constipation  - pt reports he usually has a BM every 2-3 days but feels like pain meds are \"backing him up\"  -PRN dulcolax; add senna, colase and miralax as patient taking opiods   - monitor BM    History of Present Illness   Joseph Aguilar is a 69 y.o. male w/ PMHx of HTN, HLD, T2DM, OA who initially presented to [facility] on 1/17 for chest pain, shortness of breath, nausea and vomiting. During initial eval, she was found to have an anterior wall STEMI and Interventional cardiology was alerted. Patient was taken to the cath lab where patient bacame more hypotensive " requiring pressor support and intra-aortic balloon pump insertion. Patient received LAD, left main, stent however circumflex artery was unable to be intervened on. Patient was transferred to ICU for cardiogenic shock s/p STEMI. Noted to have LV thrombus and started on heparin gtt and continued on brilinta and ASA. He noted to have some transaminitis so Statin was on hold. TTE w/ LVEF 25%, severe global hypokinesis w/ apical and distal septal near akinesis, mod siz thrombus at apex. On 1/20 he was noted to have LE bleeding w/ hematoma formation from cath site rq manual pressure and femostop. Vascular surgery was consulted . R femoral site was closed by Dr. Brooke.  IABP was d/c and heparin was held. He received 1U PRBC. Heparin was restarted and coumadin was started w/ goal 2-3. INR 2.22 1/29 and heparin was d/c.   Joseph Aguilar was admitted to the [facility] on 01/29/25     Chief Complaint: f/u ambulatory dysfunction    Interval: Patient seen and examined in bed. No events overnight.  Reports overall feeling well. Last BM 2-3 days ago. Sleeping well at night. Declined eating breakfast this morning. Denies any f/c/n/v, CP, SOB, abdominal pain, constipation, or diarrhea.       Objective   Functional Update:  TBD      Temp:  [96.6 °F (35.9 °C)-98.7 °F (37.1 °C)] 96.6 °F (35.9 °C)  HR:  [88-96] 95  Resp:  [6-18] 18  BP: (103-121)/(64-79) 106/64  SpO2:  [95 %-96 %] 95 %    Physical Exam    Gen: No acute distress, obese  HEENT: Moist mucus membranes, Normocephalic/Atraumatic  Cardiovascular: Regular rate, rhythm, wearing lifevest  Heme/Extr: No edema  Pulmonary: Non-labored breathing.   : No gilbert  GI:  non-distended. BS+  MSK: ROM is WFL in all extremities.   Integumentary: Skin is warm, dry. .  Neuro:  Speech is intact. Appropriate to questioning.  Psych: Normal mood and affect.       Scheduled Meds:  Current Facility-Administered Medications   Medication Dose Route Frequency Provider Last Rate    acetaminophen  650 mg Oral  Q6H PRN Shivani Yuan MD      aluminum-magnesium hydroxide-simethicone  30 mL Oral Q4H PRN Shivani Yuan MD      Artificial Tears  1 drop Left Eye Q4H PRN Shivani Yuan MD      aspirin  81 mg Oral Daily Shivani Yuan MD      atorvastatin  80 mg Oral Daily With Dinner Shivani Yuan MD      bisacodyl  10 mg Rectal Daily PRN Shivani Yuan MD      carvedilol  3.125 mg Oral BID With Meals Shivani Yuan MD      ciprofloxacin  1 drop Left Eye Q8H Shivani Yuan MD      clopidogrel  75 mg Oral Daily Shivani Yuan MD      gabapentin  300 mg Oral BID Shivani Yuna MD      insulin glargine  20 Units Subcutaneous HS Shivani Yuan MD      insulin lispro  2-12 Units Subcutaneous 4x Daily (AC & HS) Shivani Yuan MD      insulin lispro  9 Units Subcutaneous TID With Meals MARGARITA Hester      lisinopril  2.5 mg Oral Daily Shivani Yuan MD      magnesium Oxide  400 mg Oral BID Shivani Yuan MD      melatonin  6 mg Oral HS Shivani Yuan MD      menthol-methyl salicylate   Apply externally 4x Daily PRN Shivani Yuan MD      ondansetron  4 mg Intravenous Q6H PRN Shivani Yuan MD      oxyCODONE  2.5 mg Oral Q8H PRN Shivani Yuan MD      QUEtiapine  25 mg Oral HS Shivani Yuan MD      warfarin  7.5 mg Oral Daily (warfarin) MARGARITA Hester           Lab Results: I have reviewed the following results:  Results from last 7 days   Lab Units 01/30/25  0559 01/28/25  0226 01/27/25  0612   HEMOGLOBIN g/dL 11.6* 11.0* 11.1*   HEMATOCRIT % 34.5* 33.2* 33.5*   WBC Thousand/uL 9.68 8.66 8.07   PLATELETS Thousands/uL 283 262 212     Results from last 7 days   Lab Units 01/30/25  0559 01/28/25  0226 01/27/25  0612   BUN mg/dL 20 24 22   SODIUM mmol/L 138 135 134*   POTASSIUM mmol/L 4.1 4.1 4.1   CHLORIDE mmol/L 102 104 104   CREATININE mg/dL 0.90 0.86 0.83   AST U/L 21  --   --    ALT U/L 45  --   --        Results from last 7 days   Lab Units 01/30/25  0559 01/29/25  0630 01/28/25  0226   PROTIME seconds 25.9* 25.3* 21.7*    INR  2.37* 2.22* 1.81*        Shivani Yuan MD   Physical Medicine and Rehabilitation   01/30/25    I have spent a total time of 36 minutes in caring for this patient on the day of the visit/encounter including Counseling / Coordination of care, Documenting in the medical record, Reviewing / ordering tests, medicine, procedures  , and Communicating with other healthcare professionals .

## 2025-01-30 NOTE — PROGRESS NOTES
Progress Note - Internal Medicine   Name: Joseph Aguilar 69 y.o. male I MRN: [id number]  Unit/Bed#: -02 I Date of Admission: 1/29/2025   Date of Service: 1/30/2025 I Hospital Day: 1    Assessment & Plan  Type 2 diabetes mellitus without complication, without long-term current use of insulin (HCC)  Lab Results   Component Value Date    HGBA1C 8.2 (H) 01/18/2025       Recent Labs     01/29/25  1127 01/29/25  1559 01/29/25  2128 01/30/25  0611   POCGLU 218* 162* 165* 166*       Blood Sugar Average: Last 72 hrs:  (P) 164.1971827450897658  Last A1c was 8.2 on 1/18/2025.  Home regimen: Lantus 15u at HS, glipizide 5mg daily, metformin 1000mg BID, and Jardiance 12.5mg daily.  Currently receiving Lantus 20u at HS and lispro 9u with meals.  Continue SSI, QID accuchecks, and cons. carb diet.  Anemia  Hgb currently stable at 11.6.  Developed large R groin hematoma s/p balloon pump.  No active s/s of bleeding.  Iron panel pending.  Continue to trend routine CBC.  Hypomagnesemia  Mg 1.7 today.  Started on magnesium oxide 400mg BID.  Continue to trend routine Mg.  Anterior STEMI s/p PCI with CHYNA to LM-ostial LAD and mLAD with IABP;  of LCx (1/17/2024)  Presented on 1/17 with near syncopal episode with jaw pain and hypotension.  NSTEMI alert - cardiac cath on 1/17 with CHYNA to LAD and L main with IABP support.  IABP discontinued on 1/20.  Continue ASA 81mg daily, Plavix 75mg daily, Coumadin, Lipitor 80mg daily, and Coreg 3.125mg BID.  Recommend establishing with Cardiology on discharge.  ICM with EF 25%  ECHO on 1/19 showed EF 25%, systolic function severely reduced, global hypokinesis.  Currently wearing Lifevest.  Continue Coreg 3.125mg BID.  Consider restarting home Jardiance tomorrow.  Recommend establishing care with Cardiology as outpatient.  LV (left ventricular) mural thrombus  ECHO on 1/19 showed possible moderately sized thrombus at the apex.  Started on Coumadin in acute setting.  Currently on Coumadin 7.5mg  daily.  INR 2.37 today.  Continue Coumadin 7.5mg daily.  Repeat PT/INR on Monday.  Follow-up with Cardiology as outpatient.  Hematoma of right lower extremity  Developed R groin hematoma at IABP insertion site.  IR suite on  for closure of R femoral artery puncture site and control of expanding hematoma performed by Dr. Brooke (Vascular).  Monitor site daily for s/s of reoccurring bleeding.  Continue to trend H&H.  Conjunctivitis  Continue ciprofloxacin to L eye for 7-10 days.  Osteoarthritis of multiple joints  Multiple joints - shoulders, knees, back.  Recently had steroid injection to B/L shoulders on .  Follows with Dr. Egan (Orthopedics) as outpatient.  Considerations with therapies.  Continue current pain regimen.   History of hypertension  Newly started on lisinopril 2.5mg daily in acute setting.  Continue Coreg 3.125mg BID and lisinopril 2.5mg daily.  Monitor BP with routine VS.  Ambulatory dysfunction  Deconditioned s/p STEMI.  Primary team following.  Continue with PT/OT.    VTE Pharmacologic Prophylaxis:   Pharmacologic: Warfarin (Coumadin)  Mechanical VTE Prophylaxis in Place: Yes - sequential compression devices.    Current Length of Stay: 1 day(s)    Current Patient Status: Inpatient Rehab     Discharge Plan: As per primary team.    Code Status: Level 1 - Full Code    Subjective:   Pt examined while pt sitting in recliner in pt room.  Currently complaining of back pain and some discomfort around the area of his Lifevest.  Denies any fevers, chills, lightheadedness, dizziness, SOB, palpitations, or CP.  Tolerated therapy well this morning.  Denies any pain in the R groin area.  Has no other concerns or complaints at this time.    Objective:     Vitals:   Temp (24hrs), Av.9 °F (36.6 °C), Min:96.6 °F (35.9 °C), Max:98.7 °F (37.1 °C)    Temp:  [96.6 °F (35.9 °C)-98.7 °F (37.1 °C)] 96.6 °F (35.9 °C)  HR:  [88-96] 88  Resp:  [6-18] 18  BP: (103-121)/(70-79) 103/71  SpO2:  [95 %-96 %] 95 %  Body  mass index is 29.35 kg/m².     Review of Systems   Constitutional:  Negative for appetite change, chills, fatigue and fever.   HENT:  Negative for trouble swallowing.    Eyes:  Positive for redness (L eye conjunctivitis, urine spilled on eye while in acute care). Negative for pain, discharge, itching and visual disturbance.   Respiratory:  Negative for cough, shortness of breath, wheezing and stridor.    Cardiovascular:  Negative for chest pain, palpitations and leg swelling.   Gastrointestinal:  Negative for abdominal distention, abdominal pain, constipation, diarrhea, nausea and vomiting.        LBM 1/27   Genitourinary:  Negative for difficulty urinating.   Musculoskeletal:  Positive for back pain (lower back pain, chronic, uncomfortable while sitting in the recliner). Negative for arthralgias and gait problem.   Neurological:  Negative for dizziness, weakness, light-headedness, numbness and headaches.   Psychiatric/Behavioral:  Negative for dysphoric mood and sleep disturbance. The patient is not nervous/anxious.    All other systems reviewed and are negative.       Input and Output Summary (last 24 hours):       Intake/Output Summary (Last 24 hours) at 1/30/2025 0928  Last data filed at 1/29/2025 2135  Gross per 24 hour   Intake 240 ml   Output 600 ml   Net -360 ml       Physical Exam:     Physical Exam  Vitals and nursing note reviewed.   Constitutional:       General: He is not in acute distress.     Appearance: Normal appearance. He is not ill-appearing.   HENT:      Head: Normocephalic and atraumatic.   Eyes:      Conjunctiva/sclera:      Left eye: Left conjunctiva is injected.   Cardiovascular:      Rate and Rhythm: Normal rate and regular rhythm.      Pulses: Normal pulses.      Heart sounds: Murmur heard.      Systolic murmur is present with a grade of 1/6.      No friction rub.   Pulmonary:      Effort: Pulmonary effort is normal. No respiratory distress.      Breath sounds: Normal breath sounds. No  wheezing or rhonchi.   Abdominal:      General: Abdomen is flat. Bowel sounds are normal. There is no distension.      Palpations: Abdomen is soft. There is no mass.      Tenderness: There is no abdominal tenderness. There is no guarding or rebound.      Hernia: No hernia is present.   Musculoskeletal:      Cervical back: Normal range of motion and neck supple. No tenderness.      Right lower leg: Edema (Minimal non-pitting edema) present.      Left lower leg: Edema (Minimal non-pitting edema) present.   Skin:     General: Skin is warm and dry.   Neurological:      Mental Status: He is alert and oriented to person, place, and time.   Psychiatric:         Mood and Affect: Mood normal.         Behavior: Behavior normal.         Additional Data:     Labs:    Results from last 7 days   Lab Units 01/30/25  0559   WBC Thousand/uL 9.68   HEMOGLOBIN g/dL 11.6*   HEMATOCRIT % 34.5*   PLATELETS Thousands/uL 283   SEGS PCT % 69   LYMPHO PCT % 18   MONO PCT % 9   EOS PCT % 3     Results from last 7 days   Lab Units 01/30/25  0559   SODIUM mmol/L 138   POTASSIUM mmol/L 4.1   CHLORIDE mmol/L 102   CO2 mmol/L 25   BUN mg/dL 20   CREATININE mg/dL 0.90   ANION GAP mmol/L 11   CALCIUM mg/dL 9.5   ALBUMIN g/dL 3.7   TOTAL BILIRUBIN mg/dL 0.85   ALK PHOS U/L 74   ALT U/L 45   AST U/L 21   GLUCOSE RANDOM mg/dL 154*     Results from last 7 days   Lab Units 01/30/25  0559   INR  2.37*     Results from last 7 days   Lab Units 01/30/25  0611 01/29/25  2128 01/29/25  1559 01/29/25  1127 01/29/25  0730 01/28/25  2042 01/28/25  1618 01/28/25  1055 01/28/25  0645 01/27/25  2120 01/27/25  1622 01/27/25  1048   POC GLUCOSE mg/dl 166* 165* 162* 218* 167* 192* 202* 269* 181* 244* 203* 138               Labs reviewed    Imaging:    Imaging reviewed    Recent Cultures (last 7 days):           Last 24 Hours Medication List:   Current Facility-Administered Medications   Medication Dose Route Frequency Provider Last Rate    acetaminophen  650 mg Oral  Q6H PRN Shivani Yuan MD      aluminum-magnesium hydroxide-simethicone  30 mL Oral Q4H PRN Shivani Yuan MD      Artificial Tears  1 drop Left Eye Q4H PRN Shivani Yuan MD      aspirin  81 mg Oral Daily Shivani Yuan MD      atorvastatin  80 mg Oral Daily With Dinner Shivani Yuan MD      bisacodyl  10 mg Rectal Daily PRN Shivani Yuan MD      carvedilol  3.125 mg Oral BID With Meals Shivani Yuan MD      ciprofloxacin  1 drop Left Eye Q8H Shivani Yuan MD      clopidogrel  75 mg Oral Daily Shivani Yuan MD      gabapentin  300 mg Oral BID Shivani Yuan MD      insulin glargine  20 Units Subcutaneous HS Shivani Yuan MD      insulin lispro  2-12 Units Subcutaneous 4x Daily (AC & HS) Shivani Yuan MD      insulin lispro  9 Units Subcutaneous TID With Meals MARGARITA Hester      lidocaine  1 patch Topical Daily Shivani Yuan MD      lisinopril  2.5 mg Oral Daily Shivani Yuan MD      magnesium Oxide  400 mg Oral BID Shivnai Yuan MD      melatonin  6 mg Oral HS Shivani Yuan MD      menthol-methyl salicylate   Apply externally 4x Daily PRN Shivani Yuan MD      ondansetron  4 mg Intravenous Q6H PRN Shivani Yuan MD      oxyCODONE  2.5 mg Oral Q8H PRN Shivani Yuan MD      QUEtiapine  25 mg Oral HS Shivani Yuan MD      warfarin  7.5 mg Oral Daily (warfarin) MARGARITA Hester          M*Modal software was used to dictate this note.  It may contain errors with dictating incorrect words or incorrect spelling. Please contact the provider directly with any questions.

## 2025-01-31 LAB
GLUCOSE SERPL-MCNC: 174 MG/DL (ref 65–140)
GLUCOSE SERPL-MCNC: 222 MG/DL (ref 65–140)
GLUCOSE SERPL-MCNC: 259 MG/DL (ref 65–140)
GLUCOSE SERPL-MCNC: 269 MG/DL (ref 65–140)

## 2025-01-31 PROCEDURE — 82948 REAGENT STRIP/BLOOD GLUCOSE: CPT

## 2025-01-31 PROCEDURE — 99232 SBSQ HOSP IP/OBS MODERATE 35: CPT | Performed by: INTERNAL MEDICINE

## 2025-01-31 PROCEDURE — 97530 THERAPEUTIC ACTIVITIES: CPT

## 2025-01-31 PROCEDURE — 99233 SBSQ HOSP IP/OBS HIGH 50: CPT | Performed by: INTERNAL MEDICINE

## 2025-01-31 PROCEDURE — 97110 THERAPEUTIC EXERCISES: CPT

## 2025-01-31 RX ADMIN — DOCUSATE SODIUM 100 MG: 100 CAPSULE, LIQUID FILLED ORAL at 17:33

## 2025-01-31 RX ADMIN — DOCUSATE SODIUM 100 MG: 100 CAPSULE, LIQUID FILLED ORAL at 08:06

## 2025-01-31 RX ADMIN — INSULIN LISPRO 6 UNITS: 100 INJECTION, SOLUTION INTRAVENOUS; SUBCUTANEOUS at 21:53

## 2025-01-31 RX ADMIN — WARFARIN SODIUM 7.5 MG: 5 TABLET ORAL at 17:33

## 2025-01-31 RX ADMIN — INSULIN GLARGINE 20 UNITS: 100 INJECTION, SOLUTION SUBCUTANEOUS at 21:52

## 2025-01-31 RX ADMIN — SENNOSIDES 17.2 MG: 8.6 TABLET ORAL at 21:54

## 2025-01-31 RX ADMIN — GABAPENTIN 300 MG: 300 CAPSULE ORAL at 08:06

## 2025-01-31 RX ADMIN — CLOPIDOGREL BISULFATE 75 MG: 75 TABLET ORAL at 08:06

## 2025-01-31 RX ADMIN — CARVEDILOL 3.12 MG: 3.12 TABLET, FILM COATED ORAL at 17:33

## 2025-01-31 RX ADMIN — INSULIN LISPRO 9 UNITS: 100 INJECTION, SOLUTION INTRAVENOUS; SUBCUTANEOUS at 08:05

## 2025-01-31 RX ADMIN — Medication 400 MG: at 17:33

## 2025-01-31 RX ADMIN — INSULIN LISPRO 9 UNITS: 100 INJECTION, SOLUTION INTRAVENOUS; SUBCUTANEOUS at 12:15

## 2025-01-31 RX ADMIN — Medication 2.5 MG: at 20:28

## 2025-01-31 RX ADMIN — POLYETHYLENE GLYCOL 3350 17 G: 17 POWDER, FOR SOLUTION ORAL at 08:05

## 2025-01-31 RX ADMIN — ASPIRIN 81 MG: 81 TABLET, COATED ORAL at 08:06

## 2025-01-31 RX ADMIN — INSULIN LISPRO 9 UNITS: 100 INJECTION, SOLUTION INTRAVENOUS; SUBCUTANEOUS at 17:33

## 2025-01-31 RX ADMIN — Medication 400 MG: at 08:06

## 2025-01-31 RX ADMIN — QUETIAPINE FUMARATE 25 MG: 25 TABLET ORAL at 21:54

## 2025-01-31 RX ADMIN — INSULIN LISPRO 6 UNITS: 100 INJECTION, SOLUTION INTRAVENOUS; SUBCUTANEOUS at 12:15

## 2025-01-31 RX ADMIN — ATORVASTATIN CALCIUM 80 MG: 80 TABLET, FILM COATED ORAL at 17:33

## 2025-01-31 RX ADMIN — GABAPENTIN 300 MG: 300 CAPSULE ORAL at 17:33

## 2025-01-31 RX ADMIN — Medication 6 MG: at 21:54

## 2025-01-31 RX ADMIN — INSULIN LISPRO 4 UNITS: 100 INJECTION, SOLUTION INTRAVENOUS; SUBCUTANEOUS at 17:34

## 2025-01-31 RX ADMIN — INSULIN LISPRO 2 UNITS: 100 INJECTION, SOLUTION INTRAVENOUS; SUBCUTANEOUS at 08:04

## 2025-01-31 NOTE — ASSESSMENT & PLAN NOTE
Lab Results   Component Value Date    HGBA1C 8.2 (H) 01/18/2025       Recent Labs     01/30/25  1145 01/30/25  1604 01/30/25 2044 01/31/25  0555   POCGLU 329* 222* 181* 174*   A1c 8.2% on admission  Lantus 20u at HS and lispro 8u with meals  IM starting metformin 500 BID today  Monitor glucose levels  Cw diabetic diet    Blood Sugar Average: Last 72 hrs:  (P) [id number]

## 2025-01-31 NOTE — PROGRESS NOTES
Progress Note - PMR   Name: Joseph Aguilar 69 y.o. male I MRN: [id number]  Unit/Bed#: -02 I Date of Admission: 1/29/2025   Date of Service: 1/31/2025 I Hospital Day: 2     Assessment & Plan  Anterior STEMI s/p PCI with CHYNA to LM-ostial LAD and mLAD with IABP;  of LCx (1/17/2024)  S/p drug eluting stent to the LAD and left main; unable to stent the left circumflex 1/17  IABP dc 1/20  Hypotension in cath lab requiring pressors and intra-aortic baloon pump  Cw aspirin, plavix, statin, coumadin (goal 2-3),   Continue coreg 3.125 bid  Follow up cardiology outpt  Ambulatory dysfunction  Patient was evaluated by the rehabilitation team MD and an appropriate candidate for acute inpatient rehabilitation program at this time.  The patient will tolerate 3 hours/day 5 to 7 days/week of intensive physical, occupational therapy in order to obtain goals for community discharge  Due to the patient's functional Compared to their baseline level of function in addition to their ongoing medical needs, the patient would benefit from daily supervision from a rehabilitation physician as well as rehabilitation nursing to implement and adjust the medical as well as functional plan of care in order to meet the patient's goals.  DC TBD    History of hypertension  Cw lisinopril and BB  Monitor vitals and titrate meds as needed  Mixed hyperlipidemia  -cw statin  -f/o primary care on dc  Type 2 diabetes mellitus without complication, without long-term current use of insulin (HCC)  Lab Results   Component Value Date    HGBA1C 8.2 (H) 01/18/2025       Recent Labs     01/30/25  1145 01/30/25  1604 01/30/25  2044 01/31/25  0555   POCGLU 329* 222* 181* 174*   A1c 8.2% on admission  Lantus 20u at HS and lispro 8u with meals  IM starting metformin 500 BID today  Monitor glucose levels  Cw diabetic diet    Blood Sugar Average: Last 72 hrs:  (P) 199.2141519025378748    Anemia  Hgb 11.6 1/30  Monitor w/ routine blood work   Hypomagnesemia  Cw Mg  "supplementation goal >2   1.7 1/30   ICM with EF 25%  Echo:Left ventricular cavity size is normal. Wall thickness is normal. The left ventricular ejection fraction is 25%. Systolic function is severely reduced.   Cw coreg 3.125;   Cardiology consulted and recommended lifevest  F/o cardiology as outpt  LV (left ventricular) mural thrombus  TTE 1/19: \"There is a possible moderately-sized thrombus at the apex, difficult imaging.\"  Cardiology consulted on previous admission  Continue Coumadin 10 mg  Monitor PT, INR (2-3 per guidelines)  F/o cardiology outpt    Hematoma of right lower extremity  S/p removal of IABP and closure of right femoral puncture site with Perclose Pro-glide 1/20 by Dr. Brooke  Monitoring the R groin for bleeding, skin breakdown, expanding hematoma  Will consult vascular surgery with questions  Conjunctivitis  Cw cipro goal 7-10 days   Per nursing patient reports splashing urinal at his face   Osteoarthritis of multiple joints  Multiple joint s/p steroid inj b/l shoulders 1/6   F.w Dr. Egan as ouptatient   C/w pain control, tylenol, oxy 2.5Q8h, bengay; titrate off opiods as patient wasn't on any as outpatient   Chronic back pain  -patient reports old injury of back  -takes tylenol for pain at home  -monitor pain levels and titrate meds  -offer different modalities- heat, lidocaine patches  Constipation  - pt reports he usually has a BM every 2-3 days but feels like pain meds are \"backing him up\"  -PRN dulcolax; add senna, colase and miralax as patient taking opiods   - monitor BM    History of Present Illness   Joseph Aguilar is a 69 y.o. male w/ PMHx of HTN, HLD, T2DM, OA who initially presented to [facility] on 1/17 for chest pain, shortness of breath, nausea and vomiting. During initial eval, she was found to have an anterior wall STEMI and Interventional cardiology was alerted. Patient was taken to the cath lab where patient bacame more hypotensive " requiring pressor support and intra-aortic balloon pump insertion. Patient received LAD, left main, stent however circumflex artery was unable to be intervened on. Patient was transferred to ICU for cardiogenic shock s/p STEMI. Noted to have LV thrombus and started on heparin gtt and continued on brilinta and ASA. He noted to have some transaminitis so Statin was on hold. TTE w/ LVEF 25%, severe global hypokinesis w/ apical and distal septal near akinesis, mod siz thrombus at apex. On 1/20 he was noted to have LE bleeding w/ hematoma formation from cath site rq manual pressure and femostop. Vascular surgery was consulted . R femoral site was closed by Dr. Brooke. IABP was d/c and heparin was held. He received 1U PRBC. Heparin was restarted and coumadin was started w/ goal 2-3. INR 2.22 1/29 and heparin was d/c. Joseph Aguilar was admitted to [facility] on 01/29/25     Chief Complaint: f/u ambulatory dysfunction    Interval: Patient seen and examined in therapy. No events overnight.  Reports overall feeling well. Had a large difficult BM 1/30. Sleeping well at night. Denies any f/c/n/v, CP, SOB, abdominal pain, constipation, or diarrhea.       Objective   Functional Update:    Sit to Stand: total x2   UE Dressing:  supervision  LE Dressing:  total A         Temp:  [97.1 °F (36.2 °C)-98.8 °F (37.1 °C)] 97.1 °F (36.2 °C)  HR:  [] 83  Resp:  [18] 18  BP: ()/(55-78) 97/55  SpO2:  [93 %-96 %] 95 %    Physical Exam    Gen: No acute distress, obese   HEENT: Moist mucus membranes,   Cardiovascular: Regular rate, rhythm, life vest in place   Heme/Extr: no b.l Le edema   Pulmonary: Non-labored breathing.  : No gilbert  GI:  non-distended. BS+  MSK: ROM is WFL in all extremities.   Integumentary: Skin is warm, dry. .  Neuro:Speech is intact. Appropriate to questioning.  Psych: Normal mood and affect.       Scheduled Meds:  Current Facility-Administered Medications   Medication Dose Route Frequency Provider Last Rate     acetaminophen  650 mg Oral Q6H PRN Shivani Yuan MD      aluminum-magnesium hydroxide-simethicone  30 mL Oral Q4H PRN Shivani Yuan MD      Artificial Tears  1 drop Left Eye Q4H PRN Shivani Yuan MD      aspirin  81 mg Oral Daily Shivani Yuan MD      atorvastatin  80 mg Oral Daily With Dinner Shivani Yuan MD      bisacodyl  10 mg Rectal Daily PRN Shivani Yuan MD      carvedilol  3.125 mg Oral BID With Meals Shivani Yuan MD      ciprofloxacin  1 drop Left Eye Q8H Shivani Yuan MD      clopidogrel  75 mg Oral Daily Shivani Yuan MD      docusate sodium  100 mg Oral BID Shivani Yuan MD      gabapentin  300 mg Oral BID Shivani Yuan MD      insulin glargine  20 Units Subcutaneous HS Shivani Yuan MD      insulin lispro  2-12 Units Subcutaneous 4x Daily (AC & HS) Shivani Yuan MD      insulin lispro  9 Units Subcutaneous TID With Meals MARGARITA Hester      magnesium Oxide  400 mg Oral BID Shivani Yuan MD      melatonin  6 mg Oral HS Shivani Yuan MD      menthol-methyl salicylate   Apply externally 4x Daily PRN Shivani Yuan MD      ondansetron  4 mg Intravenous Q6H PRN Shivani Yuan MD      oxyCODONE  2.5 mg Oral Q8H PRN Shivani Yuan MD      polyethylene glycol  17 g Oral Daily Shivani Yuan MD      QUEtiapine  25 mg Oral HS Shivani Yuan MD      senna  2 tablet Oral HS Shivani Yuan MD      warfarin  7.5 mg Oral Daily (warfarin) MARGARITA Hester           Lab Results: I have reviewed the following results:  Results from last 7 days   Lab Units 01/30/25  0559 01/28/25  0226 01/27/25  0612   HEMOGLOBIN g/dL 11.6* 11.0* 11.1*   HEMATOCRIT % 34.5* 33.2* 33.5*   WBC Thousand/uL 9.68 8.66 8.07   PLATELETS Thousands/uL 283 262 212     Results from last 7 days   Lab Units 01/30/25  0559 01/28/25  0226 01/27/25  0612   BUN mg/dL 20 24 22   SODIUM mmol/L 138 135 134*   POTASSIUM mmol/L 4.1 4.1 4.1   CHLORIDE mmol/L 102 104 104   CREATININE mg/dL 0.90 0.86 0.83   AST U/L 21  --   --    ALT U/L 45  --    --        Results from last 7 days   Lab Units 01/30/25  0559 01/29/25  0630 01/28/25  0226   PROTIME seconds 25.9* 25.3* 21.7*   INR  2.37* 2.22* 1.81*        Shivani Yuan MD   Physical Medicine and Rehabilitation   01/31/25    I have spent a total time of 36 minutes in caring for this patient on the day of the visit/encounter including Counseling / Coordination of care, Documenting in the medical record, and Communicating with other healthcare professionals .

## 2025-01-31 NOTE — ASSESSMENT & PLAN NOTE
ECHO on 1/19 showed possible moderately sized thrombus at the apex.  Started on Coumadin in acute setting.  Currently on Coumadin 7.5mg daily.  INR 2.37 on 1/30.  Continue Coumadin 7.5mg daily.  Repeat PT/INR on Monday.  Follow-up with Cardiology as outpatient.

## 2025-01-31 NOTE — PROGRESS NOTES
Progress Note - Internal Medicine   Name: Joseph Aguilar 69 y.o. male I MRN: [id number]  Unit/Bed#: -02 I Date of Admission: 1/29/2025   Date of Service: 1/31/2025 I Hospital Day: 2    Assessment & Plan  Type 2 diabetes mellitus without complication, without long-term current use of insulin (HCC)  Lab Results   Component Value Date    HGBA1C 8.2 (H) 01/18/2025       Recent Labs     01/30/25  1145 01/30/25  1604 01/30/25  2044 01/31/25  0555   POCGLU 329* 222* 181* 174*       Blood Sugar Average: Last 72 hrs:  (P) [id number]  Last A1c was 8.2 on 1/18/2025.  Home regimen: Lantus 15u at HS, glipizide 5mg daily, metformin 1000mg BID, and Jardiance 12.5mg daily.  Currently receiving Lantus 20u at HS and lispro 9u with meals.  Continue SSI, QID accuchecks, and cons. carb diet.  Anemia  Hgb currently stable at 11.6.  Developed large R groin hematoma s/p balloon pump.  No active s/s of bleeding.  Iron panel on 1/30: Iron sat 23%, TIBC 185, Iron 43, and Ferritin 224.  No need for iron supplementation at this time.  Continue to trend routine CBC.  Hypomagnesemia  Mg 1.7 on 1/30.  Started on magnesium oxide 400mg BID.  Continue to trend routine Mg.  Anterior STEMI s/p PCI with CHYNA to LM-ostial LAD and mLAD with IABP;  of LCx (1/17/2024)  Presented on 1/17 with near syncopal episode with jaw pain and hypotension.  NSTEMI alert - cardiac cath on 1/17 with CHYNA to LAD and L main with IABP support.  IABP discontinued on 1/20.  Continue ASA 81mg daily, Plavix 75mg daily, Coumadin, Lipitor 80mg daily, and Coreg 3.125mg BID.  Recommend establishing with Cardiology on discharge.  ICM with EF 25%  ECHO on 1/19 showed EF 25%, systolic function severely reduced, global hypokinesis.  Currently wearing Lifevest.  Continue Coreg 3.125mg BID.  Consider restarting home Jardiance.  Recommend establishing care with Cardiology as outpatient.  LV (left ventricular) mural thrombus  ECHO on 1/19 showed possible moderately sized  thrombus at the apex.  Started on Coumadin in acute setting.  Currently on Coumadin 7.5mg daily.  INR 2.37 on .  Continue Coumadin 7.5mg daily.  Repeat PT/INR on Monday.  Follow-up with Cardiology as outpatient.  Hematoma of right lower extremity  Developed R groin hematoma at IABP insertion site.  IR suite on  for closure of R femoral artery puncture site and control of expanding hematoma performed by Dr. Brooke (Vascular).  Monitor site daily for s/s of reoccurring bleeding.  Continue to trend H&H.  Conjunctivitis  Continue ciprofloxacin to L eye for 7-10 days.  Has been declining drops.  Osteoarthritis of multiple joints  Multiple joints - shoulders, knees, back.  Recently had steroid injection to B/L shoulders on .  Follows with Dr. Egan (Orthopedics) as outpatient.  Considerations with therapies.  Continue current pain regimen.   History of hypertension  Newly started on lisinopril 2.5mg daily in acute setting.  Continue Coreg 3.125mg BID and lisinopril 2.5mg daily.  Discontinue lisinopril today.  Monitor BP with routine VS.  Ambulatory dysfunction  Deconditioned s/p STEMI.  Primary team following.  Continue with PT/OT.    VTE Pharmacologic Prophylaxis:   Pharmacologic: Warfarin (Coumadin)  Mechanical VTE Prophylaxis in Place: Yes - sequential compression devices.    Current Length of Stay: 2 day(s)    Current Patient Status: Inpatient Rehab     Discharge Plan: As per primary team.    Code Status: Level 1 - Full Code    Subjective:   Pt examined while pt sitting in recliner in pt room.  Complaints of lower back pain but this is chronic and states that he always has some pain.  Improves with repositioning.  Denies any R groin pain or increase in edema.  Denies any lightheadedness, dizziness, SOB, palpitations, or CP.  Currently has no concerns or complaints.    Objective:     Vitals:   Temp (24hrs), Av.1 °F (36.7 °C), Min:97.1 °F (36.2 °C), Max:98.8 °F (37.1 °C)    Temp:  [97.1 °F (36.2  °C)-98.8 °F (37.1 °C)] 97.1 °F (36.2 °C)  HR:  [] 83  Resp:  [18] 18  BP: ()/(55-78) 97/55  SpO2:  [93 %-96 %] 95 %  Body mass index is 29.3 kg/m².     Review of Systems   Constitutional:  Negative for appetite change, chills, fatigue and fever.   HENT:  Negative for trouble swallowing.    Eyes:  Negative for visual disturbance.   Respiratory:  Negative for cough, shortness of breath, wheezing and stridor.    Cardiovascular:  Negative for chest pain, palpitations and leg swelling.   Gastrointestinal:  Negative for abdominal distention, abdominal pain, constipation, diarrhea, nausea and vomiting.        LBM 1/30   Genitourinary:  Negative for difficulty urinating.   Musculoskeletal:  Positive for back pain (lower back pain, chronic, mild, improves with repositioning). Negative for arthralgias and gait problem.   Neurological:  Negative for dizziness, weakness, light-headedness, numbness and headaches.   Psychiatric/Behavioral:  Negative for dysphoric mood and sleep disturbance. The patient is not nervous/anxious.    All other systems reviewed and are negative.       Input and Output Summary (last 24 hours):       Intake/Output Summary (Last 24 hours) at 1/31/2025 0982  Last data filed at 1/31/2025 0849  Gross per 24 hour   Intake 840 ml   Output 200 ml   Net 640 ml       Physical Exam:     Physical Exam  Vitals and nursing note reviewed.   Constitutional:       General: He is not in acute distress.     Appearance: Normal appearance. He is not ill-appearing.   HENT:      Head: Normocephalic and atraumatic.   Cardiovascular:      Rate and Rhythm: Normal rate and regular rhythm.      Pulses: Normal pulses.      Heart sounds: Murmur heard.      Systolic murmur is present with a grade of 1/6.      No friction rub.      Comments: Wearing lifevest.  Pulmonary:      Effort: Pulmonary effort is normal. No respiratory distress.      Breath sounds: Normal breath sounds. No wheezing or rhonchi.   Abdominal:       General: Abdomen is flat. Bowel sounds are normal. There is no distension.      Palpations: Abdomen is soft. There is no mass.      Tenderness: There is no abdominal tenderness. There is no guarding or rebound.      Hernia: No hernia is present.   Musculoskeletal:      Cervical back: Normal range of motion and neck supple. No tenderness.      Right lower leg: Edema (Minimal non-pitting edema) present.      Left lower leg: Edema (Minimal non-pitting edema) present.   Skin:     General: Skin is warm and dry.   Neurological:      Mental Status: He is alert and oriented to person, place, and time.   Psychiatric:         Mood and Affect: Mood normal.         Behavior: Behavior normal.         Additional Data:     Labs:    Results from last 7 days   Lab Units 01/30/25  0559   WBC Thousand/uL 9.68   HEMOGLOBIN g/dL 11.6*   HEMATOCRIT % 34.5*   PLATELETS Thousands/uL 283   SEGS PCT % 69   LYMPHO PCT % 18   MONO PCT % 9   EOS PCT % 3     Results from last 7 days   Lab Units 01/30/25  0559   SODIUM mmol/L 138   POTASSIUM mmol/L 4.1   CHLORIDE mmol/L 102   CO2 mmol/L 25   BUN mg/dL 20   CREATININE mg/dL 0.90   ANION GAP mmol/L 11   CALCIUM mg/dL 9.5   ALBUMIN g/dL 3.7   TOTAL BILIRUBIN mg/dL 0.85   ALK PHOS U/L 74   ALT U/L 45   AST U/L 21   GLUCOSE RANDOM mg/dL 154*     Results from last 7 days   Lab Units 01/30/25  0559   INR  2.37*     Results from last 7 days   Lab Units 01/31/25  0555 01/30/25  2044 01/30/25  1604 01/30/25  1145 01/30/25  0611 01/29/25  2128 01/29/25  1559 01/29/25  1127 01/29/25  0730 01/28/25  2042 01/28/25  1618 01/28/25  1055   POC GLUCOSE mg/dl 174* 181* 222* 329* 166* 165* 162* 218* 167* 192* 202* 269*               Labs reviewed    Imaging:    Imaging reviewed    Recent Cultures (last 7 days):           Last 24 Hours Medication List:   Current Facility-Administered Medications   Medication Dose Route Frequency Provider Last Rate    acetaminophen  650 mg Oral Q6H PRN Shivani Yuan MD       aluminum-magnesium hydroxide-simethicone  30 mL Oral Q4H PRN Shivani Yuan MD      Artificial Tears  1 drop Left Eye Q4H PRN Shivani Yuan MD      aspirin  81 mg Oral Daily Shivani Yuan MD      atorvastatin  80 mg Oral Daily With Dinner Shivani Yuan MD      bisacodyl  10 mg Rectal Daily PRN Shivani Yuan MD      carvedilol  3.125 mg Oral BID With Meals Shivani Yuan MD      ciprofloxacin  1 drop Left Eye Q8H Shivani Yuan MD      clopidogrel  75 mg Oral Daily Shivani Yuan MD      docusate sodium  100 mg Oral BID Shivani Yuan MD      gabapentin  300 mg Oral BID Shivani Yuan MD      insulin glargine  20 Units Subcutaneous HS Shivani Yuan MD      insulin lispro  2-12 Units Subcutaneous 4x Daily (AC & HS) Shivani Yuan MD      insulin lispro  9 Units Subcutaneous TID With Meals MARGARITA Hester      magnesium Oxide  400 mg Oral BID Shivani Yuan MD      melatonin  6 mg Oral HS Shivani Yuan MD      menthol-methyl salicylate   Apply externally 4x Daily PRN Shivani Yuan MD      ondansetron  4 mg Intravenous Q6H PRN Shivani Yuan MD      oxyCODONE  2.5 mg Oral Q8H PRN Shivani Yuan MD      polyethylene glycol  17 g Oral Daily Shivani Yuan MD      QUEtiapine  25 mg Oral HS Shivani Yuan MD      senna  2 tablet Oral HS Shivani Yuan MD      warfarin  7.5 mg Oral Daily (warfarin) MARGARITA Hester          M*Modal software was used to dictate this note.  It may contain errors with dictating incorrect words or incorrect spelling. Please contact the provider directly with any questions.

## 2025-01-31 NOTE — PLAN OF CARE
Problem: MOBILITY - ADULT  Goal: Maintain or return to baseline ADL function  Description: INTERVENTIONS:  -  Assess patient's ability to carry out ADLs; assess patient's baseline for ADL function and identify physical deficits which impact ability to perform ADLs (bathing, care of mouth/teeth, toileting, grooming, dressing, etc.)  - Assess/evaluate cause of self-care deficits   - Assess range of motion  - Assess patient's mobility; develop plan if impaired  - Assess patient's need for assistive devices and provide as appropriate  - Encourage maximum independence but intervene and supervise when necessary  - Involve family in performance of ADLs  - Assess for home care needs following discharge   - Consider OT consult to assist with ADL evaluation and planning for discharge  - Provide patient education as appropriate  1/31/2025 1238 by Maria Dolores Thompson RN  Outcome: Progressing  1/31/2025 1237 by Maria Dolores Thompson RN  Outcome: Progressing

## 2025-01-31 NOTE — ASSESSMENT & PLAN NOTE
Lab Results   Component Value Date    HGBA1C 8.2 (H) 01/18/2025       Recent Labs     01/30/25  1145 01/30/25  1604 01/30/25 2044 01/31/25  0555   POCGLU 329* 222* 181* 174*       Blood Sugar Average: Last 72 hrs:  (P) [id number]  Last A1c was 8.2 on 1/18/2025.  Home regimen: Lantus 15u at HS, glipizide 5mg daily, metformin 1000mg BID, and Jardiance 12.5mg daily.  Currently receiving Lantus 20u at HS and lispro 9u with meals.  Continue SSI, QID accuchecks, and cons. carb diet.

## 2025-01-31 NOTE — PROGRESS NOTES
01/31/25 1100   Pain Assessment   Pain Assessment Tool 0-10   Pain Score 8   Pain Location/Orientation Orientation: Lower;Location: Back   Restrictions/Precautions   Precautions Bed/chair alarms;Cardiac/sternal;Fall Risk;Supervision on toilet/commode;Pain  (+lifevest)   Subjective   Subjective pt ready for skilled PT   Bed-Chair Transfer   Type of Assistance Needed Physical assistance   Physical Assistance Level 76% or more   Comment better in this session no slide just sit pivot   Chair/Bed-to-Chair Transfer CARE Score 2   Wheel 50 Feet with Two Turns   Type of Assistance Needed Supervision   Wheel 50 Feet with Two Turns CARE Score 4   Wheel 150 Feet   Type of Assistance Needed Supervision   Wheel 150 Feet CARE Score 4   Wheelchair mobility   Method Right upper extremity;Left upper extremity   Findings 300 feet upper arms and perform ramp Stephan control descending and ModA ascending   Does the patient use a wheelchair? 1. Yes   Type of Wheelchair Used 1. Manual   Curb or Single Stair   Reason if not Attempted Safety concerns   1 Step (Curb) CARE Score 88   4 Steps   Reason if not Attempted Safety concerns   4 Steps CARE Score 88   12 Steps   Reason if not Attempted Safety concerns   12 Steps CARE Score 88   Therapeutic Interventions   Strengthening 1 # LAQ AP Marching 20 reps   Assessment   Treatment Assessment pt cont to perform skilled PT to his tolerance and cont to ask pt request  and Joseph like to perform WC propl and agreeble to perform WC propl . Pt at end of session back in recliner with alarm on and all needs in reach . Cont work on functional walkinig trail RW next session .   Barriers to Discharge Inaccessible home environment;Decreased caregiver support   Plan   Progress Slow progress, decreased activity tolerance   PT Therapy Minutes   PT Time In 1100   PT Time Out 1130   PT Total Time (minutes) 30   PT Mode of treatment - Individual (minutes) 30   PT Mode of treatment - Concurrent (minutes) 0   PT  Mode of treatment - Group (minutes) 0   PT Mode of treatment - Co-treat (minutes) 0   PT Mode of Treatment - Total time(minutes) 30 minutes   PT Cumulative Minutes 180   Therapy Time missed   Time missed? No

## 2025-01-31 NOTE — ASSESSMENT & PLAN NOTE
Hgb currently stable at 11.6.  Developed large R groin hematoma s/p balloon pump.  No active s/s of bleeding.  Iron panel on 1/30: Iron sat 23%, TIBC 185, Iron 43, and Ferritin 224.  No need for iron supplementation at this time.  Continue to trend routine CBC.

## 2025-01-31 NOTE — ASSESSMENT & PLAN NOTE
ECHO on 1/19 showed EF 25%, systolic function severely reduced, global hypokinesis.  Currently wearing Lifevest.  Continue Coreg 3.125mg BID.  Consider restarting home Jardiance.  Recommend establishing care with Cardiology as outpatient.

## 2025-01-31 NOTE — ASSESSMENT & PLAN NOTE
Presented on 1/17 with near syncopal episode with jaw pain and hypotension.  NSTEMI alert - cardiac cath on 1/17 with CHYNA to LAD and L main with IABP support.  IABP discontinued on 1/20.  Continue ASA 81mg daily, Plavix 75mg daily, Coumadin, Lipitor 80mg daily, and Coreg 3.125mg BID.  Recommend establishing with Cardiology on discharge.   To feel better

## 2025-01-31 NOTE — CONSULTS
Consult Note- Podiatry   Joseph Aguilar 69 y.o. male MRN: [id number]  Unit/Bed#: -02 Encounter: [id number]    Assessment & Plan     Assessment:  1. Onychomycosis x 10  2. PVD  3. Pain toes b/l     Plan:  - -pt eval and managed    - Number and complexity of problems addressed:  1 undiagnosed new problem with uncertain prognosis   as shown    - Amount/complexity of data reviewed and analyzed:     Category 1: prior patient notes were analyzed today before evaluating and managing patient. All PMH were discussed with pt today.       - Risk of complications: moderate risk of morbidity from additional testing or treatment involved with this patient, which includes but not limited to:    - discussed anatomy, condition, treatment plan and options. They were instructed on proper foot care. The patient was seen today for greater than total of  45-59 minutes   . This is total time spent today involving both face-to-face time and non face-to-face time. This time spent includes  reviewing their past medical history  , performing a medically appropriate examination and evaluation of the patient, counseling and educating the patient,  documenting all findings in EMR, and independently interpreting results and communicating results with  patient     and discussing their condition and treatment options, risks, and potential complications. I have discussed the findings of this examination with the patient. The discussion included a complete verbal explanation of the examination results, diagnosis and planned treatment(s). A schedule for future care needs was explained. The patient has verbalized the understanding of these instructions at this time. If any questions should arise after returning home I have encouraged the patient to feel free to call the office.    - d/w pt that discomfort is secondary to toe nail thickening  - Hallucal mycotic nails x2 debrided decreasing thickness by 1 mm. Mycotic toe nails 2-5 b/l were  trimmed, decreasing length, without incidence utilizing a sharp nail nipper.  - All questions and concerns addressed.    - Podiatry signing off, thank you for the consult.     History of Present Illness     HPI:  Joseph Aguilar is a 69 y.o. male who presents with painful, elongated toenails and dystrophic. They state that they see Podiatrist outpatient. They have difficulty applying their socks and shoes due to the elongation of the nails. The pressure within their shoe gear is painful and they have been unable to cut their nails adequately. Patient states pain is 1/10 in shoe gear. Pain with pressure. Requires at risk foot care.     Inpatient consult to Podiatry  Consult performed by: Dylon Amador DPM  Consult ordered by: Shivani Yuan MD        Review of Systems   Constitutional: Negative.    HENT: Negative.    Eyes: Negative.    Respiratory: Negative.    Cardiovascular: Negative.    Gastrointestinal: Negative.    Musculoskeletal: Negative   Skin: elongated thickened toenails, 10   Neurological: Negative.        Historical Information   Past Medical History:   Diagnosis Date    Depression     Diabetes mellitus (HCC)     Hyperlipidemia     Hypertension     Osteoarthritis, knee      Past Surgical History:   Procedure Laterality Date    BACK SURGERY      CARDIAC CATHETERIZATION N/A 01/17/2025    Procedure: Cardiac PCI;  Surgeon: Neeraj Gaines MD;  Location: [facility];  Service: Cardiology    CARDIAC CATHETERIZATION Left 01/17/2025    Procedure: Cardiac Left Heart Cath;  Surgeon: Neeraj Gaines MD;  Location: [facility];  Service: Cardiology    CARDIAC CATHETERIZATION N/A 01/17/2025    Procedure: Cardiac iabp;  Surgeon: Neeraj Gaines MD;  Location: [facility];  Service: Cardiology    HAND SURGERY      JOINT REPLACEMENT Left     l tkr    KNEE SURGERY Left     CA ARTHRP KNE CONDYLE&PLATU MEDIAL&LAT COMPARTMENTS Right 11/19/2018    Procedure: ARTHROPLASTY KNEE TOTAL;   "Surgeon: Franny Grimaldo MD;  Location: [facility];  Service: Orthopedics    TOOTH EXTRACTION      WISDOM TOOTH EXTRACTION       Social History   Social History     Substance and Sexual Activity   Alcohol Use Yes    Alcohol/week: 3.0 standard drinks of alcohol    Types: 3 Cans of beer per week    Comment: 'couple beers every couple weeks'     Social History     Substance and Sexual Activity   Drug Use Not Currently     Social History     Tobacco Use   Smoking Status Former    Current packs/day: 0.00    Types: Cigarettes    Quit date: 3/1/2012    Years since quittin.9    Passive exposure: Past   Smokeless Tobacco Never     Family History:   Family History   Problem Relation Age of Onset    No Known Problems Mother     Heart attack Father     Arthritis Family     Cancer Family     Diabetes Family     Heart disease Family     Osteoporosis Family        Meds/Allergies     Medications Prior to Admission:     aspirin (ECOTRIN LOW STRENGTH) 81 mg EC tablet    atorvastatin (LIPITOR) 80 mg tablet    carvedilol (COREG) 3.125 mg tablet    clopidogrel (PLAVIX) 75 mg tablet    insulin glargine (LANTUS SOLOSTAR) 100 units/mL injection pen    lisinopril (ZESTRIL) 2.5 mg tablet    melatonin 5 MG TABS    metFORMIN (GLUCOPHAGE) 1000 MG tablet    warfarin (COUMADIN) 5 mg tablet    Empagliflozin 25 MG TABS    gabapentin (NEURONTIN) 300 mg capsule    glipiZIDE (GLUCOTROL) 5 mg tablet  No Known Allergies    Objective   First Vitals:   Blood Pressure: 121/76 (25 1333)  Pulse: 96 (25 1333)  Temperature: 98.7 °F (37.1 °C) (25 1333)  Temp Source: Temporal (25 1333)  Respirations: (!) 6 (25 1333)  Height: 6' 2\" (188 cm) (25 1333)  Weight - Scale: 103 kg (227 lb 1.2 oz) (25 1333)  SpO2: 96 % (25 1333)    Current Vitals:   Blood Pressure: 97/55 (25 0515)  Pulse: 83 (25 0515)  Temperature: (!) 97.1 °F (36.2 °C) (25 0515)  Temp Source: Tympanic (25 " "0515)  Respirations: 18 (01/31/25 0515)  Height: 6' 2\" (188 cm) (01/29/25 1333)  Weight - Scale: 104 kg (228 lb 2.8 oz) (01/31/25 0600)  SpO2: 95 % (01/31/25 0515)    BP 97/55 (BP Location: Left arm)   Pulse 83   Temp (!) 97.1 °F (36.2 °C) (Tympanic)   Resp 18   Ht 6' 2\" (1.88 m)   Wt 104 kg (228 lb 2.8 oz)   SpO2 95%   BMI 29.30 kg/m²     General Appearance:    Alert, cooperative, no distress   Head:    Normocephalic, without obvious abnormality, atraumatic   Eyes:    PERRL, conjunctiva/corneas clear, EOM's intact            Nose:   Moist mucous membranes, no drainage or sinus tenderness   Throat:   No tenderness, no exudates   Neck:   Supple, symmetrical, trachea midline, no JVD   Back:     Symmetric, no CVA tenderness   Lungs:     Clear to auscultation bilaterally, respirations unlabored   Chest wall:    No tenderness or deformity   Heart:    Regular rate and rhythm, S1 and S2 normal, no murmur, rub   or gallop   Abdomen:     Soft, non-tender, bowel sounds active all four quadrants,     no masses, no organomegaly     Extremities:   MMT is 5/5 to all compartments of the LE, +0/4 edema B/L, Digital ROM is intact,    Pulses:   R DP is +1/4, R PT is +0/4, L DP is +1/4, L PT is +0/4, CFT< 3sec to all digits. Thin/shiny skin noted to the B/L LE, pigmentary changes to B/L LE. Absent digital hair growth b/l. Nail thickening b/l.    Skin:   Nails are yellow discolored, thickened, elongated, with notable subungual debris and > 2 mm thickness noted to toenails 1-5 B/L. No open Lesions.          Neurologic:   CNII-XII intact. Normal strength, sensation and reflexes       Throughout. Gross sensation is intact. Protective sensation is intact       Lab Results:   Admission on 01/29/2025   Component Date Value    POC Glucose 01/29/2025 162 (H)     POC Glucose 01/29/2025 165 (H)     Protime 01/30/2025 25.9 (H)     INR 01/30/2025 2.37 (H)     Sodium 01/30/2025 138     Potassium 01/30/2025 4.1     Chloride 01/30/2025 102     " CO2 01/30/2025 25     ANION GAP 01/30/2025 11     BUN 01/30/2025 20     Creatinine 01/30/2025 0.90     Glucose 01/30/2025 154 (H)     Calcium 01/30/2025 9.5     AST 01/30/2025 21     ALT 01/30/2025 45     Alkaline Phosphatase 01/30/2025 74     Total Protein 01/30/2025 7.1     Albumin 01/30/2025 3.7     Total Bilirubin 01/30/2025 0.85     eGFR 01/30/2025 88     WBC 01/30/2025 9.68     RBC 01/30/2025 3.70 (L)     Hemoglobin 01/30/2025 11.6 (L)     Hematocrit 01/30/2025 34.5 (L)     MCV 01/30/2025 93     MCH 01/30/2025 31.4     MCHC 01/30/2025 33.6     RDW 01/30/2025 14.5     MPV 01/30/2025 9.0     Platelets 01/30/2025 283     nRBC 01/30/2025 0     Segmented % 01/30/2025 69     Immature Grans % 01/30/2025 1     Lymphocytes % 01/30/2025 18     Monocytes % 01/30/2025 9     Eosinophils Relative 01/30/2025 3     Basophils Relative 01/30/2025 0     Absolute Neutrophils 01/30/2025 6.72     Absolute Immature Grans 01/30/2025 0.06     Absolute Lymphocytes 01/30/2025 1.70     Absolute Monocytes 01/30/2025 0.87     Eosinophils Absolute 01/30/2025 0.30     Basophils Absolute 01/30/2025 0.03     Magnesium 01/30/2025 1.7 (L)     Iron Saturation 01/30/2025 23     TIBC 01/30/2025 184.8 (L)     Iron 01/30/2025 43 (L)     Transferrin 01/30/2025 132 (L)     UIBC 01/30/2025 142 (L)     Ferritin 01/30/2025 224     POC Glucose 01/30/2025 166 (H)     POC Glucose 01/30/2025 329 (H)     POC Glucose 01/30/2025 222 (H)     POC Glucose 01/30/2025 181 (H)     POC Glucose 01/31/2025 174 (H)     POC Glucose 01/31/2025 259 (H)      Imaging: I have personally reviewed pertinent films in PACS  EKG, Pathology, and Other Studies: I have personally reviewed pertinent reports.      Code Status: Level 1 - Full Code  Advance Directive and Living Will:      Power of :    POLST:

## 2025-01-31 NOTE — ASSESSMENT & PLAN NOTE
Newly started on lisinopril 2.5mg daily in acute setting.  Continue Coreg 3.125mg BID and lisinopril 2.5mg daily.  Discontinue lisinopril today.  Monitor BP with routine VS.

## 2025-01-31 NOTE — PROGRESS NOTES
"   01/31/25 1230   Pain Assessment   Pain Assessment Tool FLACC  (attempted to use 1-10 scale to score pain, pt refuses to give pain a number, becoming agitated \"everyone keeps asking me numbers, that's all you guys want\")   Pain Location/Orientation Orientation: Lower;Location: Back   Pain Onset/Description Onset: Ongoing;Frequency: Constant/Continuous   Hospital Pain Intervention(s) Repositioned;Rest   Pain Rating: FLACC (Rest) - Face 0   Pain Rating: FLACC (Rest) - Legs 0   Pain Rating: FLACC (Rest) - Activity 0   Pain Rating: FLACC (Rest) - Cry 0   Pain Rating: FLACC (Rest) - Consolability 0   Score: FLACC (Rest) 0   Pain Rating: FLACC (Activity) - Face 1   Pain Rating: FLACC (Activity) - Legs 1   Pain Rating: FLACC (Activity) - Activity 1   Pain Rating: FLACC (Activity) - Cry 1   Pain Rating: FLACC (Activity) - Consolability 1   Score: FLACC (Activity) 5   Restrictions/Precautions   Precautions Bed/chair alarms;Cognitive;Fall Risk;Cardiac/sternal;Pain;Supervision on toilet/commode  (+lifevest)   Weight Bearing Restrictions No   ROM Restrictions No   Lifestyle   Autonomy \"Don't touch me. I don't want anyone touching me.\" -- when attempting to transfer patient   Bed-Chair Transfer   Type of Assistance Needed Physical assistance;Verbal cues   Physical Assistance Level 76% or more   Comment MaxA sit-pivot transfers, pt with poor carryover of verbal cues to swing bottom across space between w/c and recliner, with pt sitting on space anyway, educated pt on fall risk, pt not receptive. Pt becoming agitated during transfers stating \"I don't want you touching me\" despite edu on fall risk. Due to poor technique and not receptive to edu/cues, pt may be safer still completing SB xfer at this time   Chair/Bed-to-Chair Transfer CARE Score 2   Toileting Hygiene   Comment offered toileting, pt declined   Reason if not Attempted Refused to perform   Toileting Hygiene CARE Score 7   Toilet Transfer   Comment offered toileting, " "pt declined   Reason if not Attempted Refused to perform   Toilet Transfer CARE Score 7   Health Management   Health Management Level   (no AD)   Health Management Level of Assistance Maximum verbal cues   Health Management Attempted to engage pt in med mgmt activity, reviewing current list of daily medications with pt, as well as PRN meds. Pt reports PTA he took his medications straight from the bottle, not receptive to pill box. Pt with poor knowledge of current medications purpose/frequency/name/dosage, becoming defensive and guarded with therapist, for example stating \"I'm not going to tell you what I take for that at home\" when discussing cholesterol med. Pt refused to engage in tangible med mgmt task w/pillbox. Therapist reviewed each medication, purpose, frequency, dose, etc but pt not receptive and often stated \"I don't know\" or \"I don't care\" when asked if he knew what each medication was for. Please reattempt at a later date possible after building better rapport w/pt, or closer to D/C date.   Exercise Tools   Exercise Tools Yes   Other Exercise Tool 1 Attempted to engage pt in UE TherEx for BUE strengthening for increased safety/IND during fxl transfers, educated pt on sternal/cardiac precautions and how this TherEx stayed safely within precautions. Trialed completing chest press 3x10 with 4# tbar but after several reps pt refused to continue, citing this TherEx aggravating his shoulder arthritis. Downgraded to just elbow flexion 3x10 with 4# tbar which pt did complete.   Other Exercise Tool 2 Downgraded TherEx 2* c/o arthritis in shoulders being aggravated, [id number] each of fwd and circular movements with 5# do box for gentle UE strengthening and AAROM/stretch. Pt tolerated fair with rest breaks, distraction/conversation to engage pt, and encouragement.   Cognition   Overall Cognitive Status WFL   Arousal/Participation Alert   Attention Attends with cues to redirect   Orientation Level Oriented X4 " "  Memory Decreased short term memory;Decreased recall of recent events;Decreased recall of precautions   Following Commands Follows multistep commands with increased time or repetition   Activity Tolerance   Activity Tolerance Patient limited by pain;Treatment limited secondary to agitation   Medical Staff Made Aware notified nsg, and Therapy Manager Areli Goldsmith, regarding pt easily agitated, initially uncooperative   Assessment   Treatment Assessment Pt seen for 90min skilled OT session focused on w/c mobility t/o unit for UE strength/endurance, fxl transfer training (sit-pivots), med mgmt, UE strengthening, UE AAROM/stretch, and attempted rapport-building, for increased independence w/ADLs/IADLs and decreased caregiver burden. See detailed descriptions of fxl performance above. Pt tolerated session fair, pt agitated upon therapist arrival, becoming increasingly agitated with fxl transfers as pt wishing to not be touched, however still requiring physical assist for safety. Despite attempts at distracting with conversation, and attempts to build rapport, pt continued to be easily agitated and irritable t/o session, very short and sarcastic with therapist, with low frustration tolerance. Notified Therapy Manager Areli Goldsmith who then rounded with pt, with pt stating no concerns or complaints and \"wanting to come here again.\" Pt cont to be limited by BUE arthritis, pain, decreased ROM, strength, endurance, standing balance/tolerance, BLE weakness, coordination, act sky, safety awareness, and SOB upon exertion. Cont OT POC: ADL retraining, transfer training, act sky, endurance, UE strengthening, and standing balance/tolerance.   Prognosis Good   Problem List Decreased strength;Decreased range of motion;Decreased endurance;Impaired balance;Decreased mobility;Decreased safety awareness;Pain   Plan   Treatment/Interventions ADL retraining;Functional transfer training;Therapeutic exercise;Endurance training;Patient/family " training;Equipment eval/education;Bed mobility;Compensatory technique education;Spoke to nursing;Spoke to advanced practitioner   Progress Slow progress, decreased activity tolerance   Discharge Recommendation   Rehab Resource Intensity Level, OT   (pending)   OT Therapy Minutes   OT Time In 1230   OT Time Out 1400   OT Total Time (minutes) 90   OT Mode of treatment - Individual (minutes) 90   OT Mode of treatment - Concurrent (minutes) 0   OT Mode of treatment - Group (minutes) 0   OT Mode of treatment - Co-treat (minutes) 0   OT Mode of Treatment - Total time(minutes) 90 minutes   OT Cumulative Minutes 180   Therapy Time missed   Time missed? No

## 2025-01-31 NOTE — PROGRESS NOTES
01/31/25 0830   Pain Assessment   Pain Assessment Tool 0-10   Pain Score 7   Pain Location/Orientation Orientation: Lower;Location: Back   Restrictions/Precautions   Precautions Bed/chair alarms;Cardiac/sternal;Fall Risk;Supervision on toilet/commode;Pain  (+lifevest)   Subjective   Subjective pt agreeable to perform skilled PT   Sit to Stand   Type of Assistance Needed Physical assistance   Physical Assistance Level Total assistance   Comment x2 A for safety, + r knee blocking, pull to stand at bed rail and in parallel bars - mod A x1   Sit to Stand CARE Score 1   Bed-Chair Transfer   Type of Assistance Needed Physical assistance   Physical Assistance Level Total assistance   Comment slide and then sit pivot as below at Mod to Stephan  VC if needed to his pace   Chair/Bed-to-Chair Transfer CARE Score 1   Walk 10 Feet   Reason if not Attempted Safety concerns   Walk 10 Feet CARE Score 88   Walk 50 Feet with Two Turns   Reason if not Attempted Safety concerns   Walk 50 Feet with Two Turns CARE Score 88   Walk 150 Feet   Reason if not Attempted Safety concerns   Walk 150 Feet CARE Score 88   Walking 10 Feet on Uneven Surfaces   Reason if not Attempted Safety concerns   Walking 10 Feet on Uneven Surfaces CARE Score 88   Ambulation   Primary Mode of Locomotion Prior to Admission Walk   Assist Device Parallel Bars   Findings STS x2 parpl bars gently bockling right knee to his tolerance   Does the patient walk? 2. Yes   Wheel 50 Feet with Two Turns   Type of Assistance Needed Supervision   Wheel 50 Feet with Two Turns CARE Score 4   Wheel 150 Feet   Type of Assistance Needed Supervision   Wheel 150 Feet CARE Score 4   Wheelchair mobility   Method Left upper extremity;Right upper extremity   Does the patient use a wheelchair? 1. Yes   Type of Wheelchair Used 1. Manual   Curb or Single Stair   Reason if not Attempted Safety concerns   1 Step (Curb) CARE Score 88   4 Steps   Reason if not Attempted Safety concerns   4 Steps  CARE Score 88   12 Steps   Reason if not Attempted Safety concerns   12 Steps CARE Score 88   Therapeutic Interventions   Strengthening 1 # LAQ AP Marching 20 reps   Balance Seated EOM dynamic balance   Assessment   Treatment Assessment pt agreeable to perform skilled PTfocus on bed mobility , slide board x2 and 1x sit pivot at ModA to Stephan VC for hand placement ,Pt also perfor TE ex has following 1 # LAQ AP Marching 20 reps and in parpl bars walking x2 genlty bockling left knee to advance RLE  up to 8 steps pre walking .Pt needs rest breaks has needed to his tolerance .Pt will cont to need skilled PT to meet DC goals . Ptlike to WC propl at times, Pt back in recliner with all needs in reach and call bell.   Barriers to Discharge Inaccessible home environment;Decreased caregiver support   Plan   Progress Slow progress, decreased activity tolerance   PT Therapy Minutes   PT Time In 0830   PT Time Out 0930   PT Total Time (minutes) 60   PT Mode of treatment - Individual (minutes) 60   PT Mode of treatment - Concurrent (minutes) 0   PT Mode of treatment - Group (minutes) 0   PT Mode of treatment - Co-treat (minutes) 0   PT Mode of Treatment - Total time(minutes) 60 minutes   PT Cumulative Minutes 150   Therapy Time missed   Time missed? No

## 2025-02-01 LAB
GLUCOSE SERPL-MCNC: 167 MG/DL (ref 65–140)
GLUCOSE SERPL-MCNC: 207 MG/DL (ref 65–140)
GLUCOSE SERPL-MCNC: 232 MG/DL (ref 65–140)
GLUCOSE SERPL-MCNC: 281 MG/DL (ref 65–140)

## 2025-02-01 PROCEDURE — 97110 THERAPEUTIC EXERCISES: CPT

## 2025-02-01 PROCEDURE — 97535 SELF CARE MNGMENT TRAINING: CPT

## 2025-02-01 PROCEDURE — 82948 REAGENT STRIP/BLOOD GLUCOSE: CPT

## 2025-02-01 PROCEDURE — 97116 GAIT TRAINING THERAPY: CPT

## 2025-02-01 PROCEDURE — 97530 THERAPEUTIC ACTIVITIES: CPT

## 2025-02-01 RX ADMIN — INSULIN LISPRO 4 UNITS: 100 INJECTION, SOLUTION INTRAVENOUS; SUBCUTANEOUS at 17:02

## 2025-02-01 RX ADMIN — DOCUSATE SODIUM 100 MG: 100 CAPSULE, LIQUID FILLED ORAL at 17:02

## 2025-02-01 RX ADMIN — WARFARIN SODIUM 7.5 MG: 5 TABLET ORAL at 17:02

## 2025-02-01 RX ADMIN — Medication 2.5 MG: at 21:26

## 2025-02-01 RX ADMIN — QUETIAPINE FUMARATE 25 MG: 25 TABLET ORAL at 21:23

## 2025-02-01 RX ADMIN — DOCUSATE SODIUM 100 MG: 100 CAPSULE, LIQUID FILLED ORAL at 08:31

## 2025-02-01 RX ADMIN — INSULIN LISPRO 4 UNITS: 100 INJECTION, SOLUTION INTRAVENOUS; SUBCUTANEOUS at 11:50

## 2025-02-01 RX ADMIN — SENNOSIDES 17.2 MG: 8.6 TABLET ORAL at 21:23

## 2025-02-01 RX ADMIN — INSULIN LISPRO 2 UNITS: 100 INJECTION, SOLUTION INTRAVENOUS; SUBCUTANEOUS at 08:31

## 2025-02-01 RX ADMIN — CLOPIDOGREL BISULFATE 75 MG: 75 TABLET ORAL at 08:31

## 2025-02-01 RX ADMIN — INSULIN LISPRO 6 UNITS: 100 INJECTION, SOLUTION INTRAVENOUS; SUBCUTANEOUS at 21:23

## 2025-02-01 RX ADMIN — INSULIN LISPRO 9 UNITS: 100 INJECTION, SOLUTION INTRAVENOUS; SUBCUTANEOUS at 17:03

## 2025-02-01 RX ADMIN — INSULIN LISPRO 9 UNITS: 100 INJECTION, SOLUTION INTRAVENOUS; SUBCUTANEOUS at 11:51

## 2025-02-01 RX ADMIN — CARVEDILOL 3.12 MG: 3.12 TABLET, FILM COATED ORAL at 17:02

## 2025-02-01 RX ADMIN — ASPIRIN 81 MG: 81 TABLET, COATED ORAL at 08:31

## 2025-02-01 RX ADMIN — GABAPENTIN 300 MG: 300 CAPSULE ORAL at 08:31

## 2025-02-01 RX ADMIN — POLYETHYLENE GLYCOL 3350 17 G: 17 POWDER, FOR SOLUTION ORAL at 08:31

## 2025-02-01 RX ADMIN — INSULIN LISPRO 9 UNITS: 100 INJECTION, SOLUTION INTRAVENOUS; SUBCUTANEOUS at 08:31

## 2025-02-01 RX ADMIN — Medication 400 MG: at 08:31

## 2025-02-01 RX ADMIN — GABAPENTIN 300 MG: 300 CAPSULE ORAL at 17:02

## 2025-02-01 RX ADMIN — ATORVASTATIN CALCIUM 80 MG: 80 TABLET, FILM COATED ORAL at 17:02

## 2025-02-01 RX ADMIN — Medication 6 MG: at 21:23

## 2025-02-01 RX ADMIN — Medication 400 MG: at 17:02

## 2025-02-01 RX ADMIN — INSULIN GLARGINE 20 UNITS: 100 INJECTION, SOLUTION SUBCUTANEOUS at 21:25

## 2025-02-01 NOTE — PROGRESS NOTES
02/01/25 1300   Pain Assessment   Pain Assessment Tool 0-10   Pain Score 5   Pain Location/Orientation Orientation: Left;Location: Back   Restrictions/Precautions   Precautions Bed/chair alarms;Fall Risk;Cardiac/sternal;Pain;Supervision on toilet/commode   Weight Bearing Restrictions No   ROM Restrictions No   Cognition   Overall Cognitive Status WFL   Arousal/Participation Alert   Attention Attends with cues to redirect   Orientation Level Oriented X4   Memory Decreased short term memory   Following Commands Follows all commands and directions without difficulty   Subjective   Subjective Patient ready for PT for second session.   Lying to Sitting on Side of Bed   Type of Assistance Needed Physical assistance   Physical Assistance Level 26%-50%   Comment bed rail   Lying to Sitting on Side of Bed CARE Score 3   Bed-Chair Transfer   Type of Assistance Needed Physical assistance   Physical Assistance Level 25% or less   Comment min assist without slide board   Chair/Bed-to-Chair Transfer CARE Score 3   Walk 10 Feet   Type of Assistance Needed Physical assistance   Physical Assistance Level 26%-50%   Comment parallel bars, blocking R knee   Walk 10 Feet CARE Score 3   Ambulation   Primary Mode of Locomotion Prior to Admission Walk   Distance Walked (feet) 20 ft  (10 x2)   Assist Device Parallel Bars   Does the patient walk? 2. Yes   Wheel 50 Feet with Two Turns   Type of Assistance Needed Independent   Wheel 50 Feet with Two Turns CARE Score 6   Wheel 150 Feet   Type of Assistance Needed Independent   Wheel 150 Feet CARE Score 6   Wheelchair mobility   Does the patient use a wheelchair? 1. Yes   Type of Wheelchair Used 1. Manual   Therapeutic Interventions   Strengthening Seated in WC, 20 reps, 3 second holds for each intervention- LAQ, seated marches, seated adduction, seated abduction, seated heel raises   Flexibility Bilateral hamstring and gastroc stretch- 10 minutes   Other Transfers, ambulation, WC mobility,  functional activities per objective   Assessment   Treatment Assessment Patient tolerated session well, progressed endurance and ambulation capabilities, patient continued to improve his ambulation activities but notes that he is feeling stronger and more stability on his feet, back pain relieves with standing, progressing to higher level of endurance with ambulation today. Patient fatigued post session, can transfer without slide board at this point. Patient requires skilled PT in order to maximize function.   Problem List Decreased strength;Decreased range of motion;Decreased endurance;Impaired balance;Decreased mobility;Decreased safety awareness;Pain   Barriers to Discharge Inaccessible home environment   Plan   Treatment/Interventions ADL retraining;Functional transfer training;Therapeutic exercise;Endurance training;Patient/family training;Bed mobility;Compensatory technique education   Progress Slow progress, decreased activity tolerance   PT Therapy Minutes   PT Time In 1300   PT Time Out 1400   PT Total Time (minutes) 60   PT Mode of treatment - Individual (minutes) 60   PT Mode of treatment - Concurrent (minutes) 0   PT Mode of treatment - Group (minutes) 0   PT Mode of treatment - Co-treat (minutes) 0   PT Mode of Treatment - Total time(minutes) 60 minutes   PT Cumulative Minutes 270   Therapy Time missed   Time missed? No

## 2025-02-01 NOTE — PROGRESS NOTES
"   02/01/25 0900   Pain Assessment   Pain Assessment Tool 0-10   Pain Score 5   Pain Location/Orientation Orientation: Left;Location: Back   Pain Onset/Description Onset: Ongoing;Descriptor: Sore  (arthritis)   Patient's Stated Pain Goal No pain   Hospital Pain Intervention(s) Rest;Repositioned   Multiple Pain Sites No   Restrictions/Precautions   Precautions Bed/chair alarms;Fall Risk;Cardiac/sternal;Pain;Supervision on toilet/commode   Weight Bearing Restrictions No   ROM Restrictions No   Lifestyle   Autonomy \"I am in a good mood today now that you washed my hair\"   Oral Hygiene   Comment reports he completed denture care this AM with PCA   Shower/Bathe Self   Type of Assistance Needed Physical assistance   Physical Assistance Level 25% or less   Comment completed SB this session seated EOB. pt able to wash UB, upper thighs, G fx reach to wash feet. Elizabeth for buttkcs depiste lat weight shift   Shower/Bathe Self CARE Score 3   Tub/Shower Transfer   Reason Not Assessed Sponge Bath   Upper Body Dressing   Type of Assistance Needed Set-up / clean-up   Comment seated in wc to doff/don shirt. pt reporting he changed lifevest battery this AM. pt indep with lifevest   Upper Body Dressing CARE Score 5   Lower Body Dressing   Type of Assistance Needed Physical assistance   Physical Assistance Level 25% or less   Comment EOB pt able to perform fx reach to don pants. compelted lat weight shift for CM, Elizabeth for over hips only   Lower Body Dressing CARE Score 3   Putting On/Taking Off Footwear   Type of Assistance Needed Supervision   Comment able to complete fx reach for socks   Putting On/Taking Off Footwear CARE Score 4   Lying to Sitting on Side of Bed   Type of Assistance Needed Physical assistance   Physical Assistance Level 26%-50%   Comment HOB flat, but pt used bedrial   Lying to Sitting on Side of Bed CARE Score 3   Bed-Chair Transfer   Type of Assistance Needed Physical assistance   Physical Assistance Level 26%-50% "   Comment modA slideboard transfer   Chair/Bed-to-Chair Transfer CARE Score 3   Toileting Hygiene   Comment encouraged to complete lat weight shift to fully assess. pt declined. anticipate pt to req maxA for hygiene/CM   Reason if not Attempted Refused to perform   Toileting Hygiene CARE Score 7   Toilet Transfer   Type of Assistance Needed Physical assistance   Physical Assistance Level 26%-50%   Comment pt agreeable to trial toilet transfer. modA with use of slideboard and PFBSC over toilet   Toilet Transfer CARE Score 3   Exercise Tools   Other Exercise Tool 1 seated engaged pt in UE TE with 2# 2x15 to cont to inc fx strength. pt tolerated well with rest break. educ on cardia/sternal prec and exercises   Cognition   Overall Cognitive Status WFL   Arousal/Participation Alert   Attention Attends with cues to redirect   Orientation Level Oriented X4   Memory Decreased short term memory;Decreased recall of recent events;Decreased recall of precautions   Following Commands Follows multistep commands with increased time or repetition   Comments likes to makes jokes, curses at times. But agreeable to work with therapist   Activity Tolerance   Activity Tolerance Patient tolerated treatment well   Assessment   Treatment Assessment Engaged pt in 90mins of skilled OT services with focus on ADL retraining, toilet transfer and UE TE. BP assessed 101/62 HR86. Pt agreeable to OT session. Completed LB dressing seated EOB and UB seated in wc for back support. Pt req Dom for pants mildly over hips. Pt agreeable to trial toilet transfer, but declined toilet hygiene, anticipate maxA. therpapist communicated transfer training to RN staff. Cont OT POC with focus on STS, transfers, activity tolerance, lat weigt shift to inc fx performance and indep. Goals is for pt to be indep as spouse works during the day.   Prognosis Good   Problem List Decreased strength;Decreased range of motion;Decreased endurance;Impaired balance;Decreased  mobility;Decreased safety awareness;Pain   Barriers to Discharge Inaccessible home environment;Decreased caregiver support   Plan   Treatment/Interventions ADL retraining;Functional transfer training;Therapeutic exercise;Endurance training;Patient/family training;Bed mobility;Compensatory technique education   Progress Slow progress, decreased activity tolerance  (pain, LE weakness)   Discharge Recommendation   Rehab Resource Intensity Level, OT   (pending progress)   Equipment Recommended   (pending progress)   OT Therapy Minutes   OT Time In 0900   OT Time Out 1030   OT Total Time (minutes) 90   OT Mode of treatment - Individual (minutes) 90   OT Mode of treatment - Concurrent (minutes) 0   OT Mode of treatment - Group (minutes) 0   OT Mode of treatment - Co-treat (minutes) 0   OT Mode of Treatment - Total time(minutes) 90 minutes   OT Cumulative Minutes 270   Therapy Time missed   Time missed? No

## 2025-02-01 NOTE — PROGRESS NOTES
02/01/25 1056   Pain Assessment   Pain Assessment Tool 0-10   Pain Score 5   Pain Location/Orientation Orientation: Left;Location: Back   Restrictions/Precautions   Precautions Bed/chair alarms;Fall Risk;Cardiac/sternal;Pain;Supervision on toilet/commode   Weight Bearing Restrictions No   ROM Restrictions No   Cognition   Overall Cognitive Status WFL   Arousal/Participation Alert   Attention Attends with cues to redirect   Orientation Level Oriented X4   Memory Decreased short term memory;Decreased recall of recent events;Decreased recall of precautions   Following Commands Follows all commands and directions without difficulty   Subjective   Subjective Patient ready for PT   Bed-Chair Transfer   Type of Assistance Needed Physical assistance   Physical Assistance Level 26%-50%   Comment mod assist slide board transfer   Chair/Bed-to-Chair Transfer CARE Score 3   Therapeutic Interventions   Strengthening Seated in WC, 20 reps, 3 second holds for each intervention- LAQ, seated marches, seated adduction, seated abduction, seated heel raises   Flexibility Bilateral hamstring and gastroc stretch- 10 minutes   Other Transfers, functional activities, and mobility per objective   Assessment   Treatment Assessment Patient tolerated 30 minute session well today, patient requested to perform seated exercises due to back pain as well as fatigue from his OT session, patient to perform more endurance activities in next session. Patient requires skilled PT in order to maximize function.   Problem List Decreased strength;Decreased range of motion;Decreased endurance;Impaired balance;Decreased mobility;Decreased safety awareness;Pain   Barriers to Discharge Inaccessible home environment   Plan   Treatment/Interventions ADL retraining;Functional transfer training;Therapeutic exercise;Endurance training;Patient/family training;Bed mobility;Compensatory technique education   Progress Slow progress, decreased activity tolerance   PT  Therapy Minutes   PT Time In 1100   PT Time Out 1130   PT Total Time (minutes) 30   PT Mode of treatment - Individual (minutes) 30   PT Mode of treatment - Concurrent (minutes) 0   PT Mode of treatment - Group (minutes) 0   PT Mode of treatment - Co-treat (minutes) 0   PT Mode of Treatment - Total time(minutes) 30 minutes   PT Cumulative Minutes 210   Therapy Time missed   Time missed? No

## 2025-02-01 NOTE — [facility]CC NURSING
Joseph Aguilar is a 69 y.o. male w/ PMHx of HTN, HLD, T2DM, OA who initially presented to [facility] on 1/17 for chest pain, shortness of breath, nausea and vomiting. During initial eval, she was found to have an anterior wall STEMI and Interventional cardiology was alerted. Patient was taken to the cath lab where patient bacame more hypotensive requiring pressor support and intra-aortic balloon pump insertion. Patient received LAD, left main, stent however circumflex artery was unable to be intervened on. Patient was transferred to ICU for cardiogenic shock s/p STEMI. Noted to have LV thrombus and started on heparin gtt and continued on brilinta and ASA. He noted to have some transaminitis so Statin was on hold. TTE w/ LVEF 25%, severe global hypokinesis w/ apical and distal septal near akinesis, mod siz thrombus at apex. On 1/20 he was noted to have LE bleeding w/ hematoma formation from cath site rq manual pressure and femostop. Vascular surgery was consulted . R femoral site was closed by Dr. Brooke.  IABP was d/c and heparin was held. He received 1U PRBC. Heparin was restarted and coumadin was started w/ goal 2-3. INR 2.22 1/29 and heparin was d/c.   Joseph Aguilar was admitted to the [facility] on 01/29/25       Pain managed with gabapentin 300mg BID, tylenol 650 mg q6hrs prn, bengay cream 4x daily prn for joints, and oxy 2.5 mg q8hrs prn. DM managed with Lantus 20u at HS and lispro 6u with meals, SSI, QID accuchecks, and cons. carb diet. Hypomagnesemia managed with magnesium oxide 400mg BID. Anterior STEMI managed with ASA 81mg daily, Plavix 75mg daily, Coumadin 7.5, Lipitor 80mg daily, and Coreg 3.125mg BID. Hx of HTN managed with Coreg 3.125mg BID and lisinopril 2.5mg daily. Insomnia managed with melatonin 6 mg and Seroquel 25mg at bedtime. Bowel regimen managed with senokot 17.2 mg daily, miralax 17g packet daily, colace 100mg BID and Dulcolax Rectal Suppository  PRN.    This week we will monitor vital signs and lab values.  We will manage pain with the above orders so that the patient can participate in his therapy sessions to his optimal abilities.  We will teach the pt how to conserve energy so that he may perform ADL's independently as much as he can.  We will educate the pt on the importance of repositioning and off-loading pressure to help lower the risk of skin breakdown.  We will prevent falls by keeping personel items and call bell with in reach, we will also maintain hourly rounding.

## 2025-02-01 NOTE — PLAN OF CARE
Problem: SAFETY ADULT  Goal: Patient will remain free of falls  Description: INTERVENTIONS:  - Educate patient/family on patient safety including physical limitations  - Instruct patient to call for assistance with activity   - Consult OT/PT to assist with strengthening/mobility   - Keep Call bell within reach  - Keep bed low and locked with side rails adjusted as appropriate  - Keep care items and personal belongings within reach  - Initiate and maintain comfort rounds  - Make Fall Risk Sign visible to staff  - Apply yellow socks and bracelet for high fall risk patients  - Consider moving patient to room near nurses station  Outcome: Progressing  Goal: Maintain or return to baseline ADL function  Description: INTERVENTIONS:  -  Assess patient's ability to carry out ADLs; assess patient's baseline for ADL function and identify physical deficits which impact ability to perform ADLs (bathing, care of mouth/teeth, toileting, grooming, dressing, etc.)  - Assess/evaluate cause of self-care deficits   - Assess range of motion  - Assess patient's mobility; develop plan if impaired  - Assess patient's need for assistive devices and provide as appropriate  - Encourage maximum independence but intervene and supervise when necessary  - Involve family in performance of ADLs  - Assess for home care needs following discharge   - Consider OT consult to assist with ADL evaluation and planning for discharge  - Provide patient education as appropriate  Outcome: Progressing  Goal: Maintains/Returns to pre admission functional level  Description: INTERVENTIONS:  - Perform AM-PAC 6 Click Basic Mobility/ Daily Activity assessment daily.  - Set and communicate daily mobility goal to care team and patient/family/caregiver.   - Collaborate with rehabilitation services on mobility goals if consulted  - Out of bed for toileting  - Record patient progress and toleration of activity level   Outcome: Progressing     Problem: METABOLIC, FLUID AND  ELECTROLYTES - ADULT  Goal: Glucose maintained within target range  Description: INTERVENTIONS:  - Monitor Blood Glucose as ordered  - Assess for signs and symptoms of hyperglycemia and hypoglycemia  - Administer ordered medications to maintain glucose within target range  - Assess nutritional intake and initiate nutrition service referral as needed  Outcome: Progressing     Problem: PAIN - ADULT  Goal: Verbalizes/displays adequate comfort level or baseline comfort level  Description: Interventions:  - Encourage patient to monitor pain and request assistance  - Assess pain using appropriate pain scale  - Administer analgesics based on type and severity of pain and evaluate response  - Implement non-pharmacological measures as appropriate and evaluate response  - Consider cultural and social influences on pain and pain management  - Notify physician/advanced practitioner if interventions unsuccessful or patient reports new pain  Outcome: Progressing     Problem: Knowledge Deficit  Goal: Patient/family/caregiver demonstrates understanding of disease process, treatment plan, medications, and discharge instructions  Description: Complete learning assessment and assess knowledge base.  Interventions:  - Provide teaching at level of understanding  - Provide teaching via preferred learning methods  Outcome: Progressing     Problem: DISCHARGE PLANNING  Goal: Discharge to home or other facility with appropriate resources  Description: INTERVENTIONS:  - Identify barriers to discharge w/patient and caregiver  - Arrange for needed discharge resources and transportation as appropriate  - Identify discharge learning needs (meds, wound care, etc.)  - Arrange for interpretive services to assist at discharge as needed  - Refer to Case Management Department for coordinating discharge planning if the patient needs post-hospital services based on physician/advanced practitioner order or complex needs related to functional status,  cognitive ability, or social support system  Outcome: Progressing

## 2025-02-02 LAB
GLUCOSE SERPL-MCNC: 154 MG/DL (ref 65–140)
GLUCOSE SERPL-MCNC: 173 MG/DL (ref 65–140)
GLUCOSE SERPL-MCNC: 209 MG/DL (ref 65–140)
GLUCOSE SERPL-MCNC: 209 MG/DL (ref 65–140)

## 2025-02-02 PROCEDURE — 82948 REAGENT STRIP/BLOOD GLUCOSE: CPT

## 2025-02-02 PROCEDURE — 97530 THERAPEUTIC ACTIVITIES: CPT

## 2025-02-02 PROCEDURE — 97116 GAIT TRAINING THERAPY: CPT

## 2025-02-02 RX ADMIN — SENNOSIDES 17.2 MG: 8.6 TABLET ORAL at 21:31

## 2025-02-02 RX ADMIN — INSULIN LISPRO 9 UNITS: 100 INJECTION, SOLUTION INTRAVENOUS; SUBCUTANEOUS at 12:12

## 2025-02-02 RX ADMIN — CARVEDILOL 3.12 MG: 3.12 TABLET, FILM COATED ORAL at 17:10

## 2025-02-02 RX ADMIN — QUETIAPINE FUMARATE 25 MG: 25 TABLET ORAL at 21:31

## 2025-02-02 RX ADMIN — INSULIN GLARGINE 20 UNITS: 100 INJECTION, SOLUTION SUBCUTANEOUS at 21:36

## 2025-02-02 RX ADMIN — Medication 400 MG: at 09:02

## 2025-02-02 RX ADMIN — Medication 400 MG: at 17:10

## 2025-02-02 RX ADMIN — DOCUSATE SODIUM 100 MG: 100 CAPSULE, LIQUID FILLED ORAL at 17:10

## 2025-02-02 RX ADMIN — ASPIRIN 81 MG: 81 TABLET, COATED ORAL at 09:02

## 2025-02-02 RX ADMIN — Medication 2.5 MG: at 21:31

## 2025-02-02 RX ADMIN — WARFARIN SODIUM 7.5 MG: 5 TABLET ORAL at 17:10

## 2025-02-02 RX ADMIN — INSULIN LISPRO 4 UNITS: 100 INJECTION, SOLUTION INTRAVENOUS; SUBCUTANEOUS at 21:31

## 2025-02-02 RX ADMIN — GABAPENTIN 300 MG: 300 CAPSULE ORAL at 17:10

## 2025-02-02 RX ADMIN — DOCUSATE SODIUM 100 MG: 100 CAPSULE, LIQUID FILLED ORAL at 09:02

## 2025-02-02 RX ADMIN — GABAPENTIN 300 MG: 300 CAPSULE ORAL at 09:02

## 2025-02-02 RX ADMIN — INSULIN LISPRO 2 UNITS: 100 INJECTION, SOLUTION INTRAVENOUS; SUBCUTANEOUS at 09:03

## 2025-02-02 RX ADMIN — CLOPIDOGREL BISULFATE 75 MG: 75 TABLET ORAL at 09:02

## 2025-02-02 RX ADMIN — INSULIN LISPRO 2 UNITS: 100 INJECTION, SOLUTION INTRAVENOUS; SUBCUTANEOUS at 17:11

## 2025-02-02 RX ADMIN — INSULIN LISPRO 9 UNITS: 100 INJECTION, SOLUTION INTRAVENOUS; SUBCUTANEOUS at 09:02

## 2025-02-02 RX ADMIN — Medication 6 MG: at 21:31

## 2025-02-02 RX ADMIN — POLYETHYLENE GLYCOL 3350 17 G: 17 POWDER, FOR SOLUTION ORAL at 09:03

## 2025-02-02 RX ADMIN — INSULIN LISPRO 4 UNITS: 100 INJECTION, SOLUTION INTRAVENOUS; SUBCUTANEOUS at 12:12

## 2025-02-02 RX ADMIN — ATORVASTATIN CALCIUM 80 MG: 80 TABLET, FILM COATED ORAL at 17:10

## 2025-02-02 RX ADMIN — INSULIN LISPRO 9 UNITS: 100 INJECTION, SOLUTION INTRAVENOUS; SUBCUTANEOUS at 17:11

## 2025-02-02 NOTE — PROGRESS NOTES
02/02/25 1230   Pain Assessment   Pain Assessment Tool 0-10   Pain Score No Pain   Cognition   Overall Cognitive Status WFL   Arousal/Participation Alert;Cooperative   Attention Within functional limits   Orientation Level Oriented X4   Memory Within functional limits   Following Commands Follows all commands and directions without difficulty   Subjective   Subjective Patient ready for PT.   Sit to Lying   Type of Assistance Needed Supervision;Adaptive equipment   Physical Assistance Level No physical assistance   Comment Bed rails   Sit to Lying CARE Score 4   Lying to Sitting on Side of Bed   Type of Assistance Needed Supervision;Adaptive equipment   Physical Assistance Level No physical assistance   Comment bed rails   Lying to Sitting on Side of Bed CARE Score 4   Sit to Stand   Type of Assistance Needed Supervision;Adaptive equipment   Physical Assistance Level No physical assistance   Comment parallel bars   Sit to Stand CARE Score 4   Bed-Chair Transfer   Type of Assistance Needed Supervision   Physical Assistance Level No physical assistance   Comment sit pivot to WC   Chair/Bed-to-Chair Transfer CARE Score 4   Walk 10 Feet   Type of Assistance Needed Supervision;Adaptive equipment   Physical Assistance Level No physical assistance   Comment supervision with parallel bars   Walk 10 Feet CARE Score 4   Walk 50 Feet with Two Turns   Type of Assistance Needed Supervision;Adaptive equipment   Physical Assistance Level No physical assistance   Comment supervision with parallel bars   Walk 50 Feet with Two Turns CARE Score 4   Ambulation   Primary Mode of Locomotion Prior to Admission Walk   Distance Walked (feet) 50 ft  (10 x5)   Assist Device Parallel Bars   Does the patient walk? 2. Yes   Wheel 50 Feet with Two Turns   Type of Assistance Needed Independent   Wheel 50 Feet with Two Turns CARE Score 6   Wheel 150 Feet   Type of Assistance Needed Independent   Wheel 150 Feet CARE Score 6   Wheelchair mobility    Method Left upper extremity;Right upper extremity   Distance Level Surface (feet) 1000 ft   Does the patient use a wheelchair? 1. Yes   Type of Wheelchair Used 1. Manual   Therapeutic Interventions   Other Ambulation, WC mobility, transfers, and functional activities per objective   Assessment   Treatment Assessment Patient tolerated treatment well today, patient progressing with endurance as well as his function with transfer activities, patient challenged with endurance but progressing with sit pivot transfers, supervision level for most activities today, patient demonstrating increases in endurance with his function and ambulation today, patient fatigued post appointment. Patient requires skilled PT to maximize function.   Family/Caregiver Present no   PT Family training done with: no   Problem List Decreased strength;Decreased range of motion;Decreased endurance;Impaired balance;Decreased mobility;Decreased safety awareness;Pain   Barriers to Discharge Inaccessible home environment   Plan   Treatment/Interventions ADL retraining;Functional transfer training;Therapeutic exercise;Endurance training;Patient/family training;Bed mobility;Compensatory technique education   Progress Progressing toward goals   PT Therapy Minutes   PT Time In 1230   PT Time Out 1330   PT Total Time (minutes) 60   PT Mode of treatment - Individual (minutes) 60   PT Mode of treatment - Concurrent (minutes) 0   PT Mode of treatment - Group (minutes) 0   PT Mode of treatment - Co-treat (minutes) 0   PT Mode of Treatment - Total time(minutes) 60 minutes   PT Cumulative Minutes 330   Therapy Time missed   Time missed? No

## 2025-02-03 LAB
ANION GAP SERPL CALCULATED.3IONS-SCNC: 9 MMOL/L (ref 4–13)
BASOPHILS # BLD AUTO: 0.03 THOUSANDS/ÂΜL (ref 0–0.1)
BASOPHILS NFR BLD AUTO: 0 % (ref 0–1)
BUN SERPL-MCNC: 15 MG/DL (ref 5–25)
CALCIUM SERPL-MCNC: 9.3 MG/DL (ref 8.4–10.2)
CHLORIDE SERPL-SCNC: 102 MMOL/L (ref 96–108)
CO2 SERPL-SCNC: 28 MMOL/L (ref 21–32)
CREAT SERPL-MCNC: 0.9 MG/DL (ref 0.6–1.3)
EOSINOPHIL # BLD AUTO: 0.34 THOUSAND/ÂΜL (ref 0–0.61)
EOSINOPHIL NFR BLD AUTO: 5 % (ref 0–6)
ERYTHROCYTE [DISTWIDTH] IN BLOOD BY AUTOMATED COUNT: 14.5 % (ref 11.6–15.1)
GFR SERPL CREATININE-BSD FRML MDRD: 88 ML/MIN/1.73SQ M
GLUCOSE SERPL-MCNC: 165 MG/DL (ref 65–140)
GLUCOSE SERPL-MCNC: 175 MG/DL (ref 65–140)
GLUCOSE SERPL-MCNC: 181 MG/DL (ref 65–140)
GLUCOSE SERPL-MCNC: 224 MG/DL (ref 65–140)
GLUCOSE SERPL-MCNC: 236 MG/DL (ref 65–140)
HCT VFR BLD AUTO: 32.5 % (ref 36.5–49.3)
HGB BLD-MCNC: 10.9 G/DL (ref 12–17)
IMM GRANULOCYTES # BLD AUTO: 0.04 THOUSAND/UL (ref 0–0.2)
IMM GRANULOCYTES NFR BLD AUTO: 1 % (ref 0–2)
INR PPP: 3.16 (ref 0.85–1.19)
LYMPHOCYTES # BLD AUTO: 1.39 THOUSANDS/ÂΜL (ref 0.6–4.47)
LYMPHOCYTES NFR BLD AUTO: 20 % (ref 14–44)
MAGNESIUM SERPL-MCNC: 1.7 MG/DL (ref 1.9–2.7)
MCH RBC QN AUTO: 31.8 PG (ref 26.8–34.3)
MCHC RBC AUTO-ENTMCNC: 33.5 G/DL (ref 31.4–37.4)
MCV RBC AUTO: 95 FL (ref 82–98)
MONOCYTES # BLD AUTO: 0.75 THOUSAND/ÂΜL (ref 0.17–1.22)
MONOCYTES NFR BLD AUTO: 11 % (ref 4–12)
NEUTROPHILS # BLD AUTO: 4.57 THOUSANDS/ÂΜL (ref 1.85–7.62)
NEUTS SEG NFR BLD AUTO: 63 % (ref 43–75)
NRBC BLD AUTO-RTO: 0 /100 WBCS
PLATELET # BLD AUTO: 288 THOUSANDS/UL (ref 149–390)
PMV BLD AUTO: 9.2 FL (ref 8.9–12.7)
POTASSIUM SERPL-SCNC: 3.9 MMOL/L (ref 3.5–5.3)
PROTHROMBIN TIME: 32.1 SECONDS (ref 12.3–15)
RBC # BLD AUTO: 3.43 MILLION/UL (ref 3.88–5.62)
SODIUM SERPL-SCNC: 139 MMOL/L (ref 135–147)
WBC # BLD AUTO: 7.12 THOUSAND/UL (ref 4.31–10.16)

## 2025-02-03 PROCEDURE — 97530 THERAPEUTIC ACTIVITIES: CPT

## 2025-02-03 PROCEDURE — 80048 BASIC METABOLIC PNL TOTAL CA: CPT | Performed by: NURSE PRACTITIONER

## 2025-02-03 PROCEDURE — 97110 THERAPEUTIC EXERCISES: CPT

## 2025-02-03 PROCEDURE — 82948 REAGENT STRIP/BLOOD GLUCOSE: CPT

## 2025-02-03 PROCEDURE — 97116 GAIT TRAINING THERAPY: CPT

## 2025-02-03 PROCEDURE — 83735 ASSAY OF MAGNESIUM: CPT | Performed by: NURSE PRACTITIONER

## 2025-02-03 PROCEDURE — 97535 SELF CARE MNGMENT TRAINING: CPT

## 2025-02-03 PROCEDURE — 85610 PROTHROMBIN TIME: CPT | Performed by: NURSE PRACTITIONER

## 2025-02-03 PROCEDURE — 85025 COMPLETE CBC W/AUTO DIFF WBC: CPT | Performed by: NURSE PRACTITIONER

## 2025-02-03 PROCEDURE — 99233 SBSQ HOSP IP/OBS HIGH 50: CPT | Performed by: FAMILY MEDICINE

## 2025-02-03 PROCEDURE — 99232 SBSQ HOSP IP/OBS MODERATE 35: CPT | Performed by: PHYSICAL MEDICINE & REHABILITATION

## 2025-02-03 RX ORDER — WARFARIN SODIUM 5 MG/1
5 TABLET ORAL
Status: DISCONTINUED | OUTPATIENT
Start: 2025-02-03 | End: 2025-02-03

## 2025-02-03 RX ORDER — LANOLIN ALCOHOL/MO/W.PET/CERES
800 CREAM (GRAM) TOPICAL 2 TIMES DAILY
Status: DISCONTINUED | OUTPATIENT
Start: 2025-02-03 | End: 2025-02-10 | Stop reason: HOSPADM

## 2025-02-03 RX ORDER — POLYETHYLENE GLYCOL 3350 17 G/17G
17 POWDER, FOR SOLUTION ORAL 2 TIMES DAILY
Status: DISCONTINUED | OUTPATIENT
Start: 2025-02-03 | End: 2025-02-10 | Stop reason: HOSPADM

## 2025-02-03 RX ORDER — INSULIN LISPRO 100 [IU]/ML
6 INJECTION, SOLUTION INTRAVENOUS; SUBCUTANEOUS
Status: DISCONTINUED | OUTPATIENT
Start: 2025-02-03 | End: 2025-02-05

## 2025-02-03 RX ORDER — LACTULOSE 10 G/15ML
20 SOLUTION ORAL DAILY
Status: COMPLETED | OUTPATIENT
Start: 2025-02-03 | End: 2025-02-03

## 2025-02-03 RX ADMIN — SENNOSIDES 17.2 MG: 8.6 TABLET ORAL at 21:28

## 2025-02-03 RX ADMIN — Medication 400 MG: at 08:34

## 2025-02-03 RX ADMIN — GABAPENTIN 300 MG: 300 CAPSULE ORAL at 08:34

## 2025-02-03 RX ADMIN — INSULIN LISPRO 4 UNITS: 100 INJECTION, SOLUTION INTRAVENOUS; SUBCUTANEOUS at 21:29

## 2025-02-03 RX ADMIN — INSULIN LISPRO 9 UNITS: 100 INJECTION, SOLUTION INTRAVENOUS; SUBCUTANEOUS at 08:34

## 2025-02-03 RX ADMIN — INSULIN LISPRO 6 UNITS: 100 INJECTION, SOLUTION INTRAVENOUS; SUBCUTANEOUS at 17:33

## 2025-02-03 RX ADMIN — Medication 800 MG: at 17:32

## 2025-02-03 RX ADMIN — CARVEDILOL 3.12 MG: 3.12 TABLET, FILM COATED ORAL at 08:40

## 2025-02-03 RX ADMIN — CLOPIDOGREL BISULFATE 75 MG: 75 TABLET ORAL at 08:34

## 2025-02-03 RX ADMIN — GABAPENTIN 300 MG: 300 CAPSULE ORAL at 17:32

## 2025-02-03 RX ADMIN — POLYETHYLENE GLYCOL 3350 17 G: 17 POWDER, FOR SOLUTION ORAL at 17:32

## 2025-02-03 RX ADMIN — LACTULOSE 20 G: 20 SOLUTION ORAL at 11:49

## 2025-02-03 RX ADMIN — INSULIN LISPRO 2 UNITS: 100 INJECTION, SOLUTION INTRAVENOUS; SUBCUTANEOUS at 08:35

## 2025-02-03 RX ADMIN — ASPIRIN 81 MG: 81 TABLET, COATED ORAL at 08:34

## 2025-02-03 RX ADMIN — INSULIN LISPRO 4 UNITS: 100 INJECTION, SOLUTION INTRAVENOUS; SUBCUTANEOUS at 11:49

## 2025-02-03 RX ADMIN — INSULIN GLARGINE 20 UNITS: 100 INJECTION, SOLUTION SUBCUTANEOUS at 21:32

## 2025-02-03 RX ADMIN — METFORMIN HYDROCHLORIDE 500 MG: 500 TABLET, FILM COATED ORAL at 09:23

## 2025-02-03 RX ADMIN — ATORVASTATIN CALCIUM 80 MG: 80 TABLET, FILM COATED ORAL at 17:32

## 2025-02-03 RX ADMIN — Medication 2.5 MG: at 19:35

## 2025-02-03 RX ADMIN — DOCUSATE SODIUM 100 MG: 100 CAPSULE, LIQUID FILLED ORAL at 08:34

## 2025-02-03 RX ADMIN — DOCUSATE SODIUM 100 MG: 100 CAPSULE, LIQUID FILLED ORAL at 17:32

## 2025-02-03 RX ADMIN — POLYETHYLENE GLYCOL 3350 17 G: 17 POWDER, FOR SOLUTION ORAL at 08:34

## 2025-02-03 RX ADMIN — METFORMIN HYDROCHLORIDE 500 MG: 500 TABLET, FILM COATED ORAL at 17:42

## 2025-02-03 RX ADMIN — INSULIN LISPRO 2 UNITS: 100 INJECTION, SOLUTION INTRAVENOUS; SUBCUTANEOUS at 17:33

## 2025-02-03 RX ADMIN — QUETIAPINE FUMARATE 25 MG: 25 TABLET ORAL at 21:28

## 2025-02-03 RX ADMIN — INSULIN LISPRO 6 UNITS: 100 INJECTION, SOLUTION INTRAVENOUS; SUBCUTANEOUS at 11:49

## 2025-02-03 RX ADMIN — Medication 6 MG: at 21:28

## 2025-02-03 NOTE — ASSESSMENT & PLAN NOTE
Lab Results   Component Value Date    HGBA1C 8.2 (H) 01/18/2025       Recent Labs     02/02/25  1125 02/02/25  1558 02/02/25  2106 02/03/25  0526   POCGLU 209* 173* 209* 181*   A1c 8.2% on admission  Lantus 20u at HS and lispro 8u with meals  IM starting metformin 500 BID today  Monitor glucose levels  Cw diabetic diet    Blood Sugar Average: Last 72 hrs:  (P) [id number]

## 2025-02-03 NOTE — PROGRESS NOTES
02/03/25 1300   Pain Assessment   Pain Assessment Tool 0-10   Pain Score 7   Pain Location/Orientation Location: Generalized   Pain Onset/Description Onset: Ongoing   Patient's Stated Pain Goal No pain   Multiple Pain Sites No   Restrictions/Precautions   Precautions Bed/chair alarms;Fall Risk;Pain;Supervision on toilet/commode   Weight Bearing Restrictions No   ROM Restrictions No   Lower Body Dressing   Type of Assistance Needed Physical assistance   Physical Assistance Level 25% or less   Comment pt requesting to change LB clothing. pt ablet o doff pants. Able to perofrm fx reacha nd don pants. able to perform chair pushup and TA for CM   Lower Body Dressing CARE Score 3   Putting On/Taking Off Footwear   Type of Assistance Needed Supervision   Putting On/Taking Off Footwear CARE Score 4   Commmunity Re-entry   Community Re-entry at this time pt is limited in standing/mobility. engaged pt in wc mobility this session around hospital. pt able to complete at SUP level.   Cognition   Overall Cognitive Status WFL   Arousal/Participation Alert;Responsive   Attention Within functional limits   Orientation Level Oriented X4   Memory Within functional limits   Following Commands Follows all commands and directions without difficulty   Activity Tolerance   Activity Tolerance Patient limited by pain   Assessment   Treatment Assessment Engaged in brief 30mins of skilled OT services with focus on wc mobility and LB dressing. Pt cont to req Dom for LB dressing. Pt cont to be limited in pain and standing tolerance/balance. Pt can cont to benefit from further skilled OT services with focus on activity tolerance, balance, STS/SPT to inc fx performance and indep.   Prognosis Fair   Problem List Decreased strength;Decreased range of motion;Decreased endurance;Impaired balance;Decreased mobility;Decreased coordination;Impaired judgement;Decreased safety awareness;Impaired sensation;Impaired tone;Pain   Barriers to Discharge  Inaccessible home environment;Decreased caregiver support   Plan   Treatment/Interventions ADL retraining;Functional transfer training;Therapeutic exercise;Endurance training;Patient/family training;Bed mobility;Compensatory technique education   Progress Progressing toward goals   Discharge Recommendation   Rehab Resource Intensity Level, OT   (pending progress)   OT Therapy Minutes   OT Time In 1300   OT Time Out 1330   OT Total Time (minutes) 30   OT Mode of treatment - Individual (minutes) 30   OT Mode of treatment - Concurrent (minutes) 0   OT Mode of treatment - Group (minutes) 0   OT Mode of treatment - Co-treat (minutes) 0   OT Mode of Treatment - Total time(minutes) 30 minutes   OT Cumulative Minutes 360   Therapy Time missed   Time missed? No

## 2025-02-03 NOTE — ASSESSMENT & PLAN NOTE
"- pt reports he usually has a BM every 2-3 days but feels like pain meds are \"backing him up\"  -PRN dulcolax; add senna, colase and miralax as patient taking opiods   - monitor BM  Miralax changed to 2x a day standing and one dose of lactulose  "

## 2025-02-03 NOTE — ASSESSMENT & PLAN NOTE
Hgb currently stable at 10.9.  Developed large R groin hematoma s/p balloon pump.  No active s/s of bleeding.  Iron panel on 1/30: Iron sat 23%, TIBC 185, Iron 43, and Ferritin 224.  No need for iron supplementation at this time.  Continue to trend routine CBC.

## 2025-02-03 NOTE — ASSESSMENT & PLAN NOTE
Cw cipro goal 7-10 days   Per nursing patient reports splashing urinal at his face   D/c antibiotics due to refusal

## 2025-02-03 NOTE — PLAN OF CARE
Problem: CARDIOVASCULAR - ADULT  Goal: Maintains optimal cardiac output and hemodynamic stability  Description: INTERVENTIONS:  - Monitor I/O, vital signs and rhythm  - Monitor for S/S and trends of decreased cardiac output  - Administer and titrate ordered vasoactive medications to optimize hemodynamic stability  - Assess quality of pulses, skin color and temperature  - Assess for signs of decreased coronary artery perfusion  - Instruct patient to report change in severity of symptoms  Outcome: Progressing     Problem: PAIN - ADULT  Goal: Verbalizes/displays adequate comfort level or baseline comfort level  Description: Interventions:  - Encourage patient to monitor pain and request assistance  - Assess pain using appropriate pain scale  - Administer analgesics based on type and severity of pain and evaluate response  - Implement non-pharmacological measures as appropriate and evaluate response  - Consider cultural and social influences on pain and pain management  - Notify physician/advanced practitioner if interventions unsuccessful or patient reports new pain  Outcome: Progressing     Problem: SAFETY ADULT  Goal: Patient will remain free of falls  Description: INTERVENTIONS:  - Educate patient/family on patient safety including physical limitations  - Instruct patient to call for assistance with activity   - Consult OT/PT to assist with strengthening/mobility   - Keep Call bell within reach  - Keep bed low and locked with side rails adjusted as appropriate  - Keep care items and personal belongings within reach  - Initiate and maintain comfort rounds  - Make Fall Risk Sign visible to staff  - Offer Toileting every 2 Hours, in advance of need  - Initiate/Maintain bed and chair alarm  - Obtain necessary fall risk management equipment: call bell within reach, side rails up and functioning, bed in the lowest position.   - Apply yellow socks and bracelet for high fall risk patients  - Consider moving patient to  room near nurses station  Outcome: Progressing

## 2025-02-03 NOTE — PROGRESS NOTES
Progress Note - Internal Medicine   Name: Joseph Aguilar 69 y.o. male I MRN: [id number]  Unit/Bed#: -02 I Date of Admission: 1/29/2025   Date of Service: 2/3/2025 I Hospital Day: 5    Assessment & Plan  Type 2 diabetes mellitus without complication, without long-term current use of insulin (HCC)  Lab Results   Component Value Date    HGBA1C 8.2 (H) 01/18/2025       Recent Labs     02/02/25  1125 02/02/25  1558 02/02/25  2106 02/03/25  0526   POCGLU 209* 173* 209* 181*       Blood Sugar Average: Last 72 hrs:  (P) [id number]  Last A1c was 8.2 on 1/18/2025.  Home regimen: Lantus 15u at HS, glipizide 5mg daily, metformin 1000mg BID, and Jardiance 12.5mg daily.  Currently receiving Lantus 20u at HS and lispro 9u with meals.  Decrease lispro to 6u with meals today and start on metformin 500mg BID.  Continue SSI, QID accuchecks, and cons. carb diet.  Anemia  Hgb currently stable at 10.9.  Developed large R groin hematoma s/p balloon pump.  No active s/s of bleeding.  Iron panel on 1/30: Iron sat 23%, TIBC 185, Iron 43, and Ferritin 224.  No need for iron supplementation at this time.  Continue to trend routine CBC.  Hypomagnesemia  Mg 1.7 on 2/3.  Increase magnesium oxide to 800mg BID today.  Continue to trend routine Mg.  Anterior STEMI s/p PCI with CHYNA to LM-ostial LAD and mLAD with IABP;  of LCx (1/17/2024)  Presented on 1/17 with near syncopal episode with jaw pain and hypotension.  NSTEMI alert - cardiac cath on 1/17 with CHYNA to LAD and L main with IABP support.  IABP discontinued on 1/20.  Continue ASA 81mg daily, Plavix 75mg daily, Coumadin, Lipitor 80mg daily, and Coreg 3.125mg BID.  Recommend establishing with Cardiology on discharge.  ICM with EF 25%  ECHO on 1/19 showed EF 25%, systolic function severely reduced, global hypokinesis.  Currently wearing Lifevest.  Continue Coreg 3.125mg BID.  Consider restarting home Jardiance.  Recommend establishing care with Cardiology as outpatient.  LV  (left ventricular) mural thrombus  ECHO on 1/19 showed possible moderately sized thrombus at the apex.  Started on Coumadin in acute setting.  Currently on Coumadin 7.5mg daily.  INR 3.16 today.  Change Coumadin to 5mg on Mon/Fri and 7.5mg all other days.  Repeat PT/INR on Wednesday.  Follow-up with Cardiology as outpatient.  Hematoma of right lower extremity  Developed R groin hematoma at IABP insertion site.  IR suite on 1/20 for closure of R femoral artery puncture site and control of expanding hematoma performed by Dr. Brooke (Vascular).  Monitor site daily for s/s of reoccurring bleeding.  Continue to trend H&H.  Conjunctivitis  Continue ciprofloxacin to L eye for 7-10 days.  Has been declining drops.  Osteoarthritis of multiple joints  Multiple joints - shoulders, knees, back.  Recently had steroid injection to B/L shoulders on 1/6.  Follows with Dr. Egan (Orthopedics) as outpatient.  Considerations with therapies.  Continue current pain regimen.   History of hypertension  Newly started on lisinopril 2.5mg daily in acute setting.  Continue Coreg 3.125mg BID.  Monitor BP with routine VS.  Ambulatory dysfunction  Deconditioned s/p STEMI.  Primary team following.  Continue with PT/OT.    VTE Pharmacologic Prophylaxis:   Pharmacologic: Warfarin (Coumadin)  Mechanical VTE Prophylaxis in Place: Yes - sequential compression devices.     Current Length of Stay: 5 day(s)    Current Patient Status: Inpatient Rehab     Discharge Plan: As per primary team.    Code Status: Level 1 - Full Code    Subjective:   Pt examined while pt sitting in WC in pt room.  Currently has complaints of constipation.  Required disimpaction the first day he had been admitted.  Agreeable to having bowel regimen adjusted.  Denies any lightheadedness, SOB, palpitations, or CP.  Denies any abdominal pain.  Denies any R groin pain.  Does have chronic shoulder pain, which worsens later in the day.    Objective:     Vitals:   Temp (24hrs),  Av.8 °F (36.6 °C), Min:97 °F (36.1 °C), Max:99.4 °F (37.4 °C)    Temp:  [97 °F (36.1 °C)-99.4 °F (37.4 °C)] 97 °F (36.1 °C)  HR:  [85-91] 88  Resp:  [18] 18  BP: (100-111)/(60-74) 100/62  SpO2:  [95 %-98 %] 98 %  Body mass index is 29.86 kg/m².     Review of Systems   Constitutional:  Negative for appetite change, chills, fatigue and fever.   HENT:  Negative for trouble swallowing.    Eyes:  Negative for visual disturbance.   Respiratory:  Negative for cough, shortness of breath, wheezing and stridor.    Cardiovascular:  Negative for chest pain, palpitations and leg swelling.   Gastrointestinal:  Positive for constipation. Negative for abdominal distention, abdominal pain, diarrhea, nausea and vomiting.        LBM    Genitourinary:  Negative for difficulty urinating.   Musculoskeletal:  Positive for arthralgias (chronic B/L shoulder pain, worsens later in the day) and back pain (chronic lower back pain). Negative for gait problem.   Neurological:  Negative for dizziness, weakness, light-headedness, numbness and headaches.   Psychiatric/Behavioral:  Negative for dysphoric mood and sleep disturbance. The patient is not nervous/anxious.    All other systems reviewed and are negative.       Input and Output Summary (last 24 hours):       Intake/Output Summary (Last 24 hours) at 2/3/2025 0955  Last data filed at 2025 2130  Gross per 24 hour   Intake 250 ml   Output 425 ml   Net -175 ml       Physical Exam:     Physical Exam  Vitals and nursing note reviewed.   Constitutional:       General: He is not in acute distress.     Appearance: Normal appearance. He is not ill-appearing.   HENT:      Head: Normocephalic and atraumatic.   Cardiovascular:      Rate and Rhythm: Normal rate and regular rhythm.      Pulses: Normal pulses.      Heart sounds: Murmur heard.      Systolic murmur is present with a grade of 1/6.      No friction rub.      Comments: Wearing LifeVest  Pulmonary:      Effort: Pulmonary effort is  normal. No respiratory distress.      Breath sounds: Normal breath sounds. No wheezing or rhonchi.   Abdominal:      General: Abdomen is flat. Bowel sounds are normal. There is no distension.      Palpations: Abdomen is soft. There is no mass.      Tenderness: There is no abdominal tenderness. There is no guarding or rebound.      Hernia: No hernia is present.   Musculoskeletal:      Cervical back: Normal range of motion and neck supple. No tenderness.      Right lower leg: No edema.      Left lower leg: No edema.   Skin:     General: Skin is warm and dry.      Capillary Refill: Capillary refill takes less than 2 seconds.   Neurological:      Mental Status: He is alert and oriented to person, place, and time.   Psychiatric:         Mood and Affect: Mood normal.         Behavior: Behavior normal.         Additional Data:     Labs:    Results from last 7 days   Lab Units 02/03/25  0529   WBC Thousand/uL 7.12   HEMOGLOBIN g/dL 10.9*   HEMATOCRIT % 32.5*   PLATELETS Thousands/uL 288   SEGS PCT % 63   LYMPHO PCT % 20   MONO PCT % 11   EOS PCT % 5     Results from last 7 days   Lab Units 02/03/25  0529 01/30/25  0559   SODIUM mmol/L 139 138   POTASSIUM mmol/L 3.9 4.1   CHLORIDE mmol/L 102 102   CO2 mmol/L 28 25   BUN mg/dL 15 20   CREATININE mg/dL 0.90 0.90   ANION GAP mmol/L 9 11   CALCIUM mg/dL 9.3 9.5   ALBUMIN g/dL  --  3.7   TOTAL BILIRUBIN mg/dL  --  0.85   ALK PHOS U/L  --  74   ALT U/L  --  45   AST U/L  --  21   GLUCOSE RANDOM mg/dL 165* 154*     Results from last 7 days   Lab Units 02/03/25  0529   INR  3.16*     Results from last 7 days   Lab Units 02/03/25  0526 02/02/25  2106 02/02/25  1558 02/02/25  1125 02/02/25  0646 02/01/25  2101 02/01/25  1603 02/01/25  1138 02/01/25  0600 01/31/25  2027 01/31/25  1621 01/31/25  1056   POC GLUCOSE mg/dl 181* 209* 173* 209* 154* 281* 207* 232* 167* 269* 222* 259*               Labs reviewed    Imaging:    Imaging reviewed    Recent Cultures (last 7 days):           Last  24 Hours Medication List:   Current Facility-Administered Medications   Medication Dose Route Frequency Provider Last Rate    acetaminophen  650 mg Oral Q6H PRN Shivani Yuan MD      aluminum-magnesium hydroxide-simethicone  30 mL Oral Q4H PRN Shivani Yuan MD      Artificial Tears  1 drop Left Eye Q4H PRN Shivani Yuan MD      aspirin  81 mg Oral Daily Shivani Yuan MD      atorvastatin  80 mg Oral Daily With Dinner Shivani Yuan MD      bisacodyl  10 mg Rectal Daily PRN Shivani Yuan MD      carvedilol  3.125 mg Oral BID With Meals Shivani Yuan MD      clopidogrel  75 mg Oral Daily Shivani Yuan MD      docusate sodium  100 mg Oral BID Shivani Yuan MD      gabapentin  300 mg Oral BID Shivani Yuan MD      insulin glargine  20 Units Subcutaneous HS Shivani Yuan MD      insulin lispro  2-12 Units Subcutaneous 4x Daily (AC & HS) Shivani Yuan MD      insulin lispro  6 Units Subcutaneous TID With Meals MARGARITA Hester      magnesium Oxide  800 mg Oral BID MARGARITA Hester      melatonin  6 mg Oral HS Shivani Yuan MD      menthol-methyl salicylate   Apply externally 4x Daily PRN Shivani Yuan MD      metFORMIN  500 mg Oral BID With Meals MARGARITA Hester      ondansetron  4 mg Intravenous Q6H PRN Shivani Yuan MD      oxyCODONE  2.5 mg Oral Q8H PRN Shivani Yuan MD      polyethylene glycol  17 g Oral Daily Shivani Yuan MD      QUEtiapine  25 mg Oral HS Shivani Yuan MD      senna  2 tablet Oral HS Shivani Yuan MD      warfarin  5 mg Oral Once per day on Monday Friday MARGARITA Hester      [START ON 2/4/2025] warfarin  7.5 mg Oral Once per day on Sunday Tuesday Wednesday Thursday Saturday MARGARITA Hester          M*Modal software was used to dictate this note.  It may contain errors with dictating incorrect words or incorrect spelling. Please contact the provider directly with any questions.

## 2025-02-03 NOTE — PROGRESS NOTES
02/03/25 1030   Pain Assessment   Pain Assessment Tool FLACC   Pain Rating: FLACC (Rest) - Face 0   Pain Rating: FLACC (Rest) - Legs 0   Pain Rating: FLACC (Rest) - Activity 0   Pain Rating: FLACC (Rest) - Cry 0   Pain Rating: FLACC (Rest) - Consolability 0   Score: FLACC (Rest) 0   Pain Rating: FLACC (Activity) - Face 1   Pain Rating: FLACC (Activity) - Legs 1   Pain Rating: FLACC (Activity) - Activity 1   Pain Rating: FLACC (Activity) - Cry 2   Pain Rating: FLACC (Activity) - Consolability 2   Score: FLACC (Activity) 7   Restrictions/Precautions   Precautions Bed/chair alarms;Cardiac/sternal;Fall Risk;Pain;Supervision on toilet/commode  (LIFEVEST)   Shower/Bathe Self   Comment per RA pt refused assist to wash  this morning, continues to refuse during AM OT session   Reason if not Attempted Refused to perform   Shower/Bathe Self CARE Score 7   Bathing   Findings  Reviewed shower set up at home, reporting too narrow of a space for shower or bench. Reviewed what typical shower expectations are for person w/ life vest. (can only be removed for bathing for short amounts of time and strongly rec that another person be home.) Pt does not have active shower order, OT inquired w/ MD about potential for shower during next OT session, awaiting MD to follow up.   Sit to Stand   Type of Assistance Needed Incidental touching   Comment CGA in parallel bars, heavy reliance on BUE for STS and lowering to sit   Sit to Stand CARE Score 4   Bed-Chair Transfer   Comment refused to perfrom transfers WC<>EOM citing pain too high, also refuses modalities and medicaitons for pain management.   Reason if not Attempted Refused to perform   Chair/Bed-to-Chair Transfer CARE Score 7   Toileting Hygiene   Comment refused to perfrom transfers citing pain too high, also refuses modalities and medicaitons for pain management.   Reason if not Attempted Refused to perform   Toileting Hygiene CARE Score 7   Toilet Transfer   Comment refused to  perfrom transfers citing pain too high, also refuses modalities and medicaitons for pain management.   Reason if not Attempted Refused to perform   Toilet Transfer CARE Score 7   Therapeutic Excerise-Strength   UE Strength No  (refused to perfrom transfers citing pain too high, also refuses modalities and medicaitons for pain management.)   Cognition   Overall Cognitive Status WFL   Arousal/Participation Alert;Responsive   Attention Within functional limits   Orientation Level Oriented X4   Additional Activities   Additional Activities Comments pt refused STS w/ RW at table which would brace walker for support as pt heavily reliant on BUE for STS. refused to perfrom transfers citing pain too high, also refuses modalities and medicaitons for pain management. Pt agreeable to STS in parallel bars, where he then initated mobility from one end and back in // bars. Completed 2 sets of 3x back and forth for a total of ~60ft. CGA overall but heavy reliance of BUE for mobility. Reports ongoing shoulder and back pain.   Activity Tolerance   Activity Tolerance Patient limited by pain   Assessment   Treatment Assessment OT session completed. Pt refusing OT suggested interventions as described above. He was not open to modalities or inquiring w/ RN about available pain medications. Eventually pt agreeable to STS and then self initiated mobility in parallel bars. Pt continues to require skilled hands on assist in order to maintain his safety. OT asked pt what AE he thinks he will require for in home mobility at [place] of [place] vs RW, pt requires none and further elaborates that he will not need any device at home. This may be unrealistic to achieve in  rehab stay, but pt not receptive to this edu. OT to continue POC.   Prognosis Fair   Problem List Decreased strength;Decreased range of motion;Decreased endurance;Impaired balance;Decreased mobility;Decreased coordination;Impaired judgement;Decreased safety awareness;Impaired  sensation;Impaired tone;Pain   Plan   Treatment/Interventions ADL retraining;Functional transfer training;Therapeutic exercise;Endurance training;Cognitive reorientation;Patient/family training;Equipment eval/education;Compensatory technique education;Continued evaluation;Spoke to MD   Progress Slow progress, decreased activity tolerance   OT Therapy Minutes   OT Time In 1030   OT Time Out 1130   OT Total Time (minutes) 60   OT Mode of treatment - Individual (minutes) 60   OT Mode of treatment - Concurrent (minutes) 0   OT Mode of treatment - Group (minutes) 0   OT Mode of treatment - Co-treat (minutes) 0   OT Mode of Treatment - Total time(minutes) 60 minutes   OT Cumulative Minutes 330   Therapy Time missed   Time missed? No

## 2025-02-03 NOTE — PROGRESS NOTES
Progress Note - PMR   Name: Joseph Aguilar 69 y.o. male I MRN: [id number]  Unit/Bed#: -02 I Date of Admission: 1/29/2025   Date of Service: 2/3/2025 I Hospital Day: 5     Assessment & Plan  Anterior STEMI s/p PCI with CHYNA to LM-ostial LAD and mLAD with IABP;  of LCx (1/17/2024)  S/p drug eluting stent to the LAD and left main; unable to stent the left circumflex 1/17  IABP dc 1/20  Hypotension in cath lab requiring pressors and intra-aortic baloon pump  Cw aspirin, plavix, statin, coumadin (goal 2-3),   2/3 INR 3.16  Continue coreg 3.125 bid  Follow up cardiology outpt  Ambulatory dysfunction  Patient was evaluated by the rehabilitation team MD and an appropriate candidate for acute inpatient rehabilitation program at this time.  The patient will tolerate 3 hours/day 5 to 7 days/week of intensive physical, occupational therapy in order to obtain goals for community discharge  Due to the patient's functional Compared to their baseline level of function in addition to their ongoing medical needs, the patient would benefit from daily supervision from a rehabilitation physician as well as rehabilitation nursing to implement and adjust the medical as well as functional plan of care in order to meet the patient's goals.  DC TBD    History of hypertension  Cw lisinopril and BB  Monitor vitals and titrate meds as needed  Lisinopril D/c  Mixed hyperlipidemia  -cw statin  -f/o primary care on dc  Type 2 diabetes mellitus without complication, without long-term current use of insulin (HCC)  Lab Results   Component Value Date    HGBA1C 8.2 (H) 01/18/2025       Recent Labs     02/02/25  1125 02/02/25  1558 02/02/25  2106 02/03/25  0526   POCGLU 209* 173* 209* 181*   A1c 8.2% on admission  Lantus 20u at HS and lispro 8u with meals  IM starting metformin 500 BID today  Monitor glucose levels  Cw diabetic diet    Blood Sugar Average: Last 72 hrs:  (P) [id number]    Anemia  Hgb 11.6 1/30, 10.9 2/3  Monitor w/  "routine blood work   Iron panel on 1/30 iron 43, ferritin 224, iron sat 23  Hypomagnesemia  Cw Mg supplementation goal >2   1.7 1/30   ICM with EF 25%  Echo:Left ventricular cavity size is normal. Wall thickness is normal. The left ventricular ejection fraction is 25%. Systolic function is severely reduced.   Cw coreg 3.125;   Cardiology consulted and recommended lifevest  F/o cardiology as outpt  LV (left ventricular) mural thrombus  TTE 1/19: \"There is a possible moderately-sized thrombus at the apex, difficult imaging.\"  Cardiology consulted on previous admission  Continue Coumadin 10 mg  Monitor PT, INR (2-3 per guidelines)  F/o cardiology outpt    Hematoma of right lower extremity  S/p removal of IABP and closure of right femoral puncture site with Perclose Pro-glide 1/20 by Dr. Brooke  Monitoring the R groin for bleeding, skin breakdown, expanding hematoma  Will consult vascular surgery with questions  Conjunctivitis  Cw cipro goal 7-10 days   Per nursing patient reports splashing urinal at his face   D/c antibiotics due to refusal  Osteoarthritis of multiple joints  Multiple joint s/p steroid inj b/l shoulders 1/6   F.w Dr. Egan as ouptatient   C/w pain control, tylenol, oxy 2.5Q8h, bengay; titrate off opiods as patient wasn't on any as outpatient   Chronic back pain  -patient reports old injury of back  -takes tylenol for pain at home  -monitor pain levels and titrate meds  -offer different modalities- heat, lidocaine patches  Does not like lidocaine patches  Constipation  - pt reports he usually has a BM every 2-3 days but feels like pain meds are \"backing him up\"  -PRN dulcolax; add senna, colase and miralax as patient taking opiods   - monitor BM  Miralax changed to 2x a day standing and one dose of lactulose    History of Present Illness   Joseph Aguilar is a 69 y.o. male w/ PMHx of HTN, HLD, T2DM, OA who initially presented to [facility] on 1/17 for chest " pain, shortness of breath, nausea and vomiting. During initial eval, she was found to have an anterior wall STEMI and Interventional cardiology was alerted. Patient was taken to the cath lab where patient bacame more hypotensive requiring pressor support and intra-aortic balloon pump insertion. Patient received LAD, left main, stent however circumflex artery was unable to be intervened on. Patient was transferred to ICU for cardiogenic shock s/p STEMI. Noted to have LV thrombus and started on heparin gtt and continued on brilinta and ASA. He noted to have some transaminitis so Statin was on hold. TTE w/ LVEF 25%, severe global hypokinesis w/ apical and distal septal near akinesis, mod siz thrombus at apex. On 1/20 he was noted to have LE bleeding w/ hematoma formation from cath site rq manual pressure and femostop. Vascular surgery was consulted . R femoral site was closed by Dr. Brooke. IABP was d/c and heparin was held. He received 1U PRBC. Heparin was restarted and coumadin was started w/ goal 2-3. INR 2.22 1/29 and heparin was d/c. Joseph Aguilar was admitted to the [facility] on 01/29/25     Chief Complaint: f/u ambulatory dysfunction    Interval: Patient seen and examined in chair eating lunch. No events overnight.  Reports overall feeling well. Last BM 1/30. Increased miralax to BID a day and ordered a dose of lactulose. Sleeping well at night.         Review of Systems   Respiratory:  Negative for shortness of breath.    Cardiovascular:  Negative for chest pain, palpitations and leg swelling.   Gastrointestinal:  Negative for abdominal pain, constipation, diarrhea, nausea and vomiting.   Musculoskeletal:  Positive for back pain (chronic pain 6/10, states lidocaine patches do not work).       Objective   Functional Update:  Physical Therapy Occupational Therapy Speech Therapy                           Temp:  [97 °F (36.1 °C)-99.4 °F (37.4 °C)] 97 °F (36.1 °C)  HR:  [85-91] 88  Resp:  [18] 18  BP: (100-111)/(60-74)  100/62  SpO2:  [95 %-98 %] 98 %    Physical Exam  Constitutional:       General: He is not in acute distress.  HENT:      Head: Normocephalic and atraumatic.      Mouth/Throat:      Mouth: Mucous membranes are moist.   Cardiovascular:      Rate and Rhythm: Normal rate and regular rhythm.      Comments: Wearing life vest  Pulmonary:      Effort: Pulmonary effort is normal. No respiratory distress.      Breath sounds: Normal breath sounds.   Abdominal:      General: Bowel sounds are normal. There is no distension.   Musculoskeletal:         General: No tenderness. Normal range of motion.   Skin:     General: Skin is warm and dry.   Neurological:      Mental Status: He is alert. Mental status is at baseline.   Psychiatric:         Mood and Affect: Mood normal.         Behavior: Behavior normal.         Scheduled Meds:  Current Facility-Administered Medications   Medication Dose Route Frequency Provider Last Rate    acetaminophen  650 mg Oral Q6H PRN Shivani Yuan MD      aluminum-magnesium hydroxide-simethicone  30 mL Oral Q4H PRN Shivani Yuan MD      Artificial Tears  1 drop Left Eye Q4H PRN Shivani Yuan MD      aspirin  81 mg Oral Daily Shivani Yuan MD      atorvastatin  80 mg Oral Daily With Dinner Shivani Yuan MD      bisacodyl  10 mg Rectal Daily PRN Shivani Yuan MD      carvedilol  3.125 mg Oral BID With Meals Shivani Yuan MD      clopidogrel  75 mg Oral Daily Shivani Yuan MD      docusate sodium  100 mg Oral BID Shivani Yuan MD      gabapentin  300 mg Oral BID Shivani Yaun MD      insulin glargine  20 Units Subcutaneous HS Shivani Yuan MD      insulin lispro  2-12 Units Subcutaneous 4x Daily (AC & HS) Shivani Yuan MD      insulin lispro  6 Units Subcutaneous TID With Meals MARGARITA Hester      lactulose  20 g Oral Daily Jennifer Soares PA-C      magnesium Oxide  800 mg Oral BID MARGARITA Hester      melatonin  6 mg Oral HS Shivani Yuan MD      menthol-methyl salicylate    Apply externally 4x Daily PRN Shivani Yuan MD      metFORMIN  500 mg Oral BID With Meals MARGARITA Hester      ondansetron  4 mg Intravenous Q6H PRASHLEY Yuan MD      oxyCODONE  2.5 mg Oral Q8H PRN Shivani Yuan MD      polyethylene glycol  17 g Oral BID Jennifer Soares PA-C      QUEtiapine  25 mg Oral HS Shivani Yuan MD      senna  2 tablet Oral HS Shivani Yuan MD      warfarin  5 mg Oral Once per day on Monday Friday MARGARITA Hester      [START ON 2/4/2025] warfarin  7.5 mg Oral Once per day on Sunday Tuesday Wednesday Thursday Saturday MARGARITA Hester           Lab Results: I have reviewed the following results:  Results from last 7 days   Lab Units 02/03/25 0529 01/30/25  0559 01/28/25  0226   HEMOGLOBIN g/dL 10.9* 11.6* 11.0*   HEMATOCRIT % 32.5* 34.5* 33.2*   WBC Thousand/uL 7.12 9.68 8.66   PLATELETS Thousands/uL 288 283 262     Results from last 7 days   Lab Units 02/03/25  0529 01/30/25  0559 01/28/25  0226   BUN mg/dL 15 20 24   SODIUM mmol/L 139 138 135   POTASSIUM mmol/L 3.9 4.1 4.1   CHLORIDE mmol/L 102 102 104   CREATININE mg/dL 0.90 0.90 0.86   AST U/L  --  21  --    ALT U/L  --  45  --        Results from last 7 days   Lab Units 02/03/25  0529 01/30/25  0559 01/29/25  0630   PROTIME seconds 32.1* 25.9* 25.3*   INR  3.16* 2.37* 2.22*        Jennifer Soares PA-C  Physical Medicine and Rehabilitation   02/03/25

## 2025-02-03 NOTE — ASSESSMENT & PLAN NOTE
Hgb 11.6 1/30, 10.9 2/3  Monitor w/ routine blood work   Iron panel on 1/30 iron 43, ferritin 224, iron sat 23

## 2025-02-03 NOTE — ASSESSMENT & PLAN NOTE
-patient reports old injury of back  -takes tylenol for pain at home  -monitor pain levels and titrate meds  -offer different modalities- heat, lidocaine patches  Does not like lidocaine patches

## 2025-02-03 NOTE — ASSESSMENT & PLAN NOTE
Lab Results   Component Value Date    HGBA1C 8.2 (H) 01/18/2025       Recent Labs     02/02/25  1125 02/02/25  1558 02/02/25  2106 02/03/25  0526   POCGLU 209* 173* 209* 181*       Blood Sugar Average: Last 72 hrs:  (P) [id number]  Last A1c was 8.2 on 1/18/2025.  Home regimen: Lantus 15u at HS, glipizide 5mg daily, metformin 1000mg BID, and Jardiance 12.5mg daily.  Currently receiving Lantus 20u at HS and lispro 9u with meals.  Decrease lispro to 6u with meals today and start on metformin 500mg BID.  Continue SSI, QID accuchecks, and cons. carb diet.

## 2025-02-03 NOTE — ASSESSMENT & PLAN NOTE
Newly started on lisinopril 2.5mg daily in acute setting.  Continue Coreg 3.125mg BID.  Monitor BP with routine VS.

## 2025-02-03 NOTE — PROGRESS NOTES
"   02/03/25 1230   Pain Assessment   Pain Assessment Tool 0-10   Pain Score 7   Pain Location/Orientation Location: Generalized   Pain Onset/Description Onset: Ongoing   Restrictions/Precautions   Precautions Bed/chair alarms;Cardiac/sternal;Fall Risk;Pain;Supervision on toilet/commode  (+ life vest)   Subjective   Subjective \"I know I wont be able to get up from a chair to the walker, I need to pull up on something\"   Sit to Stand   Comment CS pulls self up to  pbars.   Walk 10 Feet   Type of Assistance Needed Incidental touching   Physical Assistance Level No physical assistance   Comment pulls self to  pbars then use of RW inside pbars.   Walk 10 Feet CARE Score 4   Ambulation   Primary Mode of Locomotion Prior to Admission Walk   Distance Walked (feet) 40 ft  (x2)   Assist Device (S)  Roller Walker  (INSIDE parallel bars)   Findings back and forth 2x40ft with RW inside parallel bars   Does the patient walk? 2. Yes   Assessment   Treatment Assessment Pt seen for additional breif 30 min skilled PT intervention OOB in WC upon entry, continued discussion of how to get up from chair, does not have lift chair, too expensive, reports struggles with sit to stand at baseline. Amb trials inside parallel bars with RW 2x40ft, pulls to stand, then switch to RW. Feels he may be able to walk with walker outside bars in AM next day with \"fresh shoulders.\" May need seat elevator/raised seat at home to A with stand. Pt requesting more PT than OT to practice walking, \"I dont need OT\"   Barriers to Discharge Inaccessible home environment;Decreased caregiver support   Barriers to Discharge Comments spouse works, [employer] with first floor setup.   Plan   Progress Progressing toward goals   Discharge Recommendation   PT Equipment ordered pt reports having RW somewhere at home, no WC.   PT Therapy Minutes   PT Time In 1230   PT Time Out 1300   PT Total Time (minutes) 30   PT Mode of treatment - Individual (minutes) 30   PT " Mode of treatment - Concurrent (minutes) 0   PT Mode of treatment - Group (minutes) 0   PT Mode of treatment - Co-treat (minutes) 0   PT Mode of Treatment - Total time(minutes) 30 minutes   PT Cumulative Minutes 420   Therapy Time missed   Time missed? No

## 2025-02-03 NOTE — ASSESSMENT & PLAN NOTE
ECHO on 1/19 showed possible moderately sized thrombus at the apex.  Started on Coumadin in acute setting.  Currently on Coumadin 7.5mg daily.  INR 3.16 today.  Change Coumadin to 5mg on Mon/Fri and 7.5mg all other days.  Repeat PT/INR on Wednesday.  Follow-up with Cardiology as outpatient.

## 2025-02-03 NOTE — ASSESSMENT & PLAN NOTE
S/p drug eluting stent to the LAD and left main; unable to stent the left circumflex 1/17  IABP dc 1/20  Hypotension in cath lab requiring pressors and intra-aortic baloon pump  Cw aspirin, plavix, statin, coumadin (goal 2-3),   2/3 INR 3.16  Continue coreg 3.125 bid  Follow up cardiology outpt

## 2025-02-03 NOTE — PROGRESS NOTES
"   02/03/25 0836   Pain Assessment   Pain Assessment Tool 0-10   Pain Score 6   Pain Location/Orientation Location: Generalized   Pain Onset/Description Onset: Ongoing;Frequency: Constant/Continuous   Restrictions/Precautions   Precautions Bed/chair alarms;Cardiac/sternal;Fall Risk;Pain;Supervision on toilet/commode  (life vest)   Cognition   Overall Cognitive Status WFL   Arousal/Participation Alert;Cooperative   Attention Within functional limits   Orientation Level Oriented X4   Memory Within functional limits   Following Commands Follows all commands and directions without difficulty   Subjective   Subjective \"I have to walk out of here\"   Roll Left and Right   Type of Assistance Needed Independent   Roll Left and Right CARE Score 6   Lying to Sitting on Side of Bed   Type of Assistance Needed Supervision   Lying to Sitting on Side of Bed CARE Score 4   Sit to Stand   Comment (S)  pulls to  parallel bars only.   Bed-Chair Transfer   Type of Assistance Needed Physical assistance   Physical Assistance Level 26%-50%   Comment mod A for WC stabilization for sit pivot, no physical A with actual transfer.   Chair/Bed-to-Chair Transfer CARE Score 3   Walk 10 Feet   Comment // bars only   Walk 50 Feet with Two Turns   Comment // bars only.   Ambulation   Primary Mode of Locomotion Prior to Admission Walk   Distance Walked (feet) 60 ft   Assist Device Parallel Bars   Findings back and forth 3 x20 without rest in parallel bbars, cue for posture, no knee buckling oberserved.   Does the patient walk? 2. Yes   Wheel 50 Feet with Two Turns   Type of Assistance Needed Independent   Wheel 50 Feet with Two Turns CARE Score 6   Wheelchair mobility   Does the patient use a wheelchair? 1. Yes   Type of Wheelchair Used 1. Manual   Stairs   Findings step up on short curb style step inside bpars to get on standing scale, stepped down back wards   Picking Up Object   Comment unable to stand unsupported for trial at this time. "   Therapeutic Interventions   Strengthening seated in WC, blue tband DF/PF, hip abd, HS curls x30.   Flexibility seated manual passive B LE DF/knee ext stretch 5x15 sec.   Assessment   Treatment Assessment Pt engaged in 60 min skilled PT intervention sitting EOB with RN upon entry, with RN getting AM meds. Ongoing c/o constant pain. Needing Wc stabilization for sit pivot transfer, more so small bumps across without use of SB. Parallel bar use only, does not feel ready for RW, seeks to pull self up to standing which he cannot do with RW. Once standing able to amb parallel bars multiple times without buckling, May trial RW inside pbars in PM.   Plan   Progress Progressing toward goals   PT Therapy Minutes   PT Time In 0830   PT Time Out 0930   PT Total Time (minutes) 60   PT Mode of treatment - Individual (minutes) 60   PT Mode of treatment - Concurrent (minutes) 0   PT Mode of treatment - Group (minutes) 0   PT Mode of treatment - Co-treat (minutes) 0   PT Mode of Treatment - Total time(minutes) 60 minutes   PT Cumulative Minutes 390   Therapy Time missed   Time missed? No

## 2025-02-04 LAB
GLUCOSE SERPL-MCNC: 154 MG/DL (ref 65–140)
GLUCOSE SERPL-MCNC: 180 MG/DL (ref 65–140)
GLUCOSE SERPL-MCNC: 182 MG/DL (ref 65–140)
GLUCOSE SERPL-MCNC: 217 MG/DL (ref 65–140)

## 2025-02-04 PROCEDURE — 97110 THERAPEUTIC EXERCISES: CPT

## 2025-02-04 PROCEDURE — 97116 GAIT TRAINING THERAPY: CPT

## 2025-02-04 PROCEDURE — 82948 REAGENT STRIP/BLOOD GLUCOSE: CPT

## 2025-02-04 PROCEDURE — 99232 SBSQ HOSP IP/OBS MODERATE 35: CPT | Performed by: PHYSICAL MEDICINE & REHABILITATION

## 2025-02-04 PROCEDURE — 97535 SELF CARE MNGMENT TRAINING: CPT

## 2025-02-04 PROCEDURE — 99233 SBSQ HOSP IP/OBS HIGH 50: CPT | Performed by: NURSE PRACTITIONER

## 2025-02-04 RX ORDER — BISACODYL 10 MG
10 SUPPOSITORY, RECTAL RECTAL ONCE
Status: DISCONTINUED | OUTPATIENT
Start: 2025-02-04 | End: 2025-02-04

## 2025-02-04 RX ADMIN — GABAPENTIN 300 MG: 300 CAPSULE ORAL at 08:25

## 2025-02-04 RX ADMIN — Medication 6 MG: at 21:00

## 2025-02-04 RX ADMIN — Medication 2.5 MG: at 20:45

## 2025-02-04 RX ADMIN — INSULIN LISPRO 6 UNITS: 100 INJECTION, SOLUTION INTRAVENOUS; SUBCUTANEOUS at 11:55

## 2025-02-04 RX ADMIN — INSULIN LISPRO 6 UNITS: 100 INJECTION, SOLUTION INTRAVENOUS; SUBCUTANEOUS at 17:36

## 2025-02-04 RX ADMIN — DOCUSATE SODIUM 100 MG: 100 CAPSULE, LIQUID FILLED ORAL at 08:25

## 2025-02-04 RX ADMIN — GABAPENTIN 300 MG: 300 CAPSULE ORAL at 17:34

## 2025-02-04 RX ADMIN — INSULIN LISPRO 4 UNITS: 100 INJECTION, SOLUTION INTRAVENOUS; SUBCUTANEOUS at 11:56

## 2025-02-04 RX ADMIN — Medication 800 MG: at 08:25

## 2025-02-04 RX ADMIN — INSULIN LISPRO 2 UNITS: 100 INJECTION, SOLUTION INTRAVENOUS; SUBCUTANEOUS at 21:00

## 2025-02-04 RX ADMIN — ASPIRIN 81 MG: 81 TABLET, COATED ORAL at 08:25

## 2025-02-04 RX ADMIN — INSULIN LISPRO 2 UNITS: 100 INJECTION, SOLUTION INTRAVENOUS; SUBCUTANEOUS at 17:36

## 2025-02-04 RX ADMIN — METFORMIN HYDROCHLORIDE 500 MG: 500 TABLET, FILM COATED ORAL at 08:25

## 2025-02-04 RX ADMIN — INSULIN LISPRO 2 UNITS: 100 INJECTION, SOLUTION INTRAVENOUS; SUBCUTANEOUS at 08:26

## 2025-02-04 RX ADMIN — DOCUSATE SODIUM 100 MG: 100 CAPSULE, LIQUID FILLED ORAL at 17:34

## 2025-02-04 RX ADMIN — INSULIN LISPRO 6 UNITS: 100 INJECTION, SOLUTION INTRAVENOUS; SUBCUTANEOUS at 08:26

## 2025-02-04 RX ADMIN — ACETAMINOPHEN 650 MG: 325 TABLET, FILM COATED ORAL at 08:36

## 2025-02-04 RX ADMIN — INSULIN GLARGINE 20 UNITS: 100 INJECTION, SOLUTION SUBCUTANEOUS at 21:00

## 2025-02-04 RX ADMIN — QUETIAPINE FUMARATE 25 MG: 25 TABLET ORAL at 21:00

## 2025-02-04 RX ADMIN — Medication 800 MG: at 17:34

## 2025-02-04 RX ADMIN — POLYETHYLENE GLYCOL 3350 17 G: 17 POWDER, FOR SOLUTION ORAL at 17:34

## 2025-02-04 RX ADMIN — ATORVASTATIN CALCIUM 80 MG: 80 TABLET, FILM COATED ORAL at 17:34

## 2025-02-04 RX ADMIN — METFORMIN HYDROCHLORIDE 500 MG: 500 TABLET, FILM COATED ORAL at 17:34

## 2025-02-04 RX ADMIN — CLOPIDOGREL BISULFATE 75 MG: 75 TABLET ORAL at 08:25

## 2025-02-04 RX ADMIN — WARFARIN SODIUM 7.5 MG: 5 TABLET ORAL at 17:34

## 2025-02-04 RX ADMIN — POLYETHYLENE GLYCOL 3350 17 G: 17 POWDER, FOR SOLUTION ORAL at 08:25

## 2025-02-04 NOTE — PROGRESS NOTES
"   02/04/25 1230   Pain Assessment   Pain Assessment Tool 0-10   Pain Score 6   Pain Location/Orientation Location: Generalized;Location: Shoulder;Location: Knee   Cognition   Overall Cognitive Status WFL   Arousal/Participation Alert;Cooperative   Attention Within functional limits   Orientation Level Oriented X4   Memory Within functional limits   Following Commands Follows all commands and directions without difficulty   Subjective   Subjective \"Im falling asleep, shower flet good but the tylenol is wearing off\"   Sit to Stand   Type of Assistance Needed Incidental touching   Physical Assistance Level No physical assistance   Comment from very high seat! 25-27inches high.   Sit to Stand CARE Score 4   Bed-Chair Transfer   Type of Assistance Needed Physical assistance   Physical Assistance Level 25% or less   Comment min A sit pivot to stabilize WC.   Chair/Bed-to-Chair Transfer CARE Score 3   Walk 10 Feet   Type of Assistance Needed Incidental touching   Physical Assistance Level No physical assistance   Comment RW   Walk 10 Feet CARE Score 4   Walk 50 Feet with Two Turns   Type of Assistance Needed Physical assistance   Physical Assistance Level 25% or less   Comment RW   Walk 50 Feet with Two Turns CARE Score 3   Ambulation   Primary Mode of Locomotion Prior to Admission Walk   Distance Walked (feet) 42 ft  (x2, 1x50ft)   Assist Device Roller Walker   Gait Pattern Step through;Wide YOVANI;Slow Rosanne   Limitations Noted In Endurance;Speed;Strength;Swing   Provided Assistance with: Balance   Walk Assist Level Contact Guard;Minimum Assist   Findings grossly CGA once standing from high seat. X1 incident of R LE adjusting min A for balance. Pt put foth good effort to inc amb, evident ftg and inc R LE weakness with longer distance at end of session for last 10 ft.   Does the patient walk? 2. Yes   Wheel 50 Feet with Two Turns   Type of Assistance Needed Independent   Wheel 50 Feet with Two Turns CARE Score 6 "   Wheelchair mobility   Distance Level Surface (feet) 50 ft   Does the patient use a wheelchair? 1. Yes   Type of Wheelchair Used 1. Manual   Assessment   Treatment Assessment Pt seen for short 30 min skilled PT intervention - OOB in WC in room upon entry. reports being tired but willing to try to walk. Sit pivot to riase mat to stand, also stood form high back tall chair iwth cushion on it. Once standing able to amb up to 50ft total with RW. Feel she may be able to stand from recliner, will trial in later PM PT session. Progressing well. Continue amb trials as sky. Steps when able.   Plan   Progress Progressing toward goals   PT Therapy Minutes   PT Time In 1230   PT Time Out 1300   PT Total Time (minutes) 30   PT Mode of treatment - Individual (minutes) 30   PT Mode of treatment - Concurrent (minutes) 0   PT Mode of treatment - Group (minutes) 0   PT Mode of treatment - Co-treat (minutes) 0   PT Mode of Treatment - Total time(minutes) 30 minutes   PT Cumulative Minutes 450   Therapy Time missed   Time missed? No

## 2025-02-04 NOTE — PROGRESS NOTES
"   02/04/25 0830   Pain Assessment   Pain Assessment Tool FLACC   Pain Rating: FLACC (Rest) - Face 0   Pain Rating: FLACC (Rest) - Legs 0   Pain Rating: FLACC (Rest) - Activity 0   Pain Rating: FLACC (Rest) - Cry 0   Pain Rating: FLACC (Rest) - Consolability 0   Score: FLACC (Rest) 0   Pain Rating: FLACC (Activity) - Face 1   Pain Rating: FLACC (Activity) - Legs 1   Pain Rating: FLACC (Activity) - Activity 1   Pain Rating: FLACC (Activity) - Cry 1   Pain Rating: FLACC (Activity) - Consolability 1   Score: FLACC (Activity) 5   Restrictions/Precautions   Precautions Bed/chair alarms;Fall Risk;Supervision on toilet/commode  ([employer])   Lifestyle   Autonomy \"Thank you for having patience while I showered\"   Oral Hygiene   Type of Assistance Needed Independent   Comment mod I WC level, at this time requires WC level d/t poor standing tolerance and balance   Oral Hygiene CARE Score 6   Shower/Bathe Self   Type of Assistance Needed Incidental touching   Comment CGA seated on tub bench for shower. Lifevest removed for shower for 20min total. Requires tub bench and seated shower tech for safety w/ lat WS for buttocks 2' poor standing tolerance and balance. Able to wash feet w/ frwd fxnl reach and unilateral hold on GB for additional steadying   Shower/Bathe Self CARE Score 4   Bathing   Assessed Bath Style Shower   Tub/Shower Transfer   Findings Dom SPT heavy reliance on GB and BUE support while pivoting   Upper Body Dressing   Type of Assistance Needed Physical assistance   Physical Assistance Level 25% or less   Comment doffs tshirt and lifevest mod I seated. Dom for donning lifevest 2' ensuring correct  placement. Mod I don tshirt seated. Requires task to be done seated for safety concerns related to poor balance   Upper Body Dressing CARE Score 3   Lower Body Dressing   Type of Assistance Needed Incidental touching   Comment pt doffs pants over hips in stance, fit is too big so they fall over his hips to knees. Seated " he finishes doffing. Pt dons w/ fxnl reach to feet to thread BLE. In stance bending over counter pt requires multiple attempts to manage over hips. CGA only provided. Pt reports at home that he often has trouble w/ CM overhips and asks his wife for assist.   Lower Body Dressing CARE Score 4   Putting On/Taking Off Footwear   Type of Assistance Needed Supervision   Comment sup don/doff  socks w/ fwrd reach and extra time.   Putting On/Taking Off Footwear CARE Score 4   Sit to Stand   Type of Assistance Needed Incidental touching   Comment CGA but requries GB and heavy reliance of BUE support   Sit to Stand CARE Score 4   Toileting Hygiene   Type of Assistance Needed Incidental touching   Comment CGA seated on PF BSC over toilet. requries PF BSC at this time for pt to have room to WS to reach buttocks for hygiene. requries extra time and multiple attempts following XL BM formed/soft, reported such to GORDON Barnett.   Toileting Hygiene CARE Score 4   Toilet Transfer   Type of Assistance Needed Incidental touching   Comment CGA SPT WC<>PF BSC over toilet. Pt choosing this transfer tech when sit pivot would be safer, he is not receptive to edu and prefers to complete tasks how he prefers.   Toilet Transfer CARE Score 4   Transfers   Additional Comments wc mob in room mod I. At this time pt requires  WC for safe mobility 2' poor standing tolerance, balance and strength. in BLE   Cognition   Overall Cognitive Status WFL   Arousal/Participation Alert;Cooperative   Attention Within functional limits   Orientation Level Oriented X4   Activity Tolerance   Activity Tolerance Patient tolerated treatment well   Assessment   Treatment Assessment OT geneva focusing on ADL retraining including shower routine. Pt has active shower order OK for brief shower as life vest needs to be removed. overall completes shower routine at CGA level using modified tech, he did require Dom for adjusting pads of life vest after donning. Pt IND for  managing life vest parts to power on/off device. Pt requires extensive activity modifications to inc his IND and safety w/ showering tasks. Pt has dec safety insight and ongoing not very receptive to safety cues. His strengh and balance remain poor, but he manages w/ compensating techniques. OT to continue PC.   Problem List Decreased strength;Decreased range of motion;Decreased endurance;Impaired balance;Decreased mobility;Decreased coordination;Decreased safety awareness;Impaired judgement;Impaired sensation;Impaired tone;Pain   Plan   Treatment/Interventions ADL retraining;Functional transfer training;Therapeutic exercise;Endurance training;Patient/family training;Equipment eval/education;Compensatory technique education;Continued evaluation;Spoke to nursing   Progress Progressing toward goals   OT Therapy Minutes   OT Time In 0830   OT Time Out 1000   OT Total Time (minutes) 90   OT Mode of treatment - Individual (minutes) 90   OT Mode of treatment - Concurrent (minutes) 0   OT Mode of treatment - Group (minutes) 0   OT Mode of treatment - Co-treat (minutes) 0   OT Mode of Treatment - Total time(minutes) 90 minutes   OT Cumulative Minutes 450   Therapy Time missed   Time missed? No

## 2025-02-04 NOTE — ASSESSMENT & PLAN NOTE
Lab Results   Component Value Date    HGBA1C 8.2 (H) 01/18/2025       Recent Labs     02/03/25  1613 02/03/25  2047 02/04/25  0556 02/04/25  1133   POCGLU 175* 236* 154* 217*   A1c 8.2% on admission  Lantus 20u at HS and lispro 8u with meals  IM starting metformin 500 BID today  Monitor glucose levels  Cw diabetic diet    Blood Sugar Average: Last 72 hrs:  (P) [id number]

## 2025-02-04 NOTE — ASSESSMENT & PLAN NOTE
Lab Results   Component Value Date    HGBA1C 8.2 (H) 01/18/2025       Recent Labs     02/03/25  1128 02/03/25  1613 02/03/25 2047 02/04/25  0556   POCGLU 224* 175* 236* 154*       Blood Sugar Average: Last 72 hrs:  (P) [id number]    Last A1c was 8.2 on 1/18/2025.  Home regimen: Lantus 15u at HS, glipizide 5mg daily, metformin 1000mg BID, and Jardiance 12.5mg daily.  Currently receiving Lantus 20u at HS, lispro 6u with meals, and metformin 500mg BID.  Continue SSI, QID accuchecks, and cons. carb diet.

## 2025-02-04 NOTE — ASSESSMENT & PLAN NOTE
S/p drug eluting stent to the LAD and left main; unable to stent the left circumflex 1/17  IABP dc 1/20  Hypotension in cath lab requiring pressors and intra-aortic baloon pump  Cw aspirin, plavix, statin, coumadin (goal 2-3),   2/3 INR 3.16  Continue coreg 3.125 bid  Follow up cardiology outpt  Coumadin help 2/3 due to INR 3.16  Repeat INR

## 2025-02-04 NOTE — PROGRESS NOTES
"Therapy manager spoke to patient during leadership rounding. Mentioned having [employer] rep come back out one more time before d/c. Patient expressed he does not need them. He met with them twice before and they \"just read through their information. My wife was there and asked questions and they just went around it and went back through their info. They weren't helpful\". Pt stated I really don't want to see them again. Made MD and RN aware pt is requesting no additional training with [employer] staff.   "

## 2025-02-04 NOTE — PROGRESS NOTES
Progress Note - PMR   Name: Joseph Aguilar 69 y.o. male I MRN: [id number]  Unit/Bed#: -02 I Date of Admission: 1/29/2025   Date of Service: 2/4/2025 I Hospital Day: 6     Assessment & Plan  Anterior STEMI s/p PCI with CHYNA to LM-ostial LAD and mLAD with IABP;  of LCx (1/17/2024)  S/p drug eluting stent to the LAD and left main; unable to stent the left circumflex 1/17  IABP dc 1/20  Hypotension in cath lab requiring pressors and intra-aortic baloon pump  Cw aspirin, plavix, statin, coumadin (goal 2-3),   2/3 INR 3.16  Continue coreg 3.125 bid  Follow up cardiology outpt  Coumadin help 2/3 due to INR 3.16  Repeat INR   Ambulatory dysfunction  Patient was evaluated by the rehabilitation team MD and an appropriate candidate for acute inpatient rehabilitation program at this time.  The patient will tolerate 3 hours/day 5 to 7 days/week of intensive physical, occupational therapy in order to obtain goals for community discharge  Due to the patient's functional Compared to their baseline level of function in addition to their ongoing medical needs, the patient would benefit from daily supervision from a rehabilitation physician as well as rehabilitation nursing to implement and adjust the medical as well as functional plan of care in order to meet the patient's goals.  DC TBD    History of hypertension  Cw lisinopril and BB  Monitor vitals and titrate meds as needed  Lisinopril D/c  Mixed hyperlipidemia  -cw statin  -f/o primary care on dc  Type 2 diabetes mellitus without complication, without long-term current use of insulin (HCC)  Lab Results   Component Value Date    HGBA1C 8.2 (H) 01/18/2025       Recent Labs     02/03/25  1613 02/03/25  2047 02/04/25  0556 02/04/25  1133   POCGLU 175* 236* 154* 217*   A1c 8.2% on admission  Lantus 20u at HS and lispro 8u with meals  IM starting metformin 500 BID today  Monitor glucose levels  Cw diabetic diet    Blood Sugar Average: Last 72 hrs:  (P)  "[id number]    Anemia  Hgb 11.6 1/30, 10.9 2/3  Monitor w/ routine blood work   Iron panel on 1/30 iron 43, ferritin 224, iron sat 23  Hypomagnesemia  Cw Mg supplementation goal >2   1.7 1/30   ICM with EF 25%  Echo:Left ventricular cavity size is normal. Wall thickness is normal. The left ventricular ejection fraction is 25%. Systolic function is severely reduced.   Cw coreg 3.125;   Cardiology consulted and recommended lifevest  F/o cardiology as outpt  LV (left ventricular) mural thrombus  TTE 1/19: \"There is a possible moderately-sized thrombus at the apex, difficult imaging.\"  Cardiology consulted on previous admission  Continue Coumadin 10 mg  Monitor PT, INR (2-3 per guidelines)  F/o cardiology outpt    Hematoma of right lower extremity  S/p removal of IABP and closure of right femoral puncture site with Perclose Pro-glide 1/20 by Dr. Brooke  Monitoring the R groin for bleeding, skin breakdown, expanding hematoma  Will consult vascular surgery with questions  Conjunctivitis  Cw cipro goal 7-10 days   Per nursing patient reports splashing urinal at his face   D/c antibiotics due to refusal  Osteoarthritis of multiple joints  Multiple joint s/p steroid inj b/l shoulders 1/6   F.w Dr. Egan as ouptatient   C/w pain control, tylenol, oxy 2.5Q8h, bengay; titrate off opiods as patient wasn't on any as outpatient   Chronic back pain  -patient reports old injury of back  -takes tylenol for pain at home  -monitor pain levels and titrate meds  -offer different modalities- heat, lidocaine patches  Does not like lidocaine patches  Constipation  - pt reports he usually has a BM every 2-3 days but feels like pain meds are \"backing him up\"  -PRN dulcolax; add senna, colase and miralax as patient taking opiods   - monitor BM  Miralax changed to 2x a day standing and one dose of lactulose 2/3    History of Present Illness   Joseph Aguilar is a 69 y.o. male w/ PMHx of HTN, HLD, T2DM, OA who initially presented to " [facility] on 1/17 for chest pain, shortness of breath, nausea and vomiting. During initial eval, she was found to have an anterior wall STEMI and Interventional cardiology was alerted. Patient was taken to the cath lab where patient bacame more hypotensive requiring pressor support and intra-aortic balloon pump insertion. Patient received LAD, left main, stent however circumflex artery was unable to be intervened on. Patient was transferred to ICU for cardiogenic shock s/p STEMI. Noted to have LV thrombus and started on heparin gtt and continued on brilinta and ASA. He noted to have some transaminitis so Statin was on hold. TTE w/ LVEF 25%, severe global hypokinesis w/ apical and distal septal near akinesis, mod siz thrombus at apex. On 1/20 he was noted to have LE bleeding w/ hematoma formation from cath site rq manual pressure and femostop. Vascular surgery was consulted . R femoral site was closed by Dr. Brooke. IABP was d/c and heparin was held. He received 1U PRBC. Heparin was restarted and coumadin was started w/ goal 2-3. INR 2.22 1/29 and heparin was d/c. Joseph Aguilar was admitted to the [facility] on 01/29/25     Chief Complaint: f/u ambulatory dysfunction    Interval: Patient seen and examined in wheelchair in room. No events overnight.  Reports overall feeling well. Last BM 2/4. Sleeping well at night. Patient is in good spirits after he had his first shower with OT today.    Review of Systems   Respiratory:  Negative for shortness of breath.    Cardiovascular:  Negative for chest pain, palpitations and leg swelling.   Gastrointestinal:  Negative for abdominal pain, constipation, diarrhea, nausea and vomiting.       Objective   Functional Update:  Physical Therapy Occupational Therapy Speech Therapy                           Temp:  [96.4 °F (35.8 °C)-98.7 °F (37.1 °C)] 96.4 °F (35.8 °C)  HR:  [83-92] 92  Resp:  [18] 18  BP: ()/(55-71) 92/55  SpO2:  [95 %-98 %] 95  %    Physical Exam  Constitutional:       General: He is not in acute distress.  HENT:      Head: Normocephalic and atraumatic.      Mouth/Throat:      Mouth: Mucous membranes are moist.      Pharynx: Oropharynx is clear.   Cardiovascular:      Rate and Rhythm: Normal rate and regular rhythm.      Comments: Wearing life vest  Pulmonary:      Effort: Pulmonary effort is normal. No respiratory distress.      Breath sounds: Normal breath sounds.   Abdominal:      General: Bowel sounds are normal.   Musculoskeletal:         General: No tenderness. Normal range of motion.   Skin:     General: Skin is warm and dry.   Neurological:      Mental Status: He is alert and oriented to person, place, and time. Mental status is at baseline.   Psychiatric:         Mood and Affect: Mood normal.         Behavior: Behavior normal.           Scheduled Meds:  Current Facility-Administered Medications   Medication Dose Route Frequency Provider Last Rate    acetaminophen  650 mg Oral Q6H PRN Shivani Yuan MD      aluminum-magnesium hydroxide-simethicone  30 mL Oral Q4H PRN Shivani Yuan MD      Artificial Tears  1 drop Left Eye Q4H PRN Shivani Yuan MD      aspirin  81 mg Oral Daily Shivani Yuan MD      atorvastatin  80 mg Oral Daily With Dinner Shivani Yuan MD      bisacodyl  10 mg Rectal Daily PRN Shivani Yuan MD      carvedilol  3.125 mg Oral BID With Meals Shivani Yuan MD      clopidogrel  75 mg Oral Daily Shivani Yuan MD      docusate sodium  100 mg Oral BID Shivani Yuan MD      gabapentin  300 mg Oral BID Shivani Yuan MD      insulin glargine  20 Units Subcutaneous HS Shivani Yuan MD      insulin lispro  2-12 Units Subcutaneous 4x Daily (AC & HS) Shivani Yuan MD      insulin lispro  6 Units Subcutaneous TID With Meals MARGARITA Hester      magnesium Oxide  800 mg Oral BID MARGARITA Hester      melatonin  6 mg Oral HS Shivani Yuan MD      menthol-methyl salicylate   Apply externally 4x Daily PRN Shivani  MD Kwabena      metFORMIN  500 mg Oral BID With Meals MARGARITA Hester      ondansetron  4 mg Intravenous Q6H PRN Shivani Yuan MD      oxyCODONE  2.5 mg Oral Q8H PRN Shivani Yuan MD      polyethylene glycol  17 g Oral BID Jennifer Soares PA-C      QUEtiapine  25 mg Oral HS Shivani Yuan MD      senna  2 tablet Oral HS Shivani Yuan MD      warfarin  7.5 mg Oral Daily (warfarin) MARGARITA Hester           Lab Results: I have reviewed the following results:  Results from last 7 days   Lab Units 02/03/25  0529 01/30/25  0559   HEMOGLOBIN g/dL 10.9* 11.6*   HEMATOCRIT % 32.5* 34.5*   WBC Thousand/uL 7.12 9.68   PLATELETS Thousands/uL 288 283     Results from last 7 days   Lab Units 02/03/25  0529 01/30/25  0559   BUN mg/dL 15 20   SODIUM mmol/L 139 138   POTASSIUM mmol/L 3.9 4.1   CHLORIDE mmol/L 102 102   CREATININE mg/dL 0.90 0.90   AST U/L  --  21   ALT U/L  --  45       Results from last 7 days   Lab Units 02/03/25  0529 01/30/25  0559 01/29/25  0630   PROTIME seconds 32.1* 25.9* 25.3*   INR  3.16* 2.37* 2.22*        Jennifer Soares PA-C  Physical Medicine and Rehabilitation   02/04/25

## 2025-02-04 NOTE — PCC OCCUPATIONAL THERAPY
2/4/25  Pt is a 69 year old male that presented to [facility] on 1/17/2025, with PMH significant for hypertension, hyperlipidemia, diabetes, osteoarthritis, who presents with near syncope and hypotension. Endorses dizziness during the event. During evaluation in the emergency department, anterior wall STEMI was identified and interventional cardiology was alerted. Patient was taken to the Cath Lab, where patient became more hypotensive requiring pressor support and intra-aortic balloon pump insertion. Critical care was consulted for admission to the intensive care unit for cardiogenic shock status post STEMI. ECHO showed EF25%, severe global hypokinesis with apical and distal septal near akinesis, poss mod size thrombus at apex, with plan for JANET pending medical stability. On 1/20, Vascular Surgery was consulted re: LE bleeding with hematoma formation. Repeat ECHO showed EF 25%, and [employer] was ordered (delivered on 1/25). Pt reports he live sin a ML level home with wife. Wife works FT but works approx 15mins fro home and can come throughout the day if needed. Pt reports PTA he was indep with all ADLs, no AD. Wife completed all IADLs. Pt has hx of arthritis especially in BUE which limit ROM, but si fx and can complete B dressing. PTA pt did not use AD. Pt presents with the following performance component deficits impacting ADL/IADL skills: BLE weakness, impaired sitting/standing  balance, decreased endurance, decreased coordination, arthritic pain, increased fall risk, new onset of impairment of functional mobility, decreased ADLS, decreased activity tolerance, decreased safety awareness, and SOB upon exertion, that result in activity limitations and/or participation restrictions. Pt to continue to benefit from continued acute rehab OT services during hospital stay to address defined deficits and to maximize level of functional independence in the following Occupational Performance areas: grooming,  bathing/shower, toilet hygiene, dressing, medication management, functional mobility, clothing management,.  Pt presents with good rehab potential. Pt is unsafe to D/C home at this time, recommending ELOS 2-3 weeks to achieve Keron level goals with least restrictive device to address these areas and resume prior occupational roles to maximize independence to reduce risk of fall and decrease risk of readmission.  POC to focus on self-care retraining, transfer training,  improving pt's activity tolerance and endurance, UE strengthening, engagement in ADLs, balance interventions. Anticipate DC home 2/12 w/ rec for home OT to follow.

## 2025-02-04 NOTE — ASSESSMENT & PLAN NOTE
"- pt reports he usually has a BM every 2-3 days but feels like pain meds are \"backing him up\"  -PRN dulcolax; add senna, colase and miralax as patient taking opiods   - monitor BM  Miralax changed to 2x a day standing and one dose of lactulose 2/3  "

## 2025-02-04 NOTE — ASSESSMENT & PLAN NOTE
ECHO on 1/19 showed possible moderately sized thrombus at the apex.  Started on Coumadin in acute setting.  Currently on Coumadin 7.5mg daily.  INR 3.16 on 2/3.  Coumadin held on 2/3.  Repeat PT/INR on Wednesday.  Follow-up with Cardiology as outpatient.

## 2025-02-04 NOTE — PROGRESS NOTES
02/04/25 1430   Pain Assessment   Pain Assessment Tool 0-10   Pain Score 6   Pain Location/Orientation Location: Shoulder;Orientation: Left;Orientation: Right   Restrictions/Precautions   Precautions Bed/chair alarms;Fall Risk;Supervision on toilet/commode  ([employer])   Sit to Stand   Type of Assistance Needed Supervision   Comment CS from higher surface Nustep seat or recliner   Sit to Stand CARE Score 4   Bed-Chair Transfer   Type of Assistance Needed Physical assistance   Comment Min A with sit pivot, and 1 with RW-   Pt will be using RW at home so would try to get away from sit pivots   Chair/Bed-to-Chair Transfer CARE Score -   Walk 10 Feet   Type of Assistance Needed Incidental touching   Comment RW   Walk 10 Feet CARE Score 4   Walk 50 Feet with Two Turns   Type of Assistance Needed Incidental touching   Comment CG with RW and chair follow   Walk 50 Feet with Two Turns CARE Score 4   Walk 150 Feet   Reason if not Attempted Safety concerns   Walk 150 Feet CARE Score 88   Walking 10 Feet on Uneven Surfaces   Reason if not Attempted Safety concerns   Walking 10 Feet on Uneven Surfaces CARE Score 88   Ambulation   Primary Mode of Locomotion Prior to Admission Walk   Distance Walked (feet) 75 ft   Assist Device Roller Walker   Walk Assist Level Contact Guard;Chair Follow   Findings CG with chair follow from same therapist-  no overt knee or hip buckle, relys heavy on UE support ,  pt is aware of telling therapist when he needs to sit - Can work on further distances each session   Does the patient walk? 2. Yes   Curb or Single Stair   Comment (S)  Should trial step with BUE on L HR early in session before shoulders and legs get tired- Pt has 3 to enter   4 Steps   Reason if not Attempted Safety concerns   4 Steps CARE Score 88   12 Steps   Reason if not Attempted Safety concerns   12 Steps CARE Score 88   Therapeutic Interventions   Strengthening Seated LAQ 3#  (pt states he could do 4's tomorrow),  Hip flex  march 3#,  Hip ext MRE mod resisted under thigh to target glutes  10x3   Flexibility Seated bilat hamstring/ gastroc stretch 2min ea   Equipment Use   NuStep 10mins LE only for knee/ hip ROM   Assessment   Treatment Assessment 60 min skilled PT session, pt discussing hes been through many rehabs before,  is very in tune with his body and likes to give therapist feedback.  Pt did walk further this session , and also showing progress with sit-stands.  Pt does not have WC at home so would encourage more std pivots vs sit pivots as he is getting better.  Would trial steps tomorrow early in session before shoulders start to bother him.  Cont LE strengthening that will aide in transfers and walking and steps to reach LTGs.   Problem List Decreased strength;Decreased range of motion;Decreased endurance;Impaired balance;Decreased mobility;Decreased safety awareness;Impaired tone;Pain   Barriers to Discharge Inaccessible home environment;Decreased caregiver support   Plan   Progress Progressing toward goals   PT Therapy Minutes   PT Time In 1430   PT Time Out 1530   PT Total Time (minutes) 60   PT Mode of treatment - Individual (minutes) 60   PT Mode of treatment - Concurrent (minutes) 0   PT Mode of treatment - Group (minutes) 0   PT Mode of treatment - Co-treat (minutes) 0   PT Mode of Treatment - Total time(minutes) 60 minutes   PT Cumulative Minutes 510   Therapy Time missed   Time missed? No

## 2025-02-04 NOTE — PROGRESS NOTES
Progress Note - Internal Medicine   Name: Joseph Aguilar 69 y.o. male I MRN: [id number]  Unit/Bed#: -02 I Date of Admission: 1/29/2025   Date of Service: 2/4/2025 I Hospital Day: 6    Assessment & Plan  Type 2 diabetes mellitus without complication, without long-term current use of insulin (HCC)  Lab Results   Component Value Date    HGBA1C 8.2 (H) 01/18/2025       Recent Labs     02/03/25  1128 02/03/25  1613 02/03/25  2047 02/04/25  0556   POCGLU 224* 175* 236* 154*       Blood Sugar Average: Last 72 hrs:  (P) [id number]    Last A1c was 8.2 on 1/18/2025.  Home regimen: Lantus 15u at HS, glipizide 5mg daily, metformin 1000mg BID, and Jardiance 12.5mg daily.  Currently receiving Lantus 20u at HS, lispro 6u with meals, and metformin 500mg BID.  Continue SSI, QID accuchecks, and cons. carb diet.  Anemia  Hgb currently stable at 10.9.  Developed large R groin hematoma s/p balloon pump.  No active s/s of bleeding.  Iron panel on 1/30: Iron sat 23%, TIBC 185, Iron 43, and Ferritin 224.  No need for iron supplementation at this time.  Continue to trend routine CBC.  Hypomagnesemia  Mg 1.7 on 2/3.  Increased magnesium oxide to 800mg BID on 2/3.  Continue to trend routine Mg.  Anterior STEMI s/p PCI with CHYNA to LM-ostial LAD and mLAD with IABP;  of LCx (1/17/2024)  Presented on 1/17 with near syncopal episode with jaw pain and hypotension.  NSTEMI alert - cardiac cath on 1/17 with CHYNA to LAD and L main with IABP support.  IABP discontinued on 1/20.  Continue ASA 81mg daily, Plavix 75mg daily, Coumadin, Lipitor 80mg daily, and Coreg 3.125mg BID.  Recommend establishing with Cardiology on discharge.  ICM with EF 25%  ECHO on 1/19 showed EF 25%, systolic function severely reduced, global hypokinesis.  Currently wearing Lifevest.  Continue Coreg 3.125mg BID.  Consider restarting home Jardiance.  Recommend establishing care with Cardiology as outpatient.  LV (left ventricular) mural thrombus  ECHO on 1/19  showed possible moderately sized thrombus at the apex.  Started on Coumadin in acute setting.  Currently on Coumadin 7.5mg daily.  INR 3.16 on 2/3.  Coumadin held on 2/3.  Repeat PT/INR on Wednesday.  Follow-up with Cardiology as outpatient.  Hematoma of right lower extremity  Developed R groin hematoma at IABP insertion site.  IR suite on  for closure of R femoral artery puncture site and control of expanding hematoma performed by Dr. Brooke (Vascular).  Monitor site daily for s/s of reoccurring bleeding.  Continue to trend H&H.  Conjunctivitis  Continue ciprofloxacin to L eye for 7-10 days.  Has been declining drops.  Osteoarthritis of multiple joints  Multiple joints - shoulders, knees, back.  Recently had steroid injection to B/L shoulders on .  Follows with Dr. Egan (Orthopedics) as outpatient.  Considerations with therapies.  Continue current pain regimen.   History of hypertension  Newly started on lisinopril 2.5mg daily in acute setting.  Continue Coreg 3.125mg BID.  Monitor BP with routine VS.  Ambulatory dysfunction  Deconditioned s/p STEMI.  Primary team following.  Continue with PT/OT.    VTE Pharmacologic Prophylaxis:   Pharmacologic: Warfarin (Coumadin)  Mechanical VTE Prophylaxis in Place: Yes - sequential compression devices.    Current Length of Stay: 6 day(s)    Current Patient Status: Inpatient Rehab     Discharge Plan: As per primary team.    Code Status: Level 1 - Full Code    Subjective:   Pt examined while pt sitting in WC in pt room.  Currently has no complaints of R groin pain.  Denies any lightheadedness, dizziness, SOB, palpitations, or CP.  Tolerating therapy well - had a shower this morning.  Had a large BM this morning after a few days of no BM.  Has no other concerns or complaints at this time.    Objective:     Vitals:   Temp (24hrs), Av.6 °F (36.4 °C), Min:96.4 °F (35.8 °C), Max:98.7 °F (37.1 °C)    Temp:  [96.4 °F (35.8 °C)-98.7 °F (37.1 °C)] 96.4 °F (35.8 °C)  HR:   [83-92] 92  Resp:  [18] 18  BP: ()/(55-71) 92/55  SpO2:  [95 %-98 %] 95 %  Body mass index is 30.15 kg/m².     Review of Systems   Constitutional:  Negative for appetite change, chills, fatigue and fever.   HENT:  Negative for trouble swallowing.    Eyes:  Negative for visual disturbance.   Respiratory:  Negative for cough, shortness of breath, wheezing and stridor.    Cardiovascular:  Negative for chest pain, palpitations and leg swelling.   Gastrointestinal:  Negative for abdominal distention, abdominal pain, constipation, diarrhea, nausea and vomiting.        LBM 2/4   Genitourinary:  Negative for difficulty urinating.   Musculoskeletal:  Negative for arthralgias, back pain and gait problem.   Neurological:  Negative for dizziness, weakness, light-headedness, numbness and headaches.   Psychiatric/Behavioral:  Negative for dysphoric mood and sleep disturbance. The patient is not nervous/anxious.    All other systems reviewed and are negative.       Input and Output Summary (last 24 hours):     No intake or output data in the 24 hours ending 02/04/25 0931    Physical Exam:     Physical Exam  Vitals and nursing note reviewed.   Constitutional:       General: He is not in acute distress.     Appearance: Normal appearance. He is obese. He is not ill-appearing.   HENT:      Head: Normocephalic and atraumatic.   Cardiovascular:      Rate and Rhythm: Normal rate and regular rhythm.      Pulses: Normal pulses.      Heart sounds: Murmur heard.      Systolic murmur is present with a grade of 1/6.      No friction rub.      Comments: Wearing LifeVest.  Pulmonary:      Effort: Pulmonary effort is normal. No respiratory distress.      Breath sounds: Normal breath sounds. No wheezing or rhonchi.   Abdominal:      General: Abdomen is flat. Bowel sounds are normal. There is no distension.      Palpations: Abdomen is soft. There is no mass.      Tenderness: There is no abdominal tenderness. There is no guarding or rebound.       Hernia: No hernia is present.   Musculoskeletal:      Cervical back: Normal range of motion and neck supple. No tenderness.      Right lower leg: No edema.      Left lower leg: No edema.   Skin:     General: Skin is warm and dry.   Neurological:      Mental Status: He is alert and oriented to person, place, and time.   Psychiatric:         Mood and Affect: Mood normal.         Behavior: Behavior normal.         Additional Data:     Labs:    Results from last 7 days   Lab Units 02/03/25  0529   WBC Thousand/uL 7.12   HEMOGLOBIN g/dL 10.9*   HEMATOCRIT % 32.5*   PLATELETS Thousands/uL 288   SEGS PCT % 63   LYMPHO PCT % 20   MONO PCT % 11   EOS PCT % 5     Results from last 7 days   Lab Units 02/03/25  0529 01/30/25  0559   SODIUM mmol/L 139 138   POTASSIUM mmol/L 3.9 4.1   CHLORIDE mmol/L 102 102   CO2 mmol/L 28 25   BUN mg/dL 15 20   CREATININE mg/dL 0.90 0.90   ANION GAP mmol/L 9 11   CALCIUM mg/dL 9.3 9.5   ALBUMIN g/dL  --  3.7   TOTAL BILIRUBIN mg/dL  --  0.85   ALK PHOS U/L  --  74   ALT U/L  --  45   AST U/L  --  21   GLUCOSE RANDOM mg/dL 165* 154*     Results from last 7 days   Lab Units 02/03/25  0529   INR  3.16*     Results from last 7 days   Lab Units 02/04/25  0556 02/03/25  2047 02/03/25  1613 02/03/25  1128 02/03/25  0526 02/02/25  2106 02/02/25  1558 02/02/25  1125 02/02/25  0646 02/01/25  2101 02/01/25  1603 02/01/25  1138   POC GLUCOSE mg/dl 154* 236* 175* 224* 181* 209* 173* 209* 154* 281* 207* 232*               Labs reviewed    Imaging:    Imaging reviewed    Recent Cultures (last 7 days):           Last 24 Hours Medication List:   Current Facility-Administered Medications   Medication Dose Route Frequency Provider Last Rate    acetaminophen  650 mg Oral Q6H PRN Shivani Yuan MD      aluminum-magnesium hydroxide-simethicone  30 mL Oral Q4H PRN Shivani Yuan MD      Artificial Tears  1 drop Left Eye Q4H PRN Shivani Yuan MD      aspirin  81 mg Oral Daily Shivani Yuan MD      atorvastatin  80  mg Oral Daily With Dinner Shivani uYan MD      bisacodyl  10 mg Rectal Daily PRN Shivani Yuan MD      bisacodyl  10 mg Rectal Once Marilyn Alexander Blanchard MD      carvedilol  3.125 mg Oral BID With Meals Shivani Yuan MD      clopidogrel  75 mg Oral Daily Shivani Yuan MD      docusate sodium  100 mg Oral BID Shivani Yuan MD      gabapentin  300 mg Oral BID Shivani Yuan MD      insulin glargine  20 Units Subcutaneous HS Shivani Yuan MD      insulin lispro  2-12 Units Subcutaneous 4x Daily (AC & HS) Shivani Yuan MD      insulin lispro  6 Units Subcutaneous TID With Meals MARGARITA Hester      magnesium Oxide  800 mg Oral BID MARGARITA Hester      melatonin  6 mg Oral HS Shivani Yuan MD      menthol-methyl salicylate   Apply externally 4x Daily PRN Shivani Yuan MD      metFORMIN  500 mg Oral BID With Meals MARGARITA Hester      ondansetron  4 mg Intravenous Q6H PRN Shivani Yuan MD      oxyCODONE  2.5 mg Oral Q8H PRN Shivani Yuan MD      polyethylene glycol  17 g Oral BID Jennifer Soares PA-C      QUEtiapine  25 mg Oral HS Shivani Yuan MD      senna  2 tablet Oral HS Shivani Yuan MD      warfarin  7.5 mg Oral Daily (warfarin) MARGARITA Hester          M*Modal software was used to dictate this note.  It may contain errors with dictating incorrect words or incorrect spelling. Please contact the provider directly with any questions.

## 2025-02-05 LAB
BASOPHILS # BLD AUTO: 0.04 THOUSANDS/ÂΜL (ref 0–0.1)
BASOPHILS NFR BLD AUTO: 1 % (ref 0–1)
EOSINOPHIL # BLD AUTO: 0.35 THOUSAND/ÂΜL (ref 0–0.61)
EOSINOPHIL NFR BLD AUTO: 5 % (ref 0–6)
ERYTHROCYTE [DISTWIDTH] IN BLOOD BY AUTOMATED COUNT: 14.6 % (ref 11.6–15.1)
GLUCOSE SERPL-MCNC: 148 MG/DL (ref 65–140)
GLUCOSE SERPL-MCNC: 151 MG/DL (ref 65–140)
GLUCOSE SERPL-MCNC: 191 MG/DL (ref 65–140)
GLUCOSE SERPL-MCNC: 217 MG/DL (ref 65–140)
HCT VFR BLD AUTO: 35.4 % (ref 36.5–49.3)
HGB BLD-MCNC: 11.2 G/DL (ref 12–17)
IMM GRANULOCYTES # BLD AUTO: 0.03 THOUSAND/UL (ref 0–0.2)
IMM GRANULOCYTES NFR BLD AUTO: 0 % (ref 0–2)
INR PPP: 2.37 (ref 0.85–1.19)
LYMPHOCYTES # BLD AUTO: 1.27 THOUSANDS/ÂΜL (ref 0.6–4.47)
LYMPHOCYTES NFR BLD AUTO: 17 % (ref 14–44)
MCH RBC QN AUTO: 31.5 PG (ref 26.8–34.3)
MCHC RBC AUTO-ENTMCNC: 31.6 G/DL (ref 31.4–37.4)
MCV RBC AUTO: 99 FL (ref 82–98)
MONOCYTES # BLD AUTO: 0.73 THOUSAND/ÂΜL (ref 0.17–1.22)
MONOCYTES NFR BLD AUTO: 10 % (ref 4–12)
NEUTROPHILS # BLD AUTO: 5.05 THOUSANDS/ÂΜL (ref 1.85–7.62)
NEUTS SEG NFR BLD AUTO: 67 % (ref 43–75)
NRBC BLD AUTO-RTO: 0 /100 WBCS
PLATELET # BLD AUTO: 276 THOUSANDS/UL (ref 149–390)
PMV BLD AUTO: 9.3 FL (ref 8.9–12.7)
PROTHROMBIN TIME: 25.8 SECONDS (ref 12.3–15)
RBC # BLD AUTO: 3.56 MILLION/UL (ref 3.88–5.62)
WBC # BLD AUTO: 7.47 THOUSAND/UL (ref 4.31–10.16)

## 2025-02-05 PROCEDURE — 97116 GAIT TRAINING THERAPY: CPT

## 2025-02-05 PROCEDURE — 82948 REAGENT STRIP/BLOOD GLUCOSE: CPT

## 2025-02-05 PROCEDURE — 99232 SBSQ HOSP IP/OBS MODERATE 35: CPT | Performed by: FAMILY MEDICINE

## 2025-02-05 PROCEDURE — 99233 SBSQ HOSP IP/OBS HIGH 50: CPT | Performed by: PHYSICAL MEDICINE & REHABILITATION

## 2025-02-05 PROCEDURE — 97530 THERAPEUTIC ACTIVITIES: CPT

## 2025-02-05 PROCEDURE — 85025 COMPLETE CBC W/AUTO DIFF WBC: CPT | Performed by: NURSE PRACTITIONER

## 2025-02-05 PROCEDURE — 85610 PROTHROMBIN TIME: CPT | Performed by: NURSE PRACTITIONER

## 2025-02-05 PROCEDURE — 97110 THERAPEUTIC EXERCISES: CPT

## 2025-02-05 RX ORDER — INSULIN LISPRO 100 [IU]/ML
3 INJECTION, SOLUTION INTRAVENOUS; SUBCUTANEOUS
Status: DISCONTINUED | OUTPATIENT
Start: 2025-02-05 | End: 2025-02-07

## 2025-02-05 RX ORDER — INSULIN GLARGINE 100 [IU]/ML
15 INJECTION, SOLUTION SUBCUTANEOUS
Status: DISCONTINUED | OUTPATIENT
Start: 2025-02-05 | End: 2025-02-10 | Stop reason: HOSPADM

## 2025-02-05 RX ADMIN — QUETIAPINE FUMARATE 25 MG: 25 TABLET ORAL at 21:24

## 2025-02-05 RX ADMIN — ATORVASTATIN CALCIUM 80 MG: 80 TABLET, FILM COATED ORAL at 17:18

## 2025-02-05 RX ADMIN — DOCUSATE SODIUM 100 MG: 100 CAPSULE, LIQUID FILLED ORAL at 08:09

## 2025-02-05 RX ADMIN — ASPIRIN 81 MG: 81 TABLET, COATED ORAL at 08:09

## 2025-02-05 RX ADMIN — Medication 800 MG: at 08:09

## 2025-02-05 RX ADMIN — INSULIN LISPRO 2 UNITS: 100 INJECTION, SOLUTION INTRAVENOUS; SUBCUTANEOUS at 08:12

## 2025-02-05 RX ADMIN — INSULIN GLARGINE 15 UNITS: 100 INJECTION, SOLUTION SUBCUTANEOUS at 21:25

## 2025-02-05 RX ADMIN — INSULIN LISPRO 4 UNITS: 100 INJECTION, SOLUTION INTRAVENOUS; SUBCUTANEOUS at 12:07

## 2025-02-05 RX ADMIN — INSULIN LISPRO 2 UNITS: 100 INJECTION, SOLUTION INTRAVENOUS; SUBCUTANEOUS at 21:24

## 2025-02-05 RX ADMIN — Medication 800 MG: at 17:18

## 2025-02-05 RX ADMIN — CLOPIDOGREL BISULFATE 75 MG: 75 TABLET ORAL at 08:09

## 2025-02-05 RX ADMIN — WARFARIN SODIUM 7.5 MG: 5 TABLET ORAL at 17:18

## 2025-02-05 RX ADMIN — INSULIN LISPRO 3 UNITS: 100 INJECTION, SOLUTION INTRAVENOUS; SUBCUTANEOUS at 12:08

## 2025-02-05 RX ADMIN — METFORMIN HYDROCHLORIDE 1000 MG: 500 TABLET ORAL at 17:18

## 2025-02-05 RX ADMIN — SENNOSIDES 17.2 MG: 8.6 TABLET ORAL at 21:24

## 2025-02-05 RX ADMIN — Medication 6 MG: at 21:24

## 2025-02-05 RX ADMIN — GABAPENTIN 300 MG: 300 CAPSULE ORAL at 17:15

## 2025-02-05 RX ADMIN — GABAPENTIN 300 MG: 300 CAPSULE ORAL at 08:09

## 2025-02-05 RX ADMIN — DOCUSATE SODIUM 100 MG: 100 CAPSULE, LIQUID FILLED ORAL at 17:19

## 2025-02-05 RX ADMIN — INSULIN LISPRO 6 UNITS: 100 INJECTION, SOLUTION INTRAVENOUS; SUBCUTANEOUS at 08:12

## 2025-02-05 RX ADMIN — Medication 2.5 MG: at 21:24

## 2025-02-05 RX ADMIN — INSULIN LISPRO 3 UNITS: 100 INJECTION, SOLUTION INTRAVENOUS; SUBCUTANEOUS at 17:20

## 2025-02-05 RX ADMIN — METFORMIN HYDROCHLORIDE 500 MG: 500 TABLET, FILM COATED ORAL at 08:09

## 2025-02-05 RX ADMIN — CARVEDILOL 3.12 MG: 3.12 TABLET, FILM COATED ORAL at 17:15

## 2025-02-05 NOTE — ASSESSMENT & PLAN NOTE
Lab Results   Component Value Date    HGBA1C 8.2 (H) 01/18/2025       Recent Labs     02/04/25  1133 02/04/25  1606 02/04/25 2034 02/05/25  0638   POCGLU 217* 180* 182* 151*       Blood Sugar Average: Last 72 hrs:  (P) [id number]    Last A1c was 8.2 on 1/18/2025.  Home regimen: Lantus 15u at HS, glipizide 5mg daily, metformin 1000mg BID, and Jardiance 12.5mg daily.  Currently receiving Lantus 20u at HS, lispro 6u with meals, and metformin 500mg BID.  Increase metformin to 1000mg daily today.  Decrease lispro to 3u with meals and Lantus 15u at HS.  Continue SSI, QID accuchecks, and cons. carb diet.

## 2025-02-05 NOTE — PROGRESS NOTES
02/05/25 1230   Pain Assessment   Pain Assessment Tool 0-10   Pain Score 4   Pain Location/Orientation Orientation: Bilateral;Location: Shoulder   Restrictions/Precautions   Precautions Bed/chair alarms;Fall Risk   Sit to Stand   Type of Assistance Needed Supervision;Adaptive equipment   Comment CS with RW   Sit to Stand CARE Score 4   Bed-Chair Transfer   Type of Assistance Needed Incidental touching;Adaptive equipment   Comment CGA with RW, W/c to recliner   Chair/Bed-to-Chair Transfer CARE Score 4   Walk 10 Feet   Type of Assistance Needed Incidental touching;Adaptive equipment   Comment RW   Walk 10 Feet CARE Score 4   Walk 50 Feet with Two Turns   Type of Assistance Needed Incidental touching;Adaptive equipment   Comment RW   Walk 50 Feet with Two Turns CARE Score 4   Walk 150 Feet   Reason if not Attempted Safety concerns   Walk 150 Feet CARE Score 88   Ambulation   Primary Mode of Locomotion Prior to Admission Walk   Distance Walked (feet) 75 ft  (75 x 1, 55 x 1)   Assist Device Roller Walker   Gait Pattern Slow Rosanne;Step to   Limitations Noted In Endurance;Balance;Strength   Findings Pt improving, reports he is relying less on his UEs using the RW   Does the patient walk? 2. Yes   Curb or Single Stair   Style negotiated Single stair   Type of Assistance Needed Physical assistance   Physical Assistance Level 25% or less   Comment Performed with B HR and with Left HR only (BUE support)Fwd up/bkwd down   1 Step (Curb) CARE Score 3   4 Steps   Type of Assistance Needed Physical assistance   Physical Assistance Level 25% or less   Comment Performed with B HR and with Left HR only (BUE support)Fwd up/bkwd down   4 Steps CARE Score 3   12 Steps   Reason if not Attempted Safety concerns   12 Steps CARE Score 88   Assessment   Treatment Assessment Pt seen for 30 min session with focus on walking and stairs. Pt able to navigate 4 steps wih B HR and then 2 steps with single Hr using B UE support. Gait distance  "and quality improving, with pt reporting legs \"feeling a little stronger and relying less on arms on walker. Progressing toward goals with plan for d/c beginning of next week at Mod I level.   Plan   Progress Progressing toward goals   PT Therapy Minutes   PT Time In 1230   PT Time Out 1300   PT Total Time (minutes) 30   PT Mode of treatment - Individual (minutes) 30   PT Mode of treatment - Concurrent (minutes) 0   PT Mode of treatment - Group (minutes) 0   PT Mode of treatment - Co-treat (minutes) 0   PT Mode of Treatment - Total time(minutes) 30 minutes   PT Cumulative Minutes 570   Therapy Time missed   Time missed? No       "

## 2025-02-05 NOTE — ASSESSMENT & PLAN NOTE
Hgb currently stable at 11.2.  Developed large R groin hematoma s/p balloon pump.  No active s/s of bleeding.  Iron panel on 1/30: Iron sat 23%, TIBC 185, Iron 43, and Ferritin 224.  No need for iron supplementation at this time.  Continue to trend routine CBC.

## 2025-02-05 NOTE — ASSESSMENT & PLAN NOTE
ECHO on 1/19 showed EF 25%, systolic function severely reduced, global hypokinesis.  Currently wearing Lifevest.  Continue Coreg 3.125mg BID.  Consider restarting home Jardiance.  Weight increasing - but appears euvolemic.  Continue to monitor daily at this time.  Recommend establishing care with Cardiology as outpatient.

## 2025-02-05 NOTE — ASSESSMENT & PLAN NOTE
"- pt reports he usually has a BM every 2-3 days but feels like pain meds are \"backing him up\"  -PRN dulcolax; add senna, colase and miralax as patient taking opiods   - monitor BM  Miralax changed to 2x a day standing and one dose of lactulose 2/3  Large BM 2/4  "

## 2025-02-05 NOTE — PLAN OF CARE
Problem: METABOLIC, FLUID AND ELECTROLYTES - ADULT  Goal: Glucose maintained within target range  Description: INTERVENTIONS:  - Monitor Blood Glucose as ordered  - Assess for signs and symptoms of hyperglycemia and hypoglycemia  - Administer ordered medications to maintain glucose within target range  - Assess nutritional intake and initiate nutrition service referral as needed  Outcome: Progressing     Problem: PAIN - ADULT  Goal: Verbalizes/displays adequate comfort level or baseline comfort level  Description: Interventions:  - Encourage patient to monitor pain and request assistance  - Assess pain using appropriate pain scale  - Administer analgesics based on type and severity of pain and evaluate response  - Implement non-pharmacological measures as appropriate and evaluate response  - Consider cultural and social influences on pain and pain management  - Notify physician/advanced practitioner if interventions unsuccessful or patient reports new pain  Outcome: Progressing

## 2025-02-05 NOTE — NUTRITION
"Pt not receptive to diet education- stated people are always talking to him about something then someone comes up behind him to say something else. Asked if I could give him printed information, he stated \"Sure, add it to the piles.\" Will provide handouts.   "

## 2025-02-05 NOTE — ASSESSMENT & PLAN NOTE
S/p drug eluting stent to the LAD and left main; unable to stent the left circumflex 1/17  IABP dc 1/20  Hypotension in cath lab requiring pressors and intra-aortic baloon pump  Cw aspirin, plavix, statin, coumadin (goal 2-3),   2/3 INR 3.16  Continue coreg 3.125 bid  Follow up cardiology outpt  Coumadin held 2/3 due to INR 3.16  Repeat INR 2.37

## 2025-02-05 NOTE — PROGRESS NOTES
Progress Note - PMR   Name: Joseph Aguilar 69 y.o. male I MRN: [id number]  Unit/Bed#: -02 I Date of Admission: 1/29/2025   Date of Service: 2/5/2025 I Hospital Day: 7     Assessment & Plan  Anterior STEMI s/p PCI with CHYNA to LM-ostial LAD and mLAD with IABP;  of LCx (1/17/2024)  S/p drug eluting stent to the LAD and left main; unable to stent the left circumflex 1/17  IABP dc 1/20  Hypotension in cath lab requiring pressors and intra-aortic baloon pump  Cw aspirin, plavix, statin, coumadin (goal 2-3),   2/3 INR 3.16  Continue coreg 3.125 bid  Follow up cardiology outpt  Coumadin held 2/3 due to INR 3.16  Repeat INR 2.37  Ambulatory dysfunction  Patient was evaluated by the rehabilitation team MD and an appropriate candidate for acute inpatient rehabilitation program at this time.  The patient will tolerate 3 hours/day 5 to 7 days/week of intensive physical, occupational therapy in order to obtain goals for community discharge  Due to the patient's functional Compared to their baseline level of function in addition to their ongoing medical needs, the patient would benefit from daily supervision from a rehabilitation physician as well as rehabilitation nursing to implement and adjust the medical as well as functional plan of care in order to meet the patient's goals.  Ok to shower for short periods of time with Lifevest removed. Replaced immediately. Staff to refer to [employer] booklet for instructions and can also call rep if needed  DC TBD    History of hypertension  Cw lisinopril and BB  Monitor vitals and titrate meds as needed  Lisinopril D/c  Mixed hyperlipidemia  -cw statin  -f/o primary care on dc  Type 2 diabetes mellitus without complication, without long-term current use of insulin (HCC)  Lab Results   Component Value Date    HGBA1C 8.2 (H) 01/18/2025       Recent Labs     02/04/25  1133 02/04/25  1606 02/04/25 2034 02/05/25  0638   POCGLU 217* 180* 182* 151*   A1c 8.2% on admission  Lantus 20u at HS  "and lispro 8u with meals  IM starting metformin 500 BID today  Monitor glucose levels  Cw diabetic diet    Blood Sugar Average: Last 72 hrs:  (P) [id number]    Anemia  Hgb 11.6 1/30, 10.9 2/3, 2/5 11.2  Monitor w/ routine blood work   Iron panel on 1/30 iron 43, ferritin 224, iron sat 23  Hypomagnesemia  Cw Mg supplementation goal >2   1.7 1/30   ICM with EF 25%  Echo:Left ventricular cavity size is normal. Wall thickness is normal. The left ventricular ejection fraction is 25%. Systolic function is severely reduced.   Cw coreg 3.125;   Cardiology consulted and recommended lifevest  F/o cardiology as outpt  LV (left ventricular) mural thrombus  TTE 1/19: \"There is a possible moderately-sized thrombus at the apex, difficult imaging.\"  Cardiology consulted on previous admission  Continue Coumadin 10 mg  Monitor PT, INR (2-3 per guidelines)  F/o cardiology outpt    Hematoma of right lower extremity  S/p removal of IABP and closure of right femoral puncture site with Perclose Pro-glide 1/20 by Dr. Brooke  Monitoring the R groin for bleeding, skin breakdown, expanding hematoma  Will consult vascular surgery with questions  Conjunctivitis  Cw cipro goal 7-10 days   Per nursing patient reports splashing urinal at his face   D/c antibiotics due to refusal  Osteoarthritis of multiple joints  Multiple joint s/p steroid inj b/l shoulders 1/6   F.w Dr. Egan as ouptatient   C/w pain control, tylenol, oxy 2.5Q8h, bengay; titrate off opiods as patient wasn't on any as outpatient   Chronic back pain  -patient reports old injury of back  -takes tylenol for pain at home  -monitor pain levels and titrate meds  -offer different modalities- heat, lidocaine patches  Does not like lidocaine patches  Constipation  - pt reports he usually has a BM every 2-3 days but feels like pain meds are \"backing him up\"  -PRN dulcolax; add senna, colase and miralax as patient taking opiods   - monitor BM  Miralax changed to 2x a day " standing and one dose of lactulose 2/3  Large BM 2/4    History of Present Illness   Joseph Aguilar is a 69 y.o. male w/ PMHx of HTN, HLD, T2DM, OA who initially presented to [facility] on 1/17 for chest pain, shortness of breath, nausea and vomiting. During initial eval, she was found to have an anterior wall STEMI and Interventional cardiology was alerted. Patient was taken to the cath lab where patient bacame more hypotensive requiring pressor support and intra-aortic balloon pump insertion. Patient received LAD, left main, stent however circumflex artery was unable to be intervened on. Patient was transferred to ICU for cardiogenic shock s/p STEMI. Noted to have LV thrombus and started on heparin gtt and continued on brilinta and ASA. He noted to have some transaminitis so Statin was on hold. TTE w/ LVEF 25%, severe global hypokinesis w/ apical and distal septal near akinesis, mod siz thrombus at apex. On 1/20 he was noted to have LE bleeding w/ hematoma formation from cath site rq manual pressure and femostop. Vascular surgery was consulted . R femoral site was closed by Dr. Brooke. IABP was d/c and heparin was held. He received 1U PRBC. Heparin was restarted and coumadin was started w/ goal 2-3. INR 2.22 1/29 and heparin was d/c. Joseph Aguilar was admitted to [facility] on 01/29/25     Chief Complaint: f/u ambulatory dysfunction    Interval: Patient seen and examined in wheelchair in room. No events overnight.  Reports overall feeling well. Last BM 2/5 and states he feels regular now. Sleeping well at night.     Review of Systems   Respiratory:  Negative for shortness of breath.    Cardiovascular:  Negative for chest pain, palpitations and leg swelling.   Gastrointestinal:  Negative for abdominal pain, constipation, diarrhea, nausea and vomiting.   Musculoskeletal:         Left shoulder pain (chronic) believes it is from using the walker more; pain goes down arm, feels  like hand is weaker, denies numbness parthesias   Neurological:  Negative for numbness.       Objective   Functional Update:  Physical Therapy Occupational Therapy Speech Therapy   Weight Bearing Status: Full Weight Bearing  Transfers: Minimal Assistance  Bed Mobility: Minimal Assistance  Amulation Distance (ft): 40 feet  Ambulation: Incidental Touching  Assistive Device for Ambulation: Roller Walker  Discharge Recommendations: Home with:  DC Home with:: Family Support   Eating: Independent  Grooming: Independent  Bathing: Incidental Touching  Bathing: Incidental Touching  Upper Body Dressing: Minimal Assistance  Lower Body Dressing: Incidental Touching  Toileting: Incidental Touching  Tub/Shower Transfer: Incidental Touching  Toilet Transfer: Incidental Touching  Cognition: Exceptions to WNL  Cognition: Decreased Safety, Behavioral Considerations  Orientation: Person, Place, Time, Situation                   Temp:  [97.5 °F (36.4 °C)-98.2 °F (36.8 °C)] 97.5 °F (36.4 °C)  HR:  [82-89] 89  Resp:  [17-19] 17  BP: ()/(50-60) 106/60  SpO2:  [95 %-96 %] 95 %    Physical Exam  Constitutional:       General: He is not in acute distress.  HENT:      Head: Normocephalic and atraumatic.      Mouth/Throat:      Mouth: Mucous membranes are moist.   Cardiovascular:      Rate and Rhythm: Normal rate and regular rhythm.      Comments: Wearing life vest  Pulmonary:      Effort: Pulmonary effort is normal. No respiratory distress.      Breath sounds: Normal breath sounds.   Abdominal:      General: Bowel sounds are normal. There is distension.   Musculoskeletal:         General: No tenderness. Normal range of motion.   Skin:     General: Skin is warm and dry.   Neurological:      Mental Status: He is alert and oriented to person, place, and time. Mental status is at baseline.   Psychiatric:         Mood and Affect: Mood normal.         Behavior: Behavior normal.           Scheduled Meds:  Current Facility-Administered  Medications   Medication Dose Route Frequency Provider Last Rate    acetaminophen  650 mg Oral Q6H PRN Shivani Yuan MD      aluminum-magnesium hydroxide-simethicone  30 mL Oral Q4H PRN Shivani Yuan MD      Artificial Tears  1 drop Left Eye Q4H PRN Shivani Yuan MD      aspirin  81 mg Oral Daily Shivani Yuna MD      atorvastatin  80 mg Oral Daily With Dinner Shivani Yuan MD      bisacodyl  10 mg Rectal Daily PRN Shivani Yuan MD      carvedilol  3.125 mg Oral BID With Meals Shivani Yuan MD      clopidogrel  75 mg Oral Daily Shivani Yuan MD      docusate sodium  100 mg Oral BID Shivani Yuan MD      gabapentin  300 mg Oral BID Shivani Yuan MD      insulin glargine  20 Units Subcutaneous HS Shivani Yuan MD      insulin lispro  2-12 Units Subcutaneous 4x Daily (AC & HS) Shivani Yuan MD      insulin lispro  6 Units Subcutaneous TID With Meals MARGARITA eHster      magnesium Oxide  800 mg Oral BID MARGARITA Hester      melatonin  6 mg Oral HS Shivani Yuan MD      menthol-methyl salicylate   Apply externally 4x Daily PRN Shivani Yuan MD      metFORMIN  500 mg Oral BID With Meals MARGARITA Hester      ondansetron  4 mg Intravenous Q6H PRN Shivani Yuan MD      oxyCODONE  2.5 mg Oral Q8H PRN Shivani Yuan MD      polyethylene glycol  17 g Oral BID Jennifer Soares PA-C      QUEtiapine  25 mg Oral HS Shivani Yuan MD      senna  2 tablet Oral HS Shivani Yuan MD      warfarin  7.5 mg Oral Daily (warfarin) MARGARITA Hester           Lab Results: I have reviewed the following results:  Results from last 7 days   Lab Units 02/05/25  0552 02/03/25  0529 01/30/25  0559   HEMOGLOBIN g/dL 11.2* 10.9* 11.6*   HEMATOCRIT % 35.4* 32.5* 34.5*   WBC Thousand/uL 7.47 7.12 9.68   PLATELETS Thousands/uL 276 288 283     Results from last 7 days   Lab Units 02/03/25  0529 01/30/25  0559   BUN mg/dL 15 20   SODIUM mmol/L 139 138   POTASSIUM mmol/L 3.9 4.1   CHLORIDE mmol/L 102 102    CREATININE mg/dL 0.90 0.90   AST U/L  --  21   ALT U/L  --  45       Results from last 7 days   Lab Units 02/05/25  0650 02/03/25  0529 01/30/25  0559   PROTIME seconds 25.8* 32.1* 25.9*   INR  2.37* 3.16* 2.37*        Jennifer Soares PA-C  Physical Medicine and Rehabilitation   02/05/25

## 2025-02-05 NOTE — ASSESSMENT & PLAN NOTE
Lab Results   Component Value Date    HGBA1C 8.2 (H) 01/18/2025       Recent Labs     02/04/25  1133 02/04/25  1606 02/04/25 2034 02/05/25  0638   POCGLU 217* 180* 182* 151*   A1c 8.2% on admission  Lantus 20u at HS and lispro 8u with meals  IM starting metformin 500 BID today  Monitor glucose levels  Cw diabetic diet    Blood Sugar Average: Last 72 hrs:  (P) [id number]

## 2025-02-05 NOTE — PROGRESS NOTES
"   02/05/25 1330   Pain Assessment   Pain Score 6   Pain Location/Orientation Orientation: Lower;Location: Back   Hospital Pain Intervention(s) Repositioned   Restrictions/Precautions   Precautions Bed/chair alarms;Supervision on toilet/commode;Pain  (Life vest)   Cognition   Arousal/Participation Alert   Attention Within functional limits   Memory Within functional limits   Following Commands Follows all commands and directions without difficulty   Subjective   Subjective \"My knee had been like this for years\"   Sit to Stand   Type of Assistance Needed Supervision;Adaptive equipment;Incidental touching   Comment CS/CGA with RW   Sit to Stand CARE Score 4   Car Transfer   Comment simulate car transfers tomorrow when safe   Walk 10 Feet   Type of Assistance Needed Incidental touching   Comment with RW   Walk 10 Feet CARE Score 4   Walk 50 Feet with Two Turns   Type of Assistance Needed Incidental touching;Adaptive equipment   Comment with RW   Walk 50 Feet with Two Turns CARE Score 4   Walk 150 Feet   Reason if not Attempted Safety concerns   Walk 150 Feet CARE Score 88   Walking 10 Feet on Uneven Surfaces   Comment Pt. stated \"I would never walk in an uneven surafce like that\" re\" mat and also offered ramp , also refused as he only goes up and ramp with a w/c.   Reason if not Attempted Refused to perform   Walking 10 Feet on Uneven Surfaces CARE Score 7   Ambulation   Primary Mode of Locomotion Prior to Admission Walk   Distance Walked (feet) 50 ft  (42, 41)   Assist Device Roller Walker   Gait Pattern Decreased foot clearance;Narrow YOVANI;Inconsistant Rosanne   Limitations Noted In Endurance;Speed   Walk Assist Level Contact Guard   Does the patient walk? 2. Yes   Wheel 50 Feet with Two Turns   Type of Assistance Needed Independent   Wheel 50 Feet with Two Turns CARE Score 6   Therapeutic Interventions   Strengthening hip flex, add squeeze with ball, hip abd and hamstring curl with green TB   Equipment Use   NuStep " "LE only X 15 mins level 3 . R knee is postioned outwards and pt. does not want to repostioned using thera band stating that \"my knee had been like that for years\". Pt. did not c.o of R knee or hip pain with this activity though.   Assessment   Treatment Assessment Pt. able to engaged in 60 mins session but limits himself with some activities offered to him like walking on unveven surface and ramp. Pt. has some low back pain and intermittent L hip pain but otherwise no other complaints reported. Pt. remained in w/c at end of session so he can move around in room, alarms were on and nurse made aware. Cont with PT POC as tolerated   Problem List Decreased strength;Decreased range of motion;Decreased endurance;Pain;Impaired balance;Decreased safety awareness   Barriers to Discharge Inaccessible home environment;Decreased caregiver support   PT Barriers   Functional Limitation Car transfers;Stair negotiation;Standing;Transfers;Walking   Plan   Treatment/Interventions Functional transfer training;LE strengthening/ROM;Elevations;Therapeutic exercise;Endurance training;Patient/family training;Equipment eval/education;Bed mobility;Gait training   Discharge Recommendation   Rehab Resource Intensity Level, PT   (Out patient PT vs Cardiac rehab)   Equipment Recommended Walker   PT Therapy Minutes   PT Time In 1330   PT Time Out 1430   PT Total Time (minutes) 60   PT Mode of treatment - Individual (minutes) 60   PT Mode of treatment - Concurrent (minutes) 0   PT Mode of treatment - Group (minutes) 0   PT Mode of treatment - Co-treat (minutes) 0   PT Mode of Treatment - Total time(minutes) 60 minutes   PT Cumulative Minutes 600   Therapy Time missed   Time missed? No       "

## 2025-02-05 NOTE — ASSESSMENT & PLAN NOTE
Patient was evaluated by the rehabilitation team MD and an appropriate candidate for acute inpatient rehabilitation program at this time.  The patient will tolerate 3 hours/day 5 to 7 days/week of intensive physical, occupational therapy in order to obtain goals for community discharge  Due to the patient's functional Compared to their baseline level of function in addition to their ongoing medical needs, the patient would benefit from daily supervision from a rehabilitation physician as well as rehabilitation nursing to implement and adjust the medical as well as functional plan of care in order to meet the patient's goals.  Ok to shower for short periods of time with Lifevest removed. Replaced immediately. Staff to refer to [employer] booklet for instructions and can also call rep if needed  JHOAN TBD

## 2025-02-05 NOTE — PROGRESS NOTES
"   02/05/25 0800   Pain Assessment   Pain Assessment Tool 0-10   Pain Score 6   Pain Location/Orientation Orientation: Bilateral;Location: Shoulder;Location: Back   Restrictions/Precautions   Precautions Bed/chair alarms;Supervision on toilet/commode   Cognition   Overall Cognitive Status WFL   Sit to Stand   Type of Assistance Needed Supervision   Sit to Stand CARE Score 4   Toilet Transfer   Type of Assistance Needed Supervision;Adaptive equipment   Comment Pt positioning w/c himslef, performs SPT with grab bar on/off toilet   Toilet Transfer CARE Score 4   Other Comments   Comments Pt up in w/c, mobility in room while in chair to change his Lifevest Harness and T-shirt. No assistance needed.   Assessment   Treatment Assessment Pt received up in w/c, reporting shoulders are \"wiped out from yesterday\". Pt performing mobility in room with w/c to use bathroom and change lifevest harness. Toilet transfer with grab bar only and supervision. Plan to work on stairs and walking this afternoon.   PT Therapy Minutes   PT Time In 0800   PT Time Out 0830   PT Total Time (minutes) 30   PT Mode of treatment - Individual (minutes) 30   PT Mode of treatment - Concurrent (minutes) 0   PT Mode of treatment - Group (minutes) 0   PT Mode of treatment - Co-treat (minutes) 0   PT Mode of Treatment - Total time(minutes) 30 minutes   PT Cumulative Minutes 540   Therapy Time missed   Time missed? No       "

## 2025-02-05 NOTE — TEAM CONFERENCE
Acute RehabilitationTeam Conference Note  Date: 2/5/2025   Time: 11:31 AM       Patient Name:  Joseph Aguilar       Medical Record Number: [id number]   YOB: 1955  Sex: Male          Room/Bed:  [facility] 270/[facility] 270-02  Payor Info:  Payor: MEDICARE / Plan: MEDICARE A AND B / Product Type: Medicare A & B Fee for Service /      Admitting Diagnosis: STEMI (ST elevation myocardial infarction) ([facility]) [I21.3]   Admit Date/Time:  1/29/2025  1:27 PM  Admission Comments: No comment available     Primary Diagnosis:  Ambulatory dysfunction  Principal Problem: Ambulatory dysfunction    Patient Active Problem List    Diagnosis Date Noted    Osteoarthritis of multiple joints 01/29/2025    Chronic back pain 01/29/2025    Constipation 01/29/2025    Ambulatory dysfunction 01/29/2025    Conjunctivitis 01/25/2025    Subtherapeutic anticoagulation 01/24/2025    ICM with EF 25% 01/20/2025    LV (left ventricular) mural thrombus 01/20/2025    Hematoma of right lower extremity 01/20/2025    Anterior STEMI s/p PCI with CHYNA to LM-ostial LAD and mLAD with IABP;  of LCx (1/17/2024) 01/17/2025    Morbid (severe) obesity due to excess calories ([facility]) 10/07/2024    Glenohumeral arthritis, left 11/29/2022    Right foot pain 01/24/2022    Vitamin D deficiency 01/20/2022    Anemia 01/20/2022    Hypomagnesemia 01/20/2022    Closed nondisplaced intertrochanteric fracture of left femur with routine healing 01/19/2022    Pain in both feet 01/19/2022    Polyneuropathy associated with underlying disease ([facility]) 01/19/2022    Slow transit constipation 01/19/2022    S/P TKR (total knee replacement), right 11/21/2018    Obesity (BMI 30-39.9) 10/29/2018    Primary osteoarthritis of both knees 07/18/2018    Glenohumeral arthritis, right 12/06/2016    Mixed hyperlipidemia 04/23/2013    History of hypertension 07/12/2012    Type 2 diabetes mellitus without complication, without long-term current use of insulin ([facility]) 07/12/2012       Physical  Therapy:    Weight Bearing Status: Full Weight Bearing  Transfers: Minimal Assistance  Bed Mobility: Minimal Assistance  Amulation Distance (ft): 40 feet  Ambulation: Incidental Touching  Assistive Device for Ambulation: Roller Walker  Discharge Recommendations: Home with:  DC Home with:: Family Support    Pt 69 yr old male present to [facility] on 1/17/25 with near syncope/hypotension, nausea and vomiting, headache.  Noted to have + ant wall STEMI to cardiac cath for PCI. Patient was transferred to ICU for cardiogenic shock s/p STEMI. Noted to have LV thrombus. He noted to have some transaminitis. On 1/20 he was noted to have LE bleeding w/ hematoma formation from cath site rq manual pressure and femostop.  R femoral site was closed by Dr. Brooke. He received 1U PRBC and given life vest (in place on [facility] admission). PMH: HTN, HL, DM, OA all joints and back (h/o B TKR - multiple surgeries, dec B knee AROM). At baseline pt fully I at home with first floor living 3 ALEXSANDRA B HR, use of SPC PRN. + , 2 large dogs. Pt OOB in recliner, life vest in place, c/o generalized pain all over since 1974. Reports R LE was not working until a day or 2 ago, sensation improving, reports has not been able to tolerate much weight on his R LE, did a little this AM with OT. Pt currently presents needing x2 A for swap out, vs mod A for SBT due to dec stand and transfers at this time, B shoulder OA also affecting standing and transfers. Pt does feel it is improving since surgery. Pt demonstrates fairly good rehab potential to reach mod I goals with LRAD pending progress and recovery for safe return home with family support and possibly home PT (has gone to outpt PT [facility] in the past) in OS x2 weeks.     2-4-25  Pt making progress in PT for first time ambulated 40-50ft with RW.  Pt continues to have difficulty getting up from chair due to chronic OA in knees and shoulders.  Pts functional mobility fluctuates from Min A to Close S. Pt  needs to perform 3 steps to enter home which he has not performed in therapy yet.  Pt needs continued therapy to work steps and functional ambulation to reach house hold independence. Pt has wife but would be limited help as she works. Continue skilled PT to maximize fucntional returns and stair training. Anticipate DC home early next week. F/u services TBD.    Occupational Therapy:  Eating: Independent  Grooming: Independent  Bathing: Incidental Touching  Bathing: Incidental Touching  Upper Body Dressing: Minimal Assistance  Lower Body Dressing: Incidental Touching  Toileting: Incidental Touching  Tub/Shower Transfer: Incidental Touching  Toilet Transfer: Incidental Touching  Cognition: Exceptions to WNL  Cognition: Decreased Safety, Behavioral Considerations  Orientation: Person, Place, Time, Situation  Discharge Recommendations: Home with:  DC Home with:: Home Occupational Therapy, First Floor Setup, Family Support       2/4/25  Pt is a 69 year old male that presented to [facility] on 1/17/2025, with PMH significant for hypertension, hyperlipidemia, diabetes, osteoarthritis, who presents with near syncope and hypotension. Endorses dizziness during the event. During evaluation in the emergency department, anterior wall STEMI was identified and interventional cardiology was alerted. Patient was taken to the Cath Lab, where patient became more hypotensive requiring pressor support and intra-aortic balloon pump insertion. Critical care was consulted for admission to the intensive care unit for cardiogenic shock status post STEMI. ECHO showed EF25%, severe global hypokinesis with apical and distal septal near akinesis, poss mod size thrombus at apex, with plan for JANET pending medical stability. On 1/20, Vascular Surgery was consulted re: LE bleeding with hematoma formation. Repeat ECHO showed EF 25%, and LifeVest was ordered (delivered on 1/25). Pt reports he live sin a ML level home with wife. Wife  works FT but works approx 15mins fro home and can come throughout the day if needed. Pt reports PTA he was indep with all ADLs, no AD. Wife completed all IADLs. Pt has hx of arthritis especially in BUE which limit ROM, but si fx and can complete B dressing. PTA pt did not use AD. Pt presents with the following performance component deficits impacting ADL/IADL skills: BLE weakness, impaired sitting/standing  balance, decreased endurance, decreased coordination, arthritic pain, increased fall risk, new onset of impairment of functional mobility, decreased ADLS, decreased activity tolerance, decreased safety awareness, and SOB upon exertion, that result in activity limitations and/or participation restrictions. Pt to continue to benefit from continued acute rehab OT services during hospital stay to address defined deficits and to maximize level of functional independence in the following Occupational Performance areas: grooming, bathing/shower, toilet hygiene, dressing, medication management, functional mobility, clothing management,.  Pt presents with good rehab potential. Pt is unsafe to D/C home at this time, recommending ELOS 2-3 weeks to achieve Keron level goals with least restrictive device to address these areas and resume prior occupational roles to maximize independence to reduce risk of fall and decrease risk of readmission.  POC to focus on self-care retraining, transfer training,  improving pt's activity tolerance and endurance, UE strengthening, engagement in ADLs, balance interventions. Anticipate DC home 2/12 w/ rec for home OT to follow.    Speech Therapy:           No notes on file    Nursing Notes:  Appetite: Good  Diet Type: Diabetic, Thin Liquids                                                                     Pain Location/Orientation: Orientation: Bilateral, Location: Shoulder  Pain Score: 8                       Hospital Pain Intervention(s): Medication (See MAR)          Joseph Aguilar is a  69 y.o. male w/ PMHx of HTN, HLD, T2DM, OA who initially presented to [facility] on 1/17 for chest pain, shortness of breath, nausea and vomiting. During initial eval, she was found to have an anterior wall STEMI and Interventional cardiology was alerted. Patient was taken to the cath lab where patient bacame more hypotensive requiring pressor support and intra-aortic balloon pump insertion. Patient received LAD, left main, stent however circumflex artery was unable to be intervened on. Patient was transferred to ICU for cardiogenic shock s/p STEMI. Noted to have LV thrombus and started on heparin gtt and continued on brilinta and ASA. He noted to have some transaminitis so Statin was on hold. TTE w/ LVEF 25%, severe global hypokinesis w/ apical and distal septal near akinesis, mod siz thrombus at apex. On 1/20 he was noted to have LE bleeding w/ hematoma formation from cath site rq manual pressure and femostop. Vascular surgery was consulted . R femoral site was closed by Dr. Brooke.  IABP was d/c and heparin was held. He received 1U PRBC. Heparin was restarted and coumadin was started w/ goal 2-3. INR 2.22 1/29 and heparin was d/c.   Joseph Aguilar [facility] on [facility]29/25       Pain managed with gabapentin 300mg BID, tylenol 650 mg q6hrs prn, bengay cream 4x daily prn for joints, and oxy 2.5 mg q8hrs prn. DM managed with Lantus 20u at HS and lispro 6u with meals, SSI, QID accuchecks, and cons. carb diet. Hypomagnesemia managed with magnesium oxide 400mg BID. Anterior STEMI managed with ASA 81mg daily, Plavix 75mg daily, Coumadin 7.5, Lipitor 80mg daily, and Coreg 3.125mg BID. Hx of HTN managed with Coreg 3.125mg BID and lisinopril 2.5mg daily. Insomnia managed with melatonin 6 mg and Seroquel 25mg at bedtime. Bowel regimen managed with senokot 17.2 mg daily, miralax 17g packet daily, colace 100mg BID and Dulcolax Rectal Suppository PRN.    This week we will  monitor vital signs and lab values.  We will manage pain with the above orders so that the patient can participate in his therapy sessions to his optimal abilities.  We will teach the pt how to conserve energy so that he may perform ADL's independently as much as he can.  We will educate the pt on the importance of repositioning and off-loading pressure to help lower the risk of skin breakdown.  We will prevent falls by keeping personel items and call bell with in reach, we will also maintain hourly rounding.       Case Management:     Discharge Planning  Living Arrangements: Lives w/ Spouse/significant other  Support Systems: Spouse/significant other  Assistance Needed: to be determined  Type of Current Residence: Private residence  Current Home Care Services: No  CM continues to follow to assist with d/c planning, including setup of follow up services and DME acquisition as recommended by team.     Is the patient actively participating in therapies? yes  List any modifications to the treatment plan: none    Barriers Interventions   Cardiomyopathy with with reduced EF of 25% s/p cardiac infarct  Lifevest - patient training on new use of this device    Chronic OA and pain - shoulders and knees B/L Task modification, stretching/ROM, tylenol    Heart thrombus  New coumadin    Diabetes Medication management, pt teaching on changing diabetic management    ALEXSANDRA Stair training, LE strengthening, gait training      Impaired standing balance/tolerance RW training, standing balance retraining             Is the patient making expected progress toward goals? yes  List any update or changes to goals: none    Medical Goals: Patient will be medically stable for discharge to [facility] upon completion of rehab program and Patient will be able to manage medical conditions and comorbid conditions with medications and follow up upon completion of rehab program    Weekly Team Goals:   Rehab Team Goals  ADL Team  Goal: Patient will be independent with ADLs with least restrictive device upon completion of rehab program  Bowel/Bladder Team Goal: Patient will return to premorbid level for bladder/bowel management upon completion of rehab program  Transfer Team Goal: Patient will be independent with transfers with least restrictive device upon completion of rehab program  Locomotion Team Goal: Patient will be independent with locomotion with least restrictive device upon completion of rehab program  Cognitive Team Goal: Patient will be independent for basic and complex tasks upon completion of rehab program    Discussion: Currently pt is performing at contact guard level level for ADLs. Mobility levels: bed mobility at supervision level ;  functional transfers at contact guard level with RW; functional mobility at  contact guard level with RW - walks up to 50 feet; Stairs: training to be initiated today. Pt has demonstrated progress in transfers/mobility, with working at RW level instead of w/c level. Pt continues to require skilled intervention to address the above stated barriers. Current plan for d/c is Tuesday 2/11 with OP PT.       Anticipated Discharge Date:  Tuesday 2/11  [facility] Team Members Present:  The following team members are supervising care for this patient and were present during this Weekly Team Conference.    Physician: Dr. Lo MD  : Esther Loaiza, MS, OTR/L  Registered Nurse: Thu Edouard RN  Physical Therapist: Hamida Jules, BRITTANY  Occupational Therapist: Margie Samuel, MS, OTR/L

## 2025-02-05 NOTE — ASSESSMENT & PLAN NOTE
ECHO on 1/19 showed possible moderately sized thrombus at the apex.  Started on Coumadin in acute setting.  INR 2.37 today.  Continue 7.5mg Coumadin daily.  Repeat PT/INR on Friday.  Follow-up with Cardiology as outpatient.

## 2025-02-05 NOTE — PLAN OF CARE
Problem: MOBILITY - ADULT  Goal: Maintain or return to baseline ADL function  Description: INTERVENTIONS:  -  Assess patient's ability to carry out ADLs; assess patient's baseline for ADL function and identify physical deficits which impact ability to perform ADLs (bathing, care of mouth/teeth, toileting, grooming, dressing, etc.)  - Assess/evaluate cause of self-care deficits   - Assess range of motion  - Assess patient's mobility; develop plan if impaired  - Assess patient's need for assistive devices and provide as appropriate  - Encourage maximum independence but intervene and supervise when necessary  - Involve family in performance of ADLs  - Assess for home care needs following discharge   - Consider OT consult to assist with ADL evaluation and planning for discharge  - Provide patient education as appropriate  Outcome: Progressing     Problem: PAIN - ADULT  Goal: Verbalizes/displays adequate comfort level or baseline comfort level  Description: Interventions:  - Encourage patient to monitor pain and request assistance  - Assess pain using appropriate pain scale  - Administer analgesics based on type and severity of pain and evaluate response  - Implement non-pharmacological measures as appropriate and evaluate response  - Consider cultural and social influences on pain and pain management  - Notify physician/advanced practitioner if interventions unsuccessful or patient reports new pain  Outcome: Progressing

## 2025-02-05 NOTE — CASE MANAGEMENT
CM NOTE      Met with pt, informed him of plan for d/c home Tuesday 2/11. Recommendation for OP PT, pt inquires if he will be allowed to drive upon d/c. CM informed him will ask medical staff regarding driving clearance. He is aware pt may not be medically cleared to drive and in that event, he would refuse home care/home therapy (or guy at home) and do exercises provided to him by [facility] therapists until he was cleared to drive and then will go to OP PT at [facility] when he is cleared to drive.       Pt also wanted medical staff to know [facility], and he is aware this can take some time to do. CM informed medical providers.

## 2025-02-05 NOTE — ASSESSMENT & PLAN NOTE
Hgb 11.6 1/30, 10.9 2/3, 2/5 11.2  Monitor w/ routine blood work   Iron panel on 1/30 iron 43, ferritin 224, iron sat 23

## 2025-02-05 NOTE — PCC CARE MANAGEMENT
CM continues to follow to assist with d/c planning, including setup of follow up services and DME acquisition as recommended by team.

## 2025-02-05 NOTE — PROGRESS NOTES
Progress Note - Internal Medicine   Name: Joseph Aguilar 69 y.o. male I MRN: [id number]  Unit/Bed#: -02 I Date of Admission: 1/29/2025   Date of Service: 2/5/2025 I Hospital Day: 7    Assessment & Plan  Type 2 diabetes mellitus without complication, without long-term current use of insulin (HCC)  Lab Results   Component Value Date    HGBA1C 8.2 (H) 01/18/2025       Recent Labs     02/04/25  1133 02/04/25  1606 02/04/25  2034 02/05/25  0638   POCGLU 217* 180* 182* 151*       Blood Sugar Average: Last 72 hrs:  (P) [id number]    Last A1c was 8.2 on 1/18/2025.  Home regimen: Lantus 15u at HS, glipizide 5mg daily, metformin 1000mg BID, and Jardiance 12.5mg daily.  Currently receiving Lantus 20u at HS, lispro 6u with meals, and metformin 500mg BID.  Increase metformin to 1000mg daily today.  Decrease lispro to 3u with meals and Lantus 15u at HS.  Continue SSI, QID accuchecks, and cons. carb diet.  Anemia  Hgb currently stable at 11.2.  Developed large R groin hematoma s/p balloon pump.  No active s/s of bleeding.  Iron panel on 1/30: Iron sat 23%, TIBC 185, Iron 43, and Ferritin 224.  No need for iron supplementation at this time.  Continue to trend routine CBC.  Hypomagnesemia  Mg 1.7 on 2/3.  Increased magnesium oxide to 800mg BID on 2/3.  Continue to trend routine Mg.  Anterior STEMI s/p PCI with CHYNA to LM-ostial LAD and mLAD with IABP;  of LCx (1/17/2024)  Presented on 1/17 with near syncopal episode with jaw pain and hypotension.  NSTEMI alert - cardiac cath on 1/17 with CHYNA to LAD and L main with IABP support.  IABP discontinued on 1/20.  Continue ASA 81mg daily, Plavix 75mg daily, Coumadin, Lipitor 80mg daily, and Coreg 3.125mg BID.  Recommend establishing with Cardiology on discharge.  ICM with EF 25%  ECHO on 1/19 showed EF 25%, systolic function severely reduced, global hypokinesis.  Currently wearing Lifevest.  Continue Coreg 3.125mg BID.  Consider restarting home Jardiance.  Weight  increasing - but appears euvolemic.  Continue to monitor daily at this time.  Recommend establishing care with Cardiology as outpatient.  LV (left ventricular) mural thrombus  ECHO on 1/19 showed possible moderately sized thrombus at the apex.  Started on Coumadin in acute setting.  INR 2.37 today.  Continue 7.5mg Coumadin daily.  Repeat PT/INR on Friday.  Follow-up with Cardiology as outpatient.  Hematoma of right lower extremity  Developed R groin hematoma at IABP insertion site.  IR suite on 1/20 for closure of R femoral artery puncture site and control of expanding hematoma performed by Dr. Brooke (Vascular).  Monitor site daily for s/s of reoccurring bleeding.  Continue to trend H&H.  Conjunctivitis  Continue ciprofloxacin to L eye for 7-10 days.  Has been declining drops.  Osteoarthritis of multiple joints  Multiple joints - shoulders, knees, back.  Recently had steroid injection to B/L shoulders on 1/6.  Follows with Dr. Egan (Orthopedics) as outpatient.  Considerations with therapies.  Continue current pain regimen.   History of hypertension  Newly started on lisinopril 2.5mg daily in acute setting.  Continue Coreg 3.125mg BID.  Monitor BP with routine VS.  Ambulatory dysfunction  Deconditioned s/p STEMI.  Primary team following.  Continue with PT/OT.    VTE Pharmacologic Prophylaxis:   Pharmacologic: Warfarin (Coumadin)  Mechanical VTE Prophylaxis in Place: Yes - sequential compression devices.    Current Length of Stay: 7 day(s)    Current Patient Status: Inpatient Rehab     Discharge Plan: As per primary team.    Code Status: Level 1 - Full Code    Subjective:   Pt examined while pt sitting in WC in pt room.  Did not sleep well last night and is feeling tired today.  Denies any lightheadedness, dizziness, SOB, palpitations, or CP.  Denies any lower extremity edema.  Reviewed weight gain and appears euvolemic on exam.  Educated pt on monitoring daily weights and watching for fluid retention.   Acknowledged understanding.  Also agreeable to speaking with nutritionist in regards to dietary changes.    Objective:     Vitals:   Temp (24hrs), Av.9 °F (36.6 °C), Min:97.5 °F (36.4 °C), Max:98.2 °F (36.8 °C)    Temp:  [97.5 °F (36.4 °C)-98.2 °F (36.8 °C)] 97.5 °F (36.4 °C)  HR:  [82-89] 89  Resp:  [17-19] 17  BP: ()/(50-60) 106/60  SpO2:  [95 %-96 %] 95 %  Body mass index is 30.29 kg/m².     Review of Systems   Constitutional:  Positive for fatigue. Negative for appetite change, chills and fever.   HENT:  Negative for trouble swallowing.    Eyes:  Negative for visual disturbance.   Respiratory:  Negative for cough, shortness of breath, wheezing and stridor.    Cardiovascular:  Negative for chest pain, palpitations and leg swelling.   Gastrointestinal:  Negative for abdominal distention, abdominal pain, constipation, diarrhea, nausea and vomiting.        LBM 2/5   Genitourinary:  Negative for difficulty urinating.   Musculoskeletal:  Negative for arthralgias, back pain and gait problem.   Neurological:  Negative for dizziness, weakness, light-headedness, numbness and headaches.   Psychiatric/Behavioral:  Positive for sleep disturbance (difficulty sleeping last night). Negative for dysphoric mood. The patient is not nervous/anxious.    All other systems reviewed and are negative.       Input and Output Summary (last 24 hours):       Intake/Output Summary (Last 24 hours) at 2025 0955  Last data filed at 2025 1701  Gross per 24 hour   Intake 840 ml   Output --   Net 840 ml       Physical Exam:     Physical Exam  Vitals and nursing note reviewed.   Constitutional:       General: He is not in acute distress.     Appearance: Normal appearance. He is obese. He is not ill-appearing.   HENT:      Head: Normocephalic and atraumatic.   Cardiovascular:      Rate and Rhythm: Normal rate and regular rhythm.      Pulses: Normal pulses.      Heart sounds: Murmur heard.      Systolic murmur is present with a  grade of 1/6.      No friction rub.      Comments: Wearing LifeVest.  Pulmonary:      Effort: Pulmonary effort is normal. No respiratory distress.      Breath sounds: Normal breath sounds. No wheezing or rhonchi.   Abdominal:      General: Abdomen is flat. Bowel sounds are normal. There is no distension.      Palpations: Abdomen is soft.      Tenderness: There is no abdominal tenderness.   Musculoskeletal:      Cervical back: Normal range of motion and neck supple.      Right lower leg: Edema (Minimal non-pitting edema) present.      Left lower leg: No edema.   Skin:     General: Skin is warm and dry.   Neurological:      Mental Status: He is alert and oriented to person, place, and time.   Psychiatric:         Mood and Affect: Mood normal.         Behavior: Behavior normal.         Additional Data:     Labs:    Results from last 7 days   Lab Units 02/05/25  0552   WBC Thousand/uL 7.47   HEMOGLOBIN g/dL 11.2*   HEMATOCRIT % 35.4*   PLATELETS Thousands/uL 276   SEGS PCT % 67   LYMPHO PCT % 17   MONO PCT % 10   EOS PCT % 5     Results from last 7 days   Lab Units 02/03/25  0529 01/30/25  0559   SODIUM mmol/L 139 138   POTASSIUM mmol/L 3.9 4.1   CHLORIDE mmol/L 102 102   CO2 mmol/L 28 25   BUN mg/dL 15 20   CREATININE mg/dL 0.90 0.90   ANION GAP mmol/L 9 11   CALCIUM mg/dL 9.3 9.5   ALBUMIN g/dL  --  3.7   TOTAL BILIRUBIN mg/dL  --  0.85   ALK PHOS U/L  --  74   ALT U/L  --  45   AST U/L  --  21   GLUCOSE RANDOM mg/dL 165* 154*     Results from last 7 days   Lab Units 02/05/25  0650   INR  2.37*     Results from last 7 days   Lab Units 02/05/25  0638 02/04/25  2034 02/04/25  1606 02/04/25  1133 02/04/25  0556 02/03/25  2047 02/03/25  1613 02/03/25  1128 02/03/25  0526 02/02/25  2106 02/02/25  1558 02/02/25  1125   POC GLUCOSE mg/dl 151* 182* 180* 217* 154* 236* 175* 224* 181* 209* 173* 209*               Labs reviewed    Imaging:    Imaging reviewed    Recent Cultures (last 7 days):           Last 24 Hours Medication  List:   Current Facility-Administered Medications   Medication Dose Route Frequency Provider Last Rate    acetaminophen  650 mg Oral Q6H PRN Shivani Yuan MD      aluminum-magnesium hydroxide-simethicone  30 mL Oral Q4H PRN Shivani Yuan MD      Artificial Tears  1 drop Left Eye Q4H PRN Shivani Yuan MD      aspirin  81 mg Oral Daily Shivani Yuan MD      atorvastatin  80 mg Oral Daily With Dinner Shivani Yuan MD      bisacodyl  10 mg Rectal Daily PRN Shivani Yuan MD      carvedilol  3.125 mg Oral BID With Meals Shivani Yuan MD      clopidogrel  75 mg Oral Daily Shivani Yuan MD      docusate sodium  100 mg Oral BID Shivani Yuan MD      gabapentin  300 mg Oral BID Shivani Yuan MD      insulin glargine  15 Units Subcutaneous HS MARGARITA Hester      insulin lispro  2-12 Units Subcutaneous 4x Daily (AC & HS) Shivani Yuan MD      insulin lispro  3 Units Subcutaneous TID With Meals MARGARITA Hester      magnesium Oxide  800 mg Oral BID MARGARITA Hester      melatonin  6 mg Oral HS Shivani Yuan MD      menthol-methyl salicylate   Apply externally 4x Daily PRN Shivani Yuan MD      metFORMIN  1,000 mg Oral BID With Meals MARGARITA Hester      ondansetron  4 mg Intravenous Q6H PRN Shivani Yuan MD      oxyCODONE  2.5 mg Oral Q8H PRN Shivani Yuan MD      polyethylene glycol  17 g Oral BID Jennifer Soares PA-C      QUEtiapine  25 mg Oral HS Shivani Yuan MD      senna  2 tablet Oral HS Shivani Yuan MD      warfarin  7.5 mg Oral Daily (warfarin) MARGARITA Hester          M*Modal software was used to dictate this note.  It may contain errors with dictating incorrect words or incorrect spelling. Please contact the provider directly with any questions.

## 2025-02-05 NOTE — PROGRESS NOTES
02/05/25 0900   Pain Assessment   Pain Assessment Tool 0-10   Pain Score 6   Pain Location/Orientation Orientation: Bilateral;Location: Shoulder   Pain Onset/Description Onset: Ongoing;Descriptor: Sharp;Descriptor: Aching  (inc with movt)   Patient's Stated Pain Goal No pain   Restrictions/Precautions   Precautions Bed/chair alarms;Supervision on toilet/commode   Weight Bearing Restrictions No   ROM Restrictions No   Sit to Stand   Type of Assistance Needed Supervision;Adaptive equipment   Comment CS with RW and higher surface   Sit to Stand CARE Score 4   Bed-Chair Transfer   Type of Assistance Needed Incidental touching;Adaptive equipment   Comment CGA SPT with RW. engaged pt in various SPT with RW including lower surfaces as pt reports he has various chair at home pending which room he is in. able to complete but reports inc pain/discomfort at shoulders   Chair/Bed-to-Chair Transfer CARE Score 4   Toilet Transfer   Type of Assistance Needed Incidental touching;Adaptive equipment   Comment CGA SPT with RW   Toilet Transfer CARE Score 4   Meal Prep   Meal Preparation (S)  complete next session and introduced folding walker tray   Health Management   Health Management Level of Assistance Close supervision   Health Management provided pt with med reference sheet including name, purpose, frequency and dosage. pt receptive to sheet. Pt currently on 13 meds. Reporting PTA he would take them from bottles. therapist recommend use of pill organizer Am/PM but pt delcined. engaged pt in tangible task verbally. reports he owuld place all 2x/day pill on L side of counter and 1x/day pills on R side of counter. reports he would take the 2x/day 9am and remember to take 2nd dose at PM. Pt completing at SUP, can benefit from further practice and making any changes.   Cognition   Overall Cognitive Status WFL   Arousal/Participation Alert;Cooperative   Attention Within functional limits   Orientation Level Oriented X4   Memory  Within functional limits   Following Commands Follows all commands and directions without difficulty   Activity Tolerance   Activity Tolerance Patient tolerated treatment well   Assessment   Treatment Assessment Engaged pt in 60mins of skilled OT services with focus on activity tolerance, SPT, STS from lower surfaces. Pt is making steady gains and progressing to transfers with RW. Therapist updated board and communicated with GORDON Johnson. Recommend looking into fall alert/medical alert system with handout provided and pt verbalized understanding to to prevent falls. Cont OT POC with focus on transfers, activity tolerance, balance, meal prep to inc fx performance and dec CG burden.   Prognosis Good   Problem List Decreased strength;Decreased range of motion;Decreased endurance;Impaired balance;Decreased mobility;Decreased safety awareness;Impaired tone;Pain   Barriers to Discharge Inaccessible home environment;Decreased caregiver support   Plan   Treatment/Interventions ADL retraining;Functional transfer training;Therapeutic exercise;Endurance training;Patient/family training;Bed mobility;Compensatory technique education   Progress Progressing toward goals   Discharge Recommendation   Rehab Resource Intensity Level, OT   (pt has all DME. declined BSC, reports he uses sink on R side for support)   OT Therapy Minutes   OT Time In 0900   OT Time Out 1000   OT Total Time (minutes) 60   OT Mode of treatment - Individual (minutes) 60   OT Mode of treatment - Concurrent (minutes) 0   OT Mode of treatment - Group (minutes) 0   OT Mode of treatment - Co-treat (minutes) 0   OT Mode of Treatment - Total time(minutes) 60 minutes   OT Cumulative Minutes 510   Therapy Time missed   Time missed? No

## 2025-02-06 LAB
GLUCOSE SERPL-MCNC: 138 MG/DL (ref 65–140)
GLUCOSE SERPL-MCNC: 175 MG/DL (ref 65–140)
GLUCOSE SERPL-MCNC: 178 MG/DL (ref 65–140)
GLUCOSE SERPL-MCNC: 97 MG/DL (ref 65–140)

## 2025-02-06 PROCEDURE — 97116 GAIT TRAINING THERAPY: CPT

## 2025-02-06 PROCEDURE — 97110 THERAPEUTIC EXERCISES: CPT

## 2025-02-06 PROCEDURE — 99232 SBSQ HOSP IP/OBS MODERATE 35: CPT | Performed by: FAMILY MEDICINE

## 2025-02-06 PROCEDURE — 97530 THERAPEUTIC ACTIVITIES: CPT

## 2025-02-06 PROCEDURE — 82948 REAGENT STRIP/BLOOD GLUCOSE: CPT

## 2025-02-06 PROCEDURE — 97535 SELF CARE MNGMENT TRAINING: CPT

## 2025-02-06 PROCEDURE — 99233 SBSQ HOSP IP/OBS HIGH 50: CPT | Performed by: PHYSICAL MEDICINE & REHABILITATION

## 2025-02-06 RX ORDER — CARVEDILOL 3.12 MG/1
3.12 TABLET ORAL 2 TIMES DAILY WITH MEALS
Qty: 60 TABLET | Refills: 0 | Status: SHIPPED | OUTPATIENT
Start: 2025-02-06 | End: 2025-02-06

## 2025-02-06 RX ORDER — ACETAMINOPHEN 325 MG/1
650 TABLET ORAL EVERY 6 HOURS PRN
Qty: 45 TABLET | Refills: 0 | Status: SHIPPED | OUTPATIENT
Start: 2025-02-06 | End: 2025-02-06

## 2025-02-06 RX ORDER — ASPIRIN 81 MG/1
81 TABLET ORAL DAILY
Qty: 30 TABLET | Refills: 0 | Status: SHIPPED | OUTPATIENT
Start: 2025-02-06

## 2025-02-06 RX ORDER — ATORVASTATIN CALCIUM 80 MG/1
80 TABLET, FILM COATED ORAL DAILY
Qty: 30 TABLET | Refills: 0 | Status: SHIPPED | OUTPATIENT
Start: 2025-02-06

## 2025-02-06 RX ORDER — LANOLIN ALCOHOL/MO/W.PET/CERES
800 CREAM (GRAM) TOPICAL 2 TIMES DAILY
Qty: 120 TABLET | Refills: 0 | Status: SHIPPED | OUTPATIENT
Start: 2025-02-06

## 2025-02-06 RX ORDER — ATORVASTATIN CALCIUM 80 MG/1
80 TABLET, FILM COATED ORAL DAILY
Qty: 30 TABLET | Refills: 0 | Status: SHIPPED | OUTPATIENT
Start: 2025-02-06 | End: 2025-02-06

## 2025-02-06 RX ORDER — WARFARIN SODIUM 5 MG/1
7.5 TABLET ORAL
Qty: 90 TABLET | Refills: 0 | Status: SHIPPED | OUTPATIENT
Start: 2025-02-06

## 2025-02-06 RX ORDER — QUETIAPINE FUMARATE 25 MG/1
12.5 TABLET, FILM COATED ORAL
Status: COMPLETED | OUTPATIENT
Start: 2025-02-06 | End: 2025-02-06

## 2025-02-06 RX ORDER — LANOLIN ALCOHOL/MO/W.PET/CERES
800 CREAM (GRAM) TOPICAL 2 TIMES DAILY
Qty: 120 TABLET | Refills: 0 | Status: SHIPPED | OUTPATIENT
Start: 2025-02-06 | End: 2025-02-06

## 2025-02-06 RX ORDER — CARVEDILOL 3.12 MG/1
3.12 TABLET ORAL 2 TIMES DAILY WITH MEALS
Qty: 60 TABLET | Refills: 0 | Status: SHIPPED | OUTPATIENT
Start: 2025-02-06 | End: 2025-02-10

## 2025-02-06 RX ORDER — ASPIRIN 81 MG/1
81 TABLET ORAL DAILY
Qty: 30 TABLET | Refills: 0 | Status: SHIPPED | OUTPATIENT
Start: 2025-02-06 | End: 2025-02-06

## 2025-02-06 RX ORDER — CLOPIDOGREL BISULFATE 75 MG/1
75 TABLET ORAL DAILY
Qty: 30 TABLET | Refills: 0 | Status: SHIPPED | OUTPATIENT
Start: 2025-02-06

## 2025-02-06 RX ORDER — ACETAMINOPHEN 325 MG/1
650 TABLET ORAL EVERY 6 HOURS PRN
Start: 2025-02-06

## 2025-02-06 RX ORDER — WARFARIN SODIUM 5 MG/1
7.5 TABLET ORAL
Qty: 90 TABLET | Refills: 0 | Status: SHIPPED | OUTPATIENT
Start: 2025-02-06 | End: 2025-02-06

## 2025-02-06 RX ORDER — CLOPIDOGREL BISULFATE 75 MG/1
75 TABLET ORAL DAILY
Qty: 30 TABLET | Refills: 0 | Status: SHIPPED | OUTPATIENT
Start: 2025-02-06 | End: 2025-02-06

## 2025-02-06 RX ADMIN — GABAPENTIN 300 MG: 300 CAPSULE ORAL at 17:43

## 2025-02-06 RX ADMIN — METFORMIN HYDROCHLORIDE 1000 MG: 500 TABLET ORAL at 17:42

## 2025-02-06 RX ADMIN — INSULIN LISPRO 3 UNITS: 100 INJECTION, SOLUTION INTRAVENOUS; SUBCUTANEOUS at 12:01

## 2025-02-06 RX ADMIN — ATORVASTATIN CALCIUM 80 MG: 80 TABLET, FILM COATED ORAL at 17:42

## 2025-02-06 RX ADMIN — Medication 2.5 MG: at 21:11

## 2025-02-06 RX ADMIN — ASPIRIN 81 MG: 81 TABLET, COATED ORAL at 08:50

## 2025-02-06 RX ADMIN — DICLOFENAC SODIUM 2 G: 10 GEL TOPICAL at 18:02

## 2025-02-06 RX ADMIN — Medication 6 MG: at 21:12

## 2025-02-06 RX ADMIN — INSULIN LISPRO 2 UNITS: 100 INJECTION, SOLUTION INTRAVENOUS; SUBCUTANEOUS at 17:46

## 2025-02-06 RX ADMIN — DOCUSATE SODIUM 100 MG: 100 CAPSULE, LIQUID FILLED ORAL at 08:50

## 2025-02-06 RX ADMIN — Medication 800 MG: at 17:43

## 2025-02-06 RX ADMIN — WARFARIN SODIUM 7.5 MG: 5 TABLET ORAL at 17:42

## 2025-02-06 RX ADMIN — INSULIN LISPRO 2 UNITS: 100 INJECTION, SOLUTION INTRAVENOUS; SUBCUTANEOUS at 12:00

## 2025-02-06 RX ADMIN — CLOPIDOGREL BISULFATE 75 MG: 75 TABLET ORAL at 08:49

## 2025-02-06 RX ADMIN — INSULIN LISPRO 3 UNITS: 100 INJECTION, SOLUTION INTRAVENOUS; SUBCUTANEOUS at 17:46

## 2025-02-06 RX ADMIN — QUETIAPINE FUMARATE 12.5 MG: 25 TABLET ORAL at 21:12

## 2025-02-06 RX ADMIN — GABAPENTIN 300 MG: 300 CAPSULE ORAL at 08:50

## 2025-02-06 RX ADMIN — DOCUSATE SODIUM 100 MG: 100 CAPSULE, LIQUID FILLED ORAL at 17:43

## 2025-02-06 RX ADMIN — POLYETHYLENE GLYCOL 3350 17 G: 17 POWDER, FOR SOLUTION ORAL at 17:42

## 2025-02-06 RX ADMIN — INSULIN GLARGINE 15 UNITS: 100 INJECTION, SOLUTION SUBCUTANEOUS at 21:15

## 2025-02-06 RX ADMIN — SENNOSIDES 17.2 MG: 8.6 TABLET ORAL at 21:12

## 2025-02-06 RX ADMIN — Medication 800 MG: at 08:50

## 2025-02-06 RX ADMIN — INSULIN LISPRO 3 UNITS: 100 INJECTION, SOLUTION INTRAVENOUS; SUBCUTANEOUS at 08:51

## 2025-02-06 RX ADMIN — METFORMIN HYDROCHLORIDE 1000 MG: 500 TABLET ORAL at 08:49

## 2025-02-06 NOTE — PROGRESS NOTES
02/06/25 1000   Pain Assessment   Pain Assessment Tool 0-10   Pain Score No Pain   Restrictions/Precautions   Precautions Bed/chair alarms;Supervision on toilet/commode   Oral Hygiene   Type of Assistance Needed Physical assistance   Physical Assistance Level 25% or less   Comment Min A for standing balance with unialterral hand release to work on standing balance. Pt not safe to complete without assitance. At d/c pt may require use of w/c to complete task safely.   Oral Hygiene CARE Score 3   Shower/Bathe Self   Type of Assistance Needed Supervision   Physical Assistance Level No physical assistance   Comment Pt seated on shower chair to complete bathing routine. Pt completes lateral weight shifts for grecia/buttock hygiene. Pt able to use grab bar to lean forward and wash b/l LE's. Pt reports he has grab bar at home in shower.   Shower/Bathe Self CARE Score 4   Bathing   Assessed Bath Style Shower   Anticipated D/C Bath Style Tub   Tub/Shower Transfer   Assessed Shower   Findings Pt min A for stand pivot transfers to tub bench and swings legs over . Please trila on next session tub/shower transfer.   Upper Body Dressing   Type of Assistance Needed Supervision   Physical Assistance Level No physical assistance   Comment Pt able to doff LifeVest with good understand how to remove battery pack and vest. Pt able to doff shirt independently. Able to don shirt without assistance. Does require verbal cues to ensure Life Vest panel is not twisted. Pt able to self correct with cue.   Upper Body Dressing CARE Score 4   Lower Body Dressing   Type of Assistance Needed Incidental touching   Physical Assistance Level No physical assistance   Comment Pt able to doff pants in stance with unilateral hand release. If patient is home alone during showering at d/c recommend lateral weight shift to pull pants down/up. Pt able to don/doff pants over b/l feet without use of equipment. Pt pulls pants up over hips in stance with  "unilaterla hand release wtih CGA   Lower Body Dressing CARE Score 4   Putting On/Taking Off Footwear   Type of Assistance Needed Supervision   Physical Assistance Level No physical assistance   Comment Pt able to don/doff socks without assistance.   Putting On/Taking Off Footwear CARE Score 4   Sit to Stand   Type of Assistance Needed Supervision   Physical Assistance Level No physical assistance   Comment CS with RW   Sit to Stand CARE Score 4   Bed-Chair Transfer   Type of Assistance Needed Incidental touching   Physical Assistance Level No physical assistance   Comment CGA stand pivot with RW   Chair/Bed-to-Chair Transfer CARE Score 4   Kitchen Mobility   Kitchen Mobility Comments Showed patient walker tray and walker bag for item transport for kitchen mobility. Pt reports he eats a piece of fruit for breakfast and typically waits for his wife to return home to eat dinner. Pt declined using walker tray at home but was receptive to walker bag. Continue conversationt o determine if meal prep is needed to trial for d/c.   Cognition   Overall Cognitive Status WFL   Arousal/Participation Alert   Attention Within functional limits   Orientation Level Oriented X4   Memory Within functional limits   Following Commands Follows all commands and directions without difficulty   Activity Tolerance   Activity Tolerance Patient tolerated treatment well   Assessment   Treatment Assessment Pt engaged in 90 minute OT session with focus on ADL routine. Pt making progress with standing with unialteral hand release however still requires CGA. At this time recommend patient complete ADLs at w/c level. Pt receptive to w/c for d/c due to limited mobility. Spoke to QUIQUE Santana will submit to determine if w/c is covered under insurance. Pt overall with good tolerance of ADL routine. Pt does express concerns with heart failure and if he will \"die from this\" Discussed signs/symptoms of heart failure and stated we will provide additional " written information for d/c. Will provide handouts on next session.   Prognosis Good   Problem List Decreased strength;Decreased endurance;Impaired balance;Decreased mobility   Plan   Treatment/Interventions ADL retraining;Functional transfer training;LE strengthening/ROM;Therapeutic exercise   Progress Progressing toward goals   OT Therapy Minutes   OT Time In 1000   OT Time Out 1130   OT Total Time (minutes) 90   OT Mode of treatment - Individual (minutes) 90   OT Mode of treatment - Concurrent (minutes) 0   OT Mode of treatment - Group (minutes) 0   OT Mode of treatment - Co-treat (minutes) 0   OT Mode of Treatment - Total time(minutes) 90 minutes   OT Cumulative Minutes 600   Therapy Time missed   Time missed? No

## 2025-02-06 NOTE — ASSESSMENT & PLAN NOTE
"Resolved  - pt reports he usually has a BM every 2-3 days but feels like pain meds are \"backing him up\"  -PRN dulcolax; add senna, colase and miralax as patient taking opiods   - monitor BM  Miralax changed to 2x a day standing and one dose of lactulose 2/3  Large BM 2/4  "

## 2025-02-06 NOTE — ASSESSMENT & PLAN NOTE
ECHO on 1/19 showed possible moderately sized thrombus at the apex.  Started on Coumadin in acute setting.  INR 2.37 on 2/5.  Continue 7.5mg Coumadin daily.  Repeat PT/INR on Friday.  Follow-up with Cardiology as outpatient.

## 2025-02-06 NOTE — PLAN OF CARE
Problem: SKIN/TISSUE INTEGRITY - ADULT  Goal: Skin Integrity remains intact(Skin Breakdown Prevention)  Description: Assess:  -Perform Gilberto assessment every day  -Clean and moisturize skin every day  -Inspect skin when repositioning, toileting, and assisting with ADLS  -Assess extremities for adequate circulation and sensation     Bed Management:  -Have minimal linens on bed & keep smooth, unwrinkled  -Change linens as needed when moist or perspiring  -Avoid sitting or lying in one position for more than 2 hours while in bed  -Keep HOB at 30 degrees     Toileting:  -Offer bedside commode  -Assess for incontinence every day    Activity:  -Mobilize patient 3 times a day  -Encourage activity and walks on unit  -Encourage or provide ROM exercises   -Turn and reposition patient every 2 Hours  -Use appropriate equipment to lift or move patient in bed  -Instruct/ Assist with weight shifting every 15 min when out of bed in chair  -Consider limitation of chair time 2 hour intervals    Skin Care:  -Avoid use of baby powder, tape, friction and shearing, hot water or constrictive clothing  -Relieve pressure over bony prominences using pillows  -Do not massage red bony areas    Next Steps:  -Teach patient strategies to minimize risks such as turning and repositioning   -Consider consults to  interdisciplinary teams such as PT/OT  Outcome: Progressing     Problem: PAIN - ADULT  Goal: Verbalizes/displays adequate comfort level or baseline comfort level  Description: Interventions:  - Encourage patient to monitor pain and request assistance  - Assess pain using appropriate pain scale  - Administer analgesics based on type and severity of pain and evaluate response  - Implement non-pharmacological measures as appropriate and evaluate response  - Consider cultural and social influences on pain and pain management  - Notify physician/advanced practitioner if interventions unsuccessful or patient reports new pain  Outcome:  Progressing

## 2025-02-06 NOTE — PROGRESS NOTES
02/06/25 1400   Pain Assessment   Pain Assessment Tool 0-10   Pain Score 7   Pain Location/Orientation Orientation: Left;Orientation: Right;Location: Shoulder   Restrictions/Precautions   Precautions Bed/chair alarms;Fall Risk;Supervision on toilet/commode  (lifevest)   Sit to Stand   Type of Assistance Needed Supervision   Comment CS from WC   Sit to Stand CARE Score 4   Bed-Chair Transfer   Type of Assistance Needed Incidental touching   Comment CG with RW   Chair/Bed-to-Chair Transfer CARE Score 4   Walk 10 Feet   Type of Assistance Needed Supervision   Comment CS with RW   Walk 10 Feet CARE Score 4   Walk 50 Feet with Two Turns   Type of Assistance Needed Supervision   Comment CS with RW   Walk 50 Feet with Two Turns CARE Score 4   Walk 150 Feet   Comment shoulder fatigue,  only got 65 feet   Reason if not Attempted Safety concerns   Walk 150 Feet CARE Score 88   Walking 10 Feet on Uneven Surfaces   Reason if not Attempted Refused to perform   Walking 10 Feet on Uneven Surfaces CARE Score 7   Ambulation   Primary Mode of Locomotion Prior to Admission Walk   Distance Walked (feet) 65 ft  (65x1  50x2)   Assist Device Roller Walker   Gait Pattern Decreased foot clearance;Slow Rosanne;Narrow YOVANI;Forward Flexion   Walk Assist Level Close Supervision   Findings Despite all gt deviations pt appears more upright and straighter legs,  still fatigues around 50 to 60 feet due to shoulders not legs or SOB,  for longer distance did chair follow but did 50 feet without   Does the patient walk? 2. Yes   Wheel 50 Feet with Two Turns   Type of Assistance Needed Independent   Wheel 50 Feet with Two Turns CARE Score 6   Wheelchair mobility   Findings Discussed with Pt and OT, will discuss with PT tomorrow,  pt might benefit from light weight WC for home incase pt is fatigue or in too much pain , pt is only wallking house hold distances and isnt always consistant , pt has hx of severe shoulder and knee arthritis and had MI,  pt  would benefit from light weight for more ease on shoulders and from a cardiovascular stand point   4 Steps   Type of Assistance Needed Supervision   Comment Performed today at CS level,  places both hands on LUE  went up 2 then down 2 and again up 2   pt also performed 5x up/down with RLE for strengthening while at steps   4 Steps CARE Score 4   12 Steps   Reason if not Attempted Activity not applicable   12 Steps CARE Score 9   Equipment Use   NuStep 15mins for hip/knee ROM and warm up- LE only   Assessment   Treatment Assessment 60 min skilled PT session , focused on Nustep,  house hold level ambulation bouts which pt is making improvement with balance and increased reps in session.  Pt perforemd 4 steps today at CS level.  As noted above pt still inconsitant with walking/ gets into bouts of severe pain/ and has cardiac hx,  will discuss with Primary PT about WC possibly polyfly light weight WC tomorrow.   Problem List Decreased strength;Decreased endurance;Impaired balance;Decreased mobility   Barriers to Discharge Decreased caregiver support;Inaccessible home environment   Plan   Progress Progressing toward goals   PT Therapy Minutes   PT Time In 1400   PT Time Out 1500   PT Total Time (minutes) 60   PT Mode of treatment - Individual (minutes) 60   PT Mode of treatment - Concurrent (minutes) 0   PT Mode of treatment - Group (minutes) 0   PT Mode of treatment - Co-treat (minutes) 0   PT Mode of Treatment - Total time(minutes) 60 minutes   PT Cumulative Minutes 720   Therapy Time missed   Time missed? No

## 2025-02-06 NOTE — PLAN OF CARE
Problem: CARDIOVASCULAR - ADULT  Goal: Maintains optimal cardiac output and hemodynamic stability  Description: INTERVENTIONS:  - Monitor I/O, vital signs and rhythm  - Monitor for S/S and trends of decreased cardiac output  - Administer and titrate ordered vasoactive medications to optimize hemodynamic stability  - Assess quality of pulses, skin color and temperature  - Assess for signs of decreased coronary artery perfusion  - Instruct patient to report change in severity of symptoms  Outcome: Progressing     Problem: PAIN - ADULT  Goal: Verbalizes/displays adequate comfort level or baseline comfort level  Description: Interventions:  - Encourage patient to monitor pain and request assistance  - Assess pain using appropriate pain scale  - Administer analgesics based on type and severity of pain and evaluate response  - Implement non-pharmacological measures as appropriate and evaluate response  - Consider cultural and social influences on pain and pain management  - Notify physician/advanced practitioner if interventions unsuccessful or patient reports new pain  Outcome: Progressing      Gastroenterology/Hepatology Progress Note      Interval Events:   - yesterday patient w/ worsening abd distention despite NGT and rectal tube  - colonoscopy in SICU unsuccessful at passage of scope/wire beyond obstruction at 30cm  - went to OR w/ colorectal surgery for transverse colostomy, tolerated well  - overnight stool/bloody output from ostomy  - no new complaints today    Allergies:  allergic to cefobid, unknown reaction (Other)  penicillin (Rash)      Hospital Medications:  acetaminophen   IVPB .. 1000 milliGRAM(s) IV Intermittent every 6 hours  ALBUTerol    90 MICROgram(s) HFA Inhaler 2 Puff(s) Inhalation every 6 hours PRN  amLODIPine   Tablet 5 milliGRAM(s) Oral daily  budesonide  80 MICROgram(s)/formoterol 4.5 MICROgram(s) Inhaler 2 Puff(s) Inhalation two times a day  cholecalciferol 2000 Unit(s) Oral daily  cyanocobalamin 1000 MICROGram(s) Oral daily  dextrose 5% + lactated ringers. 1000 milliLiter(s) IV Continuous <Continuous>  heparin   Injectable 5000 Unit(s) SubCutaneous every 8 hours  oxyCODONE    IR 5 milliGRAM(s) Oral every 4 hours PRN  oxyCODONE    IR 2.5 milliGRAM(s) Oral every 4 hours PRN  pantoprazole  Injectable 40 milliGRAM(s) IV Push daily  tamsulosin 0.4 milliGRAM(s) Oral at bedtime  tiotropium 18 MICROgram(s) Capsule 1 Capsule(s) Inhalation daily      ROS: 14 point ROS negative unless otherwise state in subjective    PHYSICAL EXAM:   Vital Signs:  Vital Signs Last 24 Hrs  T(C): 36.5 (14 Mar 2022 04:00), Max: 36.5 (14 Mar 2022 04:00)  T(F): 97.7 (14 Mar 2022 04:00), Max: 97.7 (14 Mar 2022 04:00)  HR: 92 (14 Mar 2022 06:00) (73 - 95)  BP: 121/59 (14 Mar 2022 06:00) (107/54 - 141/66)  BP(mean): 73 (14 Mar 2022 06:00) (65 - 90)  RR: 22 (14 Mar 2022 06:00) (17 - 27)  SpO2: 99% (14 Mar 2022 06:00) (90% - 100%)  Daily     Daily Weight in k.8 (14 Mar 2022 04:00)    GENERAL:  No acute distress  HEENT:  NGT to intermittent suction  CHEST: no resp distress  HEART:  RRR  ABDOMEN:  midline incision covered, ostomy w/ bloody stool output, +distended though improved from prior, nontender  EXTREMITIES:  No cyanosis, clubbing, or edema  SKIN:  No rash/erythema/ecchymoses/petechiae/wounds/abscess/warm/dry  NEURO:  Alert and oriented x 3, no asterixis, no tremor    LABS:                        10.1   9.69  )-----------( 267      ( 14 Mar 2022 02:34 )             32.3     Mean Cell Volume: 91.8 fL (-22 @ 02:34)    03-14    145  |  108<H>  |  26<H>  ----------------------------<  95  6.0<H>   |  27  |  1.40<H>    Ca    8.6      14 Mar 2022 05:14  Phos  3.0     03-14  Mg     3.20     03-14    TPro  7.2  /  Alb  3.1<L>  /  TBili  0.2  /  DBili  x   /  AST  68<H>  /  ALT  61<H>  /  AlkPhos  68  03-13    LIVER FUNCTIONS - ( 13 Mar 2022 07:08 )  Alb: 3.1 g/dL / Pro: 7.2 g/dL / ALK PHOS: 68 U/L / ALT: 61 U/L / AST: 68 U/L / GGT: x           PT/INR - ( 14 Mar 2022 02:34 )   PT: 14.1 sec;   INR: 1.21 ratio         PTT - ( 14 Mar 2022 02:34 )  PTT:36.1 sec          Imaging:

## 2025-02-06 NOTE — CASE MANAGEMENT
CM NOTE      [facility] Blanchard [facility], however per [facility] report [facility] to [facility]. Scripts were also sent to  [facility], total for 6 medications is $61.95, reviewed with pt and [facility] is in [facility]. Voicemail left for pt's wife to confirm she is okay with this plan - requesting call back.     CM to then contact [facility] at  at [phone] and confirm them to fill medications, once pt's wife returns call.

## 2025-02-06 NOTE — ASSESSMENT & PLAN NOTE
Lab Results   Component Value Date    HGBA1C 8.2 (H) 01/18/2025       Recent Labs     02/05/25  1115 02/05/25  1617 02/05/25 2034 02/06/25  0620   POCGLU 217* 148* 191* 138   A1c 8.2% on admission  Lantus 20u at HS and lispro 8u with meals  2/6 Lispro decreased to 3u with meals and Lantus 15u at HS (2/5)  IM starting metformin 500 BID today, increased to 1000 mg daily (2/5)  Management per IM  Monitor glucose levels  Cw diabetic diet    Blood Sugar Average: Last 72 hrs:  (P) [id number]

## 2025-02-06 NOTE — PROGRESS NOTES
Progress Note - Internal Medicine   Name: Joseph Aguilar 69 y.o. male I MRN: [id number]  Unit/Bed#: -02 I Date of Admission: 1/29/2025   Date of Service: 2/6/2025 I Hospital Day: 8    Assessment & Plan  Type 2 diabetes mellitus without complication, without long-term current use of insulin (HCC)  Lab Results   Component Value Date    HGBA1C 8.2 (H) 01/18/2025       Recent Labs     02/05/25  1115 02/05/25  1617 02/05/25  2034 02/06/25  0620   POCGLU 217* 148* 191* 138       Blood Sugar Average: Last 72 hrs:  (P) [id number]    Last A1c was 8.2 on 1/18/2025.  Home regimen: Lantus 15u at HS, glipizide 5mg daily, metformin 1000mg BID, and Jardiance 12.5mg daily.  Currently receiving Lantus 15u at HS, lispro 3u with meals, and metformin 1000mg BID.  Continue SSI, QID accuchecks, and cons. carb diet.  Anemia  Hgb currently stable at 11.2.  Developed large R groin hematoma s/p balloon pump.  No active s/s of bleeding.  Iron panel on 1/30: Iron sat 23%, TIBC 185, Iron 43, and Ferritin 224.  No need for iron supplementation at this time.  Continue to trend routine CBC.  Hypomagnesemia  Mg 1.7 on 2/3.  Increased magnesium oxide to 800mg BID on 2/3.  Continue to trend routine Mg.  Anterior STEMI s/p PCI with CHYNA to LM-ostial LAD and mLAD with IABP;  of LCx (1/17/2024)  Presented on 1/17 with near syncopal episode with jaw pain and hypotension.  NSTEMI alert - cardiac cath on 1/17 with CHYNA to LAD and L main with IABP support.  IABP discontinued on 1/20.  Continue ASA 81mg daily, Plavix 75mg daily, Coumadin, Lipitor 80mg daily, and Coreg 3.125mg BID.  Recommend establishing with Cardiology on discharge.  ICM with EF 25%  ECHO on 1/19 showed EF 25%, systolic function severely reduced, global hypokinesis.  Currently wearing Lifevest.  Continue Coreg 3.125mg BID.  Consider restarting home Jardiance.  Weight increasing - but appears euvolemic.  Continue to monitor daily at this time.  Recommend establishing care  with Cardiology as outpatient.  LV (left ventricular) mural thrombus  ECHO on 1/19 showed possible moderately sized thrombus at the apex.  Started on Coumadin in acute setting.  INR 2.37 on 2/5.  Continue 7.5mg Coumadin daily.  Repeat PT/INR on Friday.  Follow-up with Cardiology as outpatient.  Hematoma of right lower extremity  Developed R groin hematoma at IABP insertion site.  IR suite on 1/20 for closure of R femoral artery puncture site and control of expanding hematoma performed by Dr. Brooke (Vascular).  Monitor site daily for s/s of reoccurring bleeding.  Continue to trend H&H.  Conjunctivitis  Continue ciprofloxacin to L eye for 7-10 days.  Has been declining drops.  Osteoarthritis of multiple joints  Multiple joints - shoulders, knees, back.  Recently had steroid injection to B/L shoulders on 1/6.  Follows with Dr. Egan (Orthopedics) as outpatient.  Considerations with therapies.  Continue current pain regimen.   History of hypertension  Newly started on lisinopril 2.5mg daily in acute setting.  Continue Coreg 3.125mg BID.  Monitor BP with routine VS.  Ambulatory dysfunction  Deconditioned s/p STEMI.  Primary team following.  Continue with PT/OT.    VTE Pharmacologic Prophylaxis:   Pharmacologic: Warfarin (Coumadin)  Mechanical VTE Prophylaxis in Place: Yes - sequential compression devices.    Current Length of Stay: 8 day(s)    Current Patient Status: Inpatient Rehab     Discharge Plan: As per primary team.    Code Status: Level 1 - Full Code    Subjective:   Pt examined while pt sitting in WC in pt room.  Complaints of L wrist/hand and shoulder pain.  Chronic, has been receiving steroid injections in both shoulders as outpatient.  Agreeable to trying Voltaren gel.  Denies any lightheadedness, dizziness, SOB, palpitations, or CP.  Had a small BM today.  Tolerating therapy well.  Slept better last night.  Has no other concerns or complaints at this time.    Objective:     Vitals:   Temp (24hrs),  Av.1 °F (36.2 °C), Min:96.8 °F (36 °C), Max:97.6 °F (36.4 °C)    Temp:  [96.8 °F (36 °C)-97.6 °F (36.4 °C)] 96.8 °F (36 °C)  HR:  [82-92] 91  Resp:  [18-20] 18  BP: (102-117)/(60-72) 104/60  SpO2:  [97 %] 97 %  Body mass index is 30 kg/m².     Review of Systems   Constitutional:  Negative for appetite change, chills, fatigue and fever.   HENT:  Negative for trouble swallowing.    Eyes:  Negative for visual disturbance.   Respiratory:  Negative for cough, shortness of breath, wheezing and stridor.    Cardiovascular:  Negative for chest pain, palpitations and leg swelling.   Gastrointestinal:  Negative for abdominal distention, abdominal pain, constipation, diarrhea, nausea and vomiting.        LBM 2/6   Genitourinary:  Negative for difficulty urinating.   Musculoskeletal:  Positive for arthralgias (multiple joints - L shoulder, wrist, hand). Negative for back pain and gait problem.   Neurological:  Negative for dizziness, weakness, light-headedness, numbness and headaches.   Psychiatric/Behavioral:  Negative for dysphoric mood and sleep disturbance. The patient is not nervous/anxious.    All other systems reviewed and are negative.       Input and Output Summary (last 24 hours):       Intake/Output Summary (Last 24 hours) at 2025 0914  Last data filed at 2025 0902  Gross per 24 hour   Intake 540 ml   Output --   Net 540 ml       Physical Exam:     Physical Exam  Vitals and nursing note reviewed.   Constitutional:       General: He is not in acute distress.     Appearance: Normal appearance. He is not ill-appearing.   HENT:      Head: Normocephalic and atraumatic.   Cardiovascular:      Rate and Rhythm: Normal rate and regular rhythm.      Pulses: Normal pulses.      Heart sounds: Murmur heard.      Systolic murmur is present with a grade of 1/6.      No friction rub.      Comments: Wearing LifeVest.  Pulmonary:      Effort: Pulmonary effort is normal. No respiratory distress.      Breath sounds: Normal  breath sounds. No wheezing or rhonchi.   Abdominal:      General: Abdomen is flat. Bowel sounds are normal. There is no distension.      Palpations: Abdomen is soft. There is no mass.      Tenderness: There is no abdominal tenderness. There is no guarding or rebound.      Hernia: No hernia is present.   Musculoskeletal:      Cervical back: Normal range of motion and neck supple. No tenderness.      Right lower leg: No edema.      Left lower leg: No edema.   Skin:     General: Skin is warm and dry.   Neurological:      Mental Status: He is alert and oriented to person, place, and time.   Psychiatric:         Mood and Affect: Mood normal.         Behavior: Behavior normal.         Additional Data:     Labs:    Results from last 7 days   Lab Units 02/05/25  0552   WBC Thousand/uL 7.47   HEMOGLOBIN g/dL 11.2*   HEMATOCRIT % 35.4*   PLATELETS Thousands/uL 276   SEGS PCT % 67   LYMPHO PCT % 17   MONO PCT % 10   EOS PCT % 5     Results from last 7 days   Lab Units 02/03/25  0529   SODIUM mmol/L 139   POTASSIUM mmol/L 3.9   CHLORIDE mmol/L 102   CO2 mmol/L 28   BUN mg/dL 15   CREATININE mg/dL 0.90   ANION GAP mmol/L 9   CALCIUM mg/dL 9.3   GLUCOSE RANDOM mg/dL 165*     Results from last 7 days   Lab Units 02/05/25  0650   INR  2.37*     Results from last 7 days   Lab Units 02/06/25  0620 02/05/25  2034 02/05/25  1617 02/05/25  1115 02/05/25  0638 02/04/25  2034 02/04/25  1606 02/04/25  1133 02/04/25  0556 02/03/25  2047 02/03/25  1613 02/03/25  1128   POC GLUCOSE mg/dl 138 191* 148* 217* 151* 182* 180* 217* 154* 236* 175* 224*               Labs reviewed    Imaging:    Imaging reviewed    Recent Cultures (last 7 days):           Last 24 Hours Medication List:   Current Facility-Administered Medications   Medication Dose Route Frequency Provider Last Rate    acetaminophen  650 mg Oral Q6H PRASHLEY Yuan MD      aluminum-magnesium hydroxide-simethicone  30 mL Oral Q4H PRN Shivani Yuan MD      Artificial Tears  1 drop Left  Eye Q4H PRN Shivani Yuan MD      aspirin  81 mg Oral Daily Shivani Yuan MD      atorvastatin  80 mg Oral Daily With Dinner Shivani Yuan MD      bisacodyl  10 mg Rectal Daily PRN Shivani Yuan MD      carvedilol  3.125 mg Oral BID With Meals Shivani Yuan MD      clopidogrel  75 mg Oral Daily Shivani Yuan MD      docusate sodium  100 mg Oral BID Shivani Yuan MD      gabapentin  300 mg Oral BID Shivani Yuan MD      insulin glargine  15 Units Subcutaneous HS MARGARITA Hester      insulin lispro  2-12 Units Subcutaneous 4x Daily (AC & HS) Shivani Yuan MD      insulin lispro  3 Units Subcutaneous TID With Meals MARGARITA Hester      magnesium Oxide  800 mg Oral BID MARGARITA Hester      melatonin  6 mg Oral HS Shivani Yuan MD      menthol-methyl salicylate   Apply externally 4x Daily PRN Shivani Yuan MD      metFORMIN  1,000 mg Oral BID With Meals MARGARITA Hester      ondansetron  4 mg Intravenous Q6H PRN Shivani Yuan MD      oxyCODONE  2.5 mg Oral Q8H PRN Shivani Yuan MD      polyethylene glycol  17 g Oral BID Jennifer Soares PA-C      QUEtiapine  25 mg Oral HS Shivani Yuan MD      senna  2 tablet Oral HS Shivani Yuan MD      warfarin  7.5 mg Oral Daily (warfarin) MARGARITA Hester          M*Modal software was used to dictate this note.  It may contain errors with dictating incorrect words or incorrect spelling. Please contact the provider directly with any questions.

## 2025-02-06 NOTE — ASSESSMENT & PLAN NOTE
Patient was evaluated by the rehabilitation team MD and an appropriate candidate for acute inpatient rehabilitation program at this time.  The patient will tolerate 3 hours/day 5 to 7 days/week of intensive physical, occupational therapy in order to obtain goals for community discharge  Due to the patient's functional Compared to their baseline level of function in addition to their ongoing medical needs, the patient would benefit from daily supervision from a rehabilitation physician as well as rehabilitation nursing to implement and adjust the medical as well as functional plan of care in order to meet the patient's goals.  Ok to shower for short periods of time with Lifevest removed. Replaced immediately. Staff to refer to Kayal booklet for instructions and can also call rep if needed  DC 2/11 outpt PT

## 2025-02-06 NOTE — ASSESSMENT & PLAN NOTE
Lab Results   Component Value Date    HGBA1C 8.2 (H) 01/18/2025       Recent Labs     02/05/25  1115 02/05/25  1617 02/05/25 2034 02/06/25  0620   POCGLU 217* 148* 191* 138       Blood Sugar Average: Last 72 hrs:  (P) [id number]    Last A1c was 8.2 on 1/18/2025.  Home regimen: Lantus 15u at HS, glipizide 5mg daily, metformin 1000mg BID, and Jardiance 12.5mg daily.  Currently receiving Lantus 15u at HS, lispro 3u with meals, and metformin 1000mg BID.  Continue SSI, QID accuchecks, and cons. carb diet.

## 2025-02-06 NOTE — PROGRESS NOTES
02/06/25 1230   Pain Assessment   Pain Assessment Tool 0-10   Pain Score No Pain   Restrictions/Precautions   Precautions Bed/chair alarms;Fall Risk;Supervision on toilet/commode;Pain  (life vest)   Weight Bearing Restrictions No   ROM Restrictions No   Cognition   Overall Cognitive Status WFL   Arousal/Participation Alert   Attention Within functional limits   Orientation Level Oriented X4   Memory Within functional limits   Following Commands Follows all commands and directions without difficulty   Subjective   Subjective Pt agreeable to PT, prefers seated ther ex   Wheel 50 Feet with Two Turns   Type of Assistance Needed Independent   Physical Assistance Level No physical assistance   Wheel 50 Feet with Two Turns CARE Score 6   Wheelchair mobility   Method Left upper extremity;Right upper extremity;Right lower extremity;Left lower extremity   Assistance Provided For Remove Leg Rest;Remove armrests;Replace Leg Rest;Replace armrests   Distance Level Surface (feet) 50 ft  (x2)   Does the patient use a wheelchair? 1. Yes   Type of Wheelchair Used 1. Manual   Therapeutic Interventions   Strengthening seated hip flexion and LAQ with 3# weight 3x10, green tband resisted hip abduction 3 x10, isometric hip add 3x10. green tband resisted hamstring curls 3 x10   Flexibility DF stretch 3 x 30 sec BLE   Assessment   Treatment Assessment Yusuf seen for 30 min skilled PT session. Pt verbalized he prefers this session to focus on seated LE exercises. Focused on strengthening, pt agreeable to exercises but wants to ambulate later with PT. Pt continues to be limited in LE strength, continue transfers with RW and ambulation as able.   Family/Caregiver Present no   Barriers to Discharge Decreased caregiver support;Inaccessible home environment   PT Barriers   Functional Limitation Car transfers;Stair negotiation;Standing;Walking;Transfers   Plan   Treatment/Interventions ADL retraining;Functional transfer training;LE  strengthening/ROM;Therapeutic exercise;Endurance training;Bed mobility;Equipment eval/education;Gait training   Progress Progressing toward goals   PT Therapy Minutes   PT Time In 1230   PT Time Out 1300   PT Total Time (minutes) 30   PT Mode of treatment - Individual (minutes) 30   PT Mode of treatment - Concurrent (minutes) 0   PT Mode of treatment - Group (minutes) 0   PT Mode of treatment - Co-treat (minutes) 0   PT Mode of Treatment - Total time(minutes) 30 minutes   PT Cumulative Minutes 660   Therapy Time missed   Time missed? No

## 2025-02-06 NOTE — PROGRESS NOTES
Progress Note - PMR   Name: Joseph Aguilar 69 y.o. male I MRN: [id number]  Unit/Bed#: -02 I Date of Admission: 1/29/2025   Date of Service: 2/6/2025 I Hospital Day: 8     Assessment & Plan  Anterior STEMI s/p PCI with CHYNA to LM-ostial LAD and mLAD with IABP;  of LCx (1/17/2024)  S/p drug eluting stent to the LAD and left main; unable to stent the left circumflex 1/17  IABP dc 1/20  Hypotension in cath lab requiring pressors and intra-aortic baloon pump  Cw aspirin, plavix, statin, coumadin (goal 2-3),   2/3 INR 3.16  Continue coreg 3.125 bid  Follow up cardiology outpt  Coumadin held 2/3 due to INR 3.16  Repeat INR 2.37  Ambulatory dysfunction  Patient was evaluated by the rehabilitation team MD and an appropriate candidate for acute inpatient rehabilitation program at this time.  The patient will tolerate 3 hours/day 5 to 7 days/week of intensive physical, occupational therapy in order to obtain goals for community discharge  Due to the patient's functional Compared to their baseline level of function in addition to their ongoing medical needs, the patient would benefit from daily supervision from a rehabilitation physician as well as rehabilitation nursing to implement and adjust the medical as well as functional plan of care in order to meet the patient's goals.  Ok to shower for short periods of time with Lifevest removed. Replaced immediately. Staff to refer to [employer] booklet for instructions and can also call rep if needed  DC 2/11 outpt PT    History of hypertension  Cw lisinopril and BB  Monitor vitals and titrate meds as needed  Lisinopril D/c  Mixed hyperlipidemia  -cw statin  -f/o primary care on dc  Type 2 diabetes mellitus without complication, without long-term current use of insulin (HCC)  Lab Results   Component Value Date    HGBA1C 8.2 (H) 01/18/2025       Recent Labs     02/05/25  1115 02/05/25  1617 02/05/25 2034 02/06/25  0620   POCGLU 217* 148* 191* 138   A1c 8.2% on admission  Lantus  "20u at HS and lispro 8u with meals  2/6 Lispro decreased to 3u with meals and Lantus 15u at HS (2/5)  IM starting metformin 500 BID today, increased to 1000 mg daily (2/5)  Management per IM  Monitor glucose levels  Cw diabetic diet    Blood Sugar Average: Last 72 hrs:  (P) [id number]    Anemia  Hgb 11.6 1/30, 10.9 2/3, 2/5 11.2  Monitor w/ routine blood work   Iron panel on 1/30 iron 43, ferritin 224, iron sat 23  Hypomagnesemia  Cw Mg supplementation goal >2   1.7 1/30   ICM with EF 25%  Echo:Left ventricular cavity size is normal. Wall thickness is normal. The left ventricular ejection fraction is 25%. Systolic function is severely reduced.   Cw coreg 3.125;   Cardiology consulted and recommended lifevest  F/o cardiology as outpt  LV (left ventricular) mural thrombus  TTE 1/19: \"There is a possible moderately-sized thrombus at the apex, difficult imaging.\"  Cardiology consulted on previous admission  Continue Coumadin 10 mg  Monitor PT, INR (2-3 per guidelines)  F/o cardiology outpt    Hematoma of right lower extremity  S/p removal of IABP and closure of right femoral puncture site with Perclose Pro-glide 1/20 by Dr. Brooke  Monitoring the R groin for bleeding, skin breakdown, expanding hematoma  Will consult vascular surgery with questions  Conjunctivitis  Cw cipro goal 7-10 days   Per nursing patient reports splashing urinal at his face   D/c antibiotics due to refusal  Osteoarthritis of multiple joints  Multiple joint s/p steroid inj b/l shoulders 1/6   F.w Dr. Egan as ouptatient   C/w pain control, tylenol, oxy 2.5Q8h, bengay; titrate off opiods as patient wasn't on any as outpatient   Chronic back pain  -patient reports old injury of back  -takes tylenol for pain at home  -monitor pain levels and titrate meds  -offer different modalities- heat, lidocaine patches  Does not like lidocaine patches  Constipation  Resolved  - pt reports he usually has a BM every 2-3 days but feels like pain meds are " "\"backing him up\"  -PRN dulcolax; add senna, colase and miralax as patient taking opiods   - monitor BM  Miralax changed to 2x a day standing and one dose of lactulose 2/3  Large BM 2/4    History of Present Illness   Joseph Aguilar is a 69 y.o. male w/ PMHx of HTN, HLD, T2DM, OA who initially presented to [facility] on 1/17 for chest pain, shortness of breath, nausea and vomiting. During initial eval, she was found to have an anterior wall STEMI and Interventional cardiology was alerted. Patient was taken to the cath lab where patient bacame more hypotensive requiring pressor support and intra-aortic balloon pump insertion. Patient received LAD, left main, stent however circumflex artery was unable to be intervened on. Patient was transferred to ICU for cardiogenic shock s/p STEMI. Noted to have LV thrombus and started on heparin gtt and continued on brilinta and ASA. He noted to have some transaminitis so Statin was on hold. TTE w/ LVEF 25%, severe global hypokinesis w/ apical and distal septal near akinesis, mod siz thrombus at apex. On 1/20 he was noted to have LE bleeding w/ hematoma formation from cath site rq manual pressure and femostop. Vascular surgery was consulted . R femoral site was closed by Dr. Brooke. IABP was d/c and heparin was held. He received 1U PRBC. Heparin was restarted and coumadin was started w/ goal 2-3. INR 2.22 1/29 and heparin was d/c. Joseph Aguilar was admitted to [facility] on 01/29/25     Chief Complaint: f/u ambulatory dysfunction    Interval: Patient seen and examined in wheelchair during therapy. No events overnight.  Reports overall feeling well. Last BM 2/6. Sleeping well at night. [facility] [place].    Review of Systems   Respiratory:  Negative for shortness of breath.    Cardiovascular:  Negative for chest pain, " palpitations and leg swelling.   Gastrointestinal:  Negative for abdominal pain, constipation, diarrhea, nausea and vomiting.       Objective   Functional Update:  Physical Therapy Occupational Therapy Speech Therapy   Weight Bearing Status: Full Weight Bearing  Transfers: Minimal Assistance  Bed Mobility: Minimal Assistance  Amulation Distance (ft): 40 feet  Ambulation: Incidental Touching  Assistive Device for Ambulation: Roller Walker  Discharge Recommendations: Home with:  DC Home with:: Family Support   Eating: Independent  Grooming: Independent  Bathing: Incidental Touching  Bathing: Incidental Touching  Upper Body Dressing: Minimal Assistance  Lower Body Dressing: Incidental Touching  Toileting: Incidental Touching  Tub/Shower Transfer: Incidental Touching  Toilet Transfer: Incidental Touching  Cognition: Exceptions to WNL  Cognition: Decreased Safety, Behavioral Considerations  Orientation: Person, Place, Time, Situation                   Temp:  [96.8 °F (36 °C)-97.6 °F (36.4 °C)] 96.8 °F (36 °C)  HR:  [82-92] 91  Resp:  [18-20] 18  BP: (102-117)/(60-72) 104/60  SpO2:  [97 %] 97 %    Physical Exam  Constitutional:       General: He is not in acute distress.  HENT:      Head: Normocephalic and atraumatic.      Mouth/Throat:      Mouth: Mucous membranes are moist.      Pharynx: Oropharynx is clear.   Cardiovascular:      Rate and Rhythm: Normal rate and regular rhythm.      Comments: Wearing life vest  Pulmonary:      Effort: Pulmonary effort is normal. No respiratory distress.      Breath sounds: Normal breath sounds.   Abdominal:      General: Bowel sounds are normal. There is no distension.   Musculoskeletal:         General: No tenderness. Normal range of motion.   Skin:     General: Skin is warm and dry.   Neurological:      Mental Status: He is alert and oriented to person, place, and time. Mental status is at baseline.   Psychiatric:         Mood and Affect: Mood normal.         Behavior: Behavior  normal.           Scheduled Meds:  Current Facility-Administered Medications   Medication Dose Route Frequency Provider Last Rate    acetaminophen  650 mg Oral Q6H PRN Shivani Yuan MD      aluminum-magnesium hydroxide-simethicone  30 mL Oral Q4H PRN Shivani Yuan MD      Artificial Tears  1 drop Left Eye Q4H PRN Shivani Yuan MD      aspirin  81 mg Oral Daily Shivani Yuan MD      atorvastatin  80 mg Oral Daily With Dinner Shivani Yuan MD      bisacodyl  10 mg Rectal Daily PRN Shivani Yuan MD      carvedilol  3.125 mg Oral BID With Meals Shivani Yuan MD      clopidogrel  75 mg Oral Daily Shivani Yuan MD      docusate sodium  100 mg Oral BID Shivani Yuan MD      gabapentin  300 mg Oral BID Shivani Yuan MD      insulin glargine  15 Units Subcutaneous HS MARGARITA Hester      insulin lispro  2-12 Units Subcutaneous 4x Daily (AC & HS) Shivani Yuan MD      insulin lispro  3 Units Subcutaneous TID With Meals MARGARITA Hester      magnesium Oxide  800 mg Oral BID MARGARITA Hester      melatonin  6 mg Oral HS Shivani Yuan MD      menthol-methyl salicylate   Apply externally 4x Daily PRN Shivani Yuan MD      metFORMIN  1,000 mg Oral BID With Meals MARGARITA Hester      ondansetron  4 mg Intravenous Q6H PRN Shivani Yuan MD      oxyCODONE  2.5 mg Oral Q8H PRN Shivani Yuan MD      polyethylene glycol  17 g Oral BID Jennifer Soares PA-C      QUEtiapine  25 mg Oral HS Shivani Yuan MD      senna  2 tablet Oral HS Shivani Yuan MD      warfarin  7.5 mg Oral Daily (warfarin) MARGARITA Hester           Lab Results: I have reviewed the following results:  Results from last 7 days   Lab Units 02/05/25  0552 02/03/25  0529   HEMOGLOBIN g/dL 11.2* 10.9*   HEMATOCRIT % 35.4* 32.5*   WBC Thousand/uL 7.47 7.12   PLATELETS Thousands/uL 276 288     Results from last 7 days   Lab Units 02/03/25  0529   BUN mg/dL 15   SODIUM mmol/L 139   POTASSIUM mmol/L 3.9   CHLORIDE  mmol/L 102   CREATININE mg/dL 0.90       Results from last 7 days   Lab Units 02/05/25  0650 02/03/25  0529   PROTIME seconds 25.8* 32.1*   INR  2.37* 3.16*        Jennifer Soares PA-C  Physical Medicine and Rehabilitation   02/06/25

## 2025-02-07 LAB
ANION GAP SERPL CALCULATED.3IONS-SCNC: 8 MMOL/L (ref 4–13)
BASOPHILS # BLD AUTO: 0.03 THOUSANDS/ÂΜL (ref 0–0.1)
BASOPHILS NFR BLD AUTO: 0 % (ref 0–1)
BUN SERPL-MCNC: 14 MG/DL (ref 5–25)
CALCIUM SERPL-MCNC: 9 MG/DL (ref 8.4–10.2)
CHLORIDE SERPL-SCNC: 104 MMOL/L (ref 96–108)
CO2 SERPL-SCNC: 27 MMOL/L (ref 21–32)
CREAT SERPL-MCNC: 0.87 MG/DL (ref 0.6–1.3)
DME PARACHUTE DELIVERY DATE EXPECTED: NORMAL
DME PARACHUTE DELIVERY DATE REQUESTED: NORMAL
DME PARACHUTE DELIVERY NOTE: NORMAL
DME PARACHUTE ITEM DESCRIPTION: NORMAL
DME PARACHUTE ORDER STATUS: NORMAL
DME PARACHUTE SUPPLIER NAME: NORMAL
DME PARACHUTE SUPPLIER PHONE: NORMAL
EOSINOPHIL # BLD AUTO: 0.28 THOUSAND/ÂΜL (ref 0–0.61)
EOSINOPHIL NFR BLD AUTO: 4 % (ref 0–6)
ERYTHROCYTE [DISTWIDTH] IN BLOOD BY AUTOMATED COUNT: 14.9 % (ref 11.6–15.1)
GFR SERPL CREATININE-BSD FRML MDRD: 89 ML/MIN/1.73SQ M
GLUCOSE SERPL-MCNC: 126 MG/DL (ref 65–140)
GLUCOSE SERPL-MCNC: 127 MG/DL (ref 65–140)
GLUCOSE SERPL-MCNC: 131 MG/DL (ref 65–140)
GLUCOSE SERPL-MCNC: 135 MG/DL (ref 65–140)
GLUCOSE SERPL-MCNC: 137 MG/DL (ref 65–140)
HCT VFR BLD AUTO: 32.4 % (ref 36.5–49.3)
HGB BLD-MCNC: 10.6 G/DL (ref 12–17)
IMM GRANULOCYTES # BLD AUTO: 0.01 THOUSAND/UL (ref 0–0.2)
IMM GRANULOCYTES NFR BLD AUTO: 0 % (ref 0–2)
INR PPP: 2.5 (ref 0.85–1.19)
LYMPHOCYTES # BLD AUTO: 1.46 THOUSANDS/ÂΜL (ref 0.6–4.47)
LYMPHOCYTES NFR BLD AUTO: 21 % (ref 14–44)
MAGNESIUM SERPL-MCNC: 1.7 MG/DL (ref 1.9–2.7)
MCH RBC QN AUTO: 31.2 PG (ref 26.8–34.3)
MCHC RBC AUTO-ENTMCNC: 32.7 G/DL (ref 31.4–37.4)
MCV RBC AUTO: 95 FL (ref 82–98)
MONOCYTES # BLD AUTO: 0.73 THOUSAND/ÂΜL (ref 0.17–1.22)
MONOCYTES NFR BLD AUTO: 11 % (ref 4–12)
NEUTROPHILS # BLD AUTO: 4.38 THOUSANDS/ÂΜL (ref 1.85–7.62)
NEUTS SEG NFR BLD AUTO: 64 % (ref 43–75)
NRBC BLD AUTO-RTO: 0 /100 WBCS
PLATELET # BLD AUTO: 246 THOUSANDS/UL (ref 149–390)
PMV BLD AUTO: 9.1 FL (ref 8.9–12.7)
POTASSIUM SERPL-SCNC: 3.8 MMOL/L (ref 3.5–5.3)
PROTHROMBIN TIME: 26.9 SECONDS (ref 12.3–15)
RBC # BLD AUTO: 3.4 MILLION/UL (ref 3.88–5.62)
SODIUM SERPL-SCNC: 139 MMOL/L (ref 135–147)
WBC # BLD AUTO: 6.89 THOUSAND/UL (ref 4.31–10.16)

## 2025-02-07 PROCEDURE — 97530 THERAPEUTIC ACTIVITIES: CPT

## 2025-02-07 PROCEDURE — 97110 THERAPEUTIC EXERCISES: CPT

## 2025-02-07 PROCEDURE — 97535 SELF CARE MNGMENT TRAINING: CPT

## 2025-02-07 PROCEDURE — 83735 ASSAY OF MAGNESIUM: CPT | Performed by: NURSE PRACTITIONER

## 2025-02-07 PROCEDURE — 85610 PROTHROMBIN TIME: CPT | Performed by: NURSE PRACTITIONER

## 2025-02-07 PROCEDURE — 99232 SBSQ HOSP IP/OBS MODERATE 35: CPT | Performed by: PHYSICAL MEDICINE & REHABILITATION

## 2025-02-07 PROCEDURE — 85025 COMPLETE CBC W/AUTO DIFF WBC: CPT | Performed by: NURSE PRACTITIONER

## 2025-02-07 PROCEDURE — 80048 BASIC METABOLIC PNL TOTAL CA: CPT | Performed by: NURSE PRACTITIONER

## 2025-02-07 PROCEDURE — 82948 REAGENT STRIP/BLOOD GLUCOSE: CPT

## 2025-02-07 PROCEDURE — 97116 GAIT TRAINING THERAPY: CPT

## 2025-02-07 PROCEDURE — 99232 SBSQ HOSP IP/OBS MODERATE 35: CPT | Performed by: FAMILY MEDICINE

## 2025-02-07 RX ADMIN — METFORMIN HYDROCHLORIDE 1000 MG: 500 TABLET ORAL at 08:07

## 2025-02-07 RX ADMIN — Medication 800 MG: at 17:25

## 2025-02-07 RX ADMIN — SENNOSIDES 17.2 MG: 8.6 TABLET ORAL at 21:31

## 2025-02-07 RX ADMIN — INSULIN LISPRO 3 UNITS: 100 INJECTION, SOLUTION INTRAVENOUS; SUBCUTANEOUS at 08:08

## 2025-02-07 RX ADMIN — ASPIRIN 81 MG: 81 TABLET, COATED ORAL at 08:07

## 2025-02-07 RX ADMIN — POLYETHYLENE GLYCOL 3350 17 G: 17 POWDER, FOR SOLUTION ORAL at 17:25

## 2025-02-07 RX ADMIN — Medication 2.5 MG: at 21:31

## 2025-02-07 RX ADMIN — Medication 800 MG: at 08:07

## 2025-02-07 RX ADMIN — METFORMIN HYDROCHLORIDE 1000 MG: 500 TABLET ORAL at 17:25

## 2025-02-07 RX ADMIN — Medication 6 MG: at 21:31

## 2025-02-07 RX ADMIN — DOCUSATE SODIUM 100 MG: 100 CAPSULE, LIQUID FILLED ORAL at 17:25

## 2025-02-07 RX ADMIN — INSULIN GLARGINE 15 UNITS: 100 INJECTION, SOLUTION SUBCUTANEOUS at 21:31

## 2025-02-07 RX ADMIN — WARFARIN SODIUM 7.5 MG: 5 TABLET ORAL at 17:26

## 2025-02-07 RX ADMIN — GABAPENTIN 300 MG: 300 CAPSULE ORAL at 08:07

## 2025-02-07 RX ADMIN — ATORVASTATIN CALCIUM 80 MG: 80 TABLET, FILM COATED ORAL at 17:25

## 2025-02-07 RX ADMIN — DOCUSATE SODIUM 100 MG: 100 CAPSULE, LIQUID FILLED ORAL at 08:07

## 2025-02-07 RX ADMIN — DICLOFENAC SODIUM 2 G: 10 GEL TOPICAL at 08:07

## 2025-02-07 RX ADMIN — GABAPENTIN 300 MG: 300 CAPSULE ORAL at 17:25

## 2025-02-07 RX ADMIN — CLOPIDOGREL BISULFATE 75 MG: 75 TABLET ORAL at 08:07

## 2025-02-07 RX ADMIN — POLYETHYLENE GLYCOL 3350 17 G: 17 POWDER, FOR SOLUTION ORAL at 08:07

## 2025-02-07 NOTE — PROGRESS NOTES
02/07/25 1030   Pain Assessment   Pain Assessment Tool 0-10   Pain Score 5   Pain Location/Orientation Location: Generalized   Restrictions/Precautions   Precautions Bed/chair alarms;Fall Risk;Supervision on toilet/commode  (+LIFE VEST)   Cognition   Overall Cognitive Status WFL   Arousal/Participation Alert;Cooperative   Attention Within functional limits   Orientation Level Oriented X4   Memory Within functional limits   Following Commands Follows all commands and directions without difficulty   Comments talkative, story tells.   Subjective   Subjective c/o onging baseline pain, feeling legs are getting better however.   Sit to Stand   Type of Assistance Needed Supervision   Physical Assistance Level No physical assistance   Comment S with RW   Sit to Stand CARE Score 4   Bed-Chair Transfer   Type of Assistance Needed Supervision   Physical Assistance Level No physical assistance   Comment S with RW   Chair/Bed-to-Chair Transfer CARE Score 4   Walk 10 Feet   Type of Assistance Needed Supervision   Physical Assistance Level No physical assistance   Comment S with Rw   Walk 10 Feet CARE Score 4   Walk 50 Feet with Two Turns   Type of Assistance Needed Supervision   Physical Assistance Level No physical assistance   Comment CS with RW   Walk 50 Feet with Two Turns CARE Score 4   Ambulation   Primary Mode of Locomotion Prior to Admission Walk   Distance Walked (feet) 80 ft  (x2, 1x50ft)   Assist Device Roller Walker   Gait Pattern Decreased foot clearance;Slow Rosanne;Narrow YOVANI;Forward Flexion   Limitations Noted In Endurance;Speed   Walk Assist Level Supervision;Close Supervision   Findings x1 stand rest break along wall with second trial of 80 ft. Progressing with posture, and endurance.   Does the patient walk? 2. Yes   Wheel 50 Feet with Two Turns   Type of Assistance Needed Independent   Wheel 50 Feet with Two Turns CARE Score 6   Wheelchair mobility   Method Left upper extremity;Right upper extremity;Right  lower extremity;Left lower extremity   Distance Level Surface (feet) 100 ft   Findings pt receptive to WC for Dc, specs reviewed and DME form submitted. Pt feels it may not fit in his home however feels it would be benficial for longer mobility pending his progress and recovery.   Does the patient use a wheelchair? 1. Yes   Type of Wheelchair Used 1. Manual   Stairs   Findings Steps in PM   Picking Up Object   Comment can trial reacher, with RW.   Therapeutic Interventions   Strengthening seated EOM: 3# LAQ and hip abd (knee ext) x30; B DF blue tband x30.   Assessment   Treatment Assessment Pt seen for 90 min skilled PT intervention with focus on amb and transfers, grossly demonstrating S with gait and transfers, needing RW support however evident improving posture and dec UE reliance. Discussed WC use and pt is receptive, feeling he may need it for lognger distances, outside of house. DME for submitted to check cost, [facility]. Pt verablies understanding and feels he may not need it immediately at Dc anyhow. To inc distance and complete steps in PM, simulate car transfer when able.   Plan   Progress Progressing toward goals   PT Therapy Minutes   PT Time In 1030   PT Time Out 1130   PT Total Time (minutes) 60   PT Mode of treatment - Individual (minutes) 60   PT Mode of treatment - Concurrent (minutes) 0   PT Mode of treatment - Group (minutes) 0   PT Mode of treatment - Co-treat (minutes) 0   PT Mode of Treatment - Total time(minutes) 60 minutes   PT Cumulative Minutes 780   Therapy Time missed   Time missed? No

## 2025-02-07 NOTE — ASSESSMENT & PLAN NOTE
Hgb 11.6 1/30, 10.9 2/3, 2/5 11.2, 2/7 10.6  Monitor w/ routine blood work   Iron panel on 1/30 iron 43, ferritin 224, iron sat 23

## 2025-02-07 NOTE — ASSESSMENT & PLAN NOTE
Lab Results   Component Value Date    HGBA1C 8.2 (H) 01/18/2025       Recent Labs     02/06/25  1100 02/06/25  1614 02/06/25 2017 02/07/25  0540   POCGLU 175* 178* 97 137       Blood Sugar Average: Last 72 hrs:  (P) 166.6130921885350008    Last A1c was 8.2 on 1/18/2025.  Home regimen: Lantus 15u at HS, glipizide 5mg daily, metformin 1000mg BID, and Jardiance 12.5mg daily.  Currently receiving Lantus 15u at HS, lispro 3u with meals, and metformin 1000mg BID.  Discontinue lispro with meals today.  Continue SSI, QID accuchecks, and cons. carb diet.

## 2025-02-07 NOTE — ASSESSMENT & PLAN NOTE
ECHO on 1/19 showed EF 25%, systolic function severely reduced, global hypokinesis.  Currently wearing Lifevest.  Continue Coreg 3.125mg BID.  Restart Jardiance on Monday.  Weight increasing - but appears euvolemic.  Continue to monitor daily at this time.  Recommend establishing care with Cardiology as outpatient.

## 2025-02-07 NOTE — ASSESSMENT & PLAN NOTE
ECHO on 1/19 showed possible moderately sized thrombus at the apex.  Started on Coumadin in acute setting.  INR 2.50 today.  Continue 7.5mg Coumadin daily.  Repeat PT/INR on Monday.  Follow-up with Cardiology as outpatient.

## 2025-02-07 NOTE — PROGRESS NOTES
Progress Note - PMR   Name: Joseph Aguilar 69 y.o. male I MRN: [id number]  Unit/Bed#: -02 I Date of Admission: 1/29/2025   Date of Service: 2/7/2025 I Hospital Day: 9     Assessment & Plan  Anterior STEMI s/p PCI with CHYNA to LM-ostial LAD and mLAD with IABP;  of LCx (1/17/2024)  S/p drug eluting stent to the LAD and left main; unable to stent the left circumflex 1/17  IABP dc 1/20  Hypotension in cath lab requiring pressors and intra-aortic baloon pump  Cw aspirin, plavix, statin, coumadin (goal 2-3),   2/3 INR 3.16  Continue coreg 3.125 bid  Follow up cardiology outpt  Coumadin held 2/3 due to INR 3.16  Repeat INR 2.37---> 2.5 (2/7)  Ambulatory dysfunction  Patient was evaluated by the rehabilitation team MD and an appropriate candidate for acute inpatient rehabilitation program at this time.  The patient will tolerate 3 hours/day 5 to 7 days/week of intensive physical, occupational therapy in order to obtain goals for community discharge  Due to the patient's functional Compared to their baseline level of function in addition to their ongoing medical needs, the patient would benefit from daily supervision from a rehabilitation physician as well as rehabilitation nursing to implement and adjust the medical as well as functional plan of care in order to meet the patient's goals.  Ok to shower for short periods of time with Lifevest removed. Replaced immediately. Staff to refer to [facility] booklet for instructions and can also call [facility] if needed  DC 2/11 outpt PT    History of hypertension  Cw lisinopril and BB  Monitor vitals and titrate meds as needed  Lisinopril D/c  Mixed hyperlipidemia  -cw statin  -f/o primary care on dc  Type 2 diabetes mellitus without complication, without long-term current use of insulin (HCC)  Lab Results   Component Value Date    HGBA1C 8.2 (H) 01/18/2025       Recent Labs     02/06/25  1100 02/06/25  1614 02/06/25 2017 02/07/25  0540   POCGLU 175* 178* 97 137   A1c 8.2% on  "admission  Lantus 20u at HS and lispro 8u with meals  2/6 Lispro decreased to 3u with meals and Lantus 15u at HS (2/5)  IM starting metformin 500 BID today, increased to 1000 mg daily (2/5)  Management per IM  Monitor glucose levels  Cw diabetic diet    Blood Sugar Average: Last 72 hrs:  (P) 166.8559458644164776    Anemia  Hgb 11.6 1/30, 10.9 2/3, 2/5 11.2, 2/7 10.6  Monitor w/ routine blood work   Iron panel on 1/30 iron 43, ferritin 224, iron sat 23  Hypomagnesemia  Cw Mg supplementation goal >2   1.7 1/30 , 1.7 2/7  ICM with EF 25%  Echo:Left ventricular cavity size is normal. Wall thickness is normal. The left ventricular ejection fraction is 25%. Systolic function is severely reduced.   Cw coreg 3.125;   Cardiology consulted and recommended lifevest  F/o cardiology as outpt  LV (left ventricular) mural thrombus  TTE 1/19: \"There is a possible moderately-sized thrombus at the apex, difficult imaging.\"  Cardiology consulted on previous admission  Continue Coumadin 10 mg  Monitor PT, INR (2-3 per guidelines)  F/o cardiology outpt    Hematoma of right lower extremity  S/p removal of IABP and closure of right femoral puncture site with Perclose Pro-glide 1/20 by Dr. Brooke  Monitoring the R groin for bleeding, skin breakdown, expanding hematoma  Will consult vascular surgery with questions  Conjunctivitis  Cw cipro goal 7-10 days   Per nursing patient reports splashing urinal at his face   D/c antibiotics due to refusal  Osteoarthritis of multiple joints  Multiple joint s/p steroid inj b/l shoulders 1/6   F.w Dr. Egan as ouptatient   C/w pain control, tylenol, oxy 2.5Q8h, bengay; titrate off opiods as patient wasn't on any as outpatient   Chronic back pain  -patient reports old injury of back  -takes tylenol for pain at home  -monitor pain levels and titrate meds  -offer different modalities- heat, lidocaine patches  Does not like lidocaine patches  Constipation  Resolved  - pt reports he usually has a BM every " "2-3 days but feels like pain meds are \"backing him up\"  -PRN dulcolax; add senna, colase and miralax as patient taking opiods   - monitor BM  Miralax changed to 2x a day standing and one dose of lactulose 2/3  Large BM 2/4    History of Present Illness   Joseph Aguilar is a 69 y.o. male w/ PMHx of HTN, HLD, T2DM, OA who initially presented to [facility] on 1/17 for chest pain, shortness of breath, nausea and vomiting. During initial eval, she was found to have an anterior wall STEMI and Interventional cardiology was alerted. Patient was taken to the cath lab where patient bacame more hypotensive requiring pressor support and intra-aortic balloon pump insertion. Patient received LAD, left main, stent however circumflex artery was unable to be intervened on. Patient was transferred to ICU for cardiogenic shock s/p STEMI. Noted to have LV thrombus and started on heparin gtt and continued on brilinta and ASA. He noted to have some transaminitis so Statin was on hold. TTE w/ LVEF 25%, severe global hypokinesis w/ apical and distal septal near akinesis, mod siz thrombus at apex. On 1/20 he was noted to have LE bleeding w/ hematoma formation from cath site rq manual pressure and femostop. Vascular surgery was consulted . R femoral site was closed by Dr. Brooke. IABP was d/c and heparin was held. He received 1U PRBC. Heparin was restarted and coumadin was started w/ goal 2-3. INR 2.22 1/29 and heparin was d/c. Joseph Aguilar was admitted to the [facility] on 01/29/25     Chief Complaint: f/u ambulatory dysfunction    Interval: Patient seen and examined in wheelchair in room. No events overnight.  Reports overall feeling \"excellent\" Last BM 2/7. Sleeping well at night. Home star was reached for a pice check and pt is agreeable to the price.    Review of Systems   Respiratory:  Negative for shortness of breath.    Cardiovascular:  Negative for chest pain, palpitations and leg swelling. "   Gastrointestinal:  Negative for abdominal pain, constipation, diarrhea, nausea and vomiting.   Musculoskeletal:         Chronic back and shoulder pain       Objective   Functional Update:  Physical Therapy Occupational Therapy Speech Therapy   Weight Bearing Status: Full Weight Bearing  Transfers: Minimal Assistance  Bed Mobility: Minimal Assistance  Amulation Distance (ft): 40 feet  Ambulation: Incidental Touching  Assistive Device for Ambulation: Roller Walker  Discharge Recommendations: Home with:  DC Home with:: Family Support   Eating: Independent  Grooming: Independent  Bathing: Incidental Touching  Bathing: Incidental Touching  Upper Body Dressing: Minimal Assistance  Lower Body Dressing: Incidental Touching  Toileting: Incidental Touching  Tub/Shower Transfer: Incidental Touching  Toilet Transfer: Incidental Touching  Cognition: Exceptions to WNL  Cognition: Decreased Safety, Behavioral Considerations  Orientation: Person, Place, Time, Situation                   Temp:  [97.4 °F (36.3 °C)-98.3 °F (36.8 °C)] 97.4 °F (36.3 °C)  HR:  [67-94] 82  Resp:  [18] 18  BP: ()/(59-85) 104/70  SpO2:  [95 %-97 %] 96 %    Physical Exam  Constitutional:       General: He is not in acute distress.  HENT:      Head: Normocephalic and atraumatic.      Mouth/Throat:      Mouth: Mucous membranes are moist.   Cardiovascular:      Rate and Rhythm: Normal rate and regular rhythm.      Comments: Wearing life vest  Pulmonary:      Effort: Pulmonary effort is normal. No respiratory distress.      Breath sounds: Normal breath sounds.   Abdominal:      General: Bowel sounds are normal. There is distension.   Musculoskeletal:         General: No tenderness.      Comments: Limited ROM in shoulders   Skin:     General: Skin is warm and dry.   Neurological:      Mental Status: He is alert and oriented to person, place, and time. Mental status is at baseline.   Psychiatric:         Mood and Affect: Mood normal.         Behavior:  Behavior normal.           Scheduled Meds:  Current Facility-Administered Medications   Medication Dose Route Frequency Provider Last Rate    acetaminophen  650 mg Oral Q6H PRN Shivani Yuan MD      aluminum-magnesium hydroxide-simethicone  30 mL Oral Q4H PRN Shivani Yuan MD      Artificial Tears  1 drop Left Eye Q4H PRN Shivani Yuan MD      aspirin  81 mg Oral Daily Shivani Yuan MD      atorvastatin  80 mg Oral Daily With Dinner Shivani Yuan MD      bisacodyl  10 mg Rectal Daily PRN Shivani Yuan MD      carvedilol  3.125 mg Oral BID With Meals Shivani Yuan MD      clopidogrel  75 mg Oral Daily Shivani Yuan MD      Diclofenac Sodium  2 g Topical 4x Daily Marilyn Blanchard MD      docusate sodium  100 mg Oral BID Shivani Yuan MD      gabapentin  300 mg Oral BID Shivani Yuan MD      insulin glargine  15 Units Subcutaneous HS MARGARITA Hester      insulin lispro  2-12 Units Subcutaneous 4x Daily (AC & HS) Shivani Yuan MD      magnesium Oxide  800 mg Oral BID MARGARITA Hester      melatonin  6 mg Oral HS Shivani Yuan MD      menthol-methyl salicylate   Apply externally 4x Daily PRN Shivani Yuan MD      metFORMIN  1,000 mg Oral BID With Meals MARGARITA Hester      ondansetron  4 mg Intravenous Q6H PRN Shivani Yuan MD      oxyCODONE  2.5 mg Oral Q8H PRN Shivani Yuan MD      polyethylene glycol  17 g Oral BID Jennifer Soares PA-C      senna  2 tablet Oral HS Shivani Yuan MD      warfarin  7.5 mg Oral Daily (warfarin) MARGARITA Hester           Lab Results: I have reviewed the following results:  Results from last 7 days   Lab Units 02/07/25  0541 02/05/25  0552 02/03/25  0529   HEMOGLOBIN g/dL 10.6* 11.2* 10.9*   HEMATOCRIT % 32.4* 35.4* 32.5*   WBC Thousand/uL 6.89 7.47 7.12   PLATELETS Thousands/uL 246 276 288     Results from last 7 days   Lab Units 02/07/25  0541 02/03/25  0529   BUN mg/dL 14 15   SODIUM mmol/L 139 139   POTASSIUM mmol/L 3.8 3.9    CHLORIDE mmol/L 104 102   CREATININE mg/dL 0.87 0.90       Results from last 7 days   Lab Units 02/07/25  0541 02/05/25  0650 02/03/25  0529   PROTIME seconds 26.9* 25.8* 32.1*   INR  2.50* 2.37* 3.16*        Jennifer Soares PA-C  Physical Medicine and Rehabilitation   02/07/25

## 2025-02-07 NOTE — PROGRESS NOTES
02/07/25 1300   Pain Assessment   Pain Assessment Tool 0-10   Pain Score 6   Pain Location/Orientation Location: Back   Pain Onset/Description Frequency: Constant/Continuous   Restrictions/Precautions   Precautions Bed/chair alarms;Fall Risk;Supervision on toilet/commode  (life vest)   Weight Bearing Restrictions No   ROM Restrictions No   Cognition   Overall Cognitive Status WFL   Arousal/Participation Alert   Attention Within functional limits   Orientation Level Oriented X4   Memory Within functional limits   Following Commands Follows all commands and directions without difficulty   Subjective   Subjective Patient ready for therapy today notes that he had therapy earlier today.   Sit to Stand   Type of Assistance Needed Supervision   Physical Assistance Level No physical assistance   Comment Supervision with RW   Sit to Stand CARE Score 4   Bed-Chair Transfer   Type of Assistance Needed Supervision   Physical Assistance Level No physical assistance   Comment Supervision with RW   Chair/Bed-to-Chair Transfer CARE Score 4   Walk 10 Feet   Type of Assistance Needed Supervision   Physical Assistance Level No physical assistance   Comment Supervision with RW   Walk 10 Feet CARE Score 4   Walk 50 Feet with Two Turns   Type of Assistance Needed Supervision   Physical Assistance Level No physical assistance   Comment Close Supervision with RW   Walk 50 Feet with Two Turns CARE Score 4   Walk 150 Feet   Type of Assistance Needed Supervision   Physical Assistance Level No physical assistance   Comment Close Supervision with RW   Walk 150 Feet CARE Score 4   Ambulation   Primary Mode of Locomotion Prior to Admission Walk   Distance Walked (feet) 400 ft  (150x2, 100x1)   Assist Device Roller Walker   Gait Pattern Decreased foot clearance;Slow Rosanne;Narrow YOVANI;Forward Flexion   Limitations Noted In Endurance;Speed   Walk Assist Level Supervision;Close Supervision   Does the patient walk? 2. Yes   Wheel 50 Feet with Two  Turns   Type of Assistance Needed Independent   Wheel 50 Feet with Two Turns CARE Score 6   Wheel 150 Feet   Type of Assistance Needed Independent   Wheel 150 Feet CARE Score 6   Wheelchair mobility   Method Left upper extremity;Right upper extremity;Right lower extremity;Left lower extremity   Distance Level Surface (feet) 1500 ft   Does the patient use a wheelchair? 1. Yes   Type of Wheelchair Used 1. Manual   Picking Up Object   Type of Assistance Needed Supervision;Adaptive equipment   Physical Assistance Level No physical assistance   Comment Reacher for marker   Picking Up Object CARE Score 4   Therapeutic Interventions   Strengthening Seated bilateral LEs- 20 reps, 3 second holds bilaterally- LAQ, Marches, Adduction, abduction blue TB   Other Ambulation, transfers, and functional mobility per object   Assessment   Treatment Assessment Patient seen for 90 minute session, patient challenged but progressing in endurance today, increasing ambulation capabilities today. Patient limited with ambulation endurance due to his shoulder stability, strength and pain, but progress made improvements in additions to distance. Patient fatigued post appointment but improving with overall function, patient supervision with reacher today. Patient requires skilled PT in order to maximize function and improve endurance.   Family/Caregiver Present no   PT Family training done with: no   Problem List Decreased strength;Decreased endurance;Impaired balance;Decreased mobility   Barriers to Discharge Decreased caregiver support   PT Barriers   Functional Limitation Car transfers;Stair negotiation;Standing;Walking;Transfers   Plan   Treatment/Interventions ADL retraining;Functional transfer training;Therapeutic exercise;Endurance training;Patient/family training;Bed mobility;Compensatory technique education   Progress Progressing toward goals   PT Therapy Minutes   PT Time In 1300   PT Time Out 1430   PT Total Time (minutes) 90   PT  Mode of treatment - Individual (minutes) 90   PT Mode of treatment - Concurrent (minutes) 0   PT Mode of treatment - Group (minutes) 0   PT Mode of treatment - Co-treat (minutes) 0   PT Mode of Treatment - Total time(minutes) 90 minutes   PT Cumulative Minutes 810   Therapy Time missed   Time missed? No

## 2025-02-07 NOTE — ASSESSMENT & PLAN NOTE
S/p drug eluting stent to the LAD and left main; unable to stent the left circumflex 1/17  IABP dc 1/20  Hypotension in cath lab requiring pressors and intra-aortic baloon pump  Cw aspirin, plavix, statin, coumadin (goal 2-3),   2/3 INR 3.16  Continue coreg 3.125 bid  Follow up cardiology outpt  Coumadin held 2/3 due to INR 3.16  Repeat INR 2.37---> 2.5 (2/7)

## 2025-02-07 NOTE — ASSESSMENT & PLAN NOTE
Lab Results   Component Value Date    HGBA1C 8.2 (H) 01/18/2025       Recent Labs     02/06/25  1100 02/06/25  1614 02/06/25 2017 02/07/25  0540   POCGLU 175* 178* 97 137   A1c 8.2% on admission  Lantus 20u at HS and lispro 8u with meals  2/6 Lispro decreased to 3u with meals and Lantus 15u at HS (2/5)  IM starting metformin 500 BID today, increased to 1000 mg daily (2/5)  Management per IM  Monitor glucose levels  Cw diabetic diet    Blood Sugar Average: Last 72 hrs:  (P) [id number]

## 2025-02-07 NOTE — ASSESSMENT & PLAN NOTE
Hgb currently stable at 10.6.  Developed large R groin hematoma s/p balloon pump.  No active s/s of bleeding.  Iron panel on 1/30: Iron sat 23%, TIBC 185, Iron 43, and Ferritin 224.  No need for iron supplementation at this time.  Continue to trend routine CBC.   No

## 2025-02-07 NOTE — PROGRESS NOTES
Progress Note - Internal Medicine   Name: Joseph Aguilar 69 y.o. male I MRN: [id number]  Unit/Bed#: -02 I Date of Admission: 1/29/2025   Date of Service: 2/7/2025 I Hospital Day: 9    Assessment & Plan  Type 2 diabetes mellitus without complication, without long-term current use of insulin (HCC)  Lab Results   Component Value Date    HGBA1C 8.2 (H) 01/18/2025       Recent Labs     02/06/25  1100 02/06/25  1614 02/06/25 2017 02/07/25  0540   POCGLU 175* 178* 97 137       Blood Sugar Average: Last 72 hrs:  (P) [id number]    Last A1c was 8.2 on 1/18/2025.  Home regimen: Lantus 15u at HS, glipizide 5mg daily, metformin 1000mg BID, and Jardiance 12.5mg daily.  Currently receiving Lantus 15u at HS, lispro 3u with meals, and metformin 1000mg BID.  Discontinue lispro with meals today.  Continue SSI, QID accuchecks, and cons. carb diet.  Anemia  Hgb currently stable at 10.6.  Developed large R groin hematoma s/p balloon pump.  No active s/s of bleeding.  Iron panel on 1/30: Iron sat 23%, TIBC 185, Iron 43, and Ferritin 224.  No need for iron supplementation at this time.  Continue to trend routine CBC.  Hypomagnesemia  Mg 1.7 on 2/7.  Continue magnesium oxide to 800mg BID.  Continue to trend routine Mg.  Anterior STEMI s/p PCI with CHYNA to LM-ostial LAD and mLAD with IABP;  of LCx (1/17/2024)  Presented on 1/17 with near syncopal episode with jaw pain and hypotension.  NSTEMI alert - cardiac cath on 1/17 with CHYNA to LAD and L main with IABP support.  IABP discontinued on 1/20.  Continue ASA 81mg daily, Plavix 75mg daily, Coumadin, Lipitor 80mg daily, and Coreg 3.125mg BID.  Recommend establishing with Cardiology on discharge.  ICM with EF 25%  ECHO on 1/19 showed EF 25%, systolic function severely reduced, global hypokinesis.  Currently wearing Lifevest.  Continue Coreg 3.125mg BID.  Restart Jardiance on Monday.  Weight increasing - but appears euvolemic.  Continue to monitor daily at this  time.  Recommend establishing care with Cardiology as outpatient.  LV (left ventricular) mural thrombus  ECHO on  showed possible moderately sized thrombus at the apex.  Started on Coumadin in acute setting.  INR 2.50 today.  Continue 7.5mg Coumadin daily.  Repeat PT/INR on Monday.  Follow-up with Cardiology as outpatient.  Hematoma of right lower extremity  Developed R groin hematoma at IABP insertion site.  IR suite on  for closure of R femoral artery puncture site and control of expanding hematoma performed by Dr. Brooke (Vascular).  Monitor site daily for s/s of reoccurring bleeding.  Continue to trend H&H.  Conjunctivitis  Continue ciprofloxacin to L eye for 7-10 days.  Has been declining drops.  Osteoarthritis of multiple joints  Multiple joints - shoulders, knees, back.  Recently had steroid injection to B/L shoulders on .  Follows with Dr. Egan (Orthopedics) as outpatient.  Considerations with therapies.  Continue current pain regimen.   History of hypertension  Newly started on lisinopril 2.5mg daily in acute setting.  Continue Coreg 3.125mg BID.  Monitor BP with routine VS.  Ambulatory dysfunction  Deconditioned s/p STEMI.  Primary team following.  Continue with PT/OT.    VTE Pharmacologic Prophylaxis:   Pharmacologic: Warfarin (Coumadin)  Mechanical VTE Prophylaxis in Place: Yes - sequential compression devices.    Current Length of Stay: 9 day(s)    Current Patient Status: Inpatient Rehab     Discharge Plan: As per primary team.    Code Status: Level 1 - Full Code    Subjective:   Pt examined while pt sitting in WC in pt room.  Currently has no complaints.  Denies any lightheadedness, dizziness, SOB, palpitations, or CP.  Has been tolerating therapy well.  Slept well last night.  Reviewed INR this morning and current Coumadin regimen.    Objective:     Vitals:   Temp (24hrs), Av °F (36.7 °C), Min:97.4 °F (36.3 °C), Max:98.3 °F (36.8 °C)    Temp:  [97.4 °F (36.3 °C)-98.3 °F (36.8 °C)]  97.4 °F (36.3 °C)  HR:  [67-94] 82  Resp:  [18] 18  BP: ()/(59-85) 104/70  SpO2:  [95 %-97 %] 96 %  Body mass index is 30.17 kg/m².     Review of Systems   Constitutional:  Negative for appetite change, chills, fatigue and fever.   HENT:  Negative for trouble swallowing.    Eyes:  Negative for visual disturbance.   Respiratory:  Negative for cough, shortness of breath, wheezing and stridor.    Cardiovascular:  Negative for chest pain, palpitations and leg swelling.   Gastrointestinal:  Negative for abdominal distention, abdominal pain, constipation, diarrhea, nausea and vomiting.        LBM 2/7   Genitourinary:  Negative for difficulty urinating.   Musculoskeletal:  Negative for arthralgias, back pain and gait problem.   Neurological:  Negative for dizziness, weakness, light-headedness, numbness and headaches.   Psychiatric/Behavioral:  Negative for dysphoric mood and sleep disturbance. The patient is not nervous/anxious.    All other systems reviewed and are negative.       Input and Output Summary (last 24 hours):       Intake/Output Summary (Last 24 hours) at 2/7/2025 0943  Last data filed at 2/7/2025 0801  Gross per 24 hour   Intake 550 ml   Output 325 ml   Net 225 ml       Physical Exam:     Physical Exam  Vitals and nursing note reviewed.   Constitutional:       General: He is not in acute distress.     Appearance: Normal appearance. He is not ill-appearing.   HENT:      Head: Normocephalic and atraumatic.   Cardiovascular:      Rate and Rhythm: Normal rate and regular rhythm.      Pulses: Normal pulses.      Heart sounds: Murmur heard.      Systolic murmur is present with a grade of 1/6.      No friction rub.      Comments: Wearing LifeVest.  Pulmonary:      Effort: Pulmonary effort is normal. No respiratory distress.      Breath sounds: Normal breath sounds. No wheezing or rhonchi.   Abdominal:      General: Abdomen is flat. Bowel sounds are normal. There is no distension.      Palpations: Abdomen is  soft.      Tenderness: There is no abdominal tenderness.   Musculoskeletal:      Cervical back: Normal range of motion and neck supple.      Right lower leg: No edema.      Left lower leg: No edema.   Skin:     General: Skin is warm and dry.   Neurological:      Mental Status: He is alert and oriented to person, place, and time.   Psychiatric:         Mood and Affect: Mood normal.         Behavior: Behavior normal.         Additional Data:     Labs:    Results from last 7 days   Lab Units 02/07/25  0541   WBC Thousand/uL 6.89   HEMOGLOBIN g/dL 10.6*   HEMATOCRIT % 32.4*   PLATELETS Thousands/uL 246   SEGS PCT % 64   LYMPHO PCT % 21   MONO PCT % 11   EOS PCT % 4     Results from last 7 days   Lab Units 02/07/25  0541   SODIUM mmol/L 139   POTASSIUM mmol/L 3.8   CHLORIDE mmol/L 104   CO2 mmol/L 27   BUN mg/dL 14   CREATININE mg/dL 0.87   ANION GAP mmol/L 8   CALCIUM mg/dL 9.0   GLUCOSE RANDOM mg/dL 127     Results from last 7 days   Lab Units 02/07/25  0541   INR  2.50*     Results from last 7 days   Lab Units 02/07/25  0540 02/06/25  2017 02/06/25  1614 02/06/25  1100 02/06/25  0620 02/05/25  2034 02/05/25  1617 02/05/25  1115 02/05/25  0638 02/04/25  2034 02/04/25  1606 02/04/25  1133   POC GLUCOSE mg/dl 137 97 178* 175* 138 191* 148* 217* 151* 182* 180* 217*               Labs reviewed    Imaging:    Imaging reviewed    Recent Cultures (last 7 days):           Last 24 Hours Medication List:   Current Facility-Administered Medications   Medication Dose Route Frequency Provider Last Rate    acetaminophen  650 mg Oral Q6H PRN Shivani Yuan MD      aluminum-magnesium hydroxide-simethicone  30 mL Oral Q4H PRN Shivani Yuan MD      Artificial Tears  1 drop Left Eye Q4H PRN Shivani Yuan MD      aspirin  81 mg Oral Daily Shivani Yuan MD      atorvastatin  80 mg Oral Daily With Dinner Shivani Yuan MD      bisacodyl  10 mg Rectal Daily PRN Shivani Yuan MD      carvedilol  3.125 mg Oral BID With Meals Shivani Yuan MD       clopidogrel  75 mg Oral Daily Shivani Yuan MD      Diclofenac Sodium  2 g Topical 4x Daily Marilyn Blanchard MD      docusate sodium  100 mg Oral BID Shivani Yuan MD      gabapentin  300 mg Oral BID Shivani Yuan MD      insulin glargine  15 Units Subcutaneous HS MARGARITA Hester      insulin lispro  2-12 Units Subcutaneous 4x Daily (AC & HS) Shivani Yuan MD      magnesium Oxide  800 mg Oral BID MARGARITA Hester      melatonin  6 mg Oral HS Shivani Yuan MD      menthol-methyl salicylate   Apply externally 4x Daily PRN Shivani Yuan MD      metFORMIN  1,000 mg Oral BID With Meals MARGARITA Hester      ondansetron  4 mg Intravenous Q6H PRN Shivani Yuan MD      oxyCODONE  2.5 mg Oral Q8H PRN Shivani Yuan MD      polyethylene glycol  17 g Oral BID Jennifer Soares PA-C      senna  2 tablet Oral HS Shivani Yuan MD      warfarin  7.5 mg Oral Daily (warfarin) MARGARITA Hester          M*Modal software was used to dictate this note.  It may contain errors with dictating incorrect words or incorrect spelling. Please contact the provider directly with any questions.

## 2025-02-07 NOTE — PROGRESS NOTES
"   02/07/25 0900   Pain Assessment   Pain Assessment Tool 0-10   Pain Score 7   Pain Location/Orientation Location: Back   Pain Onset/Description Frequency: Constant/Continuous   Effect of Pain on Daily Activities \"deals with it\"   Patient's Stated Pain Goal No pain   Hospital Pain Intervention(s) Repositioned;Rest   Multiple Pain Sites No   Restrictions/Precautions   Precautions Bed/chair alarms;Fall Risk;Supervision on toilet/commode  (life vest)   Weight Bearing Restrictions No   ROM Restrictions No   Sit to Stand   Type of Assistance Needed Supervision;Adaptive equipment   Comment CS with RW   Sit to Stand CARE Score 4   Bed-Chair Transfer   Type of Assistance Needed Supervision;Adaptive equipment   Comment CS with RW   Chair/Bed-to-Chair Transfer CARE Score 4   Toileting Hygiene   Type of Assistance Needed Supervision   Comment pt had med BM, able to manage hygiene seated, SUP in stance for Cm   Toileting Hygiene CARE Score 4   Toilet Transfer   Type of Assistance Needed Supervision;Adaptive equipment   Comment CS with RW SPT to BSC over toilet   Toilet Transfer CARE Score 4   Cognition   Overall Cognitive Status WFL   Arousal/Participation Alert   Attention Within functional limits   Orientation Level Oriented X4   Memory Within functional limits   Following Commands Follows all commands and directions without difficulty   Additional Activities   Additional Activities Other (Comment)  (Heart failure educ)   Additional Activities Comments Access Code: [email or web address]  URL: [email or web address]  Date: 02/07/2025  Prepared by: Margie Thibodeaux    Patient Education  - My Heart Failure Action Plan  - Managing Heart Failure: Heart-Healthy Lifestyle Choices  - What Is Heart Failure?  - Managing Heart Failure: Heart-Healthy Diet  - Tracking Heart Failure Symptoms  - Coping with Heart Failure  - Cardiac Devices & Surgery for Heart Failure  - Treating Heart Failure with Medications     Activity Tolerance   Activity " Tolerance Patient tolerated treatment well   Assessment   Treatment Assessment Engaged pt in 30mins of skilled OT services with focus on toileting and heart failure educ. Pt demonstrating improvement and fx at SUP level with RW. Pt receptive and engaged in educ. Pt can cont to benefit from further skilled OT services with focus on med mngmnt, progress to IRP.   Prognosis Good   Problem List Decreased strength;Decreased endurance;Impaired balance;Decreased mobility   Barriers to Discharge Decreased caregiver support;Inaccessible home environment   Plan   Treatment/Interventions ADL retraining;Functional transfer training;Therapeutic exercise;Endurance training;Patient/family training;Bed mobility;Compensatory technique education   Progress Progressing toward goals   Discharge Recommendation   Equipment Recommended   (pt has all DME)   OT Therapy Minutes   OT Time In 0900   OT Time Out 0930   OT Total Time (minutes) 30   OT Mode of treatment - Individual (minutes) 30   OT Mode of treatment - Concurrent (minutes) 0   OT Mode of treatment - Group (minutes) 0   OT Mode of treatment - Co-treat (minutes) 0   OT Mode of Treatment - Total time(minutes) 30 minutes   OT Cumulative Minutes 630   Therapy Time missed   Time missed? No

## 2025-02-07 NOTE — CASE MANAGEMENT
CM NOTE      Pt's wife called back yesterday afternoon and was in agreement to have scripts filled at [facility]. CM called  this AM and asked them to fill pt's prescriptions that were sent down yesterday, which they will do. Pt and his wife aware he will owe $61.95. Pt's wife will give pt credit card this weekend when she comes to visit him.        1300 - w/c order submitted via [employer]

## 2025-02-07 NOTE — PLAN OF CARE
Problem: METABOLIC, FLUID AND ELECTROLYTES - ADULT  Goal: Glucose maintained within target range  Description: INTERVENTIONS:  - Monitor Blood Glucose as ordered  - Assess for signs and symptoms of hyperglycemia and hypoglycemia  - Administer ordered medications to maintain glucose within target range  - Assess nutritional intake and initiate nutrition service referral as needed  Outcome: Progressing     Problem: MUSCULOSKELETAL - ADULT  Goal: Maintain or return mobility to safest level of function  Description: INTERVENTIONS:  - Assess patient's ability to carry out ADLs; assess patient's baseline for ADL function and identify physical deficits which impact ability to perform ADLs (bathing, care of mouth/teeth, toileting, grooming, dressing, etc.)  - Assess/evaluate cause of self-care deficits   - Assess range of motion  - Assess patient's mobility  - Assess patient's need for assistive devices and provide as appropriate  - Encourage maximum independence but intervene and supervise when necessary  - Involve family in performance of ADLs  - Assess for home care needs following discharge   - Consider OT consult to assist with ADL evaluation and planning for discharge  - Provide patient education as appropriate  Outcome: Progressing     Problem: PAIN - ADULT  Goal: Verbalizes/displays adequate comfort level or baseline comfort level  Description: Interventions:  - Encourage patient to monitor pain and request assistance  - Assess pain using appropriate pain scale  - Administer analgesics based on type and severity of pain and evaluate response  - Implement non-pharmacological measures as appropriate and evaluate response  - Consider cultural and social influences on pain and pain management  - Notify physician/advanced practitioner if interventions unsuccessful or patient reports new pain  Outcome: Progressing     Problem: SAFETY ADULT  Goal: Maintain or return to baseline ADL function  Description:  INTERVENTIONS:  -  Assess patient's ability to carry out ADLs; assess patient's baseline for ADL function and identify physical deficits which impact ability to perform ADLs (bathing, care of mouth/teeth, toileting, grooming, dressing, etc.)  - Assess/evaluate cause of self-care deficits   - Assess range of motion  - Assess patient's mobility; develop plan if impaired  - Assess patient's need for assistive devices and provide as appropriate  - Encourage maximum independence but intervene and supervise when necessary  - Involve family in performance of ADLs  - Assess for home care needs following discharge   - Consider OT consult to assist with ADL evaluation and planning for discharge  - Provide patient education as appropriate  Outcome: Progressing

## 2025-02-08 LAB
GLUCOSE SERPL-MCNC: 123 MG/DL (ref 65–140)
GLUCOSE SERPL-MCNC: 129 MG/DL (ref 65–140)
GLUCOSE SERPL-MCNC: 134 MG/DL (ref 65–140)
GLUCOSE SERPL-MCNC: 142 MG/DL (ref 65–140)

## 2025-02-08 PROCEDURE — 97530 THERAPEUTIC ACTIVITIES: CPT

## 2025-02-08 PROCEDURE — 82948 REAGENT STRIP/BLOOD GLUCOSE: CPT

## 2025-02-08 PROCEDURE — 97110 THERAPEUTIC EXERCISES: CPT

## 2025-02-08 PROCEDURE — 97116 GAIT TRAINING THERAPY: CPT

## 2025-02-08 RX ADMIN — METFORMIN HYDROCHLORIDE 1000 MG: 500 TABLET ORAL at 08:14

## 2025-02-08 RX ADMIN — ATORVASTATIN CALCIUM 80 MG: 80 TABLET, FILM COATED ORAL at 17:29

## 2025-02-08 RX ADMIN — CLOPIDOGREL BISULFATE 75 MG: 75 TABLET ORAL at 08:14

## 2025-02-08 RX ADMIN — ASPIRIN 81 MG: 81 TABLET, COATED ORAL at 08:14

## 2025-02-08 RX ADMIN — Medication 6 MG: at 21:15

## 2025-02-08 RX ADMIN — GABAPENTIN 300 MG: 300 CAPSULE ORAL at 17:29

## 2025-02-08 RX ADMIN — CARVEDILOL 3.12 MG: 3.12 TABLET, FILM COATED ORAL at 17:29

## 2025-02-08 RX ADMIN — SENNOSIDES 17.2 MG: 8.6 TABLET ORAL at 21:16

## 2025-02-08 RX ADMIN — METFORMIN HYDROCHLORIDE 1000 MG: 500 TABLET ORAL at 17:29

## 2025-02-08 RX ADMIN — Medication 800 MG: at 08:14

## 2025-02-08 RX ADMIN — DOCUSATE SODIUM 100 MG: 100 CAPSULE, LIQUID FILLED ORAL at 08:14

## 2025-02-08 RX ADMIN — POLYETHYLENE GLYCOL 3350 17 G: 17 POWDER, FOR SOLUTION ORAL at 17:42

## 2025-02-08 RX ADMIN — DOCUSATE SODIUM 100 MG: 100 CAPSULE, LIQUID FILLED ORAL at 17:42

## 2025-02-08 RX ADMIN — WARFARIN SODIUM 7.5 MG: 5 TABLET ORAL at 17:29

## 2025-02-08 RX ADMIN — POLYETHYLENE GLYCOL 3350 17 G: 17 POWDER, FOR SOLUTION ORAL at 08:14

## 2025-02-08 RX ADMIN — Medication 800 MG: at 17:29

## 2025-02-08 RX ADMIN — Medication 2.5 MG: at 21:20

## 2025-02-08 RX ADMIN — GABAPENTIN 300 MG: 300 CAPSULE ORAL at 08:14

## 2025-02-08 RX ADMIN — INSULIN GLARGINE 15 UNITS: 100 INJECTION, SOLUTION SUBCUTANEOUS at 21:24

## 2025-02-08 NOTE — PROGRESS NOTES
"   02/08/25 1400   Pain Assessment   Pain Assessment Tool 0-10   Pain Score 5   Pain Location/Orientation Orientation: Lower;Location: Back   Pain Onset/Description Onset: Ongoing;Frequency: Constant/Continuous;Descriptor: Sore   Restrictions/Precautions   Precautions Bed/chair alarms;Fall Risk;Supervision on toilet/commode;Pain  (+ life vest)   Cognition   Overall Cognitive Status WFL   Arousal/Participation Alert;Cooperative   Attention Within functional limits   Orientation Level Oriented X4   Memory Within functional limits   Following Commands Follows all commands and directions without difficulty   Subjective   Subjective asking to shower, \"i know it will make me feel a lot better\"   Sit to Stand   Type of Assistance Needed Independent   Physical Assistance Level No physical assistance   Sit to Stand CARE Score 6   Bed-Chair Transfer   Type of Assistance Needed Independent   Physical Assistance Level No physical assistance   Comment stand pivot with RW   Chair/Bed-to-Chair Transfer CARE Score 6   Walk 10 Feet   Type of Assistance Needed Independent   Physical Assistance Level No physical assistance   Walk 10 Feet CARE Score 6   Walk 50 Feet with Two Turns   Type of Assistance Needed Supervision   Physical Assistance Level No physical assistance   Walk 50 Feet with Two Turns CARE Score 4   Ambulation   Primary Mode of Locomotion Prior to Admission Walk   Distance Walked (feet) 50 ft  (x4)   Assist Device Roller Walker   Findings discussed walking hourly at home and benefits of multiple short distance household walks several times per day. seated rests breaks between trials.   Does the patient walk? 2. Yes   Therapeutic Interventions   Other Pt setup to shower. Life vest removed at 1420, amb into BR with RW, use of shower bench in walk in shower, pt self showered needing no phyiscal A. A at end of shower todon socks prior to stand. (observed R groin ecchymosis/hematoma- remains tender to touch per pt) Redonned " life vest at 1440, vitals taken and stable.   Other Comments   Comments Spouse present at start of session , reviewed POC and pt status with amb, steps, and  mobility progress to date. Spouse and pt prefer DC home monday post therapy, pt is now eager to get home. Staff notifed and is aware. SPouse updated on request placed for , still awaiting coverage determination.   Assessment   Treatment Assessment Pt seen for total 90 min skilled PT intervention, asking for shower upon entry, upset he did not have a shower today and will not have one tomorrow either, reports if setup he will do it himself. Pt self completed shower with S as above, life vest redonned with min A. Focus on short walks. Needs HEP for anticipated DC home monday post therapy.   PT Therapy Minutes   PT Time In 1400   PT Time Out 1530   PT Total Time (minutes) 90   PT Mode of treatment - Individual (minutes) 90   PT Mode of treatment - Concurrent (minutes) 0   PT Mode of treatment - Group (minutes) 0   PT Mode of treatment - Co-treat (minutes) 0   PT Mode of Treatment - Total time(minutes) 90 minutes   PT Cumulative Minutes 1020

## 2025-02-08 NOTE — PROGRESS NOTES
"OT TREATMENT       02/08/25 1030   Pain Assessment   Pain Assessment Tool 0-10   Pain Score 5   Pain Location/Orientation Orientation: Lower;Location: Back   Pain Onset/Description Frequency: Constant/Continuous   Patient's Stated Pain Goal No pain   Hospital Pain Intervention(s) Repositioned;Emotional support   Restrictions/Precautions   Precautions Bed/chair alarms;Fall Risk;Supervision on toilet/commode;Pain  (LIFEVEST)   Weight Bearing Restrictions No   ROM Restrictions No   Lifestyle   Autonomy \"There is nothing that I need, my wife does everything at home. I just need to walk\"   Sit to Stand   Type of Assistance Needed Supervision   Physical Assistance Level No physical assistance   Comment to RW, performed x3   Sit to Stand CARE Score 4   Bed-Chair Transfer   Type of Assistance Needed Supervision   Physical Assistance Level No physical assistance   Comment short distance ambulatory with RW   Chair/Bed-to-Chair Transfer CARE Score 4   Health Management   Health Management Attempted to engage pt in tangible med mgmt task. Pt declining stating \"I am a grown man, I don't need any of this. I have taken meds all my life and will continue to do so without any of this\"   Functional Standing Tolerance   Time ~3 mins   Activity stance at sink   Comments Pt reporting he would like to wash up, this therapist offered to wash up at sink due to time constraints. pt maintained stance at sink ~3 mins with use of RW. Pt then notably frustrated declined to continue, stating \"I can do this birdbath stuff at home no problem, I don't need this, they're supposed to give me a shower.\" this therapist attempted to educate pt on therapy schedule and D/C ADL session on Monday, pt not receptive.   Cognition   Overall Cognitive Status WFL   Arousal/Participation Alert   Attention Within functional limits   Orientation Level Oriented X4   Memory Within functional limits   Following Commands Follows all commands and directions without " difficulty   Additional Activities   Additional Activities Other (Comment)   Additional Activities Comments Pt educated extensively on benefit and goals for OT to encourage improved participation. Pt not receptive to education, very limited participation.   Activity Tolerance   Activity Tolerance Patient tolerated treatment well   Assessment   Treatment Assessment Pt participated in skilled OT session with focus on standing tolerance and education. Pt tolerated treatment well, pt remained seated in WC with all immediate needs met, call bell accessible, chair alarm secured . Pt will continue to benefit from skilled OT services to ensure safe discharge. Continue plan of care with focus on ADL retraining.   Prognosis Good   Problem List Decreased strength;Decreased endurance;Impaired balance;Decreased mobility;Pain;Decreased safety awareness   Barriers to Discharge Inaccessible home environment;Decreased caregiver support   Plan   Treatment/Interventions ADL retraining;Functional transfer training;Therapeutic exercise;Endurance training;Patient/family training;Bed mobility;Equipment eval/education;Compensatory technique education   Progress Progressing toward goals   Discharge Recommendation   Rehab Resource Intensity Level, OT   (home with support)   OT Therapy Minutes   OT Time In 1030   OT Time Out 1100   OT Total Time (minutes) 30   OT Mode of treatment - Individual (minutes) 30   OT Mode of treatment - Concurrent (minutes) 0   OT Mode of treatment - Group (minutes) 0   OT Mode of treatment - Co-treat (minutes) 0   OT Mode of Treatment - Total time(minutes) 30 minutes   OT Cumulative Minutes 660   Therapy Time missed   Time missed? No

## 2025-02-08 NOTE — PROGRESS NOTES
"   02/08/25 8154   Pain Assessment   Pain Assessment Tool 0-10   Pain Score 6   Pain Location/Orientation Orientation: Lower;Location: Back   Restrictions/Precautions   Precautions Bed/chair alarms;Fall Risk;Supervision on toilet/commode;Pain  (lifevest)   Subjective   Subjective pt agreeable to perform skilled PT   Roll Left and Right   Type of Assistance Needed Independent   Roll Left and Right CARE Score 6   Sit to Lying   Type of Assistance Needed Supervision   Comment bed rails   Sit to Lying CARE Score 4   Lying to Sitting on Side of Bed   Type of Assistance Needed Supervision   Comment bed rails   Lying to Sitting on Side of Bed CARE Score 4   Sit to Stand   Type of Assistance Needed Supervision   Comment RW   Sit to Stand CARE Score 4   Bed-Chair Transfer   Type of Assistance Needed Supervision   Comment RW   Chair/Bed-to-Chair Transfer CARE Score 4   Transfer Bed/Chair/Wheelchair   Adaptive Equipment Roller Walker   Car Transfer   Type of Assistance Needed Incidental touching;Adaptive equipment   Comment extra time to perform skilled PT   Car Transfer CARE Score 4   Walk 10 Feet   Type of Assistance Needed Set-up / clean-up   Comment RW   Walk 10 Feet CARE Score 5   Walk 50 Feet with Two Turns   Type of Assistance Needed Supervision   Comment RW   Walk 50 Feet with Two Turns CARE Score 4   Walk 150 Feet   Type of Assistance Needed Supervision   Comment RW CS lvl   Walk 150 Feet CARE Score 4   Walking 10 Feet on Uneven Surfaces   Comment Pt. stated \"I would never walk in an uneven surafce like that\"   Reason if not Attempted Refused to perform   Walking 10 Feet on Uneven Surfaces CARE Score 7   Ambulation   Primary Mode of Locomotion Prior to Admission Walk   Distance Walked (feet) 250 ft  (x2)   Assist Device Roller Walker   Does the patient walk? 2. Yes   Wheel 50 Feet with Two Turns   Type of Assistance Needed Independent   Wheel 50 Feet with Two Turns CARE Score 6   Wheel 150 Feet   Type of Assistance " Needed Independent   Wheel 150 Feet CARE Score 6   Wheelchair mobility   Method Right upper extremity;Left upper extremity;Right lower extremity;Left lower extremity   Does the patient use a wheelchair? 1. Yes   Type of Wheelchair Used 1. Manual   Curb or Single Stair   Style negotiated Single stair   Type of Assistance Needed Physical assistance   Physical Assistance Level 25% or less   Comment Performed with B HR and with Left HR only (BUE support)Fwd up/bkwd down   1 Step (Curb) CARE Score 3   4 Steps   Type of Assistance Needed Physical assistance   Physical Assistance Level 25% or less   4 Steps CARE Score 3   12 Steps   Reason if not Attempted Activity not applicable   12 Steps CARE Score 9   Picking Up Object   Type of Assistance Needed Supervision;Adaptive equipment   Comment reacher marker   Picking Up Object CARE Score 4   Therapeutic Interventions   Strengthening Seated bilateral LEs- 20 reps, 3 second holds bilaterally- LAQ, Marches, Adduction, abduction blue TB   Flexibility DF stretch 3 x 30 sec BLE   Equipment Use   NuStep 10 min lvl 2   Assessment   Treatment Assessment pt agreeable to perform skilled PT focus on  cont care scores at this time and cont to perform LE strengthening as above and pt cont to ambulate w RW and cont to perform  steps has following Performed with B HR and with Left HR only (BUE support)Fwd up/bkwd down and ex ; Seated bilateral LEs- 20 reps, 3 second holds bilaterally- LAQ, Marches, Adduction, abduction blue TB, DF stretch 3 x 30 sec BLE   and cont care scores next session , Pt being DC next week   Barriers to Discharge Inaccessible home environment;Decreased caregiver support   Plan   Progress Progressing toward goals   Discharge Recommendation   Rehab Resource Intensity Level, PT   (Out patient PT vs Cardiac rehab)   PT Therapy Minutes   PT Time In 0830   PT Time Out 0930   PT Total Time (minutes) 60   PT Mode of treatment - Individual (minutes) 60   PT Mode of  treatment - Concurrent (minutes) 0   PT Mode of treatment - Group (minutes) 0   PT Mode of treatment - Co-treat (minutes) 0   PT Mode of Treatment - Total time(minutes) 60 minutes   PT Cumulative Minutes 930   Therapy Time missed   Time missed? No

## 2025-02-09 LAB
GLUCOSE SERPL-MCNC: 116 MG/DL (ref 65–140)
GLUCOSE SERPL-MCNC: 130 MG/DL (ref 65–140)
GLUCOSE SERPL-MCNC: 138 MG/DL (ref 65–140)
GLUCOSE SERPL-MCNC: 148 MG/DL (ref 65–140)

## 2025-02-09 PROCEDURE — 97530 THERAPEUTIC ACTIVITIES: CPT

## 2025-02-09 PROCEDURE — 82948 REAGENT STRIP/BLOOD GLUCOSE: CPT

## 2025-02-09 PROCEDURE — 97116 GAIT TRAINING THERAPY: CPT

## 2025-02-09 PROCEDURE — 97110 THERAPEUTIC EXERCISES: CPT

## 2025-02-09 RX ORDER — LACTULOSE 10 G/15ML
30 SOLUTION ORAL ONCE
Status: COMPLETED | OUTPATIENT
Start: 2025-02-09 | End: 2025-02-09

## 2025-02-09 RX ADMIN — SENNOSIDES 17.2 MG: 8.6 TABLET ORAL at 21:47

## 2025-02-09 RX ADMIN — CLOPIDOGREL BISULFATE 75 MG: 75 TABLET ORAL at 08:36

## 2025-02-09 RX ADMIN — ATORVASTATIN CALCIUM 80 MG: 80 TABLET, FILM COATED ORAL at 17:25

## 2025-02-09 RX ADMIN — Medication 6 MG: at 21:46

## 2025-02-09 RX ADMIN — Medication 2.5 MG: at 21:46

## 2025-02-09 RX ADMIN — CARVEDILOL 3.12 MG: 3.12 TABLET, FILM COATED ORAL at 17:26

## 2025-02-09 RX ADMIN — ASPIRIN 81 MG: 81 TABLET, COATED ORAL at 08:36

## 2025-02-09 RX ADMIN — DOCUSATE SODIUM 100 MG: 100 CAPSULE, LIQUID FILLED ORAL at 08:36

## 2025-02-09 RX ADMIN — GABAPENTIN 300 MG: 300 CAPSULE ORAL at 08:36

## 2025-02-09 RX ADMIN — METFORMIN HYDROCHLORIDE 1000 MG: 500 TABLET ORAL at 17:25

## 2025-02-09 RX ADMIN — DOCUSATE SODIUM 100 MG: 100 CAPSULE, LIQUID FILLED ORAL at 17:25

## 2025-02-09 RX ADMIN — INSULIN GLARGINE 15 UNITS: 100 INJECTION, SOLUTION SUBCUTANEOUS at 21:47

## 2025-02-09 RX ADMIN — POLYETHYLENE GLYCOL 3350 17 G: 17 POWDER, FOR SOLUTION ORAL at 08:36

## 2025-02-09 RX ADMIN — LACTULOSE 30 G: 20 SOLUTION ORAL at 12:08

## 2025-02-09 RX ADMIN — METFORMIN HYDROCHLORIDE 1000 MG: 500 TABLET ORAL at 08:36

## 2025-02-09 RX ADMIN — Medication 800 MG: at 17:25

## 2025-02-09 RX ADMIN — POLYETHYLENE GLYCOL 3350 17 G: 17 POWDER, FOR SOLUTION ORAL at 17:25

## 2025-02-09 RX ADMIN — WARFARIN SODIUM 7.5 MG: 5 TABLET ORAL at 17:25

## 2025-02-09 RX ADMIN — Medication 800 MG: at 08:36

## 2025-02-09 RX ADMIN — GABAPENTIN 300 MG: 300 CAPSULE ORAL at 17:25

## 2025-02-09 NOTE — PROGRESS NOTES
02/09/25 1230   Pain Assessment   Pain Assessment Tool 0-10   Pain Score 4   Pain Location/Orientation Location: Back   Hospital Pain Intervention(s) Repositioned   Restrictions/Precautions   Precautions Bed/chair alarms;Fall Risk;Supervision on toilet/commode;Pain  (+ lifevest)   Weight Bearing Restrictions No   ROM Restrictions No   Cognition   Overall Cognitive Status WFL   Arousal/Participation Alert;Cooperative   Attention Within functional limits   Orientation Level Oriented X4   Memory Within functional limits   Following Commands Follows all commands and directions without difficulty   Subjective   Subjective Patient ready for skilled PT, patient stated that he was given a laxative prior to session.   Sit to Stand   Type of Assistance Needed Independent   Physical Assistance Level No physical assistance   Comment RW   Sit to Stand CARE Score 6   Bed-Chair Transfer   Type of Assistance Needed Independent   Physical Assistance Level No physical assistance   Comment stand pivot with RW   Chair/Bed-to-Chair Transfer CARE Score 6   Walk 10 Feet   Type of Assistance Needed Independent   Physical Assistance Level No physical assistance   Comment RW   Walk 10 Feet CARE Score 6   Walk 50 Feet with Two Turns   Type of Assistance Needed Supervision   Physical Assistance Level No physical assistance   Comment RW   Walk 50 Feet with Two Turns CARE Score 4   Walk 150 Feet   Type of Assistance Needed Supervision   Physical Assistance Level No physical assistance   Comment RW   Walk 150 Feet CARE Score 4   Ambulation   Primary Mode of Locomotion Prior to Admission Walk   Distance Walked (feet) 200 ft  (150'x1, 50'x1)   Assist Device Roller Walker   Does the patient walk? 2. Yes   Wheel 50 Feet with Two Turns   Type of Assistance Needed Independent   Wheel 50 Feet with Two Turns CARE Score 6   Wheel 150 Feet   Type of Assistance Needed Independent   Wheel 150 Feet CARE Score 6   Wheelchair mobility   Method Left upper  extremity;Right upper extremity   Does the patient use a wheelchair? 1. Yes   Type of Wheelchair Used 1. Manual   Curb or Single Stair   Style negotiated Single stair   Type of Assistance Needed Independent;Adaptive equipment   Physical Assistance Level No physical assistance   Comment bilateral HR   1 Step (Curb) CARE Score 6   4 Steps   Type of Assistance Needed Independent;Adaptive equipment   Physical Assistance Level No physical assistance   Comment bilateral HR   4 Steps CARE Score 6   Stairs   Type Stairs   # of Steps 4   Weight Bearing Precautions WBAT   Assist Devices Bilateral Rail   Therapeutic Interventions   Strengthening HEP: Access Code: [email or web address] URL: [email or web address] Date: 02/09/2025 Prepared by: Milton Contreras Exercises - Seated March - 1 x daily - 7 x weekly - 3 sets - 10 reps - 3 hold - Seated Heel Toe Raises - 1 x daily - 7 x weekly - 3 sets - 10 reps - 3 hold - Seated Long Arc Quad - 1 x daily - 7 x weekly - 3 sets - 10 reps - 3 hold - Seated Heel Raise - 1 x daily - 7 x weekly - 3 sets - 10 reps - 3 hold - Seated Hip Abduction with Resistance - 1 x daily - 7 x weekly - 3 sets - 10 reps - 3 hold - Seated Hip Adduction Isometrics with Ball - 1 x daily - 7 x weekly - 3 sets - 10 reps - 3 hold - Seated Long Arc Quad with Hip Adduction - 1 x daily - 7 x weekly - 3 sets - 10 reps - 3 hold   Other Ambulation, transfers, and HEP review: ADL retraining;Functional transfer training; Stair training   Other Comments   Comments Access Code: [email or web address]  URL: [email or web address]  Date: 02/09/2025  Prepared by: Milton Contreras    Exercises  - Seated March  - 1 x daily - 7 x weekly - 3 sets - 10 reps - 3 hold  - Seated Heel Toe Raises  - 1 x daily - 7 x weekly - 3 sets - 10 reps - 3 hold  - Seated Long Arc Quad  - 1 x daily - 7 x weekly - 3 sets - 10 reps - 3 hold  - Seated Heel Raise  - 1 x daily - 7 x weekly - 3 sets - 10 reps - 3 hold  - Seated Hip Abduction with Resistance  -  1 x daily - 7 x weekly - 3 sets - 10 reps - 3 hold  - Seated Hip Adduction Isometrics with Ball  - 1 x daily - 7 x weekly - 3 sets - 10 reps - 3 hold  - Seated Long Arc Quad with Hip Adduction  - 1 x daily - 7 x weekly - 3 sets - 10 reps - 3 hold   Assessment   Treatment Assessment Patient tolerated session well today, patient demonstrated independence with HEP, patient challenged with overall function. Patient challenged with overall function and soreness in ankle, but noted challenges with weight bearing, improving with independence with function, patient noted soreness post session, patient interventions in seated position. Patient advised to call nursing due to laxative to assist with bathroom needs as needed due to laxative. Patient verbalized understanding. Patient requires skilled PT to maximize function.   Family/Caregiver Present no   PT Family training done with: no   Problem List Decreased strength;Decreased endurance;Impaired balance;Decreased mobility;Pain;Decreased safety awareness   Barriers to Discharge Inaccessible home environment;Decreased caregiver support   PT Barriers   Functional Limitation Car transfers;Stair negotiation;Standing;Walking;Transfers   Plan   Treatment/Interventions ADL retraining;Functional transfer training;Therapeutic exercise;Endurance training;Patient/family training;Bed mobility;Equipment eval/education;Compensatory technique education   Progress Progressing toward goals   PT Therapy Minutes   PT Time In 1230   PT Time Out 1330   PT Total Time (minutes) 60   PT Mode of treatment - Individual (minutes) 60   PT Mode of treatment - Concurrent (minutes) 0   PT Mode of treatment - Group (minutes) 0   PT Mode of treatment - Co-treat (minutes) 0   PT Mode of Treatment - Total time(minutes) 60 minutes   PT Cumulative Minutes 1080   Therapy Time missed   Time missed? No

## 2025-02-10 VITALS
HEIGHT: 74 IN | BODY MASS INDEX: 30.39 KG/M2 | TEMPERATURE: 98.1 F | RESPIRATION RATE: 20 BRPM | WEIGHT: 236.77 LBS | OXYGEN SATURATION: 97 % | SYSTOLIC BLOOD PRESSURE: 92 MMHG | HEART RATE: 88 BPM | DIASTOLIC BLOOD PRESSURE: 58 MMHG

## 2025-02-10 LAB
ANION GAP SERPL CALCULATED.3IONS-SCNC: 11 MMOL/L (ref 4–13)
BASOPHILS # BLD AUTO: 0.04 THOUSANDS/ÂΜL (ref 0–0.1)
BASOPHILS NFR BLD AUTO: 1 % (ref 0–1)
BUN SERPL-MCNC: 10 MG/DL (ref 5–25)
CALCIUM SERPL-MCNC: 8.9 MG/DL (ref 8.4–10.2)
CHLORIDE SERPL-SCNC: 104 MMOL/L (ref 96–108)
CO2 SERPL-SCNC: 25 MMOL/L (ref 21–32)
CREAT SERPL-MCNC: 0.8 MG/DL (ref 0.6–1.3)
EOSINOPHIL # BLD AUTO: 0.21 THOUSAND/ÂΜL (ref 0–0.61)
EOSINOPHIL NFR BLD AUTO: 4 % (ref 0–6)
ERYTHROCYTE [DISTWIDTH] IN BLOOD BY AUTOMATED COUNT: 14.8 % (ref 11.6–15.1)
GFR SERPL CREATININE-BSD FRML MDRD: 92 ML/MIN/1.73SQ M
GLUCOSE P FAST SERPL-MCNC: 118 MG/DL (ref 65–99)
GLUCOSE SERPL-MCNC: 118 MG/DL (ref 65–140)
GLUCOSE SERPL-MCNC: 119 MG/DL (ref 65–140)
GLUCOSE SERPL-MCNC: 131 MG/DL (ref 65–140)
HCT VFR BLD AUTO: 34.3 % (ref 36.5–49.3)
HGB BLD-MCNC: 11.1 G/DL (ref 12–17)
IMM GRANULOCYTES # BLD AUTO: 0.02 THOUSAND/UL (ref 0–0.2)
IMM GRANULOCYTES NFR BLD AUTO: 0 % (ref 0–2)
INR PPP: 2.8 (ref 0.85–1.19)
LYMPHOCYTES # BLD AUTO: 1.26 THOUSANDS/ÂΜL (ref 0.6–4.47)
LYMPHOCYTES NFR BLD AUTO: 22 % (ref 14–44)
MAGNESIUM SERPL-MCNC: 1.8 MG/DL (ref 1.9–2.7)
MCH RBC QN AUTO: 31.4 PG (ref 26.8–34.3)
MCHC RBC AUTO-ENTMCNC: 32.4 G/DL (ref 31.4–37.4)
MCV RBC AUTO: 97 FL (ref 82–98)
MONOCYTES # BLD AUTO: 0.61 THOUSAND/ÂΜL (ref 0.17–1.22)
MONOCYTES NFR BLD AUTO: 11 % (ref 4–12)
NEUTROPHILS # BLD AUTO: 3.57 THOUSANDS/ÂΜL (ref 1.85–7.62)
NEUTS SEG NFR BLD AUTO: 62 % (ref 43–75)
NRBC BLD AUTO-RTO: 0 /100 WBCS
PLATELET # BLD AUTO: 226 THOUSANDS/UL (ref 149–390)
PMV BLD AUTO: 9.4 FL (ref 8.9–12.7)
POTASSIUM SERPL-SCNC: 3.7 MMOL/L (ref 3.5–5.3)
PROTHROMBIN TIME: 29.3 SECONDS (ref 12.3–15)
RBC # BLD AUTO: 3.54 MILLION/UL (ref 3.88–5.62)
SODIUM SERPL-SCNC: 140 MMOL/L (ref 135–147)
WBC # BLD AUTO: 5.71 THOUSAND/UL (ref 4.31–10.16)

## 2025-02-10 PROCEDURE — 80048 BASIC METABOLIC PNL TOTAL CA: CPT | Performed by: NURSE PRACTITIONER

## 2025-02-10 PROCEDURE — 82948 REAGENT STRIP/BLOOD GLUCOSE: CPT

## 2025-02-10 PROCEDURE — 83735 ASSAY OF MAGNESIUM: CPT | Performed by: NURSE PRACTITIONER

## 2025-02-10 PROCEDURE — 85025 COMPLETE CBC W/AUTO DIFF WBC: CPT | Performed by: NURSE PRACTITIONER

## 2025-02-10 PROCEDURE — 97110 THERAPEUTIC EXERCISES: CPT

## 2025-02-10 PROCEDURE — 97116 GAIT TRAINING THERAPY: CPT

## 2025-02-10 PROCEDURE — 99232 SBSQ HOSP IP/OBS MODERATE 35: CPT | Performed by: NURSE PRACTITIONER

## 2025-02-10 PROCEDURE — 99239 HOSP IP/OBS DSCHRG MGMT >30: CPT | Performed by: INTERNAL MEDICINE

## 2025-02-10 PROCEDURE — 97530 THERAPEUTIC ACTIVITIES: CPT

## 2025-02-10 PROCEDURE — 97530 THERAPEUTIC ACTIVITIES: CPT | Performed by: PHYSICAL THERAPIST

## 2025-02-10 PROCEDURE — 85610 PROTHROMBIN TIME: CPT | Performed by: NURSE PRACTITIONER

## 2025-02-10 PROCEDURE — 97535 SELF CARE MNGMENT TRAINING: CPT

## 2025-02-10 RX ORDER — CARVEDILOL 3.12 MG/1
3.12 TABLET ORAL
Status: DISCONTINUED | OUTPATIENT
Start: 2025-02-10 | End: 2025-02-10 | Stop reason: HOSPADM

## 2025-02-10 RX ORDER — CARVEDILOL 3.12 MG/1
3.12 TABLET ORAL
Qty: 30 TABLET | Refills: 0 | Status: SHIPPED | OUTPATIENT
Start: 2025-02-10 | End: 2025-02-12

## 2025-02-10 RX ADMIN — CLOPIDOGREL BISULFATE 75 MG: 75 TABLET ORAL at 08:38

## 2025-02-10 RX ADMIN — ASPIRIN 81 MG: 81 TABLET, COATED ORAL at 08:38

## 2025-02-10 RX ADMIN — EMPAGLIFLOZIN 10 MG: 10 TABLET, FILM COATED ORAL at 10:16

## 2025-02-10 RX ADMIN — DICLOFENAC SODIUM 2 G: 10 GEL TOPICAL at 12:10

## 2025-02-10 RX ADMIN — GABAPENTIN 300 MG: 300 CAPSULE ORAL at 08:38

## 2025-02-10 RX ADMIN — METFORMIN HYDROCHLORIDE 1000 MG: 500 TABLET ORAL at 08:38

## 2025-02-10 RX ADMIN — Medication 800 MG: at 08:38

## 2025-02-10 RX ADMIN — DICLOFENAC SODIUM 2 G: 10 GEL TOPICAL at 08:40

## 2025-02-10 NOTE — ASSESSMENT & PLAN NOTE
Cw lisinopril and BB  Monitor vitals and titrate meds as needed  Lisinopril dc due to hypotension; coreg HS due to AM hypotension  Cardiology f.u

## 2025-02-10 NOTE — PROGRESS NOTES
"   02/10/25 2905   Pain Assessment   Pain Assessment Tool 0-10   Pain Score 7   Pain Location/Orientation Orientation: Bilateral;Location: Shoulder   Pain Onset/Description Onset: Ongoing;Frequency: Constant/Continuous   Effect of Pain on Daily Activities \"delals with it\"   Patient's Stated Pain Goal No pain   Restrictions/Precautions   Precautions Fall Risk;Pain   Weight Bearing Restrictions No   ROM Restrictions No   Oral Hygiene   Type of Assistance Needed Independent   Comment reports completing denture care prior to therapist arrival   Oral Hygiene CARE Score 6   Shower/Bathe Self   Type of Assistance Needed Independent   Comment completed shower this session seated on tub bench. pt able to wash 10/10 parts.   Shower/Bathe Self CARE Score 6   Tub/Shower Transfer   Findings Keron tub bench transfer. pt reports wife has purchased tub bench   Upper Body Dressing   Type of Assistance Needed Independent   Comment seated able to don [place] shirt and able to manage lifevest. pt removed battery from lifevest to be able to shower. and then donned   Upper Body Dressing CARE Score 6   Lower Body Dressing   Type of Assistance Needed Independent   Comment able to don pants, indep in stance for CM   Lower Body Dressing CARE Score 6   Putting On/Taking Off Footwear   Type of Assistance Needed Independent   Comment grippy socks   Putting On/Taking Off Footwear CARE Score 6   Sit to Stand   Type of Assistance Needed Independent;Adaptive equipment   Comment Keron with RW   Sit to Stand CARE Score 6   Bed-Chair Transfer   Type of Assistance Needed Independent;Adaptive equipment   Comment Keron with RW   Chair/Bed-to-Chair Transfer CARE Score 6   Toileting Hygiene   Type of Assistance Needed Independent   Toileting Hygiene CARE Score 6   Toilet Transfer   Type of Assistance Needed Independent   Toilet Transfer CARE Score 6   Health Management   Health Management Level of Assistance Modified independent   Health Management reviewed med " list and removed seroquel per med list. pt vocalizing all meds and when to take them. pt rpeorting he palces all 2x/day meds on one side and 1x/day meds on opposite sides. pt able to verbalize 100% of meds.   Cognition   Overall Cognitive Status WFL   Arousal/Participation Alert;Cooperative   Attention Within functional limits   Orientation Level Oriented X4   Memory Within functional limits   Following Commands Follows all commands and directions without difficulty   Activity Tolerance   Activity Tolerance Patient tolerated treatment well   Assessment   Treatment Assessment Engaged pt in 60mins of skilled OT services with focus on DC ADL. pt completed shower and able to complete at Keron level. pt indep to manage lifevest. Completed med mngmnt and able to complete at Keron level. Pt has met all OT goals.   Prognosis Good   Problem List Decreased strength;Decreased endurance;Impaired balance;Decreased mobility;Pain;Decreased safety awareness   Barriers to Discharge Inaccessible home environment;Decreased caregiver support   Discharge Recommendation   Equipment Recommended Shower transfer bench ($$)  (pt rpeorts wife purchased)   OT Therapy Minutes   OT Time In 0830   OT Time Out 0930   OT Total Time (minutes) 60   OT Mode of treatment - Individual (minutes) 60   OT Mode of treatment - Concurrent (minutes) 0   OT Mode of treatment - Group (minutes) 0   OT Mode of treatment - Co-treat (minutes) 0   OT Mode of Treatment - Total time(minutes) 60 minutes   OT Cumulative Minutes 720   Therapy Time missed   Time missed? No

## 2025-02-10 NOTE — ASSESSMENT & PLAN NOTE
Newly started on Coreg 3.125mg BID in acute setting.  Change Coreg to 3.125mg at HS tonight.  Monitor BP with routine VS.

## 2025-02-10 NOTE — ASSESSMENT & PLAN NOTE
Lab Results   Component Value Date    HGBA1C 8.2 (H) 01/18/2025       Recent Labs     02/09/25  1055 02/09/25  1624 02/09/25  2055 02/10/25  0542   POCGLU 148* 116 138 119       Blood Sugar Average: Last 72 hrs:  (P) [id number]    Last A1c was 8.2 on 1/18/2025.  Home regimen: Lantus 15u at HS, glipizide 5mg daily, metformin 1000mg BID, and Jardiance 12.5mg daily.  Currently receiving Lantus 15u at HS and metformin 1000mg BID.  Start Jardiance 10mg daily today.  Continue SSI, QID accuchecks, and cons. carb diet.

## 2025-02-10 NOTE — ASSESSMENT & PLAN NOTE
"TTE 1/19: \"There is a possible moderately-sized thrombus at the apex, difficult imaging.\"  Cardiology consulted on previous admission  Continue Coumadin 10 mg--> now 7.5 daily since 2/4   Monitor PT, INR (2-3 per guidelines)  F/o cardiology outpt    "

## 2025-02-10 NOTE — DISCHARGE SUMMARY
Discharge Summary - PMR   Name: Joseph Aguilar 69 y.o. male I MRN: [id number]  Unit/Bed#: [facility] 270-02 I Date of Admission: 1/29/2025   Date of Service: 2/10/2025 I Hospital Day: 12    Medical Problems       Resolved Problems  Date Reviewed: 2/10/2025   None         Admission Date: 1/29/2025   Discharge Date:  2/10/2025    Etiologic/Rehabilitation Diagnosis: Impairment of mobility, safety and Activities of Daily Living (ADLs) due to Cardiac:  09 Cardiac STEMI    HPI: Joseph Aguilar is a 69 y.o. male w/ PMHx of HTN, HLD, T2DM, OA who initially presented to [facility] on 1/17 for chest pain, shortness of breath, nausea and vomiting. During initial eval, she was found to have an anterior wall STEMI and Interventional cardiology was alerted. Patient was taken to the cath lab where patient bacame more hypotensive requiring pressor support and intra-aortic balloon pump insertion. Patient received LAD, left main, stent however circumflex artery was unable to be intervened on. Patient was transferred to ICU for cardiogenic shock s/p STEMI. Noted to have LV thrombus and started on heparin gtt and continued on brilinta and ASA. He noted to have some transaminitis so Statin was on hold. TTE w/ LVEF 25%, severe global hypokinesis w/ apical and distal septal near akinesis, mod siz thrombus at apex. On 1/20 he was noted to have LE bleeding w/ hematoma formation from cath site rq manual pressure and femostop. Vascular surgery was consulted . R femoral site was closed by Dr. Brooke.  IABP was d/c and heparin was held. He received 1U PRBC. Heparin was restarted and coumadin was started w/ goal 2-3. INR 2.22 1/29 and heparin was d/c.   Joseph Aguilar was admitted to the [facility] on 01/29/25     Procedures Performed During [facility] Admission: none    Acute Rehabilitation Center Course: Patient participated in a comprehensive interdisciplinary inpatient rehabilitation program which included involvment of  MD, therapies (PT, OT, and/or SLP), RN, CM, SW, dietary, and psychology services. He was able to be advanced to a modified independent level of assist and considered safe for discharge home. Please see below for patient's day to day management of medical needs.      Assessment & Plan  Anterior STEMI s/p PCI with CHYNA to LM-ostial LAD and mLAD with IABP;  of LCx (1/17/2024)  S/p drug eluting stent to the LAD and left main; unable to stent the left circumflex 1/17  IABP dc 1/20  Hypotension in cath lab requiring pressors and intra-aortic baloon pump  Cw aspirin, plavix, statin, coumadin (goal 2-3),   2/10 INR 2.8  Coumadin clinic information provided, Script for repeat INR in 1 week provided   Continue coreg 3.125 bid--> changed to HS as patient has not been getting in AM due to hypotension   Follow up cardiology outpt apt 2/25/2025  Ambulatory dysfunction  Patient was evaluated by the rehabilitation team MD and an appropriate candidate for acute inpatient rehabilitation program at this time.  The patient will tolerate 3 hours/day 5 to 7 days/week of intensive physical, occupational therapy in order to obtain goals for community discharge  Due to the patient's functional Compared to their baseline level of function in addition to their ongoing medical needs, the patient would benefit from daily supervision from a rehabilitation physician as well as rehabilitation nursing to implement and adjust the medical as well as functional plan of care in order to meet the patient's goals.  Ok to shower for short periods of time with Lifevest removed. Replaced immediately. Staff to refer to Kayal booklet for instructions and can also call rep if needed  DC 2/11 outpt PT    History of hypertension  Cw lisinopril and BB  Monitor vitals and titrate meds as needed  Lisinopril dc due to hypotension; coreg HS due to AM hypotension  Cardiology f.u   Mixed hyperlipidemia  -cw statin  -f/o primary care on dc  Type 2 diabetes mellitus  "without complication, without long-term current use of insulin ([facility])  Lab Results   Component Value Date    HGBA1C 8.2 (H) 01/18/2025       Recent Labs     02/09/25  1055 02/09/25  1624 02/09/25  2055 02/10/25  0542   POCGLU 148* 116 138 119   A1c 8.2% on admission  Lantus 20u at HS and lispro 8u with meals  2/6 Lispro decreased to 3u with meals and Lantus 15u at HS (2/5); lispro dc   IM starting metformin 500 BID today, increased to 1000 mg daily (2/5); started on Jardiance to resume home dose on dc. Home glipizide held   Management per IM  Monitor glucose levels  Cw diabetic diet  F.u w/ PCP     Blood Sugar Average: Last 72 hrs:  (P) 131.9423268567483503    Anemia  Hgb 11.6 1/30, 10.9 2/3, 2/5 11.2, 2/7 10.6, 2/10 11.1  Monitor w/ routine blood work   Iron panel on 1/30 iron 43, ferritin 224, iron sat 23  Hypomagnesemia  Cw Mg supplementation goal >2   1.7 1/30 , 1.7 2/7, 2/10 1.8  ICM with EF 25%  Echo:Left ventricular cavity size is normal. Wall thickness is normal. The left ventricular ejection fraction is 25%. Systolic function is severely reduced.   Cw coreg 3.125; --> changed to    Cardiology consulted and recommended lifevest  F/o cardiology as outpt  LV (left ventricular) mural thrombus  TTE 1/19: \"There is a possible moderately-sized thrombus at the apex, difficult imaging.\"  Cardiology consulted on previous admission  Continue Coumadin 10 mg--> now 7.5 daily since 2/4   Monitor PT, INR (2-3 per guidelines)  F/o cardiology outpt    Hematoma of right lower extremity  S/p removal of IABP and closure of right femoral puncture site with Perclose Pro-glide 1/20 by Dr. Brooke  Monitoring the R groin for bleeding, skin breakdown, expanding hematoma  Will consult vascular surgery with questions  Conjunctivitis  Cw cipro goal 7-10 days   Per nursing patient reports splashing urinal at his face   D/c antibiotics due to refusal  Osteoarthritis of multiple joints  Multiple joint s/p steroid inj b/l shoulders 1/6 " "  F.w Dr. Egan as ouptatient   C/w pain control, tylenol, oxy 2.5Q8h, bengay; Has been taking Oxy 1x/ day, will not prescribe on dc   Chronic back pain  -patient reports old injury of back  -takes tylenol for pain at home  -monitor pain levels and titrate meds  -offer different modalities- heat, lidocaine patches  Does not like lidocaine patches  Constipation  Resolved  - pt reports he usually has a BM every 2-3 days but feels like pain meds are \"backing him up\"  -PRN dulcolax; add senna, colase and miralax as patient taking opiods   - monitor BM  Miralax changed to 2x a day standing and one dose of lactulose 2/3  Large BM 2/10     Interval History: Patient seen and examined in room. No events overnight.  Reports overall feeling well. Notes that his endurance has improved since his surgery. Reports some left ankle pain from being stretched.  Last BM 2/10. Sleeping well at night. Denies any f/c/n/v, CP, SOB, abdominal pain, constipation, or diarrhea.     Physical Exam    Gen: No acute distress, obese, in no distress   HEENT: Moist mucus membranes, Normocephalic/Atraumatic  Cardiovascular: Regular rate, rhythm, lifevest in place   Heme/Extr: No edema  Pulmonary: Non-labored breathing.  : no gilbert  GI: Soft, BS+  MSK: diminished ROM Left shoulder,  No effusions or deformities. Bulk is symmetric. Integumentary: Skin is warm, dry.   Neuro: Appropriate to questioning.     Significant Findings, Care, Treatment and Services Provided:  Patient participated in a comprehensive physical, occupational and speech therapy program under the guidance of rehab physician and nursing oversight       Complications: none    Functional Status Upon Admission to [facility]:  Laying to sit: ModA  UE Dressing:  ModA  LE Dressing:  MaxA    Functional Status Upon Discharge from [facility]:   Physical Therapy Occupational Therapy Speech Therapy   Weight Bearing Status: Full Weight Bearing  Transfers: Minimal Assistance  Bed Mobility: Minimal " Assistance  Amulation Distance (ft): 40 feet  Ambulation: Incidental Touching  Assistive Device for Ambulation: Roller Walker  Discharge Recommendations: Home with:  DC Home with:: Family Support   Eating: Independent  Grooming: Independent  Bathing: Incidental Touching  Bathing: Incidental Touching  Upper Body Dressing: Minimal Assistance  Lower Body Dressing: Incidental Touching  Toileting: Incidental Touching  Tub/Shower Transfer: Incidental Touching  Toilet Transfer: Incidental Touching  Cognition: Exceptions to WNL  Cognition: Decreased Safety, Behavioral Considerations  Orientation: Person, Place, Time, Situation                   Discharge Diagnosis: Impairment of mobility, safety and Activities of Daily Living (ADLs) due to Cardiac:  09 Cardiac STEMI    Discharge Medications:   See after visit summary for reconciled discharge medications provided to patient and family.      Condition at Discharge: fair     Discharge instructions/Information to patient and family:   See after visit summary for information provided to patient and family.      Provisions for Follow-Up Care:  See after visit summary for information related to follow-up care and any pertinent home health orders.      Future Appointments   Date Time Provider Department Center   2/25/2025  2:00 PM [facility]   4/7/2025 11:15 AM DO [facility]       Disposition: Home      Planned Readmission: No    Discharge Statement   I have spent a total time of 46 minutes in caring for this patient on the day of the visit/encounter. >30 minutes of time was spent on: Instructions for management, Patient and family education, Counseling / Coordination of care, Documenting in the medical record, and Communicating with other healthcare professionals .    Discharge Medications:  See after visit summary for reconciled discharge medications provided to patient and family.

## 2025-02-10 NOTE — ASSESSMENT & PLAN NOTE
ECHO on 1/19 showed possible moderately sized thrombus at the apex.  Started on Coumadin in acute setting.  INR 2.80 today.  Continue 7.5mg Coumadin daily.  Repeat PT/INR in 1 week as outpatient.  Follow-up with Cardiology as outpatient.

## 2025-02-10 NOTE — NURSING NOTE
Pt discharged to home with spouse. All discharge instructions reviewed with spouse and pt, both voice good understanding, [facility] and explained. Spouse and pt understand importance of contacting coumadin clinic and following up with regular labs and dosing of coumadin.  W/C delivered to room , pt left unit in own W/C with staff and spouse . Car transfer completed without any issues

## 2025-02-10 NOTE — CASE MANAGEMENT
CM NOTE    Met with pt, provided him with  [facility] phone number, instructed him to call this AM to pay for medications and then nursing will retreive them for him. Pt will d/c home later today. Informed him w/c was covered with no copay and is to be delivered today before 3 pm d/c. Provided him list of phone number for [facility] OP PT, Dr. Yuan will write orders for OP PT and pt can go when he has a ride or is cleared by cardiology to drive - based on his preference.

## 2025-02-10 NOTE — PROGRESS NOTES
Progress Note - Internal Medicine   Name: Joseph Aguilar 69 y.o. male I MRN: [id number]  Unit/Bed#: -02 I Date of Admission: 1/29/2025   Date of Service: 2/10/2025 I Hospital Day: 12    Assessment & Plan  Type 2 diabetes mellitus without complication, without long-term current use of insulin ([facility])  Lab Results   Component Value Date    HGBA1C 8.2 (H) 01/18/2025       Recent Labs     02/09/25  1055 02/09/25  1624 02/09/25  2055 02/10/25  0542   POCGLU 148* 116 138 119       Blood Sugar Average: Last 72 hrs:  (P) [id number]    Last A1c was 8.2 on 1/18/2025.  Home regimen: Lantus 15u at HS, glipizide 5mg daily, metformin 1000mg BID, and Jardiance 12.5mg daily.  Currently receiving Lantus 15u at HS and metformin 1000mg BID.  Start Jardiance 10mg daily today.  Continue SSI, QID accuchecks, and cons. carb diet.  Anemia  Hgb currently stable at 11.1.  Developed large R groin hematoma s/p balloon pump.  No active s/s of bleeding.  Iron panel on 1/30: Iron sat 23%, TIBC 185, Iron 43, and Ferritin 224.  No need for iron supplementation at this time.  Continue to trend routine CBC.  Hypomagnesemia  Mg 1.8 today.  Continue magnesium oxide to 800mg BID.  Continue to trend routine Mg.  Anterior STEMI s/p PCI with CHYNA to LM-ostial LAD and mLAD with IABP;  of LCx (1/17/2024)  Presented on 1/17 with near syncopal episode with jaw pain and hypotension.  NSTEMI alert - cardiac cath on 1/17 with CHYNA to LAD and L main with IABP support.  IABP discontinued on 1/20.  Continue ASA 81mg daily, Plavix 75mg daily, Coumadin, Lipitor 80mg daily, and Coreg 3.125mg BID.  Change Coreg to 3.125mg at HS due to not meeting BP parameters.  Recommend establishing with Cardiology on discharge.  ICM with EF 25%  ECHO on 1/19 showed EF 25%, systolic function severely reduced, global hypokinesis.  Currently wearing Lifevest.  Continue Coreg 3.125mg BID.  Change to 3.125mg at HS today.  Started on Jardiance 10mg daily today.  Weight  increasing - but appears euvolemic.  Continue to monitor daily at this time.  Recommend establishing care with Cardiology as outpatient.  LV (left ventricular) mural thrombus  ECHO on 1/19 showed possible moderately sized thrombus at the apex.  Started on Coumadin in acute setting.  INR 2.80 today.  Continue 7.5mg Coumadin daily.  Repeat PT/INR in 1 week as outpatient.  Follow-up with Cardiology as outpatient.  Hematoma of right lower extremity  Developed R groin hematoma at IABP insertion site.  IR suite on 1/20 for closure of R femoral artery puncture site and control of expanding hematoma performed by Dr. Brooke (Vascular).  Monitor site daily for s/s of reoccurring bleeding.  Continue to trend H&H.  Conjunctivitis  Continue ciprofloxacin to L eye for 7-10 days.  Has been declining drops.  Osteoarthritis of multiple joints  Multiple joints - shoulders, knees, back.  Recently had steroid injection to B/L shoulders on 1/6.  Follows with Dr. Egan (Orthopedics) as outpatient.  Considerations with therapies.  Continue current pain regimen.   History of hypertension  Newly started on Coreg 3.125mg BID in acute setting.  Change Coreg to 3.125mg at  tonight.  Monitor BP with routine VS.  Ambulatory dysfunction  Deconditioned s/p STEMI.  Primary team following.  Continue with PT/OT.    VTE Pharmacologic Prophylaxis:   Pharmacologic: Warfarin (Coumadin)  Mechanical VTE Prophylaxis in Place: Yes - sequential compression devices.    Current Length of Stay: 12 day(s)    Current Patient Status: Inpatient Rehab     Discharge Plan: As per primary team.    Code Status: Level 1 - Full Code    Subjective:   Pt examined while pt sitting in WC in pt room.  Currently has no complaints.  Plans for discharge to home later today.  Reviewed importance of daily weights and when to call Cardiologist.  Did review current Coumadin regimen and obtaining INR in 1 week.  Acknowledged understanding.  Has appt with Cardiologist scheduled for  .    Objective:     Vitals:   Temp (24hrs), Av.9 °F (36.6 °C), Min:97.6 °F (36.4 °C), Max:98.1 °F (36.7 °C)    Temp:  [97.6 °F (36.4 °C)-98.1 °F (36.7 °C)] 98.1 °F (36.7 °C)  HR:  [75-88] 88  Resp:  [20] 20  BP: ()/(58-69) 92/58  SpO2:  [93 %-97 %] 97 %  Body mass index is 30.4 kg/m².     Review of Systems   Constitutional:  Negative for appetite change, chills, fatigue and fever.   HENT:  Negative for trouble swallowing.    Eyes:  Negative for visual disturbance.   Respiratory:  Negative for cough, shortness of breath, wheezing and stridor.    Cardiovascular:  Negative for chest pain, palpitations and leg swelling.   Gastrointestinal:  Negative for abdominal distention, abdominal pain, constipation, diarrhea, nausea and vomiting.        LBM    Genitourinary:  Negative for difficulty urinating.   Musculoskeletal:  Negative for arthralgias, back pain and gait problem.   Neurological:  Negative for dizziness, weakness, light-headedness, numbness and headaches.   Psychiatric/Behavioral:  Negative for dysphoric mood and sleep disturbance. The patient is not nervous/anxious.    All other systems reviewed and are negative.       Input and Output Summary (last 24 hours):       Intake/Output Summary (Last 24 hours) at 2/10/2025 1001  Last data filed at 2/10/2025 0904  Gross per 24 hour   Intake 700 ml   Output --   Net 700 ml       Physical Exam:     Physical Exam  Vitals and nursing note reviewed.   Constitutional:       General: He is not in acute distress.     Appearance: Normal appearance. He is not ill-appearing.   HENT:      Head: Normocephalic and atraumatic.   Cardiovascular:      Rate and Rhythm: Normal rate and regular rhythm.      Pulses: Normal pulses.      Heart sounds: Normal heart sounds. No murmur heard.     No friction rub.      Comments: Wearing LifeVest.  Pulmonary:      Effort: Pulmonary effort is normal. No respiratory distress.      Breath sounds: Normal breath sounds. No wheezing or  rhonchi.   Abdominal:      General: Abdomen is flat. Bowel sounds are normal. There is no distension.      Palpations: Abdomen is soft. There is no mass.      Tenderness: There is no abdominal tenderness. There is no guarding or rebound.      Hernia: No hernia is present.   Musculoskeletal:      Cervical back: Normal range of motion and neck supple. No tenderness.      Right lower leg: No edema.      Left lower leg: No edema.   Skin:     General: Skin is warm and dry.   Neurological:      Mental Status: He is alert and oriented to person, place, and time.   Psychiatric:         Mood and Affect: Mood normal.         Behavior: Behavior normal.         Additional Data:     Labs:    Results from last 7 days   Lab Units 02/10/25  0442   WBC Thousand/uL 5.71   HEMOGLOBIN g/dL 11.1*   HEMATOCRIT % 34.3*   PLATELETS Thousands/uL 226   SEGS PCT % 62   LYMPHO PCT % 22   MONO PCT % 11   EOS PCT % 4     Results from last 7 days   Lab Units 02/10/25  0442   SODIUM mmol/L 140   POTASSIUM mmol/L 3.7   CHLORIDE mmol/L 104   CO2 mmol/L 25   BUN mg/dL 10   CREATININE mg/dL 0.80   ANION GAP mmol/L 11   CALCIUM mg/dL 8.9   GLUCOSE RANDOM mg/dL 118     Results from last 7 days   Lab Units 02/10/25  0442   INR  2.80*     Results from last 7 days   Lab Units 02/10/25  0542 02/09/25  2055 02/09/25  1624 02/09/25  1055 02/09/25  0637 02/08/25  2050 02/08/25  1619 02/08/25  1119 02/08/25  0545 02/07/25  2124 02/07/25  1640 02/07/25  1145   POC GLUCOSE mg/dl 119 138 116 148* 130 142* 134 129 123 131 126 135               Labs reviewed    Imaging:    Imaging reviewed    Recent Cultures (last 7 days):           Last 24 Hours Medication List:   Current Facility-Administered Medications   Medication Dose Route Frequency Provider Last Rate    acetaminophen  650 mg Oral Q6H PRASHLEY Yuan MD      aluminum-magnesium hydroxide-simethicone  30 mL Oral Q4H PRASHLEY Yuan MD      Artificial Tears  1 drop Left Eye Q4H BOB Yuan MD       aspirin  81 mg Oral Daily Shivani Yuan MD      atorvastatin  80 mg Oral Daily With Dinner Shivani Yuan MD      bisacodyl  10 mg Rectal Daily PRN Shivani Yuan MD      carvedilol  3.125 mg Oral HS MARGARITA Hester      clopidogrel  75 mg Oral Daily Shivani Yuan MD      Diclofenac Sodium  2 g Topical 4x Daily Marilyn Blanchard MD      docusate sodium  100 mg Oral BID Shivani Yuan MD      Empagliflozin  10 mg Oral Daily MARGARITA Hester      gabapentin  300 mg Oral BID Shivani Yuan MD      insulin glargine  15 Units Subcutaneous HS MARGARITA Hester      insulin lispro  2-12 Units Subcutaneous 4x Daily (AC & HS) Shivani Yuan MD      magnesium Oxide  800 mg Oral BID MARGARITA Hester      melatonin  6 mg Oral HS Shivani Yuan MD      menthol-methyl salicylate   Apply externally 4x Daily PRN Shivani Yuan MD      metFORMIN  1,000 mg Oral BID With Meals MARGARITA Hester      ondansetron  4 mg Intravenous Q6H PRN Shivani Yuan MD      oxyCODONE  2.5 mg Oral Q8H PRN Shivani Yuan MD      polyethylene glycol  17 g Oral BID Jennifer Soares PA-C      senna  2 tablet Oral HS Shivani Yuan MD      warfarin  7.5 mg Oral Daily (warfarin) MARGARITA Hester          M*Modal software was used to dictate this note.  It may contain errors with dictating incorrect words or incorrect spelling. Please contact the provider directly with any questions.

## 2025-02-10 NOTE — ASSESSMENT & PLAN NOTE
S/p drug eluting stent to the LAD and left main; unable to stent the left circumflex 1/17  IABP dc 1/20  Hypotension in cath lab requiring pressors and intra-aortic baloon pump  Cw aspirin, plavix, statin, coumadin (goal 2-3),   2/10 INR 2.8  Coumadin clinic information provided, Script for repeat INR in 1 week provided   Continue coreg 3.125 bid--> changed to HS as patient has not been getting in AM due to hypotension   Follow up cardiology outpt apt 2/25/2025

## 2025-02-10 NOTE — ASSESSMENT & PLAN NOTE
Echo:Left ventricular cavity size is normal. Wall thickness is normal. The left ventricular ejection fraction is 25%. Systolic function is severely reduced.   Cw coreg 3.125; --> changed to HS   Cardiology consulted and recommended lifevest  F/o cardiology as outpt

## 2025-02-10 NOTE — ASSESSMENT & PLAN NOTE
Lab Results   Component Value Date    HGBA1C 8.2 (H) 01/18/2025       Recent Labs     02/09/25  1055 02/09/25  1624 02/09/25  2055 02/10/25  0542   POCGLU 148* 116 138 119   A1c 8.2% on admission  Lantus 20u at HS and lispro 8u with meals  2/6 Lispro decreased to 3u with meals and Lantus 15u at HS (2/5); lispro dc   IM starting metformin 500 BID today, increased to 1000 mg daily (2/5); started on Jardiance to resume home dose on dc. Home glipizide held   Management per IM  Monitor glucose levels  Cw diabetic diet  F.u w/ PCP     Blood Sugar Average: Last 72 hrs:  (P) [id number]

## 2025-02-10 NOTE — ASSESSMENT & PLAN NOTE
Hgb currently stable at 11.1.  Developed large R groin hematoma s/p balloon pump.  No active s/s of bleeding.  Iron panel on 1/30: Iron sat 23%, TIBC 185, Iron 43, and Ferritin 224.  No need for iron supplementation at this time.  Continue to trend routine CBC.

## 2025-02-10 NOTE — ASSESSMENT & PLAN NOTE
ECHO on 1/19 showed EF 25%, systolic function severely reduced, global hypokinesis.  Currently wearing Lifevest.  Continue Coreg 3.125mg BID.  Change to 3.125mg at HS today.  Started on Jardiance 10mg daily today.  Weight increasing - but appears euvolemic.  Continue to monitor daily at this time.  Recommend establishing care with Cardiology as outpatient.

## 2025-02-10 NOTE — PLAN OF CARE
Problem: NEUROSENSORY - ADULT  Goal: Achieves stable or improved neurological status  Description: INTERVENTIONS  - Monitor and report changes in neurological status  - Monitor vital signs such as temperature, blood pressure, glucose, and any other labs ordered   - Initiate measures to prevent increased intracranial pressure  - Monitor for seizure activity and implement precautions if appropriate      Outcome: Progressing     Problem: GASTROINTESTINAL - ADULT  Goal: Minimal or absence of nausea and/or vomiting  Description: INTERVENTIONS:  - Administer IV fluids if ordered to ensure adequate hydration  - Maintain NPO status until nausea and vomiting are resolved  - Nasogastric tube if ordered  - Administer ordered antiemetic medications as needed  - Provide nonpharmacologic comfort measures as appropriate  - Advance diet as tolerated, if ordered  - Consider nutrition services referral to assist patient with adequate nutrition and appropriate food choices  Outcome: Progressing     Problem: GENITOURINARY - ADULT  Goal: Maintains or returns to baseline urinary function  Description: INTERVENTIONS:  - Assess urinary function  - Encourage oral fluids to ensure adequate hydration if ordered  - Administer IV fluids as ordered to ensure adequate hydration  - Administer ordered medications as needed  - Offer frequent toileting  - Follow urinary retention protocol if ordered  Outcome: Progressing

## 2025-02-10 NOTE — PROGRESS NOTES
02/10/25 1230   Cognition   Overall Cognitive Status WFL   Subjective   Subjective Ready to go home later today   Roll Left and Right   Type of Assistance Needed Independent   Roll Left and Right CARE Score 6   Sit to Lying   Type of Assistance Needed Independent   Sit to Lying CARE Score 6   Lying to Sitting on Side of Bed   Type of Assistance Needed Independent   Lying to Sitting on Side of Bed CARE Score 6   Sit to Stand   Type of Assistance Needed Independent;Adaptive equipment   Comment RW   Sit to Stand CARE Score 6   Bed-Chair Transfer   Type of Assistance Needed Independent;Adaptive equipment   Comment SPT to w/c with or w/o RW   Chair/Bed-to-Chair Transfer CARE Score 6   Car Transfer   Type of Assistance Needed Supervision   Physical Assistance Level No physical assistance   Car Transfer CARE Score 4   Walk 10 Feet   Type of Assistance Needed Independent;Adaptive equipment   Walk 10 Feet CARE Score 6   Walk 50 Feet with Two Turns   Type of Assistance Needed Set-up / clean-up;Adaptive equipment   Walk 50 Feet with Two Turns CARE Score 5   Walk 150 Feet   Type of Assistance Needed Supervision;Adaptive equipment   Walk 150 Feet CARE Score 4   Ambulation   Primary Mode of Locomotion Prior to Admission Walk   Distance Walked (feet) 150 ft  (x2)   Assist Device Roller Walker   Does the patient walk? 2. Yes   Wheel 50 Feet with Two Turns   Type of Assistance Needed Independent   Wheel 50 Feet with Two Turns CARE Score 6   Wheel 150 Feet   Type of Assistance Needed Independent   Wheel 150 Feet CARE Score 6   Wheelchair mobility   Method Right upper extremity;Left upper extremity   Distance Level Surface (feet) 150 ft   Does the patient use a wheelchair? 1. Yes   Type of Wheelchair Used 1. Manual   Picking Up Object   Type of Assistance Needed Independent;Adaptive equipment   Comment using reacher   Picking Up Object CARE Score 6   Equipment Use   NuStep 10 min lvl 2 B LE   Assessment   Treatment Assessment Pt  "able to safely perform all functuional mobility needed to discharge home later today. New w/c delivered and trialed while in therapy. All parts working well, good fit, with pt reporting \"easier to push and less stress on his shoulders\". Pt remained in chair at end of session.   PT Therapy Minutes   PT Time In 1230   PT Time Out 1400   PT Total Time (minutes) 90   PT Mode of treatment - Individual (minutes) 90   PT Mode of treatment - Concurrent (minutes) 0   PT Mode of treatment - Group (minutes) 0   PT Mode of treatment - Co-treat (minutes) 0   PT Mode of Treatment - Total time(minutes) 90 minutes   PT Cumulative Minutes 1200   Therapy Time missed   Time missed? No       "

## 2025-02-10 NOTE — ASSESSMENT & PLAN NOTE
Presented on 1/17 with near syncopal episode with jaw pain and hypotension.  NSTEMI alert - cardiac cath on 1/17 with CHYNA to LAD and L main with IABP support.  IABP discontinued on 1/20.  Continue ASA 81mg daily, Plavix 75mg daily, Coumadin, Lipitor 80mg daily, and Coreg 3.125mg BID.  Change Coreg to 3.125mg at HS due to not meeting BP parameters.  Recommend establishing with Cardiology on discharge.

## 2025-02-10 NOTE — PLAN OF CARE
Problem: NEUROSENSORY - ADULT  Goal: Achieves stable or improved neurological status  Description: INTERVENTIONS  - Monitor and report changes in neurological status  - Monitor vital signs such as temperature, blood pressure, glucose, and any other labs ordered   - Initiate measures to prevent increased intracranial pressure  - Monitor for seizure activity and implement precautions if appropriate      2/10/2025 1206 by Margaux Gregg RN  Outcome: Adequate for Discharge  2/10/2025 1102 by Margaux Gregg RN  Outcome: Progressing  Goal: Remains free of injury related to seizures activity  Description: INTERVENTIONS  - Maintain airway, patient safety  and administer oxygen as ordered  - Monitor patient for seizure activity, document and report duration and description of seizure to physician/advanced practitioner  - If seizure occurs,  ensure patient safety during seizure  - Reorient patient post seizure  - Seizure pads on all 4 side rails  - Instruct patient/family to notify RN of any seizure activity including if an aura is experienced  - Instruct patient/family to call for assistance with activity based on nursing assessment  - Administer anti-seizure medications if ordered    Outcome: Adequate for Discharge  Goal: Achieves maximal functionality and self care  Description: INTERVENTIONS  - Monitor swallowing and airway patency with patient fatigue and changes in neurological status  - Encourage and assist patient to increase activity and self care.   - Encourage visually impaired, hearing impaired and aphasic patients to use assistive/communication devices  Outcome: Adequate for Discharge     Problem: CARDIOVASCULAR - ADULT  Goal: Maintains optimal cardiac output and hemodynamic stability  Description: INTERVENTIONS:  - Monitor I/O, vital signs and rhythm  - Monitor for S/S and trends of decreased cardiac output  - Administer and titrate ordered vasoactive medications to optimize hemodynamic stability  - Assess  quality of pulses, skin color and temperature  - Assess for signs of decreased coronary artery perfusion  - Instruct patient to report change in severity of symptoms  Outcome: Adequate for Discharge  Goal: Absence of cardiac dysrhythmias or at baseline rhythm  Description: INTERVENTIONS:  - Continuous cardiac monitoring, vital signs, obtain 12 lead EKG if ordered  - Administer antiarrhythmic and heart rate control medications as ordered  - Monitor electrolytes and administer replacement therapy as ordered  Outcome: Adequate for Discharge     Problem: RESPIRATORY - ADULT  Goal: Achieves optimal ventilation and oxygenation  Description: INTERVENTIONS:  - Assess for changes in respiratory status  - Assess for changes in mentation and behavior  - Position to facilitate oxygenation and minimize respiratory effort  - Oxygen administered by appropriate delivery if ordered  - Initiate smoking cessation education as indicated  - Encourage broncho-pulmonary hygiene including cough, deep breathe, Incentive Spirometry  - Assess the need for suctioning and aspirate as needed  - Assess and instruct to report SOB or any respiratory difficulty  - Respiratory Therapy support as indicated  Outcome: Adequate for Discharge     Problem: GASTROINTESTINAL - ADULT  Goal: Minimal or absence of nausea and/or vomiting  Description: INTERVENTIONS:  - Administer IV fluids if ordered to ensure adequate hydration  - Maintain NPO status until nausea and vomiting are resolved  - Nasogastric tube if ordered  - Administer ordered antiemetic medications as needed  - Provide nonpharmacologic comfort measures as appropriate  - Advance diet as tolerated, if ordered  - Consider nutrition services referral to assist patient with adequate nutrition and appropriate food choices  2/10/2025 1206 by Margaux Gregg RN  Outcome: Adequate for Discharge  2/10/2025 1102 by Margaux Gregg RN  Outcome: Progressing  Goal: Maintains adequate nutritional  intake  Description: INTERVENTIONS:  - Monitor percentage of each meal consumed  - Identify factors contributing to decreased intake, treat as appropriate  - Assist with meals as needed  - Monitor I&O, weight, and lab values if indicated  - Obtain nutrition services referral as needed  Outcome: Adequate for Discharge  Goal: Oral mucous membranes remain intact  Description: INTERVENTIONS  - Assess oral mucosa and hygiene practices  - Implement preventative oral hygiene regimen  - Implement oral medicated treatments as ordered  - Initiate Nutrition services referral as needed  Outcome: Adequate for Discharge     Problem: GENITOURINARY - ADULT  Goal: Maintains or returns to baseline urinary function  Description: INTERVENTIONS:  - Assess urinary function  - Encourage oral fluids to ensure adequate hydration if ordered  - Administer IV fluids as ordered to ensure adequate hydration  - Administer ordered medications as needed  - Offer frequent toileting  - Follow urinary retention protocol if ordered  2/10/2025 1206 by Margaux Gregg RN  Outcome: Adequate for Discharge  2/10/2025 1102 by Margaux Gregg RN  Outcome: Progressing  Goal: Absence of urinary retention  Description: INTERVENTIONS:  - Assess patient’s ability to void and empty bladder  - Monitor I/O  - Bladder scan as needed  - Discuss with physician/AP medications to alleviate retention as needed  - Discuss catheterization for long term situations as appropriate  Outcome: Adequate for Discharge     Problem: METABOLIC, FLUID AND ELECTROLYTES - ADULT  Goal: Electrolytes maintained within normal limits  Description: INTERVENTIONS:  - Monitor labs and assess patient for signs and symptoms of electrolyte imbalances  - Administer electrolyte replacement as ordered  - Monitor response to electrolyte replacements, including repeat lab results as appropriate  - Instruct patient on fluid and nutrition as appropriate  Outcome: Adequate for Discharge  Goal: Fluid balance  maintained  Description: INTERVENTIONS:  - Monitor labs   - Monitor I/O and WT  - Instruct patient on fluid and nutrition as appropriate  - Assess for signs & symptoms of volume excess or deficit  Outcome: Adequate for Discharge  Goal: Glucose maintained within target range  Description: INTERVENTIONS:  - Monitor Blood Glucose as ordered  - Assess for signs and symptoms of hyperglycemia and hypoglycemia  - Administer ordered medications to maintain glucose within target range  - Assess nutritional intake and initiate nutrition service referral as needed  Outcome: Adequate for Discharge     Problem: SKIN/TISSUE INTEGRITY - ADULT  Goal: Skin Integrity remains intact(Skin Breakdown Prevention)  Description: Assess:  -Perform Gilberto assessment every   -Clean and moisturize skin every   -Inspect skin when repositioning, toileting, and assisting with ADLS  -Assess under medical devices such as  every   -Assess extremities for adequate circulation and sensation     Bed Management:  -Have minimal linens on bed & keep smooth, unwrinkled  -Change linens as needed when moist or perspiring  -Avoid sitting or lying in one position for more than  hours while in bed  -Keep HOB at degrees     Toileting:  -Offer bedside commode  -Assess for incontinence every   -Use incontinent care products after each incontinent episode such as     Activity:  -Mobilize patient  times a day  -Encourage activity and walks on unit  -Encourage or provide ROM exercises   -Turn and reposition patient every  Hours  -Use appropriate equipment to lift or move patient in bed  -Instruct/ Assist with weight shifting every  when out of bed in chair  -Consider limitation of chair time  hour intervals    Skin Care:  -Avoid use of baby powder, tape, friction and shearing, hot water or constrictive clothing  -Relieve pressure over bony prominences using   -Do not massage red bony areas    Next Steps:  -Teach patient strategies to minimize risks such as     -Consider consults to  interdisciplinary teams such as   Outcome: Adequate for Discharge  Goal: Incision(s), wounds(s) or drain site(s) healing without S/S of infection  Description: INTERVENTIONS  - Assess and document dressing, incision, wound bed, drain sites and surrounding tissue  - Provide patient and family education  - Perform skin care/dressing changes every   Outcome: Adequate for Discharge  Goal: Pressure injury heals and does not worsen  Description: Interventions:  - Implement low air loss mattress or specialty surface (Criteria met)  - Apply silicone foam dressing  - Instruct/assist with weight shifting every  minutes when in chair   - Limit chair time to  hour intervals  - Use special pressure reducing interventions such as  when in chair   - Apply fecal or urinary incontinence containment device   - Perform passive or active ROM every   - Turn and reposition patient & offload bony prominences every  hours   - Utilize friction reducing device or surface for transfers   - Consider consults to  interdisciplinary teams such as   - Use incontinent care products after each incontinent episode such as - Consider nutrition services referral as needed  Outcome: Adequate for Discharge     Problem: HEMATOLOGIC - ADULT  Goal: Maintains hematologic stability  Description: INTERVENTIONS  - Assess for signs and symptoms of bleeding or hemorrhage  - Monitor labs  - Administer supportive blood products/factors as ordered and appropriate  Outcome: Adequate for Discharge     Problem: MUSCULOSKELETAL - ADULT  Goal: Maintain or return mobility to safest level of function  Description: INTERVENTIONS:  - Assess patient's ability to carry out ADLs; assess patient's baseline for ADL function and identify physical deficits which impact ability to perform ADLs (bathing, care of mouth/teeth, toileting, grooming, dressing, etc.)  - Assess/evaluate cause of self-care deficits   - Assess range of motion  - Assess patient's  mobility  - Assess patient's need for assistive devices and provide as appropriate  - Encourage maximum independence but intervene and supervise when necessary  - Involve family in performance of ADLs  - Assess for home care needs following discharge   - Consider OT consult to assist with ADL evaluation and planning for discharge  - Provide patient education as appropriate  Outcome: Adequate for Discharge  Goal: Maintain proper alignment of affected body part  Description: INTERVENTIONS:  - Support, maintain and protect limb and body alignment  - Provide patient/ family with appropriate education  Outcome: Adequate for Discharge     Problem: Prexisting or High Potential for Compromised Skin Integrity  Goal: Skin integrity is maintained or improved  Description: INTERVENTIONS:  - Identify patients at risk for skin breakdown  - Assess and monitor skin integrity  - Assess and monitor nutrition and hydration status  - Monitor labs   - Assess for incontinence   - Turn and reposition patient  - Assist with mobility/ambulation  - Relieve pressure over bony prominences  - Avoid friction and shearing  - Provide appropriate hygiene as needed including keeping skin clean and dry  - Evaluate need for skin moisturizer/barrier cream  - Collaborate with interdisciplinary team   - Patient/family teaching  - Consider wound care consult   Outcome: Adequate for Discharge     Problem: MOBILITY - ADULT  Goal: Maintain or return to baseline ADL function  Description: INTERVENTIONS:  -  Assess patient's ability to carry out ADLs; assess patient's baseline for ADL function and identify physical deficits which impact ability to perform ADLs (bathing, care of mouth/teeth, toileting, grooming, dressing, etc.)  - Assess/evaluate cause of self-care deficits   - Assess range of motion  - Assess patient's mobility; develop plan if impaired  - Assess patient's need for assistive devices and provide as appropriate  - Encourage maximum independence  but intervene and supervise when necessary  - Involve family in performance of ADLs  - Assess for home care needs following discharge   - Consider OT consult to assist with ADL evaluation and planning for discharge  - Provide patient education as appropriate  Outcome: Adequate for Discharge  Goal: Maintains/Returns to pre admission functional level  Description: INTERVENTIONS:  - Perform AM-PAC 6 Click Basic Mobility/ Daily Activity assessment daily.  - Set and communicate daily mobility goal to care team and patient/family/caregiver.   - Collaborate with rehabilitation services on mobility goals if consulted  - Perform Range of Motion  times a day.  - Reposition patient every  hours.  - Dangle patient  times a day  - Stand patient  times a day  - Ambulate patient  times a day  - Out of bed to chair  times a day   - Out of bed for meals  times a day  - Out of bed for toileting  - Record patient progress and toleration of activity level   Outcome: Adequate for Discharge     Problem: Potential for Falls  Goal: Patient will remain free of falls  Description: INTERVENTIONS:  - Educate patient/family on patient safety including physical limitations  - Instruct patient to call for assistance with activity   - Consult OT/PT to assist with strengthening/mobility   - Keep Call bell within reach  - Keep bed low and locked with side rails adjusted as appropriate  - Keep care items and personal belongings within reach  - Initiate and maintain comfort rounds  - Make Fall Risk Sign visible to staff  - Offer Toileting every Hours, in advance of need  - Initiate/Maintain alarm  - Obtain necessary fall risk management equipment:   - Apply yellow socks and bracelet for high fall risk patients  - Consider moving patient to room near nurses station  Outcome: Adequate for Discharge     Problem: PAIN - ADULT  Goal: Verbalizes/displays adequate comfort level or baseline comfort level  Description: Interventions:  - Encourage patient to  monitor pain and request assistance  - Assess pain using appropriate pain scale  - Administer analgesics based on type and severity of pain and evaluate response  - Implement non-pharmacological measures as appropriate and evaluate response  - Consider cultural and social influences on pain and pain management  - Notify physician/advanced practitioner if interventions unsuccessful or patient reports new pain  Outcome: Adequate for Discharge     Problem: INFECTION - ADULT  Goal: Absence or prevention of progression during hospitalization  Description: INTERVENTIONS:  - Assess and monitor for signs and symptoms of infection  - Monitor lab/diagnostic results  - Monitor all insertion sites, i.e. indwelling lines, tubes, and drains  - Monitor endotracheal if appropriate and nasal secretions for changes in amount and color  - [place] appropriate cooling/warming therapies per order  - Administer medications as ordered  - Instruct and encourage patient and family to use good hand hygiene technique  - Identify and instruct in appropriate isolation precautions for identified infection/condition  Outcome: Adequate for Discharge  Goal: Absence of fever/infection during neutropenic period  Description: INTERVENTIONS:  - Monitor WBC    Outcome: Adequate for Discharge     Problem: SAFETY ADULT  Goal: Patient will remain free of falls  Description: INTERVENTIONS:  - Educate patient/family on patient safety including physical limitations  - Instruct patient to call for assistance with activity   - Consult OT/PT to assist with strengthening/mobility   - Keep Call bell within reach  - Keep bed low and locked with side rails adjusted as appropriate  - Keep care items and personal belongings within reach  - Initiate and maintain comfort rounds  - Make Fall Risk Sign visible to staff  - Offer Toileting every  Hours, in advance of need  - Initiate/Maintain alarm  - Obtain necessary fall risk management equipment:   - Apply yellow socks  and bracelet for high fall risk patients  - Consider moving patient to room near nurses station  Outcome: Adequate for Discharge  Goal: Maintain or return to baseline ADL function  Description: INTERVENTIONS:  -  Assess patient's ability to carry out ADLs; assess patient's baseline for ADL function and identify physical deficits which impact ability to perform ADLs (bathing, care of mouth/teeth, toileting, grooming, dressing, etc.)  - Assess/evaluate cause of self-care deficits   - Assess range of motion  - Assess patient's mobility; develop plan if impaired  - Assess patient's need for assistive devices and provide as appropriate  - Encourage maximum independence but intervene and supervise when necessary  - Involve family in performance of ADLs  - Assess for home care needs following discharge   - Consider OT consult to assist with ADL evaluation and planning for discharge  - Provide patient education as appropriate  Outcome: Adequate for Discharge  Goal: Maintains/Returns to pre admission functional level  Description: INTERVENTIONS:  - Perform AM-PAC 6 Click Basic Mobility/ Daily Activity assessment daily.  - Set and communicate daily mobility goal to care team and patient/family/caregiver.   - Collaborate with rehabilitation services on mobility goals if consulted  - Perform Range of Motion  times a day.  - Reposition patient every  hours.  - Dangle patient  times a day  - Stand patient  times a day  - Ambulate patient  times a day  - Out of bed to chair  times a day   - Out of bed for meals  times a day  - Out of bed for toileting  - Record patient progress and toleration of activity level   Outcome: Adequate for Discharge     Problem: DISCHARGE PLANNING  Goal: Discharge to home or other facility with appropriate resources  Description: INTERVENTIONS:  - Identify barriers to discharge w/patient and caregiver  - Arrange for needed discharge resources and transportation as appropriate  - Identify discharge  learning needs (meds, wound care, etc.)  - Arrange for interpretive services to assist at discharge as needed  - Refer to Case Management Department for coordinating discharge planning if the patient needs post-hospital services based on physician/advanced practitioner order or complex needs related to functional status, cognitive ability, or social support system  Outcome: Adequate for Discharge     Problem: Knowledge Deficit  Goal: Patient/family/caregiver demonstrates understanding of disease process, treatment plan, medications, and discharge instructions  Description: Complete learning assessment and assess knowledge base.  Interventions:  - Provide teaching at level of understanding  - Provide teaching via preferred learning methods  Outcome: Adequate for Discharge

## 2025-02-10 NOTE — ASSESSMENT & PLAN NOTE
"Resolved  - pt reports he usually has a BM every 2-3 days but feels like pain meds are \"backing him up\"  -PRN dulcolax; add senna, colase and miralax as patient taking opiods   - monitor BM  Miralax changed to 2x a day standing and one dose of lactulose 2/3  Large BM 2/10   "

## 2025-02-10 NOTE — DISCHARGE INSTR - AVS FIRST PAGE
DISCHARGE INSTRUCTIONS: [facility] ACUTE REHABILITATION [place]    Bring these instructions with you to your Outpatient Physician appointments so they can order and follow-up any additional lab work or imaging recommended at time of discharge.    Resume follow-up with all your prior providers that you have established care prior to this hospitalization.  Discuss with primary care physician (PCP) if you have additional questions.     It  is you or your caregivers responsibility to obtain follow-up MEDICATION REFILLS  As indicated through your Primary Care Physician (PCP) and other outpatient specialty provider(s) after discharge.  Please follow-up with your PCP as soon as possible after discharge to set-up follow-up management and when appropriate refills.        You remain a fall and injury risk which could be severe.  - Your risk of fall has decreased however since admission to acute rehab.  Caregiver training has been completed with our staff.  - Appropriate supervision +/- assistance as instructed during your rehab course is recommended to decrease risk of fall and injury.    - Continue skilled therapy as discussed after discharge to further decrease this risk    If you (or your health care proxy) have any questions or concerns regarding your acute rehabilitation stay including issues with medications, rehabilitation, and follow-up plan, please call:          [facility] Acute Rehabilitation Unit at [place] at [phone]      Should you develop fevers, chills, new weakness, changes in sensation, difficulty speaking, facial weakness, confusion, shortness of breath, chest pain, or other concerning symptoms please call 911 and/or obtain transportation to nearest ER immediately.    Should you develop worsening pain, swelling, or drainage notify your surgeon right away or obtain transportation to nearest ER for evaluation.    Should you develop feelings of significant hopelessness, severe  "depression, severe anxiety, or suicide you should call 911 or obtain transportation to nearest ER.    24-7 Nationwide suicide and crisis lifeline call \"274\"  National Suicide Prevention Lifeline is 1-665.819.4936 and is available 24 hrs/7 days a week.   [facility] Crisis [phone] is available 24 hrs/7 days for mental health  [facility] Crisis [phone] is available 24 hrs/7 days for mental health   [facility] [phone]    PHYSICIANS to see:  Please see your doctors listed in the follow up providers section of your discharge paperwork, and take the discharge paperwork with you to your appointments.    SKIN CARE INSTRUCTIONS to follow:  Monitor skin for increased redness or breadown and promptly notify your physician should these develop    If instructed while in [facility] - be sure you stand +/- walk every 1-2 hours and if advised use appropriate supervision/assistance to optimally offload your buttock/sacral region.  While seated or lying in bed shift positions and from side to side often.  Can use barrier type cream such as Hydragaurd 2 times per day and as needed.    Turn patient (yourself) fully every 2 hours while in bed.      Due to the following you are at increased risk of skin breakdown/wounds/worsening wounds:  - Impaired mobility  - Impaired nutrition/intake/low albumin  - Medical co-morbidities    Buttocks/Sacrum  Turn as full as possible off sacrum/buttocks every 2 hours  Use Cushion while in chair or wheelchair  Weight shift every 10-15 minutes while in chair  Keep skin clean and dry as possible.  Remove wet or soiled clothing/linens promptly  Use barrier cream or similar 2 times per day   Monitor skin for increased breakdown which you are at risk of and notify nursing, PCP, or other physician providers should this occur right away    Heels/bony edges of feet  Float heels off edge of pillow for added pressure relief.  Monitor heels and bony " protuberances and notify physician or nursing for any increased redness, bogginess, or breakdown      WEIGHTBEARING/ACTIVITY PRECAUTIONS to follow:    You should wear your lifevest at all times until cleared by your Cardiologist to not wear it.     Driving restrictions:  You are recommended against driving until cleared by an outpatient physician, specifically your Cardiologist     Work restrictions:  You should NOT return to work (if working) until cleared by an outpatient physician.    You should not operate heavy machinery (if applicable) until cleared by an outpatient physician.      Alcohol restrictions:  You are recommended to not drink alcohol at this time unless cleared by an outpatient physician.  Drinking alcohol in your current functional condition can increase your risk of injury which could be severe.  Drinking alcohol given your current health problems can lead to increased medical complications which could be severe.    Combining alcohol with your current medications can increase your risk of injury which could be severe.      Smoking restrictions:  You are recommended to not smoke nicotine.  Smoking increases your risk of heart attack, stroke, emphysema/COPD, and lung cancer.      ------------------------------------------------------------------------------------------------------------  ------------------------------------------------------------------------------------------------------------    MEDICATIONS:  Please see a full list of your medications outlined in the After Visit Summary that is attached to these Discharge Instructions.  Please note changes may have been made to your medications please refer to your discharge paperwork for your current medications and take this list with you to all your doctors appointments for your doctors to review.  Please do not resume a home medication unless the medication reconciliation sheet indicates to do so, please do not assume that a medication that  you were given a prescription for is the same as a medication you have at home based on both medications having the same name as dosages and frequency may have changed.      Unless specifically noted in your medication list provided to you in your discharge paper work do not resume prior vitamins, minerals, or supplements you may have been taking prior to your hospitalization unless instructed by an outpatient physician in the future.  Discuss with your primary care at next visit if applicable.          Please note a summary of your hospital stay with relevant information for your doctors will try to be sent to them.  Please confirm with your doctors at your follow up visits that they have received this summary and have them contact [facility] Records if they have not received them along with any other medical records they may require.     [facility] Phone Number:  [phone]

## 2025-02-10 NOTE — PROGRESS NOTES
"   02/10/25 0930   Pain Assessment   Pain Assessment Tool 0-10   Pain Score No Pain   Restrictions/Precautions   Precautions Fall Risk   Cognition   Arousal/Participation Alert;Cooperative   Subjective   Subjective Pt reports \"I'm going home today\" and doesn't want to do anything that will make him sore tomorrow   Sit to Stand   Type of Assistance Needed Independent   Physical Assistance Level No physical assistance   Sit to Stand CARE Score 6   Bed-Chair Transfer   Type of Assistance Needed Independent;Adaptive equipment   Physical Assistance Level No physical assistance   Chair/Bed-to-Chair Transfer CARE Score 6   Walk 10 Feet   Type of Assistance Needed Independent;Adaptive equipment   Physical Assistance Level No physical assistance   Walk 10 Feet CARE Score 6   Walk 50 Feet with Two Turns   Type of Assistance Needed Supervision;Adaptive equipment   Physical Assistance Level No physical assistance   Walk 50 Feet with Two Turns CARE Score 4   Walk 150 Feet   Type of Assistance Needed Supervision;Adaptive equipment   Physical Assistance Level No physical assistance   Walk 150 Feet CARE Score 4   Walking 10 Feet on Uneven Surfaces   Reason if not Attempted Refused to perform   Walking 10 Feet on Uneven Surfaces CARE Score 7   Ambulation   Primary Mode of Locomotion Prior to Admission Walk   Distance Walked (feet) 150 ft  (x2)   Assist Device Roller Walker   Gait Pattern Slow Rosanne;Decreased foot clearance;Forward Flexion   Limitations Noted In Speed;Endurance   Walk Assist Level Supervision   Findings mod I with RW for household distances, S for longer distances   Does the patient walk? 2. Yes   Curb or Single Stair   Style negotiated Single stair   Type of Assistance Needed Independent;Adaptive equipment   Physical Assistance Level No physical assistance   1 Step (Curb) CARE Score 6   4 Steps   Reason if not Attempted Refused to perform   4 Steps CARE Score 7   12 Steps   Reason if not Attempted Activity not " applicable   12 Steps CARE Score 9   Stairs   Type Stairs   # of Steps 2   Assist Devices Bilateral Rail   Findings pt only performed 2 stairs since this is his home set-up   Therapeutic Interventions   Other walking along hallway railing 30 ft x 2 (1 hand on RW and other hand on railing as pt feels this allows him to walk more normally and he is having a railing installed in his hallway at home)   Assessment   Treatment Assessment Pt participated in 30 min skilled PT session focusing on overall mobility prior to D/C home. Pt is mod I with RW for transfers and ambulating short distances, requires S for longer distances due to decreased endurance and possible need for w/c follow. He is mod I negotiating 2 stairs to enter his home using L HR. He declined to practice walking on uneven surface, stating he would not normally do that and would walk around any uneven or soft surfaces. Practiced walking along hallway rail with 1 UE (with other UE lightly on RW) to mimic more normalized gait pattern and more upright posture. All questions/concerns addressed. Pt prepared for D/C home later today.   Problem List Decreased endurance   Plan   Treatment/Interventions Functional transfer training;LE strengthening/ROM;Elevations;Therapeutic exercise;Endurance training;Patient/family training;Equipment eval/education;Bed mobility;Gait training   Progress Progressing toward goals   Discharge Recommendation   Equipment Recommended Walker   PT Therapy Minutes   PT Time In 0930   PT Time Out 1000   PT Total Time (minutes) 30   PT Mode of treatment - Individual (minutes) 30   PT Mode of treatment - Concurrent (minutes) 0   PT Mode of treatment - Group (minutes) 0   PT Mode of treatment - Co-treat (minutes) 0   PT Mode of Treatment - Total time(minutes) 30 minutes   PT Cumulative Minutes 1110   Therapy Time missed   Time missed? No

## 2025-02-10 NOTE — ASSESSMENT & PLAN NOTE
Hgb 11.6 1/30, 10.9 2/3, 2/5 11.2, 2/7 10.6, 2/10 11.1  Monitor w/ routine blood work   Iron panel on 1/30 iron 43, ferritin 224, iron sat 23

## 2025-02-10 NOTE — ASSESSMENT & PLAN NOTE
Multiple joint s/p steroid inj b/l shoulders 1/6   F.w Dr. Egan as ouptatient   C/w pain control, tylenol, oxy 2.5Q8h, bengay; Has been taking Oxy 1x/ day, will not prescribe on dc

## 2025-02-11 PROBLEM — Z98.61 CAD S/P PERCUTANEOUS CORONARY ANGIOPLASTY: Status: ACTIVE | Noted: 2025-01-17

## 2025-02-11 PROBLEM — I25.10 CAD S/P PERCUTANEOUS CORONARY ANGIOPLASTY: Status: ACTIVE | Noted: 2025-01-17

## 2025-02-12 ENCOUNTER — OFFICE VISIT (OUTPATIENT)
Dept: CARDIOLOGY CLINIC | Facility: CLINIC | Age: 70
End: 2025-02-12
Payer: MEDICARE

## 2025-02-12 VITALS
OXYGEN SATURATION: 95 % | DIASTOLIC BLOOD PRESSURE: 86 MMHG | RESPIRATION RATE: 16 BRPM | BODY MASS INDEX: 30.4 KG/M2 | SYSTOLIC BLOOD PRESSURE: 123 MMHG | HEIGHT: 74 IN | HEART RATE: 95 BPM

## 2025-02-12 DIAGNOSIS — E11.9 TYPE 2 DIABETES MELLITUS WITHOUT COMPLICATION, WITHOUT LONG-TERM CURRENT USE OF INSULIN (HCC): Chronic | ICD-10-CM

## 2025-02-12 DIAGNOSIS — I10 PRIMARY HYPERTENSION: ICD-10-CM

## 2025-02-12 DIAGNOSIS — E78.2 MIXED HYPERLIPIDEMIA: Chronic | ICD-10-CM

## 2025-02-12 DIAGNOSIS — I25.10 CAD S/P PERCUTANEOUS CORONARY ANGIOPLASTY: Primary | ICD-10-CM

## 2025-02-12 DIAGNOSIS — I25.5 ISCHEMIC CARDIOMYOPATHY: ICD-10-CM

## 2025-02-12 DIAGNOSIS — I51.3 LV (LEFT VENTRICULAR) MURAL THROMBUS: ICD-10-CM

## 2025-02-12 DIAGNOSIS — Z98.61 CAD S/P PERCUTANEOUS CORONARY ANGIOPLASTY: Primary | ICD-10-CM

## 2025-02-12 PROCEDURE — 99214 OFFICE O/P EST MOD 30 MIN: CPT

## 2025-02-12 RX ORDER — CARVEDILOL 3.12 MG/1
3.12 TABLET ORAL 2 TIMES DAILY WITH MEALS
Qty: 180 TABLET | Refills: 2 | Status: SHIPPED | OUTPATIENT
Start: 2025-02-12

## 2025-02-12 RX ORDER — LOSARTAN POTASSIUM 25 MG/1
12.5 TABLET ORAL DAILY
Qty: 15 TABLET | Refills: 5 | Status: SHIPPED | OUTPATIENT
Start: 2025-02-12

## 2025-02-12 NOTE — ASSESSMENT & PLAN NOTE
LifeVest in place, denies any device discharges.  Euvolemic on exam.  Start losartan for optimization of GDMT.  Continue Coreg and Jardiance.  If BP tolerates, may be reasonable to consider addition of Aldactone.  Plan for limited TTE in 3 months to assess for EF recovery.  If EF does not improve to >35% within 3 months, patient will need consideration for ICD.

## 2025-02-12 NOTE — ASSESSMENT & PLAN NOTE
Continue Coumadin.  Plan for limited TTE in 1 week to reevaluate.  Task sent to have patient established with the Coumadin clinic.

## 2025-02-12 NOTE — ASSESSMENT & PLAN NOTE
Denies chest pain.  Continue ASA, Plavix, Lipitor, and Coreg.  Patient expresses interest in pursuing cardiac rehab, referral for such was previously placed.  Advised to notify our office for any new/worsening symptoms.

## 2025-02-12 NOTE — PROGRESS NOTES
[facility] Cardiology   Office Visit    Joseph Aguilar 69 y.o. male MRN: [id number]    02/12/25      Assessment & Plan  CAD; STEMI s/p PCI with CHYNA to LM-ostial LAD and mLAD;  of LCx (1/17/2024)  Denies chest pain.  Continue ASA, Plavix, Lipitor, and Coreg.  Patient expresses interest in pursuing cardiac rehab, referral for such was previously placed.  Advised to notify our office for any new/worsening symptoms.  ICM with EF 25%  LifeVest in place, denies any device discharges.  Euvolemic on exam.  Start losartan for optimization of GDMT.  Continue Coreg and Jardiance.  If BP tolerates, may be reasonable to consider addition of Aldactone.  Plan for limited TTE in 3 months to assess for EF recovery.  If EF does not improve to >35% within 3 months, patient will need consideration for ICD.  LV (left ventricular) mural thrombus  Continue Coumadin.  Plan for limited TTE in 1 week to reevaluate.  Task sent to have patient established with the Coumadin clinic.  Primary hypertension  BP is well-controlled.  Continue Coreg.  Encourage ambulatory monitoring and low-sodium diet.  Mixed hyperlipidemia  Continue Lipitor, dietary control, and periodic surveillance.  Type 2 diabetes mellitus without complication, without long-term current use of insulin ([facility])    Lab Results   Component Value Date    HGBA1C 8.2 (H) 01/18/2025   Management per PCP.        Interval history: Joseph Aguilar is a very pleasant 69 y.o. year old male with history of CAD/STEMI s/p PCI, ICM with EF 25%, LV mural thrombus, hypertension, hyperlipidemia, and T2DM who presents for office visit.  Patient was evaluated by cardiology during recent admission for STEMI.  He underwent cardiac catheterization on 1/17/2024 and received PCI with CHYNA to LM-ostial LAD and mLAD with IABP.  Patient subsequently experienced cardiogenic shock and was admitted to the ICU requiring pressor support in addition to IABP.  Echocardiogram revealed EF 25% with LV thrombus.  On  "1/20/2024 patient was found to have bleeding/hematoma requiring manual pressure and FemoStop along with vascular surgery consultation.  IABP was removed at that time.  Patient received optimization of medications and was fitted for LifeVest prior to discharge from [facility] to [facility].  He has reportedly been well overall from a cardiac standpoint since time of discharge.  Patient has not been taking Jardiance and was unaware this was prescribed.  1 month samples of Jardiance provided upon patient request.  Endorses strict compliance to all medications otherwise.  Denies adverse effects to current medications.  Patient remains compliant with LifeVest wear and denies any discharges from device.  He expresses interest in pursuing cardiac rehab.  Denies chest pain or any additional complaints at this time.       Review of Systems:  Review of Systems   Constitutional:  Negative for chills and fever.   HENT:  Negative for ear pain and sore throat.    Eyes:  Negative for pain and visual disturbance.   Respiratory:  Negative for cough and shortness of breath.    Cardiovascular:  Negative for chest pain, palpitations and leg swelling.   Gastrointestinal:  Negative for abdominal pain and vomiting.   Genitourinary:  Negative for dysuria and hematuria.   Musculoskeletal:  Negative for arthralgias and back pain.   Skin:  Negative for color change and rash.   Neurological:  Negative for seizures and syncope.   All other systems reviewed and are negative.      PHYSICAL EXAM:  Vitals:   Vitals:    02/12/25 1457   BP: 123/86   BP Location: Left arm   Patient Position: Sitting   Cuff Size: Standard   Pulse: 95   Resp: 16   SpO2: 95%   Height: 6' 2\" (1.88 m)        Physical Exam:  GEN: Alert and oriented x3, in no acute distress.  Well appearing and well nourished.   HEENT: Sclera anicteric, conjunctivae pink, mucous membranes moist. Oropharynx clear.   NECK: Supple, no carotid bruits, no significant JVD. Trachea midline, " no thyromegaly.   HEART: Regular rhythm, normal S1 and S2, no murmurs, clicks, gallops or rubs. PMI nondisplaced, no thrills.   LUNGS: Clear to auscultation bilaterally; no wheezes, rales, or rhonchi. No increased work of breathing or signs of respiratory distress.   ABDOMEN: Soft, nontender, nondistended, normoactive bowel sounds.   EXTREMITIES: Skin warm and well perfused, no clubbing or cyanosis, no edema.  NEURO: No focal findings. Normal speech. Mood and affect normal.   SKIN: Normal without suspicious lesions on exposed skin.    Follow up: 2 weeks or sooner as needed    No Known Allergies      Current Outpatient Medications:     acetaminophen (TYLENOL) 325 mg tablet, Take 2 tablets (650 mg total) by mouth every 6 (six) hours as needed for mild pain, headaches or fever, Disp: , Rfl:     aspirin (ECOTRIN LOW STRENGTH) 81 mg EC tablet, Take 1 tablet (81 mg total) by mouth daily, Disp: 30 tablet, Rfl: 0    atorvastatin (LIPITOR) 80 mg tablet, Take 1 tablet (80 mg total) by mouth daily, Disp: 30 tablet, Rfl: 0    carvedilol (COREG) 3.125 mg tablet, Take 1 tablet (3.125 mg total) by mouth 2 (two) times a day with meals, Disp: 180 tablet, Rfl: 2    clopidogrel (PLAVIX) 75 mg tablet, Take 1 tablet (75 mg total) by mouth daily, Disp: 30 tablet, Rfl: 0    Empagliflozin 25 MG TABS, Take 0.5 tablets (12.5 mg total) by mouth in the morning, Disp: 45 tablet, Rfl: 1    gabapentin (NEURONTIN) 300 mg capsule, Take 300 mg by mouth, Disp: , Rfl:     insulin glargine (LANTUS SOLOSTAR) 100 units/mL injection pen, Inject 15 Units under the skin daily at bedtime, Disp: 15 mL, Rfl: 0    losartan (COZAAR) 25 mg tablet, Take 0.5 tablets (12.5 mg total) by mouth daily, Disp: 15 tablet, Rfl: 5    magnesium Oxide (MAG-OX) 400 mg TABS, Take 2 tablets (800 mg total) by mouth 2 (two) times a day, Disp: 120 tablet, Rfl: 0    melatonin 5 MG TABS, Take 1 tablet (5 mg total) by mouth daily at bedtime, Disp: 30 tablet, Rfl: 0    metFORMIN  (GLUCOPHAGE) 1000 MG tablet, TAKE ONE TABLET BY MOUTH TWICE A DAY WITH MEALS FOR DIABETES, Disp: , Rfl:     warfarin (COUMADIN) 5 mg tablet, Take 1.5 tablets (7.5 mg total) by mouth daily, Disp: 90 tablet, Rfl: 0    Past Medical History:   Diagnosis Date    Depression     Diabetes mellitus (HCC)     Hyperlipidemia     Hypertension     Osteoarthritis, knee        Family History   Problem Relation Age of Onset    No Known Problems Mother     Heart attack Father     Arthritis Family     Cancer Family     Diabetes Family     Heart disease Family     Osteoporosis Family        Past Medical History:   Diagnosis Date    Depression     Diabetes mellitus (HCC)     Hyperlipidemia     Hypertension     Osteoarthritis, knee        Past Surgical History:   Procedure Laterality Date    BACK SURGERY      CARDIAC CATHETERIZATION N/A 01/17/2025    Procedure: Cardiac PCI;  Surgeon: Neeraj Gaines MD;  Location: [facility];  Service: Cardiology    CARDIAC CATHETERIZATION Left 01/17/2025    Procedure: Cardiac Left Heart Cath;  Surgeon: Neeraj Gaines MD;  Location: [facility];  Service: Cardiology    CARDIAC CATHETERIZATION N/A 01/17/2025    Procedure: Cardiac iabp;  Surgeon: Neeraj Gaines MD;  Location: [facility];  Service: Cardiology    HAND SURGERY      JOINT REPLACEMENT Left     l tkr    KNEE SURGERY Left     OR ARTHRP KNE CONDYLE&PLATU MEDIAL&LAT COMPARTMENTS Right 11/19/2018    Procedure: ARTHROPLASTY KNEE TOTAL;  Surgeon: Franny Grimaldo MD;  Location: [facility];  Service: Orthopedics    TOOTH EXTRACTION      WISDOM TOOTH EXTRACTION         Social History     Socioeconomic History    Marital status: /Civil Union     Spouse name: Not on file    Number of children: Not on file    Years of education: Not on file    Highest education level: Not on file   Occupational History    Not on file   Tobacco Use    Smoking status: Former     Current packs/day: 0.00     Types: Cigarettes      Quit date: 3/1/2012     Years since quittin.9     Passive exposure: Past    Smokeless tobacco: Never   Vaping Use    Vaping status: Former    Quit date: 2010   Substance and Sexual Activity    Alcohol use: Yes     Alcohol/week: 3.0 standard drinks of alcohol     Types: 3 Cans of beer per week     Comment: 'couple beers every couple weeks'    Drug use: Not Currently    Sexual activity: Not Currently   Other Topics Concern    Not on file   Social History Narrative    Caffeine use, active     Social Drivers of Health     Financial Resource Strain: Not on file   Food Insecurity: No Food Insecurity (2/10/2025)    Nursing - Inadequate Food Risk Classification     Worried About Running Out of Food in the Last Year: Never true     Ran Out of Food in the Last Year: Never true     Ran Out of Food in the Last Year: Never true   Transportation Needs: No Transportation Needs (2/10/2025)    Nursing - Transportation Risk Classification     Lack of Transportation: Not on file     Lack of Transportation: No   Physical Activity: Not on file   Stress: Not on file   Social Connections: Not on file   Intimate Partner Violence: Unknown (2/10/2025)    Nursing IPS     Feels Physically and Emotionally Safe: Not on file     Physically Hurt by Someone: Not on file     Humiliated or Emotionally Abused by Someone: Not on file     Physically Hurt by Someone: No     Hurt or Threatened by Someone: No   Housing Stability: Unknown (2/10/2025)    Nursing: Inadequate Housing Risk Classification     Has Housing: Not on file     Worried About Losing Housing: Not on file     Unable to Get Utilities: Not on file     Unable to Pay for Housing in the Last Year: No     Has Housin         LABORATORY RESULTS:    Lab Results   Component Value Date    WBC 5.71 02/10/2025    HGB 11.1 (L) 02/10/2025    HCT 34.3 (L) 02/10/2025    MCV 97 02/10/2025     02/10/2025     Lab Results   Component Value Date    CALCIUM 8.9 02/10/2025    K 3.7 02/10/2025  "   CO2 25 02/10/2025     02/10/2025    BUN 10 02/10/2025    CREATININE 0.80 02/10/2025     Lab Results   Component Value Date    HGBA1C 8.2 (H) 01/18/2025       Lipid Profile:   No results found for: \"CHOL\"  Lab Results   Component Value Date    HDL 33 (L) 01/18/2025     Lab Results   Component Value Date    LDLCALC 58 01/18/2025     Lab Results   Component Value Date    TRIG 104 01/18/2025       The ASCVD Risk score (Linda BYRNES, et al., 2019) failed to calculate for the following reasons:    Risk score cannot be calculated because patient has a medical history suggesting prior/existing ASCVD    1. CAD; STEMI s/p PCI with CHYNA to LM-ostial LAD and mLAD;  of LCx (1/17/2024)        2. ICM with EF 25%  losartan (COZAAR) 25 mg tablet    carvedilol (COREG) 3.125 mg tablet    Echo follow up/limited w/ contrast if indicated      3. LV (left ventricular) mural thrombus  Echo follow up/limited w/ contrast if indicated      4. Primary hypertension        5. Mixed hyperlipidemia        6. Type 2 diabetes mellitus without complication, without long-term current use of insulin (HCC)  Empagliflozin 25 MG TABS          Imaging: I have reviewed all pertinent imaging studies.      Recommend aggressive risk factor modification and therapeutic lifestyle changes.  Low-salt, low-calorie, low-fat, low-cholesterol diet with regular exercise and to optimize weight.    Discussed concepts of atherosclerosis, including signs and symptoms of cardiac disease.    Medications reviewed and possible side effects discussed.  Previous studies were reviewed.    Safety measures were reviewed.  All questions and concerns addressed.  Patient was advised to report any problems requiring medical attention.    Follow-up with PCP and appropriate specialist and lab work as discussed.    Return for follow up visit as scheduled or earlier, if needed.  Thank you for allowing me to participate in the care and evaluation of your patient.  Should you have " any questions, please feel free to contact me.    Loyd Olmos PA-C  2/12/2025,5:40 PM

## 2025-02-12 NOTE — PATIENT INSTRUCTIONS
Please follow instructions as recommended during visit.  Recommend low salt DASH diet  Strongly encourage compliance with your medications.   Please reach out to us if you have any questions   Recommend you present to the emergency room or call our office if you have chest pain, chest pressure, shortness of breath, any new, persistent or progressive symptoms.     Please call [phone] to schedule cardiac rehab.    
98.1

## 2025-02-13 ENCOUNTER — ANTICOAG VISIT (OUTPATIENT)
Dept: CARDIOLOGY CLINIC | Facility: CLINIC | Age: 70
End: 2025-02-13

## 2025-02-13 DIAGNOSIS — Z79.01 ANTICOAGULATION MONITORING, INR RANGE 2-3: Primary | ICD-10-CM

## 2025-02-13 NOTE — PROGRESS NOTES
Pt was seen in our office on 2/12. We are going to monitor his Warfarin. Last INR was 2/10 at 2.8. I advised he cont with the sd and retest on 2/18. He was unsure of the lab locations in the area. I gave him the address of the hospital, the one at [facility] and the [facility]. He was unfamiliar with all the areas. I told him I will help him research the locations or give him the # to lab services [phone] to help with directions. Pt states this is going no where and did not take down the info. He said he will call me back later.

## 2025-02-14 NOTE — PROGRESS NOTES
Wife called back. I explained dosing and testing instructions. She will take him to the lab on Monday.

## 2025-02-14 NOTE — CASE MANAGEMENT
Case Management Discharge Planning Note    Patient name Joseph Aguilar  Location /-02 MRN [id number]  : 1955 Date 2025       Current Admission Date: 2025  Current Admission Diagnosis:Ambulatory dysfunction   Patient Active Problem List    Diagnosis Date Noted Date Diagnosed    Osteoarthritis of multiple joints 2025     Chronic back pain 2025     Constipation 2025     Ambulatory dysfunction 2025     Conjunctivitis 2025     Subtherapeutic anticoagulation 2025     ICM with EF 25% 2025     LV (left ventricular) mural thrombus 2025     Hematoma of right lower extremity 2025     CAD; STEMI s/p PCI with CHYNA to LM-ostial LAD and mLAD;  of LCx (2024) 2025     Morbid (severe) obesity due to excess calories (HCC) 10/07/2024     Glenohumeral arthritis, left 2022     Right foot pain 2022     Vitamin D deficiency 2022     Anemia 2022     Hypomagnesemia 2022     Closed nondisplaced intertrochanteric fracture of left femur with routine healing 2022     Pain in both feet 2022     Polyneuropathy associated with underlying disease (HCC) 2022     Slow transit constipation 2022     S/P TKR (total knee replacement), right 2018     Obesity (BMI 30-39.9) 10/29/2018     Primary osteoarthritis of both knees 2018     Glenohumeral arthritis, right 2016     Mixed hyperlipidemia 2013     Primary hypertension 2012     Type 2 diabetes mellitus without complication, without long-term current use of insulin (HCC) 2012       LOS (days): 12  Geometric Mean LOS (GMLOS) (days):   Days to GMLOS:     OBJECTIVE:  Risk of Unplanned Readmission Score: 20.06         Current admission status: Inpatient Rehab   Preferred Pharmacy:   [facility] - [place] - [address]  [address]  [place] [address]  Phone: [phone] Fax:  [phone]     [facility] [place] [place] [address] [place] [address]  Phone: [phone] Fax: [phone]    Primary Care Provider: Christopher Beck MD    Primary Insurance: MEDICARE  Secondary Insurance: COMMERCIAL MISCELLANEOUS    DISCHARGE DETAILS:     [facility] if patient was discharged in January as medicare stated he transferred to snf. CM informed onel patient was transferred then discharged from [facility].

## 2025-02-14 NOTE — PROGRESS NOTES
02/14/25 1051   Hello, [Guardian’s Name / Patient’s Name], this is [Caller Name] from [facility], and our clinical care team wanted to check on you / your child after your recent visit to the hospital. It will only take 3-5 minutes. Is this a good time?   Discharge Call Type/ Specific Diagnosis: ARC General   ARC Discharge Follow- Up   ARC Follow- Up Time Frame 5 Day follow up   Call Complete   Attempted Number of Calls 1   Discharge phone call complete? Left Message

## 2025-02-14 NOTE — OCCUPATIONAL THERAPY NOTE
OT DISCHARGE SUMMARY    Pt made good progress during stay on the ARC following admission for ambulatory dysfunction.  Pt presented upon IE with generalized weakness, decreased endurance, activity tolerance, and functional mobility. On evaluation, pt required maxA/TA to complete all ADLs and functional transfers. Pt was discharged to home with wife support. Pt currently functioning at Keron level for ADL, Keron level for transfers with RW and wc level. The following DME was recommended shower bench (per pt wife ordered). Family training not required during stay. Pt has met all OT goals, will cont with OP PT only.      -Margie Samuel MS, OTR/L, CSRS

## 2025-02-17 ENCOUNTER — APPOINTMENT (OUTPATIENT)
Age: 70
End: 2025-02-17
Payer: MEDICARE

## 2025-02-17 DIAGNOSIS — Z79.01 ANTICOAGULATION MONITORING, INR RANGE 2-3: ICD-10-CM

## 2025-02-17 PROCEDURE — 36415 COLL VENOUS BLD VENIPUNCTURE: CPT

## 2025-02-17 PROCEDURE — 85610 PROTHROMBIN TIME: CPT

## 2025-02-17 NOTE — PROGRESS NOTES
02/17/25 1249   Hello, [Guardian’s Name / Patient’s Name], this is [Caller Name] from [facility], and our clinical care team wanted to check on you / your child after your recent visit to the hospital. It will only take 3-5 minutes. Is this a good time?   Discharge Call Type/ Specific Diagnosis: ARC General   ARC Discharge Follow- Up   ARC Follow- Up Time Frame 5 Day follow up   Call Complete   Attempted Number of Calls 2   Discharge phone call complete? Left Message

## 2025-02-18 ENCOUNTER — ANTICOAG VISIT (OUTPATIENT)
Dept: CARDIOLOGY CLINIC | Facility: CLINIC | Age: 70
End: 2025-02-18

## 2025-02-18 ENCOUNTER — RESULTS FOLLOW-UP (OUTPATIENT)
Dept: CARDIOLOGY CLINIC | Facility: CLINIC | Age: 70
End: 2025-02-18

## 2025-02-18 LAB
INR PPP: 2.02 (ref 0.85–1.19)
PROTHROMBIN TIME: 22.9 SECONDS (ref 12.3–15)

## 2025-02-18 NOTE — PROGRESS NOTES
02/18/25 1313   Hello, [Guardian’s Name / Patient’s Name], this is [Caller Name] from [facility], and our clinical care team wanted to check on you / your child after your recent visit to the hospital. It will only take 3-5 minutes. Is this a good time?   Discharge Call Type/ Specific Diagnosis: ARC General   ARC Discharge Follow- Up   ARC Follow- Up Time Frame 5 Day follow up   Call Complete   Attempted Number of Calls 3   Discharge phone call complete? Left Message

## 2025-02-19 NOTE — PHYSICAL THERAPY NOTE
ARC PT DC SUMMARY    Pt 69 yr old male present to [facility] on 1/17/25 with near syncope/hypotension, nausea and vomiting, headache.  Noted to have + ant wall STEMI to cardiac cath for PCI. Patient was transferred to ICU for cardiogenic shock s/p STEMI. Noted to have LV thrombus. He noted to have some transaminitis. On 1/20 he was noted to have LE bleeding w/ hematoma formation from cath site rq manual pressure and femostop.  R femoral site was closed by Dr. Brooke. He received 1U PRBC and given life vest (in place on [facility] admission). PMH: HTN, HL, DM, OA all joints and back (h/o B TKR - multiple surgeries, dec B knee AROM). At baseline pt fully I at home with first floor living 3 ALEXSANDRA B HR, use of SPC PRN. + , 2 large dogs.     c/o generalized pain all over since 1974. Ongoing B shoulder issues/dec AROM.  Reports R LE was not working until a day or 2 PTA, sensation improving, reports has not been able to tolerate much weight on his R LE initially.  Pt progressed from SBT use to able to stand pivot and Independently ambulate short distances with RW. Was receptive to WC use when ftgd and for longer distances as needed.     Ultimately pt met allset goals with RW support. HEP provided as pt plan is to go to out pt PT if/once cleared to drive, Script provided. DC home with spouse support, lifevest, and WC provided.

## 2025-02-21 ENCOUNTER — RESULTS FOLLOW-UP (OUTPATIENT)
Dept: NON INVASIVE DIAGNOSTICS | Facility: HOSPITAL | Age: 70
End: 2025-02-21

## 2025-02-21 ENCOUNTER — HOSPITAL ENCOUNTER (OUTPATIENT)
Dept: NON INVASIVE DIAGNOSTICS | Facility: CLINIC | Age: 70
Discharge: HOME | End: 2025-02-21
Payer: MEDICARE

## 2025-02-21 VITALS
DIASTOLIC BLOOD PRESSURE: 86 MMHG | HEIGHT: 74 IN | SYSTOLIC BLOOD PRESSURE: 123 MMHG | BODY MASS INDEX: 30.29 KG/M2 | HEART RATE: 86 BPM | WEIGHT: 236 LBS

## 2025-02-21 DIAGNOSIS — I25.5 ISCHEMIC CARDIOMYOPATHY: ICD-10-CM

## 2025-02-21 DIAGNOSIS — I51.3 LV (LEFT VENTRICULAR) MURAL THROMBUS: ICD-10-CM

## 2025-02-21 LAB
BSA FOR ECHO PROCEDURE: 2.33 M2
E WAVE DECELERATION TIME: 182 MS
E/A RATIO: 1.19
LEFT VENTRICLE DIASTOLIC VOLUME (MOD BIPLANE): 165 ML
LEFT VENTRICLE DIASTOLIC VOLUME INDEX (MOD BIPLANE): 70.8 ML/M2
LEFT VENTRICLE SYSTOLIC VOLUME (MOD BIPLANE): 116 ML
LEFT VENTRICLE SYSTOLIC VOLUME INDEX (MOD BIPLANE): 49.8 ML/M2
LV EF BIPLANE MOD: 30 %
LV EF US.2D.A4C+ESTIMATED: 27 %
MV E'TISSUE VEL-LAT: 7 CM/S
MV E'TISSUE VEL-SEP: 5 CM/S
MV PEAK A VEL: 0.59 M/S
MV PEAK E VEL: 70 CM/S
MV STENOSIS PRESSURE HALF TIME: 53 MS
MV VALVE AREA P 1/2 METHOD: 4.15
SL CV LV EF: 25

## 2025-02-21 PROCEDURE — 93325 DOPPLER ECHO COLOR FLOW MAPG: CPT

## 2025-02-21 PROCEDURE — C8924 2D TTE W OR W/O FOL W/CON,FU: HCPCS

## 2025-02-21 PROCEDURE — 93321 DOPPLER ECHO F-UP/LMTD STD: CPT

## 2025-02-21 PROCEDURE — 93321 DOPPLER ECHO F-UP/LMTD STD: CPT | Performed by: INTERNAL MEDICINE

## 2025-02-21 PROCEDURE — 93308 TTE F-UP OR LMTD: CPT | Performed by: INTERNAL MEDICINE

## 2025-02-21 PROCEDURE — 93325 DOPPLER ECHO COLOR FLOW MAPG: CPT | Performed by: INTERNAL MEDICINE

## 2025-02-21 RX ADMIN — PERFLUTREN 4 ML/MIN: 6.52 INJECTION, SUSPENSION INTRAVENOUS at 15:16

## 2025-02-25 ENCOUNTER — ANTICOAG VISIT (OUTPATIENT)
Dept: CARDIOLOGY CLINIC | Facility: CLINIC | Age: 70
End: 2025-02-25

## 2025-02-25 ENCOUNTER — OFFICE VISIT (OUTPATIENT)
Dept: CARDIOLOGY CLINIC | Facility: CLINIC | Age: 70
End: 2025-02-25
Payer: MEDICARE

## 2025-02-25 ENCOUNTER — APPOINTMENT (OUTPATIENT)
Dept: LAB | Facility: HOSPITAL | Age: 70
End: 2025-02-25
Payer: MEDICARE

## 2025-02-25 VITALS
HEIGHT: 74 IN | BODY MASS INDEX: 28.75 KG/M2 | WEIGHT: 224 LBS | OXYGEN SATURATION: 97 % | DIASTOLIC BLOOD PRESSURE: 74 MMHG | SYSTOLIC BLOOD PRESSURE: 108 MMHG | RESPIRATION RATE: 16 BRPM | HEART RATE: 88 BPM

## 2025-02-25 DIAGNOSIS — I25.5 ISCHEMIC CARDIOMYOPATHY: ICD-10-CM

## 2025-02-25 DIAGNOSIS — I10 PRIMARY HYPERTENSION: ICD-10-CM

## 2025-02-25 DIAGNOSIS — I51.3 LV (LEFT VENTRICULAR) MURAL THROMBUS: ICD-10-CM

## 2025-02-25 DIAGNOSIS — Z79.01 ANTICOAGULATION MONITORING, INR RANGE 2-3: ICD-10-CM

## 2025-02-25 DIAGNOSIS — E11.9 TYPE 2 DIABETES MELLITUS WITHOUT COMPLICATION, WITHOUT LONG-TERM CURRENT USE OF INSULIN (HCC): Chronic | ICD-10-CM

## 2025-02-25 DIAGNOSIS — I25.10 CAD S/P PERCUTANEOUS CORONARY ANGIOPLASTY: Primary | ICD-10-CM

## 2025-02-25 DIAGNOSIS — Z98.61 CAD S/P PERCUTANEOUS CORONARY ANGIOPLASTY: Primary | ICD-10-CM

## 2025-02-25 DIAGNOSIS — E78.2 MIXED HYPERLIPIDEMIA: Chronic | ICD-10-CM

## 2025-02-25 PROBLEM — E66.01 MORBID (SEVERE) OBESITY DUE TO EXCESS CALORIES (HCC): Status: RESOLVED | Noted: 2024-10-07 | Resolved: 2025-02-25

## 2025-02-25 LAB
INR PPP: 2.77 (ref 0.85–1.19)
PROTHROMBIN TIME: 29.9 SECONDS (ref 12.3–15)

## 2025-02-25 PROCEDURE — 99214 OFFICE O/P EST MOD 30 MIN: CPT

## 2025-02-25 PROCEDURE — 85610 PROTHROMBIN TIME: CPT

## 2025-02-25 PROCEDURE — 36415 COLL VENOUS BLD VENIPUNCTURE: CPT

## 2025-02-25 RX ORDER — LOSARTAN POTASSIUM 25 MG/1
12.5 TABLET ORAL DAILY
Qty: 45 TABLET | Refills: 1 | Status: SHIPPED | OUTPATIENT
Start: 2025-02-25

## 2025-02-25 NOTE — ASSESSMENT & PLAN NOTE
Continue ASA, Plavix, Lipitor, and Coreg.  Advised to notify our office for any new/worsening symptoms.

## 2025-02-25 NOTE — ASSESSMENT & PLAN NOTE
Limited TTE reviewed with evidence of LV apical thrombus.  Continue Coumadin, follows with the Coumadin clinic.  Plan to reassess with limited TTE in 2 months.

## 2025-02-25 NOTE — ASSESSMENT & PLAN NOTE
Euvolemic on exam.  Continue Coreg and Jardiance.  Jardiance samples previously provided.  [facility] was [facility] by [facility].  However [facility] medications.  Will fax printed prescription for both medications.  Plan for limited TTE in 2 months to assess for EF recovery.  Discussed consideration for ICD if EF does not improve to >35%.  Patient compliant with LifeVest wear, denies device discharges.

## 2025-02-25 NOTE — ASSESSMENT & PLAN NOTE
BP is borderline soft today, patient denies associated lightheadedness/dizziness or syncope.  See plan above.  Encourage ambulatory monitoring and low-sodium diet.

## 2025-02-25 NOTE — PATIENT INSTRUCTIONS
Please follow instructions as recommended during visit.  Recommend low salt DASH diet  Strongly encourage compliance with your medications.   Please reach out to us if you have any questions   Recommend you present to the emergency room or call our office if you have chest pain, chest pressure, shortness of breath, any new, persistent or progressive symptoms.       Please call Central Scheduling at [phone] to schedule echo.

## 2025-02-25 NOTE — PROGRESS NOTES
[facility] Cardiology   Office Visit    Joseph Aguilar 69 y.o. male MRN: [id number]    02/25/25      Assessment & Plan  CAD; STEMI s/p PCI with CHYNA to LM-ostial LAD and mLAD;  of LCx (1/17/2024)  Continue ASA, Plavix, Lipitor, and Coreg.  Advised to notify our office for any new/worsening symptoms.  ICM with EF 25%  Euvolemic on exam.  Continue Coreg and Jardiance.  Jardiance samples previously provided.  Losartan [facility]ard[facility], receipt was confirmed by pharmacy.  However [facility] these medications.  Will fax printed prescription for both medications.  Plan for limited TTE in 2 months to assess for EF recovery.  Discussed consideration for ICD if EF does not improve to >35%.  Patient compliant with LifeVest wear, denies device discharges.  LV (left ventricular) mural thrombus  Limited TTE reviewed with evidence of LV apical thrombus.  Continue Coumadin, follows with the Coumadin clinic.  Plan to reassess with limited TTE in 2 months.  Primary hypertension  BP is borderline soft today, patient denies associated lightheadedness/dizziness or syncope.  See plan above.  Encourage ambulatory monitoring and low-sodium diet.  Mixed hyperlipidemia  Continue Lipitor, dietary control, and periodic surveillance.  Type 2 diabetes mellitus without complication, without long-term current use of insulin ([facility])    Lab Results   Component Value Date    HGBA1C 8.2 (H) 01/18/2025   Management per PCP.        Interval history: Joseph Aguilar is a very pleasant 69 y.o. year old male with history of CAD/STEMI s/p PCI, ICM with EF 25%, LV mural thrombus, hypertension, hyperlipidemia, T2DM, and obesity who presents for office visit.  During previous visit with cardiology patient was ordered echocardiogram to evaluate LV thrombus has been completed with results as per above.  He was advised to start losartan and advised to increase Coreg to twice daily.   "[facility]artan and [facility]ard[facility], receipt was confirmed by [facility].  However, [facility] the [facility] that [facility] not [facility] the [facility] for these medications.  Otherwise, patient endorses strict compliance to all medications and LifeVest prior.  Denies discharges from LifeVest or adverse effects to current medications.  Patient endorses occasional RLE muscle spasms which has been gradually improving.  He plans to discuss this further with PCP if symptoms persist.  Patient also endorses chronic arthralgia of both knees which has been stable.  Patient ambulates with the assistance of wheelchair/walker.  Denies chest pain/anginal equivalent or any additional complaints at this time.       Review of Systems:  Review of Systems   Constitutional:  Negative for chills and fever.   HENT:  Negative for ear pain and sore throat.    Eyes:  Negative for pain and visual disturbance.   Respiratory:  Negative for cough and shortness of breath.    Cardiovascular:  Negative for chest pain, palpitations and leg swelling.   Gastrointestinal:  Negative for abdominal pain and vomiting.   Genitourinary:  Negative for dysuria and hematuria.   Musculoskeletal:  Positive for arthralgias. Negative for joint swelling.   Skin:  Negative for color change and rash.   Neurological:  Negative for seizures and syncope.   All other systems reviewed and are negative.       PHYSICAL EXAM:  Vitals:   Vitals:    02/25/25 1412   BP: 108/74   BP Location: Left arm   Patient Position: Sitting   Cuff Size: Standard   Pulse: 88   Resp: 16   SpO2: 97%   Weight: 102 kg (224 lb)   Height: 6' 2\" (1.88 m)        Physical Exam:  GEN: Alert and oriented x3, in no acute distress.  Well appearing and well nourished.  LifeVest in place.  HEENT: Sclera anicteric, conjunctivae pink, mucous membranes moist. Oropharynx clear.   NECK: Supple, no carotid bruits, no significant JVD. Trachea midline, no thyromegaly.   HEART: Regular rhythm, normal " S1 and S2, no murmurs, clicks, gallops or rubs. PMI nondisplaced, no thrills.   LUNGS: Clear to auscultation bilaterally; no wheezes, rales, or rhonchi. No increased work of breathing or signs of respiratory distress.   ABDOMEN: Soft, nontender, nondistended, normoactive bowel sounds.   EXTREMITIES: Skin warm and well perfused, no clubbing or cyanosis, no edema.  NEURO: No focal findings. Normal speech. Mood and affect normal.   SKIN: Normal without suspicious lesions on exposed skin.    Follow up: 3 months or sooner as needed    No Known Allergies      Current Outpatient Medications:     acetaminophen (TYLENOL) 325 mg tablet, Take 2 tablets (650 mg total) by mouth every 6 (six) hours as needed for mild pain, headaches or fever, Disp: , Rfl:     aspirin (ECOTRIN LOW STRENGTH) 81 mg EC tablet, Take 1 tablet (81 mg total) by mouth daily, Disp: 30 tablet, Rfl: 0    atorvastatin (LIPITOR) 80 mg tablet, Take 1 tablet (80 mg total) by mouth daily, Disp: 30 tablet, Rfl: 0    carvedilol (COREG) 3.125 mg tablet, Take 1 tablet (3.125 mg total) by mouth 2 (two) times a day with meals, Disp: 180 tablet, Rfl: 2    clopidogrel (PLAVIX) 75 mg tablet, Take 1 tablet (75 mg total) by mouth daily, Disp: 30 tablet, Rfl: 0    Empagliflozin 25 MG TABS, Take 0.5 tablets (12.5 mg total) by mouth in the morning, Disp: 45 tablet, Rfl: 1    gabapentin (NEURONTIN) 300 mg capsule, Take 300 mg by mouth, Disp: , Rfl:     insulin glargine (LANTUS SOLOSTAR) 100 units/mL injection pen, Inject 15 Units under the skin daily at bedtime, Disp: 15 mL, Rfl: 0    losartan (COZAAR) 25 mg tablet, Take 0.5 tablets (12.5 mg total) by mouth daily, Disp: 45 tablet, Rfl: 1    magnesium Oxide (MAG-OX) 400 mg TABS, Take 2 tablets (800 mg total) by mouth 2 (two) times a day, Disp: 120 tablet, Rfl: 0    melatonin 5 MG TABS, Take 1 tablet (5 mg total) by mouth daily at bedtime, Disp: 30 tablet, Rfl: 0    metFORMIN (GLUCOPHAGE) 1000 MG tablet, TAKE ONE TABLET BY MOUTH  TWICE A DAY WITH MEALS FOR DIABETES, Disp: , Rfl:     warfarin (COUMADIN) 5 mg tablet, Take 1.5 tablets (7.5 mg total) by mouth daily, Disp: 90 tablet, Rfl: 0    Past Medical History:   Diagnosis Date    Depression     Diabetes mellitus (HCC)     Hyperlipidemia     Hypertension     Osteoarthritis, knee        Family History   Problem Relation Age of Onset    No Known Problems Mother     Heart attack Father     Arthritis Family     Cancer Family     Diabetes Family     Heart disease Family     Osteoporosis Family        Past Medical History:   Diagnosis Date    Depression     Diabetes mellitus (HCC)     Hyperlipidemia     Hypertension     Osteoarthritis, knee        Past Surgical History:   Procedure Laterality Date    BACK SURGERY      CARDIAC CATHETERIZATION N/A 2025    Procedure: Cardiac PCI;  Surgeon: Neeraj Gaines MD;  Location: [facility];  Service: Cardiology    CARDIAC CATHETERIZATION Left 2025    Procedure: Cardiac Left Heart Cath;  Surgeon: Neeraj Gaines MD;  Location: [facility];  Service: Cardiology    CARDIAC CATHETERIZATION N/A 2025    Procedure: Cardiac iabp;  Surgeon: Neeraj Gaines MD;  Location: [facility];  Service: Cardiology    HAND SURGERY      JOINT REPLACEMENT Left     l tkr    KNEE SURGERY Left     [place] ARTHRP KNE CONDYLE&PLATU MEDIAL&LAT COMPARTMENTS Right 2018    Procedure: ARTHROPLASTY KNEE TOTAL;  Surgeon: Franny Grimaldo MD;  Location: [facility];  Service: Orthopedics    TOOTH EXTRACTION      WISDOM TOOTH EXTRACTION         Social History     Socioeconomic History    Marital status: /Civil Union     Spouse name: Not on file    Number of children: Not on file    Years of education: Not on file    Highest education level: Not on file   Occupational History    Not on file   Tobacco Use    Smoking status: Former     Current packs/day: 0.00     Types: Cigarettes     Quit date: 3/1/2012     Years since quittin.9      Passive exposure: Past    Smokeless tobacco: Never   Vaping Use    Vaping status: Former    Quit date: 2010   Substance and Sexual Activity    Alcohol use: Yes     Alcohol/week: 3.0 standard drinks of alcohol     Types: 3 Cans of beer per week     Comment: 'couple beers every couple weeks'    Drug use: Not Currently    Sexual activity: Not Currently   Other Topics Concern    Not on file   Social History Narrative    Caffeine use, active     Social Drivers of Health     Financial Resource Strain: Not on file   Food Insecurity: No Food Insecurity (2/10/2025)    Nursing - Inadequate Food Risk Classification     Worried About Running Out of Food in the Last Year: Never true     Ran Out of Food in the Last Year: Never true     Ran Out of Food in the Last Year: Never true   Transportation Needs: No Transportation Needs (2/10/2025)    Nursing - Transportation Risk Classification     Lack of Transportation: Not on file     Lack of Transportation: No   Physical Activity: Not on file   Stress: Not on file   Social Connections: Not on file   Intimate Partner Violence: Unknown (2/10/2025)    Nursing IPS     Feels Physically and Emotionally Safe: Not on file     Physically Hurt by Someone: Not on file     Humiliated or Emotionally Abused by Someone: Not on file     Physically Hurt by Someone: No     Hurt or Threatened by Someone: No   Housing Stability: Unknown (2/10/2025)    Nursing: Inadequate Housing Risk Classification     Has Housing: Not on file     Worried About Losing Housing: Not on file     Unable to Get Utilities: Not on file     Unable to Pay for Housing in the Last Year: No     Has Housin         LABORATORY RESULTS:    Lab Results   Component Value Date    WBC 5.71 02/10/2025    HGB 11.1 (L) 02/10/2025    HCT 34.3 (L) 02/10/2025    MCV 97 02/10/2025     02/10/2025     Lab Results   Component Value Date    CALCIUM 8.9 02/10/2025    K 3.7 02/10/2025    CO2 25 02/10/2025     02/10/2025    BUN 10  "02/10/2025    CREATININE 0.80 02/10/2025     Lab Results   Component Value Date    HGBA1C 8.2 (H) 01/18/2025       Lipid Profile:   No results found for: \"CHOL\"  Lab Results   Component Value Date    HDL 33 (L) 01/18/2025     Lab Results   Component Value Date    LDLCALC 58 01/18/2025     Lab Results   Component Value Date    TRIG 104 01/18/2025       The ASCVD Risk score (Linda BYRNES, et al., 2019) failed to calculate for the following reasons:    Risk score cannot be calculated because patient has a medical history suggesting prior/existing ASCVD    1. CAD; STEMI s/p PCI with CHYNA to LM-ostial LAD and mLAD;  of LCx (1/17/2024)        2. ICM with EF 25%  Ambulatory referral to Cardiology    losartan (COZAAR) 25 mg tablet    Echo follow up/limited w/ contrast if indicated      3. LV (left ventricular) mural thrombus  Echo follow up/limited w/ contrast if indicated      4. Primary hypertension        5. Mixed hyperlipidemia        6. Type 2 diabetes mellitus without complication, without long-term current use of insulin (HCC)  Empagliflozin 25 MG TABS          Imaging: I have reviewed all pertinent imaging studies.    Recommend aggressive risk factor modification and therapeutic lifestyle changes.  Low-salt, low-calorie, low-fat, low-cholesterol diet with regular exercise and to optimize weight.    Discussed concepts of atherosclerosis, including signs and symptoms of cardiac disease.    Medications reviewed and possible side effects discussed.  Previous studies were reviewed.    Safety measures were reviewed.  All questions and concerns addressed.  Patient was advised to report any problems requiring medical attention.    Follow-up with PCP and appropriate specialist and lab work as discussed.    Return for follow up visit as scheduled or earlier, if needed.  Thank you for allowing me to participate in the care and evaluation of your patient.  Should you have any questions, please feel free to contact " me.    Loyd Olmos PA-C  2/25/2025,4:39 PM

## 2025-02-28 PROBLEM — H10.9 CONJUNCTIVITIS: Status: RESOLVED | Noted: 2025-01-25 | Resolved: 2025-02-28

## 2025-02-28 NOTE — TELEPHONE ENCOUNTER
----- Message from Loyd lOmos PA-C sent at 2/21/2025  5:02 PM EST -----  Good afternoon, please call patient to advise echocardiogram shows heart pump function remains 25% and LV thrombus (blood clot) remains present.  Recommend repeating echocardiogram in 2 months to reassess.  Thank you and have a wonderful evening.

## 2025-03-04 ENCOUNTER — APPOINTMENT (OUTPATIENT)
Dept: LAB | Facility: HOSPITAL | Age: 70
End: 2025-03-04
Payer: MEDICARE

## 2025-03-04 DIAGNOSIS — Z79.01 ANTICOAGULATION MONITORING, INR RANGE 2-3: ICD-10-CM

## 2025-03-04 LAB
DME PARACHUTE DELIVERY DATE ACTUAL: NORMAL
DME PARACHUTE DELIVERY DATE EXPECTED: NORMAL
DME PARACHUTE DELIVERY DATE REQUESTED: NORMAL
DME PARACHUTE DELIVERY NOTE: NORMAL
DME PARACHUTE ITEM DESCRIPTION: NORMAL
DME PARACHUTE ORDER STATUS: NORMAL
DME PARACHUTE SUPPLIER NAME: NORMAL
DME PARACHUTE SUPPLIER PHONE: NORMAL
INR PPP: 2.74 (ref 0.85–1.19)
PROTHROMBIN TIME: 29.6 SECONDS (ref 12.3–15)

## 2025-03-04 PROCEDURE — 36415 COLL VENOUS BLD VENIPUNCTURE: CPT

## 2025-03-04 PROCEDURE — 85610 PROTHROMBIN TIME: CPT

## 2025-03-05 ENCOUNTER — ANTICOAG VISIT (OUTPATIENT)
Dept: CARDIOLOGY CLINIC | Facility: CLINIC | Age: 70
End: 2025-03-05

## 2025-03-18 ENCOUNTER — APPOINTMENT (OUTPATIENT)
Dept: LAB | Facility: HOSPITAL | Age: 70
End: 2025-03-18
Payer: MEDICARE

## 2025-03-18 DIAGNOSIS — Z79.01 ANTICOAGULATION MONITORING, INR RANGE 2-3: ICD-10-CM

## 2025-03-18 LAB
INR PPP: 2.3 (ref 0.85–1.19)
PROTHROMBIN TIME: 26 SECONDS (ref 12.3–15)

## 2025-03-18 PROCEDURE — 85610 PROTHROMBIN TIME: CPT

## 2025-03-18 PROCEDURE — 36415 COLL VENOUS BLD VENIPUNCTURE: CPT

## 2025-03-19 ENCOUNTER — TELEPHONE (OUTPATIENT)
Dept: CARDIOLOGY CLINIC | Facility: CLINIC | Age: 70
End: 2025-03-19

## 2025-03-19 ENCOUNTER — ANTICOAG VISIT (OUTPATIENT)
Dept: CARDIOLOGY CLINIC | Facility: CLINIC | Age: 70
End: 2025-03-19

## 2025-03-19 NOTE — TELEPHONE ENCOUNTER
Called the pt and wife. I left Grealdo's response on both of their vm's and asked to call back if they have further questions.

## 2025-03-19 NOTE — TELEPHONE ENCOUNTER
Pt is having shoulder injections on 4/7. He wants to know if he should hold Warfarin and if so, how long?     Thank you.

## 2025-03-20 NOTE — TELEPHONE ENCOUNTER
Wife called back and I relayed Geraldo's message. She states they will just hold off on the injections.

## 2025-03-21 ENCOUNTER — TELEPHONE (OUTPATIENT)
Age: 70
End: 2025-03-21

## 2025-03-21 NOTE — TELEPHONE ENCOUNTER
Caller: Patient- Yusuf    Doctor: Dr. Egan    Reason for call: Patient is calling in stating that he is scheduled to be seen with the Dr on 4/7 and is wanting to know if he would still be able to get an injection while he is on Coumadin or if he needs to wait? Please advise and reach out to patient relating this.    Call back#: [phone]

## 2025-03-25 NOTE — TELEPHONE ENCOUNTER
"Called and spoke w/pt and relayed ADORE Gold's msg. Pt states he just wanted to make sure as he has had a massive hear attack, what they call a \" maker\" and  survived. He is now on Coumadin.  He will see them on 4/7/25 for his B/L shoulder injections. No further questions/concerns.   "

## 2025-04-03 ENCOUNTER — TELEPHONE (OUTPATIENT)
Dept: CARDIOLOGY CLINIC | Facility: CLINIC | Age: 70
End: 2025-04-03

## 2025-04-03 NOTE — TELEPHONE ENCOUNTER
Received [employer] vest medical reorder form through fax.    Scanned into chart.     Placed into [employer] folder

## 2025-04-07 ENCOUNTER — APPOINTMENT (OUTPATIENT)
Dept: LAB | Facility: HOSPITAL | Age: 70
End: 2025-04-07
Payer: MEDICARE

## 2025-04-07 ENCOUNTER — OFFICE VISIT (OUTPATIENT)
Dept: OBGYN CLINIC | Facility: CLINIC | Age: 70
End: 2025-04-07
Payer: MEDICARE

## 2025-04-07 VITALS — WEIGHT: 233 LBS | HEIGHT: 74 IN | BODY MASS INDEX: 29.9 KG/M2

## 2025-04-07 DIAGNOSIS — M19.012 PRIMARY OSTEOARTHRITIS OF SHOULDERS, BILATERAL: Primary | ICD-10-CM

## 2025-04-07 DIAGNOSIS — M19.011 PRIMARY OSTEOARTHRITIS OF SHOULDERS, BILATERAL: Primary | ICD-10-CM

## 2025-04-07 DIAGNOSIS — G89.29 CHRONIC LEFT SHOULDER PAIN: ICD-10-CM

## 2025-04-07 DIAGNOSIS — Z79.01 ANTICOAGULATION MONITORING, INR RANGE 2-3: ICD-10-CM

## 2025-04-07 DIAGNOSIS — M25.511 CHRONIC RIGHT SHOULDER PAIN: ICD-10-CM

## 2025-04-07 DIAGNOSIS — G89.29 CHRONIC RIGHT SHOULDER PAIN: ICD-10-CM

## 2025-04-07 DIAGNOSIS — M25.512 CHRONIC LEFT SHOULDER PAIN: ICD-10-CM

## 2025-04-07 LAB
INR PPP: 2.14 (ref 0.85–1.19)
PROTHROMBIN TIME: 24.6 SECONDS (ref 12.3–15)

## 2025-04-07 PROCEDURE — 85610 PROTHROMBIN TIME: CPT

## 2025-04-07 PROCEDURE — 99214 OFFICE O/P EST MOD 30 MIN: CPT | Performed by: ORTHOPAEDIC SURGERY

## 2025-04-07 PROCEDURE — 20610 DRAIN/INJ JOINT/BURSA W/O US: CPT | Performed by: ORTHOPAEDIC SURGERY

## 2025-04-07 PROCEDURE — 36415 COLL VENOUS BLD VENIPUNCTURE: CPT

## 2025-04-07 RX ORDER — LIDOCAINE HYDROCHLORIDE 10 MG/ML
2 INJECTION, SOLUTION INFILTRATION; PERINEURAL
Status: COMPLETED | OUTPATIENT
Start: 2025-04-07 | End: 2025-04-07

## 2025-04-07 RX ORDER — BUPIVACAINE HYDROCHLORIDE 2.5 MG/ML
2 INJECTION, SOLUTION INFILTRATION; PERINEURAL
Status: COMPLETED | OUTPATIENT
Start: 2025-04-07 | End: 2025-04-07

## 2025-04-07 RX ORDER — METHYLPREDNISOLONE ACETATE 40 MG/ML
2 INJECTION, SUSPENSION INTRA-ARTICULAR; INTRALESIONAL; INTRAMUSCULAR; SOFT TISSUE
Status: COMPLETED | OUTPATIENT
Start: 2025-04-07 | End: 2025-04-07

## 2025-04-07 RX ADMIN — METHYLPREDNISOLONE ACETATE 2 ML: 40 INJECTION, SUSPENSION INTRA-ARTICULAR; INTRALESIONAL; INTRAMUSCULAR; SOFT TISSUE at 15:30

## 2025-04-07 RX ADMIN — LIDOCAINE HYDROCHLORIDE 2 ML: 10 INJECTION, SOLUTION INFILTRATION; PERINEURAL at 15:30

## 2025-04-07 RX ADMIN — BUPIVACAINE HYDROCHLORIDE 2 ML: 2.5 INJECTION, SOLUTION INFILTRATION; PERINEURAL at 15:30

## 2025-04-07 NOTE — PROGRESS NOTES
Name: Joseph Aguilar      : 1955      MRN: [id number]  Encounter Provider: Arthur Egan DO  Encounter Date: 2025   Encounter department: [facility] [place]  :  Assessment & Plan  Primary osteoarthritis of shoulders, bilateral    Orders:    Large joint arthrocentesis: R glenohumeral    Large joint arthrocentesis: L glenohumeral    Chronic right shoulder pain         Chronic left shoulder pain               Bilateral shoulder glenohumeral osteoarthritis  The patient was offered a corticosteroid injection for their bilateral glenohumeral joints. They tolerated the procedure well.  The patient was educated they may have some irritation in the next few days and should rest, ice, elevate and perform gentle range of motion exercises. They were advised the medicine should begin to work in a few days time.    They were informed their blood sugar levels can temporarily elevate and should reach out to their PCP if this becomes an issue. The patient was informed corticosteroid injections can be repeated at the earliest every 3 months.   I will see the patient back in approximately 3  months for repeat evaluation and possible repeat corticosteroid injection.      History of the Present Illness   History of Present Illness   HPI   Joseph Aguilar is a 69 y.o. male with Bilateral shoulder glenohumeral osteoarthritis s/p corticosteroid injections on 2025. Patient has pain daily with arm motion. His shoulder pain wakes him at [facility]. Patient is s/p heart attack on 2025 when he was then admitted for 12 days and discharged to [facility] for another 12 days. He admits he is now doing well and is wearing defibrillator device. Now on coumadin. He admits some days his arm hurts now and he doesn't even get out of bed.         Review of Systems     Review of Systems   Constitutional:  Negative for chills and fever.   HENT:  Negative for ear pain and sore throat.    Eyes:  Negative for  "pain and visual disturbance.   Respiratory:  Negative for cough and shortness of breath.    Cardiovascular:  Negative for chest pain and palpitations.   Gastrointestinal:  Negative for abdominal pain and vomiting.   Genitourinary:  Negative for dysuria and hematuria.   Musculoskeletal:  Negative for arthralgias and back pain.   Skin:  Negative for color change and rash.   Neurological:  Negative for seizures and syncope.   All other systems reviewed and are negative.      Physical Exam   Objective   Ht 6' 2\" (1.88 m)   Wt 106 kg (233 lb)   BMI 29.92 kg/m²        Right Shoulder:   Active range of motion   90 degrees forward flexion  45 degrees abduction  5 degrees external rotation   SI joint internal rotation    Passive range of motion   90 degrees of forward flexion   Forward flexion testing 4/5  External rotation testing 5/5  Internal rotation testing 4-/5   The patient is neurovascularly intact distally in the extremity.     Left Shoulder:   Active range of motion   60 degrees forward flexion  45 degrees abduction  5 degrees external rotation   SI joint internal rotation    Passive range of motion   70 degrees of forward flexion   Forward flexion testing 4/5  External rotation testing 5/5  Internal rotation testing 4-/5   The patient is neurovascularly intact distally in the extremity.    Data Review     I have personally reviewed pertinent films in PACS, and my interpretation follows.    X-rays taken 12/10/23 of Left shoulder independently reviewed and demonstrate severe degenerative changes with osteophyte formation.     X-rays taken 12/10/23 of Right shoulder independently reviewed and demonstrate severe degenerative changes with osteophyte formation.    Hospital notes from recent admission with myocardial infarction reviewed.    Lab Results   Component Value Date/Time    HGBA1C 8.2 (H) 01/18/2025 04:43 AM    HGBA1C 7.2 (H) 01/08/2022 06:38 AM    CRP <3.0 10/22/2018 01:55 PM       Social History     Tobacco " "Use    Smoking status: Former     Current packs/day: 0.00     Types: Cigarettes     Quit date: 3/1/2012     Years since quittin.1     Passive exposure: Past    Smokeless tobacco: Never   Vaping Use    Vaping status: Former    Quit date: 2010   Substance Use Topics    Alcohol use: Yes     Alcohol/week: 3.0 standard drinks of alcohol     Types: 3 Cans of beer per week     Comment: 'couple beers every couple weeks'    Drug use: Not Currently           Large joint arthrocentesis: R glenohumeral  [place] Protocol:  Consent: Verbal consent obtained.  Risks and benefits: risks, benefits and alternatives were discussed  Consent given by: patient  Time out: Immediately prior to procedure a \"time out\" was called to verify the correct patient, procedure, equipment, support staff and site/side marked as required.  Patient understanding: patient states understanding of the procedure being performed  Site marked: the operative site was marked  Patient identity confirmed: verbally with patient  Supporting Documentation  Indications: pain   Procedure Details  Location: shoulder - R glenohumeral  Preparation: Patient was prepped and draped in the usual sterile fashion  Needle size: 22 G  Ultrasound guidance: no  Approach: superior  Medications administered: 2 mL bupivacaine 0.25 %; 2 mL methylPREDNISolone acetate 40 mg/mL; 2 mL lidocaine 1 %    Patient tolerance: patient tolerated the procedure well with no immediate complications  Dressing:  Sterile dressing applied      Large joint arthrocentesis: L glenohumeral  Universal Protocol:  Consent: Verbal consent obtained.  Risks and benefits: risks, benefits and alternatives were discussed  Consent given by: patient  Time out: Immediately prior to procedure a \"time out\" was called to verify the correct patient, procedure, equipment, support staff and site/side marked as required.  Patient understanding: patient states understanding of the procedure being performed  Site " marked: the operative site was marked  Patient identity confirmed: verbally with patient  Supporting Documentation  Indications: pain   Procedure Details  Location: shoulder - L glenohumeral  Preparation: Patient was prepped and draped in the usual sterile fashion  Needle size: 22 G  Ultrasound guidance: no  Approach: superior  Medications administered: 2 mL bupivacaine 0.25 %; 2 mL methylPREDNISolone acetate 40 mg/mL; 2 mL lidocaine 1 %    Patient tolerance: patient tolerated the procedure well with no immediate complications  Dressing:  Sterile dressing applied            Arthur Egan DO   Scribe Attestation      I,:  Naina Treadwell am acting as a scribe while in the presence of the attending physician.:       I,:  Arthur Egan DO personally performed the services described in this documentation    as scribed in my presence.:

## 2025-04-08 ENCOUNTER — ANTICOAG VISIT (OUTPATIENT)
Dept: CARDIOLOGY CLINIC | Facility: CLINIC | Age: 70
End: 2025-04-08

## 2025-04-08 DIAGNOSIS — I25.5 ISCHEMIC CARDIOMYOPATHY: ICD-10-CM

## 2025-04-08 RX ORDER — WARFARIN SODIUM 5 MG/1
7.5 TABLET ORAL
Qty: 137 TABLET | Refills: 3 | Status: SHIPPED | OUTPATIENT
Start: 2025-04-08 | End: 2025-04-09 | Stop reason: SDUPTHER

## 2025-04-08 NOTE — TELEPHONE ENCOUNTER
Medication: Warfarin    Dose/Frequency: 1.5 tabs daily    Quantity: 9135    Pharmacy: [facility] in [place]    Office:   [] PCP/Provider -   [x] Speciality/Provider -     Does the patient have enough for 3 days?   [] Yes   [x] No - Send as HP to POD  **see below**  Pt will also need a 30 day supply sent to the [facility] [place] [facility]. He will need confirmation of order sent to pharmacies.  Thank carlton velarde

## 2025-04-09 RX ORDER — WARFARIN SODIUM 5 MG/1
7.5 TABLET ORAL
Qty: 137 TABLET | Refills: 0 | Status: SHIPPED | OUTPATIENT
Start: 2025-04-09

## 2025-04-09 NOTE — TELEPHONE ENCOUNTER
[facility] no longer [facility], please send to [facility] pharmacy    Reason for call:   [x] Refill   [] Prior Auth  [] Other:     Office:   [] PCP/Provider -   [x] Specialty/Provider -     Medication: warfarin (COUMADIN) 5 mg tablet     Dose/Frequency: Take 1.5 tablets (7.5 mg total) by mouth daily     Quantity: 137    Pharmacy: [facility]    Local Pharmacy   Does the patient have enough for 3 days?   [x] Yes   [] No - Send as HP to POD

## 2025-04-15 ENCOUNTER — TELEPHONE (OUTPATIENT)
Dept: CARDIOLOGY CLINIC | Facility: CLINIC | Age: 70
End: 2025-04-15

## 2025-04-15 NOTE — TELEPHONE ENCOUNTER
Inbound call from Abbey Servin. Patient needs new [employer] reorder form signed. Cannot have white out over the prefilled in provider name. Abbey is faxing new form over, needs new form completed.

## 2025-04-17 ENCOUNTER — HOSPITAL ENCOUNTER (OUTPATIENT)
Dept: NON INVASIVE DIAGNOSTICS | Facility: CLINIC | Age: 70
Discharge: HOME | End: 2025-04-17
Payer: MEDICARE

## 2025-04-17 ENCOUNTER — TELEPHONE (OUTPATIENT)
Dept: CARDIOLOGY CLINIC | Facility: CLINIC | Age: 70
End: 2025-04-17

## 2025-04-17 VITALS
HEIGHT: 74 IN | HEART RATE: 78 BPM | BODY MASS INDEX: 29.9 KG/M2 | DIASTOLIC BLOOD PRESSURE: 74 MMHG | SYSTOLIC BLOOD PRESSURE: 108 MMHG | WEIGHT: 233 LBS

## 2025-04-17 DIAGNOSIS — I51.3 LV (LEFT VENTRICULAR) MURAL THROMBUS: ICD-10-CM

## 2025-04-17 DIAGNOSIS — I25.5 ISCHEMIC CARDIOMYOPATHY: ICD-10-CM

## 2025-04-17 LAB
AORTIC ROOT: 3.5 CM
BSA FOR ECHO PROCEDURE: 2.32 M2
FRACTIONAL SHORTENING: 23 (ref 28–44)
INTERVENTRICULAR SEPTUM IN DIASTOLE (PARASTERNAL SHORT AXIS VIEW): 1 CM
INTERVENTRICULAR SEPTUM: 1 CM (ref 0.6–1.1)
LEFT ATRIUM SIZE: 3.4 CM
LEFT INTERNAL DIMENSION IN SYSTOLE: 3.6 CM (ref 2.1–4)
LEFT VENTRICULAR INTERNAL DIMENSION IN DIASTOLE: 4.7 CM (ref 3.5–6)
LEFT VENTRICULAR POSTERIOR WALL IN END DIASTOLE: 1.1 CM
LEFT VENTRICULAR STROKE VOLUME: 45 ML
LV EF US.2D.A4C+ESTIMATED: 57 %
LVSV (TEICH): 45 ML
SL CV LV EF: 30
SL CV PED ECHO LEFT VENTRICLE DIASTOLIC VOLUME (MOD BIPLANE) 2D: 101 ML
SL CV PED ECHO LEFT VENTRICLE SYSTOLIC VOLUME (MOD BIPLANE) 2D: 56 ML

## 2025-04-17 PROCEDURE — 93321 DOPPLER ECHO F-UP/LMTD STD: CPT | Performed by: INTERNAL MEDICINE

## 2025-04-17 PROCEDURE — 93308 TTE F-UP OR LMTD: CPT | Performed by: INTERNAL MEDICINE

## 2025-04-17 PROCEDURE — 93325 DOPPLER ECHO COLOR FLOW MAPG: CPT | Performed by: INTERNAL MEDICINE

## 2025-04-17 PROCEDURE — C8924 2D TTE W OR W/O FOL W/CON,FU: HCPCS

## 2025-04-17 RX ADMIN — PERFLUTREN 0.6 ML/MIN: 6.52 INJECTION, SUSPENSION INTRAVENOUS at 15:35

## 2025-04-17 NOTE — TELEPHONE ENCOUNTER
----- Message from Loyd Olmos PA-C sent at 4/15/2025  4:53 PM EDT -----  Hey, just an FYI - I have already completed the the [employer] reorder form for patient which is scanned into media.  Have a great day!

## 2025-04-21 ENCOUNTER — RESULTS FOLLOW-UP (OUTPATIENT)
Dept: NON INVASIVE DIAGNOSTICS | Facility: HOSPITAL | Age: 70
End: 2025-04-21

## 2025-04-21 DIAGNOSIS — I25.5 ISCHEMIC CARDIOMYOPATHY: Primary | ICD-10-CM

## 2025-04-24 ENCOUNTER — TELEPHONE (OUTPATIENT)
Age: 70
End: 2025-04-24

## 2025-04-24 NOTE — TELEPHONE ENCOUNTER
Caller: Joseph Aguilar ( Jesse)    Doctor: Dr. Olmos    Reason for call: Patient is returning call about a message left from Oly. Patient needs some better understanding in regards to the message that was left. Please call patient back. Thank you    Call back#: [phone]

## 2025-04-24 NOTE — TELEPHONE ENCOUNTER
Spoke with pt and Loyd RAY's message was relayed for pt to continue Plavix and coumadin and stop aspirin. Pt verbalized understanding

## 2025-04-28 NOTE — PROGRESS NOTES
"Advanced Heart Failure/Pulmonary Hypertension Outpatient Note - Joseph Aguilar 69 y.o. male MRN: [id number]    @ Encounter: [id number]      Assessment:  69 y.o. male PMH and acute problems listed later in this note (a partial list may also be included within 'assessment' section) presents for consultation.  I first met Joseph Aguilar on 4/29/25.  Referred by No ref. provider found.    This patient was identified as a [facility] candidate.   [facility]  The patient's primary cardiac team was general cardiology as inpt 1/2025, has seen AP's as outpt until our first meeting 4/29/25  ICM, HFrEF 25%, nondilated LV, nl RV size/fxn  CAD; 1/2025 STEMI s/p PCI with CHYNA to LM-ostial LAD and mLAD;  of LCx  LV thrombus  HTN  HLD  DM      Today I have reviewed all pertinent labs/imaging/data including but not limited to:        Lab Units 02/10/25  0442 02/07/25  0541 02/03/25  0529   CREATININE mg/dL 0.80 0.87 0.90         Lab Results   Component Value Date    K 3.7 02/10/2025     Lab Results   Component Value Date    HGBA1C 8.2 (H) 01/18/2025     No results found for: \"[id number]\", \"TSH\"  Lab Results   Component Value Date    LDLCALC 58 01/18/2025     No results found for: \"BNP\"   No results found for: \"NTBNP\"       TODAY'S PLAN:     04/29/25  I am meeting patient for the first time today  Warm, euvolemic  No new cardiac complaints, feels generally well  Wearing lifevest    We Reviewed his past cardiac data including echo 4/2025  Serial echos over 2 months on minimal gdmt show minimal EF change  LV thrombus vs muscle bundle - slight decrease in size across studies, though unclear.  Will plan for future CMR for hopefully more definitive assessment after 6 mo of AC, potentially 8/2025    Gdmt below, on low dose 3 drug regimen prior to our first visit 4/29/25. No arni use.  BP may be somewhat limiting  [facility] can add complexity.  Switch coreg to metop today  Switch arb to arni as soon as able [facility], then " check BMP in 1 week after this switch  Has lifevest  Narrow qrs  Check LVEF after 3 months on max tolerated gdmt, potentially check with 8/2025 CMR as above    On sapt + AC already  On high intens statin    Cardiac rehab referral given    Adivse tlc, diet, exercise    Needs DM control, fu primary team    Follow up:  With me in 4 weeks or sooner if symptoms evolve.  In addition to follow up with their other medical providers    Neurohormonal Blockade/GDMT:  --Beta-Blocker: coreg 3.125 bid > toprol xl 25 qd  --ACEi, ARB, ARNi: losartan 12.5 qd > entresto 24/26 bid  --MRA: future maybe  --SGLT2i: jardiance 25 (?half pill is currently ordered)  --Hydralazine/nitrates: not on    --Diuretic: not on    Other HF pharmaco-invasive therapy (if applicable):   PPM,  ICD , CRT (if applicable):  Interrogation:  Advanced Therapies (if applicable):   --Inotrope:  --LVAD/Transplant Candidacy:    Studies:  Today I have reviewed all pertinent patient data/labs/imaging where available, including but not limited to the below studies. This includes my independent interpretation. Selected results may be displayed here but comprehensive listing is omitted for note clarity and can be found in the epic chart.    ECG.    Echo.    Stress.    Cath.    HPI:   69 y.o. male PMH and acute problems listed later in this note (a partial list may also be included within 'assessment' section) presents for consultation.  No new CP/SOB/dizziness/palpitations/syncope.  No new fatigue.  No new unintentional weight changes.  No new leg swelling, PND, pillow orthopnea.  No new fevers, chills, cough, nausea, vomiting, diarrhea, dysuria.      ROS:  10 point ROS negative except as specified in HPI    Past Medical History:   Diagnosis Date    Depression     Diabetes mellitus (HCC)     Hyperlipidemia     Hypertension     Osteoarthritis, knee      Patient Active Problem List   Diagnosis    Glenohumeral arthritis, right    Primary osteoarthritis of both knees     Primary hypertension    Mixed hyperlipidemia    Type 2 diabetes mellitus without complication, without long-term current use of insulin (HCC)    Obesity (BMI 30-39.9)    S/P TKR (total knee replacement), right    Closed nondisplaced intertrochanteric fracture of left femur with routine healing    Pain in both feet    Polyneuropathy associated with underlying disease (HCC)    Slow transit constipation    Vitamin D deficiency    Anemia    Hypomagnesemia    Right foot pain    Glenohumeral arthritis, left    CAD; STEMI s/p PCI with CHYNA to LM-ostial LAD and mLAD;  of LCx (1/17/2025)    ICM with EF 25%    LV (left ventricular) mural thrombus    Hematoma of right lower extremity    Subtherapeutic anticoagulation    Osteoarthritis of multiple joints    Chronic back pain    Constipation    Ambulatory dysfunction     No current facility-administered medications for this visit.    Current Outpatient Medications   Medication Instructions    acetaminophen (TYLENOL) 650 mg, Oral, Every 6 hours PRN    atorvastatin (LIPITOR) 80 mg, Oral, Daily    clopidogrel (PLAVIX) 75 mg, Oral, Daily    Empagliflozin 12.5 mg, Oral, Daily    gabapentin (NEURONTIN) 300 mg    insulin glargine (LANTUS SOLOSTAR) 15 Units, Subcutaneous, Daily at bedtime    magnesium Oxide (MAG-OX) 800 mg, Oral, 2 times daily    Melatonin 5 mg, Oral, Daily at bedtime    metFORMIN (GLUCOPHAGE) 1000 MG tablet TAKE ONE TABLET BY MOUTH TWICE A DAY WITH MEALS FOR DIABETES    metoprolol succinate (TOPROL-XL) 25 mg, Oral, Daily    sacubitril-valsartan (Entresto) 24-26 MG TABS 1 tablet, Oral, 2 times daily    warfarin (COUMADIN) 7.5 mg, Oral, Daily (warfarin)      No Known Allergies  Social History     Socioeconomic History    Marital status: /Civil Union     Spouse name: Not on file    Number of children: Not on file    Years of education: Not on file    Highest education level: Not on file   Occupational History    Not on file   Tobacco Use    Smoking status: Former      Current packs/day: 0.00     Types: Cigarettes     Quit date: 3/1/2012     Years since quittin.1     Passive exposure: Past    Smokeless tobacco: Never   Vaping Use    Vaping status: Former    Quit date: 2010   Substance and Sexual Activity    Alcohol use: Yes     Alcohol/week: 3.0 standard drinks of alcohol     Types: 3 Cans of beer per week     Comment: 'couple beers every couple weeks'    Drug use: Not Currently    Sexual activity: Not Currently   Other Topics Concern    Not on file   Social History Narrative    Caffeine use, active     Social Drivers of Health     Financial Resource Strain: Not on file   Food Insecurity: No Food Insecurity (2/10/2025)    Nursing - Inadequate Food Risk Classification     Worried About Running Out of Food in the Last Year: Never true     Ran Out of Food in the Last Year: Never true     Ran Out of Food in the Last Year: Never true   Transportation Needs: No Transportation Needs (2/10/2025)    Nursing - Transportation Risk Classification     Lack of Transportation: Not on file     Lack of Transportation: No   Physical Activity: Not on file   Stress: Not on file   Social Connections: Not on file   Intimate Partner Violence: Unknown (2/10/2025)    Nursing IPS     Feels Physically and Emotionally Safe: Not on file     Physically Hurt by Someone: Not on file     Humiliated or Emotionally Abused by Someone: Not on file     Physically Hurt by Someone: No     Hurt or Threatened by Someone: No   Housing Stability: Unknown (2/10/2025)    Nursing: Inadequate Housing Risk Classification     Has Housing: Not on file     Worried About Losing Housing: Not on file     Unable to Get Utilities: Not on file     Unable to Pay for Housing in the Last Year: No     Has Housin     Family History   Problem Relation Age of Onset    No Known Problems Mother     Heart attack Father     Arthritis Family     Cancer Family     Diabetes Family     Heart disease Family     Osteoporosis Family   "      Physical Exam:  Vitals:    04/29/25 1458   BP: 106/68   BP Location: Left arm   Patient Position: Sitting   Cuff Size: Standard   Pulse: 93   SpO2: 97%   Weight: 104 kg (229 lb 3.2 oz)   Height: 6' 2\" (1.88 m)     Constitutional: NAD, non toxic  Ears/nose/mouth/throat: atraumatic  CV: RRR, nl S1S2, no murmurs/rubs/gallups, no JVD, no HJR  Resp: CTABL  GI: Soft, NTND  MSK: no swollen joints in exposed areas  Extr: No edema, warm LE  Pysche: Normal affect  Neuro: appropriate in conversation  Skin: dry and intact in exposed areas    Labs & Results:  Lab Results   Component Value Date    WBC 5.71 02/10/2025    HGB 11.1 (L) 02/10/2025    HCT 34.3 (L) 02/10/2025    MCV 97 02/10/2025     02/10/2025     Lab Results   Component Value Date    SODIUM 140 02/10/2025    K 3.7 02/10/2025     02/10/2025    CO2 25 02/10/2025    BUN 10 02/10/2025    CREATININE 0.80 02/10/2025    GLUC 118 02/10/2025    CALCIUM 8.9 02/10/2025       Counseling / Coordination of Care  Greater than 50% of total time was spent with the patient and / or family counseling and / or coordination of care. Discussion included diagnoses, most recent studies and any changes in treatment.    Thank you for the opportunity to participate in the care of this patient.    Massimo Valdovinos MD  Attending Physician  Advanced [facility] Card[facility]  [facility]  "

## 2025-04-29 ENCOUNTER — OFFICE VISIT (OUTPATIENT)
Dept: CARDIOLOGY CLINIC | Facility: CLINIC | Age: 70
End: 2025-04-29
Payer: MEDICARE

## 2025-04-29 VITALS
BODY MASS INDEX: 29.41 KG/M2 | WEIGHT: 229.2 LBS | SYSTOLIC BLOOD PRESSURE: 106 MMHG | OXYGEN SATURATION: 97 % | HEART RATE: 93 BPM | DIASTOLIC BLOOD PRESSURE: 68 MMHG | HEIGHT: 74 IN

## 2025-04-29 DIAGNOSIS — I25.10 CAD S/P PERCUTANEOUS CORONARY ANGIOPLASTY: ICD-10-CM

## 2025-04-29 DIAGNOSIS — I10 PRIMARY HYPERTENSION: ICD-10-CM

## 2025-04-29 DIAGNOSIS — Z98.61 CAD S/P PERCUTANEOUS CORONARY ANGIOPLASTY: ICD-10-CM

## 2025-04-29 DIAGNOSIS — I51.3 LV (LEFT VENTRICULAR) MURAL THROMBUS: ICD-10-CM

## 2025-04-29 DIAGNOSIS — R06.00 DYSPNEA, UNSPECIFIED TYPE: ICD-10-CM

## 2025-04-29 DIAGNOSIS — I25.5 ISCHEMIC CARDIOMYOPATHY: Primary | ICD-10-CM

## 2025-04-29 PROCEDURE — 99204 OFFICE O/P NEW MOD 45 MIN: CPT | Performed by: STUDENT IN AN ORGANIZED HEALTH CARE EDUCATION/TRAINING PROGRAM

## 2025-04-29 RX ORDER — SACUBITRIL AND VALSARTAN 24; 26 MG/1; MG/1
1 TABLET, FILM COATED ORAL 2 TIMES DAILY
Qty: 180 TABLET | Refills: 5 | Status: SHIPPED | OUTPATIENT
Start: 2025-04-29 | End: 2026-10-21

## 2025-04-29 RX ORDER — METOPROLOL SUCCINATE 25 MG/1
25 TABLET, EXTENDED RELEASE ORAL DAILY
Qty: 30 TABLET | Refills: 17 | Status: SHIPPED | OUTPATIENT
Start: 2025-04-29 | End: 2026-10-21

## 2025-05-05 ENCOUNTER — RESULTS FOLLOW-UP (OUTPATIENT)
Dept: CARDIOLOGY CLINIC | Facility: CLINIC | Age: 70
End: 2025-05-05

## 2025-05-05 ENCOUNTER — ANTICOAG VISIT (OUTPATIENT)
Dept: CARDIOLOGY CLINIC | Facility: CLINIC | Age: 70
End: 2025-05-05

## 2025-05-05 ENCOUNTER — APPOINTMENT (OUTPATIENT)
Dept: LAB | Facility: HOSPITAL | Age: 70
End: 2025-05-05
Payer: MEDICARE

## 2025-05-05 DIAGNOSIS — R06.00 DYSPNEA, UNSPECIFIED TYPE: ICD-10-CM

## 2025-05-05 DIAGNOSIS — Z79.01 LONG TERM (CURRENT) USE OF ANTICOAGULANTS: Primary | ICD-10-CM

## 2025-05-05 DIAGNOSIS — I25.10 CAD S/P PERCUTANEOUS CORONARY ANGIOPLASTY: ICD-10-CM

## 2025-05-05 DIAGNOSIS — Z79.01 ANTICOAGULATION MONITORING, INR RANGE 2-3: ICD-10-CM

## 2025-05-05 DIAGNOSIS — I25.5 ISCHEMIC CARDIOMYOPATHY: ICD-10-CM

## 2025-05-05 DIAGNOSIS — Z98.61 CAD S/P PERCUTANEOUS CORONARY ANGIOPLASTY: ICD-10-CM

## 2025-05-05 LAB
ANION GAP SERPL CALCULATED.3IONS-SCNC: 7 MMOL/L (ref 4–13)
BUN SERPL-MCNC: 21 MG/DL (ref 5–25)
CALCIUM SERPL-MCNC: 9.5 MG/DL (ref 8.4–10.2)
CHLORIDE SERPL-SCNC: 104 MMOL/L (ref 96–108)
CO2 SERPL-SCNC: 28 MMOL/L (ref 21–32)
CREAT SERPL-MCNC: 0.95 MG/DL (ref 0.6–1.3)
GFR SERPL CREATININE-BSD FRML MDRD: 82 ML/MIN/1.73SQ M
GLUCOSE P FAST SERPL-MCNC: 126 MG/DL (ref 65–99)
INR PPP: 2.03 (ref 0.85–1.19)
POTASSIUM SERPL-SCNC: 4.3 MMOL/L (ref 3.5–5.3)
PROTHROMBIN TIME: 23.7 SECONDS (ref 12.3–15)
SODIUM SERPL-SCNC: 139 MMOL/L (ref 135–147)
TSH SERPL DL<=0.05 MIU/L-ACNC: 1.79 UIU/ML (ref 0.45–4.5)

## 2025-05-05 PROCEDURE — 80048 BASIC METABOLIC PNL TOTAL CA: CPT

## 2025-05-05 PROCEDURE — 36415 COLL VENOUS BLD VENIPUNCTURE: CPT

## 2025-05-05 PROCEDURE — 85610 PROTHROMBIN TIME: CPT

## 2025-05-05 PROCEDURE — 84443 ASSAY THYROID STIM HORMONE: CPT

## 2025-05-12 ENCOUNTER — RESULTS FOLLOW-UP (OUTPATIENT)
Dept: CARDIOLOGY CLINIC | Facility: CLINIC | Age: 70
End: 2025-05-12

## 2025-05-19 ENCOUNTER — TELEPHONE (OUTPATIENT)
Age: 70
End: 2025-05-19

## 2025-05-19 NOTE — TELEPHONE ENCOUNTER
FOLLOW UP: Pt would like to know if he should continue to take Losartan as he is having difficulty getting [facility]resto [facility].    Or can samples be provided until he can received Entresto from the [facility]    REASON FOR CONVERSATION: No chief complaint on file.    SYMPTOMS: Pt states that he [facility] the [facility]rolol [facility] so he stopped taking Coreg but for some reason he is having difficulty getting entresto so he has continued to take losartan.    OTHER: Per OV on 4/29 with Dr. Valdovinos   Neurohormonal Blockade/GDMT:  --Beta-Blocker: coreg 3.125 bid > toprol xl 25 qd  --ACEi, ARB, ARNi: losartan 12.5 qd > entresto 24/26 bid    DISPOSITION: Discuss with provider

## 2025-05-20 ENCOUNTER — TRANSCRIBE ORDERS (OUTPATIENT)
Facility: HOSPITAL | Age: 70
End: 2025-05-20

## 2025-05-20 DIAGNOSIS — I50.22 CHRONIC SYSTOLIC HEART FAILURE (HCC): Primary | ICD-10-CM

## 2025-05-21 ENCOUNTER — CLINICAL SUPPORT (OUTPATIENT)
Facility: HOSPITAL | Age: 70
End: 2025-05-21
Attending: STUDENT IN AN ORGANIZED HEALTH CARE EDUCATION/TRAINING PROGRAM
Payer: MEDICARE

## 2025-05-21 DIAGNOSIS — I50.22 CHRONIC SYSTOLIC HEART FAILURE (HCC): Primary | ICD-10-CM

## 2025-05-21 NOTE — PROGRESS NOTES
CARDIAC REHABILITATION   ASSESSMENT AND INDIVIDUALIZED TREATMENT PLAN  INITIAL           Today's date: 2025   # of Exercise Sessions Completed: Initial Evaluation Today  Patient name: Joseph Aguilar      : 1955  Age: 69 y.o.       MRN: [id number]  Referring Physician: Massimo Valdovinos MD  Cardiologist: Massimo Valdovinos MD  Provider: Ramiro  Clinician: Alan Hammonds, MS, CEP, CCRP        Treatment is tailored to this patient's individual needs.  The ITP was reviewed with the patient and all questions were answered to their satisfaction.  Additional ITP documentation can be found electronically including daily and monthly exercise summaries, daily session notes with ECG summaries, education notes, daily medication reconciliation, and daily physician supervision.      INITIAL EVALUATION SUMMARY DATE:  25    Patient's subjective report of progress/symptoms:   Has remained symptom free  Wearing his life vest  Home exercise/ADLs:   Has been very sedentary since his event because he was unsure if it was safe  Typically sedentary d/t orthopedic issues  Clinical Comments:   Yusuf got a late start with cardiac rehab.  According to the Pt he was told that he couldn't exercise wearing the life vest.  Unsure of the source since cardiac rehab is beneficial for such patients.   Completed an initial submax ETT on the nustep d/t his multiple lower body orhto limitations  Admits to chronic depression since d/c from the [employer] - PHQ-9 = 10 - not interested in therapy, reports he does not have emotional support  Has ov with Dr. Valdovinos May 27  He is compliant wearing his life vest - didn't understand why he needed to wear it - provided education on it's use/benefit  Pt is doubtful that cardiac rehab will benefit him    Initial Fitness Assessment: NuStep submaximal ETT:  Resting:  BP: 110/70  HR: 91  Exercise:  BP: 140/80  HR: 104  METs:  2.4  ECG Summary: NSR, rare PVC  Symptoms: no cardiac sxs, leg pain  Test terminated at:  RPE  6    Dx:   Encounter Diagnosis   Name Primary?    Chronic systolic heart failure (HCC) Yes       Description of Diagnosis: Ischemic Cardiomyopathy  - EF 30% - life vest  Date of onset: 4/17/25  Other Cardiac History: 1/17/25:  STEMI, CHYNA to LM - ostial LAD & mLAD,  of LCX, LV mural thrombus        ASSESSMENT    Medical History:   Past Medical History:   Diagnosis Date    Depression     Diabetes mellitus (HCC)     Hyperlipidemia     Hypertension     Osteoarthritis, knee        Family History:  Family History   Problem Relation Age of Onset    No Known Problems Mother     Heart attack Father     Arthritis Family     Cancer Family     Diabetes Family     Heart disease Family     Osteoporosis Family        Allergies:   Patient has no known allergies.    Current Medications:   Current Medications[1]    Medication compliance: Yes   Comments: Pt reports to be compliant with medications    Physical Limitations: two rods in right hip following a fracture, B/L knee replacements, chronic pain, arthritis in shoulder/hand (cortisone shots)    Fall Risk: Moderate   Comments: Patient uses walking assist device (walker/cane/rollator) and Denies a fall in the past 6 months    Cultural needs: none      CAD Risk Factors:  Cholesterol: Yes  HTN: Yes  DM: Type 2   Not checking BG because it's a waste of time  Obesity: Yes   Inactivity: Yes      EXERCISE ASSESSMENT:      Current Functional Status:  Occupation: disability - was a  in the [employer] (5 years)   Recreation/Physical Activity: sedentary  ADL’s: doesn't do much  [place]: No limitations  Home exercise: none  Other Comments: doesn't go up his stairs      SMART Exercise Goals:   reduced dyspnea with physical activity    improved DASI score by 10%  increased exercise capacity by 40% based on peak METs tolerated in cardiac rehab exercise session  maintain > 150 minutes per week of moderate intensity exercise  improved 6MWT distance by 10%    Patient Specific EXERCISE GOALS:  "      none      Functional Capacity Screening Tool:  [facility] Activity Status Index:  3.63 METs    NUTRITION ASSESSMENT:    Initial Weight:  231.6 (wearing life vest)  Current Weight: 231.6 (wearing life vest)    Height:   Ht Readings from Last 1 Encounters:   04/29/25 6' 2\" (1.88 m)       Rate Your Plate Score: 67/81    Diabetes: T2D  A1c: 8.2    last measured: 1/18/25    Lipid management: Discussed diet and lipid management and Last lipid profile 1/18/25  Chol 112    HDL 33  LDL 58    Current Dietary Habits:  No sweets  Been through dietary counseling  Not a lot of beef and pork  Eats what's in the fridge  Lost 20lbs since Bucky  Fruits/vegs    SMART Nutrition Goals:   reduced BMI to < 25, eat poultry without the skin, rarely eat cheese or choose reduced fat or skim, choose light or fat-free salad dressings or carty, and choose low sugar desserts and sweets    Patient Specific NUTRITION GOALS:     1. Pt has no specific dietary goals      Drug/Alcohol Use:   No      PSYCHOSOCIAL ASSESSMENT:    Date of last Assessment:  5/21/25  Depression screening:  PHQ-9 = 10    Interpretation:  10-14 = Moderate Depression  Anxiety screening:  FORREST-7 = 5    Interpretation: 5-9 = Mild anxiety    Pt self-report of depression and anxiety   Patient has a history of depression - chronic readjustment disorder   No therapy or medication    Self-reported stress level:  1   Stressors:  none  Stress Management Tools: hang out with his dogs    SMART Psychosocial Goals:     PHQ-9 - reduced severity by one level, Physical Fitness in [place] Score < 3, Social Support in [place] Score < 3, Daily Activity in [place] Score < 3, Pain in [place] Score < 3, Overall Health in [place] Score < 3,  Increased interest and pleasure in doing things,  reduced time feeling down, depressed or hopeless, improved sleeping habits, feel less tired with more energy, improved concentration, reduced time feeling nervous or on edge, and reduced " irritability    Patient Specific PSYCHOCOSOCIAL GOALS:    Talk to friends on the phone  Spend time with his dogs      Quality of Life Screen:  (Higher score indicates disease impact on QOL)  [facility] COOP score: 25/45     Social Support:   Wife is not supportive, 3 friends who he talks to occasionally  Community/Social Activities: none     Psychosocial Assessment as it relates to rehabilitation:   Patient denies issues with his/her family or home life that may affect their rehabilitation efforts.       OTHER CORE COMPONENT ASSESSMENT:    Tobacco Use:     Pt quit 13 years ago (2012)   and has abstained    Anginal Symptoms:  lightheadedness   NTG use: No prescription    SMART Goals:   consistent, controlled resting BP < 130/80 and medication compliance    Patient Specific CORE COMPONENT GOALS:    Take medications        INDIVIDUALIZED TREATMENT PLAN      EXERCISE GOALS and PLAN      Progress toward Exercise goals:   Reviewed Pt goals and determined plan of care    Exercise Plan:    education on home exercise guidelines, home exercise 30+ mins 2 days opposite CR, Group class: Risk Factors for Heart Disease, Patient education:   Exercise After Cardiac Rehabilitation, and After discharge patient will continue to follow a regular exercise regimen with the goal of a minimum of 150 minutes per week of moderate intensity exercise.      The patient was counseled on exercise guidelines to achieve a minimum of 150 mins/wk of moderate intensity (RPE 4-6)   exercise and encouraged to add 1-2 days of exercise on opposite days of cardiac rehab as tolerated.       PHYSICIAN PRESCRIBED EXERCISE:    Current Aerobic Exercise Prescription:      Frequency: 3 days/week   Supplement with home exercise 2+ days/wk as tolerated       Minutes: 20 - 40         METS: 1.8-2.5            HR: RHR +30-40bpm   RPE: 4-6         Modalities: NuStep     Exercise workloads will be progressed gradually as tolerated, within limits of patient's ability, and  according to the patient's   response to the exercise program.      Aerobic Exercise Prescription Plan for Progression   Frequency: 3 days/week of cardiac rehab       Supplement with home exercise 2+ days/wk as tolerated    Minutes: 40       >150 mins/wk of moderate intensity exercise   METS: 2.0-3.5   HR: RHR +30-40bpm     RPE: 4-6   Modalities: NuStep    Strength training:  Pt's ability to perform strength training will be limited by his orthopedic pain   Modalities:     Home Exercise: none    Exercise Education: benefit of exercise for CAD risk factors, home exercise guidelines, AHA guidelines to achieve >150 mins/wk of moderate exercise, and RPE scale     Readiness to change: Pre-Contemplation:   (Not yet acknowledging that there is a problem behavior that needs to change)      NUTRITION GOALS AND PLAN      Nutritional   Reviewed patient's Rate your Plate. Discussed key elements of heart healthy eating. Reviewed patient goals for dietary modifications and their clinical implications.  Reviewed most recent lipid profile.     Patient's progress toward Nutrition goals:    Reviewed Pt goals and determined plan of care      Nutrition Plan:   group class: Reading Food Labels, group Class: Heart Healthy Eating, replace refined flours with whole grains, eat reduced-fat or part-skim cheese or rarely eat, use light or fat-free salad dressings and mayonnaise, and choose desserts such as fruit, holly food cake, low-fat or fat-free sweets    Measurable goals were based Rate Your Plate Dietary Self-Assessment. These are the areas in which the patient could score higher on the assessment.  Goals include recommendations for a heart healthy diet based on American Heart Association.    Nutrition Education:   heart healthy eating principles  low sodium diet  maintaining hydration  nutrition for  lipid management    Readiness to change: Pre-Contemplation:   (Not yet acknowledging that there is a problem behavior that needs to  change)      PSYCHOSOCIAL GOALS AND PLAN    Psychosocial Assessment as it relates to rehabilitation:   Patient denies issues with his/her family or home life that may affect their rehabilitation efforts.     Patient's progress toward Psychosocial goals:    Reviewed Pt goals and determined plan of care    Psychosocial Intervention/plan:   Class: Stress and Your Health, Class: Relaxation, PHQ-9 >5 will refer to MD, and enjoy his dogs, talk to friends,     Psychosocial Education: benefits of a positive support system, stress management techniques, and benefits of mental health counseling    Information to utilize [employer] was provided as well as contact information for counseling through  Behavioral Health and group psychotherapy groups available.    Readiness to change: Pre-Contemplation:   (Not yet acknowledging that there is a problem behavior that needs to change)      OTHER CORE COMPONENTS GOALS and PLAN      Blood Pressure will be monitored throughout the program and cardiologist will be notified of elevated trends.    Pt will be encouraged to monitor home BP if advised by cardiologist.    Tobacco Plan:   N/A:  Pt has a remote history of smoking.      Progress toward Core Component goals:   Reviewed Pt goals and determined plan of care    Other Core Components Intervention:   group class: Understanding Heart Disease, group class: Common Heart Medications, medication compliance, avoid processed foods, engage in regular exercise, eliminate salt shaker at the table, use salt substitutes, and check labels for sodium content    Group and Individual Education:  components of blood pressure management    Readiness to change: Pre-Contemplation:   (Not yet acknowledging that there is a problem behavior that needs to change)         [1]   Current Outpatient Medications   Medication Sig Dispense Refill    acetaminophen (TYLENOL) 325 mg tablet Take 2 tablets (650 mg total) by mouth every 6 (six) hours as needed for  mild pain, headaches or fever      atorvastatin (LIPITOR) 80 mg tablet Take 1 tablet (80 mg total) by mouth daily 30 tablet 0    clopidogrel (PLAVIX) 75 mg tablet Take 1 tablet (75 mg total) by mouth daily 30 tablet 0    Empagliflozin 25 MG TABS Take 0.5 tablets (12.5 mg total) by mouth in the morning 45 tablet 1    gabapentin (NEURONTIN) 300 mg capsule Take 300 mg by mouth      insulin glargine (LANTUS SOLOSTAR) 100 units/mL injection pen Inject 15 Units under the skin daily at bedtime 15 mL 0    magnesium Oxide (MAG-OX) 400 mg TABS Take 2 tablets (800 mg total) by mouth 2 (two) times a day 120 tablet 0    melatonin 5 MG TABS Take 1 tablet (5 mg total) by mouth daily at bedtime (Patient not taking: Reported on 4/29/2025) 30 tablet 0    metFORMIN (GLUCOPHAGE) 1000 MG tablet TAKE ONE TABLET BY MOUTH TWICE A DAY WITH MEALS FOR DIABETES      metoprolol succinate (TOPROL-XL) 25 mg 24 hr tablet Take 1 tablet (25 mg total) by mouth daily 30 tablet 17    sacubitril-valsartan (Entresto) 24-26 MG TABS Take 1 tablet by mouth 2 (two) times a day 180 tablet 5    warfarin (COUMADIN) 5 mg tablet Take 1.5 tablets (7.5 mg total) by mouth daily 137 tablet 0     No current facility-administered medications for this visit.

## 2025-05-22 DIAGNOSIS — I50.22 CHRONIC SYSTOLIC HEART FAILURE (HCC): Primary | ICD-10-CM

## 2025-05-22 NOTE — TELEPHONE ENCOUNTER
I spoke with the patient regarding continuing Losartan until he is able to get [facility]rest[facility]. He will then call the office to let us know. He is aware of blood work to be done 1 week after start date.

## 2025-05-23 NOTE — PROGRESS NOTES
"Advanced Heart Failure/Pulmonary Hypertension Outpatient Note - Joseph Aguilar 69 y.o. male MRN: [id number]    @ Encounter: [id number]      Assessment:  69 y.o. male PMH and acute problems listed later in this note (a partial list may also be included within 'assessment' section) presents for consultation.  I first met Joseph Aguilar on 4/29/25.  Referred by No ref. provider found.    This patient was identified as a [facility] candidate.   [facility]  The patient's primary cardiac team was general cardiology as inpt 1/2025, has seen AP's as outpt until our first meeting 4/29/25  ICM, HFrEF 25%, nondilated LV, nl RV size/fxn  CAD; 1/2025 STEMI s/p PCI with CHYNA to LM-ostial LAD and mLAD;  of LCx  LV thrombus  HTN  HLD  DM      Today I have reviewed all pertinent labs/imaging/data including but not limited to:        Lab Units 05/05/25  1459 02/10/25  0442 02/07/25  0541   CREATININE mg/dL 0.95 0.80 0.87         Lab Results   Component Value Date    K 4.3 05/05/2025     Lab Results   Component Value Date    HGBA1C 8.2 (H) 01/18/2025     Lab Results   Component Value Date    YAA7STQOPMTE 1.786 05/05/2025     Lab Results   Component Value Date    LDLCALC 58 01/18/2025     No results found for: \"BNP\"   No results found for: \"NTBNP\"       TODAY'S PLAN:     05/27/25  Warm, euvolemic  No new cardiac complaints, feels generally well - better energy than prior  BP acceptable  Still [facility] - he is in process of completing necessary ppwk  Wearing lifevest, does not like it    Gdmt below, on low dose 3 drug regimen prior to our first visit 4/29/25. No arni use.  BP may be somewhat limiting  [facility] can add complexity.  Start mra today fu BMP 1 week.  Switch arb to arni as soon as able [facility], then check BMP in 1 week after this switch  Has lifevest  Narrow qrs  Check LVEF after 3 months on max tolerated gdmt, potentially check with 8/2025 CMR as above    On sapt + AC already  On " high intens statin    Follow up:  With me in 4 weeks or sooner if symptoms evolve.  In addition to follow up with their other medical providers    Key info from my prior notes:    I am meeting patient for the first time today  Warm, euvolemic  No new cardiac complaints, feels generally well  Wearing lifevest    We Reviewed his past cardiac data including echo 4/2025  Serial echos over 2 months on minimal gdmt show minimal EF change  LV thrombus vs muscle bundle - slight decrease in size across studies, though unclear.  Will plan for future CMR for hopefully more definitive assessment after 6 mo of AC, potentially 8/2025    Cardiac rehab referral given    Adivse tlc, diet, exercise    Needs DM control, fu primary team    Neurohormonal Blockade/GDMT:  --Beta-Blocker: toprol xl 25 qd  --ACEi, ARB, ARNi: losartan 12.5 qd > entresto 24/26 bid  --MRA: aldactone 25 qd  --SGLT2i: jardiance 25 (?half pill is currently ordered)  --Hydralazine/nitrates: not on    --Diuretic: not on    Other HF pharmaco-invasive therapy (if applicable):   PPM,  ICD , CRT (if applicable):  Interrogation:  Advanced Therapies (if applicable):   --Inotrope:  --LVAD/Transplant Candidacy:    Studies:  Today I have reviewed all pertinent patient data/labs/imaging where available, including but not limited to the below studies. This includes my independent interpretation. Selected results may be displayed here but comprehensive listing is omitted for note clarity and can be found in the epic chart.    ECG.    Echo.    Stress.    Cath.    HPI:   69 y.o. male PMH and acute problems listed later in this note (a partial list may also be included within 'assessment' section) presents for consultation.  No new CP/SOB/dizziness/palpitations/syncope.  No new fatigue.  No new unintentional weight changes.  No new leg swelling, PND, pillow orthopnea.  No new fevers, chills, cough, nausea, vomiting, diarrhea, dysuria.    Interval History:  As noted in 'plan'  section above and prior epic chart notes.    No new CP/SOB/dizziness/palpitations/syncope.  No new fatigue.  No new unintentional weight changes.  No new leg swelling, PND, pillow orthopnea.  No new fevers, chills, cough, nausea, vomiting, diarrhea, dysuria.    ROS:  10 point ROS negative except as specified in HPI    Past Medical History:   Diagnosis Date    Depression     Diabetes mellitus (HCC)     Hyperlipidemia     Hypertension     Osteoarthritis, knee      Patient Active Problem List   Diagnosis    Glenohumeral arthritis, right    Primary osteoarthritis of both knees    Primary hypertension    Mixed hyperlipidemia    Type 2 diabetes mellitus without complication, without long-term current use of insulin ([facility])    Obesity (BMI 30-39.9)    S/P TKR (total knee replacement), right    Closed nondisplaced intertrochanteric fracture of left femur with routine healing    Pain in both feet    Polyneuropathy associated with underlying disease ([facility])    Slow transit constipation    Vitamin D deficiency    Anemia    Hypomagnesemia    Right foot pain    Glenohumeral arthritis, left    CAD; STEMI s/p PCI with CHYNA to LM-ostial LAD and mLAD;  of LCx (1/17/2025)    ICM with EF 25%    LV (left ventricular) mural thrombus    Hematoma of right lower extremity    Subtherapeutic anticoagulation    Osteoarthritis of multiple joints    Chronic back pain    Constipation    Ambulatory dysfunction    Chronic systolic heart failure (HCC)     No current facility-administered medications for this visit.    Current Outpatient Medications   Medication Instructions    acetaminophen (TYLENOL) 650 mg, Oral, Every 6 hours PRN    atorvastatin (LIPITOR) 80 mg, Oral, Daily    clopidogrel (PLAVIX) 75 mg, Oral, Daily    Empagliflozin 12.5 mg, Oral, Daily    gabapentin (NEURONTIN) 300 mg    insulin glargine (LANTUS SOLOSTAR) 15 Units, Subcutaneous, Daily at bedtime    magnesium Oxide (MAG-OX) 800 mg, Oral, 2 times daily    Melatonin 5 mg, Oral, Daily  at bedtime    metFORMIN (GLUCOPHAGE) 1000 MG tablet     metoprolol succinate (TOPROL-XL) 25 mg, Oral, Daily    sacubitril-valsartan (Entresto) 24-26 MG TABS 1 tablet, Oral, 2 times daily    spironolactone (ALDACTONE) 25 mg, Oral, Daily    spironolactone (ALDACTONE) 25 mg, Oral, Daily    warfarin (COUMADIN) 7.5 mg, Oral, Daily (warfarin)      No Known Allergies  Social History     Socioeconomic History    Marital status: /Civil Union     Spouse name: Not on file    Number of children: Not on file    Years of education: Not on file    Highest education level: Not on file   Occupational History    Not on file   Tobacco Use    Smoking status: Former     Current packs/day: 0.00     Types: Cigarettes     Quit date: 3/1/2012     Years since quittin.2     Passive exposure: Past    Smokeless tobacco: Never   Vaping Use    Vaping status: Former    Quit date: 2010   Substance and Sexual Activity    Alcohol use: Yes     Alcohol/week: 3.0 standard drinks of alcohol     Types: 3 Cans of beer per week     Comment: 'couple beers every couple weeks'    Drug use: Not Currently    Sexual activity: Not Currently   Other Topics Concern    Not on file   Social History Narrative    Caffeine use, active     Social Drivers of Health     Financial Resource Strain: Not on file   Food Insecurity: No Food Insecurity (2/10/2025)    Nursing - Inadequate Food Risk Classification     Worried About Running Out of Food in the Last Year: Never true     Ran Out of Food in the Last Year: Never true     Ran Out of Food in the Last Year: Never true   Transportation Needs: No Transportation Needs (2/10/2025)    Nursing - Transportation Risk Classification     Lack of Transportation: Not on file     Lack of Transportation: No   Physical Activity: Not on file   Stress: Not on file   Social Connections: Not on file   Intimate Partner Violence: Unknown (2/10/2025)    Nursing IPS     Feels Physically and Emotionally Safe: Not on file      "Physically Hurt by Someone: Not on file     Humiliated or Emotionally Abused by Someone: Not on file     Physically Hurt by Someone: No     Hurt or Threatened by Someone: No   Housing Stability: Unknown (2/10/2025)    Nursing: Inadequate Housing Risk Classification     Has Housing: Not on file     Worried About Losing Housing: Not on file     Unable to Get Utilities: Not on file     Unable to Pay for Housing in the Last Year: No     Has Housin     Family History   Problem Relation Name Age of Onset    No Known Problems Mother      Heart attack Father      Arthritis Family      Cancer Family      Diabetes Family      Heart disease Family      Osteoporosis Family         Physical Exam:  Vitals:    25 1539   BP: 108/76   BP Location: Left arm   Patient Position: Sitting   Cuff Size: Standard   Pulse: 84   SpO2: 97%   Weight: 104 kg (229 lb 1.6 oz)   Height: 6' 2\" (1.88 m)     Constitutional: NAD, non toxic  Ears/nose/mouth/throat: atraumatic  CV: RRR, nl S1S2, no murmurs/rubs/gallups, no JVD, no HJR  Resp: CTABL  GI: Soft, NTND  MSK: no swollen joints in exposed areas  Extr: No edema, warm LE  Pysche: Normal affect  Neuro: appropriate in conversation  Skin: dry and intact in exposed areas    Labs & Results:  Lab Results   Component Value Date    WBC 5.71 02/10/2025    HGB 11.1 (L) 02/10/2025    HCT 34.3 (L) 02/10/2025    MCV 97 02/10/2025     02/10/2025     Lab Results   Component Value Date    SODIUM 139 2025    K 4.3 2025     2025    CO2 28 2025    BUN 21 2025    CREATININE 0.95 2025    GLUC 118 02/10/2025    CALCIUM 9.5 2025       Counseling / Coordination of Care  Greater than 50% of total time was spent with the patient and / or family counseling and / or coordination of care. Discussion included diagnoses, most recent studies and any changes in treatment.    Thank you for the opportunity to participate in the care of this patient.    Massimo Valdovinos, " MD  Attending Physician  Advanced Heart Failure Cardiology  [facility]

## 2025-05-27 ENCOUNTER — OFFICE VISIT (OUTPATIENT)
Dept: CARDIOLOGY CLINIC | Facility: CLINIC | Age: 70
End: 2025-05-27
Payer: MEDICARE

## 2025-05-27 VITALS
HEART RATE: 84 BPM | BODY MASS INDEX: 29.4 KG/M2 | SYSTOLIC BLOOD PRESSURE: 108 MMHG | WEIGHT: 229.1 LBS | OXYGEN SATURATION: 97 % | HEIGHT: 74 IN | DIASTOLIC BLOOD PRESSURE: 76 MMHG

## 2025-05-27 DIAGNOSIS — I50.22 CHRONIC SYSTOLIC HEART FAILURE (HCC): Primary | ICD-10-CM

## 2025-05-27 DIAGNOSIS — I25.10 CAD S/P PERCUTANEOUS CORONARY ANGIOPLASTY: ICD-10-CM

## 2025-05-27 DIAGNOSIS — I10 PRIMARY HYPERTENSION: ICD-10-CM

## 2025-05-27 DIAGNOSIS — Z98.61 CAD S/P PERCUTANEOUS CORONARY ANGIOPLASTY: ICD-10-CM

## 2025-05-27 DIAGNOSIS — I51.3 LV (LEFT VENTRICULAR) MURAL THROMBUS: ICD-10-CM

## 2025-05-27 PROCEDURE — 99214 OFFICE O/P EST MOD 30 MIN: CPT | Performed by: STUDENT IN AN ORGANIZED HEALTH CARE EDUCATION/TRAINING PROGRAM

## 2025-05-27 RX ORDER — SPIRONOLACTONE 25 MG/1
25 TABLET ORAL DAILY
Qty: 30 TABLET | Refills: 1 | Status: SHIPPED | OUTPATIENT
Start: 2025-05-27 | End: 2025-07-26

## 2025-05-27 RX ORDER — SPIRONOLACTONE 25 MG/1
25 TABLET ORAL DAILY
Qty: 30 TABLET | Refills: 17 | Status: SHIPPED | OUTPATIENT
Start: 2025-05-27 | End: 2026-11-18

## 2025-05-28 ENCOUNTER — CLINICAL SUPPORT (OUTPATIENT)
Facility: HOSPITAL | Age: 70
End: 2025-05-28
Attending: STUDENT IN AN ORGANIZED HEALTH CARE EDUCATION/TRAINING PROGRAM
Payer: MEDICARE

## 2025-05-28 DIAGNOSIS — I50.22 CHRONIC SYSTOLIC HEART FAILURE (HCC): Primary | ICD-10-CM

## 2025-05-28 PROCEDURE — 93798 PHYS/QHP OP CAR RHAB W/ECG: CPT

## 2025-05-30 ENCOUNTER — APPOINTMENT (OUTPATIENT)
Facility: HOSPITAL | Age: 70
End: 2025-05-30
Attending: STUDENT IN AN ORGANIZED HEALTH CARE EDUCATION/TRAINING PROGRAM
Payer: MEDICARE

## 2025-05-30 DIAGNOSIS — I50.22 CHRONIC SYSTOLIC HEART FAILURE (HCC): Primary | ICD-10-CM

## 2025-05-30 PROCEDURE — 93798 PHYS/QHP OP CAR RHAB W/ECG: CPT

## 2025-06-02 ENCOUNTER — APPOINTMENT (OUTPATIENT)
Facility: HOSPITAL | Age: 70
End: 2025-06-02
Attending: STUDENT IN AN ORGANIZED HEALTH CARE EDUCATION/TRAINING PROGRAM
Payer: MEDICARE

## 2025-06-02 DIAGNOSIS — I50.22 CHRONIC SYSTOLIC HEART FAILURE (HCC): Primary | ICD-10-CM

## 2025-06-02 PROCEDURE — 93798 PHYS/QHP OP CAR RHAB W/ECG: CPT

## 2025-06-04 ENCOUNTER — APPOINTMENT (OUTPATIENT)
Facility: HOSPITAL | Age: 70
End: 2025-06-04
Attending: STUDENT IN AN ORGANIZED HEALTH CARE EDUCATION/TRAINING PROGRAM
Payer: MEDICARE

## 2025-06-04 ENCOUNTER — APPOINTMENT (OUTPATIENT)
Dept: LAB | Facility: HOSPITAL | Age: 70
End: 2025-06-04
Payer: MEDICARE

## 2025-06-04 ENCOUNTER — ANTICOAG VISIT (OUTPATIENT)
Dept: CARDIOLOGY CLINIC | Facility: CLINIC | Age: 70
End: 2025-06-04

## 2025-06-04 ENCOUNTER — RESULTS FOLLOW-UP (OUTPATIENT)
Dept: CARDIOLOGY CLINIC | Facility: CLINIC | Age: 70
End: 2025-06-04

## 2025-06-04 DIAGNOSIS — I50.22 CHRONIC SYSTOLIC HEART FAILURE (HCC): ICD-10-CM

## 2025-06-04 DIAGNOSIS — I50.22 CHRONIC SYSTOLIC HEART FAILURE (HCC): Primary | ICD-10-CM

## 2025-06-04 DIAGNOSIS — Z79.01 ANTICOAGULATION MONITORING, INR RANGE 2-3: ICD-10-CM

## 2025-06-04 LAB
ANION GAP SERPL CALCULATED.3IONS-SCNC: 9 MMOL/L (ref 4–13)
BUN SERPL-MCNC: 21 MG/DL (ref 5–25)
CALCIUM SERPL-MCNC: 9.7 MG/DL (ref 8.4–10.2)
CHLORIDE SERPL-SCNC: 105 MMOL/L (ref 96–108)
CO2 SERPL-SCNC: 26 MMOL/L (ref 21–32)
CREAT SERPL-MCNC: 1.16 MG/DL (ref 0.6–1.3)
GFR SERPL CREATININE-BSD FRML MDRD: 64 ML/MIN/1.73SQ M
GLUCOSE P FAST SERPL-MCNC: 149 MG/DL (ref 65–99)
INR PPP: 2.52 (ref 0.85–1.19)
POTASSIUM SERPL-SCNC: 4.3 MMOL/L (ref 3.5–5.3)
PROTHROMBIN TIME: 27.8 SECONDS (ref 12.3–15)
SODIUM SERPL-SCNC: 140 MMOL/L (ref 135–147)

## 2025-06-04 PROCEDURE — 36415 COLL VENOUS BLD VENIPUNCTURE: CPT

## 2025-06-04 PROCEDURE — 80048 BASIC METABOLIC PNL TOTAL CA: CPT

## 2025-06-04 PROCEDURE — 85610 PROTHROMBIN TIME: CPT

## 2025-06-04 PROCEDURE — 93798 PHYS/QHP OP CAR RHAB W/ECG: CPT

## 2025-06-05 ENCOUNTER — TELEPHONE (OUTPATIENT)
Dept: CARDIOLOGY CLINIC | Facility: CLINIC | Age: 70
End: 2025-06-05

## 2025-06-05 ENCOUNTER — RESULTS FOLLOW-UP (OUTPATIENT)
Dept: CARDIOLOGY CLINIC | Facility: CLINIC | Age: 70
End: 2025-06-05

## 2025-06-05 ENCOUNTER — TELEPHONE (OUTPATIENT)
Age: 70
End: 2025-06-05

## 2025-06-05 NOTE — TELEPHONE ENCOUNTER
Received a call from Yusuf [facility].    Pt has additional questions in regards to the medication and the switch    Contacted HF and spoke to Sridevi, and warm transferred call

## 2025-06-05 NOTE — TELEPHONE ENCOUNTER
Gave instructions to patient per Dr. Alexander. There is an active order in EP[facility] for a BMP.  He knows to have this done a week after starting the Entresto.

## 2025-06-05 NOTE — TELEPHONE ENCOUNTER
[facility] just [facility].  He wants to confirm if he should be stopping the Spironolactone when starting Entresto and if weaning off the Spironolactone is necessary vs just stopping.     I will instruct him on having BMP in one week after starting Entresto as well.

## 2025-06-06 ENCOUNTER — CLINICAL SUPPORT (OUTPATIENT)
Facility: HOSPITAL | Age: 70
End: 2025-06-06
Attending: STUDENT IN AN ORGANIZED HEALTH CARE EDUCATION/TRAINING PROGRAM
Payer: MEDICARE

## 2025-06-06 DIAGNOSIS — I50.22 CHRONIC SYSTOLIC HEART FAILURE (HCC): Primary | ICD-10-CM

## 2025-06-06 PROCEDURE — 93798 PHYS/QHP OP CAR RHAB W/ECG: CPT

## 2025-06-09 ENCOUNTER — CLINICAL SUPPORT (OUTPATIENT)
Facility: HOSPITAL | Age: 70
End: 2025-06-09
Attending: STUDENT IN AN ORGANIZED HEALTH CARE EDUCATION/TRAINING PROGRAM
Payer: MEDICARE

## 2025-06-09 DIAGNOSIS — I50.22 CHRONIC SYSTOLIC HEART FAILURE (HCC): Primary | ICD-10-CM

## 2025-06-09 PROCEDURE — 93798 PHYS/QHP OP CAR RHAB W/ECG: CPT

## 2025-06-11 ENCOUNTER — APPOINTMENT (OUTPATIENT)
Facility: HOSPITAL | Age: 70
End: 2025-06-11
Attending: STUDENT IN AN ORGANIZED HEALTH CARE EDUCATION/TRAINING PROGRAM
Payer: MEDICARE

## 2025-06-11 ENCOUNTER — APPOINTMENT (OUTPATIENT)
Dept: LAB | Facility: HOSPITAL | Age: 70
End: 2025-06-11
Payer: MEDICARE

## 2025-06-11 DIAGNOSIS — I50.22 CHRONIC SYSTOLIC HEART FAILURE (HCC): Primary | ICD-10-CM

## 2025-06-11 DIAGNOSIS — I50.22 CHRONIC SYSTOLIC HEART FAILURE (HCC): ICD-10-CM

## 2025-06-11 PROCEDURE — 93798 PHYS/QHP OP CAR RHAB W/ECG: CPT

## 2025-06-13 ENCOUNTER — APPOINTMENT (OUTPATIENT)
Facility: HOSPITAL | Age: 70
End: 2025-06-13
Attending: STUDENT IN AN ORGANIZED HEALTH CARE EDUCATION/TRAINING PROGRAM
Payer: MEDICARE

## 2025-06-13 ENCOUNTER — APPOINTMENT (OUTPATIENT)
Dept: LAB | Facility: HOSPITAL | Age: 70
End: 2025-06-13
Payer: MEDICARE

## 2025-06-13 LAB
ANION GAP SERPL CALCULATED.3IONS-SCNC: 10 MMOL/L (ref 4–13)
BUN SERPL-MCNC: 24 MG/DL (ref 5–25)
CALCIUM SERPL-MCNC: 9.6 MG/DL (ref 8.4–10.2)
CHLORIDE SERPL-SCNC: 102 MMOL/L (ref 96–108)
CO2 SERPL-SCNC: 25 MMOL/L (ref 21–32)
CREAT SERPL-MCNC: 1.06 MG/DL (ref 0.6–1.3)
GFR SERPL CREATININE-BSD FRML MDRD: 71 ML/MIN/1.73SQ M
GLUCOSE P FAST SERPL-MCNC: 185 MG/DL (ref 65–99)
POTASSIUM SERPL-SCNC: 4.2 MMOL/L (ref 3.5–5.3)
SODIUM SERPL-SCNC: 137 MMOL/L (ref 135–147)

## 2025-06-13 PROCEDURE — 36415 COLL VENOUS BLD VENIPUNCTURE: CPT

## 2025-06-13 PROCEDURE — 80048 BASIC METABOLIC PNL TOTAL CA: CPT

## 2025-06-16 ENCOUNTER — RESULTS FOLLOW-UP (OUTPATIENT)
Dept: CARDIOLOGY CLINIC | Facility: CLINIC | Age: 70
End: 2025-06-16

## 2025-06-16 ENCOUNTER — APPOINTMENT (OUTPATIENT)
Facility: HOSPITAL | Age: 70
End: 2025-06-16
Attending: STUDENT IN AN ORGANIZED HEALTH CARE EDUCATION/TRAINING PROGRAM
Payer: MEDICARE

## 2025-06-18 ENCOUNTER — APPOINTMENT (OUTPATIENT)
Facility: HOSPITAL | Age: 70
End: 2025-06-18
Attending: STUDENT IN AN ORGANIZED HEALTH CARE EDUCATION/TRAINING PROGRAM
Payer: MEDICARE

## 2025-06-18 NOTE — PROGRESS NOTES
CARDIAC REHABILITATION   ASSESSMENT AND INDIVIDUALIZED TREATMENT PLAN  30 DAY REASSESSMENT          Today's date: 2025   # of Exercise Sessions Completed: 8  Patient name: Joseph Aguilar      : 1955  Age: 70 y.o.       MRN: [id number]  Referring Physician: Massimo Valdovinos MD  Cardiologist: Massimo Valdovinos MD  Provider: Ramiro  Clinician: Alan Hammonds, MS, CEP, CCRP        Treatment is tailored to this patient's individual needs.  The ITP was reviewed with the patient and all questions were answered to their satisfaction.  Additional ITP documentation can be found electronically including daily and monthly exercise summaries, daily session notes with ECG summaries, education notes, daily medication reconciliation, and daily physician supervision.      REASSESSMENT SUMMARY   DATE:  25    See ITP below for further details including patient goals and plan of care for progression.    Resting BP  104/70 - 120/64,  HR 86 - 95  Exercise /60- 124/74.  HR 93 - 107  Exercise session details:  40 minutes,  1.8 - 2.0 METs  Telemetry:  NSR  Symptoms: no cardiac  Current functional status:    home exercise: none  ADLs: minimal - d/t chronic orthopedic pain  Clinical Comments:   Yusuf is significantly limited by chronic orthopedic pain and therefore only tolerates low levels on the Nustep.  Progression will be hampered by his pain.   Yusuf stopped wearing his life vest and returned it to the company      INITIAL EVALUATION SUMMARY DATE:  25    Patient's subjective report of progress/symptoms:   Has remained symptom free  Wearing his life vest  Home exercise/ADLs:   Has been very sedentary since his event because he was unsure if it was safe  Typically sedentary d/t orthopedic issues  Clinical Comments:   Yusuf got a late start with cardiac rehab.  According to the Pt he was told that he couldn't exercise wearing the life vest.  Unsure of the source since cardiac rehab is beneficial for such patients.    Completed an initial submax ETT on the nustep d/t his multiple lower body orhto limitations  Admits to chronic depression since d/c from the [employer] - PHQ-9 = 10 - not interested in therapy, reports he does not have emotional support  Has ov with Dr. Valdovinos May 27  He is compliant wearing his life vest - didn't understand why he needed to wear it - provided education on it's use/benefit  Pt is doubtful that cardiac rehab will benefit him    Initial Fitness Assessment: NuStep submaximal ETT:  Resting:  BP: 110/70  HR: 91  Exercise:  BP: 140/80  HR: 104  METs:  2.4  ECG Summary: NSR, rare PVC  Symptoms: no cardiac sxs, leg pain  Test terminated at:  RPE 6    Dx:   Encounter Diagnosis   Name Primary?    Chronic systolic heart failure (HCC) Yes       Description of Diagnosis: Ischemic Cardiomyopathy  - EF 30% - life vest  Date of onset: 4/17/25  Other Cardiac History: 1/17/25:  STEMI, CHYNA to LM - ostial LAD & mLAD,  of LCX, LV mural thrombus        ASSESSMENT    Medical History:   Past Medical History:   Diagnosis Date    Depression     Diabetes mellitus (HCC)     Hyperlipidemia     Hypertension     Osteoarthritis, knee        Family History:  Family History   Problem Relation Name Age of Onset    No Known Problems Mother      Heart attack Father      Arthritis Family      Cancer Family      Diabetes Family      Heart disease Family      Osteoporosis Family         Allergies:   Patient has no known allergies.    Current Medications:   Current Medications[1]    Medication compliance: Yes   Comments: Pt reports to be compliant with medications    Physical Limitations: two rods in right hip following a fracture, B/L knee replacements, chronic pain, arthritis in shoulder/hand (cortisone shots)    Fall Risk: Moderate   Comments: Patient uses walking assist device (walker/cane/rollator) and Denies a fall in the past 6 months    Cultural needs: none      CAD Risk Factors:  Cholesterol: Yes  HTN: Yes  DM: Type 2   Not checking  "BG because it's a waste of time  Obesity: Yes   Inactivity: Yes      EXERCISE ASSESSMENT:      Current Functional Status:  Occupation: disability - was a  in the [employer] (5 years)   Recreation/Physical Activity: sedentary  ADL’s: doesn't do much  [place]: No limitations  Home exercise: none  Other Comments: doesn't go up his stairs      SMART Exercise Goals:   reduced dyspnea with physical activity    improved DASI score by 10%  increased exercise capacity by 40% based on peak METs tolerated in cardiac rehab exercise session  maintain > 150 minutes per week of moderate intensity exercise  improved 6MWT distance by 10%    Patient Specific EXERCISE GOALS:       none      Functional Capacity Screening Tool:  [facility] Activity Status Index:  3.63 METs    NUTRITION ASSESSMENT:    Initial Weight:  231.6 (wearing life vest)  Current Weight: 225 - no life vest    Height:   Ht Readings from Last 1 Encounters:   05/27/25 6' 2\" (1.88 m)       Rate Your Plate Score: 67/81    Diabetes: T2D  A1c: 8.2    last measured: 1/18/25    Lipid management: Discussed diet and lipid management and Last lipid profile 1/18/25  Chol 112    HDL 33  LDL 58    Current Dietary Habits:  No sweets  Been through dietary counseling  Not a lot of beef and pork  Eats what's in the fridge  Lost 20lbs since [place]  Fruits/vegs    SMART Nutrition Goals:   reduced BMI to < 25, eat poultry without the skin, rarely eat cheese or choose reduced fat or skim, choose light or fat-free salad dressings or carty, and choose low sugar desserts and sweets    Patient Specific NUTRITION GOALS:     1. Pt has no specific dietary goals      Drug/Alcohol Use:   No      PSYCHOSOCIAL ASSESSMENT:    Date of last Assessment:  5/21/25  Depression screening:  PHQ-9 = 10    Interpretation:  10-14 = Moderate Depression  Anxiety screening:  FORREST-7 = 5    Interpretation: 5-9 = Mild anxiety    Pt self-report of depression and anxiety   Patient has a history of depression - " chronic readjustment disorder   No therapy or medication    Self-reported stress level:  1   Stressors:  none  Stress Management Tools: hang out with his dogs    SMART Psychosocial Goals:     PHQ-9 - reduced severity by one level, Physical Fitness in [facility] Score < 3, Social Support in [facility] Score < 3, Daily Activity in [facility] Score < 3, Pain in [facility] Score < 3, Overall Health in [facility] Score < 3,  Increased interest and pleasure in doing things,  reduced time feeling down, depressed or hopeless, improved sleeping habits, feel less tired with more energy, improved concentration, reduced time feeling nervous or on edge, and reduced irritability    Patient Specific PSYCHOCOSOCIAL GOALS:    Talk to friends on the phone  Spend time with his dogs      Quality of Life Screen:  (Higher score indicates disease impact on QOL)  [facility] COOP score: 25/45     Social Support:   Wife is not supportive, 3 friends who he talks to occasionally  Community/Social Activities: none     Psychosocial Assessment as it relates to rehabilitation:   Patient denies issues with his/her family or home life that may affect their rehabilitation efforts.       OTHER CORE COMPONENT ASSESSMENT:    Tobacco Use:     Pt quit 13 years ago (2012)   and has abstained    Anginal Symptoms:  lightheadedness   NTG use: No prescription    SMART Goals:   consistent, controlled resting BP < 130/80 and medication compliance    Patient Specific CORE COMPONENT GOALS:    Take medications        INDIVIDUALIZED TREATMENT PLAN      EXERCISE GOALS and PLAN      Progress toward Exercise goals:   Pt is progressing and showing improvement  toward the following goals:  consistent attendance in cardiac rehab, progressed to 40 mins.  , Pt has not made progress toward the following goals: no progression in MET level. , Will continue to educate and progress as tolerated., Patient will be encouraged to focus on lifestyle modifications following  discharge.    Exercise Plan:    education on home exercise guidelines, home exercise 30+ mins 2 days opposite CR, Group class: Risk Factors for Heart Disease, Patient education:   Exercise After Cardiac Rehabilitation, and After discharge patient will continue to follow a regular exercise regimen with the goal of a minimum of 150 minutes per week of moderate intensity exercise.      The patient was counseled on exercise guidelines to achieve a minimum of 150 mins/wk of moderate intensity (RPE 4-6)   exercise and encouraged to add 1-2 days of exercise on opposite days of cardiac rehab as tolerated.       PHYSICIAN PRESCRIBED EXERCISE:    Current Aerobic Exercise Prescription:      Frequency: 3 days/week   Supplement with home exercise 2+ days/wk as tolerated       Minutes: 40         METS: 1.8-2.5            HR: RHR +30-40bpm   RPE: 4-6         Modalities: NuStep     Exercise workloads will be progressed gradually as tolerated, within limits of patient's ability, and according to the patient's   response to the exercise program.      Aerobic Exercise Prescription Plan for Progression   Frequency: 3 days/week of cardiac rehab       Supplement with home exercise 2+ days/wk as tolerated    Minutes: 40       >150 mins/wk of moderate intensity exercise   METS: 2.0-3.5   HR: RHR +30-40bpm     RPE: 4-6   Modalities: NuStep    Strength training:  Pt's ability to perform strength training will be limited by his orthopedic pain   Modalities:     Home Exercise: none    Exercise Education: benefit of exercise for CAD risk factors, home exercise guidelines, AHA guidelines to achieve >150 mins/wk of moderate exercise, and RPE scale     Readiness to change: Pre-Contemplation:   (Not yet acknowledging that there is a problem behavior that needs to change)      NUTRITION GOALS AND PLAN      Nutritional   Reviewed patient's Rate your Plate. Discussed key elements of heart healthy eating. Reviewed patient goals for dietary  modifications and their clinical implications.  Reviewed most recent lipid profile.     Patient's progress toward Nutrition goals:    Pt has not made progress toward the following goals: no change in diet. , Will continue to educate and progress as tolerated., Patient will be encouraged to focus on lifestyle modifications following discharge.      Nutrition Plan:   group class: Reading Food Labels, group Class: Heart Healthy Eating, replace refined flours with whole grains, eat reduced-fat or part-skim cheese or rarely eat, use light or fat-free salad dressings and mayonnaise, and choose desserts such as fruit, holly food cake, low-fat or fat-free sweets    Measurable goals were based Rate Your Plate Dietary Self-Assessment. These are the areas in which the patient could score higher on the assessment.  Goals include recommendations for a heart healthy diet based on American Heart Association.    Nutrition Education:   heart healthy eating principles  low sodium diet  maintaining hydration  nutrition for  lipid management    Readiness to change: Pre-Contemplation:   (Not yet acknowledging that there is a problem behavior that needs to change)      PSYCHOSOCIAL GOALS AND PLAN    Psychosocial Assessment as it relates to rehabilitation:   Patient denies issues with his/her family or home life that may affect their rehabilitation efforts.     Patient's progress toward Psychosocial goals:    Reviewed Pt goals and determined plan of care    Psychosocial Intervention/plan:   Class: Stress and Your Health, Class: Relaxation, PHQ-9 >5 will refer to MD, and enjoy his dogs, talk to friends,     Psychosocial Education: benefits of a positive support system, stress management techniques, and benefits of mental health counseling    Information to utilize [employer] was provided as well as contact information for counseling through  Behavioral Health and group psychotherapy groups available.    Readiness to change:  Pre-Contemplation:   (Not yet acknowledging that there is a problem behavior that needs to change)      OTHER CORE COMPONENTS GOALS and PLAN      Blood Pressure will be monitored throughout the program and cardiologist will be notified of elevated trends.    Pt will be encouraged to monitor home BP if advised by cardiologist.    Tobacco Plan:   N/A:  Pt has a remote history of smoking.      Progress toward Core Component goals:   Pt is progressing and showing improvement  toward the following goals:  spends time with his dogs, enjoys friends.  , Will continue to educate and progress as tolerated., Patient will be encouraged to focus on lifestyle modifications following discharge.    Other Core Components Intervention:   group class: Understanding Heart Disease, group class: Common Heart Medications, medication compliance, avoid processed foods, engage in regular exercise, eliminate salt shaker at the table, use salt substitutes, and check labels for sodium content    Group and Individual Education:  components of blood pressure management    Readiness to change: Pre-Contemplation:   (Not yet acknowledging that there is a problem behavior that needs to change)         [1]   Current Outpatient Medications   Medication Sig Dispense Refill    acetaminophen (TYLENOL) 325 mg tablet Take 2 tablets (650 mg total) by mouth every 6 (six) hours as needed for mild pain, headaches or fever      atorvastatin (LIPITOR) 80 mg tablet Take 1 tablet (80 mg total) by mouth daily 30 tablet 0    clopidogrel (PLAVIX) 75 mg tablet Take 1 tablet (75 mg total) by mouth daily 30 tablet 0    Empagliflozin 25 MG TABS Take 0.5 tablets (12.5 mg total) by mouth in the morning 45 tablet 1    gabapentin (NEURONTIN) 300 mg capsule Take 300 mg by mouth      insulin glargine (LANTUS SOLOSTAR) 100 units/mL injection pen Inject 15 Units under the skin daily at bedtime 15 mL 0    magnesium Oxide (MAG-OX) 400 mg TABS Take 2 tablets (800 mg total) by mouth  2 (two) times a day 120 tablet 0    melatonin 5 MG TABS Take 1 tablet (5 mg total) by mouth daily at bedtime (Patient not taking: Reported on 4/29/2025) 30 tablet 0    metFORMIN (GLUCOPHAGE) 1000 MG tablet       metoprolol succinate (TOPROL-XL) 25 mg 24 hr tablet Take 1 tablet (25 mg total) by mouth daily 30 tablet 17    sacubitril-valsartan (Entresto) 24-26 MG TABS Take 1 tablet by mouth 2 (two) times a day (Patient not taking: Reported on 5/27/2025) 180 tablet 5    spironolactone (ALDACTONE) 25 mg tablet Take 1 tablet (25 mg total) by mouth daily 30 tablet 17    spironolactone (ALDACTONE) 25 mg tablet Take 1 tablet (25 mg total) by mouth daily 30 tablet 1    warfarin (COUMADIN) 5 mg tablet Take 1.5 tablets (7.5 mg total) by mouth daily 137 tablet 0     No current facility-administered medications for this visit.

## 2025-06-20 ENCOUNTER — APPOINTMENT (OUTPATIENT)
Facility: HOSPITAL | Age: 70
End: 2025-06-20
Attending: STUDENT IN AN ORGANIZED HEALTH CARE EDUCATION/TRAINING PROGRAM
Payer: MEDICARE

## 2025-06-23 ENCOUNTER — APPOINTMENT (OUTPATIENT)
Facility: HOSPITAL | Age: 70
End: 2025-06-23
Attending: STUDENT IN AN ORGANIZED HEALTH CARE EDUCATION/TRAINING PROGRAM
Payer: MEDICARE

## 2025-06-24 NOTE — PROGRESS NOTES
"Advanced Heart Failure/Pulmonary Hypertension Outpatient Note - Joseph Aguilar 70 y.o. male MRN: [id number]    @ Encounter: [id number]      Assessment:  70 y.o. male PMH and acute problems listed later in this note (a partial list may also be included within 'assessment' section) presents for consultation.  I first met Joseph Aguilar on 4/29/25.  Referred by No ref. provider found.    This patient was identified as a [facility] candidate.   [facility]  The patient's primary cardiac team was general cardiology as inpt 1/2025, has seen AP's as outpt until our first meeting 4/29/25  ICM, HFrEF 25%, nondilated LV, nl RV size/fxn  CAD; 1/2025 STEMI s/p PCI with CHYNA to LM-ostial LAD and mLAD;  of LCx  LV thrombus  HTN  HLD  DM      Today I have reviewed all pertinent labs/imaging/data including but not limited to:        Lab Units 06/13/25  1204 06/04/25  1311 05/05/25  1459   CREATININE mg/dL 1.06 1.16 0.95         Lab Results   Component Value Date    K 4.2 06/13/2025     Lab Results   Component Value Date    HGBA1C 8.2 (H) 01/18/2025     Lab Results   Component Value Date    VZD3PZTRTBNI 1.786 05/05/2025     Lab Results   Component Value Date    LDLCALC 58 01/18/2025     No results found for: \"BNP\"   No results found for: \"NTBNP\"       TODAY'S PLAN:     06/25/25  Warm, euvolemic  No new cardiac complaints, feels generally well  Asymptomatic hypotension  NYHA I  Active, feels well  He sent his lifevest back  Labs after recent GDMT changes were acceptable    Gdmt below, on low dose 3 drug regimen prior to our first visit 4/29/25. No arni use.  BP may be somewhat limiting  [facility] can add complexity - ultimately was able to get on 4 pillars gdmt [facility]  Up metop today  Has lifevest - he sent it back  Narrow qrs  Check LVEF after 3 months on max tolerated gdmt, potentially check with 8/2025 CMR - ordered today    On sapt + AC already  On high intens statin    Tlc, exercise, diet    He paused cardiac rehab " 2/2 neck pain and steroid injections, he plans to resume near term    Follow up:  With me in 8 weeks after CMR, or sooner if symptoms evolve.  In addition to follow up with their other medical providers    Key info from my prior notes:    I am meeting patient for the first time today  Warm, euvolemic  No new cardiac complaints, feels generally well  Wearing lifevest    We Reviewed his past cardiac data including echo 4/2025  Serial echos over 2 months on minimal gdmt show minimal EF change  LV thrombus vs muscle bundle - slight decrease in size across studies, though unclear.  Will plan for future CMR for hopefully more definitive assessment after 6 mo of AC, potentially 8/2025    Cardiac rehab referral given    Adivse tlc, diet, exercise    Needs DM control, fu primary team    Neurohormonal Blockade/GDMT:  --Beta-Blocker: toprol xl 25 qd > 50 qd  --ACEi, ARB, ARNi: entresto 24/26 bid  --MRA: aldactone 25 qd  --SGLT2i: jardiance 25 (?half pill is currently ordered)  --Hydralazine/nitrates: not on    --Diuretic: not on    Other HF pharmaco-invasive therapy (if applicable):   PPM,  ICD , CRT (if applicable):  Interrogation:  Advanced Therapies (if applicable):   --Inotrope:  --LVAD/Transplant Candidacy:    Studies:  Today I have reviewed all pertinent patient data/labs/imaging where available, including but not limited to the below studies. This includes my independent interpretation. Selected results may be displayed here but comprehensive listing is omitted for note clarity and can be found in the epic chart.    ECG.    Echo.    Stress.    Cath.    HPI:   70 y.o. male PMH and acute problems listed later in this note (a partial list may also be included within 'assessment' section) presents for consultation.  No new CP/SOB/dizziness/palpitations/syncope.  No new fatigue.  No new unintentional weight changes.  No new leg swelling, PND, pillow orthopnea.  No new fevers, chills, cough, nausea, vomiting, diarrhea,  dysuria.    Interval History:  As noted in 'plan' section above and prior epic chart notes.    No new CP/SOB/dizziness/palpitations/syncope.  No new fatigue.  No new unintentional weight changes.  No new leg swelling, PND, pillow orthopnea.  No new fevers, chills, cough, nausea, vomiting, diarrhea, dysuria.    ROS:  10 point ROS negative except as specified in HPI    Past Medical History:   Diagnosis Date    Depression     Diabetes mellitus (HCC)     Hyperlipidemia     Hypertension     Osteoarthritis, knee      Patient Active Problem List   Diagnosis    Glenohumeral arthritis, right    Primary osteoarthritis of both knees    Primary hypertension    Mixed hyperlipidemia    Type 2 diabetes mellitus without complication, without long-term current use of insulin (HCC)    Obesity (BMI 30-39.9)    S/P TKR (total knee replacement), right    Closed nondisplaced intertrochanteric fracture of left femur with routine healing    Pain in both feet    Polyneuropathy associated with underlying disease (HCC)    Slow transit constipation    Vitamin D deficiency    Anemia    Hypomagnesemia    Right foot pain    Glenohumeral arthritis, left    CAD; STEMI s/p PCI with CHYNA to LM-ostial LAD and mLAD;  of LCx (1/17/2025)    ICM with EF 25%    LV (left ventricular) mural thrombus    Hematoma of right lower extremity    Subtherapeutic anticoagulation    Osteoarthritis of multiple joints    Chronic back pain    Constipation    Ambulatory dysfunction    Chronic systolic heart failure (HCC)     No current facility-administered medications for this visit.    Current Outpatient Medications   Medication Instructions    acetaminophen (TYLENOL) 650 mg, Oral, Every 6 hours PRN    atorvastatin (LIPITOR) 80 mg, Oral, Daily    clopidogrel (PLAVIX) 75 mg, Oral, Daily    Empagliflozin 12.5 mg, Oral, Daily    gabapentin (NEURONTIN) 300 mg    insulin glargine (LANTUS SOLOSTAR) 15 Units, Subcutaneous, Daily at bedtime    magnesium Oxide (MAG-OX) 800 mg,  Oral, 2 times daily    Melatonin 5 mg, Oral, Daily at bedtime    metFORMIN (GLUCOPHAGE) 1000 MG tablet     metoprolol succinate (TOPROL-XL) 50 mg, Oral, Daily    sacubitril-valsartan (Entresto) 24-26 MG TABS 1 tablet, Oral, 2 times daily    spironolactone (ALDACTONE) 25 mg, Oral, Daily    spironolactone (ALDACTONE) 25 mg, Oral, Daily    warfarin (COUMADIN) 7.5 mg, Oral, Daily (warfarin)      No Known Allergies  Social History     Socioeconomic History    Marital status: /Civil Union     Spouse name: Not on file    Number of children: Not on file    Years of education: Not on file    Highest education level: Not on file   Occupational History    Not on file   Tobacco Use    Smoking status: Former     Current packs/day: 0.00     Types: Cigarettes     Quit date: 3/1/2012     Years since quittin.3     Passive exposure: Past    Smokeless tobacco: Never   Vaping Use    Vaping status: Former    Quit date: 2010   Substance and Sexual Activity    Alcohol use: Yes     Alcohol/week: 3.0 standard drinks of alcohol     Types: 3 Cans of beer per week     Comment: 'couple beers every couple weeks'    Drug use: Not Currently    Sexual activity: Not Currently   Other Topics Concern    Not on file   Social History Narrative    Caffeine use, active     Social Drivers of Health     Financial Resource Strain: Not on file   Food Insecurity: No Food Insecurity (2/10/2025)    Nursing - Inadequate Food Risk Classification     Worried About Running Out of Food in the Last Year: Never true     Ran Out of Food in the Last Year: Never true     Ran Out of Food in the Last Year: Never true   Transportation Needs: No Transportation Needs (2/10/2025)    Nursing - Transportation Risk Classification     Lack of Transportation: Not on file     Lack of Transportation: No   Physical Activity: Not on file   Stress: Not on file   Social Connections: Not on file   Intimate Partner Violence: Unknown (2/10/2025)    Nursing IPS     Feels  "Physically and Emotionally Safe: Not on file     Physically Hurt by Someone: Not on file     Humiliated or Emotionally Abused by Someone: Not on file     Physically Hurt by Someone: No     Hurt or Threatened by Someone: No   Housing Stability: Unknown (2/10/2025)    Nursing: Inadequate Housing Risk Classification     Has Housing: Not on file     Worried About Losing Housing: Not on file     Unable to Get Utilities: Not on file     Unable to Pay for Housing in the Last Year: No     Has Housing: No     Family History   Problem Relation Name Age of Onset    No Known Problems Mother      Heart attack Father      Arthritis Family      Cancer Family      Diabetes Family      Heart disease Family      Osteoporosis Family         Physical Exam:  Vitals:    06/25/25 1120   BP: 100/68   BP Location: Left arm   Patient Position: Sitting   Cuff Size: Standard   Pulse: 95   SpO2: 97%   Weight: 102 kg (224 lb 11.2 oz)   Height: 6' 2\" (1.88 m)       Constitutional: NAD, non toxic  Ears/nose/mouth/throat: atraumatic  CV: RRR, nl S1S2, no murmurs/rubs/gallups, no JVD, no HJR  Resp: CTABL  GI: Soft, NTND  MSK: no swollen joints in exposed areas  Extr: No edema, warm LE  Pysche: Normal affect  Neuro: appropriate in conversation  Skin: dry and intact in exposed areas    Labs & Results:  Lab Results   Component Value Date    WBC 5.71 02/10/2025    HGB 11.1 (L) 02/10/2025    HCT 34.3 (L) 02/10/2025    MCV 97 02/10/2025     02/10/2025     Lab Results   Component Value Date    SODIUM 137 06/13/2025    K 4.2 06/13/2025     06/13/2025    CO2 25 06/13/2025    BUN 24 06/13/2025    CREATININE 1.06 06/13/2025    GLUC 118 02/10/2025    CALCIUM 9.6 06/13/2025       Counseling / Coordination of Care  Greater than 50% of total time was spent with the patient and / or family counseling and / or coordination of care. Discussion included diagnoses, most recent studies and any changes in treatment.    Thank you for the opportunity to " participate in the care of this patient.    Massimo Valdovinos MD  Attending Physician  Advanced Heart Failure Cardiology  [facility]

## 2025-06-25 ENCOUNTER — APPOINTMENT (OUTPATIENT)
Facility: HOSPITAL | Age: 70
End: 2025-06-25
Attending: STUDENT IN AN ORGANIZED HEALTH CARE EDUCATION/TRAINING PROGRAM
Payer: MEDICARE

## 2025-06-25 ENCOUNTER — OFFICE VISIT (OUTPATIENT)
Dept: CARDIOLOGY CLINIC | Facility: CLINIC | Age: 70
End: 2025-06-25
Payer: MEDICARE

## 2025-06-25 VITALS
OXYGEN SATURATION: 97 % | HEART RATE: 95 BPM | DIASTOLIC BLOOD PRESSURE: 68 MMHG | HEIGHT: 74 IN | WEIGHT: 224.7 LBS | SYSTOLIC BLOOD PRESSURE: 100 MMHG | BODY MASS INDEX: 28.84 KG/M2

## 2025-06-25 DIAGNOSIS — Z98.61 CAD S/P PERCUTANEOUS CORONARY ANGIOPLASTY: ICD-10-CM

## 2025-06-25 DIAGNOSIS — I25.10 CAD S/P PERCUTANEOUS CORONARY ANGIOPLASTY: ICD-10-CM

## 2025-06-25 DIAGNOSIS — I25.5 ISCHEMIC CARDIOMYOPATHY: Primary | ICD-10-CM

## 2025-06-25 DIAGNOSIS — I51.3 LV (LEFT VENTRICULAR) MURAL THROMBUS: ICD-10-CM

## 2025-06-25 PROCEDURE — 99214 OFFICE O/P EST MOD 30 MIN: CPT | Performed by: STUDENT IN AN ORGANIZED HEALTH CARE EDUCATION/TRAINING PROGRAM

## 2025-06-25 RX ORDER — METOPROLOL SUCCINATE 25 MG/1
50 TABLET, EXTENDED RELEASE ORAL DAILY
Qty: 60 TABLET | Refills: 17 | Status: SHIPPED | OUTPATIENT
Start: 2025-06-25 | End: 2026-12-17

## 2025-07-07 ENCOUNTER — OFFICE VISIT (OUTPATIENT)
Dept: OBGYN CLINIC | Facility: CLINIC | Age: 70
End: 2025-07-07
Payer: MEDICARE

## 2025-07-07 VITALS — HEIGHT: 74 IN | BODY MASS INDEX: 29.24 KG/M2 | WEIGHT: 227.8 LBS

## 2025-07-07 DIAGNOSIS — M19.012 PRIMARY OSTEOARTHRITIS OF SHOULDERS, BILATERAL: Primary | ICD-10-CM

## 2025-07-07 DIAGNOSIS — M19.011 PRIMARY OSTEOARTHRITIS OF SHOULDERS, BILATERAL: Primary | ICD-10-CM

## 2025-07-07 PROCEDURE — 99213 OFFICE O/P EST LOW 20 MIN: CPT | Performed by: ORTHOPAEDIC SURGERY

## 2025-07-07 PROCEDURE — 20610 DRAIN/INJ JOINT/BURSA W/O US: CPT | Performed by: ORTHOPAEDIC SURGERY

## 2025-07-07 RX ORDER — BUPIVACAINE HYDROCHLORIDE 2.5 MG/ML
2 INJECTION, SOLUTION INFILTRATION; PERINEURAL
Status: COMPLETED | OUTPATIENT
Start: 2025-07-07 | End: 2025-07-07

## 2025-07-07 RX ORDER — LIDOCAINE HYDROCHLORIDE 10 MG/ML
2 INJECTION, SOLUTION INFILTRATION; PERINEURAL
Status: COMPLETED | OUTPATIENT
Start: 2025-07-07 | End: 2025-07-07

## 2025-07-07 RX ORDER — METHYLPREDNISOLONE ACETATE 40 MG/ML
2 INJECTION, SUSPENSION INTRA-ARTICULAR; INTRALESIONAL; INTRAMUSCULAR; SOFT TISSUE
Status: COMPLETED | OUTPATIENT
Start: 2025-07-07 | End: 2025-07-07

## 2025-07-07 RX ADMIN — BUPIVACAINE HYDROCHLORIDE 2 ML: 2.5 INJECTION, SOLUTION INFILTRATION; PERINEURAL at 12:15

## 2025-07-07 RX ADMIN — METHYLPREDNISOLONE ACETATE 2 ML: 40 INJECTION, SUSPENSION INTRA-ARTICULAR; INTRALESIONAL; INTRAMUSCULAR; SOFT TISSUE at 12:15

## 2025-07-07 RX ADMIN — LIDOCAINE HYDROCHLORIDE 2 ML: 10 INJECTION, SOLUTION INFILTRATION; PERINEURAL at 12:15

## 2025-07-07 NOTE — PROGRESS NOTES
Name: Joseph Aguilar      : 1955      MRN: [id number]  Encounter Provider: Arthur Egan DO  Encounter Date: 2025   Encounter department: [facility] [place]  :  Assessment & Plan  Primary osteoarthritis of shoulders, bilateral  Bilateral CSI provided today, patient tolerated the procedure well.             Bilateral shoulder glenohumeral osteoarthritis  The patient was offered a corticosteroid injection for their bilateral glenohumeral joints. They tolerated the procedure well.    The patient was educated they may have some irritation in the next few days and should rest, ice, elevate and perform gentle range of motion exercises. They were advised the medicine should begin to work in a few days time.    The patient was informed corticosteroid injections can be repeated at the earliest every 3 months.  Follow up 3 months for reevaluation and repeat CSI    History of the Present Illness   History of Present Illness   HPI   Joseph Aguilar is a 70 y.o. male with Bilateral shoulder glenohumeral osteoarthritis s/p corticosteroid injections on 2025. He admits great relief from the injections for approximately 6 weeks before pain begins worsening. The patient had to stop his cardiac rehab recently due to worsening shoulder pain. He would like repeat injections today. Pain 8/10 today.        Review of Systems     Review of Systems   Constitutional:  Negative for chills and fever.   HENT:  Negative for ear pain and sore throat.    Eyes:  Negative for pain and visual disturbance.   Respiratory:  Negative for cough and shortness of breath.    Cardiovascular:  Negative for chest pain and palpitations.   Gastrointestinal:  Negative for abdominal pain and vomiting.   Genitourinary:  Negative for dysuria and hematuria.   Musculoskeletal:  Negative for arthralgias and back pain.   Skin:  Negative for color change and rash.   Neurological:  Negative for seizures and syncope.  "  All other systems reviewed and are negative.      Physical Exam   Objective   Ht 6' 2\" (1.88 m)   Wt 103 kg (227 lb 12.8 oz)   BMI 29.25 kg/m²        Right Shoulder:   Active range of motion   80 degrees forward flexion  45 degrees abduction  5 degrees external rotation   SI joint internal rotation    Passive range of motion   90 degrees of forward flexion   Forward flexion testing 4/5  External rotation testing 5/5  Internal rotation testing 4-/5   The patient is neurovascularly intact distally in the extremity.    Left  Shoulder:   Active range of motion   70 degrees forward flexion  45 degrees abduction  5 degrees external rotation   SI joint internal rotation    Passive range of motion   70 degrees of forward flexion   Forward flexion testing 4/5  External rotation testing 5/5  Internal rotation testing 4-/5   The patient is neurovascularly intact distally in the extremity.    Data Review     I have personally reviewed pertinent films in PACS, and my interpretation follows.    X-rays taken 12/10/23 of Left shoulder independently reviewed and demonstrate severe degenerative changes with osteophyte formation.     X-rays taken 12/10/23 of Right shoulder independently reviewed and demonstrate severe degenerative changes with osteophyte formation.    Lab Results   Component Value Date/Time    HGBA1C 8.2 (H) 2025 04:43 AM    HGBA1C 7.2 (H) 2022 06:38 AM    CRP <3.0 10/22/2018 01:55 PM       Social History     Tobacco Use    Smoking status: Former     Current packs/day: 0.00     Types: Cigarettes     Quit date: 3/1/2012     Years since quittin.3     Passive exposure: Past    Smokeless tobacco: Never   Vaping Use    Vaping status: Former    Quit date: 2010   Substance Use Topics    Alcohol use: Yes     Alcohol/week: 3.0 standard drinks of alcohol     Types: 3 Cans of beer per week     Comment: 'couple beers every couple weeks'    Drug use: Not Currently           Large joint arthrocentesis: R " "glenohumeral    Performed by: Arthur Egan DO  Authorized by: Arthur Egan DO    Universal Protocol:  Consent: Verbal consent obtained  Risks and benefits: risks, benefits and alternatives were discussed  Consent given by: patient  Time out: Immediately prior to procedure a \"time out\" was called to verify the correct patient, procedure, equipment, support staff and site/side marked as required.  Patient understanding: patient states understanding of the procedure being performed  Site marked: the operative site was marked  Patient identity confirmed: verbally with patient  Supporting Documentation  Indications: pain     Is this a Visco injection? NoProcedure Details  Location: shoulder - R glenohumeral  Preparation: Patient was prepped and draped in the usual sterile fashion  Needle size: 22 G  Ultrasound guidance: no  Approach: superior  Medications administered: 2 mL bupivacaine 0.25 %; 2 mL methylPREDNISolone acetate 40 mg/mL; 2 mL lidocaine 1 %    Patient tolerance: patient tolerated the procedure well with no immediate complications  Dressing:  Sterile dressing applied      Large joint arthrocentesis: L glenohumeral    Performed by: Arthur Egan DO  Authorized by: Arthur Egan DO    Universal Protocol:  Consent: Verbal consent obtained  Risks and benefits: risks, benefits and alternatives were discussed  Consent given by: patient  Time out: Immediately prior to procedure a \"time out\" was called to verify the correct patient, procedure, equipment, support staff and site/side marked as required.  Patient understanding: patient states understanding of the procedure being performed  Site marked: the operative site was marked  Patient identity confirmed: verbally with patient  Supporting Documentation  Indications: pain     Is this a Visco injection? NoProcedure Details  Location: shoulder - L glenohumeral  Preparation: Patient was prepped and draped in the usual sterile fashion  Needle size: 22 " G  Ultrasound guidance: no  Approach: superior  Medications administered: 2 mL bupivacaine 0.25 %; 2 mL methylPREDNISolone acetate 40 mg/mL; 2 mL lidocaine 1 %    Patient tolerance: patient tolerated the procedure well with no immediate complications  Dressing:  Sterile dressing applied            Naina Treadwell   Scribe Attestation      I,:  Naina Treadwell am acting as a scribe while in the presence of the attending physician.:       I,:  Arthur Egan DO personally performed the services described in this documentation    as scribed in my presence.:

## 2025-07-09 ENCOUNTER — ANTICOAG VISIT (OUTPATIENT)
Dept: CARDIOLOGY CLINIC | Facility: CLINIC | Age: 70
End: 2025-07-09

## 2025-07-09 ENCOUNTER — CLINICAL SUPPORT (OUTPATIENT)
Facility: HOSPITAL | Age: 70
End: 2025-07-09
Payer: MEDICARE

## 2025-07-09 ENCOUNTER — APPOINTMENT (OUTPATIENT)
Dept: LAB | Facility: HOSPITAL | Age: 70
End: 2025-07-09
Payer: MEDICARE

## 2025-07-09 ENCOUNTER — RESULTS FOLLOW-UP (OUTPATIENT)
Dept: CARDIOLOGY CLINIC | Facility: CLINIC | Age: 70
End: 2025-07-09

## 2025-07-09 DIAGNOSIS — I50.22 CHRONIC SYSTOLIC HEART FAILURE (HCC): Primary | ICD-10-CM

## 2025-07-09 DIAGNOSIS — Z79.01 ANTICOAGULATION MONITORING, INR RANGE 2-3: ICD-10-CM

## 2025-07-09 LAB
INR PPP: 2.44 (ref 0.85–1.19)
PROTHROMBIN TIME: 27.2 SECONDS (ref 12.3–15)

## 2025-07-09 PROCEDURE — 93798 PHYS/QHP OP CAR RHAB W/ECG: CPT

## 2025-07-09 PROCEDURE — 85610 PROTHROMBIN TIME: CPT

## 2025-07-09 PROCEDURE — 36415 COLL VENOUS BLD VENIPUNCTURE: CPT

## 2025-07-09 NOTE — PROGRESS NOTES
CARDIAC REHABILITATION   ASSESSMENT AND INDIVIDUALIZED TREATMENT PLAN  DISCHARGE            Today's date: 2025   # of Exercise Sessions Completed:   Patient name: Joseph Aguilar      : 1955  Age: 70 y.o.       MRN: [id number]  Referring Physician: Massimo Valdovinos MD  Cardiologist: Massimo Valdovinos MD  Provider: Ramiro  Clinician: Alan Hammonds, MS, CEP, CCRP        Treatment is tailored to this patient's individual needs.  The ITP was reviewed with the patient and all questions were answered to their satisfaction.  Additional ITP documentation can be found electronically including daily and monthly exercise summaries, daily session notes with ECG summaries, education notes, daily medication reconciliation, and daily physician supervision.      SUMMARY   DATE:  25    See ITP below for further details including patient goals and plan of care for progression.    Clinical Comments: Yusuf decided to withdraw from cardiac rehab after completing 9 sessions.  He has significant orthopedic pain in both his upper and lower body.  He was not able to tolerate the exercise modalities and stated that he was doing more at home walking in his field on an incline.  TM was attempted in CR.  However, he struggled to manage.          REASSESSMENT SUMMARY   DATE:  25    See ITP below for further details including patient goals and plan of care for progression.    Resting BP  104/70 - 120/64,  HR 86 - 95  Exercise /60- 124/74.  HR 93 - 107  Exercise session details:  40 minutes,  1.8 - 2.0 METs  Telemetry:  NSR  Symptoms: no cardiac  Current functional status:    home exercise: none  ADLs: minimal - d/t chronic orthopedic pain  Clinical Comments:   Yusuf is significantly limited by chronic orthopedic pain and therefore only tolerates low levels on the Nustep.  Progression will be hampered by his pain.   Yusuf stopped wearing his life vest and returned it to the company      INITIAL EVALUATION SUMMARY DATE:   5/21/25    Patient's subjective report of progress/symptoms:   Has remained symptom free  Wearing his life vest  Home exercise/ADLs:   Has been very sedentary since his event because he was unsure if it was safe  Typically sedentary d/t orthopedic issues  Clinical Comments:   Yusuf got a late start with cardiac rehab.  According to the Pt he was told that he couldn't exercise wearing the life vest.  Unsure of the source since cardiac rehab is beneficial for such patients.   Completed an initial submax ETT on the nustep d/t his multiple lower body orhto limitations  Admits to chronic depression since d/c from the [facility] - PHQ-9 = 10 - not interested in therapy, reports he does not have emotional support  Has ov with Dr. Aruna Reyes 27  He is compliant wearing his life vest - didn't understand why he needed to wear it - provided education on it's use/benefit  Pt is doubtful that cardiac rehab will benefit him    Initial Fitness Assessment: NuStep submaximal ETT:  Resting:  BP: 110/70  HR: 91  Exercise:  BP: 140/80  HR: 104  METs:  2.4  ECG Summary: NSR, rare PVC  Symptoms: no cardiac sxs, leg pain  Test terminated at:  RPE 6    Dx:   Encounter Diagnosis   Name Primary?    Chronic systolic heart failure (HCC) Yes       Description of Diagnosis: Ischemic Cardiomyopathy  - EF 30% - life vest  Date of onset: 4/17/25  Other Cardiac History: 1/17/25:  STEMI, CHYNA to LM - ostial LAD & mLAD,  of LCX, LV mural thrombus        ASSESSMENT    Medical History:   Past Medical History:   Diagnosis Date    Depression     Diabetes mellitus (HCC)     Hyperlipidemia     Hypertension     Osteoarthritis, knee        Family History:  Family History   Problem Relation Name Age of Onset    No Known Problems Mother      Heart attack Father      Arthritis Family      Cancer Family      Diabetes Family      Heart disease Family      Osteoporosis Family         Allergies:   Patient has no known allergies.    Current Medications:   Current  "Medications[1]    Medication compliance: Yes   Comments: Pt reports to be compliant with medications    Physical Limitations: two rods in right hip following a fracture, B/L knee replacements, chronic pain, arthritis in shoulder/hand (cortisone shots)    Fall Risk: Moderate   Comments: Patient uses walking assist device (walker/cane/rollator) and Denies a fall in the past 6 months    Cultural needs: none      CAD Risk Factors:  Cholesterol: Yes  HTN: Yes  DM: Type 2   Not checking BG because it's a waste of time  Obesity: Yes   Inactivity: Yes      EXERCISE ASSESSMENT:      Current Functional Status:  Occupation: disability - was a  in the [employer] (5 years)   Recreation/Physical Activity: sedentary  ADL’s: doesn't do much  [place]: No limitations  Home exercise: none  Other Comments: doesn't go up his stairs      SMART Exercise Goals:   reduced dyspnea with physical activity    improved DASI score by 10%  increased exercise capacity by 40% based on peak METs tolerated in cardiac rehab exercise session  maintain > 150 minutes per week of moderate intensity exercise  improved 6MWT distance by 10%    Patient Specific EXERCISE GOALS:       none      Functional Capacity Screening Tool:  [facility] Activity Status Index:  3.63 METs    NUTRITION ASSESSMENT:    Initial Weight:  231.6 (wearing life vest)  Current Weight: 225 - no life vest    Height:   Ht Readings from Last 1 Encounters:   07/07/25 6' 2\" (1.88 m)       Rate Your Plate Score: 67/81    Diabetes: T2D  A1c: 8.2    last measured: 1/18/25    Lipid management: Discussed diet and lipid management and Last lipid profile 1/18/25  Chol 112    HDL 33  LDL 58    Current Dietary Habits:  No sweets  Been through dietary counseling  Not a lot of beef and pork  Eats what's in the fridge  Lost 20lbs since Jan  Fruits/vegs    SMART Nutrition Goals:   reduced BMI to < 25, eat poultry without the skin, rarely eat cheese or choose reduced fat or skim, choose light or " fat-free salad dressings or carty, and choose low sugar desserts and sweets    Patient Specific NUTRITION GOALS:     1. Pt has no specific dietary goals      Drug/Alcohol Use:   No      PSYCHOSOCIAL ASSESSMENT:    Date of last Assessment:  5/21/25  Depression screening:  PHQ-9 = 10    Interpretation:  10-14 = Moderate Depression  Anxiety screening:  FORREST-7 = 5    Interpretation: 5-9 = Mild anxiety    Pt self-report of depression and anxiety   Patient has a history of depression - chronic readjustment disorder   No therapy or medication    Self-reported stress level:  1   Stressors:  none  Stress Management Tools: hang out with his dogs    SMART Psychosocial Goals:     PHQ-9 - reduced severity by one level, Physical Fitness in [facility] Score < 3, Social Support in [facility] Score < 3, Daily Activity in [facility] Score < 3, Pain in [facility] Score < 3, Overall Health in [facility] Score < 3,  Increased interest and pleasure in doing things,  reduced time feeling down, depressed or hopeless, improved sleeping habits, feel less tired with more energy, improved concentration, reduced time feeling nervous or on edge, and reduced irritability    Patient Specific PSYCHOCOSOCIAL GOALS:    Talk to friends on the phone  Spend time with his dogs      Quality of Life Screen:  (Higher score indicates disease impact on QOL)  [facility] COOP score: 25/45     Social Support:   Wife is not supportive, 3 friends who he talks to occasionally  Community/Social Activities: none     Psychosocial Assessment as it relates to rehabilitation:   Patient denies issues with his/her family or home life that may affect their rehabilitation efforts.       OTHER CORE COMPONENT ASSESSMENT:    Tobacco Use:     Pt quit 13 years ago (2012)   and has abstained    Anginal Symptoms:  lightheadedness   NTG use: No prescription    SMART Goals:   consistent, controlled resting BP < 130/80 and medication compliance    Patient Specific CORE COMPONENT GOALS:     Take medications        INDIVIDUALIZED TREATMENT PLAN      EXERCISE GOALS and PLAN      Progress toward Exercise goals:   Pt is progressing and showing improvement  toward the following goals:  consistent attendance in cardiac rehab, progressed to 40 mins.  , Pt has not made progress toward the following goals: no progression in MET level. , Will continue to educate and progress as tolerated., Patient will be encouraged to focus on lifestyle modifications following discharge.    Exercise Plan:    education on home exercise guidelines, home exercise 30+ mins 2 days opposite CR, Group class: Risk Factors for Heart Disease, Patient education:   Exercise After Cardiac Rehabilitation, and After discharge patient will continue to follow a regular exercise regimen with the goal of a minimum of 150 minutes per week of moderate intensity exercise.      The patient was counseled on exercise guidelines to achieve a minimum of 150 mins/wk of moderate intensity (RPE 4-6)   exercise and encouraged to add 1-2 days of exercise on opposite days of cardiac rehab as tolerated.       PHYSICIAN PRESCRIBED EXERCISE:    Current Aerobic Exercise Prescription:      Frequency: 3 days/week   Supplement with home exercise 2+ days/wk as tolerated       Minutes: 40         METS: 1.8-2.5            HR: RHR +30-40bpm   RPE: 4-6         Modalities: NuStep     Exercise workloads will be progressed gradually as tolerated, within limits of patient's ability, and according to the patient's   response to the exercise program.      Aerobic Exercise Prescription Plan for Progression   Frequency: 3 days/week of cardiac rehab       Supplement with home exercise 2+ days/wk as tolerated    Minutes: 40       >150 mins/wk of moderate intensity exercise   METS: 2.0-3.5   HR: RHR +30-40bpm     RPE: 4-6   Modalities: NuStep    Strength training:  Pt's ability to perform strength training will be limited by his orthopedic pain   Modalities:     Home Exercise:  none    Exercise Education: benefit of exercise for CAD risk factors, home exercise guidelines, AHA guidelines to achieve >150 mins/wk of moderate exercise, and RPE scale     Readiness to change: Pre-Contemplation:   (Not yet acknowledging that there is a problem behavior that needs to change)      NUTRITION GOALS AND PLAN      Nutritional   Reviewed patient's Rate your Plate. Discussed key elements of heart healthy eating. Reviewed patient goals for dietary modifications and their clinical implications.  Reviewed most recent lipid profile.     Patient's progress toward Nutrition goals:    Pt has not made progress toward the following goals: no change in diet. , Will continue to educate and progress as tolerated., Patient will be encouraged to focus on lifestyle modifications following discharge.      Nutrition Plan:   group class: Reading Food Labels, group Class: Heart Healthy Eating, replace refined flours with whole grains, eat reduced-fat or part-skim cheese or rarely eat, use light or fat-free salad dressings and mayonnaise, and choose desserts such as fruit, holly food cake, low-fat or fat-free sweets    Measurable goals were based Rate Your Plate Dietary Self-Assessment. These are the areas in which the patient could score higher on the assessment.  Goals include recommendations for a heart healthy diet based on American Heart Association.    Nutrition Education:   heart healthy eating principles  low sodium diet  maintaining hydration  nutrition for  lipid management    Readiness to change: Pre-Contemplation:   (Not yet acknowledging that there is a problem behavior that needs to change)      PSYCHOSOCIAL GOALS AND PLAN    Psychosocial Assessment as it relates to rehabilitation:   Patient denies issues with his/her family or home life that may affect their rehabilitation efforts.     Patient's progress toward Psychosocial goals:    Reviewed Pt goals and determined plan of care    Psychosocial  Intervention/plan:   Class: Stress and Your Health, Class: Relaxation, PHQ-9 >5 will refer to MD, and enjoy his dogs, talk to friends,     Psychosocial Education: benefits of a positive support system, stress management techniques, and benefits of mental health counseling    Information to utilize [employer] was provided as well as contact information for counseling through  Behavioral Health and group psychotherapy groups available.    Readiness to change: Pre-Contemplation:   (Not yet acknowledging that there is a problem behavior that needs to change)      OTHER CORE COMPONENTS GOALS and PLAN      Blood Pressure will be monitored throughout the program and cardiologist will be notified of elevated trends.    Pt will be encouraged to monitor home BP if advised by cardiologist.    Tobacco Plan:   N/A:  Pt has a remote history of smoking.      Progress toward Core Component goals:   Pt is progressing and showing improvement  toward the following goals:  spends time with his dogs, enjoys friends.  , Will continue to educate and progress as tolerated., Patient will be encouraged to focus on lifestyle modifications following discharge.    Other Core Components Intervention:   group class: Understanding Heart Disease, group class: Common Heart Medications, medication compliance, avoid processed foods, engage in regular exercise, eliminate salt shaker at the table, use salt substitutes, and check labels for sodium content    Group and Individual Education:  components of blood pressure management    Readiness to change: Pre-Contemplation:   (Not yet acknowledging that there is a problem behavior that needs to change)         [1]   Current Outpatient Medications   Medication Sig Dispense Refill    acetaminophen (TYLENOL) 325 mg tablet Take 2 tablets (650 mg total) by mouth every 6 (six) hours as needed for mild pain, headaches or fever      atorvastatin (LIPITOR) 80 mg tablet Take 1 tablet (80 mg total) by mouth daily  30 tablet 0    clopidogrel (PLAVIX) 75 mg tablet Take 1 tablet (75 mg total) by mouth daily 30 tablet 0    Empagliflozin 25 MG TABS Take 0.5 tablets (12.5 mg total) by mouth in the morning 45 tablet 1    gabapentin (NEURONTIN) 300 mg capsule Take 300 mg by mouth (Patient not taking: Reported on 6/25/2025)      insulin glargine (LANTUS SOLOSTAR) 100 units/mL injection pen Inject 15 Units under the skin daily at bedtime 15 mL 0    magnesium Oxide (MAG-OX) 400 mg TABS Take 2 tablets (800 mg total) by mouth 2 (two) times a day 120 tablet 0    melatonin 5 MG TABS Take 1 tablet (5 mg total) by mouth daily at bedtime (Patient not taking: Reported on 4/29/2025) 30 tablet 0    metFORMIN (GLUCOPHAGE) 1000 MG tablet       metoprolol succinate (TOPROL-XL) 25 mg 24 hr tablet Take 2 tablets (50 mg total) by mouth daily 60 tablet 17    sacubitril-valsartan (Entresto) 24-26 MG TABS Take 1 tablet by mouth 2 (two) times a day 180 tablet 5    spironolactone (ALDACTONE) 25 mg tablet Take 1 tablet (25 mg total) by mouth daily 30 tablet 17    spironolactone (ALDACTONE) 25 mg tablet Take 1 tablet (25 mg total) by mouth daily 30 tablet 1    warfarin (COUMADIN) 5 mg tablet Take 1.5 tablets (7.5 mg total) by mouth daily 137 tablet 0     No current facility-administered medications for this visit.

## 2025-07-11 ENCOUNTER — APPOINTMENT (OUTPATIENT)
Facility: HOSPITAL | Age: 70
End: 2025-07-11
Payer: MEDICARE

## 2025-07-14 ENCOUNTER — APPOINTMENT (OUTPATIENT)
Facility: HOSPITAL | Age: 70
End: 2025-07-14
Payer: MEDICARE

## 2025-07-16 ENCOUNTER — APPOINTMENT (OUTPATIENT)
Facility: HOSPITAL | Age: 70
End: 2025-07-16
Payer: MEDICARE

## 2025-07-18 ENCOUNTER — APPOINTMENT (OUTPATIENT)
Facility: HOSPITAL | Age: 70
End: 2025-07-18
Payer: MEDICARE

## 2025-07-18 NOTE — TELEPHONE ENCOUNTER
Caller: Yusuf Aguilar     Doctor:     Reason for call: Patient calling for instructions on coumadin dosage and when he should test again. Patient did not receive voicemail and I was not able to connect him with the coumadin clinic. Please call the patient back to advise.     Call back#: [phone]

## 2025-07-21 ENCOUNTER — APPOINTMENT (OUTPATIENT)
Facility: HOSPITAL | Age: 70
End: 2025-07-21
Payer: MEDICARE

## 2025-07-21 DIAGNOSIS — I25.5 ISCHEMIC CARDIOMYOPATHY: ICD-10-CM

## 2025-07-21 NOTE — TELEPHONE ENCOUNTER
S/w pt. Advised that his last INR draw on 7/9 was 2.44 and Corrina Lopez LPN asked that he retest in one month. Pt verbalized understanding

## 2025-07-23 ENCOUNTER — APPOINTMENT (OUTPATIENT)
Facility: HOSPITAL | Age: 70
End: 2025-07-23
Payer: MEDICARE

## 2025-07-23 RX ORDER — WARFARIN SODIUM 5 MG/1
7.5 TABLET ORAL DAILY
Qty: 137 TABLET | Refills: 3 | Status: SHIPPED | OUTPATIENT
Start: 2025-07-23

## 2025-07-25 ENCOUNTER — APPOINTMENT (OUTPATIENT)
Facility: HOSPITAL | Age: 70
End: 2025-07-25
Payer: MEDICARE

## 2025-07-28 ENCOUNTER — APPOINTMENT (OUTPATIENT)
Facility: HOSPITAL | Age: 70
End: 2025-07-28
Payer: MEDICARE

## 2025-07-30 ENCOUNTER — APPOINTMENT (OUTPATIENT)
Facility: HOSPITAL | Age: 70
End: 2025-07-30
Payer: MEDICARE

## 2025-08-15 ENCOUNTER — APPOINTMENT (OUTPATIENT)
Dept: LAB | Facility: HOSPITAL | Age: 70
End: 2025-08-15
Payer: MEDICARE

## (undated) DEVICE — SUT VICRYL PLUS 2-0 CTB-1 27 IN VCPB259H

## (undated) DEVICE — PENCIL ELECTROSURG E-Z CLEAN -0035H

## (undated) DEVICE — ACE WRAP 6 IN UNSTERILE

## (undated) DEVICE — CHLORAPREP HI-LITE 26ML ORANGE

## (undated) DEVICE — CATH GUIDE EXT GUIDELINER COAST 6FR

## (undated) DEVICE — DGW .035 FC J3MM 260CM TEF: Brand: EMERALD

## (undated) DEVICE — GAUZE SPONGES,USP TYPE VII GAUZE, 12 PLY: Brand: CURITY

## (undated) DEVICE — NEEDLE 18 G X 1 1/2 SAFETY

## (undated) DEVICE — TR BAND RADIAL ARTERY COMPRESSION DEVICE: Brand: TR BAND

## (undated) DEVICE — SCD SEQUENTIAL COMPRESSION COMFORT SLEEVE MEDIUM KNEE LENGTH: Brand: KENDALL SCD

## (undated) DEVICE — TRAY FOLEY 16FR URIMETER SURESTEP

## (undated) DEVICE — RUNTHROUGH NS EXTRA FLOPPY PTCA GUIDEWIRE: Brand: RUNTHROUGH

## (undated) DEVICE — GLOVE SRG BIOGEL 8

## (undated) DEVICE — INTENDED FOR TISSUE SEPARATION, AND OTHER PROCEDURES THAT REQUIRE A SHARP SURGICAL BLADE TO PUNCTURE OR CUT.: Brand: BARD-PARKER SAFETY BLADES SIZE 10, STERILE

## (undated) DEVICE — Device

## (undated) DEVICE — BALLOON IABP 8FR 50ML

## (undated) DEVICE — CAPIT KNEE ATTUNE RP CEMENT - DEPUY

## (undated) DEVICE — GLIDESHEATH SLENDER STAINLESS STEEL KIT: Brand: GLIDESHEATH SLENDER

## (undated) DEVICE — SUT VICRYL PLUS 1 CTB-1 36 IN VCPB947H

## (undated) DEVICE — GLOVE INDICATOR PI UNDERGLOVE SZ 8.5 BLUE

## (undated) DEVICE — SILVER-COATED ANTIMICROBIAL BARRIER DRESSING: Brand: ACTICOAT   4" X 8"

## (undated) DEVICE — CUFF TOURNIQUET 34 X 4 IN QUICK CONNECT DISP 1BLA

## (undated) DEVICE — GUIDEWIRE WITH ICE™ HYDROPHILIC COATING: Brand: CHOICE™ PT

## (undated) DEVICE — PADDING CAST 4 IN  COTTON STRL

## (undated) DEVICE — THE SIMPULSE SOLO SYSTEM WITH ULTREX RETRACTABLE SPLASH SHIELD TIP: Brand: SIMPULSE SOLO

## (undated) DEVICE — BETHLEHEM UNIV TOTAL KNEE, KIT: Brand: CARDINAL HEALTH

## (undated) DEVICE — U-DRAPE: Brand: CONVERTORS

## (undated) DEVICE — CATH GUIDE LAUNCHER 6FR EBU 3.5

## (undated) DEVICE — PTCA DILATATION CATHETER: Brand: EMERGE™

## (undated) DEVICE — CATH DIAG 5FR .045 100CM FR4

## (undated) DEVICE — BALLOON NC EMERGE 4 X 20MM

## (undated) DEVICE — ABDOMINAL PAD: Brand: DERMACEA

## (undated) DEVICE — RECIPROCATING BLADE, DOUBLE SIDED (70.0 X 0.96 X 12.5MM)

## (undated) DEVICE — PADDING,UNDERCAST,COTTON, 4"X4YD STERILE: Brand: MEDLINE

## (undated) DEVICE — JP 3-SPRING RES W/10FR PVC DRAIN/TR: Brand: CARDINAL HEALTH

## (undated) DEVICE — REM POLYHESIVE ADULT PATIENT RETURN ELECTRODE: Brand: VALLEYLAB

## (undated) DEVICE — HOOD: Brand: FLYTE, SURGICOOL

## (undated) DEVICE — 3M™ IOBAN™ 2 ANTIMICROBIAL INCISE DRAPE 6650EZ: Brand: IOBAN™ 2

## (undated) DEVICE — COBAN 4 IN STERILE

## (undated) DEVICE — DUAL CUT SAGITTAL BLADE

## (undated) DEVICE — SPINNING CEMENT MIXING BOWL

## (undated) DEVICE — SPONGE PVP SCRUB WING STERILE

## (undated) DEVICE — GUIDEWIRE WHOLEY HI TORQUE INTERM MOD J.035 260CM

## (undated) DEVICE — IMPERVIOUS STOCKINETTE: Brand: DEROYAL